# Patient Record
Sex: FEMALE | Race: WHITE | NOT HISPANIC OR LATINO | Employment: OTHER | URBAN - METROPOLITAN AREA
[De-identification: names, ages, dates, MRNs, and addresses within clinical notes are randomized per-mention and may not be internally consistent; named-entity substitution may affect disease eponyms.]

---

## 2023-03-24 DIAGNOSIS — M62.838 MUSCLE SPASM: ICD-10-CM

## 2023-03-24 DIAGNOSIS — J45.40 MODERATE PERSISTENT ASTHMA, UNSPECIFIED WHETHER COMPLICATED (HHS-HCC): Primary | ICD-10-CM

## 2023-03-24 PROBLEM — T78.3XXA ANGIO-EDEMA: Status: ACTIVE | Noted: 2023-03-24

## 2023-03-24 PROBLEM — G47.00 INSOMNIA: Status: ACTIVE | Noted: 2023-03-24

## 2023-03-24 PROBLEM — J44.9 COPD (CHRONIC OBSTRUCTIVE PULMONARY DISEASE) (MULTI): Status: ACTIVE | Noted: 2023-03-24

## 2023-03-24 PROBLEM — L03.116 CELLULITIS OF LEFT LOWER EXTREMITY: Status: ACTIVE | Noted: 2023-03-24

## 2023-03-24 PROBLEM — U07.1 COVID-19: Status: ACTIVE | Noted: 2023-03-24

## 2023-03-24 PROBLEM — R93.5 ABNORMAL ULTRASOUND OF ABDOMEN: Status: ACTIVE | Noted: 2023-03-24

## 2023-03-24 PROBLEM — K76.9 LIVER LESION: Status: ACTIVE | Noted: 2023-03-24

## 2023-03-24 PROBLEM — I73.9 PERIPHERAL VASCULAR DISEASE (CMS-HCC): Status: ACTIVE | Noted: 2023-03-24

## 2023-03-24 PROBLEM — M79.606 LEG PAIN: Status: ACTIVE | Noted: 2023-03-24

## 2023-03-24 PROBLEM — F41.9 ANXIETY: Status: ACTIVE | Noted: 2023-03-24

## 2023-03-24 PROBLEM — I25.10 CAD (CORONARY ARTERY DISEASE): Status: ACTIVE | Noted: 2023-03-24

## 2023-03-24 PROBLEM — I73.9 INTERMITTENT CLAUDICATION (CMS-HCC): Status: ACTIVE | Noted: 2023-03-24

## 2023-03-24 PROBLEM — Z98.890 S/P ARTERIOGRAM OF EXTREMITY: Status: ACTIVE | Noted: 2023-03-24

## 2023-03-24 PROBLEM — E55.9 VITAMIN D DEFICIENCY: Status: ACTIVE | Noted: 2023-03-24

## 2023-03-24 PROBLEM — R20.2 PARESTHESIAS: Status: ACTIVE | Noted: 2023-03-24

## 2023-03-24 RX ORDER — MONTELUKAST SODIUM 10 MG/1
10 TABLET ORAL NIGHTLY
COMMUNITY
Start: 2020-09-25 | End: 2023-03-24 | Stop reason: SDUPTHER

## 2023-03-24 RX ORDER — HYDROXYZINE HYDROCHLORIDE 25 MG/1
25 TABLET, FILM COATED ORAL
COMMUNITY
End: 2023-08-01 | Stop reason: SDUPTHER

## 2023-03-24 RX ORDER — CITALOPRAM 20 MG/1
1 TABLET, FILM COATED ORAL DAILY
COMMUNITY
Start: 2022-06-20 | End: 2023-08-01 | Stop reason: SDUPTHER

## 2023-03-24 RX ORDER — ASPIRIN 81 MG/1
1 TABLET ORAL DAILY
COMMUNITY
Start: 2020-09-25

## 2023-03-24 RX ORDER — AMLODIPINE BESYLATE 5 MG/1
1 TABLET ORAL DAILY
COMMUNITY
Start: 2017-02-10 | End: 2023-04-18 | Stop reason: SDUPTHER

## 2023-03-24 RX ORDER — ROSUVASTATIN CALCIUM 10 MG/1
1 TABLET, COATED ORAL DAILY
COMMUNITY
Start: 2020-09-25 | End: 2023-10-24 | Stop reason: SDUPTHER

## 2023-03-24 RX ORDER — CYCLOBENZAPRINE HCL 10 MG
10 TABLET ORAL 2 TIMES DAILY PRN
Qty: 30 TABLET | Refills: 3 | Status: SHIPPED | OUTPATIENT
Start: 2023-03-24 | End: 2023-08-09 | Stop reason: DRUGHIGH

## 2023-03-24 RX ORDER — ALBUTEROL SULFATE 90 UG/1
1 AEROSOL, METERED RESPIRATORY (INHALATION) EVERY 4 HOURS PRN
COMMUNITY
Start: 2021-11-02 | End: 2023-11-21 | Stop reason: SDUPTHER

## 2023-03-24 RX ORDER — CITALOPRAM 40 MG/1
1 TABLET, FILM COATED ORAL DAILY
COMMUNITY
Start: 2021-11-02 | End: 2023-07-26 | Stop reason: SDUPTHER

## 2023-03-24 RX ORDER — CEPHALEXIN 500 MG/1
500 TABLET ORAL 4 TIMES DAILY
COMMUNITY
Start: 2022-08-09 | End: 2023-10-04 | Stop reason: WASHOUT

## 2023-03-24 RX ORDER — CLOPIDOGREL BISULFATE 75 MG/1
1 TABLET ORAL DAILY
COMMUNITY
Start: 2022-08-23 | End: 2023-11-07 | Stop reason: SDUPTHER

## 2023-03-24 RX ORDER — FLUTICASONE FUROATE, UMECLIDINIUM BROMIDE AND VILANTEROL TRIFENATATE 200; 62.5; 25 UG/1; UG/1; UG/1
1 POWDER RESPIRATORY (INHALATION)
COMMUNITY
Start: 2022-02-04 | End: 2023-11-07 | Stop reason: SDUPTHER

## 2023-03-24 RX ORDER — MONTELUKAST SODIUM 10 MG/1
10 TABLET ORAL NIGHTLY
Qty: 90 TABLET | Refills: 1 | Status: SHIPPED | OUTPATIENT
Start: 2023-03-24 | End: 2023-09-21 | Stop reason: SDUPTHER

## 2023-03-24 RX ORDER — BUSPIRONE HYDROCHLORIDE 15 MG/1
1 TABLET ORAL 3 TIMES DAILY
COMMUNITY
Start: 2020-09-25 | End: 2023-10-04 | Stop reason: WASHOUT

## 2023-03-24 RX ORDER — CYCLOBENZAPRINE HCL 10 MG
10 TABLET ORAL 2 TIMES DAILY PRN
COMMUNITY
Start: 2020-09-14 | End: 2023-03-24 | Stop reason: SDUPTHER

## 2023-03-24 RX ORDER — LORAZEPAM 0.5 MG/1
TABLET ORAL DAILY PRN
COMMUNITY
Start: 2021-12-01 | End: 2023-05-02 | Stop reason: ALTCHOICE

## 2023-03-24 RX ORDER — TRAZODONE HYDROCHLORIDE 50 MG/1
50 TABLET ORAL NIGHTLY
COMMUNITY
Start: 2021-12-01 | End: 2023-05-24 | Stop reason: SDUPTHER

## 2023-03-29 ENCOUNTER — APPOINTMENT (OUTPATIENT)
Dept: PRIMARY CARE | Facility: CLINIC | Age: 68
End: 2023-03-29
Payer: MEDICARE

## 2023-04-18 DIAGNOSIS — I10 PRIMARY HYPERTENSION: Primary | ICD-10-CM

## 2023-04-18 RX ORDER — AMLODIPINE BESYLATE 5 MG/1
5 TABLET ORAL DAILY
Qty: 90 TABLET | Refills: 3 | Status: SHIPPED | OUTPATIENT
Start: 2023-04-18 | End: 2023-10-04 | Stop reason: WASHOUT

## 2023-05-02 ENCOUNTER — OFFICE VISIT (OUTPATIENT)
Dept: PRIMARY CARE | Facility: CLINIC | Age: 68
End: 2023-05-02
Payer: MEDICARE

## 2023-05-02 VITALS
TEMPERATURE: 97.3 F | SYSTOLIC BLOOD PRESSURE: 118 MMHG | WEIGHT: 139 LBS | BODY MASS INDEX: 25.42 KG/M2 | DIASTOLIC BLOOD PRESSURE: 70 MMHG

## 2023-05-02 DIAGNOSIS — R73.9 HYPERGLYCEMIA: ICD-10-CM

## 2023-05-02 DIAGNOSIS — E78.2 MIXED HYPERLIPIDEMIA: ICD-10-CM

## 2023-05-02 DIAGNOSIS — Z12.2 ENCOUNTER FOR SCREENING FOR LUNG CANCER: ICD-10-CM

## 2023-05-02 DIAGNOSIS — R20.2 PARESTHESIAS: ICD-10-CM

## 2023-05-02 DIAGNOSIS — F17.200 CURRENT SMOKER: ICD-10-CM

## 2023-05-02 DIAGNOSIS — I73.9 PERIPHERAL VASCULAR DISEASE (CMS-HCC): ICD-10-CM

## 2023-05-02 DIAGNOSIS — F41.9 ANXIETY: ICD-10-CM

## 2023-05-02 DIAGNOSIS — F17.210 CIGARETTE SMOKER: ICD-10-CM

## 2023-05-02 DIAGNOSIS — Z00.00 MEDICARE ANNUAL WELLNESS VISIT, SUBSEQUENT: ICD-10-CM

## 2023-05-02 DIAGNOSIS — Z00.00 ROUTINE GENERAL MEDICAL EXAMINATION AT HEALTH CARE FACILITY: Primary | ICD-10-CM

## 2023-05-02 DIAGNOSIS — J43.8 OTHER EMPHYSEMA (MULTI): ICD-10-CM

## 2023-05-02 LAB
ESTIMATED AVERAGE GLUCOSE FOR HBA1C: 120 MG/DL
HEMOGLOBIN A1C/HEMOGLOBIN TOTAL IN BLOOD: 5.8 %

## 2023-05-02 PROCEDURE — 83036 HEMOGLOBIN GLYCOSYLATED A1C: CPT

## 2023-05-02 PROCEDURE — 84443 ASSAY THYROID STIM HORMONE: CPT

## 2023-05-02 PROCEDURE — 1170F FXNL STATUS ASSESSED: CPT | Performed by: STUDENT IN AN ORGANIZED HEALTH CARE EDUCATION/TRAINING PROGRAM

## 2023-05-02 PROCEDURE — 4004F PT TOBACCO SCREEN RCVD TLK: CPT | Performed by: STUDENT IN AN ORGANIZED HEALTH CARE EDUCATION/TRAINING PROGRAM

## 2023-05-02 PROCEDURE — 1160F RVW MEDS BY RX/DR IN RCRD: CPT | Performed by: STUDENT IN AN ORGANIZED HEALTH CARE EDUCATION/TRAINING PROGRAM

## 2023-05-02 PROCEDURE — G0439 PPPS, SUBSEQ VISIT: HCPCS | Performed by: STUDENT IN AN ORGANIZED HEALTH CARE EDUCATION/TRAINING PROGRAM

## 2023-05-02 PROCEDURE — G0296 VISIT TO DETERM LDCT ELIG: HCPCS | Performed by: STUDENT IN AN ORGANIZED HEALTH CARE EDUCATION/TRAINING PROGRAM

## 2023-05-02 PROCEDURE — 1159F MED LIST DOCD IN RCRD: CPT | Performed by: STUDENT IN AN ORGANIZED HEALTH CARE EDUCATION/TRAINING PROGRAM

## 2023-05-02 PROCEDURE — 36415 COLL VENOUS BLD VENIPUNCTURE: CPT | Performed by: STUDENT IN AN ORGANIZED HEALTH CARE EDUCATION/TRAINING PROGRAM

## 2023-05-02 PROCEDURE — 99214 OFFICE O/P EST MOD 30 MIN: CPT | Performed by: STUDENT IN AN ORGANIZED HEALTH CARE EDUCATION/TRAINING PROGRAM

## 2023-05-02 PROCEDURE — 80061 LIPID PANEL: CPT

## 2023-05-02 ASSESSMENT — ACTIVITIES OF DAILY LIVING (ADL)
DRESSING: INDEPENDENT
TAKING_MEDICATION: INDEPENDENT
GROCERY_SHOPPING: INDEPENDENT
MANAGING_FINANCES: INDEPENDENT
DOING_HOUSEWORK: INDEPENDENT
BATHING: INDEPENDENT

## 2023-05-02 ASSESSMENT — ENCOUNTER SYMPTOMS
DYSURIA: 0
APPETITE CHANGE: 0
ARTHRALGIAS: 1
SLEEP DISTURBANCE: 0
DYSPHORIC MOOD: 0
COUGH: 1
SHORTNESS OF BREATH: 0
HEADACHES: 0
CHEST TIGHTNESS: 0
CONSTIPATION: 1
ACTIVITY CHANGE: 0
NERVOUS/ANXIOUS: 0
BLOOD IN STOOL: 0
WHEEZING: 0
LIGHT-HEADEDNESS: 0
FATIGUE: 0

## 2023-05-02 ASSESSMENT — PATIENT HEALTH QUESTIONNAIRE - PHQ9
SUM OF ALL RESPONSES TO PHQ9 QUESTIONS 1 AND 2: 0
2. FEELING DOWN, DEPRESSED OR HOPELESS: NOT AT ALL
1. LITTLE INTEREST OR PLEASURE IN DOING THINGS: NOT AT ALL

## 2023-05-02 NOTE — PROGRESS NOTES
Subjective   Patient ID: Radha Toussaint is a 67 y.o. female who presents for Medicare Annual Wellness Visit Subsequent (Medicare annual wellness).  Breathing doing well. Using her trelegy.     Anxiety and sleep much better. Has been using mindfulness and relaxation techniques.     Occasional constipation.     Hernia was bothering her a few months ago. Has stopped.     Arthritis pains, tolerable.     Due for CT lung screening. Discussed pros and cons with her.     Never had mammograms, does not wish to. No prior colon cancer screening. Declines.         Review of Systems   Constitutional:  Negative for activity change, appetite change and fatigue.   HENT:  Negative for congestion.    Eyes:  Negative for visual disturbance.   Respiratory:  Positive for cough. Negative for chest tightness, shortness of breath and wheezing.    Gastrointestinal:  Positive for constipation. Negative for blood in stool.   Genitourinary:  Negative for dysuria.   Musculoskeletal:  Positive for arthralgias.   Neurological:  Negative for light-headedness and headaches.   Psychiatric/Behavioral:  Negative for dysphoric mood and sleep disturbance. The patient is not nervous/anxious.        Objective   Physical Exam  Vitals reviewed.   Constitutional:       General: She is not in acute distress.     Appearance: She is not toxic-appearing.   HENT:      Head: Normocephalic.   Eyes:      Pupils: Pupils are equal, round, and reactive to light.   Cardiovascular:      Rate and Rhythm: Normal rate and regular rhythm.      Pulses: Normal pulses.      Heart sounds: No murmur heard.  Pulmonary:      Effort: Pulmonary effort is normal. No respiratory distress.      Breath sounds: No stridor. Wheezing present. No rhonchi.   Abdominal:      Palpations: Abdomen is soft.      Tenderness: There is no abdominal tenderness.   Musculoskeletal:         General: Normal range of motion.      Cervical back: Normal range of motion.   Skin:     General: Skin is warm and  dry.   Neurological:      General: No focal deficit present.      Mental Status: She is alert. Mental status is at baseline.   Psychiatric:         Mood and Affect: Mood normal.         Behavior: Behavior normal.         Assessment/Plan   Diagnoses and all orders for this visit:  Routine general medical examination at health care facility Medicare annual wellness visit, subsequent  Comments:  declines mammograms, colon cancer screening, flu vaccines  Other emphysema (CMS/HCC)  Current smoker  -     CT lung screening low dose; Future  Encounter for screening for lung cancer  Comments:  counseling provided on low dose CT screening  Orders:  -     CT lung screening low dose; Future  Paresthesias  -     TSH with reflex to Free T4 if abnormal  Peripheral vascular disease (CMS/[facility])  -     Lipid panel  -     Hemoglobin A1c  Mixed hyperlipidemia  -     Lipid panel  Hyperglycemia  -     Hemoglobin A1c  Cigarette smoker  -     CT lung screening low dose; Future    Not interested in smoking cessation    Follow up in 6 months    Francheska Rogel, DO

## 2023-05-03 LAB
CHOLESTEROL (MG/DL) IN SER/PLAS: 160 MG/DL (ref 0–199)
CHOLESTEROL IN HDL (MG/DL) IN SER/PLAS: 35.6 MG/DL
CHOLESTEROL/HDL RATIO: 4.5
LDL: 107 MG/DL (ref 0–99)
THYROTROPIN (MIU/L) IN SER/PLAS BY DETECTION LIMIT <= 0.05 MIU/L: 1.75 MIU/L (ref 0.44–3.98)
TRIGLYCERIDE (MG/DL) IN SER/PLAS: 89 MG/DL (ref 0–149)
VLDL: 18 MG/DL (ref 0–40)

## 2023-05-08 ENCOUNTER — TELEPHONE (OUTPATIENT)
Dept: PRIMARY CARE | Facility: CLINIC | Age: 68
End: 2023-05-08
Payer: MEDICARE

## 2023-05-08 NOTE — TELEPHONE ENCOUNTER
----- Message from Francheska Rogel DO sent at 5/5/2023  9:53 AM EDT -----  Happy birthday!    Labs all look ok. Blood sugar a little elevated, prediabetes. A1c 5.8%- not too bad    Cholesterol and thyroid are good    No changes at this time

## 2023-05-24 DIAGNOSIS — F51.01 PRIMARY INSOMNIA: Primary | ICD-10-CM

## 2023-05-24 RX ORDER — TRAZODONE HYDROCHLORIDE 50 MG/1
50 TABLET ORAL NIGHTLY
Qty: 30 TABLET | Refills: 3 | Status: SHIPPED | OUTPATIENT
Start: 2023-05-24 | End: 2023-08-09 | Stop reason: SDUPTHER

## 2023-07-26 DIAGNOSIS — F41.9 ANXIETY: Primary | ICD-10-CM

## 2023-07-27 RX ORDER — CITALOPRAM 40 MG/1
40 TABLET, FILM COATED ORAL DAILY
Qty: 90 TABLET | Refills: 1 | Status: SHIPPED | OUTPATIENT
Start: 2023-07-27 | End: 2023-10-04 | Stop reason: WASHOUT

## 2023-08-01 ENCOUNTER — OFFICE VISIT (OUTPATIENT)
Dept: PRIMARY CARE | Facility: CLINIC | Age: 68
End: 2023-08-01
Payer: MEDICARE

## 2023-08-01 VITALS
TEMPERATURE: 96.2 F | HEIGHT: 62 IN | DIASTOLIC BLOOD PRESSURE: 64 MMHG | BODY MASS INDEX: 24.46 KG/M2 | SYSTOLIC BLOOD PRESSURE: 122 MMHG | HEART RATE: 76 BPM | OXYGEN SATURATION: 83 % | WEIGHT: 132.9 LBS

## 2023-08-01 DIAGNOSIS — J44.9 CHRONIC OBSTRUCTIVE PULMONARY DISEASE, UNSPECIFIED COPD TYPE (MULTI): ICD-10-CM

## 2023-08-01 DIAGNOSIS — M79.10 MUSCLE PAIN: ICD-10-CM

## 2023-08-01 DIAGNOSIS — C80.1 CANCER (MULTI): ICD-10-CM

## 2023-08-01 DIAGNOSIS — F41.9 ANXIETY: Primary | ICD-10-CM

## 2023-08-01 DIAGNOSIS — K59.00 CONSTIPATION, UNSPECIFIED CONSTIPATION TYPE: ICD-10-CM

## 2023-08-01 PROCEDURE — 96372 THER/PROPH/DIAG INJ SC/IM: CPT | Performed by: STUDENT IN AN ORGANIZED HEALTH CARE EDUCATION/TRAINING PROGRAM

## 2023-08-01 PROCEDURE — 1126F AMNT PAIN NOTED NONE PRSNT: CPT | Performed by: STUDENT IN AN ORGANIZED HEALTH CARE EDUCATION/TRAINING PROGRAM

## 2023-08-01 PROCEDURE — 99215 OFFICE O/P EST HI 40 MIN: CPT | Performed by: STUDENT IN AN ORGANIZED HEALTH CARE EDUCATION/TRAINING PROGRAM

## 2023-08-01 PROCEDURE — 4004F PT TOBACCO SCREEN RCVD TLK: CPT | Performed by: STUDENT IN AN ORGANIZED HEALTH CARE EDUCATION/TRAINING PROGRAM

## 2023-08-01 PROCEDURE — 1160F RVW MEDS BY RX/DR IN RCRD: CPT | Performed by: STUDENT IN AN ORGANIZED HEALTH CARE EDUCATION/TRAINING PROGRAM

## 2023-08-01 PROCEDURE — 1159F MED LIST DOCD IN RCRD: CPT | Performed by: STUDENT IN AN ORGANIZED HEALTH CARE EDUCATION/TRAINING PROGRAM

## 2023-08-01 RX ORDER — CYCLOBENZAPRINE HCL 10 MG
10 TABLET ORAL 3 TIMES DAILY PRN
Qty: 30 TABLET | Refills: 0 | Status: SHIPPED | OUTPATIENT
Start: 2023-08-01 | End: 2024-01-18 | Stop reason: ALTCHOICE

## 2023-08-01 RX ORDER — METHYLPREDNISOLONE SODIUM SUCCINATE 40 MG/ML
40 INJECTION INTRAMUSCULAR; INTRAVENOUS ONCE
Status: COMPLETED | OUTPATIENT
Start: 2023-08-01 | End: 2023-08-01

## 2023-08-01 RX ORDER — SERTRALINE HYDROCHLORIDE 25 MG/1
25 TABLET, FILM COATED ORAL DAILY
Qty: 30 TABLET | Refills: 1 | Status: SHIPPED | OUTPATIENT
Start: 2023-08-01 | End: 2023-09-25 | Stop reason: SDUPTHER

## 2023-08-01 RX ORDER — PREDNISONE 10 MG/1
TABLET ORAL
Qty: 30 TABLET | Refills: 0 | Status: SHIPPED | OUTPATIENT
Start: 2023-08-01 | End: 2023-08-15

## 2023-08-01 RX ORDER — SENNOSIDES 8.6 MG/1
1 TABLET ORAL DAILY
Qty: 30 TABLET | Refills: 11 | Status: SHIPPED | OUTPATIENT
Start: 2023-08-01 | End: 2023-10-16 | Stop reason: SDUPTHER

## 2023-08-01 RX ORDER — AZITHROMYCIN 250 MG/1
TABLET, FILM COATED ORAL
Qty: 6 TABLET | Refills: 0 | Status: SHIPPED | OUTPATIENT
Start: 2023-08-01 | End: 2023-08-09 | Stop reason: ALTCHOICE

## 2023-08-01 RX ORDER — IPRATROPIUM BROMIDE AND ALBUTEROL SULFATE 2.5; .5 MG/3ML; MG/3ML
3 SOLUTION RESPIRATORY (INHALATION)
Qty: 360 ML | Refills: 11 | Status: SHIPPED | OUTPATIENT
Start: 2023-08-01 | End: 2024-05-07 | Stop reason: WASHOUT

## 2023-08-01 RX ORDER — HYDROXYZINE HYDROCHLORIDE 25 MG/1
25 TABLET, FILM COATED ORAL 3 TIMES DAILY
Qty: 90 TABLET | Refills: 3 | Status: SHIPPED | OUTPATIENT
Start: 2023-08-01 | End: 2023-10-04 | Stop reason: WASHOUT

## 2023-08-01 RX ADMIN — METHYLPREDNISOLONE SODIUM SUCCINATE 40 MG: 40 INJECTION INTRAMUSCULAR; INTRAVENOUS at 11:30

## 2023-08-01 NOTE — PROGRESS NOTES
"Subjective   Patient ID: Radha Toussaint is a 68 y.o. female who presents for Fatigue (Weakness, difficulty sleeping), Anxiety (Increased anxiety and panic attacks), Hospital Follow-up (In the ER on 7/26), and Constipation (Hasn't had a bowel movement since being in the ER).    HPI     COPD: severe copd, states has oxygen at home but is wearing only at night.was 83% when walking but 95% at rest, adivsed to wear oxygen 24 7. Has trelegy and nebulizer that she doesn't like or use. Advised she needs to take this to help with her breathing,     Anxiety: has sever anxiety as she has new dx of cancer and going for biopsy on 11th. Causing her great disterss and worrying, did my best to ease her anxiety.    Cancer; has follow up with oncology, мария lung cancer given smoking hx    Review of Systems    Objective   /64 (BP Location: Right arm, Patient Position: Sitting)   Pulse 76   Temp 35.7 °C (96.2 °F) (Temporal)   Ht 1.575 m (5' 2\")   Wt 60.3 kg (132 lb 14.4 oz)   SpO2 (!) 83% Comment: on room air  BMI 24.31 kg/m²     Physical Exam  Constitutional:       Appearance: Normal appearance.   HENT:      Head: Normocephalic and atraumatic.      Right Ear: Tympanic membrane and ear canal normal.      Left Ear: Tympanic membrane and ear canal normal.      Mouth/Throat:      Mouth: Mucous membranes are moist.      Pharynx: Oropharynx is clear.   Eyes:      Extraocular Movements: Extraocular movements intact.      Conjunctiva/sclera: Conjunctivae normal.      Pupils: Pupils are equal, round, and reactive to light.   Cardiovascular:      Rate and Rhythm: Normal rate and regular rhythm.      Pulses: Normal pulses.      Heart sounds: Normal heart sounds.   Pulmonary:      Effort: Pulmonary effort is normal.      Breath sounds: Normal breath sounds.   Abdominal:      General: Abdomen is flat. Bowel sounds are normal.      Palpations: Abdomen is soft.   Musculoskeletal:         General: Normal range of motion.      Cervical " back: Normal range of motion and neck supple.   Skin:     General: Skin is warm and dry.      Capillary Refill: Capillary refill takes 2 to 3 seconds.   Neurological:      General: No focal deficit present.      Mental Status: She is alert and oriented to person, place, and time. Mental status is at baseline.   Psychiatric:         Mood and Affect: Mood normal.         Behavior: Behavior normal.         Thought Content: Thought content normal.         Judgment: Judgment normal.         Assessment/Plan   1. Anxiety    - predniSONE (Deltasone) 10 mg tablet; Take 5 tablets (50 mg) by mouth once daily for 3 days, THEN 4 tablets (40 mg) once daily for 2 days, THEN 3 tablets (30 mg) once daily for 3 days, THEN 2 tablets (20 mg) once daily for 3 days, THEN 1 tablet (10 mg) once daily for 3 days.  Dispense: 30 tablet; Refill: 0  - hydrOXYzine HCL (Atarax) 25 mg tablet; Take 1 tablet (25 mg) by mouth 3 times a day. At onset of panic, can take 2nd tab 1 hour later if needed can take 3rd tab 8 hours later  Dispense: 90 tablet; Refill: 3  - sertraline (Zoloft) 25 mg tablet; Take 1 tablet (25 mg) by mouth once daily.  Dispense: 30 tablet; Refill: 1    2. Chronic obstructive pulmonary disease, unspecified COPD type (CMS/HCC)  - sterodi taper and IM shot of steroids  - oxygen 24/7, breztri as welll  - if no better advised to go back to hospital  - azithromycin (Zithromax) 250 mg tablet; Take 2 tablets (500 mg) by mouth once daily for 1 day, THEN 1 tablet (250 mg) once daily for 4 days. Take 2 tabs (500 mg) by mouth today, than 1 daily for 4 days..  Dispense: 6 tablet; Refill: 0  - ipratropium-albuteroL (Duo-Neb) 0.5-2.5 mg/3 mL nebulizer solution; Take 3 mL by nebulization 4 times a day.  Dispense: 360 mL; Refill: 11    3. Muscle pain    - cyclobenzaprine (Flexeril) 10 mg tablet; Take 1 tablet (10 mg) by mouth 3 times a day as needed for muscle spasms.  Dispense: 30 tablet; Refill: 0    4. Constipation, unspecified constipation  type    - sennosides (senna) 8.6 mg tablet; Take 1 tablet (8.6 mg) by mouth once daily.  Dispense: 30 tablet; Refill: 11    5. Cancer (CMS/HCC)  - biopsy amanda. Will need to get this down in order to give prognosis  - Referral to Oncology; Future        non-distended/non-tender

## 2023-08-07 DIAGNOSIS — J43.8 OTHER EMPHYSEMA (MULTI): Primary | ICD-10-CM

## 2023-08-09 ENCOUNTER — TELEPHONE (OUTPATIENT)
Dept: PRIMARY CARE | Facility: CLINIC | Age: 68
End: 2023-08-09

## 2023-08-09 DIAGNOSIS — F51.01 PRIMARY INSOMNIA: ICD-10-CM

## 2023-08-09 RX ORDER — TRAZODONE HYDROCHLORIDE 50 MG/1
50 TABLET ORAL NIGHTLY
Qty: 30 TABLET | Refills: 3 | Status: SHIPPED | OUTPATIENT
Start: 2023-08-09 | End: 2023-08-10 | Stop reason: SDUPTHER

## 2023-08-10 RX ORDER — TRAZODONE HYDROCHLORIDE 100 MG/1
100 TABLET ORAL NIGHTLY
Qty: 90 TABLET | Refills: 1 | Status: SHIPPED | OUTPATIENT
Start: 2023-08-10 | End: 2023-10-04 | Stop reason: WASHOUT

## 2023-08-10 NOTE — TELEPHONE ENCOUNTER
Patient's  Alexis came in office stating Radha was in hospital with  he put her on trazodone 50 mg told her to double up and take twice a day patient is now almost out  50 mg was sent yesterday when it was supposed to be 100 mg insurance won't cover the 50 mg Trazodone they will only cover a new prescription. Please send back to me when done patient would like a call back. Pharmacy is [address] [place] [facility] [id number]

## 2023-08-11 ENCOUNTER — HOSPITAL ENCOUNTER (OUTPATIENT)
Dept: DATA CONVERSION | Facility: HOSPITAL | Age: 68
End: 2023-08-11
Attending: NURSE PRACTITIONER | Admitting: NURSE PRACTITIONER
Payer: MEDICARE

## 2023-08-11 DIAGNOSIS — E27.8 OTHER SPECIFIED DISORDERS OF ADRENAL GLAND (MULTI): ICD-10-CM

## 2023-08-11 DIAGNOSIS — C79.71 SECONDARY MALIGNANT NEOPLASM OF RIGHT ADRENAL GLAND (MULTI): ICD-10-CM

## 2023-08-11 LAB
INR IN PPP BY COAGULATION ASSAY: 1.2 (ref 0.9–1.1)
PROTHROMBIN TIME (PT) IN PPP BY COAGULATION ASSAY: 13.9 SEC (ref 9.8–12.8)

## 2023-08-23 LAB
COMPLETE PATHOLOGY REPORT: NORMAL
CONVERTED ADDENDUM DIAGNOSIS 2: NORMAL
CONVERTED ADDENDUM DIAGNOSIS 3: NORMAL
CONVERTED ADDENDUM DIAGNOSIS 4: NORMAL
CONVERTED CLINICAL DIAGNOSIS-HISTORY: NORMAL
CONVERTED DIAGNOSIS COMMENT: NORMAL
CONVERTED FINAL DIAGNOSIS: NORMAL
CONVERTED FINAL REPORT PDF LINK TO COPY AND PASTE: NORMAL
CONVERTED GROSS DESCRIPTION: NORMAL

## 2023-08-25 ENCOUNTER — PATIENT OUTREACH (OUTPATIENT)
Dept: CARE COORDINATION | Facility: CLINIC | Age: 68
End: 2023-08-25
Payer: MEDICARE

## 2023-08-25 DIAGNOSIS — R53.1 WEAKNESS: ICD-10-CM

## 2023-08-25 RX ORDER — FAMOTIDINE 40 MG/1
40 TABLET, FILM COATED ORAL DAILY
Status: ON HOLD | COMMUNITY
End: 2024-01-23 | Stop reason: ENTERED-IN-ERROR

## 2023-08-25 NOTE — PROGRESS NOTES
Discharge Facility:[facility]  Discharge Diagnosis:R53.1 Weakness  Admission Date:8/21/23  Discharge Date: 8/23/23    PCP Appointment Date:Task sent to the office  Specialist Appointment Date:   Hospital Encounter and Summary: not available at this time  See discharge assessment below for further details      Engagement  Call Start Time: 0905 (8/25/2023  9:05 AM)    Medications  Medications reviewed with patient/caregiver?: Yes (8/25/2023  9:05 AM)  Is the patient having any side effects they believe may be caused by any medication additions or changes?: No (8/25/2023  9:05 AM)  Does the patient have all medications ordered at discharge?: Yes (8/25/2023  9:05 AM)  Care Management Interventions: Provided patient education (8/25/2023  9:05 AM)  Is the patient taking all medications as directed (includes completed medication regime)?: Yes (8/25/2023  9:05 AM)  Care Management Interventions: Provided patient education (8/25/2023  9:05 AM)  Medication Comments: Per daughter patient was discharged with Pepcid 40mg prn (8/25/2023  9:05 AM)    Appointments  Does the patient have a primary care provider?: Yes (8/25/2023  9:05 AM)  Care Management Interventions: Advised patient to make appointment (8/25/2023  9:05 AM)  Has the patient kept scheduled appointments due by today?: Yes (8/25/2023  9:05 AM)    Self Management  Has home health visited the patient within 72 hours of discharge?: Not applicable (8/25/2023  9:05 AM)    Patient Teaching  Does the patient have access to their discharge instructions?: Yes (8/25/2023  9:05 AM)  Care Management Interventions: Reviewed instructions with patient (8/25/2023  9:05 AM)  What is the patient's perception of their health status since discharge?: Improving (8/25/2023  9:05 AM)  Is the patient/caregiver able to teach back the hierarchy of who to call/visit for symptoms/problems? PCP, Specialist, Home Health nurse, Urgent Care, ED, 911: Yes (8/25/2023  9:05 AM)

## 2023-09-05 ENCOUNTER — APPOINTMENT (OUTPATIENT)
Dept: PRIMARY CARE | Facility: CLINIC | Age: 68
End: 2023-09-05
Payer: MEDICARE

## 2023-09-05 LAB
THYROTROPIN (MIU/L) IN SER/PLAS BY DETECTION LIMIT <= 0.05 MIU/L: 6.39 MIU/L (ref 0.44–3.98)
THYROXINE (T4) FREE (NG/DL) IN SER/PLAS: 1.2 NG/DL (ref 0.61–1.12)

## 2023-09-06 ENCOUNTER — PATIENT OUTREACH (OUTPATIENT)
Dept: CARE COORDINATION | Facility: CLINIC | Age: 68
End: 2023-09-06
Payer: MEDICARE

## 2023-09-21 DIAGNOSIS — C34.90 NON-SMALL CELL LUNG CANCER, UNSPECIFIED LATERALITY (MULTI): Primary | ICD-10-CM

## 2023-09-21 DIAGNOSIS — J45.40 MODERATE PERSISTENT ASTHMA, UNSPECIFIED WHETHER COMPLICATED (HHS-HCC): ICD-10-CM

## 2023-09-21 RX ORDER — MONTELUKAST SODIUM 10 MG/1
10 TABLET ORAL NIGHTLY
Qty: 90 TABLET | Refills: 1 | Status: SHIPPED | OUTPATIENT
Start: 2023-09-21 | End: 2024-03-19 | Stop reason: SDUPTHER

## 2023-09-21 RX ORDER — HEPARIN 100 UNIT/ML
500 SYRINGE INTRAVENOUS AS NEEDED
Status: CANCELLED | OUTPATIENT
Start: 2023-10-02

## 2023-09-21 RX ORDER — HEPARIN SODIUM,PORCINE/PF 10 UNIT/ML
50 SYRINGE (ML) INTRAVENOUS AS NEEDED
Status: CANCELLED | OUTPATIENT
Start: 2023-10-02

## 2023-09-25 VITALS — BODY MASS INDEX: 24.14 KG/M2 | HEIGHT: 62 IN | WEIGHT: 131.17 LBS

## 2023-09-25 DIAGNOSIS — C34.90 NON-SMALL CELL LUNG CANCER, UNSPECIFIED LATERALITY (MULTI): Primary | ICD-10-CM

## 2023-09-25 DIAGNOSIS — F41.9 ANXIETY: ICD-10-CM

## 2023-09-25 RX ORDER — PALONOSETRON 0.05 MG/ML
0.25 INJECTION, SOLUTION INTRAVENOUS ONCE
Status: CANCELLED | OUTPATIENT
Start: 2023-10-04

## 2023-09-25 RX ORDER — DIPHENHYDRAMINE HCL 50 MG
50 CAPSULE ORAL ONCE
Status: CANCELLED | OUTPATIENT
Start: 2023-10-04

## 2023-09-25 RX ORDER — FAMOTIDINE 10 MG/ML
20 INJECTION INTRAVENOUS ONCE AS NEEDED
Status: CANCELLED | OUTPATIENT
Start: 2023-10-04

## 2023-09-25 RX ORDER — FAMOTIDINE 10 MG/ML
20 INJECTION INTRAVENOUS ONCE
Status: CANCELLED | OUTPATIENT
Start: 2023-10-04

## 2023-09-25 RX ORDER — EPINEPHRINE 0.3 MG/.3ML
0.3 INJECTION SUBCUTANEOUS EVERY 5 MIN PRN
Status: CANCELLED | OUTPATIENT
Start: 2023-10-04

## 2023-09-25 RX ORDER — PROCHLORPERAZINE MALEATE 10 MG
10 TABLET ORAL EVERY 6 HOURS PRN
Status: CANCELLED | OUTPATIENT
Start: 2023-10-04

## 2023-09-25 RX ORDER — ALBUTEROL SULFATE 0.83 MG/ML
3 SOLUTION RESPIRATORY (INHALATION) AS NEEDED
Status: CANCELLED | OUTPATIENT
Start: 2023-10-04

## 2023-09-25 RX ORDER — PROCHLORPERAZINE EDISYLATE 5 MG/ML
10 INJECTION INTRAMUSCULAR; INTRAVENOUS EVERY 6 HOURS PRN
Status: CANCELLED | OUTPATIENT
Start: 2023-10-04

## 2023-09-25 RX ORDER — SERTRALINE HYDROCHLORIDE 25 MG/1
25 TABLET, FILM COATED ORAL DAILY
Qty: 90 TABLET | Refills: 1 | Status: SHIPPED | OUTPATIENT
Start: 2023-09-25 | End: 2023-10-27 | Stop reason: SDUPTHER

## 2023-09-25 RX ORDER — DIPHENHYDRAMINE HYDROCHLORIDE 50 MG/ML
50 INJECTION INTRAMUSCULAR; INTRAVENOUS AS NEEDED
Status: CANCELLED | OUTPATIENT
Start: 2023-10-04

## 2023-10-02 DIAGNOSIS — C34.91 NON-SMALL CELL CANCER OF RIGHT LUNG (MULTI): Primary | ICD-10-CM

## 2023-10-04 ENCOUNTER — OFFICE VISIT (OUTPATIENT)
Dept: PALLIATIVE MEDICINE | Facility: HOSPITAL | Age: 68
End: 2023-10-04
Payer: MEDICARE

## 2023-10-04 ENCOUNTER — OFFICE VISIT (OUTPATIENT)
Dept: HEMATOLOGY/ONCOLOGY | Facility: HOSPITAL | Age: 68
End: 2023-10-04
Payer: MEDICARE

## 2023-10-04 ENCOUNTER — APPOINTMENT (OUTPATIENT)
Dept: PALLIATIVE MEDICINE | Facility: CLINIC | Age: 68
End: 2023-10-04
Payer: MEDICARE

## 2023-10-04 VITALS
SYSTOLIC BLOOD PRESSURE: 134 MMHG | TEMPERATURE: 97 F | WEIGHT: 134.04 LBS | OXYGEN SATURATION: 93 % | HEART RATE: 77 BPM | BODY MASS INDEX: 23.75 KG/M2 | HEIGHT: 63 IN | RESPIRATION RATE: 18 BRPM | DIASTOLIC BLOOD PRESSURE: 63 MMHG

## 2023-10-04 DIAGNOSIS — Z51.81 ENCOUNTER FOR MONITORING OPIOID MAINTENANCE THERAPY: ICD-10-CM

## 2023-10-04 DIAGNOSIS — R63.0 POOR APPETITE: ICD-10-CM

## 2023-10-04 DIAGNOSIS — C34.90 NON-SMALL CELL LUNG CANCER, UNSPECIFIED LATERALITY (MULTI): Primary | ICD-10-CM

## 2023-10-04 DIAGNOSIS — Z79.891 ENCOUNTER FOR MONITORING OPIOID MAINTENANCE THERAPY: ICD-10-CM

## 2023-10-04 DIAGNOSIS — G89.3 CANCER RELATED PAIN: ICD-10-CM

## 2023-10-04 DIAGNOSIS — Z51.5 PALLIATIVE CARE ENCOUNTER: ICD-10-CM

## 2023-10-04 PROCEDURE — 1126F AMNT PAIN NOTED NONE PRSNT: CPT | Performed by: INTERNAL MEDICINE

## 2023-10-04 PROCEDURE — 99214 OFFICE O/P EST MOD 30 MIN: CPT | Mod: GC | Performed by: INTERNAL MEDICINE

## 2023-10-04 PROCEDURE — 4004F PT TOBACCO SCREEN RCVD TLK: CPT | Performed by: INTERNAL MEDICINE

## 2023-10-04 PROCEDURE — 99214 OFFICE O/P EST MOD 30 MIN: CPT | Performed by: INTERNAL MEDICINE

## 2023-10-04 PROCEDURE — 3008F BODY MASS INDEX DOCD: CPT | Performed by: INTERNAL MEDICINE

## 2023-10-04 PROCEDURE — 1159F MED LIST DOCD IN RCRD: CPT | Performed by: INTERNAL MEDICINE

## 2023-10-04 PROCEDURE — 1160F RVW MEDS BY RX/DR IN RCRD: CPT | Performed by: INTERNAL MEDICINE

## 2023-10-04 PROCEDURE — 99214 OFFICE O/P EST MOD 30 MIN: CPT | Mod: 25 | Performed by: INTERNAL MEDICINE

## 2023-10-04 RX ORDER — ALBUTEROL SULFATE 0.83 MG/ML
3 SOLUTION RESPIRATORY (INHALATION) AS NEEDED
Status: CANCELLED | OUTPATIENT
Start: 2023-10-06

## 2023-10-04 RX ORDER — EPINEPHRINE 0.3 MG/.3ML
0.3 INJECTION SUBCUTANEOUS EVERY 5 MIN PRN
Status: CANCELLED | OUTPATIENT
Start: 2023-10-06

## 2023-10-04 RX ORDER — DIPHENHYDRAMINE HYDROCHLORIDE 50 MG/ML
50 INJECTION INTRAMUSCULAR; INTRAVENOUS AS NEEDED
Status: CANCELLED | OUTPATIENT
Start: 2023-10-06

## 2023-10-04 RX ORDER — HYDROMORPHONE HYDROCHLORIDE 2 MG/1
2 TABLET ORAL 3 TIMES DAILY PRN
Qty: 90 TABLET | Refills: 0 | Status: SHIPPED | OUTPATIENT
Start: 2023-10-04 | End: 2023-10-05 | Stop reason: SDUPTHER

## 2023-10-04 RX ORDER — NALOXONE HYDROCHLORIDE 4 MG/.1ML
4 SPRAY NASAL AS NEEDED
Qty: 2 EACH | Refills: 0 | Status: SHIPPED | OUTPATIENT
Start: 2023-10-04 | End: 2024-01-18 | Stop reason: WASHOUT

## 2023-10-04 RX ORDER — MIRTAZAPINE 7.5 MG/1
7.5 TABLET, FILM COATED ORAL NIGHTLY
Qty: 60 TABLET | Refills: 2 | Status: SHIPPED | OUTPATIENT
Start: 2023-10-04 | End: 2023-11-07 | Stop reason: SINTOL

## 2023-10-04 RX ORDER — FAMOTIDINE 10 MG/ML
20 INJECTION INTRAVENOUS ONCE AS NEEDED
Status: CANCELLED | OUTPATIENT
Start: 2023-10-06

## 2023-10-04 ASSESSMENT — ENCOUNTER SYMPTOMS
CONSTIPATION: 0
DIARRHEA: 0
VOMITING: 0
NAUSEA: 0
COUGH: 0
DIARRHEA: 0
FATIGUE: 0
ABDOMINAL PAIN: 0
SHORTNESS OF BREATH: 1
FATIGUE: 0
VOMITING: 0
NAUSEA: 0
APPETITE CHANGE: 0
UNEXPECTED WEIGHT CHANGE: 0
NERVOUS/ANXIOUS: 1
COUGH: 0
NUMBNESS: 1
MYALGIAS: 1
DIFFICULTY URINATING: 0
ARTHRALGIAS: 0
ACTIVITY CHANGE: 0
EYE PROBLEMS: 0
BACK PAIN: 0
LEG SWELLING: 1
ADENOPATHY: 0
APPETITE CHANGE: 0
CONSTIPATION: 0
HEADACHES: 0
SHORTNESS OF BREATH: 0
DIZZINESS: 0

## 2023-10-04 ASSESSMENT — PAIN SCALES - GENERAL: PAINLEVEL: 0-NO PAIN

## 2023-10-04 NOTE — PROGRESS NOTES
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 Parada).     Today, the patient was seen with her  Alexis. Overall, the patient reports feeling well. She denies any acute complaints, but reports being frustrated with the delay in her infusion starting.     Symptoms  [place] Symptom Assessment Scores  Pain: Under  control with current regimen. Describes intermittent tingling in her foot, cramping in her calves. Improves with dilaudid (takes 2 mg BID).    SOB: Chronic with exertion, on 2 L baseline   Fatigue: Denies   Sleep: No complaints  Constipation: Reports having multiple small bowel movements each day  Nausea: Denies  Cough: Denies   Anxiety: Chronic, has been taking medications since age 26 for this   Depression: Denies  Appetite: Reports good appetite and recent 3 pound weight gain   Drowsiness: Denies.     Serious Illness Conversation  How much information about what is likely to be ahead with your illness would you like: Pt and  would like all information  What is your understanding now of where you are with your illness:  Pt understands she has stage IV cancer in her lungs and adrenals   What brings you [place]: , grandchildren, smoking, camping, housekeeping  What are you hoping for: Remission  What are your fears, worries, or concerns about the future: Denies any fears or concerns   What are some functions that are so critical that you can't imagine living without: Patient reports wanting to be very independent as she has been her entire life.   How much does your family know about your priorities and wishes:  Family is aware of patient's priorities and wishes     Patient ID: Radha Toussaint is a 68 y.o. female  who presents for No chief complaint on file..    HPI    Palliative Care Social History  Social History: Living situation - 1 story home with  and grandkids and Self-identifed support system: 3 children and  (Alexis)   Roman Catholic/Cultural/Spiritual:  pt is Yazidi, prays everyday, is interested in speaking with      Prior Hospitalizations: not asked  History obtained from: the patient and      Review of Systems   Constitutional:  Negative for activity change, appetite change and fatigue.   Respiratory:  Positive for shortness of breath. Negative for cough.    Cardiovascular:  Negative for chest pain.   Gastrointestinal:  Negative for constipation, diarrhea, nausea and vomiting.   Musculoskeletal:  Positive for myalgias.   Neurological:  Positive for numbness.   Psychiatric/Behavioral:  The patient is nervous/anxious.      Objective   Physical Exam  Constitutional:       General: She is not in acute distress.     Appearance: Normal appearance.   HENT:      Head: Normocephalic and atraumatic.      Mouth/Throat:      Mouth: Mucous membranes are moist.      Pharynx: Oropharynx is clear.   Eyes:      Extraocular Movements: Extraocular movements intact.      Conjunctiva/sclera: Conjunctivae normal.      Pupils: Pupils are equal, round, and reactive to light.   Cardiovascular:      Rate and Rhythm: Normal rate and regular rhythm.      Pulses: Normal pulses.      Heart sounds: Normal heart sounds.   Pulmonary:      Effort: Pulmonary effort is normal.      Breath sounds: Normal breath sounds. No wheezing.   Musculoskeletal:         General: No swelling, tenderness or deformity.      Cervical back: Normal range of motion and neck supple.   Skin:     General: Skin is warm and dry.   Neurological:      General: No focal deficit present.      Mental Status: She is alert and oriented to person, place, and time.      Sensory: No sensory deficit.   Psychiatric:         Mood and Affect: Mood normal.          Behavior: Behavior normal.     Assessment/Plan   Palliative Assessment   Functional assessment: Patient reports being fairly active, spends > 50% out of bed      Prognosis: 1-2 years     Decisional Capacity: intact  Patient's understanding of illness: Good  Patient goals of care: Remission, independence     Advance Care Planning  Advance Directives on file?: <no information>  Health Care Directive on file?: Unclear, patient is unaware of signing any POA paperwork     Allergies  Ace inhibitors, Demerol [meperidine], and Iodinated contrast media    Last Recorded Vitals  There were no vitals taken for this visit.    Relevant Results  Lab Results   Component Value Date    WBC 9.6 10/04/2023    HGB 10.5 (L) 10/04/2023    HCT 35.1 (L) 10/04/2023    MCV 79 (L) 10/04/2023     10/04/2023      Lab Results   Component Value Date    GLUCOSE 87 10/04/2023    CALCIUM 7.9 (L) 10/04/2023     10/04/2023    K 4.7 10/04/2023    CO2 27 10/04/2023     10/04/2023    BUN 13 10/04/2023    CREATININE 0.56 10/04/2023      Lab Results   Component Value Date    ALT 12 10/04/2023    AST 26 10/04/2023    ALKPHOS 57 10/04/2023    BILITOT 0.4 10/04/2023        Assessment and Plan  Code Status  Full Code   Patient is a capable decision maker: Yes  Surrogate decision maker: - Alexis   Estimated life expectancy Provider estimate of survival: 6+ months    Radha Toussaint is a is a 68 F with PMH of chronic respiratory failure COPD 2 L at night, CAD s/p PCI, HTN, HLD, left MCA s/p clip, PVD s/p  femorofemoral bypass, AAA s/p graft repair, and newly diagnosed 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 Parada).     CA related pain  Poor appetite  Insomnia  CIPN    Recommendations:  - Updated medication reconciliation performed   - Continue current pain management regiment. Patient is reporting pain is well controlled with dilaudid 2 mg BID  - Continue with mirtazapine, patient reports improvement  in sleep and appetite with the medication    - Can consider changing Zoloft to duloxetine in the future given patient's complaints of neuropathy. Will discuss with PCP  - Discussed with patient a few coping strategies for anxiety including controlled breathing and thought redirecting     Goals of Care  Most important health related goal: Remission, independence     Functional Status           Caregiving/Caregiver Support  Does the patient require assistance in some or all components of his care, including coordination of medical care? No  If Yes, which person serves that role?  spouse   Caregiver emotional or practical needs:  NA    Discharge and Social Considerations  Will see patient again in 6 weeks at next infusion appointment.       Lissett Marroquin MD

## 2023-10-04 NOTE — PROGRESS NOTES
[facility] - Medical Oncology Follow-Up Visit    Patient ID: Radha Toussaint is a 68 y.o. female with stage IV poorly differentiated carcinoma (unknown primary but likely of the lung) with no actionable mutations and PD-L1 70%.     Current therapy: carboplatin/paclitaxel/pembrolizumab     Chief Concern: Here today for follow-up and treatment readiness visit    HPI:  Patient here today with her . She reports this last week she has felt amazing - best she has in months. Denies back pain. Last took dilaudid 3 days ago. Her legs have been achey with chemo and intermittently has had swelling and tingling. No tingling right now, seems worse at night. Legs just feel heavy if she is up and around a lot. Has been doing a lot at home - cleaned out the camper van yest. Reports gaining 4lbs. No further black stools, bowels moving ok.      Diagnosis: NSCLC - poorly differentiated carcinoma, suspect lung primary  Stage:  Cancer Staging   Non-small cell lung cancer (NSCLC) (CMS/[facility])  Staging form: Lung, AJCC 8th Edition  - Clinical: Stage IVB (cTX, cNX, pM1c) - Signed by Francheska Parada MD on 9/23/2023     Current sites of disease: Adrenals bilaterally, possible lung/hilum  Molecular and ancillary testing:   PD-L1 70%  LAKHWINDER amplification   B2M p.[id number] ([id number] c.[id number]), subclonal   CDKN2A p.A127P ([id number] c.379G>C)   NRAS p.G12A ([id number] c.35G>C), subclonal   TP53 p.R249S ([id number] c.747G>T)     Oncologic History:  7/26/23 - Presented to ER with 1m of weakness, dyspnea, abd pain and prior fall, Found to have bilateral adrenal masses on CT A/P, possible R hilar LN  8/11/23 - Biopsy of adrenal mass with poorly differentiated carcinoma  8/29-9/2/23 - [place] [facility] [place], [place]ension, found to have adrenal insufficiency due to bilateral adrenal masses. Improved with hydrocortisone. PET scan completed without obvious primary (small effusion, small  lung nodules, R  hilar LN mildly FDG-avid)  9/14/23 - C1 carboplatin/paclitaxel/pembrolizumab (dose-reduced chemotherapy)    Oncology History   Non-small cell lung cancer (NSCLC) (CMS/HCC)   9/14/2023 -  Chemotherapy    Pembrolizumab + PACLitaxel / CARBOplatin, 21 Day Cycles     9/21/2023 Initial Diagnosis    Non-small cell lung cancer (NSCLC) (CMS/HCC)     9/23/2023 Cancer Staged    Staging form: Lung, AJCC 8th Edition, Clinical: Stage IVB (cTX, cNX, pM1c) - Signed by Francheska Parada MD on 9/23/2023         Meds (Current):    Current Outpatient Medications:     albuterol 90 mcg/actuation inhaler, Inhale 1 puff every 4 hours if needed., Disp: , Rfl:     amLODIPine (Norvasc) 5 mg tablet, Take 1 tablet (5 mg) by mouth once daily., Disp: 90 tablet, Rfl: 3    aspirin 81 mg EC tablet, Take 1 tablet (81 mg) by mouth once daily., Disp: , Rfl:     busPIRone (Buspar) 15 mg tablet, Take 1 tablet (15 mg) by mouth 3 times a day., Disp: , Rfl:     cephalexin (Keftab) 500 mg tablet, Take 1 tablet (500 mg) by mouth 4 times a day., Disp: , Rfl:     citalopram (CeleXA) 40 mg tablet, Take 1 tablet (40 mg) by mouth once daily., Disp: 90 tablet, Rfl: 1    clopidogrel (Plavix) 75 mg tablet, Take 1 tablet (75 mg) by mouth once daily., Disp: , Rfl:     cyclobenzaprine (Flexeril) 10 mg tablet, Take 1 tablet (10 mg) by mouth 3 times a day as needed for muscle spasms., Disp: 30 tablet, Rfl: 0    famotidine (Pepcid) 40 mg tablet, Take 1 tablet (40 mg) by mouth once daily as needed for heartburn., Disp: , Rfl:     fludrocortisone (Florinef) 0.1 mg tablet, TAKE 1/2 TABLET BY MOUTH ONCE DAILY, Disp: 30 tablet, Rfl: 2    fluticasone-umeclidin-vilanter (Trelegy Ellipta) 200-62.5-25 mcg blister with device, Inhale 1 puff once daily., Disp: , Rfl:     hydrocortisone (Cortef) 10 mg tablet, TAKE 2 TABLETS AT 8AM THEN TAKE 1 TABLET AT NOON THEN TAKE 1/2 TABLET AT 4PM FOR 7 DAYS, Disp: 25 tablet, Rfl: 0    hydrocortisone (Cortef) 5 mg tablet, STARTING  ON 9/8 TAKE 2 TABS AT 8AM THEN TAKE 1 TABLET AT NOON THEN TAKE 1/2 TABLET AT 4PM, Disp: 180 tablet, Rfl: 0    HYDROmorphone (Dilaudid) 2 mg tablet, TAKE 1 TABLET BY MOUTH EVERY 4 HOURS WHILE AWAKE AS NEEDED FOR PAIN-SEVERE (7-10), Disp: 50 tablet, Rfl: 0    hydrOXYzine HCL (Atarax) 25 mg tablet, Take 1 tablet (25 mg) by mouth 3 times a day. At onset of panic, can take 2nd tab 1 hour later if needed can take 3rd tab 8 hours later, Disp: 90 tablet, Rfl: 3    ipratropium-albuteroL (Duo-Neb) 0.5-2.5 mg/3 mL nebulizer solution, Take 3 mL by nebulization 4 times a day., Disp: 360 mL, Rfl: 11    LORazepam (Ativan) 0.5 mg tablet, TAKE 1/2 TABLET BY MOUTH THREE TIMES A DAY AS NEEDED FOR ANXIETY, Disp: 30 tablet, Rfl: 0    mirtazapine (Remeron) 7.5 mg tablet, TAKE 1 TABLET BY MOUTH EVERY NIGHT AT BEDTIME, Disp: 60 tablet, Rfl: 2    montelukast (Singulair) 10 mg tablet, Take 1 tablet (10 mg) by mouth once daily at bedtime., Disp: 90 tablet, Rfl: 1    ondansetron ODT (Zofran-ODT) 4 mg disintegrating tablet, DISSOLVE 1 TABLET IN MOUTH BY MOUTH THREE TIMES A DAY AS NEEDED NAUSEA, Disp: 90 tablet, Rfl: 0    oxygen (O2) gas therapy, Uses 2/3 ml of oxygen at night., Disp: , Rfl:     polyethylene glycol (Glycolax, Miralax) 17 gram/dose powder, MIX ONE CAPFUL (17 GRAMS) IN 8 OUNCES OF LIQUID AND DRINK BY MOUTH ONCE DAILY, Disp: 510 g, Rfl: 2    rosuvastatin (Crestor) 10 mg tablet, Take 1 tablet (10 mg) by mouth once daily., Disp: , Rfl:     sennosides (senna) 8.6 mg tablet, Take 1 tablet (8.6 mg) by mouth once daily., Disp: 30 tablet, Rfl: 11    sertraline (Zoloft) 25 mg tablet, Take 1 tablet (25 mg) by mouth once daily., Disp: 90 tablet, Rfl: 1    simethicone (Mylicon) 80 mg chewable tablet, CHEW 1 TAB(S) ORALLY 4 TIMES A DAY (AFTER MEALS AND AT BEDTIME), Disp: 120 tablet, Rfl: 0    traZODone (Desyrel) 100 mg tablet, Take 1 tablet (100 mg) by mouth once daily at bedtime., Disp: 90 tablet, Rfl: 1    traZODone (Desyrel) 50 mg tablet,  "0.5 TAB(S) ORALLY ONCE A DAY (AT BEDTIME), AS NEEDED FOR INSOMNIA, Disp: 15 tablet, Rfl: 0    Review of Systems   Constitutional:  Negative for appetite change, fatigue and unexpected weight change.   HENT:   Negative for hearing loss.    Eyes:  Negative for eye problems.   Respiratory:  Negative for cough and shortness of breath.    Cardiovascular:  Positive for leg swelling (intermittent). Negative for chest pain.   Gastrointestinal:  Negative for abdominal pain, constipation, diarrhea, nausea and vomiting.   Genitourinary:  Negative for difficulty urinating.    Musculoskeletal:  Negative for arthralgias and back pain.   Skin:  Negative for rash.   Neurological:  Negative for dizziness and headaches.   Hematological:  Negative for adenopathy.        Objective   BSA: 1.64 meters squared  /63 (BP Location: Left arm, Patient Position: Sitting, BP Cuff Size: Adult)   Pulse 77   Temp 36.1 °C (97 °F) (Temporal)   Resp 18   Ht (S) 1.595 m (5' 2.8\")   Wt 60.8 kg (134 lb 0.6 oz)   SpO2 93%   BMI 23.90 kg/m²   Performance Status:  Symptomatic; fully ambulatory ECOG 1     Physical Exam  Vitals and nursing note reviewed.   Constitutional:       General: She is not in acute distress.  HENT:      Head: Normocephalic and atraumatic.   Eyes:      Pupils: Pupils are equal, round, and reactive to light.   Cardiovascular:      Rate and Rhythm: Normal rate and regular rhythm.      Heart sounds: Normal heart sounds.   Pulmonary:      Effort: Pulmonary effort is normal.      Breath sounds: Normal breath sounds.   Abdominal:      General: There is no distension.   Musculoskeletal:         General: No swelling.   Skin:     General: Skin is warm and dry.      Findings: No rash.   Neurological:      General: No focal deficit present.      Mental Status: She is alert and oriented to person, place, and time.   Psychiatric:         Mood and Affect: Mood normal.         Behavior: Behavior normal.          Results:  Labs:  Lab " "Results   Component Value Date    WBC 9.6 10/04/2023    HGB 10.5 (L) 10/04/2023    HCT 35.1 (L) 10/04/2023    MCV 79 (L) 10/04/2023     10/04/2023      Lab Results   Component Value Date    NEUTROABS 6.45 09/18/2023      Lab Results   Component Value Date    GLUCOSE 79 09/20/2023    CALCIUM 8.0 (L) 09/20/2023     09/20/2023    K 4.0 09/20/2023    CO2 28 09/20/2023     09/20/2023    BUN 5 (L) 09/20/2023    CREATININE 0.53 09/20/2023    MG 1.99 09/06/2023     Lab Results   Component Value Date    ALT 16 09/20/2023    AST 14 09/20/2023    ALKPHOS 69 09/20/2023    BILITOT 0.4 09/20/2023    BILIDIR 0.2 08/21/2023      Lab Results   Component Value Date    ACTH 12.2 09/06/2023    CORTISOL 25.0 (H) 09/06/2023    TSH 2.75 09/06/2023    FREET4 1.20 (H) 09/05/2023       Imaging:  No new imaging    Pathology:  No new pathology    Assessment/Plan      Radha Toussaint is a 68 y.o. female here for follow up of poorly differentiated carcinoma involving bilateral adrenal glands, likely of lung primary, with PD-L1 70% and no actionable alterations.    Reviewed overall work-up to date with patient and family previously including implications of stage IV disease, and suspicion for lung cancer though not clearly proven (she has history of tobacco use, and does have R hilar lymph node as well as lung nodules  which although not overly suspicious, in the right context may represent primary site).     Discussed options and given burden of disease, elected to proceed with carboplatin/paclitaxel and pembro instead of pembro alone. NGS did not show actionable mutations.     She is s/p C1, with hospitalization for severe fatigue. Now feels significantly improved.     Of note, \"cancer type\" test resulted and showed high suspicion for sarcoma; I have again discussed our case with pathology and they are confident histologically this does not represent sarcoma. I think the molecular pattern is relatively non-specific therefore " will continue current plan. Additionally, recent imaging with ?colonic mass vs transient changes, will follow and if concern can consider colonoscopy in the future.    #Poorly differentiated carcinoma - likely lung primary  - Proceed with C2 today, continue with dose reduction of carboplatin/taxol (carbo AUC 4 and taxol 140mg/m2) given tolerance. Discussed would like to continue if tolerated for now, if ongoing issues with tolerance with this cycle may consider de-escalating to immunotherapy alone   - Plan for IVF on Friday after chemotherapy given fatigue with last treatment  - Anti-emetics sent to pharmacy previously  - Refilled dilaudid 2mg q4h PRN for pain at last visit, hasn't used last few days  - Refilled ativan BID 0.5mg (using about 2x per day) previously  - Referred to supportive oncology given their management inpatient and her significant pain and anxiety (though pain now much improved), and for support moving forward given complexity of her situation  - Will also refer to psycho-oncology as she would likely benefit from focused management of depression as well. She also endorses severe mood changes on prednisone in the past and given possible need for them in the future would help to have psychiatry  team following prior  - Monitor for worsening neuropathy on chemotherapy     Monitor mood with chemo/steroids     RTC for C3    Francheska Parada MD  [facility]

## 2023-10-05 DIAGNOSIS — C34.90 NON-SMALL CELL LUNG CANCER, UNSPECIFIED LATERALITY (MULTI): Primary | ICD-10-CM

## 2023-10-05 DIAGNOSIS — G89.3 CANCER RELATED PAIN: ICD-10-CM

## 2023-10-05 RX ORDER — HYDROMORPHONE HYDROCHLORIDE 4 MG/1
2 TABLET ORAL 3 TIMES DAILY PRN
Qty: 45 TABLET | Refills: 0 | OUTPATIENT
Start: 2023-10-05 | End: 2023-10-23

## 2023-10-05 NOTE — TELEPHONE ENCOUNTER
Thais calling to notify team that hydromorphone 2mg. Is unavailable from . Pharmacy had 10 pills and pt took those, which will only last pt approx 3 days.   Please send to another pharmacy or send alternative medication.

## 2023-10-06 RX ORDER — BUDESONIDE, GLYCOPYRROLATE, AND FORMOTEROL FUMARATE 160; 9; 4.8 UG/1; UG/1; UG/1
2 AEROSOL, METERED RESPIRATORY (INHALATION)
Qty: 11.8 G | Refills: 0 | COMMUNITY
Start: 2023-10-06 | End: 2023-10-22 | Stop reason: ENTERED-IN-ERROR

## 2023-10-10 ENCOUNTER — TELEPHONE (OUTPATIENT)
Dept: HEMATOLOGY/ONCOLOGY | Facility: HOSPITAL | Age: 68
End: 2023-10-10
Payer: MEDICARE

## 2023-10-10 ENCOUNTER — NURSE TRIAGE (OUTPATIENT)
Dept: ADMISSION | Facility: HOSPITAL | Age: 68
End: 2023-10-10
Payer: MEDICARE

## 2023-10-10 DIAGNOSIS — C34.90 NON-SMALL CELL LUNG CANCER, UNSPECIFIED LATERALITY (MULTI): Primary | ICD-10-CM

## 2023-10-10 DIAGNOSIS — K59.00 CONSTIPATION, UNSPECIFIED CONSTIPATION TYPE: ICD-10-CM

## 2023-10-10 NOTE — TELEPHONE ENCOUNTER
"Patient called to report she has had difficulty\"staying asleep at night, she is extremely fatigued during the day, admits to the insomnia for one week,Patient states fatigue level is a \"10\" on a scale of 0-10, She has been taking   Remeron, however, not effective, patient stated \" I just cannot stay asleep at night, and I am so weak and tired.:  Takes the Remeron at 6pm every day. Denies fever and chills, admits to nausea today, takes her anti nausea medication 3x's per day, also had 3 bouts of diarrhea in the last 24 hours.  Has been drinking fluids and appetite is fair to good.  Able to ambulate around the home without difficulty, denies confusion or lethargy.    Secure Chat and message sent to Dr. John's Team    Per Dr. Parada the patient notified the prednisone pre-med could be contributing to the insomnia, informed the patient ok to take the Ativan she has prior to bed per Dr. Parada,  Also, if the ativan is not effective, the patient can try low dose melatonin (OTC)  However, if the diarrhea, nausea and insomnia persist the patient needs to call back to let Dr. Parada's office know.  16:48  This nurse called the patient back to inform her Dr. Parada  would like to know if the anti-nausea medications have helped, patient states the meds helps 75% of the time.  She is going to take ativan when she wakes up the 1st time after going to bed.  Nothing seems to cause the insomnia,  I did inform Radha the 1st week is the toughest for the chemo side effects . The patient stated she will try going to try  OTC Melatonin 3mg PO  to start.... if the ativan doesn't help. This nurse informed the patient  to  please call back if the diarrhea, nausea or insomnia worsens, we can advise another plan, I let her know we are here 24/7.    Call back number given to the patient.  "

## 2023-10-16 DIAGNOSIS — K59.00 CONSTIPATION, UNSPECIFIED CONSTIPATION TYPE: ICD-10-CM

## 2023-10-16 RX ORDER — SENNOSIDES 8.6 MG/1
1 TABLET ORAL DAILY
Qty: 30 TABLET | Refills: 11 | Status: SHIPPED | OUTPATIENT
Start: 2023-10-16 | End: 2024-10-15

## 2023-10-22 ENCOUNTER — APPOINTMENT (OUTPATIENT)
Dept: RADIOLOGY | Facility: HOSPITAL | Age: 68
End: 2023-10-22
Payer: MEDICARE

## 2023-10-22 ENCOUNTER — HOSPITAL ENCOUNTER (OUTPATIENT)
Facility: HOSPITAL | Age: 68
Setting detail: OBSERVATION
Discharge: HOME | End: 2023-10-23
Attending: STUDENT IN AN ORGANIZED HEALTH CARE EDUCATION/TRAINING PROGRAM
Payer: MEDICARE

## 2023-10-22 DIAGNOSIS — R06.02 SHORTNESS OF BREATH: Primary | ICD-10-CM

## 2023-10-22 DIAGNOSIS — J15.69 PNEUMONIA DUE TO GRAM-NEGATIVE BACTERIA (MULTI): ICD-10-CM

## 2023-10-22 DIAGNOSIS — J18.9 PNEUMONIA DUE TO INFECTIOUS ORGANISM, UNSPECIFIED LATERALITY, UNSPECIFIED PART OF LUNG: ICD-10-CM

## 2023-10-22 LAB
ANION GAP SERPL CALC-SCNC: 14 MMOL/L (ref 10–20)
BASOPHILS # BLD AUTO: 0.07 X10*3/UL (ref 0–0.1)
BASOPHILS NFR BLD AUTO: 0.6 %
BNP SERPL-MCNC: 82 PG/ML (ref 0–99)
BUN SERPL-MCNC: 15 MG/DL (ref 6–23)
BURR CELLS BLD QL SMEAR: NORMAL
CALCIUM SERPL-MCNC: 8.7 MG/DL (ref 8.6–10.3)
CARDIAC TROPONIN I PNL SERPL HS: 15 NG/L (ref 0–13)
CHLORIDE SERPL-SCNC: 98 MMOL/L (ref 98–107)
CO2 SERPL-SCNC: 26 MMOL/L (ref 21–32)
CREAT SERPL-MCNC: 0.52 MG/DL (ref 0.5–1.05)
D DIMER PPP FEU-MCNC: 2493 NG/ML FEU
EOSINOPHIL # BLD AUTO: 0.11 X10*3/UL (ref 0–0.7)
EOSINOPHIL NFR BLD AUTO: 0.9 %
ERYTHROCYTE [DISTWIDTH] IN BLOOD BY AUTOMATED COUNT: 20.1 % (ref 11.5–14.5)
GFR SERPL CREATININE-BSD FRML MDRD: >90 ML/MIN/1.73M*2
GLUCOSE SERPL-MCNC: 97 MG/DL (ref 74–99)
HCT VFR BLD AUTO: 38.6 % (ref 36–46)
HGB BLD-MCNC: 11.6 G/DL (ref 12–16)
IMM GRANULOCYTES # BLD AUTO: 0.12 X10*3/UL (ref 0–0.7)
IMM GRANULOCYTES NFR BLD AUTO: 1 % (ref 0–0.9)
LACTATE SERPL-SCNC: 0.5 MMOL/L (ref 0.4–2)
LYMPHOCYTES # BLD AUTO: 1.61 X10*3/UL (ref 1.2–4.8)
LYMPHOCYTES NFR BLD AUTO: 13.9 %
MCH RBC QN AUTO: 23.9 PG (ref 26–34)
MCHC RBC AUTO-ENTMCNC: 30.1 G/DL (ref 32–36)
MCV RBC AUTO: 79 FL (ref 80–100)
MONOCYTES # BLD AUTO: 0.95 X10*3/UL (ref 0.1–1)
MONOCYTES NFR BLD AUTO: 8.2 %
NEUTROPHILS # BLD AUTO: 8.75 X10*3/UL (ref 1.2–7.7)
NEUTROPHILS NFR BLD AUTO: 75.4 %
NRBC BLD-RTO: 0 /100 WBCS (ref 0–0)
OVALOCYTES BLD QL SMEAR: NORMAL
PLATELET # BLD AUTO: 321 X10*3/UL (ref 150–450)
PMV BLD AUTO: 10 FL (ref 7.5–11.5)
POTASSIUM SERPL-SCNC: 4.6 MMOL/L (ref 3.5–5.3)
RBC # BLD AUTO: 4.86 X10*6/UL (ref 4–5.2)
RBC MORPH BLD: NORMAL
SARS-COV-2 RNA RESP QL NAA+PROBE: NOT DETECTED
SODIUM SERPL-SCNC: 133 MMOL/L (ref 136–145)
WBC # BLD AUTO: 11.6 X10*3/UL (ref 4.4–11.3)

## 2023-10-22 PROCEDURE — 71275 CT ANGIOGRAPHY CHEST: CPT | Performed by: STUDENT IN AN ORGANIZED HEALTH CARE EDUCATION/TRAINING PROGRAM

## 2023-10-22 PROCEDURE — G0378 HOSPITAL OBSERVATION PER HR: HCPCS

## 2023-10-22 PROCEDURE — 2500000002 HC RX 250 W HCPCS SELF ADMINISTERED DRUGS (ALT 637 FOR MEDICARE OP, ALT 636 FOR OP/ED): Performed by: STUDENT IN AN ORGANIZED HEALTH CARE EDUCATION/TRAINING PROGRAM

## 2023-10-22 PROCEDURE — 83880 ASSAY OF NATRIURETIC PEPTIDE: CPT | Performed by: STUDENT IN AN ORGANIZED HEALTH CARE EDUCATION/TRAINING PROGRAM

## 2023-10-22 PROCEDURE — 96367 TX/PROPH/DG ADDL SEQ IV INF: CPT

## 2023-10-22 PROCEDURE — 85025 COMPLETE CBC W/AUTO DIFF WBC: CPT | Performed by: STUDENT IN AN ORGANIZED HEALTH CARE EDUCATION/TRAINING PROGRAM

## 2023-10-22 PROCEDURE — 87075 CULTR BACTERIA EXCEPT BLOOD: CPT | Mod: CMCLAB,PARLAB | Performed by: STUDENT IN AN ORGANIZED HEALTH CARE EDUCATION/TRAINING PROGRAM

## 2023-10-22 PROCEDURE — 99285 EMERGENCY DEPT VISIT HI MDM: CPT | Mod: 25

## 2023-10-22 PROCEDURE — 80048 BASIC METABOLIC PNL TOTAL CA: CPT | Performed by: STUDENT IN AN ORGANIZED HEALTH CARE EDUCATION/TRAINING PROGRAM

## 2023-10-22 PROCEDURE — 84484 ASSAY OF TROPONIN QUANT: CPT

## 2023-10-22 PROCEDURE — 87040 BLOOD CULTURE FOR BACTERIA: CPT | Mod: CMCLAB,PARLAB | Performed by: STUDENT IN AN ORGANIZED HEALTH CARE EDUCATION/TRAINING PROGRAM

## 2023-10-22 PROCEDURE — 85379 FIBRIN DEGRADATION QUANT: CPT | Performed by: STUDENT IN AN ORGANIZED HEALTH CARE EDUCATION/TRAINING PROGRAM

## 2023-10-22 PROCEDURE — 84484 ASSAY OF TROPONIN QUANT: CPT | Mod: 59 | Performed by: STUDENT IN AN ORGANIZED HEALTH CARE EDUCATION/TRAINING PROGRAM

## 2023-10-22 PROCEDURE — 71275 CT ANGIOGRAPHY CHEST: CPT | Mod: ME

## 2023-10-22 PROCEDURE — 71045 X-RAY EXAM CHEST 1 VIEW: CPT | Performed by: RADIOLOGY

## 2023-10-22 PROCEDURE — 96365 THER/PROPH/DIAG IV INF INIT: CPT

## 2023-10-22 PROCEDURE — 71045 X-RAY EXAM CHEST 1 VIEW: CPT | Mod: FY

## 2023-10-22 PROCEDURE — 36415 COLL VENOUS BLD VENIPUNCTURE: CPT | Performed by: STUDENT IN AN ORGANIZED HEALTH CARE EDUCATION/TRAINING PROGRAM

## 2023-10-22 PROCEDURE — 94640 AIRWAY INHALATION TREATMENT: CPT

## 2023-10-22 PROCEDURE — 87635 SARS-COV-2 COVID-19 AMP PRB: CPT | Performed by: STUDENT IN AN ORGANIZED HEALTH CARE EDUCATION/TRAINING PROGRAM

## 2023-10-22 PROCEDURE — 2550000001 HC RX 255 CONTRASTS: Performed by: STUDENT IN AN ORGANIZED HEALTH CARE EDUCATION/TRAINING PROGRAM

## 2023-10-22 PROCEDURE — 83605 ASSAY OF LACTIC ACID: CPT | Performed by: STUDENT IN AN ORGANIZED HEALTH CARE EDUCATION/TRAINING PROGRAM

## 2023-10-22 PROCEDURE — 2500000004 HC RX 250 GENERAL PHARMACY W/ HCPCS (ALT 636 FOR OP/ED): Performed by: STUDENT IN AN ORGANIZED HEALTH CARE EDUCATION/TRAINING PROGRAM

## 2023-10-22 RX ORDER — ALBUTEROL SULFATE 90 UG/1
1 AEROSOL, METERED RESPIRATORY (INHALATION) EVERY 4 HOURS PRN
Status: DISCONTINUED | OUTPATIENT
Start: 2023-10-22 | End: 2023-10-23 | Stop reason: HOSPADM

## 2023-10-22 RX ORDER — AMLODIPINE BESYLATE 5 MG/1
5 TABLET ORAL DAILY
Status: DISCONTINUED | OUTPATIENT
Start: 2023-10-23 | End: 2023-10-23 | Stop reason: HOSPADM

## 2023-10-22 RX ORDER — IPRATROPIUM BROMIDE AND ALBUTEROL SULFATE 2.5; .5 MG/3ML; MG/3ML
3 SOLUTION RESPIRATORY (INHALATION) 4 TIMES DAILY PRN
Status: DISCONTINUED | OUTPATIENT
Start: 2023-10-22 | End: 2023-10-22

## 2023-10-22 RX ORDER — ONDANSETRON 4 MG/1
4 TABLET, ORALLY DISINTEGRATING ORAL EVERY 8 HOURS PRN
Status: DISCONTINUED | OUTPATIENT
Start: 2023-10-22 | End: 2023-10-23 | Stop reason: HOSPADM

## 2023-10-22 RX ORDER — FLUTICASONE FUROATE AND VILANTEROL 200; 25 UG/1; UG/1
1 POWDER RESPIRATORY (INHALATION)
Status: DISCONTINUED | OUTPATIENT
Start: 2023-10-23 | End: 2023-10-23 | Stop reason: HOSPADM

## 2023-10-22 RX ORDER — PANTOPRAZOLE SODIUM 40 MG/1
40 TABLET, DELAYED RELEASE ORAL
COMMUNITY
End: 2024-03-11 | Stop reason: ALTCHOICE

## 2023-10-22 RX ORDER — SERTRALINE HYDROCHLORIDE 50 MG/1
25 TABLET, FILM COATED ORAL DAILY
Status: DISCONTINUED | OUTPATIENT
Start: 2023-10-23 | End: 2023-10-23 | Stop reason: HOSPADM

## 2023-10-22 RX ORDER — MONTELUKAST SODIUM 10 MG/1
10 TABLET ORAL NIGHTLY
Status: DISCONTINUED | OUTPATIENT
Start: 2023-10-22 | End: 2023-10-23 | Stop reason: HOSPADM

## 2023-10-22 RX ORDER — PANTOPRAZOLE SODIUM 40 MG/1
40 TABLET, DELAYED RELEASE ORAL
Status: DISCONTINUED | OUTPATIENT
Start: 2023-10-23 | End: 2023-10-23 | Stop reason: HOSPADM

## 2023-10-22 RX ORDER — IPRATROPIUM BROMIDE AND ALBUTEROL SULFATE 2.5; .5 MG/3ML; MG/3ML
3 SOLUTION RESPIRATORY (INHALATION) ONCE
Status: COMPLETED | OUTPATIENT
Start: 2023-10-22 | End: 2023-10-22

## 2023-10-22 RX ORDER — MIRTAZAPINE 15 MG/1
7.5 TABLET, FILM COATED ORAL NIGHTLY
Status: DISCONTINUED | OUTPATIENT
Start: 2023-10-22 | End: 2023-10-23 | Stop reason: HOSPADM

## 2023-10-22 RX ORDER — AMLODIPINE BESYLATE 5 MG/1
5 TABLET ORAL DAILY
COMMUNITY
End: 2023-12-01 | Stop reason: HOSPADM

## 2023-10-22 RX ORDER — CLOPIDOGREL BISULFATE 75 MG/1
75 TABLET ORAL DAILY
Status: DISCONTINUED | OUTPATIENT
Start: 2023-10-23 | End: 2023-10-23 | Stop reason: HOSPADM

## 2023-10-22 RX ORDER — FERROUS SULFATE 325(65) MG
65 TABLET, DELAYED RELEASE (ENTERIC COATED) ORAL DAILY
COMMUNITY

## 2023-10-22 RX ORDER — IPRATROPIUM BROMIDE AND ALBUTEROL SULFATE 2.5; .5 MG/3ML; MG/3ML
3 SOLUTION RESPIRATORY (INHALATION) 3 TIMES DAILY
Status: DISCONTINUED | OUTPATIENT
Start: 2023-10-23 | End: 2023-10-23

## 2023-10-22 RX ORDER — L. ACIDOPHILUS/L.BULGARICUS 1MM CELL
TABLET ORAL DAILY
Status: DISCONTINUED | OUTPATIENT
Start: 2023-10-23 | End: 2023-10-23 | Stop reason: HOSPADM

## 2023-10-22 RX ORDER — FLUDROCORTISONE ACETATE 0.1 MG/1
0.05 TABLET ORAL DAILY
Status: DISCONTINUED | OUTPATIENT
Start: 2023-10-23 | End: 2023-10-23 | Stop reason: HOSPADM

## 2023-10-22 RX ORDER — ROSUVASTATIN CALCIUM 10 MG/1
10 TABLET, COATED ORAL DAILY
Status: DISCONTINUED | OUTPATIENT
Start: 2023-10-23 | End: 2023-10-23 | Stop reason: HOSPADM

## 2023-10-22 RX ORDER — FERROUS SULFATE 325(65) MG
65 TABLET ORAL DAILY
Status: DISCONTINUED | OUTPATIENT
Start: 2023-10-23 | End: 2023-10-23 | Stop reason: HOSPADM

## 2023-10-22 RX ORDER — SENNOSIDES 8.6 MG/1
1 TABLET ORAL DAILY
Status: DISCONTINUED | OUTPATIENT
Start: 2023-10-23 | End: 2023-10-23 | Stop reason: HOSPADM

## 2023-10-22 RX ORDER — HEPARIN SODIUM 5000 [USP'U]/ML
5000 INJECTION, SOLUTION INTRAVENOUS; SUBCUTANEOUS EVERY 8 HOURS
Status: DISCONTINUED | OUTPATIENT
Start: 2023-10-22 | End: 2023-10-23 | Stop reason: HOSPADM

## 2023-10-22 RX ORDER — FAMOTIDINE 20 MG/1
40 TABLET, FILM COATED ORAL DAILY
Status: DISCONTINUED | OUTPATIENT
Start: 2023-10-23 | End: 2023-10-23 | Stop reason: HOSPADM

## 2023-10-22 RX ORDER — HYDROCORTISONE 10 MG/1
5 TABLET ORAL DAILY
Status: DISCONTINUED | OUTPATIENT
Start: 2023-10-23 | End: 2023-10-23 | Stop reason: HOSPADM

## 2023-10-22 RX ORDER — GUAIFENESIN 600 MG/1
600 TABLET, EXTENDED RELEASE ORAL 2 TIMES DAILY PRN
Status: DISCONTINUED | OUTPATIENT
Start: 2023-10-22 | End: 2023-10-23 | Stop reason: HOSPADM

## 2023-10-22 RX ORDER — ASPIRIN 81 MG/1
81 TABLET ORAL DAILY
Status: DISCONTINUED | OUTPATIENT
Start: 2023-10-23 | End: 2023-10-23 | Stop reason: HOSPADM

## 2023-10-22 RX ORDER — HYDROMORPHONE HYDROCHLORIDE 2 MG/1
2 TABLET ORAL 3 TIMES DAILY PRN
Status: DISCONTINUED | OUTPATIENT
Start: 2023-10-22 | End: 2023-10-23 | Stop reason: HOSPADM

## 2023-10-22 RX ADMIN — AZITHROMYCIN MONOHYDRATE 500 MG: 500 INJECTION, POWDER, LYOPHILIZED, FOR SOLUTION INTRAVENOUS at 22:08

## 2023-10-22 RX ADMIN — IPRATROPIUM BROMIDE AND ALBUTEROL SULFATE 3 ML: .5; 3 SOLUTION RESPIRATORY (INHALATION) at 19:19

## 2023-10-22 RX ADMIN — IOHEXOL 65 ML: 350 INJECTION, SOLUTION INTRAVENOUS at 20:27

## 2023-10-22 RX ADMIN — VANCOMYCIN HYDROCHLORIDE 1.5 G: 1.5 INJECTION, POWDER, LYOPHILIZED, FOR SOLUTION INTRAVENOUS at 23:39

## 2023-10-22 RX ADMIN — PIPERACILLIN SODIUM AND TAZOBACTAM SODIUM 4.5 G: 4; .5 INJECTION, SOLUTION INTRAVENOUS at 21:48

## 2023-10-22 ASSESSMENT — LIFESTYLE VARIABLES
HAVE YOU EVER FELT YOU SHOULD CUT DOWN ON YOUR DRINKING: NO
EVER FELT BAD OR GUILTY ABOUT YOUR DRINKING: NO
REASON UNABLE TO ASSESS: NO
HAVE PEOPLE ANNOYED YOU BY CRITICIZING YOUR DRINKING: NO
EVER HAD A DRINK FIRST THING IN THE MORNING TO STEADY YOUR NERVES TO GET RID OF A HANGOVER: NO

## 2023-10-22 ASSESSMENT — PAIN - FUNCTIONAL ASSESSMENT: PAIN_FUNCTIONAL_ASSESSMENT: 0-10

## 2023-10-22 ASSESSMENT — COLUMBIA-SUICIDE SEVERITY RATING SCALE - C-SSRS
1. IN THE PAST MONTH, HAVE YOU WISHED YOU WERE DEAD OR WISHED YOU COULD GO TO SLEEP AND NOT WAKE UP?: NO
2. HAVE YOU ACTUALLY HAD ANY THOUGHTS OF KILLING YOURSELF?: NO
6. HAVE YOU EVER DONE ANYTHING, STARTED TO DO ANYTHING, OR PREPARED TO DO ANYTHING TO END YOUR LIFE?: NO

## 2023-10-22 NOTE — PROGRESS NOTES
Pharmacy Medication History Review    Radha Toussaint is a 68 y.o. female admitted for No Principal Problem: There is no principal problem currently on the Problem List. Please update the Problem List and refresh.. Pharmacy reviewed the patient's [id number]-[id number] medications and allergies for accuracy.    The list below reflectives the updated PTA list. Please review each medication in order reconciliation for additional clarification and justification.    See PTA med list       The list below reflectives the updated allergy list. Please review each documented allergy for additional clarification and justification.  Allergies  Reviewed by Emiliana Wheeler CPhT on 10/22/2023        Severity Reactions Comments    Ace Inhibitors Medium Angioedema     Demerol [meperidine] Low Nausea/vomiting     Iodinated Contrast Media Low Rash             Below are additional concerns with the patient's PTA list.      Emiliana Wheeler CPhT

## 2023-10-22 NOTE — ED PROVIDER NOTES
HPI   Chief Complaint   Patient presents with    Shortness of Breath     Patient arrives from home by EMS reporting anxiety and shortness of breath. States shortness of breath is brought on by minimal exertion. Wears 2L NC PRN at night for COPD and was wearing it throughout the day today due to dyspnea.  States she took a hydroxyzine prior to arrival, but was still feeling anxious. Visibly dyspneic on arrival, wearing 4L NC. Speaking in few word sentences. Productive cough with clear sputum. Denies any fevers/chills/sick contacts       History of COPD on baseline 2 L nasal cannula as well as a history of lung cancer on chemotherapy presents with exertional dyspnea.  Patient states that she has anxiety and thought that this was anxiety.  She tried hydroxyzine at home without relief.  No breathing treatments at home.  Does not associated cough productive of clear sputum.  Denies fevers, chills, chest pain, and lower extremity swelling.      History provided by:  Patient   used: No                        Thomas Coma Scale Score: 15                  Patient History   Past Medical History:   Diagnosis Date    Aneurysm of carotid artery (CMS/[facility])     Neck aneurysm    DVT (deep venous thrombosis) (CMS/[facility])     2022    History of tubal ligation     Old myocardial infarction     History of heart attack    Other specified disorders of kidney and ureter     Obstruction of kidney    Personal history of other diseases of the circulatory system     History of intracranial aneurysm    Personal history of other diseases of the circulatory system     History of abdominal aortic aneurysm (AAA)    Personal history of other diseases of the respiratory system     History of chronic obstructive lung disease    Personal history of urinary calculi     History of kidney stones    Right upper quadrant pain 09/25/2020    Abdominal pain, RUQ (right upper quadrant)     Past Surgical History:   Procedure Laterality Date     CT ABDOMEN PELVIS ANGIOGRAM W AND/OR WO IV CONTRAST  11/21/2019    CT ABDOMEN PELVIS ANGIOGRAM W AND/OR WO IV CONTRAST 11/21/2019 [facility]    CT AORTA AND BILATERAL ILIOFEMORAL RUNOFF ANGIOGRAM W AND/OR WO IV CONTRAST  8/9/2022    CT AORTA AND BILATERAL ILIOFEMORAL RUNOFF ANGIOGRAM W AND/OR WO IV CONTRAST 8/9/2022 [facility]    CT HEAD ANGIO W AND WO IV CONTRAST  5/22/2020    CT HEAD ANGIO W AND WO IV CONTRAST 5/22/2020 [facility]    OTHER SURGICAL HISTORY  09/30/2020    Tubal ligation    OTHER SURGICAL HISTORY  09/30/2020    Cardiac catheterization     Family History   Problem Relation Name Age of Onset    Aneurysm Mother      Hypertension Mother      Heart attack Father       Social History     Tobacco Use    Smoking status: Every Day     Packs/day: 1     Types: Cigarettes    Smokeless tobacco: Never   Substance Use Topics    Alcohol use: Not on file    Drug use: Not on file       Physical Exam   ED Triage Vitals   Temp Heart Rate Resp BP   10/22/23 1815 10/22/23 1815 10/22/23 1815 10/22/23 1815   37 °C (98.6 °F) 79 24 180/77      SpO2 Temp src Heart Rate Source Patient Position   10/22/23 1815 -- 10/22/23 1815 10/22/23 1921   96 %  Monitor Lying      BP Location FiO2 (%)     10/22/23 1921 10/22/23 1919     Right arm 32 %       Physical Exam  Vitals and nursing note reviewed.   HENT:      Head: Atraumatic.      Mouth/Throat:      Mouth: Mucous membranes are moist.   Eyes:      Conjunctiva/sclera: Conjunctivae normal.   Cardiovascular:      Rate and Rhythm: Normal rate and regular rhythm.      Comments: There is no pitting lower extremity edema or erythema.  Pulmonary:      Comments: Dyspneic but able to speak in full sentences.  Decreased breath sounds bilaterally.  No obvious wheezing.  Abdominal:      Palpations: Abdomen is soft.      Tenderness: There is no abdominal tenderness.   Musculoskeletal:         General: No deformity.      Cervical back: Normal range of motion.    Skin:     General: Skin is warm and dry.   Neurological:      Mental Status: She is alert.      Comments: Moving all extremities         ED Course & MDM   ED Course as of 10/22/23 2332   Sun Oct 22, 2023   1855 D-Dimer Non VTE, Quant (ng/mL FEU)(!): 2,493 [AB]   1900 Spoke with patient about her contrast allergy.  She states that she has not had an issue with this and would like to proceed with the scan. [AB]   1931 Again spoke with patient about contrast allergy.  She had scans with contrast performed in August.  She states that she was not premedicated for the scans.  I do not feel that she needs premedication for her scans in the emergency department today. [AB]   2129 Given her leukocytosis, productive cough, and increased oxygen requirement from her baseline, will treat for pneumonia, particular in the setting of her immunosuppression. [AB]      ED Course User Index  [AB] Darrion Wolff MD         Diagnoses as of 10/22/23 2332   Shortness of breath   Pneumonia due to infectious organism, unspecified laterality, unspecified part of lung       Medical Decision Making  Multiple etiologies of her shortness of breath, given her past medical history of COPD, CAD, and lung cancer.    Considered COPD.  Decreased breath sounds bilaterally without wheezing.  Trial of albuterol with minimal improvement.    Given immunosuppressed status, to consider infectious etiology.  Will treat for pneumonia, as further detailed above in ED course.    Considered CHF.  Pulmonary edema noted on CT imaging.  Patient is peripherally euvolemic.  BNP is not elevated.    Given history of cancer did consider PE.  Dimer elevated.  CT PE without PE.    Amount and/or Complexity of Data Reviewed  Independent Historian: EMS  External Data Reviewed: notes.     Details: Most recent discharge summary  Labs: ordered.  Radiology: ordered.  ECG/medicine tests: ordered and independent interpretation performed.     Details: My ECG interpretation:  Normal sinus rhythm heart rate 74, no ST segment elevation  Discussion of management or test interpretation with external provider(s): The admitting provider    Risk  Decision regarding hospitalization.        Procedure  Procedures     Darrion Wolff MD  10/22/23 [id number]

## 2023-10-23 VITALS
WEIGHT: 133 LBS | OXYGEN SATURATION: 97 % | BODY MASS INDEX: 22.71 KG/M2 | SYSTOLIC BLOOD PRESSURE: 143 MMHG | HEIGHT: 64 IN | HEART RATE: 85 BPM | RESPIRATION RATE: 19 BRPM | TEMPERATURE: 98.6 F | DIASTOLIC BLOOD PRESSURE: 82 MMHG

## 2023-10-23 LAB
ANION GAP SERPL CALC-SCNC: 12 MMOL/L (ref 10–20)
APPEARANCE UR: CLEAR
BILIRUB UR STRIP.AUTO-MCNC: NEGATIVE MG/DL
BUN SERPL-MCNC: 11 MG/DL (ref 6–23)
CALCIUM SERPL-MCNC: 8.9 MG/DL (ref 8.6–10.3)
CARDIAC TROPONIN I PNL SERPL HS: 24 NG/L (ref 0–13)
CARDIAC TROPONIN I PNL SERPL HS: 42 NG/L (ref 0–13)
CHLORIDE SERPL-SCNC: 102 MMOL/L (ref 98–107)
CO2 SERPL-SCNC: 28 MMOL/L (ref 21–32)
COLOR UR: COLORLESS
CREAT SERPL-MCNC: 0.63 MG/DL (ref 0.5–1.05)
ERYTHROCYTE [DISTWIDTH] IN BLOOD BY AUTOMATED COUNT: 19.8 % (ref 11.5–14.5)
GFR SERPL CREATININE-BSD FRML MDRD: >90 ML/MIN/1.73M*2
GLUCOSE SERPL-MCNC: 87 MG/DL (ref 74–99)
GLUCOSE UR STRIP.AUTO-MCNC: ABNORMAL MG/DL
HCT VFR BLD AUTO: 37.1 % (ref 36–46)
HGB BLD-MCNC: 11 G/DL (ref 12–16)
KETONES UR STRIP.AUTO-MCNC: NEGATIVE MG/DL
LEGIONELLA AG UR QL: NEGATIVE
LEUKOCYTE ESTERASE UR QL STRIP.AUTO: ABNORMAL
MCH RBC QN AUTO: 23.5 PG (ref 26–34)
MCHC RBC AUTO-ENTMCNC: 29.6 G/DL (ref 32–36)
MCV RBC AUTO: 79 FL (ref 80–100)
NITRITE UR QL STRIP.AUTO: NEGATIVE
NRBC BLD-RTO: 0 /100 WBCS (ref 0–0)
PH UR STRIP.AUTO: 6.5 [PH]
PLATELET # BLD AUTO: 293 X10*3/UL (ref 150–450)
PMV BLD AUTO: 9.6 FL (ref 7.5–11.5)
POTASSIUM SERPL-SCNC: 4 MMOL/L (ref 3.5–5.3)
PROT UR STRIP.AUTO-MCNC: NEGATIVE MG/DL
RBC # BLD AUTO: 4.69 X10*6/UL (ref 4–5.2)
RBC # UR STRIP.AUTO: NEGATIVE /UL
RBC #/AREA URNS AUTO: NORMAL /HPF
S PNEUM AG UR QL: NEGATIVE
SODIUM SERPL-SCNC: 138 MMOL/L (ref 136–145)
SP GR UR STRIP.AUTO: <1.005
SQUAMOUS #/AREA URNS AUTO: NORMAL /HPF
UROBILINOGEN UR STRIP.AUTO-MCNC: ABNORMAL MG/DL
VANCOMYCIN TROUGH SERPL-MCNC: 17.5 UG/ML (ref 5–20)
WBC # BLD AUTO: 8.6 X10*3/UL (ref 4.4–11.3)
WBC #/AREA URNS AUTO: NORMAL /HPF
WBC CLUMPS #/AREA URNS AUTO: NORMAL /HPF

## 2023-10-23 PROCEDURE — 84484 ASSAY OF TROPONIN QUANT: CPT

## 2023-10-23 PROCEDURE — 87449 NOS EACH ORGANISM AG IA: CPT | Mod: CMCLAB,PARLAB

## 2023-10-23 PROCEDURE — 81001 URINALYSIS AUTO W/SCOPE: CPT

## 2023-10-23 PROCEDURE — 87899 AGENT NOS ASSAY W/OPTIC: CPT | Mod: CMCLAB,PARLAB

## 2023-10-23 PROCEDURE — 94640 AIRWAY INHALATION TREATMENT: CPT

## 2023-10-23 PROCEDURE — 96372 THER/PROPH/DIAG INJ SC/IM: CPT

## 2023-10-23 PROCEDURE — 99238 HOSP IP/OBS DSCHRG MGMT 30/<: CPT | Performed by: STUDENT IN AN ORGANIZED HEALTH CARE EDUCATION/TRAINING PROGRAM

## 2023-10-23 PROCEDURE — 80202 ASSAY OF VANCOMYCIN: CPT

## 2023-10-23 PROCEDURE — 85027 COMPLETE CBC AUTOMATED: CPT

## 2023-10-23 PROCEDURE — 80048 BASIC METABOLIC PNL TOTAL CA: CPT

## 2023-10-23 PROCEDURE — 96376 TX/PRO/DX INJ SAME DRUG ADON: CPT

## 2023-10-23 PROCEDURE — G0378 HOSPITAL OBSERVATION PER HR: HCPCS

## 2023-10-23 PROCEDURE — 36415 COLL VENOUS BLD VENIPUNCTURE: CPT

## 2023-10-23 PROCEDURE — S4991 NICOTINE PATCH NONLEGEND: HCPCS

## 2023-10-23 PROCEDURE — 99223 1ST HOSP IP/OBS HIGH 75: CPT

## 2023-10-23 PROCEDURE — 2500000004 HC RX 250 GENERAL PHARMACY W/ HCPCS (ALT 636 FOR OP/ED)

## 2023-10-23 PROCEDURE — 2500000002 HC RX 250 W HCPCS SELF ADMINISTERED DRUGS (ALT 637 FOR MEDICARE OP, ALT 636 FOR OP/ED): Performed by: STUDENT IN AN ORGANIZED HEALTH CARE EDUCATION/TRAINING PROGRAM

## 2023-10-23 PROCEDURE — 2500000002 HC RX 250 W HCPCS SELF ADMINISTERED DRUGS (ALT 637 FOR MEDICARE OP, ALT 636 FOR OP/ED)

## 2023-10-23 PROCEDURE — 2500000001 HC RX 250 WO HCPCS SELF ADMINISTERED DRUGS (ALT 637 FOR MEDICARE OP)

## 2023-10-23 RX ORDER — AMOXICILLIN AND CLAVULANATE POTASSIUM 500; 125 MG/1; MG/1
500 TABLET, FILM COATED ORAL 3 TIMES DAILY
Qty: 10 TABLET | Refills: 0 | Status: SHIPPED | OUTPATIENT
Start: 2023-10-23 | End: 2023-11-07 | Stop reason: WASHOUT

## 2023-10-23 RX ORDER — IBUPROFEN 200 MG
1 TABLET ORAL DAILY
Status: DISCONTINUED | OUTPATIENT
Start: 2023-10-23 | End: 2023-10-23 | Stop reason: HOSPADM

## 2023-10-23 RX ORDER — IPRATROPIUM BROMIDE AND ALBUTEROL SULFATE 2.5; .5 MG/3ML; MG/3ML
3 SOLUTION RESPIRATORY (INHALATION)
Status: DISCONTINUED | OUTPATIENT
Start: 2023-10-23 | End: 2023-10-23 | Stop reason: HOSPADM

## 2023-10-23 RX ADMIN — SENNOSIDES 8.6 MG: 8.6 TABLET, FILM COATED ORAL at 13:04

## 2023-10-23 RX ADMIN — CLOPIDOGREL BISULFATE 75 MG: 75 TABLET ORAL at 10:33

## 2023-10-23 RX ADMIN — IPRATROPIUM BROMIDE AND ALBUTEROL SULFATE 3 ML: .5; 3 SOLUTION RESPIRATORY (INHALATION) at 07:21

## 2023-10-23 RX ADMIN — ROSUVASTATIN CALCIUM 10 MG: 10 TABLET, FILM COATED ORAL at 13:03

## 2023-10-23 RX ADMIN — PANTOPRAZOLE SODIUM 40 MG: 40 TABLET, DELAYED RELEASE ORAL at 10:33

## 2023-10-23 RX ADMIN — HYDROCORTISONE 5 MG: 10 TABLET ORAL at 13:02

## 2023-10-23 RX ADMIN — FLUDROCORTISONE ACETATE 0.05 MG: 0.1 TABLET ORAL at 13:04

## 2023-10-23 RX ADMIN — SERTRALINE HYDROCHLORIDE 25 MG: 50 TABLET ORAL at 10:33

## 2023-10-23 RX ADMIN — PIPERACILLIN SODIUM AND TAZOBACTAM SODIUM 3.38 G: 3; .375 INJECTION, SOLUTION INTRAVENOUS at 03:56

## 2023-10-23 RX ADMIN — MIRTAZAPINE 7.5 MG: 15 TABLET, FILM COATED ORAL at 00:26

## 2023-10-23 RX ADMIN — ASPIRIN 81 MG: 81 TABLET, COATED ORAL at 10:33

## 2023-10-23 RX ADMIN — FAMOTIDINE 40 MG: 20 TABLET, FILM COATED ORAL at 10:33

## 2023-10-23 RX ADMIN — Medication 1 TABLET: at 13:02

## 2023-10-23 RX ADMIN — HYDROMORPHONE HYDROCHLORIDE 2 MG: 2 TABLET ORAL at 13:03

## 2023-10-23 RX ADMIN — IPRATROPIUM BROMIDE AND ALBUTEROL SULFATE 3 ML: .5; 3 SOLUTION RESPIRATORY (INHALATION) at 13:30

## 2023-10-23 RX ADMIN — HEPARIN SODIUM 5000 UNITS: 5000 INJECTION INTRAVENOUS; SUBCUTANEOUS at 00:26

## 2023-10-23 RX ADMIN — HEPARIN SODIUM 5000 UNITS: 5000 INJECTION INTRAVENOUS; SUBCUTANEOUS at 15:54

## 2023-10-23 RX ADMIN — FERROUS SULFATE TAB 325 MG (65 MG ELEMENTAL FE) 65 MG OF IRON: 325 (65 FE) TAB at 13:03

## 2023-10-23 RX ADMIN — Medication 1 PATCH: at 15:53

## 2023-10-23 RX ADMIN — PIPERACILLIN SODIUM AND TAZOBACTAM SODIUM 3.38 G: 3; .375 INJECTION, SOLUTION INTRAVENOUS at 10:38

## 2023-10-23 SDOH — SOCIAL STABILITY: SOCIAL INSECURITY: HAS ANYONE EVER THREATENED TO HURT YOUR FAMILY OR YOUR PETS?: NO

## 2023-10-23 SDOH — SOCIAL STABILITY: SOCIAL INSECURITY: DOES ANYONE TRY TO KEEP YOU FROM HAVING/CONTACTING OTHER FRIENDS OR DOING THINGS OUTSIDE YOUR HOME?: NO

## 2023-10-23 SDOH — SOCIAL STABILITY: SOCIAL INSECURITY: ABUSE: ADULT

## 2023-10-23 SDOH — SOCIAL STABILITY: SOCIAL INSECURITY: ARE YOU OR HAVE YOU BEEN THREATENED OR ABUSED PHYSICALLY, EMOTIONALLY, OR SEXUALLY BY ANYONE?: NO

## 2023-10-23 SDOH — SOCIAL STABILITY: SOCIAL INSECURITY: DO YOU FEEL ANYONE HAS EXPLOITED OR TAKEN ADVANTAGE OF YOU FINANCIALLY OR OF YOUR PERSONAL PROPERTY?: NO

## 2023-10-23 SDOH — SOCIAL STABILITY: SOCIAL INSECURITY: DO YOU FEEL UNSAFE GOING BACK TO THE PLACE WHERE YOU ARE LIVING?: NO

## 2023-10-23 SDOH — SOCIAL STABILITY: SOCIAL INSECURITY: WERE YOU ABLE TO COMPLETE ALL THE BEHAVIORAL HEALTH SCREENINGS?: YES

## 2023-10-23 SDOH — SOCIAL STABILITY: SOCIAL INSECURITY: ARE THERE ANY APPARENT SIGNS OF INJURIES/BEHAVIORS THAT COULD BE RELATED TO ABUSE/NEGLECT?: NO

## 2023-10-23 SDOH — SOCIAL STABILITY: SOCIAL INSECURITY: HAVE YOU HAD THOUGHTS OF HARMING ANYONE ELSE?: NO

## 2023-10-23 ASSESSMENT — COGNITIVE AND FUNCTIONAL STATUS - GENERAL
DAILY ACTIVITIY SCORE: 22
WALKING IN HOSPITAL ROOM: A LITTLE
MOBILITY SCORE: 21
PATIENT BASELINE BEDBOUND: NO
TOILETING: A LITTLE
CLIMB 3 TO 5 STEPS WITH RAILING: A LITTLE
HELP NEEDED FOR BATHING: A LITTLE
TURNING FROM BACK TO SIDE WHILE IN FLAT BAD: A LITTLE

## 2023-10-23 ASSESSMENT — LIFESTYLE VARIABLES
AUDIT-C TOTAL SCORE: 0
HOW MANY STANDARD DRINKS CONTAINING ALCOHOL DO YOU HAVE ON A TYPICAL DAY: PATIENT DOES NOT DRINK
SKIP TO QUESTIONS 9-10: 1
AUDIT-C TOTAL SCORE: 0
PRESCIPTION_ABUSE_PAST_12_MONTHS: NO
SUBSTANCE_ABUSE_PAST_12_MONTHS: NO
HOW OFTEN DO YOU HAVE A DRINK CONTAINING ALCOHOL: NEVER
HOW OFTEN DO YOU HAVE 6 OR MORE DRINKS ON ONE OCCASION: NEVER

## 2023-10-23 ASSESSMENT — PATIENT HEALTH QUESTIONNAIRE - PHQ9
1. LITTLE INTEREST OR PLEASURE IN DOING THINGS: NOT AT ALL
SUM OF ALL RESPONSES TO PHQ9 QUESTIONS 1 & 2: 0
2. FEELING DOWN, DEPRESSED OR HOPELESS: NOT AT ALL

## 2023-10-23 ASSESSMENT — ACTIVITIES OF DAILY LIVING (ADL)
HEARING - LEFT EAR: FUNCTIONAL
TOILETING: NEEDS ASSISTANCE
ADEQUATE_TO_COMPLETE_ADL: YES
DRESSING YOURSELF: INDEPENDENT
PATIENT'S MEMORY ADEQUATE TO SAFELY COMPLETE DAILY ACTIVITIES?: YES
LACK_OF_TRANSPORTATION: NO
HEARING - RIGHT EAR: FUNCTIONAL
WALKS IN HOME: NEEDS ASSISTANCE
FEEDING YOURSELF: INDEPENDENT
GROOMING: INDEPENDENT
BATHING: NEEDS ASSISTANCE
JUDGMENT_ADEQUATE_SAFELY_COMPLETE_DAILY_ACTIVITIES: YES

## 2023-10-23 ASSESSMENT — PAIN SCALES - GENERAL: PAINLEVEL_OUTOF10: 8

## 2023-10-23 ASSESSMENT — COLUMBIA-SUICIDE SEVERITY RATING SCALE - C-SSRS
6. HAVE YOU EVER DONE ANYTHING, STARTED TO DO ANYTHING, OR PREPARED TO DO ANYTHING TO END YOUR LIFE?: NO
2. HAVE YOU ACTUALLY HAD ANY THOUGHTS OF KILLING YOURSELF?: NO
1. IN THE PAST MONTH, HAVE YOU WISHED YOU WERE DEAD OR WISHED YOU COULD GO TO SLEEP AND NOT WAKE UP?: NO

## 2023-10-23 NOTE — PROGRESS NOTES
This TCC met with patient at bedside, introduced self and explained role.  Demographic information and insurance verified.  Patient is independent, from with spouse and adult daughter.  Patient is currently undergoing chemo for adrenal and lung CA; wears O2 at 2 LPM at night.  Denies SW needs at this time.  Patient plans to return home at discharge, no needs.  Transportation home will be provided by patient's spouse.  TCC will continue to follow care progression for discharge planning needs.     10/23/23 1303   Discharge Planning   Living Arrangements Spouse/significant other;Children   Support Systems Spouse/significant other;Children   Assistance Needed Independent, denies use of assistive devices; able to drive, but currently not driving.   Type of Residence Private residence  ([facility])   Number of Stairs to Enter Residence 2   Number of Stairs Within Residence   (Patient stays on 1st floor.)   Do you have animals or pets at home? Yes   Type of Animals or Pets 6 dogs, 2 cats, 2 turtles   Who is requesting discharge planning?   (CCT workflow)   Home or Post Acute Services None   Patient expects to be discharged to: Home   Does the patient need discharge transport arranged? No   Financial Resource Strain   How hard is it for you to pay for the very basics like food, housing, medical care, and heating? Not hard   Housing Stability   In the last 12 months, was there a time when you were not able to pay the mortgage or rent on time? N   In the last 12 months, how many places have you lived? 1   In the last 12 months, was there a time when you did not have a steady place to sleep or slept in a shelter (including now)? N   Transportation Needs   In the past 12 months, has lack of transportation kept you from medical appointments or from getting medications? no   In the past 12 months, has lack of transportation kept you from meetings, work, or from getting things needed for daily living? No       Grace Garcia  Stack

## 2023-10-23 NOTE — H&P
History Of Present Illness  Radha Toussaint is a 68 y.o. 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 States shortness of breath started 4 days ago with productive cough that is green. States she had mild abdominal pain. Denies chest pain, fever, chills, nausea, vomiting, urinary symptoms, diarrhea, or constipation. Receives chemotherapy at [facility].     ED Course: Temp 37.0C, /77, HR 79, RR 24. 96% 4L NC (baseline is 2L at home intermittently). EKG unavailable for my review. Labs show WBC 11.6, neutrophil 8.75. H&H 11.6/38.6. Sodium 133. Troponin 15. See imaging results below. Azithromycin, zosyn, vancomycin, duoneb given in ED. Pt will be admitted under the care of Dr. Davies who will continue to follow. I was asked to do the H&P and place initial admission orders.     Past Medical History  Past Medical History:   Diagnosis Date    Aneurysm of carotid artery (CMS/[facility])     Neck aneurysm    DVT (deep venous thrombosis) (CMS/[facility])     2022    History of tubal ligation     Old myocardial infarction     History of heart attack    Other specified disorders of kidney and ureter     Obstruction of kidney    Personal history of other diseases of the circulatory system     History of intracranial aneurysm    Personal history of other diseases of the circulatory system     History of abdominal aortic aneurysm (AAA)    Personal history of other diseases of the respiratory system     History of chronic obstructive lung disease    Personal history of urinary calculi     History of kidney stones    Right upper quadrant pain 09/25/2020    Abdominal  pain, RUQ (right upper quadrant)       Surgical History  as above; Left MCA aneurysm clipping, femoral bypass, AAA graft repair, tubal ligation, bladder lift, lithotripsy        Social History  Smoker (1 ppd for ~ 47 years), no EtOH or drug use. Lives with . Retired teacher.     Family History  Father  age 52, MI.  Mother  age 70 5 aortic aneurysm complication.  Sister  age 68 (from depression?).  2 children with history of Crohn's disease.      Allergies  Ace inhibitors, Prednisone, Demerol [meperidine], and Iodinated contrast media    Review of Systems   10 point review of systems negative except as noted above.  Physical Exam  Constitutional:       Comments: Chronically ill appearing, eating during assessment   HENT:      Head: Normocephalic and atraumatic.      Nose: Nose normal.      Mouth/Throat:      Mouth: Mucous membranes are moist.      Pharynx: Oropharynx is clear.   Eyes:      Extraocular Movements: Extraocular movements intact.      Conjunctiva/sclera: Conjunctivae normal.      Pupils: Pupils are equal, round, and reactive to light.   Cardiovascular:      Rate and Rhythm: Normal rate and regular rhythm.      Pulses: Normal pulses.      Heart sounds: Normal heart sounds.   Pulmonary:      Effort: Pulmonary effort is normal.      Breath sounds: Normal breath sounds.      Comments: Diminished with mild wheeze  Abdominal:      General: Bowel sounds are normal.      Palpations: Abdomen is soft.   Musculoskeletal:         General: Normal range of motion.      Cervical back: Normal range of motion and neck supple.   Skin:     General: Skin is warm and dry.      Capillary Refill: Capillary refill takes less than 2 seconds.   Neurological:      General: No focal deficit present.      Mental Status: She is alert and oriented to person, place, and time.   Psychiatric:         Mood and Affect: Mood normal.         Behavior: Behavior normal.         Thought Content: Thought content  "normal.         Judgment: Judgment normal.          Last Recorded Vitals  Blood pressure 138/74, pulse 85, temperature 37 °C (98.6 °F), resp. rate 20, height 1.626 m (5' 4\"), weight 60.3 kg (133 lb), SpO2 98 %.    Relevant Results  Results for orders placed or performed during the hospital encounter of 10/22/23 (from the past 24 hour(s))   CBC and Auto Differential   Result Value Ref Range    WBC 11.6 (H) 4.4 - 11.3 x10*3/uL    nRBC 0.0 0.0 - 0.0 /100 WBCs    RBC 4.86 4.00 - 5.20 x10*6/uL    Hemoglobin 11.6 (L) 12.0 - 16.0 g/dL    Hematocrit 38.6 36.0 - 46.0 %    MCV 79 (L) 80 - 100 fL    MCH 23.9 (L) 26.0 - 34.0 pg    MCHC 30.1 (L) 32.0 - 36.0 g/dL    RDW 20.1 (H) 11.5 - 14.5 %    Platelets 321 150 - 450 x10*3/uL    MPV 10.0 7.5 - 11.5 fL    Neutrophils % 75.4 40.0 - 80.0 %    Immature Granulocytes %, Automated 1.0 (H) 0.0 - 0.9 %    Lymphocytes % 13.9 13.0 - 44.0 %    Monocytes % 8.2 2.0 - 10.0 %    Eosinophils % 0.9 0.0 - 6.0 %    Basophils % 0.6 0.0 - 2.0 %    Neutrophils Absolute 8.75 (H) 1.20 - 7.70 x10*3/uL    Immature Granulocytes Absolute, Automated 0.12 0.00 - 0.70 x10*3/uL    Lymphocytes Absolute 1.61 1.20 - 4.80 x10*3/uL    Monocytes Absolute 0.95 0.10 - 1.00 x10*3/uL    Eosinophils Absolute 0.11 0.00 - 0.70 x10*3/uL    Basophils Absolute 0.07 0.00 - 0.10 x10*3/uL   Basic metabolic panel   Result Value Ref Range    Glucose 97 74 - 99 mg/dL    Sodium 133 (L) 136 - 145 mmol/L    Potassium 4.6 3.5 - 5.3 mmol/L    Chloride 98 98 - 107 mmol/L    Bicarbonate 26 21 - 32 mmol/L    Anion Gap 14 10 - 20 mmol/L    Urea Nitrogen 15 6 - 23 mg/dL    Creatinine 0.52 0.50 - 1.05 mg/dL    eGFR >90 >60 mL/min/1.73m*2    Calcium 8.7 8.6 - 10.3 mg/dL   Troponin I, High Sensitivity   Result Value Ref Range    Troponin I, High Sensitivity 15 (H) 0 - 13 ng/L   B-Type Natriuretic Peptide   Result Value Ref Range    BNP 82 0 - 99 pg/mL   D-dimer, quantitative   Result Value Ref Range    D-Dimer Non VTE, Quant (ng/mL FEU) 2,790 " (H) <=500 ng/mL FEU   SARS-CoV-2 RT PCR   Result Value Ref Range    Coronavirus 2019, PCR Not Detected Not Detected   Morphology   Result Value Ref Range    RBC Morphology See Below     Ovalocytes Few     Sybil Cells Few    Lactate   Result Value Ref Range    Lactate 0.5 0.4 - 2.0 mmol/L   Troponin I, High Sensitivity   Result Value Ref Range    Troponin I, High Sensitivity 42 (H) 0 - 13 ng/L     CT angio chest for pulmonary embolism    Result Date: 10/22/2023  Interpreted By:  Paulo Simpson, STUDY: CT ANGIO CHEST FOR PULMONARY EMBOLISM;  10/22/2023 8:20 pm   INDICATION: Signs/Symptoms:h/o lung cancer, elevated dimer, notes exertional SOB, eval for PE.   COMPARISON: 09/01/2023 PET CT, 08/30/2023 CT chest   ACCESSION NUMBER(S): [id number]   ORDERING CLINICIAN: WILLIAM MIRANDA   TECHNIQUE: Contiguous axial images of the chest were obtained after the intravenous administration of 65 mL Omnipaque 350 contrast using angiographic PE protocol. Coronal and sagittal reformatted images were reconstructed from the axial data. MIP images were created on an independent workstation and reviewed.   FINDINGS: MEDIASTINUM AND LYMPH NODES: There are numerous prominent and mildly enlarged mediastinal and right hilar lymph nodes that are similar in size at 8:30 2023. For example, there is a 1 cm right lower paratracheal lymph node, 1.2 cm subcarinal lymph node, 1.3 cm right hilar lymph node (amongst others), 0.7 cm aortopulmonary window lymph node. There is a 1 cm right thyroid lobe nodule. The esophagus appears within normal limits. No pneumomediastinum.   VESSELS: There is redemonstration of a focal outpouching of the anterior wall of the innominate artery consistent with saccular aneurysm (axial image 66/252 measuring 1.7 cm x 1.7 cm x 1.8 cm. There is redemonstration of ectatic proximal descending thoracic aorta measuring 3.8 cm x 3 cm with minimal mural thrombus. The mid ascending aorta is normal in caliber. There is mild  thoracic aortic atherosclerosis and moderate upper abdominal aortic atherosclerosis with partially visualized endovascular aortic stent graft noted. No acute pulmonary embolism.   HEART: Normal in size.  Mild coronary artery calcifications. No significant pericardial effusion.   LUNG, AIRWAYS, AND PLEURA: Emphysema. There has been resolution of previous small left pleural effusion and adjacent atelectasis. However, there is new smooth bilateral interlobular septal thickening and peribronchovascular interstitial thickening suspicious for interstitial edema. Scattered subcentimeter pulmonary nodules are unchanged from prior study. There is mild scarring/atelectasis in the medial right upper lobe and lingula. There is a small area of subsegmental atelectasis in the peripheral right lower lobe, new from prior study. No consolidation, pleural effusion or pneumothorax.   OSSEOUS STRUCTURES: No acute osseous abnormality. Previous subacute lateral left 8th rib fractures now healed with mild sclerosis in its previous location. Stable 0.4 cm lytic focus within the T7 vertebral body, dating back to 07/26/2023.   CHEST WALL SOFT TISSUES: No discernible abnormality.   UPPER ABDOMEN/OTHER: There is redemonstration of nonobstructing subcentimeter left upper pole renal calculus. There is redemonstration of calcified portacaval lymph nodes present transient of mildly dilated main pancreatic duct with parenchymal calcifications indicative of chronic pancreatitis. Additionally, there is redemonstration of bilateral adrenal metastases, similar to slightly smaller compared to 09/18/2023 measuring 6.5 x 4.1 cm on the right and 4.4 cm x 2.6 cm on the left. Evaluation of solid organs is limited due to contrast timing. There are subcentimeter hypodense lesions in the right hepatic lobe that are too small to characterize but are stable from prior study.       1. No acute pulmonary embolism.   2. New smooth interlobular septal thickening and  peribronchovascular interstitial thickening suggestive of interstitial edema. Resolution of previous small left pleural effusion.   3. Unchanged subcentimeter bilateral solid pulmonary nodules that may relate to metastatic disease. Continued attention on follow-up surveillance recommended.   4. Slight decrease in size of bilateral adrenal metastases. Stable size of intrathoracic prominent to mildly enlarged lymph nodes.   5. Unchanged saccular aneurysm arising from the anterior wall the innominate artery measuring 1.7 cm x 1.7 cm x 1.8 cm.   MACRO: None.   Signed by: Paulo Simpson 10/22/2023 9:09 PM Dictation workstation:   [id number]    XR chest 1 view    Result Date: 10/22/2023  Interpreted By:  Lanie Denny, STUDY: XR CHEST 1 VIEW;  10/22/2023 6:51 pm   INDICATION: Signs/Symptoms:SOB.   COMPARISON: 09/18/2023   ACCESSION NUMBER(S): [id number]   ORDERING CLINICIAN: WILLIAM MIRANDA   FINDINGS: AP radiograph of the chest was provided.       CARDIOMEDIASTINAL SILHOUETTE: Cardiomediastinal silhouette is stable in size and configuration.   LUNGS: Lung fields are hyperinflated with coarse interstitial prominence and hazy left lower lobe opacities similar to prior imaging. No focal consolidation, pleural effusion or sizable pneumothorax seen.   ABDOMEN: No remarkable upper abdominal findings.   BONES: No acute osseous changes.       1.  Chronic lung changes likely sequela of emphysema/COPD and left basilar scarring/atelectasis.       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 States shortness of breath started 4 days ago with productive cough that is green. Pulmonology consulted. Patient to be admitted to hospital for further medical management.  #Suspect pneumonia, suspect gram negative rods  #Elevated troponin, denies chest pain on admission  #Hypoxia with increased oxygen requirement (on 4L NC, baseline is 2L intermittently at home)  #COPD  #Lung cancer on chemotherapy  #Leukocytosis  -Admit to OBS/Telemetry to Dr. Davies  -Pulmonology consult and appreciate recs  -Continue Azithromycin, rocephin, and vancomycin. De-escalate as appropriate.  -Duoneb/Proventil  -Mucinex  -Imaging results above  -RT recs appreciated  -Titrate oxygen to maintain sat >92%  -Strep. Pneumonia Ag. & Legionella antigen urine  -Sputum culture ordered  -Troponin 15. Will trend.   -Repeat labs in AM    #Acute on chronic anemia  H&H 11.6/38.6  -No signs of active bleeding  -Continue to monitor    Chronic issues  #HTN  #HLD  #CAD  -Continue home meds as appropriate when nursing completes home med rec.  -Smoking cessation  -Nicotine patch  -Full code    #DVT prophylaxis  -Heparin SQ  -SCD's as tolerated  -Up in chair    I spent 25 minutes in the professional and overall care of this patient.      KERA Cheng-CNP

## 2023-10-23 NOTE — PROGRESS NOTES
Vancomycin Dosing by Pharmacy- FOLLOW UP    Radha Toussaint is a 68 y.o. year old female who Pharmacy has been consulted for vancomycin dosing for pneumonia. Based on the patient's indication and renal status this patient is being dosed based on a goal AUC of 400-600.     Renal function is currently stable.    A loading dose of vancomycin 1,500 mg has been given.    Visit Vitals  /74 (BP Location: Right arm, Patient Position: Lying)   Pulse 67   Temp 37 °C (98.6 °F)   Resp 20        Lab Results   Component Value Date    CREATININE 0.63 10/23/2023    CREATININE 0.52 10/22/2023    CREATININE 0.56 10/04/2023    CREATININE 0.53 09/20/2023    CREATININE CANCELED 09/19/2023    CREATININE 0.47 (L) 09/19/2023    CREATININE CANCELED 09/19/2023        Patient weight is 60.3  kg    Lab Results   Component Value Date    PATIENTTEMP 37.0 09/18/2023    PATIENTTEMP 37.0 08/30/2023        Assessment/Plan     A maintenance dose of vancomycin 1,500 mg iv q24h has been ordered.    This dosing regimen is predicted by [employer] to result in the following pharmacokinetic parameters:    AUC24,ss: 476 mg/L.hr  Probability of AUC24 > 400: 78 %  Ctrough,ss: 11.4 mg/L  Probability of Ctrough,ss > 20: 6 %  Probability of nephrotoxicity (Lodise ROSA 2009): 7 %    2.  The next level will be obtained on 10/26/23 with am labs. May be obtained sooner if clinically indicated.   3.  Will continue to monitor renal function daily while on vancomycin and order serum creatinine at least every 48 hours if not already ordered.  4.  Follow for continued vancomycin needs, clinical response, and signs/symptoms of toxicity.       Eduardo Matt, PharmD

## 2023-10-23 NOTE — DISCHARGE SUMMARY
Discharge Diagnosis  Pneumonia due to gram-negative bacteria    Issues Requiring Follow-Up  pna    Test Results Pending At Discharge  Pending Labs       Order Current Status    Blood Culture Preliminary result    Blood Culture Preliminary result            Hospital Course   Radha Toussaint is a 68 y.o. 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 States shortness of breath started 4 days ago with productive cough that is green. States she had mild abdominal pain. Denies chest pain, fever, chills, nausea, vomiting, urinary symptoms, diarrhea, or constipation. Receives chemotherapy at [facility].      ED Course: Temp 37.0C, /77, HR 79, RR 24. 96% 4L NC (baseline is 2L at home intermittently). EKG unavailable for my review. Labs show WBC 11.6, neutrophil 8.75. H&H 11.6/38.6. Sodium 133. Troponin 15. See imaging results below. Azithromycin, zosyn, vancomycin, duoneb given in ED. Pt will be admitted under the care of Dr. Davies who will continue to follow. I was asked to do the H&P and place initial admission orders. Feels better, wants to go home, oxygne weaned    Pertinent Physical Exam At Time of Discharge  Physical Exam  Constitutional:       Appearance: Normal appearance.   HENT:      Head: Normocephalic and atraumatic.      Right Ear: Tympanic membrane and ear canal normal.      Left Ear: Tympanic membrane and ear canal normal.      Mouth/Throat:      Mouth: Mucous membranes are moist.      Pharynx: Oropharynx is clear.   Eyes:      Extraocular Movements: Extraocular movements intact.      Conjunctiva/sclera: Conjunctivae normal.       Pupils: Pupils are equal, round, and reactive to light.   Cardiovascular:      Rate and Rhythm: Normal rate and regular rhythm.      Pulses: Normal pulses.      Heart sounds: Normal heart sounds.   Pulmonary:      Effort: Pulmonary effort is normal.      Breath sounds: Normal breath sounds.   Abdominal:      General: Abdomen is flat. Bowel sounds are normal.      Palpations: Abdomen is soft.   Musculoskeletal:         General: Normal range of motion.      Cervical back: Normal range of motion and neck supple.   Skin:     General: Skin is warm and dry.      Capillary Refill: Capillary refill takes 2 to 3 seconds.   Neurological:      General: No focal deficit present.      Mental Status: She is alert and oriented to person, place, and time. Mental status is at baseline.   Psychiatric:         Mood and Affect: Mood normal.         Behavior: Behavior normal.         Thought Content: Thought content normal.         Judgment: Judgment normal.         Home Medications     Medication List      START taking these medications     amoxicillin-pot clavulanate 500-125 mg tablet; Commonly known as:   Augmentin; Take 1 tablet (500 mg) by mouth 3 times a day.     CHANGE how you take these medications     ipratropium-albuteroL 0.5-2.5 mg/3 mL nebulizer solution; Commonly known   as: Duo-Neb; Take 3 mL by nebulization 4 times a day.; What changed: when   to take this, reasons to take this     CONTINUE taking these medications     albuterol 90 mcg/actuation inhaler   amLODIPine 5 mg tablet; Commonly known as: Norvasc   aspirin 81 mg EC tablet   clopidogrel 75 mg tablet; Commonly known as: Plavix   cyclobenzaprine 10 mg tablet; Commonly known as: Flexeril; Take 1 tablet   (10 mg) by mouth 3 times a day as needed for muscle spasms.   famotidine 40 mg tablet; Commonly known as: Pepcid   ferrous sulfate 325 (65 Fe) MG EC tablet   fludrocortisone 0.1 mg tablet; Commonly known as: Florinef; TAKE 1/2   TABLET BY MOUTH ONCE DAILY    hydrocortisone 5 mg tablet; Commonly known as: Cortef; STARTING ON 9/8   TAKE 2 TABS AT 8AM THEN TAKE 1 TABLET AT NOON THEN TAKE 1/2 TABLET AT 4PM   mirtazapine 7.5 mg tablet; Commonly known as: Remeron; TAKE 1 TABLET BY   MOUTH EVERY NIGHT AT BEDTIME   montelukast 10 mg tablet; Commonly known as: Singulair; Take 1 tablet   (10 mg) by mouth once daily at bedtime.   naloxone 4 mg/0.1 mL nasal spray; Commonly known as: Narcan; Administer   1 spray (4 mg) into affected nostril(s) if needed for opioid reversal. May   repeat every 2-3 minutes if needed, alternating nostrils, until medical   assistance becomes available.   ondansetron ODT 4 mg disintegrating tablet; Commonly known as:   Zofran-ODT; DISSOLVE 1 TABLET IN MOUTH BY MOUTH THREE TIMES A DAY AS   NEEDED NAUSEA   pantoprazole 40 mg EC tablet; Commonly known as: ProtoNix   PROBIOTIC ORAL   rosuvastatin 10 mg tablet; Commonly known as: Crestor   sennosides 8.6 mg tablet; Commonly known as: senna; Take 1 tablet (8.6   mg) by mouth once daily.   sertraline 25 mg tablet; Commonly known as: Zoloft; Take 1 tablet (25   mg) by mouth once daily.   Trelegy Ellipta 200-62.5-25 mcg blister with device; Generic drug:   fluticasone-umeclidin-vilanter     STOP taking these medications     HYDROmorphone 4 mg tablet; Commonly known as: Dilaudid       Outpatient Follow-Up  Future Appointments   Date Time Provider Department Center   10/25/2023  8:30 AM KERA Seymour-CNP [id number] Academic   10/25/2023  9:30 AM INF [id number]   11/1/2023  1:00 PM Darin Richardson MD [id number] Academic   11/7/2023  8:00 [facility]

## 2023-10-23 NOTE — H&P
History Of Present Illness  Radha Toussaint is a 68 y.o. 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 States shortness of breath started 4 days ago with productive cough that is green. States she had mild abdominal pain. Denies chest pain, fever, chills, nausea, vomiting, urinary symptoms, diarrhea, or constipation. Receives chemotherapy at [facility].     ED Course: Temp 37.0C, /77, HR 79, RR 24. 96% 4L NC (baseline is 2L at home intermittently). EKG unavailable for my review. Labs show WBC 11.6, neutrophil 8.75. H&H 11.6/38.6. Sodium 133. Troponin 15. See imaging results below. Azithromycin, zosyn, vancomycin, duoneb given in ED. Pt will be admitted under the care of Dr. Davies who will continue to follow. I was asked to do the H&P and place initial admission orders.     Past Medical History  Past Medical History:   Diagnosis Date    Aneurysm of carotid artery (CMS/[facility])     Neck aneurysm    DVT (deep venous thrombosis) (CMS/[facility])     2022    History of tubal ligation     Old myocardial infarction     History of heart attack    Other specified disorders of kidney and ureter     Obstruction of kidney    Personal history of other diseases of the circulatory system     History of intracranial aneurysm    Personal history of other diseases of the circulatory system     History of abdominal aortic aneurysm (AAA)    Personal history of other diseases of the respiratory system     History of chronic obstructive lung disease    Personal history of urinary calculi     History of kidney stones    Right upper quadrant pain 09/25/2020    Abdominal  pain, RUQ (right upper quadrant)       Surgical History  as above; Left MCA aneurysm clipping, femoral bypass, AAA graft repair, tubal ligation, bladder lift, lithotripsy        Social History  Smoker (1 ppd for ~ 47 years), no EtOH or drug use. Lives with . Retired teacher.     Family History  Father  age 52, MI.  Mother  age 70 5 aortic aneurysm complication.  Sister  age 68 (from depression?).  2 children with history of Crohn's disease.      Allergies  Ace inhibitors, Prednisone, Demerol [meperidine], and Iodinated contrast media    Review of Systems   10 point review of systems negative except as noted above.  Physical Exam  Constitutional:       Comments: Chronically ill appearing, eating during assessment   HENT:      Head: Normocephalic and atraumatic.      Nose: Nose normal.      Mouth/Throat:      Mouth: Mucous membranes are moist.      Pharynx: Oropharynx is clear.   Eyes:      Extraocular Movements: Extraocular movements intact.      Conjunctiva/sclera: Conjunctivae normal.      Pupils: Pupils are equal, round, and reactive to light.   Cardiovascular:      Rate and Rhythm: Normal rate and regular rhythm.      Pulses: Normal pulses.      Heart sounds: Normal heart sounds.   Pulmonary:      Effort: Pulmonary effort is normal.      Breath sounds: Normal breath sounds.      Comments: Diminished with mild wheeze  Abdominal:      General: Bowel sounds are normal.      Palpations: Abdomen is soft.   Musculoskeletal:         General: Normal range of motion.      Cervical back: Normal range of motion and neck supple.   Skin:     General: Skin is warm and dry.      Capillary Refill: Capillary refill takes less than 2 seconds.   Neurological:      General: No focal deficit present.      Mental Status: She is alert and oriented to person, place, and time.   Psychiatric:         Mood and Affect: Mood normal.         Behavior: Behavior normal.         Thought Content: Thought content  "normal.         Judgment: Judgment normal.          Last Recorded Vitals  Blood pressure 119/56, pulse 76, temperature 37 °C (98.6 °F), resp. rate 20, height 1.626 m (5' 4\"), weight 60.3 kg (133 lb), SpO2 99 %.    Relevant Results  Results for orders placed or performed during the hospital encounter of 10/22/23 (from the past 24 hour(s))   CBC and Auto Differential   Result Value Ref Range    WBC 11.6 (H) 4.4 - 11.3 x10*3/uL    nRBC 0.0 0.0 - 0.0 /100 WBCs    RBC 4.86 4.00 - 5.20 x10*6/uL    Hemoglobin 11.6 (L) 12.0 - 16.0 g/dL    Hematocrit 38.6 36.0 - 46.0 %    MCV 79 (L) 80 - 100 fL    MCH 23.9 (L) 26.0 - 34.0 pg    MCHC 30.1 (L) 32.0 - 36.0 g/dL    RDW 20.1 (H) 11.5 - 14.5 %    Platelets 321 150 - 450 x10*3/uL    MPV 10.0 7.5 - 11.5 fL    Neutrophils % 75.4 40.0 - 80.0 %    Immature Granulocytes %, Automated 1.0 (H) 0.0 - 0.9 %    Lymphocytes % 13.9 13.0 - 44.0 %    Monocytes % 8.2 2.0 - 10.0 %    Eosinophils % 0.9 0.0 - 6.0 %    Basophils % 0.6 0.0 - 2.0 %    Neutrophils Absolute 8.75 (H) 1.20 - 7.70 x10*3/uL    Immature Granulocytes Absolute, Automated 0.12 0.00 - 0.70 x10*3/uL    Lymphocytes Absolute 1.61 1.20 - 4.80 x10*3/uL    Monocytes Absolute 0.95 0.10 - 1.00 x10*3/uL    Eosinophils Absolute 0.11 0.00 - 0.70 x10*3/uL    Basophils Absolute 0.07 0.00 - 0.10 x10*3/uL   Basic metabolic panel   Result Value Ref Range    Glucose 97 74 - 99 mg/dL    Sodium 133 (L) 136 - 145 mmol/L    Potassium 4.6 3.5 - 5.3 mmol/L    Chloride 98 98 - 107 mmol/L    Bicarbonate 26 21 - 32 mmol/L    Anion Gap 14 10 - 20 mmol/L    Urea Nitrogen 15 6 - 23 mg/dL    Creatinine 0.52 0.50 - 1.05 mg/dL    eGFR >90 >60 mL/min/1.73m*2    Calcium 8.7 8.6 - 10.3 mg/dL   Troponin I, High Sensitivity   Result Value Ref Range    Troponin I, High Sensitivity 15 (H) 0 - 13 ng/L   B-Type Natriuretic Peptide   Result Value Ref Range    BNP 82 0 - 99 pg/mL   D-dimer, quantitative   Result Value Ref Range    D-Dimer Non VTE, Quant (ng/mL FEU) 2,628 " (H) <=500 ng/mL FEU   SARS-CoV-2 RT PCR   Result Value Ref Range    Coronavirus 2019, PCR Not Detected Not Detected   Morphology   Result Value Ref Range    RBC Morphology See Below     Ovalocytes Few     Sybil Cells Few    Lactate   Result Value Ref Range    Lactate 0.5 0.4 - 2.0 mmol/L   Blood Culture    Specimen: Peripheral Venipuncture; Blood culture   Result Value Ref Range    Blood Culture Loaded on Instrument - Culture in progress    Blood Culture    Specimen: Peripheral Venipuncture; Blood culture   Result Value Ref Range    Blood Culture Loaded on Instrument - Culture in progress    Troponin I, High Sensitivity   Result Value Ref Range    Troponin I, High Sensitivity 42 (H) 0 - 13 ng/L   Urinalysis with Reflex Microscopic   Result Value Ref Range    Color, Urine Colorless (N) Straw, Yellow    Appearance, Urine Clear Clear    Specific Gravity, Urine <1.005 (A) 1.005 - 1.035    pH, Urine 6.5 5.0, 5.5, 6.0, 6.5, 7.0, 7.5, 8.0    Protein, Urine NEGATIVE NEGATIVE, 10 (TRACE) mg/dL    Glucose, Urine 30 (TRACE) (A) NEGATIVE mg/dL    Blood, Urine NEGATIVE NEGATIVE    Ketones, Urine NEGATIVE NEGATIVE mg/dL    Bilirubin, Urine NEGATIVE NEGATIVE    Urobilinogen, Urine NORM NORM mg/dL    Nitrite, Urine NEGATIVE NEGATIVE    Leukocyte Esterase, Urine 75 Srinath/µL (A) NEGATIVE   Microscopic Only, Urine   Result Value Ref Range    WBC, Urine 1-5 1-5, NONE /HPF    WBC Clumps, Urine RARE Reference range not established. /HPF    RBC, Urine 1-2 NONE, 1-2, 3-5 /HPF    Squamous Epithelial Cells, Urine 1-9 (SPARSE) Reference range not established. /HPF   Legionella Antigen, Urine    Specimen: Urine   Result Value Ref Range    L. pneumophila Urine Ag Negative Negative   Streptococcus pneumoniae Antigen, Urine    Specimen: Urine   Result Value Ref Range    Streptococcus pneumoniae Ag, Urine Negative Negative   Troponin I, High Sensitivity   Result Value Ref Range    Troponin I, High Sensitivity 24 (H) 0 - 13 ng/L   CBC   Result Value  Ref Range    WBC 8.6 4.4 - 11.3 x10*3/uL    nRBC 0.0 0.0 - 0.0 /100 WBCs    RBC 4.69 4.00 - 5.20 x10*6/uL    Hemoglobin 11.0 (L) 12.0 - 16.0 g/dL    Hematocrit 37.1 36.0 - 46.0 %    MCV 79 (L) 80 - 100 fL    MCH 23.5 (L) 26.0 - 34.0 pg    MCHC 29.6 (L) 32.0 - 36.0 g/dL    RDW 19.8 (H) 11.5 - 14.5 %    Platelets 293 150 - 450 x10*3/uL    MPV 9.6 7.5 - 11.5 fL   Basic Metabolic Panel   Result Value Ref Range    Glucose 87 74 - 99 mg/dL    Sodium 138 136 - 145 mmol/L    Potassium 4.0 3.5 - 5.3 mmol/L    Chloride 102 98 - 107 mmol/L    Bicarbonate 28 21 - 32 mmol/L    Anion Gap 12 10 - 20 mmol/L    Urea Nitrogen 11 6 - 23 mg/dL    Creatinine 0.63 0.50 - 1.05 mg/dL    eGFR >90 >60 mL/min/1.73m*2    Calcium 8.9 8.6 - 10.3 mg/dL   Vancomycin, Trough   Result Value Ref Range    Vancomycin, Trough 17.5 5.0 - 20.0 ug/mL     CT angio chest for pulmonary embolism    Result Date: 10/22/2023  Interpreted By:  Paulo Simpson, STUDY: CT ANGIO CHEST FOR PULMONARY EMBOLISM;  10/22/2023 8:20 pm   INDICATION: Signs/Symptoms:h/o lung cancer, elevated dimer, notes exertional SOB, eval for PE.   COMPARISON: 09/01/2023 PET CT, 08/30/2023 CT chest   ACCESSION NUMBER(S): [id number]   ORDERING CLINICIAN: WILLIAM MIRANDA   TECHNIQUE: Contiguous axial images of the chest were obtained after the intravenous administration of 65 mL Omnipaque 350 contrast using angiographic PE protocol. Coronal and sagittal reformatted images were reconstructed from the axial data. MIP images were created on an independent workstation and reviewed.   FINDINGS: MEDIASTINUM AND LYMPH NODES: There are numerous prominent and mildly enlarged mediastinal and right hilar lymph nodes that are similar in size at 8:30 2023. For example, there is a 1 cm right lower paratracheal lymph node, 1.2 cm subcarinal lymph node, 1.3 cm right hilar lymph node (amongst others), 0.7 cm aortopulmonary window lymph node. There is a 1 cm right thyroid lobe nodule. The esophagus appears  within normal limits. No pneumomediastinum.   VESSELS: There is redemonstration of a focal outpouching of the anterior wall of the innominate artery consistent with saccular aneurysm (axial image 66/252 measuring 1.7 cm x 1.7 cm x 1.8 cm. There is redemonstration of ectatic proximal descending thoracic aorta measuring 3.8 cm x 3 cm with minimal mural thrombus. The mid ascending aorta is normal in caliber. There is mild thoracic aortic atherosclerosis and moderate upper abdominal aortic atherosclerosis with partially visualized endovascular aortic stent graft noted. No acute pulmonary embolism.   HEART: Normal in size.  Mild coronary artery calcifications. No significant pericardial effusion.   LUNG, AIRWAYS, AND PLEURA: Emphysema. There has been resolution of previous small left pleural effusion and adjacent atelectasis. However, there is new smooth bilateral interlobular septal thickening and peribronchovascular interstitial thickening suspicious for interstitial edema. Scattered subcentimeter pulmonary nodules are unchanged from prior study. There is mild scarring/atelectasis in the medial right upper lobe and lingula. There is a small area of subsegmental atelectasis in the peripheral right lower lobe, new from prior study. No consolidation, pleural effusion or pneumothorax.   OSSEOUS STRUCTURES: No acute osseous abnormality. Previous subacute lateral left 8th rib fractures now healed with mild sclerosis in its previous location. Stable 0.4 cm lytic focus within the T7 vertebral body, dating back to 07/26/2023.   CHEST WALL SOFT TISSUES: No discernible abnormality.   UPPER ABDOMEN/OTHER: There is redemonstration of nonobstructing subcentimeter left upper pole renal calculus. There is redemonstration of calcified portacaval lymph nodes present transient of mildly dilated main pancreatic duct with parenchymal calcifications indicative of chronic pancreatitis. Additionally, there is redemonstration of bilateral  adrenal metastases, similar to slightly smaller compared to 09/18/2023 measuring 6.5 x 4.1 cm on the right and 4.4 cm x 2.6 cm on the left. Evaluation of solid organs is limited due to contrast timing. There are subcentimeter hypodense lesions in the right hepatic lobe that are too small to characterize but are stable from prior study.       1. No acute pulmonary embolism.   2. New smooth interlobular septal thickening and peribronchovascular interstitial thickening suggestive of interstitial edema. Resolution of previous small left pleural effusion.   3. Unchanged subcentimeter bilateral solid pulmonary nodules that may relate to metastatic disease. Continued attention on follow-up surveillance recommended.   4. Slight decrease in size of bilateral adrenal metastases. Stable size of intrathoracic prominent to mildly enlarged lymph nodes.   5. Unchanged saccular aneurysm arising from the anterior wall the innominate artery measuring 1.7 cm x 1.7 cm x 1.8 cm.   MACRO: None.   Signed by: Paulo Simpson 10/22/2023 9:09 PM Dictation workstation:   [id number]    XR chest 1 view    Result Date: 10/22/2023  Interpreted By:  Lanie Denny, STUDY: XR CHEST 1 VIEW;  10/22/2023 6:51 pm   INDICATION: Signs/Symptoms:SOB.   COMPARISON: 09/18/2023   ACCESSION NUMBER(S): [id number]   ORDERING CLINICIAN: WILLIAM MIRANDA   FINDINGS: AP radiograph of the chest was provided.       CARDIOMEDIASTINAL SILHOUETTE: Cardiomediastinal silhouette is stable in size and configuration.   LUNGS: Lung fields are hyperinflated with coarse interstitial prominence and hazy left lower lobe opacities similar to prior imaging. No focal consolidation, pleural effusion or sizable pneumothorax seen.   ABDOMEN: No remarkable upper abdominal findings.   BONES: No acute osseous changes.       1.  Chronic lung changes likely sequela of emphysema/COPD and left basilar scarring/atelectasis.       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 States shortness of breath started 4 days ago with productive cough that is green. Pulmonology consulted. Patient to be admitted to hospital for further medical management.  #Suspect pneumonia, suspect gram negative rods  #Elevated troponin, denies chest pain on admission  #Hypoxia with increased oxygen requirement (on 4L NC, baseline is 2L intermittently at home)  #COPD  #Lung cancer on chemotherapy  #Leukocytosis  -Admit to OBS/Telemetry to Dr. Davies  -Pulmonology consult and appreciate recs  -Continue Azithromycin, rocephin, and vancomycin. De-escalate as appropriate.  -Duoneb/Proventil  -Mucinex  -Imaging results above  -RT recs appreciated  -Titrate oxygen to maintain sat >92%  -Strep. Pneumonia Ag. & Legionella antigen urine  -Sputum culture ordered  -Troponin 15. Will trend.   -Repeat labs in AM    #Acute on chronic anemia  H&H 11.6/38.6  -No signs of active bleeding  -Continue to monitor    Chronic issues  #HTN  #HLD  #CAD  -Continue home meds as appropriate when nursing completes home med rec.  -Smoking cessation  -Nicotine patch  -Full code    #DVT prophylaxis  -Heparin SQ  -SCD's as tolerated  -Up in chair    I spent 25 minutes in the professional and overall care of this patient.      Nico Davies, DO

## 2023-10-23 NOTE — PROGRESS NOTES
"Vancomycin Dosing by Pharmacy- INITIAL    Radha Toussaint is a 68 y.o. year old female who Pharmacy has been consulted for vancomycin dosing for pneumonia. Based on the patient's indication and renal status this patient will be dosed based on a goal AUC of 500-600.     Renal function is currently stable.    Visit Vitals  /62 (BP Location: Right arm, Patient Position: Lying)   Pulse 90   Temp 37 °C (98.6 °F)   Resp 22        Lab Results   Component Value Date    CREATININE 0.52 10/22/2023    CREATININE 0.56 10/04/2023    CREATININE 0.53 09/20/2023    CREATININE CANCELED 09/19/2023    CREATININE 0.47 (L) 09/19/2023    CREATININE CANCELED 09/19/2023        Patient weight is No results found for: \"PTWEIGHT\"    No results found for: \"CULTURE\"     No intake/output data recorded.  [unfilled]    Lab Results   Component Value Date    PATIENTTEMP 37.0 09/18/2023    PATIENTTEMP 37.0 08/30/2023          Assessment/Plan     Patient will be given a loading dose of 1500 mg at ~22:00.  2.   Trough with AM draw 10/23  3.   Maintenance dose to be determined by clinical staff.    This dosing regimen is predicted by [employer] to result in the following pharmacokinetic parameters:  A maintenance dose and scheduled was not determined at this time. A loading dose only was given. A lab/trough is scheduled for 10/23 AM draw.     Follow-up level will be ordered on 10/23 at 0430 unless clinically indicated sooner.  Will continue to monitor renal function daily while on vancomycin and order serum creatinine at least every 48 hours if not already ordered.  Follow for continued vancomycin needs, clinical response, and signs/symptoms of toxicity.       Nestor Cannon, PharmD       "

## 2023-10-23 NOTE — PROGRESS NOTES
Pulmonary Critical Care note    Patient Name :Radha Toussaint  Date of service : 10/23/23  MRN: [id number]  YOB: 1955      Interval History:    History of Present Illness:     Past Medical/Surgical History:   Past Medical History:   Diagnosis Date    Aneurysm of carotid artery (CMS/[facility])     Neck aneurysm    DVT (deep venous thrombosis) (CMS/[facility])     2022    History of tubal ligation     Old myocardial infarction     History of heart attack    Other specified disorders of kidney and ureter     Obstruction of kidney    Personal history of other diseases of the circulatory system     History of intracranial aneurysm    Personal history of other diseases of the circulatory system     History of abdominal aortic aneurysm (AAA)    Personal history of other diseases of the respiratory system     History of chronic obstructive lung disease    Personal history of urinary calculi     History of kidney stones    Right upper quadrant pain 09/25/2020    Abdominal pain, RUQ (right upper quadrant)     Past Surgical History:   Procedure Laterality Date    CT ABDOMEN PELVIS ANGIOGRAM W AND/OR WO IV CONTRAST  11/21/2019    CT ABDOMEN PELVIS ANGIOGRAM W AND/OR WO IV CONTRAST 11/21/2019 [facility]    CT AORTA AND BILATERAL ILIOFEMORAL RUNOFF ANGIOGRAM W AND/OR WO IV CONTRAST  8/9/2022    CT AORTA AND BILATERAL ILIOFEMORAL RUNOFF ANGIOGRAM W AND/OR WO IV CONTRAST 8/9/2022 [facility]    CT HEAD ANGIO W AND WO IV CONTRAST  5/22/2020    CT HEAD ANGIO W AND WO IV CONTRAST 5/22/2020 [facility]    OTHER SURGICAL HISTORY  09/30/2020    Tubal ligation    OTHER SURGICAL HISTORY  09/30/2020    Cardiac catheterization       Family History:   No relevant family history has been documented for this patient.    Allergies:     Allergies   Allergen Reactions    Ace Inhibitors Angioedema    Prednisone Anxiety and Agitation     & violent    Demerol [Meperidine] Nausea/vomiting    Iodinated Contrast Media Rash          Social history:   reports that she has been smoking cigarettes. She has been smoking an average of 1 pack per day. She has never used smokeless tobacco.    Current Medications:   Scheduled medications  amLODIPine, 5 mg, oral, Daily  aspirin, 81 mg, oral, Daily  azithromycin, 500 mg, intravenous, q24h  clopidogrel, 75 mg, oral, Daily  famotidine, 40 mg, oral, Daily  ferrous sulfate, 65 mg of iron, oral, Daily  fludrocortisone, 0.05 mg, oral, Daily  tiotropium, 2 Inhalation, inhalation, Daily   And  fluticasone furoate-vilanteroL, 1 puff, inhalation, Daily  heparin (porcine), 5,000 Units, subcutaneous, q8h  hydrocortisone, 5 mg, oral, Daily  ipratropium-albuteroL, 3 mL, nebulization, TID  lactobacillus acidophilus, , oral, Daily  mirtazapine, 7.5 mg, oral, Nightly  montelukast, 10 mg, oral, Nightly  nicotine, 1 patch, transdermal, Daily  pantoprazole, 40 mg, oral, Daily before breakfast  piperacillin-tazobactam, 3.375 g, intravenous, q6h  rosuvastatin, 10 mg, oral, Daily  sennosides, 1 tablet, oral, Daily  sertraline, 25 mg, oral, Daily  vancomycin, 1,500 mg, intravenous, q24h      Continuous medications     PRN medications  PRN medications: albuterol, guaiFENesin, HYDROmorphone, ondansetron ODT, oxygen      Review of Systems:   General: denies weight gain, denies loss of appetite, fever, chills, night sweats.  HEENT: denies headaches, dizziness, head trauma, visual changes, eye pain, tinnitus, nosebleeds, hoarseness or throat pain    Respiratory: denies chest pain, dyspnea, cough and hemoptysis  Cardiovascular: denies orthopnea, paroxysmal nocturnal dyspnea, leg swelling, and previous heart attack.    Gastrointestinal: denies pain, nausea vomiting, diarrhea, constipation, melena or bleeding.  Genitourinary: denies hematuria, frequency, urgency or dysuria  Neurology: denies syncope, seizures, paralysis, paraesthesia   Endocrine: denies polyuria, polydipsia, skin or hair changes, and heat or cold  intolerance  Musculoskeletal: denies joint pain, swelling, arthritis or myalgia  Hematologic: denies bleeding, adenopathy and easy bruising  Skin: denies rashes and skin discoloration  Psychiatry: denies depression    Physical Exam:   Vital Signs:   Vitals:    10/23/23 1500   BP: 124/76   Pulse: 80   Resp:    Temp:    SpO2: 100%     Vitals:    10/22/23 1815   Weight: 60.3 kg (133 lb)         Input/Output:  No intake or output data in the 24 hours ending 10/23/23 1623    Oxygen requirements:    Ventilator Information:  FiO2 (%):  [32 %] 32 %  Oxygen Therapy/Pulse Ox  Oxygen Therapy: Supplemental oxygen  O2 Delivery Method: Nasal cannula  FiO2 (%): 32 %  SpO2: 100 %  Patient Activity During SpO2 Measurement: At rest  Temp: 37 °C (98.6 °F)        General appearance: Not in pain or distress, in no respiratory distress    HEENT: Atraumatic/normocephalic, EOMI, GISSELLE, pharynx clear, moist mucosa, redness of the uvula appreciated,   Neck: Supple, no jugular venous distension, lymphadenopathy, thyromegaly or carotid bruits  Chest: decreased breath sounds    Cardiovascular: Normal S1, S2, regular rate and rhythm, no murmur, rub or gallop  Abdomen: Normal sounds present, soft, lax with no tenderness, no hepatosplenomegaly, and no masses  Extremities: No edema. Pulses are equally present.   Skin: intact, no rashes   Neurologic: Alert and oriented x 3, No focal deficit         Investigations:  Labs, radiological imaging and cardiac work up were personally reviewed    Results from last 7 days   Lab Units 10/23/23  0528 10/22/23  1837   SODIUM mmol/L 138 133*   POTASSIUM mmol/L 4.0 4.6   CHLORIDE mmol/L 102 98   CO2 mmol/L 28 26   BUN mg/dL 11 15   CREATININE mg/dL 0.63 0.52   GLUCOSE mg/dL 87 97   CALCIUM mg/dL 8.9 8.7     Results from last 7 days   Lab Units 10/23/23  0528   WBC AUTO x10*3/uL 8.6   HEMOGLOBIN g/dL 11.0*   HEMATOCRIT % 37.1   PLATELETS AUTO x10*3/uL 293         CT angio chest for pulmonary embolism    Result  Date: 10/22/2023  Interpreted By:  Paulo Simpson, STUDY: CT ANGIO CHEST FOR PULMONARY EMBOLISM;  10/22/2023 8:20 pm   INDICATION: Signs/Symptoms:h/o lung cancer, elevated dimer, notes exertional SOB, eval for PE.   COMPARISON: 09/01/2023 PET CT, 08/30/2023 CT chest   ACCESSION NUMBER(S): [id number]   ORDERING CLINICIAN: WILLIAM MIRANDA   TECHNIQUE: Contiguous axial images of the chest were obtained after the intravenous administration of 65 mL Omnipaque 350 contrast using angiographic PE protocol. Coronal and sagittal reformatted images were reconstructed from the axial data. MIP images were created on an independent workstation and reviewed.   FINDINGS: MEDIASTINUM AND LYMPH NODES: There are numerous prominent and mildly enlarged mediastinal and right hilar lymph nodes that are similar in size at 8:30 2023. For example, there is a 1 cm right lower paratracheal lymph node, 1.2 cm subcarinal lymph node, 1.3 cm right hilar lymph node (amongst others), 0.7 cm aortopulmonary window lymph node. There is a 1 cm right thyroid lobe nodule. The esophagus appears within normal limits. No pneumomediastinum.   VESSELS: There is redemonstration of a focal outpouching of the anterior wall of the innominate artery consistent with saccular aneurysm (axial image 66/252 measuring 1.7 cm x 1.7 cm x 1.8 cm. There is redemonstration of ectatic proximal descending thoracic aorta measuring 3.8 cm x 3 cm with minimal mural thrombus. The mid ascending aorta is normal in caliber. There is mild thoracic aortic atherosclerosis and moderate upper abdominal aortic atherosclerosis with partially visualized endovascular aortic stent graft noted. No acute pulmonary embolism.   HEART: Normal in size.  Mild coronary artery calcifications. No significant pericardial effusion.   LUNG, AIRWAYS, AND PLEURA: Emphysema. There has been resolution of previous small left pleural effusion and adjacent atelectasis. However, there is new smooth bilateral  interlobular septal thickening and peribronchovascular interstitial thickening suspicious for interstitial edema. Scattered subcentimeter pulmonary nodules are unchanged from prior study. There is mild scarring/atelectasis in the medial right upper lobe and lingula. There is a small area of subsegmental atelectasis in the peripheral right lower lobe, new from prior study. No consolidation, pleural effusion or pneumothorax.   OSSEOUS STRUCTURES: No acute osseous abnormality. Previous subacute lateral left 8th rib fractures now healed with mild sclerosis in its previous location. Stable 0.4 cm lytic focus within the T7 vertebral body, dating back to 07/26/2023.   CHEST WALL SOFT TISSUES: No discernible abnormality.   UPPER ABDOMEN/OTHER: There is redemonstration of nonobstructing subcentimeter left upper pole renal calculus. There is redemonstration of calcified portacaval lymph nodes present transient of mildly dilated main pancreatic duct with parenchymal calcifications indicative of chronic pancreatitis. Additionally, there is redemonstration of bilateral adrenal metastases, similar to slightly smaller compared to 09/18/2023 measuring 6.5 x 4.1 cm on the right and 4.4 cm x 2.6 cm on the left. Evaluation of solid organs is limited due to contrast timing. There are subcentimeter hypodense lesions in the right hepatic lobe that are too small to characterize but are stable from prior study.       1. No acute pulmonary embolism.   2. New smooth interlobular septal thickening and peribronchovascular interstitial thickening suggestive of interstitial edema. Resolution of previous small left pleural effusion.   3. Unchanged subcentimeter bilateral solid pulmonary nodules that may relate to metastatic disease. Continued attention on follow-up surveillance recommended.   4. Slight decrease in size of bilateral adrenal metastases. Stable size of intrathoracic prominent to mildly enlarged lymph nodes.   5. 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   MACRO: None.   Signed by: Paulo Simpson 10/22/2023 9:09 PM Dictation workstation:   [id number]    XR chest 1 view    Result Date: 10/22/2023  Interpreted By:  Lanie Denny, STUDY: XR CHEST 1 VIEW;  10/22/2023 6:51 pm   INDICATION: Signs/Symptoms:SOB.   COMPARISON: 09/18/2023   ACCESSION NUMBER(S): [id number]   ORDERING CLINICIAN: WILLIAM MIRANDA   FINDINGS: AP radiograph of the chest was provided.       CARDIOMEDIASTINAL SILHOUETTE: Cardiomediastinal silhouette is stable in size and configuration.   LUNGS: Lung fields are hyperinflated with coarse interstitial prominence and hazy left lower lobe opacities similar to prior imaging. No focal consolidation, pleural effusion or sizable pneumothorax seen.   ABDOMEN: No remarkable upper abdominal findings.   BONES: No acute osseous changes.       1.  Chronic lung changes likely sequela of emphysema/COPD and left basilar scarring/atelectasis.       MACRO: None   Signed by: Lanie Denny 10/22/2023 7:11 PM Dictation workstation:   [id number]        Assessment  Patient is -year-old (man/woman) with the following medical Problems:  I reviewed patient CT scan of the chest patient has no evidence of from advanced worsening lung cancer nor pleural effusion no parenchymal infiltrate her symptoms are most consistent with airway disease i.e. acute exacerbation of COPD  Acute on chronic hypoxic respiratory failure  Acute exacerbation of COPD  Underlying lung cancer    Recommendation:  IV steroids  IV antibiotics  Schedule bronchodilator therapy  If significant improvement next 24 hours she may be a good candidate for outpatient therapy  Oxygen for saturation of 89 to 94%  Incentive spirometry  Bronchodilators therapy as needed  PT/OT  DVT prophylaxis  Minimize benzodiazepine and narcotics  Encourage ambulation  Head evaluation and aspiration precautions.  Recommend  LAMA/LABA/ICS upon discharge.        Dina Cristobal MD  10/23/23     STAFF PHYSICIAN NOTE OF PERSONAL INVOLVEMENT IN CARE  As the attending physician, I certify that I personally reviewed the patient's history and personally examined the patient to confirm the physical findings described above, and that I reviewed the relevant imaging studies and available reports.  I also discussed the differential diagnosis and all of the proposed management plans with the patient and individuals accompanying the patient to this visit.  They had the opportunity to ask questions about the proposed management plans and to have those questions answered.

## 2023-10-23 NOTE — CONSULTS
Inpatient consult to Pulmonology  Consult performed by: Kj Huynh DO  Consult ordered by: Myriam Tang, APRN-CNP  Reason for consult: Possible pneumonia in the setting of lung cancer history      HPI:  Radha Toussaint is a 68 y.o. 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  She describes the cough consistent with phlegm production.  She says that overall she does not feel sick however states shortness of breath episodes will progress to panic attacks and that she cannot catch her breath.  Patient follows with Dr. Carlos for cancer therapy; her last treatment was 3 weeks ago and her next one is this Wednesday.  Patient is currently requiring 3 L O2 nasal cannula.  She thinks that her cough feels better today.    Patient presented to the ED afebrile with normal pulse and respiratory rate, 180/77 and 96% on 4 L O2.  CMP unremarkable.  D-dimer was drawn last night and found to be 2400.  CBC showed hemoglobin at 11.  Blood cultures drawn.  UA was unremarkable.  CT PE ruled out acute pulmonary embolism and showed new interlobular septal thickening and interstitial thickening suggestive of interstitial edema, unchanged bilateral solid pulmonary nodule slight decrease in size of bilateral adrenal metastasis.  Chest x-ray showed findings consistent with chronic lung changes of emphysema/COPD and basilar scarring/atelectasis.  These findings are unchanged from previous study on 9/18.  Patient to be admitted to the medical floor and pulmonology consulted for possible pneumonia in setting of immunocompromised individual.    A 10 point ROS was performed with the patient denying any complaint at this time  aside from those listed in the HPI above.     PMH: as above  PSH: Denies  SH: 1.5 PPD, denies EtOH, denies illicit drug use    Vitals:    10/23/23 1000   BP: 119/56   Pulse: 76   Resp:    Temp:    SpO2: 99%       Scheduled Medications  amLODIPine, 5 mg, oral, Daily  aspirin, 81 mg, oral, Daily  azithromycin, 500 mg, intravenous, q24h  clopidogrel, 75 mg, oral, Daily  famotidine, 40 mg, oral, Daily  ferrous sulfate, 65 mg of iron, oral, Daily  fludrocortisone, 0.05 mg, oral, Daily  tiotropium, 2 Inhalation, inhalation, Daily   And  fluticasone furoate-vilanteroL, 1 puff, inhalation, Daily  heparin (porcine), 5,000 Units, subcutaneous, q8h  hydrocortisone, 5 mg, oral, Daily  ipratropium-albuteroL, 3 mL, nebulization, TID  lactobacillus acidophilus, , oral, Daily  mirtazapine, 7.5 mg, oral, Nightly  montelukast, 10 mg, oral, Nightly  nicotine, 1 patch, transdermal, Daily  pantoprazole, 40 mg, oral, Daily before breakfast  piperacillin-tazobactam, 3.375 g, intravenous, q6h  rosuvastatin, 10 mg, oral, Daily  sennosides, 1 tablet, oral, Daily  sertraline, 25 mg, oral, Daily  vancomycin, 1,500 mg, intravenous, q24h         Physical Exam   Constitutional: frail, A&Ox3, no acute distress, alert and cooperative. Hair loss from chemo  Eyes: EOMI, clear sclera  Respiratory/Thorax: overall diminished lung sounds, CTAB, with good chest expansion  Cardiovascular: RRR, no murmurs, 2+ equal pulses of the extremities  Gastrointestinal: Nondistended, soft, non-tender, no rebound tenderness or guarding, +BS  Extremities: normal extremities, no cyanosis edema, no clubbing  Lymphatic: No significant lymphadenopathy  Psychological: Appropriate mood and behavior  Skin: Warm and dry    ASSESSMENT & RECOMMENDATIONS    Acute hypoxic Respiratory Failure  AECOPD  Hx Non small cell lung cancer, currently on chemo  Hx metastasis to b/l adrenals  -Patient states that she does not feel sick, no body aches no fevers chills, normal WBC --> indicating  less likely to be infectious process.  -Worsening cough and increased oxygen demand supports COPD exacerbation.  Patient feeling better on steroid dose and breathing treatments.  -Last chemo therapy 3 weeks ago --> next on Wednesday 10/25  -Currently on 3L O2  PLAN:  -If significant improvement next 24 hours she may be a good candidate for outpatient therapy   -Sputum culture pending  -Azithromycin, Zosyn, Vanc  -Tiotropium daily, Duonebs TID  -PRN Albuterol, Mucinex  -Fludrocortisone 0.05mg daily, Hydrocortisone 5mg daily  -Encourage use of LESTER Huynh DO  PGY-1, Internal Medicine  Please SecureChat for any further questions  This is a preliminary note, please await attending attestation for final A/P

## 2023-10-24 ENCOUNTER — PATIENT OUTREACH (OUTPATIENT)
Dept: CARE COORDINATION | Facility: CLINIC | Age: 68
End: 2023-10-24
Payer: MEDICARE

## 2023-10-24 DIAGNOSIS — J18.9 PNEUMONIA DUE TO INFECTIOUS ORGANISM, UNSPECIFIED LATERALITY, UNSPECIFIED PART OF LUNG: ICD-10-CM

## 2023-10-24 DIAGNOSIS — E78.2 MIXED HYPERLIPIDEMIA: Primary | ICD-10-CM

## 2023-10-24 RX ORDER — ROSUVASTATIN CALCIUM 10 MG/1
10 TABLET, COATED ORAL DAILY
Qty: 90 TABLET | Refills: 1 | Status: ON HOLD | OUTPATIENT
Start: 2023-10-24 | End: 2024-01-23 | Stop reason: SDUPTHER

## 2023-10-24 NOTE — PROGRESS NOTES
Discharge Facility: [facility]  Discharge Diagnosis:Pneumonia due to gram-negative bacteria   Admission Date:10.22.23  Discharge Date: 10.23.23    PCP Appointment Date:11.7.23  Specialist Appointment Date: HEM ONC 10.25.23,  11.1.23  Hospital Encounter and Summary: Linked  See discharge assessment below for further details     Engagement  Call Start Time: 1455 (10/24/2023  2:55 PM)    Medications  Medications reviewed with patient/caregiver?: Yes (10/24/2023  2:55 PM)  Is the patient having any side effects they believe may be caused by any medication additions or changes?: No (10/24/2023  2:55 PM)  Does the patient have all medications ordered at discharge?: Yes (10/24/2023  2:55 PM)  Care Management Interventions: No intervention needed (10/24/2023  2:55 PM)  Is the patient taking all medications as directed (includes completed medication regime)?: Yes (10/24/2023  2:55 PM)  Care Management Interventions: Provided patient education (10/24/2023  2:55 PM)    Appointments  Does the patient have a primary care provider?: Yes (10/24/2023  2:55 PM)  Care Management Interventions: Educated patient on importance of making appointment (10/24/2023  2:55 PM)  Has the patient kept scheduled appointments due by today?: Yes (10/24/2023  2:55 PM)    Self Management  What is the home health agency?: n/a (10/24/2023  2:55 PM)  What Durable Medical Equipment (DME) was ordered?: n/a (10/24/2023  2:55 PM)    Patient Teaching  Does the patient have access to their discharge instructions?: Yes (10/24/2023  2:55 PM)  What is the patient's perception of their health status since discharge?: Improving (10/24/2023  2:55 PM)  Is the patient/caregiver able to teach back the hierarchy of who to call/visit for symptoms/problems? PCP, Specialist, Home Health nurse, Urgent Care, ED, 911: Yes (10/24/2023  2:55 PM)  Patient/Caregiver Education Comments: Spoke with patient today. States i feeling better. She feels is at baseline with her breathing.  Overwhelmed with multiple healthcare appts coming up so declined my offer to check tosee if sooner appt available. (10/24/2023  2:55 PM)

## 2023-10-24 NOTE — TELEPHONE ENCOUNTER
Message sent to team.    Per Dr. Parada:    No issue with amoxicillin/dilaudid. No other new meds?   Will have to see her to decide about treatment tomorrow but there is a chance we delay - options include her comign in person for eval, possible treatment but possible delay or can do virtual visit tomorrow instead to follow-up and push chemo back a week to allow some time for recovery. I don't think pushing chemo back one week will be detrimental so up to them     Called pt's daughter Opal back to communicate the plan of care. Understanding verbalized with teach back. She opted for a virtual appointment tomorrow and reschedule infusion to next Wednesday.   Scheduling orders placed.  Team updated.  No further questions or concerns at this time.

## 2023-10-25 ENCOUNTER — TELEMEDICINE (OUTPATIENT)
Dept: HEMATOLOGY/ONCOLOGY | Facility: HOSPITAL | Age: 68
End: 2023-10-25
Payer: MEDICARE

## 2023-10-25 DIAGNOSIS — F41.9 ANXIETY: ICD-10-CM

## 2023-10-25 DIAGNOSIS — G89.3 NEOPLASM RELATED PAIN: ICD-10-CM

## 2023-10-25 DIAGNOSIS — C34.90 NON-SMALL CELL LUNG CANCER, UNSPECIFIED LATERALITY (MULTI): Primary | ICD-10-CM

## 2023-10-25 PROBLEM — E86.0 DEHYDRATION: Status: ACTIVE | Noted: 2023-10-25

## 2023-10-25 PROBLEM — I25.10 ARTERIOSCLEROSIS OF CORONARY ARTERY: Status: ACTIVE | Noted: 2023-10-25

## 2023-10-25 PROBLEM — R10.9 ABDOMINAL PAIN: Status: ACTIVE | Noted: 2023-10-25

## 2023-10-25 PROBLEM — R93.5 ABNORMAL ULTRASOUND OF ABDOMEN: Status: RESOLVED | Noted: 2023-03-24 | Resolved: 2023-10-25

## 2023-10-25 PROBLEM — C80.1: Status: ACTIVE | Noted: 2023-10-25

## 2023-10-25 PROBLEM — R20.2 NUMBNESS AND TINGLING SENSATION OF SKIN: Status: ACTIVE | Noted: 2023-10-25

## 2023-10-25 PROBLEM — F17.200 TOBACCO DEPENDENCE SYNDROME: Status: ACTIVE | Noted: 2023-10-25

## 2023-10-25 PROBLEM — R20.0 NUMBNESS AND TINGLING SENSATION OF SKIN: Status: ACTIVE | Noted: 2023-10-25

## 2023-10-25 PROBLEM — D21.9 FIBROIDS: Status: ACTIVE | Noted: 2023-10-25

## 2023-10-25 PROBLEM — F41.8 MIXED ANXIETY DEPRESSIVE DISORDER: Status: ACTIVE | Noted: 2023-10-25

## 2023-10-25 PROBLEM — R53.1 GENERALIZED WEAKNESS: Status: ACTIVE | Noted: 2023-10-25

## 2023-10-25 PROBLEM — R63.0 DECREASE IN APPETITE: Status: ACTIVE | Noted: 2023-10-25

## 2023-10-25 PROBLEM — I95.9 HYPOTENSION: Status: ACTIVE | Noted: 2023-10-25

## 2023-10-25 PROBLEM — R11.2 NAUSEA AND VOMITING: Status: ACTIVE | Noted: 2023-10-25

## 2023-10-25 PROBLEM — I10 HYPERTENSION: Status: ACTIVE | Noted: 2023-10-25

## 2023-10-25 PROBLEM — I72.3 ANEURYSM OF ILIAC ARTERY (CMS-HCC): Status: ACTIVE | Noted: 2023-10-25

## 2023-10-25 PROBLEM — R11.10 VOMITING IN ADULT: Status: ACTIVE | Noted: 2023-10-25

## 2023-10-25 PROBLEM — I71.9 AORTIC ANEURYSM (CMS-HCC): Status: ACTIVE | Noted: 2023-10-25

## 2023-10-25 PROBLEM — D21.9 LEIOMYOMA: Status: ACTIVE | Noted: 2023-10-25

## 2023-10-25 PROBLEM — I71.40 ABDOMINAL AORTIC ANEURYSM (CMS-HCC): Status: ACTIVE | Noted: 2023-10-25

## 2023-10-25 PROBLEM — D63.8 ANEMIA IN OTHER CHRONIC DISEASES CLASSIFIED ELSEWHERE: Status: ACTIVE | Noted: 2023-10-25

## 2023-10-25 PROBLEM — J44.1 COPD EXACERBATION (MULTI): Status: ACTIVE | Noted: 2017-10-02

## 2023-10-25 PROBLEM — M19.90 ARTHRITIS: Status: ACTIVE | Noted: 2023-10-25

## 2023-10-25 PROBLEM — I73.9 CLAUDICATION (CMS-HCC): Status: ACTIVE | Noted: 2023-10-25

## 2023-10-25 PROBLEM — R06.09 DYSPNEA ON EXERTION: Status: ACTIVE | Noted: 2023-10-25

## 2023-10-25 PROBLEM — R63.0 ANOREXIA: Status: ACTIVE | Noted: 2023-10-25

## 2023-10-25 PROBLEM — N20.0 NEPHROLITHIASIS: Status: ACTIVE | Noted: 2023-10-25

## 2023-10-25 PROBLEM — F32.A DEPRESSION: Status: ACTIVE | Noted: 2023-10-25

## 2023-10-25 PROBLEM — E27.40 ADRENAL INSUFFICIENCY (MULTI): Status: ACTIVE | Noted: 2023-10-25

## 2023-10-25 PROBLEM — K59.00 CONSTIPATION: Status: ACTIVE | Noted: 2023-10-25

## 2023-10-25 PROBLEM — K52.9 COLITIS: Status: ACTIVE | Noted: 2023-10-25

## 2023-10-25 PROCEDURE — 99442 PR PHYS/QHP TELEPHONE EVALUATION 11-20 MIN: CPT | Performed by: NURSE PRACTITIONER

## 2023-10-25 ASSESSMENT — PATIENT HEALTH QUESTIONNAIRE - PHQ9
4. FEELING TIRED OR HAVING LITTLE ENERGY: SEVERAL DAYS
7. TROUBLE CONCENTRATING ON THINGS, SUCH AS READING THE NEWSPAPER OR WATCHING TELEVISION: SEVERAL DAYS
1. LITTLE INTEREST OR PLEASURE IN DOING THINGS: 1
4. FEELING TIRED OR HAVING LITTLE ENERGY: SEVERAL DAYS
4. FEELING TIRED OR HAVING LITTLE ENERGY: SEVERAL DAYS
8. MOVING OR SPEAKING SO SLOWLY THAT OTHER PEOPLE COULD HAVE NOTICED. OR THE OPPOSITE, BEING SO FIGETY OR RESTLESS THAT YOU HAVE BEEN MOVING AROUND A LOT MORE THAN USUAL: 0
4. FEELING TIRED OR HAVING LITTLE ENERGY: 1
2. FEELING DOWN, DEPRESSED OR HOPELESS: SEVERAL DAYS
7. TROUBLE CONCENTRATING ON THINGS, SUCH AS READING THE NEWSPAPER OR WATCHING TELEVISION: SEVERAL DAYS
2. FEELING DOWN, DEPRESSED OR HOPELESS: SEVERAL DAYS
8. MOVING OR SPEAKING SO SLOWLY THAT OTHER PEOPLE COULD HAVE NOTICED. OR THE OPPOSITE, BEING SO FIGETY OR RESTLESS THAT YOU HAVE BEEN MOVING AROUND A LOT MORE THAN USUAL: NOT AT ALL
6. FEELING BAD ABOUT YOURSELF - OR THAT YOU ARE A FAILURE OR HAVE LET YOURSELF OR YOUR FAMILY DOWN: 1
8. MOVING OR SPEAKING SO SLOWLY THAT OTHER PEOPLE COULD HAVE NOTICED. OR THE OPPOSITE, BEING SO FIGETY OR RESTLESS THAT YOU HAVE BEEN MOVING AROUND A LOT MORE THAN USUAL: NOT AT ALL
2. FEELING DOWN, DEPRESSED, IRRITABLE, OR HOPELESS: 1
5. POOR APPETITE OR OVEREATING: SEVERAL DAYS
5. POOR APPETITE OR OVEREATING: 1
2. FEELING DOWN, DEPRESSED, IRRITABLE, OR HOPELESS: SEVERAL DAYS
8. MOVING OR SPEAKING SO SLOWLY THAT OTHER PEOPLE COULD HAVE NOTICED. OR THE OPPOSITE, BEING SO FIGETY OR RESTLESS THAT YOU HAVE BEEN MOVING AROUND A LOT MORE THAN USUAL: NOT AT ALL
1. LITTLE INTEREST OR PLEASURE IN DOING THINGS: SEVERAL DAYS
7. TROUBLE CONCENTRATING ON THINGS, SUCH AS READING THE NEWSPAPER OR WATCHING TELEVISION: SEVERAL DAYS
9. THOUGHTS THAT YOU WOULD BE BETTER OFF DEAD, OR OF HURTING YOURSELF: NOT AT ALL
7. TROUBLE CONCENTRATING ON THINGS, SUCH AS READING THE NEWSPAPER OR WATCHING TELEVISION: 1
1. LITTLE INTEREST OR PLEASURE IN DOING THINGS: SEVERAL DAYS
7. TROUBLE CONCENTRATING ON THINGS, SUCH AS READING THE NEWSPAPER OR WATCHING TELEVISION: SEVERAL DAYS
9. THOUGHTS THAT YOU WOULD BE BETTER OFF DEAD, OR OF HURTING YOURSELF: 0
2. FEELING DOWN, DEPRESSED, IRRITABLE, OR HOPELESS: SEVERAL DAYS
10. IF YOU CHECKED OFF ANY PROBLEMS, HOW DIFFICULT HAVE THESE PROBLEMS MADE IT FOR YOU TO DO YOUR WORK, TAKE CARE OF THINGS AT HOME, OR GET ALONG WITH OTHER PEOPLE: SOMEWHAT DIFFICULT
8. MOVING OR SPEAKING SO SLOWLY THAT OTHER PEOPLE COULD HAVE NOTICED. OR THE OPPOSITE, BEING SO FIGETY OR RESTLESS THAT YOU HAVE BEEN MOVING AROUND A LOT MORE THAN USUAL: NOT AT ALL
2. FEELING DOWN, DEPRESSED, IRRITABLE, OR HOPELESS: 1
1. LITTLE INTEREST OR PLEASURE IN DOING THINGS: SEVERAL DAYS
SUM OF ALL RESPONSES TO PHQ QUESTIONS 1-9: 7
1. LITTLE INTEREST OR PLEASURE IN DOING THINGS: SEVERAL DAYS
5. POOR APPETITE OR OVEREATING: SEVERAL DAYS
6. FEELING BAD ABOUT YOURSELF - OR THAT YOU ARE A FAILURE OR HAVE LET YOURSELF OR YOUR FAMILY DOWN: SEVERAL DAYS
10. IF YOU CHECKED OFF ANY PROBLEMS, HOW DIFFICULT HAVE THESE PROBLEMS MADE IT FOR YOU TO DO YOUR WORK, TAKE CARE OF THINGS AT HOME, OR GET ALONG WITH OTHER PEOPLE: SOMEWHAT DIFFICULT
6. FEELING BAD ABOUT YOURSELF - OR THAT YOU ARE A FAILURE OR HAVE LET YOURSELF OR YOUR FAMILY DOWN: SEVERAL DAYS
10. IF YOU CHECKED OFF ANY PROBLEMS, HOW DIFFICULT HAVE THESE PROBLEMS MADE IT FOR YOU TO DO YOUR WORK, TAKE CARE OF THINGS AT HOME, OR GET ALONG WITH OTHER PEOPLE: SOMEWHAT DIFFICULT
5. POOR APPETITE OR OVEREATING: SEVERAL DAYS
SUM OF ALL RESPONSES TO PHQ QUESTIONS 1-9: 7
9. THOUGHTS THAT YOU WOULD BE BETTER OFF DEAD, OR OF HURTING YOURSELF: 0
SUM OF ALL RESPONSES TO PHQ QUESTIONS 1-9: 7
6. FEELING BAD ABOUT YOURSELF - OR THAT YOU ARE A FAILURE OR HAVE LET YOURSELF OR YOUR FAMILY DOWN: 1
4. FEELING TIRED OR HAVING LITTLE ENERGY: SEVERAL DAYS
7. TROUBLE CONCENTRATING ON THINGS, SUCH AS READING THE NEWSPAPER OR WATCHING TELEVISION: 1
9. THOUGHTS THAT YOU WOULD BE BETTER OFF DEAD, OR OF HURTING YOURSELF: NOT AT ALL
3. TROUBLE FALLING OR STAYING ASLEEP OR SLEEPING TOO MUCH: SEVERAL DAYS
6. FEELING BAD ABOUT YOURSELF - OR THAT YOU ARE A FAILURE OR HAVE LET YOURSELF OR YOUR FAMILY DOWN: SEVERAL DAYS
5. POOR APPETITE OR OVEREATING: SEVERAL DAYS
8. MOVING OR SPEAKING SO SLOWLY THAT OTHER PEOPLE COULD HAVE NOTICED. OR THE OPPOSITE, BEING SO FIGETY OR RESTLESS THAT YOU HAVE BEEN MOVING AROUND A LOT MORE THAN USUAL: 0
10. IF YOU CHECKED OFF ANY PROBLEMS, HOW DIFFICULT HAVE THESE PROBLEMS MADE IT FOR YOU TO DO YOUR WORK, TAKE CARE OF THINGS AT HOME, OR GET ALONG WITH OTHER PEOPLE: SOMEWHAT DIFFICULT
SUM OF ALL RESPONSES TO PHQ QUESTIONS 1-9: 7
3. TROUBLE FALLING OR STAYING ASLEEP OR SLEEPING TOO MUCH: 1
3. TROUBLE FALLING OR STAYING ASLEEP OR SLEEPING TOO MUCH: SEVERAL DAYS
3. TROUBLE FALLING OR STAYING ASLEEP OR SLEEPING TOO MUCH: 1
3. TROUBLE FALLING OR STAYING ASLEEP OR SLEEPING TOO MUCH: SEVERAL DAYS
6. FEELING BAD ABOUT YOURSELF - OR THAT YOU ARE A FAILURE OR HAVE LET YOURSELF OR YOUR FAMILY DOWN: SEVERAL DAYS
4. FEELING TIRED OR HAVING LITTLE ENERGY: 1
1. LITTLE INTEREST OR PLEASURE IN DOING THINGS: 1
3. TROUBLE FALLING OR STAYING ASLEEP OR SLEEPING TOO MUCH: SEVERAL DAYS
9. THOUGHTS THAT YOU WOULD BE BETTER OFF DEAD, OR OF HURTING YOURSELF: NOT AT ALL
5. POOR APPETITE OR OVEREATING: 1
9. THOUGHTS THAT YOU WOULD BE BETTER OFF DEAD, OR OF HURTING YOURSELF: NOT AT ALL

## 2023-10-25 NOTE — PROGRESS NOTES
Consent:  Verbal consent was requested and obtained from patient on this date for a telehealth visit.    Patient ID: Radha Toussaint is a 68 y.o. female.    Current therapy: carboplatin/paclitaxel/pembrolizumab     Chief Concern: Telephonic ED visit follow-up      Subjective    HPI:  Pt reports she is feeling well today.  Breathing is stable.  Only using 2L oxygen at night and PRN during the day.  Reports CONNORS, at her baseline.  ED visit on 10/22 for SOB.  Note reflects she was ' visibly dyspneic on arrival...' Reports at time of ED visit, she was experiencing a panic attack. States remeron is ineffective.  Ativan ineffective - not taking.  With hydroxyzine admin, anxiety symptoms worsen once med is wearing off.  Previously seen by Dr. Lerner with reported plan to fuv with pt at next infusion.  Continues with oral ATB amoxicillin for PNA.  Denies ASE.  Appetite is improving.  Denies n/v/c/d.  Daily BM's.  Pain managed with PRN Dilaudid, taking 1-2x/day.  No rashes or skin concerns.  No other concerns today.      Diagnosis: NSCLC - poorly differentiated carcinoma, suspect lung primary  Stage:  Cancer Staging   Non-small cell lung cancer (NSCLC) (CMS/[facility])  Staging form: Lung, AJCC 8th Edition  - Clinical: Stage IVB (cTX, cNX, pM1c) - Signed by Francheska Parada MD on 9/23/2023     Current sites of disease: Adrenals bilaterally, possible lung/hilum  Molecular and ancillary testing:   PD-L1 70%  LAKHWINDER amplification   B2M p.[id number] ([id number] c.37_45delCTCTCTCTT), subclonal   CDKN2A p.A127P (NM_000077 c.379G>C)   NRAS p.G12A (NM_002524 c.35G>C), subclonal   TP53 p.R249S (NM_000546 c.747G>T)     Oncologic History:  7/26/23 - Presented to ER with 1m of weakness, dyspnea, abd pain and prior fall, Found to have bilateral adrenal masses on CT A/P, possible R hilar LN  8/11/23 - Biopsy of adrenal mass with poorly differentiated carcinoma  8/29-9/2/23 - Admitted to [facility] [place] with progressive weakness, hypotension, found to have  adrenal insufficiency due to bilateral adrenal masses. Improved with hydrocortisone. PET scan completed without obvious primary (small effusion, small lung nodules, R  hilar LN mildly FDG-avid)  9/14/23 - C1 carboplatin/paclitaxel/pembrolizumab (dose-reduced chemotherapy)    Oncology History   Non-small cell lung cancer (NSCLC) (CMS/HCC)   9/14/2023 -  Chemotherapy    Pembrolizumab + PACLitaxel / CARBOplatin, 21 Day Cycles     9/21/2023 Initial Diagnosis    Non-small cell lung cancer (NSCLC) (CMS/HCC)     9/23/2023 Cancer Staged    Staging form: Lung, AJCC 8th Edition, Clinical: Stage IVB (cTX, cNX, pM1c) - Signed by Francheska Parada MD on 9/23/2023         Objective    BSA: There is no height or weight on file to calculate BSA.  There were no vitals taken for this visit.  - telephonic visit.      Physical Exam  Neurological:      Mental Status: She is alert.   Psychiatric:         Mood and Affect: Mood normal.         Behavior: Behavior normal.         Thought Content: Thought content normal.         Judgment: Judgment normal.       Performance Status:  Symptomatic; fully ambulatory    Assessment/Plan       Radha Toussaint is a 68 y.o. female here for follow up of poorly differentiated carcinoma involving bilateral adrenal glands, likely of lung primary, with PD-L1 70% and no actionable alterations.     Reviewed overall work-up to date with patient and family previously including implications of stage IV disease, and suspicion for lung cancer though not clearly proven (she has history of tobacco use, and does have R hilar lymph node as well as lung nodules  which although not overly suspicious, in the right context may represent primary site).     Discussed options and given burden of disease, elected to proceed with carboplatin/paclitaxel and pembro instead of pembro alone. NGS did not show actionable mutations.     She is s/p C1, with hospitalization for severe fatigue. Now feels significantly improved.     Of  "note, \"cancer type\" test resulted and showed high suspicion for sarcoma; I have again discussed our case with pathology and they are confident histologically this does not represent sarcoma. I think the molecular pattern is relatively non-specific therefore will continue current plan. Additionally, recent imaging with ?colonic mass vs transient changes, will follow and if concern can consider colonoscopy in the future.     #Poorly differentiated carcinoma - likely lung primary  - Proceed with C2 today, continue with dose reduction of carboplatin/taxol (carbo AUC 4 and taxol 140mg/m2) given tolerance. Discussed would like to continue if tolerated for now, if ongoing issues with tolerance with this cycle may consider de-escalating to immunotherapy alone   - Tentatively plan for IVF on Friday after chemotherapy given fatigue   - Anti-emetics Rx's in place   - Dilaudid 2mg q4h PRN Rx in place, taking 1-2x/day     #Anxiety/Depression  - Ativan BID 0.5mg (using about 2x per day) - today reports she isn't taking med b/c it is ineffective  - Hydroxyzine with limited benefit, states symptoms worsen as med wears off  - Referred to supportive oncology given their management inpatient and her significant pain and anxiety (though pain now much improved), and for support moving forward given complexity of her situation  - Will also refer to psycho-oncology as she would likely benefit from focused management of depression as well. She also endorses severe mood changes on prednisone in the past and given possible need for them in the future would help to have psychiatry  team following prior   - Dr. Dylan dominique scheduled on same day as rescheduled C3.  Will check to see if Dr. Richardson appt can be moved to later in the day.     - Monitor for worsening neuropathy on chemotherapy     - Monitor mood with chemo/steroids     RTC 11/1 for C3    KERA Seymour-CNP    Time spent on call 19:15     "

## 2023-10-26 ENCOUNTER — TELEPHONE (OUTPATIENT)
Dept: PRIMARY CARE | Facility: CLINIC | Age: 68
End: 2023-10-26
Payer: MEDICARE

## 2023-10-26 NOTE — TELEPHONE ENCOUNTER
Patients daughter calling concerned about her mom- anxiety is very bad, having panic attacks and hard time breathing. Cancer and chemo is making is worse. Was recently is hospital for this. She wants to know if there is anything you can recommend to help with this? She said this is preventing her from doing chemo at the moment. Opal will be here in the AM for an apt if you want to tell her anything else,

## 2023-10-27 DIAGNOSIS — F41.9 ANXIETY: ICD-10-CM

## 2023-10-27 LAB
BACTERIA BLD CULT: NORMAL
BACTERIA BLD CULT: NORMAL

## 2023-10-27 RX ORDER — SERTRALINE HYDROCHLORIDE 50 MG/1
TABLET, FILM COATED ORAL
Qty: 42 TABLET | Refills: 0 | Status: SHIPPED | OUTPATIENT
Start: 2023-10-27 | End: 2023-11-20 | Stop reason: SDUPTHER

## 2023-10-30 ENCOUNTER — TELEPHONE (OUTPATIENT)
Dept: PALLIATIVE MEDICINE | Facility: HOSPITAL | Age: 68
End: 2023-10-30
Payer: MEDICARE

## 2023-10-30 NOTE — TELEPHONE ENCOUNTER
Received email from Dr. Lerner requesting that patient be seen on 11/1/23  am. Scheduled patient for  10 am. He will see her while she is in infusion. Message left for patient.

## 2023-10-31 RX ORDER — DIPHENHYDRAMINE HCL 50 MG
50 CAPSULE ORAL ONCE
Status: CANCELLED | OUTPATIENT
Start: 2023-11-01

## 2023-10-31 RX ORDER — FAMOTIDINE 10 MG/ML
20 INJECTION INTRAVENOUS ONCE
Status: CANCELLED | OUTPATIENT
Start: 2023-11-01

## 2023-10-31 RX ORDER — FAMOTIDINE 10 MG/ML
20 INJECTION INTRAVENOUS ONCE AS NEEDED
Status: CANCELLED | OUTPATIENT
Start: 2023-11-01

## 2023-10-31 RX ORDER — EPINEPHRINE 0.3 MG/.3ML
0.3 INJECTION SUBCUTANEOUS EVERY 5 MIN PRN
Status: CANCELLED | OUTPATIENT
Start: 2023-11-01

## 2023-10-31 RX ORDER — PROCHLORPERAZINE MALEATE 10 MG
10 TABLET ORAL EVERY 6 HOURS PRN
Status: CANCELLED | OUTPATIENT
Start: 2023-11-01

## 2023-10-31 RX ORDER — PROCHLORPERAZINE EDISYLATE 5 MG/ML
10 INJECTION INTRAMUSCULAR; INTRAVENOUS EVERY 6 HOURS PRN
Status: CANCELLED | OUTPATIENT
Start: 2023-11-01

## 2023-10-31 RX ORDER — DIPHENHYDRAMINE HYDROCHLORIDE 50 MG/ML
50 INJECTION INTRAMUSCULAR; INTRAVENOUS AS NEEDED
Status: CANCELLED | OUTPATIENT
Start: 2023-11-01

## 2023-10-31 RX ORDER — PALONOSETRON 0.05 MG/ML
0.25 INJECTION, SOLUTION INTRAVENOUS ONCE
Status: CANCELLED | OUTPATIENT
Start: 2023-11-01

## 2023-10-31 RX ORDER — ALBUTEROL SULFATE 0.83 MG/ML
3 SOLUTION RESPIRATORY (INHALATION) AS NEEDED
Status: CANCELLED | OUTPATIENT
Start: 2023-11-01

## 2023-11-01 ENCOUNTER — TELEPHONE (OUTPATIENT)
Dept: HEMATOLOGY/ONCOLOGY | Facility: HOSPITAL | Age: 68
End: 2023-11-01
Payer: MEDICARE

## 2023-11-01 ENCOUNTER — OFFICE VISIT (OUTPATIENT)
Dept: PALLIATIVE MEDICINE | Facility: HOSPITAL | Age: 68
End: 2023-11-01
Payer: MEDICARE

## 2023-11-01 ENCOUNTER — LAB (OUTPATIENT)
Dept: LAB | Facility: HOSPITAL | Age: 68
End: 2023-11-01
Payer: MEDICARE

## 2023-11-01 ENCOUNTER — HOSPITAL ENCOUNTER (OUTPATIENT)
Dept: CARDIOLOGY | Facility: HOSPITAL | Age: 68
Discharge: HOME | End: 2023-11-01
Payer: MEDICARE

## 2023-11-01 ENCOUNTER — INFUSION (OUTPATIENT)
Dept: HEMATOLOGY/ONCOLOGY | Facility: HOSPITAL | Age: 68
End: 2023-11-01
Payer: MEDICARE

## 2023-11-01 VITALS
BODY MASS INDEX: 22.74 KG/M2 | DIASTOLIC BLOOD PRESSURE: 53 MMHG | WEIGHT: 132.5 LBS | OXYGEN SATURATION: 97 % | SYSTOLIC BLOOD PRESSURE: 104 MMHG | TEMPERATURE: 97.3 F | HEART RATE: 73 BPM | RESPIRATION RATE: 18 BRPM

## 2023-11-01 DIAGNOSIS — E27.40 ADRENAL INSUFFICIENCY (MULTI): ICD-10-CM

## 2023-11-01 DIAGNOSIS — C34.91 NON-SMALL CELL CANCER OF RIGHT LUNG (MULTI): ICD-10-CM

## 2023-11-01 DIAGNOSIS — C34.90 NON-SMALL CELL LUNG CANCER, UNSPECIFIED LATERALITY (MULTI): Primary | ICD-10-CM

## 2023-11-01 DIAGNOSIS — Z51.5 PALLIATIVE CARE ENCOUNTER: Primary | ICD-10-CM

## 2023-11-01 DIAGNOSIS — C34.90 NON-SMALL CELL LUNG CANCER, UNSPECIFIED LATERALITY (MULTI): ICD-10-CM

## 2023-11-01 DIAGNOSIS — R53.0 NEOPLASTIC MALIGNANT RELATED FATIGUE: ICD-10-CM

## 2023-11-01 DIAGNOSIS — R06.02 SHORTNESS OF BREATH: ICD-10-CM

## 2023-11-01 DIAGNOSIS — R63.0 POOR APPETITE: ICD-10-CM

## 2023-11-01 DIAGNOSIS — F41.9 ANXIETY: Primary | ICD-10-CM

## 2023-11-01 DIAGNOSIS — G89.3 CANCER RELATED PAIN: ICD-10-CM

## 2023-11-01 LAB
ALBUMIN SERPL BCP-MCNC: 3.6 G/DL (ref 3.4–5)
ALP SERPL-CCNC: 62 U/L (ref 33–136)
ALT SERPL W P-5'-P-CCNC: 8 U/L (ref 7–45)
ANION GAP SERPL CALC-SCNC: 11 MMOL/L (ref 10–20)
AST SERPL W P-5'-P-CCNC: 9 U/L (ref 9–39)
ATRIAL RATE: 74 BPM
BASOPHILS # BLD AUTO: 0.07 X10*3/UL (ref 0–0.1)
BASOPHILS NFR BLD AUTO: 0.7 %
BILIRUB SERPL-MCNC: 0.4 MG/DL (ref 0–1.2)
BUN SERPL-MCNC: 13 MG/DL (ref 6–23)
CALCIUM SERPL-MCNC: 9 MG/DL (ref 8.6–10.3)
CHLORIDE SERPL-SCNC: 103 MMOL/L (ref 98–107)
CO2 SERPL-SCNC: 29 MMOL/L (ref 21–32)
CORTIS AM PEAK SERPL-MSCNC: 15 UG/DL (ref 5–20)
CREAT SERPL-MCNC: 0.52 MG/DL (ref 0.5–1.05)
EOSINOPHIL # BLD AUTO: 0.07 X10*3/UL (ref 0–0.7)
EOSINOPHIL NFR BLD AUTO: 0.7 %
ERYTHROCYTE [DISTWIDTH] IN BLOOD BY AUTOMATED COUNT: 19.7 % (ref 11.5–14.5)
GFR SERPL CREATININE-BSD FRML MDRD: >90 ML/MIN/1.73M*2
GLUCOSE SERPL-MCNC: 110 MG/DL (ref 74–99)
HCT VFR BLD AUTO: 38.8 % (ref 36–46)
HGB BLD-MCNC: 11.8 G/DL (ref 12–16)
IMM GRANULOCYTES # BLD AUTO: 0.05 X10*3/UL (ref 0–0.7)
IMM GRANULOCYTES NFR BLD AUTO: 0.5 % (ref 0–0.9)
LYMPHOCYTES # BLD AUTO: 1.27 X10*3/UL (ref 1.2–4.8)
LYMPHOCYTES NFR BLD AUTO: 11.9 %
MCH RBC QN AUTO: 23.6 PG (ref 26–34)
MCHC RBC AUTO-ENTMCNC: 30.4 G/DL (ref 32–36)
MCV RBC AUTO: 78 FL (ref 80–100)
MONOCYTES # BLD AUTO: 0.77 X10*3/UL (ref 0.1–1)
MONOCYTES NFR BLD AUTO: 7.2 %
NEUTROPHILS # BLD AUTO: 8.42 X10*3/UL (ref 1.2–7.7)
NEUTROPHILS NFR BLD AUTO: 79 %
NRBC BLD-RTO: 0 /100 WBCS (ref 0–0)
P AXIS: 84 DEGREES
P OFFSET: 198 MS
P ONSET: 150 MS
PLATELET # BLD AUTO: 247 X10*3/UL (ref 150–450)
PMV BLD AUTO: 10.4 FL (ref 7.5–11.5)
POTASSIUM SERPL-SCNC: 4.2 MMOL/L (ref 3.5–5.3)
PR INTERVAL: 142 MS
PROT SERPL-MCNC: 6.8 G/DL (ref 6.4–8.2)
Q ONSET: 221 MS
QRS COUNT: 12 BEATS
QRS DURATION: 96 MS
QT INTERVAL: 340 MS
QTC CALCULATION(BAZETT): 377 MS
QTC FREDERICIA: 364 MS
R AXIS: 81 DEGREES
RBC # BLD AUTO: 4.99 X10*6/UL (ref 4–5.2)
SODIUM SERPL-SCNC: 139 MMOL/L (ref 136–145)
T AXIS: 27 DEGREES
T OFFSET: 391 MS
TSH SERPL-ACNC: 2.63 MIU/L (ref 0.44–3.98)
VENTRICULAR RATE: 74 BPM
WBC # BLD AUTO: 10.7 X10*3/UL (ref 4.4–11.3)

## 2023-11-01 PROCEDURE — 3074F SYST BP LT 130 MM HG: CPT | Performed by: INTERNAL MEDICINE

## 2023-11-01 PROCEDURE — 1160F RVW MEDS BY RX/DR IN RCRD: CPT | Performed by: INTERNAL MEDICINE

## 2023-11-01 PROCEDURE — 96415 CHEMO IV INFUSION ADDL HR: CPT

## 2023-11-01 PROCEDURE — 1126F AMNT PAIN NOTED NONE PRSNT: CPT | Performed by: INTERNAL MEDICINE

## 2023-11-01 PROCEDURE — 82533 TOTAL CORTISOL: CPT

## 2023-11-01 PROCEDURE — 2500000001 HC RX 250 WO HCPCS SELF ADMINISTERED DRUGS (ALT 637 FOR MEDICARE OP): Performed by: INTERNAL MEDICINE

## 2023-11-01 PROCEDURE — 85025 COMPLETE CBC W/AUTO DIFF WBC: CPT

## 2023-11-01 PROCEDURE — 80053 COMPREHEN METABOLIC PANEL: CPT

## 2023-11-01 PROCEDURE — 96413 CHEMO IV INFUSION 1 HR: CPT

## 2023-11-01 PROCEDURE — 3079F DIAST BP 80-89 MM HG: CPT | Performed by: INTERNAL MEDICINE

## 2023-11-01 PROCEDURE — 3008F BODY MASS INDEX DOCD: CPT | Performed by: INTERNAL MEDICINE

## 2023-11-01 PROCEDURE — 96417 CHEMO IV INFUS EACH ADDL SEQ: CPT

## 2023-11-01 PROCEDURE — 99215 OFFICE O/P EST HI 40 MIN: CPT | Mod: GC | Performed by: INTERNAL MEDICINE

## 2023-11-01 PROCEDURE — 99215 OFFICE O/P EST HI 40 MIN: CPT | Performed by: INTERNAL MEDICINE

## 2023-11-01 PROCEDURE — 84443 ASSAY THYROID STIM HORMONE: CPT

## 2023-11-01 PROCEDURE — 1159F MED LIST DOCD IN RCRD: CPT | Performed by: INTERNAL MEDICINE

## 2023-11-01 PROCEDURE — 96375 TX/PRO/DX INJ NEW DRUG ADDON: CPT | Mod: INF

## 2023-11-01 PROCEDURE — 93005 ELECTROCARDIOGRAM TRACING: CPT

## 2023-11-01 PROCEDURE — 2500000004 HC RX 250 GENERAL PHARMACY W/ HCPCS (ALT 636 FOR OP/ED): Performed by: INTERNAL MEDICINE

## 2023-11-01 PROCEDURE — 36415 COLL VENOUS BLD VENIPUNCTURE: CPT

## 2023-11-01 PROCEDURE — 4004F PT TOBACCO SCREEN RCVD TLK: CPT | Performed by: INTERNAL MEDICINE

## 2023-11-01 PROCEDURE — 82024 ASSAY OF ACTH: CPT

## 2023-11-01 PROCEDURE — 2500000004 HC RX 250 GENERAL PHARMACY W/ HCPCS (ALT 636 FOR OP/ED): Mod: JZ | Performed by: INTERNAL MEDICINE

## 2023-11-01 PROCEDURE — 96367 TX/PROPH/DG ADDL SEQ IV INF: CPT

## 2023-11-01 RX ORDER — FAMOTIDINE 10 MG/ML
20 INJECTION INTRAVENOUS ONCE
Status: COMPLETED | OUTPATIENT
Start: 2023-11-01 | End: 2023-11-01

## 2023-11-01 RX ORDER — HEPARIN 100 UNIT/ML
500 SYRINGE INTRAVENOUS AS NEEDED
Status: DISCONTINUED | OUTPATIENT
Start: 2023-11-01 | End: 2023-11-01 | Stop reason: HOSPADM

## 2023-11-01 RX ORDER — PROCHLORPERAZINE MALEATE 10 MG
10 TABLET ORAL EVERY 6 HOURS PRN
Status: DISCONTINUED | OUTPATIENT
Start: 2023-11-01 | End: 2023-11-01 | Stop reason: HOSPADM

## 2023-11-01 RX ORDER — PROCHLORPERAZINE EDISYLATE 5 MG/ML
10 INJECTION INTRAMUSCULAR; INTRAVENOUS EVERY 6 HOURS PRN
Status: DISCONTINUED | OUTPATIENT
Start: 2023-11-01 | End: 2023-11-01 | Stop reason: HOSPADM

## 2023-11-01 RX ORDER — FAMOTIDINE 10 MG/ML
20 INJECTION INTRAVENOUS ONCE AS NEEDED
Status: DISCONTINUED | OUTPATIENT
Start: 2023-11-01 | End: 2023-11-01 | Stop reason: HOSPADM

## 2023-11-01 RX ORDER — HEPARIN SODIUM,PORCINE/PF 10 UNIT/ML
50 SYRINGE (ML) INTRAVENOUS AS NEEDED
Status: DISCONTINUED | OUTPATIENT
Start: 2023-11-01 | End: 2023-11-01 | Stop reason: HOSPADM

## 2023-11-01 RX ORDER — HEPARIN SODIUM,PORCINE/PF 10 UNIT/ML
50 SYRINGE (ML) INTRAVENOUS AS NEEDED
Status: CANCELLED | OUTPATIENT
Start: 2023-11-01

## 2023-11-01 RX ORDER — DIPHENHYDRAMINE HCL 50 MG
50 CAPSULE ORAL ONCE
Status: COMPLETED | OUTPATIENT
Start: 2023-11-01 | End: 2023-11-01

## 2023-11-01 RX ORDER — EPINEPHRINE 0.3 MG/.3ML
0.3 INJECTION SUBCUTANEOUS EVERY 5 MIN PRN
Status: DISCONTINUED | OUTPATIENT
Start: 2023-11-01 | End: 2023-11-01 | Stop reason: HOSPADM

## 2023-11-01 RX ORDER — ALBUTEROL SULFATE 0.83 MG/ML
3 SOLUTION RESPIRATORY (INHALATION) AS NEEDED
Status: DISCONTINUED | OUTPATIENT
Start: 2023-11-01 | End: 2023-11-01 | Stop reason: HOSPADM

## 2023-11-01 RX ORDER — DIPHENHYDRAMINE HYDROCHLORIDE 50 MG/ML
50 INJECTION INTRAMUSCULAR; INTRAVENOUS AS NEEDED
Status: DISCONTINUED | OUTPATIENT
Start: 2023-11-01 | End: 2023-11-01 | Stop reason: HOSPADM

## 2023-11-01 RX ORDER — HEPARIN 100 UNIT/ML
500 SYRINGE INTRAVENOUS AS NEEDED
Status: CANCELLED | OUTPATIENT
Start: 2023-11-01

## 2023-11-01 RX ORDER — PALONOSETRON 0.05 MG/ML
0.25 INJECTION, SOLUTION INTRAVENOUS ONCE
Status: COMPLETED | OUTPATIENT
Start: 2023-11-01 | End: 2023-11-01

## 2023-11-01 RX ADMIN — PACLITAXEL 216 MG: 6 INJECTION, SOLUTION INTRAVENOUS at 11:58

## 2023-11-01 RX ADMIN — DEXAMETHASONE SODIUM PHOSPHATE 12 MG: 10 INJECTION, SOLUTION INTRAMUSCULAR; INTRAVENOUS at 10:05

## 2023-11-01 RX ADMIN — SODIUM CHLORIDE 200 MG: 9 INJECTION, SOLUTION INTRAVENOUS at 11:08

## 2023-11-01 RX ADMIN — PALONOSETRON HYDROCHLORIDE 250 MCG: 0.25 INJECTION INTRAVENOUS at 10:05

## 2023-11-01 RX ADMIN — DIPHENHYDRAMINE HYDROCHLORIDE 50 MG: 50 CAPSULE ORAL at 10:05

## 2023-11-01 RX ADMIN — FAMOTIDINE 20 MG: 10 INJECTION INTRAVENOUS at 10:05

## 2023-11-01 RX ADMIN — CARBOPLATIN 393.2 MG: 10 INJECTION, SOLUTION INTRAVENOUS at 15:15

## 2023-11-01 RX ADMIN — FOSAPREPITANT 150 MG: 150 INJECTION, POWDER, LYOPHILIZED, FOR SOLUTION INTRAVENOUS at 10:33

## 2023-11-01 ASSESSMENT — PAIN SCALES - GENERAL: PAINLEVEL: 0-NO PAIN

## 2023-11-01 NOTE — PROGRESS NOTES
Outpatient Supportive Oncology Follow Up Note     Radha Toussaint is a is a 68 F with PMH of chronic respiratory failure COPD 2 L at night, CAD s/p PCI, HTN, HLD, left MCA s/p clip, PVD s/p  femorofemoral bypass, AAA s/p graft repair, and newly diagnosed 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 Parada).      Today, the patient was seen with her  Alexis. Overall, the patient reports feeling well.  Her pain is well controlled. Patient and her  note she has a strong internal script, that things must always go to plan otherwise the patient will become very anxious. This is very difficult for her to manage and is a large source of stress for her as well.     Patient has continuously felt fatigued. Her appetite has been well except for this last week when she felt as though she did not have good appetite or will to eat.    Patient and her  note that she has not responded well to the mirtazapine and it does not help with appetite, fatigue. She also has responded poorly to steroids in the past and mentions that her mood is severely affected when she is on treatment.     Symptoms  [place] Symptom Assessment Scores  Pain: Under  control with current regimen. Describes intermittent tingling in her foot, cramping in her calves. Improves with dilaudid (takes 2 mg BID).    SOB: Chronic with exertion, on 2 L baseline   Fatigue: Denies   Sleep: No complaints  Constipation: Reports having multiple small bowel movements each day  Nausea: Denies  Cough: Denies   Anxiety: Chronic, has been taking medications since age 26 for this   Depression: Denies  Appetite: Reports good appetite and recent 1/2 pound weight loss  Drowsiness: Denies.      Serious Illness Conversation  How much information about what is likely to be ahead with your illness would you like: Pt and  would like all information  What is your understanding now of where you are with your illness:  Pt understands  she has stage IV cancer in her lungs and adrenals   What brings you [place]: , grandchildren, smoking, camping, housekeeping  What are you hoping for: Remission  What are your fears, worries, or concerns about the future: Denies any fears or concerns   What are some functions that are so critical that you can't imagine living without: Patient reports wanting to be very independent as she has been her entire life.   How much does your family know about your priorities and wishes:  Family is aware of patient's priorities and wishes      Patient ID: Radha Toussaint is a 68 y.o. female who presents for No chief complaint on file..     HPI     Palliative Care Social History  Social History: Living situation - 1 story home with  and grandkids and Self-identifed support system: 3 children and  (Alexis)   Pentecostalism/Cultural/Spiritual:  pt is Latter day, prays everyday, is interested in speaking with       Prior Hospitalizations: not asked  History obtained from: the patient and       Review of Systems   Constitutional:  Negative for activity change, appetite change and fatigue.   Respiratory:  Positive for shortness of breath. Negative for cough.    Cardiovascular:  Negative for chest pain.   Gastrointestinal:  Negative for constipation, diarrhea, nausea and vomiting.   Psychiatric/Behavioral:  The patient is nervous/anxious.          Objective   Physical Exam  Constitutional:       General: She is not in acute distress.     Appearance: Normal appearance.   HENT:      Head: Normocephalic and atraumatic.      Mouth/Throat:      Mouth: Mucous membranes are moist.      Pharynx: Oropharynx is clear.   Eyes:      Extraocular Movements: Extraocular movements intact.      Conjunctiva/sclera: Conjunctivae normal.      Pupils: Pupils are equal, round, and reactive to light.   Cardiovascular:      Rate and Rhythm: Normal rate and regular rhythm.      Pulses: Normal pulses.      Heart sounds: Normal heart  sounds.   Pulmonary:      Effort: Pulmonary effort is normal.      Breath sounds: Normal breath sounds. No wheezing.   Musculoskeletal:         General: No swelling, tenderness or deformity.      Cervical back: Normal range of motion and neck supple.   Skin:     General: Skin is warm and dry.   Neurological:      General: No focal deficit present.      Mental Status: She is alert and oriented to person, place, and time.      Sensory: No sensory deficit.   Psychiatric:         Mood and Affect: Mood normal.         Behavior: Behavior normal.            Assessment/Plan   Palliative Assessment   Functional assessment: Patient reports being fairly active, spends > 50% out of bed   Prognosis: 1-2 years      Decisional Capacity: intact  Patient's understanding of illness: Good  Patient goals of care: Remission, independence      Advance Care Planning  Advance Directives on file?: <no information>  Health Care Directive on file?: Unclear, patient is unaware of signing any POA paperwork      Allergies  Ace inhibitors, Demerol [meperidine], and Iodinated contrast media     Last Recorded Vitals  There were no vitals taken for this visit.     Relevant Results        Contains abnormal data Comprehensive metabolic panel  Order: [id number]  Status: Final result       Visible to patient: No (inaccessible in [facility])       Dx: Non-small cell lung cancer, unspecifi...    0 Result Notes            Component  Ref Range & Units 08:42  (11/1/23) 9 d ago  (10/23/23) 10 d ago  (10/22/23) 4 wk ago  (10/4/23) 1 mo ago  (9/20/23) 1 mo ago  (9/19/23) 1 mo ago  (9/19/23)   Glucose  74 - 99 mg/dL 110 High  87 97 87 79 CANCELED R, CM 93   Sodium  136 - 145 mmol/L 139 138 133 Low  139 141 CANCELED R,  Low    Potassium  3.5 - 5.3 mmol/L 4.2 4.0 4.6 4.7 CM 4.0 CANCELED R, CM 4.2   Chloride  98 - 107 mmol/L 103 102 98 107 106 CANCELED R,    Bicarbonate  21 - 32 mmol/L 29 28 26 27 28 CANCELED R, CM 29   Anion Gap  10 - 20 mmol/L 11 12 14  10 11 CANCELED R, CM 10   Urea Nitrogen  6 - 23 mg/dL 13 11 15 13 5 Low  CANCELED R, CM 9   Creatinine  0.50 - 1.05 mg/dL 0.52 0.63 0.52 0.56 0.53 CANCELED R, CM 0.47 Low    eGFR  >60 mL/min/1.73m*2 >90 >90 CM >90 CM >90 CM      Comment: Calculations of estimated GFR are performed using the 2021 CKD-EPI Study Refit equation without the race variable for the IDMS-Traceable creatinine methods.  https://jasn.asnjournals.org/content/early/2021/09/22/ASN.8775321180   Calcium  8.6 - 10.3 mg/dL 9.0 8.9 8.7 7.9 Low  8.0 Low  CANCELED R, CM 8.0 Low    Albumin  3.4 - 5.0 g/dL 3.6   3.2 Low  2.4 Low  CANCELED R, CM 2.7 Low    Alkaline Phosphatase  33 - 136 U/L 62   57 69 CANCELED R, CM 80   Total Protein  6.4 - 8.2 g/dL 6.8   6.2 Low  4.8 Low  CANCELED R, CM 5.1 Low    AST  9 - 39 U/L 9   26 CM 14 CANCELED R, CM 15   Bilirubin, Total  0.0 - 1.2 mg/dL 0.4   0.4 0.4 CANCELED R, CM 0.5   ALT  7 - 45 U/L 8   12 CM 16 CM CANCELED R, CM 18 CM   Comment: Patients treated with Sulfasalazine may generate falsely decreased results for ALT.   Resulting Agency [facility]          Other Results from 11/1/2023     CBC and Auto Differential Final result 11/1/2023    Acth In process 11/1/2023    Cortisol Am In process 11/1/2023    Tsh With Reflex To Free T4 If Abnormal In process 11/1/2023         Assessment and Plan  Code Status  Full Code   Patient is a capable decision maker: Yes  Surrogate decision maker: - Alexis   Estimated life expectancy Provider estimate of survival: 6+ months     Radha Toussaint is a is a 68 F with PMH of chronic respiratory failure COPD 2 L at night, CAD s/p PCI, HTN, HLD, left MCA s/p clip, PVD s/p  femorofemoral bypass, AAA s/p graft repair, and newly diagnosed 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 Parada).      Problem List: (in addition to above)  CA related pain  Poor appetite  Cancer related  fatigue  Adjustment disorder    Today, the patient was seen with her  Alexis. Overall, the patient reports feeling well.  Her pain is well controlled. Patient and her  note she has a strong internal script, that things must always go to plan otherwise the patient will become very anxious. This is very difficult for her to manage and is a large source of stress for her as well.     Patient has continuously felt fatigued. Her appetite has been well except for this last week when she felt as though she did not have good appetite or will to eat.    Patient and her  note that she has not responded well to the mirtazapine and it does not help with appetite, fatigue. She also has responded poorly to steroids in the past and mentions that her mood is severely affected when she is on treatment.         Recommendations:  - Stop Mirtazapine, patient not responding well  - Continue current pain management regiment. Patient is reporting pain is well controlled with dilaudid 2 mg BID prn  - Start Olanzipine 5mg at bedtime  - Can consider changing Zoloft to duloxetine in the future  - Discussed with patient a few coping strategies for anxiety including controlled breathing and thought redirecting   - Recommend having an echo performed to rule out and HFrEF, cardiomyopathy, will be seen by cardio-onc  - Encouraged small spurts of exercise as this has been proven to improve cancer related fatigue     Goals of Care  Most important health related goal: Remission, independence        Caregiving/Caregiver Support  Does the patient require assistance in some or all components of his care, including coordination of medical care? No  If Yes, which person serves that role?  spouse   Caregiver emotional or practical needs:  NA     Discharge and Social Considerations  Will see patient again in 4 weeks at next infusion appointment.

## 2023-11-03 LAB — ACTH PLAS-MCNC: 36.4 PG/ML (ref 7.2–63.3)

## 2023-11-06 DIAGNOSIS — E27.40 ADRENAL INSUFFICIENCY (MULTI): Primary | ICD-10-CM

## 2023-11-06 DIAGNOSIS — C34.91 NON-SMALL CELL CANCER OF RIGHT LUNG (MULTI): ICD-10-CM

## 2023-11-06 RX ORDER — HYDROCORTISONE 5 MG/1
TABLET ORAL
Qty: 180 TABLET | Refills: 0 | Status: SHIPPED | OUTPATIENT
Start: 2023-11-06 | End: 2024-01-03 | Stop reason: SDUPTHER

## 2023-11-07 ENCOUNTER — OFFICE VISIT (OUTPATIENT)
Dept: PRIMARY CARE | Facility: CLINIC | Age: 68
End: 2023-11-07
Payer: MEDICARE

## 2023-11-07 VITALS
HEIGHT: 64 IN | SYSTOLIC BLOOD PRESSURE: 112 MMHG | BODY MASS INDEX: 22.36 KG/M2 | WEIGHT: 131 LBS | DIASTOLIC BLOOD PRESSURE: 80 MMHG

## 2023-11-07 DIAGNOSIS — C34.90 NON-SMALL CELL LUNG CANCER, UNSPECIFIED LATERALITY (MULTI): ICD-10-CM

## 2023-11-07 DIAGNOSIS — F17.200 CURRENT SMOKER: ICD-10-CM

## 2023-11-07 DIAGNOSIS — J43.8 OTHER EMPHYSEMA (MULTI): ICD-10-CM

## 2023-11-07 DIAGNOSIS — F41.9 ANXIETY: ICD-10-CM

## 2023-11-07 DIAGNOSIS — I73.9 PERIPHERAL VASCULAR DISEASE (CMS-HCC): Primary | ICD-10-CM

## 2023-11-07 DIAGNOSIS — F33.9 DEPRESSION, RECURRENT (CMS-HCC): ICD-10-CM

## 2023-11-07 PROCEDURE — 4004F PT TOBACCO SCREEN RCVD TLK: CPT | Performed by: STUDENT IN AN ORGANIZED HEALTH CARE EDUCATION/TRAINING PROGRAM

## 2023-11-07 PROCEDURE — 3079F DIAST BP 80-89 MM HG: CPT | Performed by: STUDENT IN AN ORGANIZED HEALTH CARE EDUCATION/TRAINING PROGRAM

## 2023-11-07 PROCEDURE — 99213 OFFICE O/P EST LOW 20 MIN: CPT | Performed by: STUDENT IN AN ORGANIZED HEALTH CARE EDUCATION/TRAINING PROGRAM

## 2023-11-07 PROCEDURE — 1160F RVW MEDS BY RX/DR IN RCRD: CPT | Performed by: STUDENT IN AN ORGANIZED HEALTH CARE EDUCATION/TRAINING PROGRAM

## 2023-11-07 PROCEDURE — 3008F BODY MASS INDEX DOCD: CPT | Performed by: STUDENT IN AN ORGANIZED HEALTH CARE EDUCATION/TRAINING PROGRAM

## 2023-11-07 PROCEDURE — 1159F MED LIST DOCD IN RCRD: CPT | Performed by: STUDENT IN AN ORGANIZED HEALTH CARE EDUCATION/TRAINING PROGRAM

## 2023-11-07 PROCEDURE — 1126F AMNT PAIN NOTED NONE PRSNT: CPT | Performed by: STUDENT IN AN ORGANIZED HEALTH CARE EDUCATION/TRAINING PROGRAM

## 2023-11-07 PROCEDURE — 3074F SYST BP LT 130 MM HG: CPT | Performed by: STUDENT IN AN ORGANIZED HEALTH CARE EDUCATION/TRAINING PROGRAM

## 2023-11-07 RX ORDER — CLOPIDOGREL BISULFATE 75 MG/1
75 TABLET ORAL DAILY
Qty: 90 TABLET | Refills: 1 | Status: SHIPPED | OUTPATIENT
Start: 2023-11-07 | End: 2024-05-13 | Stop reason: SDUPTHER

## 2023-11-07 RX ORDER — FLUTICASONE FUROATE, UMECLIDINIUM BROMIDE AND VILANTEROL TRIFENATATE 200; 62.5; 25 UG/1; UG/1; UG/1
1 POWDER RESPIRATORY (INHALATION)
Qty: 60 EACH | Refills: 3 | Status: SHIPPED | OUTPATIENT
Start: 2023-11-07 | End: 2024-03-19 | Stop reason: SDUPTHER

## 2023-11-07 RX ORDER — OLANZAPINE 5 MG/1
5 TABLET ORAL NIGHTLY
Qty: 30 TABLET | Refills: 3 | Status: SHIPPED | OUTPATIENT
Start: 2023-11-07 | End: 2024-01-18 | Stop reason: WASHOUT

## 2023-11-07 ASSESSMENT — ENCOUNTER SYMPTOMS
ARTHRALGIAS: 1
GASTROINTESTINAL NEGATIVE: 1
CARDIOVASCULAR NEGATIVE: 1
FATIGUE: 0
RESPIRATORY NEGATIVE: 1
SLEEP DISTURBANCE: 0

## 2023-11-07 ASSESSMENT — PATIENT HEALTH QUESTIONNAIRE - PHQ9
1. LITTLE INTEREST OR PLEASURE IN DOING THINGS: NOT AT ALL
2. FEELING DOWN, DEPRESSED OR HOPELESS: NOT AT ALL
SUM OF ALL RESPONSES TO PHQ9 QUESTIONS 1 AND 2: 0

## 2023-11-07 NOTE — PROGRESS NOTES
Subjective   Patient ID: Radha Toussaint is a 68 y.o. female who presents for Hospital Follow-up (Medication refills ).  Diagnosed with non small cell lung cancer. Emphysema. Current daily 1 ppd smoker.     Last chemo is planned for November 29 then switches to immune therapy.     Most recent chemo she did better with.     Sleep and anxiety are not great. Mirtazapine not helping. States her palliative care doctor mentioned switching meds but then did not send anything. Per note was considering olanzapine.     Has declined all preventative cancer screenings including mammogram, colon cancer screening, and lung cancer screening.     Using her trelegy. Breathing is about the same.     Says overall she is doing okay all things considered.           Review of Systems   Constitutional:  Negative for fatigue.   Respiratory: Negative.     Cardiovascular: Negative.    Gastrointestinal: Negative.    Genitourinary: Negative.    Musculoskeletal:  Positive for arthralgias.   Psychiatric/Behavioral:  Negative for sleep disturbance.    All other systems reviewed and are negative.      Objective   Physical Exam  Vitals reviewed.   Constitutional:       Appearance: Normal appearance.   HENT:      Head: Normocephalic.      Mouth/Throat:      Mouth: Mucous membranes are moist.   Eyes:      Pupils: Pupils are equal, round, and reactive to light.   Cardiovascular:      Rate and Rhythm: Normal rate and regular rhythm.   Pulmonary:      Effort: Pulmonary effort is normal. No respiratory distress.      Breath sounds: Normal breath sounds. No wheezing or rhonchi.   Musculoskeletal:         General: Normal range of motion.   Skin:     General: Skin is warm and dry.   Neurological:      General: No focal deficit present.      Mental Status: She is alert. Mental status is at baseline.   Psychiatric:         Mood and Affect: Mood normal.         Behavior: Behavior normal.         Assessment/Plan   Diagnoses and all orders for this  visit:  Peripheral vascular disease (CMS/HCC)  -     clopidogrel (Plavix) 75 mg tablet; Take 1 tablet (75 mg) by mouth once daily.  Anxiety  Comments:  will try olanzapine instead of mirtazapine  Orders:  -     OLANZapine (ZyPREXA) 5 mg tablet; Take 1 tablet (5 mg) by mouth once daily at bedtime.  Other emphysema (CMS/HCC)  -     fluticasone-umeclidin-vilanter (Trelegy Ellipta) 200-62.5-25 mcg blister with device; Inhale 1 puff once daily.  Depression, recurrent (CMS/HCC)  Current smoker  Comments:  not interested in quitting  Non-small cell lung cancer, unspecified laterality (CMS/HCC)  Comments:  following with oncology       Follow up in 6 months    Francheska Rogel, DO

## 2023-11-08 ENCOUNTER — APPOINTMENT (OUTPATIENT)
Dept: HEMATOLOGY/ONCOLOGY | Facility: HOSPITAL | Age: 68
End: 2023-11-08
Payer: MEDICARE

## 2023-11-09 ENCOUNTER — APPOINTMENT (OUTPATIENT)
Dept: CARDIOLOGY | Facility: CLINIC | Age: 68
End: 2023-11-09
Payer: MEDICARE

## 2023-11-10 ENCOUNTER — PATIENT OUTREACH (OUTPATIENT)
Dept: CARE COORDINATION | Facility: CLINIC | Age: 68
End: 2023-11-10
Payer: MEDICARE

## 2023-11-10 NOTE — PROGRESS NOTES
Call regarding appt. with PCP on 11.10.23 after hospitalization.  At time of outreach call the patient feels as if their condition has improved since last visit.  Reviewed with patient the PCP appointment and any questions or concerns regarding the appt.

## 2023-11-15 DIAGNOSIS — C34.91 NON-SMALL CELL CANCER OF RIGHT LUNG (MULTI): ICD-10-CM

## 2023-11-20 ENCOUNTER — TELEPHONE (OUTPATIENT)
Dept: PRIMARY CARE | Facility: CLINIC | Age: 68
End: 2023-11-20
Payer: MEDICARE

## 2023-11-20 DIAGNOSIS — F41.9 ANXIETY: ICD-10-CM

## 2023-11-20 RX ORDER — SERTRALINE HYDROCHLORIDE 100 MG/1
100 TABLET, FILM COATED ORAL DAILY
Qty: 90 TABLET | Refills: 2 | Status: SHIPPED | OUTPATIENT
Start: 2023-11-20 | End: 2024-01-18 | Stop reason: WASHOUT

## 2023-11-20 NOTE — TELEPHONE ENCOUNTER
Patient called and said she normally takes 100mg of zoloft per day but rx was sent in, incorrectly. Can this be fixed? I wanted to make sure you wanted her taking it daily and not on the titered dose that you have in her chart before setting it up

## 2023-11-21 DIAGNOSIS — J43.8 OTHER EMPHYSEMA (MULTI): Primary | ICD-10-CM

## 2023-11-21 RX ORDER — ALBUTEROL SULFATE 90 UG/1
1 AEROSOL, METERED RESPIRATORY (INHALATION) EVERY 4 HOURS PRN
Qty: 18 G | Refills: 3 | Status: SHIPPED | OUTPATIENT
Start: 2023-11-21

## 2023-11-22 ENCOUNTER — TELEMEDICINE (OUTPATIENT)
Dept: HEMATOLOGY/ONCOLOGY | Facility: HOSPITAL | Age: 68
End: 2023-11-22
Payer: MEDICARE

## 2023-11-22 ENCOUNTER — APPOINTMENT (OUTPATIENT)
Dept: HEMATOLOGY/ONCOLOGY | Facility: HOSPITAL | Age: 68
End: 2023-11-22
Payer: MEDICARE

## 2023-11-22 DIAGNOSIS — C34.91 NON-SMALL CELL CANCER OF RIGHT LUNG (MULTI): ICD-10-CM

## 2023-11-22 PROCEDURE — 99441 PR PHYS/QHP TELEPHONE EVALUATION 5-10 MIN: CPT | Performed by: INTERNAL MEDICINE

## 2023-11-22 RX ORDER — PROCHLORPERAZINE MALEATE 5 MG
5 TABLET ORAL EVERY 6 HOURS PRN
Qty: 60 TABLET | Refills: 1 | Status: SHIPPED | OUTPATIENT
Start: 2023-11-22 | End: 2023-12-22

## 2023-11-24 NOTE — PROGRESS NOTES
Medical Oncology Follow-up Visit  Telephone visit    Patient ID: Radha Toussaint is a 68 y.o. female.    Current therapy: carboplatin/paclitaxel/pembrolizumab     Chief Concern: Telephonic  visit to follow-up    Subjective    HPI:  Pt reports she is feeling well today. Reports she did really well with this round of chemo. Anxiety is a lot better. Her legs continue to be intermittently swollen and one does have tingling which has not worsened since starting chemo. Is up and around more. Still with some fatigue. Still short of breath at times but overall improved    Diagnosis: NSCLC - poorly differentiated carcinoma, suspect lung primary  Stage:  Cancer Staging   Non-small cell lung cancer (NSCLC) (CMS/HCC)  Staging form: Lung, AJCC 8th Edition  - Clinical: Stage IVB (cTX, cNX, pM1c) - Signed by Francheska Parada MD on 9/23/2023     Current sites of disease: Adrenals bilaterally, possible lung/hilum  Molecular and ancillary testing:   PD-L1 70%  LAKHWINDER amplification   B2M p.[id number] ([id number] c.[id number]), subclonal   CDKN2A p.A127P (NM_000077 c.379G>C)   NRAS p.G12A (NM_002524 c.35G>C), subclonal   TP53 p.R249S (NM_[id number] c.747G>T)     Oncologic History:  7/26/23 - Presented to ER with 1m of weakness, dyspnea, abd pain and prior fall, Found to have bilateral adrenal masses on CT A/P, possible R hilar LN  8/11/23 - Biopsy of adrenal mass with poorly differentiated carcinoma  8/29-9/2/23 - Admitted to [facility] with progressive weakness, hypotension, found to have adrenal insufficiency due to bilateral adrenal masses. Improved with hydrocortisone. PET scan completed without obvious primary (small effusion, small lung nodules, R  hilar LN mildly FDG-avid)  9/14/23 - C1 carboplatin/paclitaxel/pembrolizumab (dose-reduced chemotherapy)    Oncology History   Non-small cell lung cancer (NSCLC) (CMS/HCC)   9/14/2023 -  Chemotherapy    Pembrolizumab + PACLitaxel / CARBOplatin, 21 Day Cycles     9/21/2023 Initial Diagnosis  "   Non-small cell lung cancer (NSCLC) (CMS/[facility])     9/23/2023 Cancer Staged    Staging form: Lung, AJCC 8th Edition, Clinical: Stage IVB (cTX, cNX, pM1c) - Signed by Francheska Parada MD on 9/23/2023         Objective    BSA: There is no height or weight on file to calculate BSA.  There were no vitals taken for this visit.  - telephonic visit.      Physical Exam  Neurological:      Mental Status: She is alert.   Psychiatric:         Mood and Affect: Mood normal.         Behavior: Behavior normal.         Thought Content: Thought content normal.         Judgment: Judgment normal.       Performance Status:  Symptomatic; fully ambulatory    Assessment/Plan       Radha Toussaint is a 68 y.o. female here for follow up of poorly differentiated carcinoma involving bilateral adrenal glands, likely of lung primary, with PD-L1 70% and no actionable alterations.     Reviewed overall work-up to date with patient and family previously including implications of stage IV disease, and suspicion for lung cancer though not clearly proven (she has history of tobacco use, and does have R hilar lymph node as well as lung nodules  which although not overly suspicious, in the right context may represent primary site).     Discussed options and given burden of disease, elected to proceed with carboplatin/paclitaxel and pembro instead of pembro alone. NGS did not show actionable mutations.     She is s/p C1, with hospitalization for severe fatigue. Now feels significantly improved.     Of note, \"cancer type\" test resulted and showed high suspicion for sarcoma; I have again discussed our case with pathology and they are confident histologically this does not represent sarcoma. I think the molecular pattern is relatively non-specific therefore will continue current plan. Additionally, recent imaging with ?colonic mass vs transient changes, will follow and if concern can consider colonoscopy in the future.     #Poorly differentiated carcinoma - " likely lung primary  - Proceed with C4 next week, continue with dose reduction of carboplatin/taxol (carbo AUC 4 and taxol 140mg/m2) given tolerance.   - Plan repeat CT C/A/P after C4  - If response to treatment will plan to transition to pembrolizumab maintenance; she would like to transition care back to Dr. Burnham at [place] and is all set up to see him and receive her treatment there   - Anti-emetics Rx's in place, she did note nausea after last cycle, advised her to schedule compazine for a few days after chemo  - Dilaudid 2mg q4h PRN Rx in place    #Anxiety/Depression  - Ativan BID 0.5mg didn't really help  - Hydroxyzine with limited benefit, states symptoms worsen as med wears off  - Referred to supportive oncology given their management inpatient and her significant pain and anxiety (though pain now much improved), and for support moving forward given complexity of her situation, may need to transition to supportive onc team in [place] when transferring care  - Previously referred to psycho-oncology as she would likely benefit from focused management of depression as well. She also endorses severe mood changes on prednisone in the past and given possible need for them in the future would help to have psychiatry  team following prior   - Dr. Dylan dominique scheduled on same day as rescheduled C3. Discussed not sure if onco-psych at [place], can refer to psychiatry in general if she would like as she does not wish to come to main campus for visit    - Monitor for worsening neuropathy on chemotherapy     - Monitor mood with chemo/steroids - hx of ?psychosis with steroids in the past     RTC next week for C4, plan to review subsequent CT results by phone per her request    Francheska Parada MD    Time spent on call 9 min

## 2023-11-26 RX ORDER — PROCHLORPERAZINE MALEATE 10 MG
10 TABLET ORAL EVERY 6 HOURS PRN
Status: CANCELLED | OUTPATIENT
Start: 2023-11-29

## 2023-11-26 RX ORDER — EPINEPHRINE 0.3 MG/.3ML
0.3 INJECTION SUBCUTANEOUS EVERY 5 MIN PRN
Status: CANCELLED | OUTPATIENT
Start: 2023-11-29

## 2023-11-26 RX ORDER — DIPHENHYDRAMINE HYDROCHLORIDE 50 MG/ML
50 INJECTION INTRAMUSCULAR; INTRAVENOUS AS NEEDED
Status: CANCELLED | OUTPATIENT
Start: 2023-11-29

## 2023-11-26 RX ORDER — FAMOTIDINE 10 MG/ML
20 INJECTION INTRAVENOUS ONCE
Status: CANCELLED | OUTPATIENT
Start: 2023-11-29

## 2023-11-26 RX ORDER — PALONOSETRON 0.05 MG/ML
0.25 INJECTION, SOLUTION INTRAVENOUS ONCE
Status: CANCELLED | OUTPATIENT
Start: 2023-11-29

## 2023-11-26 RX ORDER — FAMOTIDINE 10 MG/ML
20 INJECTION INTRAVENOUS ONCE AS NEEDED
Status: CANCELLED | OUTPATIENT
Start: 2023-11-29

## 2023-11-26 RX ORDER — ALBUTEROL SULFATE 0.83 MG/ML
3 SOLUTION RESPIRATORY (INHALATION) AS NEEDED
Status: CANCELLED | OUTPATIENT
Start: 2023-11-29

## 2023-11-26 RX ORDER — DIPHENHYDRAMINE HCL 50 MG
50 CAPSULE ORAL ONCE
Status: CANCELLED | OUTPATIENT
Start: 2023-11-29

## 2023-11-26 RX ORDER — PROCHLORPERAZINE EDISYLATE 5 MG/ML
10 INJECTION INTRAMUSCULAR; INTRAVENOUS EVERY 6 HOURS PRN
Status: CANCELLED | OUTPATIENT
Start: 2023-11-29

## 2023-11-28 ENCOUNTER — HOSPITAL ENCOUNTER (OUTPATIENT)
Dept: CARDIOLOGY | Facility: HOSPITAL | Age: 68
Discharge: HOME | End: 2023-11-28
Payer: MEDICARE

## 2023-11-28 ENCOUNTER — APPOINTMENT (OUTPATIENT)
Dept: RADIOLOGY | Facility: HOSPITAL | Age: 68
DRG: 177 | End: 2023-11-28
Payer: MEDICARE

## 2023-11-28 ENCOUNTER — HOSPITAL ENCOUNTER (INPATIENT)
Facility: HOSPITAL | Age: 68
LOS: 3 days | Discharge: HOME | DRG: 177 | End: 2023-12-01
Attending: STUDENT IN AN ORGANIZED HEALTH CARE EDUCATION/TRAINING PROGRAM | Admitting: STUDENT IN AN ORGANIZED HEALTH CARE EDUCATION/TRAINING PROGRAM
Payer: MEDICARE

## 2023-11-28 DIAGNOSIS — U07.1 COVID: Primary | ICD-10-CM

## 2023-11-28 DIAGNOSIS — J96.00 ACUTE RESPIRATORY FAILURE, UNSPECIFIED WHETHER WITH HYPOXIA OR HYPERCAPNIA (MULTI): ICD-10-CM

## 2023-11-28 DIAGNOSIS — I21.4 NSTEMI (NON-ST ELEVATED MYOCARDIAL INFARCTION) (MULTI): ICD-10-CM

## 2023-11-28 LAB
ALBUMIN SERPL BCP-MCNC: 4.1 G/DL (ref 3.4–5)
ALP SERPL-CCNC: 79 U/L (ref 33–136)
ALT SERPL W P-5'-P-CCNC: 10 U/L (ref 7–45)
ANION GAP SERPL CALC-SCNC: 14 MMOL/L (ref 10–20)
AST SERPL W P-5'-P-CCNC: 22 U/L (ref 9–39)
BASE EXCESS BLDV CALC-SCNC: 3.2 MMOL/L (ref -2–3)
BASOPHILS # BLD AUTO: 0.06 X10*3/UL (ref 0–0.1)
BASOPHILS NFR BLD AUTO: 0.6 %
BILIRUB SERPL-MCNC: 0.5 MG/DL (ref 0–1.2)
BNP SERPL-MCNC: 185 PG/ML (ref 0–99)
BODY TEMPERATURE: 37 DEGREES CELSIUS
BUN SERPL-MCNC: 12 MG/DL (ref 6–23)
CALCIUM SERPL-MCNC: 9.2 MG/DL (ref 8.6–10.3)
CARDIAC TROPONIN I PNL SERPL HS: 2735 NG/L (ref 0–13)
CARDIAC TROPONIN I PNL SERPL HS: 2902 NG/L (ref 0–13)
CHLORIDE SERPL-SCNC: 99 MMOL/L (ref 98–107)
CO2 SERPL-SCNC: 28 MMOL/L (ref 21–32)
CREAT SERPL-MCNC: 0.67 MG/DL (ref 0.5–1.05)
EOSINOPHIL # BLD AUTO: 0.04 X10*3/UL (ref 0–0.7)
EOSINOPHIL NFR BLD AUTO: 0.4 %
ERYTHROCYTE [DISTWIDTH] IN BLOOD BY AUTOMATED COUNT: 19.9 % (ref 11.5–14.5)
FLUAV RNA RESP QL NAA+PROBE: NOT DETECTED
FLUBV RNA RESP QL NAA+PROBE: NOT DETECTED
GFR SERPL CREATININE-BSD FRML MDRD: >90 ML/MIN/1.73M*2
GLUCOSE SERPL-MCNC: 114 MG/DL (ref 74–99)
HCO3 BLDV-SCNC: 31.2 MMOL/L (ref 22–26)
HCT VFR BLD AUTO: 45.1 % (ref 36–46)
HGB BLD-MCNC: 13.9 G/DL (ref 12–16)
IMM GRANULOCYTES # BLD AUTO: 0.07 X10*3/UL (ref 0–0.7)
IMM GRANULOCYTES NFR BLD AUTO: 0.7 % (ref 0–0.9)
INHALED O2 CONCENTRATION: 36 %
LACTATE SERPL-SCNC: 1.4 MMOL/L (ref 0.4–2)
LACTATE SERPL-SCNC: 2.3 MMOL/L (ref 0.4–2)
LYMPHOCYTES # BLD AUTO: 1.19 X10*3/UL (ref 1.2–4.8)
LYMPHOCYTES NFR BLD AUTO: 11.1 %
MAGNESIUM SERPL-MCNC: 1.73 MG/DL (ref 1.6–2.4)
MCH RBC QN AUTO: 24.6 PG (ref 26–34)
MCHC RBC AUTO-ENTMCNC: 30.8 G/DL (ref 32–36)
MCV RBC AUTO: 80 FL (ref 80–100)
MONOCYTES # BLD AUTO: 0.58 X10*3/UL (ref 0.1–1)
MONOCYTES NFR BLD AUTO: 5.4 %
NEUTROPHILS # BLD AUTO: 8.8 X10*3/UL (ref 1.2–7.7)
NEUTROPHILS NFR BLD AUTO: 81.8 %
NRBC BLD-RTO: 0 /100 WBCS (ref 0–0)
OXYHGB MFR BLDV: 24.3 % (ref 45–75)
PCO2 BLDV: 62 MM HG (ref 41–51)
PH BLDV: 7.31 PH (ref 7.33–7.43)
PLATELET # BLD AUTO: 209 X10*3/UL (ref 150–450)
PO2 BLDV: 21 MM HG (ref 35–45)
POTASSIUM SERPL-SCNC: 4.3 MMOL/L (ref 3.5–5.3)
PROT SERPL-MCNC: 7.7 G/DL (ref 6.4–8.2)
RBC # BLD AUTO: 5.65 X10*6/UL (ref 4–5.2)
SAO2 % BLDV: 26 % (ref 45–75)
SARS-COV-2 RNA RESP QL NAA+PROBE: DETECTED
SODIUM SERPL-SCNC: 137 MMOL/L (ref 136–145)
TEST COMMENT: ABNORMAL
UFH PPP CHRO-ACNC: 0.2 IU/ML
UFH PPP CHRO-ACNC: 0.2 IU/ML
WBC # BLD AUTO: 10.7 X10*3/UL (ref 4.4–11.3)

## 2023-11-28 PROCEDURE — 71045 X-RAY EXAM CHEST 1 VIEW: CPT | Performed by: RADIOLOGY

## 2023-11-28 PROCEDURE — 2500000004 HC RX 250 GENERAL PHARMACY W/ HCPCS (ALT 636 FOR OP/ED): Performed by: STUDENT IN AN ORGANIZED HEALTH CARE EDUCATION/TRAINING PROGRAM

## 2023-11-28 PROCEDURE — 94640 AIRWAY INHALATION TREATMENT: CPT

## 2023-11-28 PROCEDURE — 96361 HYDRATE IV INFUSION ADD-ON: CPT

## 2023-11-28 PROCEDURE — 2500000001 HC RX 250 WO HCPCS SELF ADMINISTERED DRUGS (ALT 637 FOR MEDICARE OP): Performed by: STUDENT IN AN ORGANIZED HEALTH CARE EDUCATION/TRAINING PROGRAM

## 2023-11-28 PROCEDURE — 96374 THER/PROPH/DIAG INJ IV PUSH: CPT

## 2023-11-28 PROCEDURE — 93005 ELECTROCARDIOGRAM TRACING: CPT

## 2023-11-28 PROCEDURE — 71275 CT ANGIOGRAPHY CHEST: CPT

## 2023-11-28 PROCEDURE — 3E0333Z INTRODUCTION OF ANTI-INFLAMMATORY INTO PERIPHERAL VEIN, PERCUTANEOUS APPROACH: ICD-10-PCS | Performed by: STUDENT IN AN ORGANIZED HEALTH CARE EDUCATION/TRAINING PROGRAM

## 2023-11-28 PROCEDURE — 87040 BLOOD CULTURE FOR BACTERIA: CPT | Mod: PARLAB | Performed by: NURSE PRACTITIONER

## 2023-11-28 PROCEDURE — 85025 COMPLETE CBC W/AUTO DIFF WBC: CPT | Performed by: NURSE PRACTITIONER

## 2023-11-28 PROCEDURE — 71275 CT ANGIOGRAPHY CHEST: CPT | Performed by: RADIOLOGY

## 2023-11-28 PROCEDURE — 96375 TX/PRO/DX INJ NEW DRUG ADDON: CPT

## 2023-11-28 PROCEDURE — 2500000001 HC RX 250 WO HCPCS SELF ADMINISTERED DRUGS (ALT 637 FOR MEDICARE OP): Performed by: NURSE PRACTITIONER

## 2023-11-28 PROCEDURE — 87636 SARSCOV2 & INF A&B AMP PRB: CPT | Performed by: NURSE PRACTITIONER

## 2023-11-28 PROCEDURE — 2060000001 HC INTERMEDIATE ICU ROOM DAILY

## 2023-11-28 PROCEDURE — 83735 ASSAY OF MAGNESIUM: CPT | Performed by: NURSE PRACTITIONER

## 2023-11-28 PROCEDURE — 99223 1ST HOSP IP/OBS HIGH 75: CPT | Performed by: INTERNAL MEDICINE

## 2023-11-28 PROCEDURE — 99285 EMERGENCY DEPT VISIT HI MDM: CPT | Mod: 25 | Performed by: STUDENT IN AN ORGANIZED HEALTH CARE EDUCATION/TRAINING PROGRAM

## 2023-11-28 PROCEDURE — 2500000004 HC RX 250 GENERAL PHARMACY W/ HCPCS (ALT 636 FOR OP/ED): Performed by: NURSE PRACTITIONER

## 2023-11-28 PROCEDURE — XW033E5 INTRODUCTION OF REMDESIVIR ANTI-INFECTIVE INTO PERIPHERAL VEIN, PERCUTANEOUS APPROACH, NEW TECHNOLOGY GROUP 5: ICD-10-PCS | Performed by: STUDENT IN AN ORGANIZED HEALTH CARE EDUCATION/TRAINING PROGRAM

## 2023-11-28 PROCEDURE — 85520 HEPARIN ASSAY: CPT | Performed by: NURSE PRACTITIONER

## 2023-11-28 PROCEDURE — 84484 ASSAY OF TROPONIN QUANT: CPT | Performed by: NURSE PRACTITIONER

## 2023-11-28 PROCEDURE — 80053 COMPREHEN METABOLIC PANEL: CPT | Performed by: NURSE PRACTITIONER

## 2023-11-28 PROCEDURE — 83605 ASSAY OF LACTIC ACID: CPT | Performed by: NURSE PRACTITIONER

## 2023-11-28 PROCEDURE — 82805 BLOOD GASES W/O2 SATURATION: CPT | Performed by: NURSE PRACTITIONER

## 2023-11-28 PROCEDURE — 2550000001 HC RX 255 CONTRASTS: Performed by: STUDENT IN AN ORGANIZED HEALTH CARE EDUCATION/TRAINING PROGRAM

## 2023-11-28 PROCEDURE — 36415 COLL VENOUS BLD VENIPUNCTURE: CPT | Performed by: NURSE PRACTITIONER

## 2023-11-28 PROCEDURE — 99223 1ST HOSP IP/OBS HIGH 75: CPT | Performed by: NURSE PRACTITIONER

## 2023-11-28 PROCEDURE — 83880 ASSAY OF NATRIURETIC PEPTIDE: CPT | Performed by: NURSE PRACTITIONER

## 2023-11-28 PROCEDURE — 2500000002 HC RX 250 W HCPCS SELF ADMINISTERED DRUGS (ALT 637 FOR MEDICARE OP, ALT 636 FOR OP/ED): Performed by: STUDENT IN AN ORGANIZED HEALTH CARE EDUCATION/TRAINING PROGRAM

## 2023-11-28 PROCEDURE — 71045 X-RAY EXAM CHEST 1 VIEW: CPT | Mod: FY

## 2023-11-28 PROCEDURE — 2500000005 HC RX 250 GENERAL PHARMACY W/O HCPCS: Performed by: NURSE PRACTITIONER

## 2023-11-28 PROCEDURE — 99292 CRITICAL CARE ADDL 30 MIN: CPT | Mod: 25 | Performed by: NURSE PRACTITIONER

## 2023-11-28 RX ORDER — HYDROCORTISONE 10 MG/1
5 TABLET ORAL DAILY
Status: DISCONTINUED | OUTPATIENT
Start: 2023-11-28 | End: 2023-12-01 | Stop reason: HOSPADM

## 2023-11-28 RX ORDER — HYDROMORPHONE HYDROCHLORIDE 2 MG/1
2 TABLET ORAL 3 TIMES DAILY PRN
Status: DISCONTINUED | OUTPATIENT
Start: 2023-11-28 | End: 2023-12-01 | Stop reason: HOSPADM

## 2023-11-28 RX ORDER — CLOPIDOGREL BISULFATE 75 MG/1
75 TABLET ORAL DAILY
Status: DISCONTINUED | OUTPATIENT
Start: 2023-11-28 | End: 2023-12-01 | Stop reason: HOSPADM

## 2023-11-28 RX ORDER — HEPARIN SODIUM 5000 [USP'U]/ML
60 INJECTION, SOLUTION INTRAVENOUS; SUBCUTANEOUS ONCE
Status: COMPLETED | OUTPATIENT
Start: 2023-11-28 | End: 2023-11-28

## 2023-11-28 RX ORDER — SERTRALINE HYDROCHLORIDE 100 MG/1
100 TABLET, FILM COATED ORAL DAILY
Status: DISCONTINUED | OUTPATIENT
Start: 2023-11-28 | End: 2023-11-30

## 2023-11-28 RX ORDER — FLUTICASONE FUROATE AND VILANTEROL 200; 25 UG/1; UG/1
1 POWDER RESPIRATORY (INHALATION)
Status: DISCONTINUED | OUTPATIENT
Start: 2023-11-28 | End: 2023-12-01 | Stop reason: HOSPADM

## 2023-11-28 RX ORDER — DEXAMETHASONE SODIUM PHOSPHATE 10 MG/ML
6 INJECTION INTRAMUSCULAR; INTRAVENOUS EVERY 24 HOURS
Status: DISCONTINUED | OUTPATIENT
Start: 2023-11-28 | End: 2023-12-01 | Stop reason: HOSPADM

## 2023-11-28 RX ORDER — NALOXONE HYDROCHLORIDE 1 MG/ML
4 INJECTION INTRAMUSCULAR; INTRAVENOUS; SUBCUTANEOUS AS NEEDED
Status: DISCONTINUED | OUTPATIENT
Start: 2023-11-28 | End: 2023-12-01 | Stop reason: HOSPADM

## 2023-11-28 RX ORDER — IPRATROPIUM BROMIDE AND ALBUTEROL SULFATE 2.5; .5 MG/3ML; MG/3ML
3 SOLUTION RESPIRATORY (INHALATION) EVERY 2 HOUR PRN
Status: DISCONTINUED | OUTPATIENT
Start: 2023-11-28 | End: 2023-12-01 | Stop reason: HOSPADM

## 2023-11-28 RX ORDER — FERROUS SULFATE 325(65) MG
65 TABLET, DELAYED RELEASE (ENTERIC COATED) ORAL
Status: DISCONTINUED | OUTPATIENT
Start: 2023-11-28 | End: 2023-12-01

## 2023-11-28 RX ORDER — HEPARIN SODIUM 10000 [USP'U]/100ML
0-4000 INJECTION, SOLUTION INTRAVENOUS CONTINUOUS
Status: DISCONTINUED | OUTPATIENT
Start: 2023-11-28 | End: 2023-12-01 | Stop reason: HOSPADM

## 2023-11-28 RX ORDER — FLUDROCORTISONE ACETATE 0.1 MG/1
0.1 TABLET ORAL DAILY
Status: DISCONTINUED | OUTPATIENT
Start: 2023-11-28 | End: 2023-12-01 | Stop reason: HOSPADM

## 2023-11-28 RX ORDER — ONDANSETRON 4 MG/1
4 TABLET, ORALLY DISINTEGRATING ORAL EVERY 8 HOURS PRN
Status: DISCONTINUED | OUTPATIENT
Start: 2023-11-28 | End: 2023-12-01 | Stop reason: HOSPADM

## 2023-11-28 RX ORDER — PANTOPRAZOLE SODIUM 40 MG/1
40 TABLET, DELAYED RELEASE ORAL
Status: DISCONTINUED | OUTPATIENT
Start: 2023-11-29 | End: 2023-12-01 | Stop reason: HOSPADM

## 2023-11-28 RX ORDER — LORAZEPAM 0.5 MG/1
0.5 TABLET ORAL ONCE
Status: COMPLETED | OUTPATIENT
Start: 2023-11-28 | End: 2023-11-28

## 2023-11-28 RX ORDER — ROSUVASTATIN CALCIUM 10 MG/1
10 TABLET, COATED ORAL DAILY
Status: DISCONTINUED | OUTPATIENT
Start: 2023-11-28 | End: 2023-12-01 | Stop reason: HOSPADM

## 2023-11-28 RX ORDER — IPRATROPIUM BROMIDE AND ALBUTEROL SULFATE 2.5; .5 MG/3ML; MG/3ML
3 SOLUTION RESPIRATORY (INHALATION)
Status: DISCONTINUED | OUTPATIENT
Start: 2023-11-28 | End: 2023-11-28

## 2023-11-28 RX ORDER — SENNOSIDES 8.6 MG/1
1 TABLET ORAL DAILY
Status: DISCONTINUED | OUTPATIENT
Start: 2023-11-28 | End: 2023-12-01 | Stop reason: HOSPADM

## 2023-11-28 RX ORDER — FLUTICASONE FUROATE AND VILANTEROL 200; 25 UG/1; UG/1
1 POWDER RESPIRATORY (INHALATION)
Status: CANCELLED | OUTPATIENT
Start: 2023-11-29

## 2023-11-28 RX ORDER — MONTELUKAST SODIUM 10 MG/1
10 TABLET ORAL NIGHTLY
Status: DISCONTINUED | OUTPATIENT
Start: 2023-11-28 | End: 2023-12-01 | Stop reason: HOSPADM

## 2023-11-28 RX ORDER — OLANZAPINE 5 MG/1
5 TABLET ORAL NIGHTLY
Status: DISCONTINUED | OUTPATIENT
Start: 2023-11-28 | End: 2023-12-01 | Stop reason: HOSPADM

## 2023-11-28 RX ORDER — ASPIRIN 81 MG/1
81 TABLET ORAL DAILY
Status: DISCONTINUED | OUTPATIENT
Start: 2023-11-28 | End: 2023-12-01 | Stop reason: HOSPADM

## 2023-11-28 RX ORDER — AMLODIPINE BESYLATE 5 MG/1
5 TABLET ORAL DAILY
Status: DISCONTINUED | OUTPATIENT
Start: 2023-11-28 | End: 2023-11-29

## 2023-11-28 RX ORDER — DEXAMETHASONE SODIUM PHOSPHATE 10 MG/ML
10 INJECTION INTRAMUSCULAR; INTRAVENOUS ONCE
Status: COMPLETED | OUTPATIENT
Start: 2023-11-28 | End: 2023-11-28

## 2023-11-28 RX ORDER — HEPARIN SODIUM 5000 [USP'U]/ML
INJECTION, SOLUTION INTRAVENOUS; SUBCUTANEOUS EVERY 4 HOURS PRN
Status: DISCONTINUED | OUTPATIENT
Start: 2023-11-28 | End: 2023-12-01 | Stop reason: HOSPADM

## 2023-11-28 RX ORDER — IPRATROPIUM BROMIDE AND ALBUTEROL SULFATE 2.5; .5 MG/3ML; MG/3ML
3 SOLUTION RESPIRATORY (INHALATION)
Status: DISCONTINUED | OUTPATIENT
Start: 2023-11-28 | End: 2023-12-01 | Stop reason: HOSPADM

## 2023-11-28 RX ORDER — MIDODRINE HYDROCHLORIDE 5 MG/1
5 TABLET ORAL 2 TIMES DAILY
Status: DISCONTINUED | OUTPATIENT
Start: 2023-11-28 | End: 2023-12-01 | Stop reason: HOSPADM

## 2023-11-28 RX ORDER — DOCUSATE SODIUM 100 MG/1
100 CAPSULE, LIQUID FILLED ORAL 2 TIMES DAILY
Status: DISCONTINUED | OUTPATIENT
Start: 2023-11-28 | End: 2023-12-01 | Stop reason: HOSPADM

## 2023-11-28 RX ORDER — FERROUS SULFATE 325(65) MG
65 TABLET, DELAYED RELEASE (ENTERIC COATED) ORAL DAILY
Status: DISCONTINUED | OUTPATIENT
Start: 2023-11-28 | End: 2023-11-28

## 2023-11-28 RX ORDER — FAMOTIDINE 20 MG/1
40 TABLET, FILM COATED ORAL DAILY
Status: DISCONTINUED | OUTPATIENT
Start: 2023-11-28 | End: 2023-12-01 | Stop reason: HOSPADM

## 2023-11-28 RX ORDER — LORAZEPAM 0.5 MG/1
0.5 TABLET ORAL EVERY 8 HOURS PRN
Status: DISCONTINUED | OUTPATIENT
Start: 2023-11-28 | End: 2023-12-01 | Stop reason: HOSPADM

## 2023-11-28 RX ORDER — PROCHLORPERAZINE MALEATE 5 MG
5 TABLET ORAL EVERY 6 HOURS PRN
Status: DISCONTINUED | OUTPATIENT
Start: 2023-11-28 | End: 2023-12-01 | Stop reason: HOSPADM

## 2023-11-28 RX ORDER — CEFTRIAXONE 1 G/50ML
1 INJECTION, SOLUTION INTRAVENOUS EVERY 24 HOURS
Status: DISCONTINUED | OUTPATIENT
Start: 2023-11-28 | End: 2023-12-01 | Stop reason: HOSPADM

## 2023-11-28 RX ORDER — NAPROXEN SODIUM 220 MG/1
324 TABLET, FILM COATED ORAL ONCE
Status: COMPLETED | OUTPATIENT
Start: 2023-11-28 | End: 2023-11-28

## 2023-11-28 RX ADMIN — FAMOTIDINE 40 MG: 20 TABLET, FILM COATED ORAL at 15:27

## 2023-11-28 RX ADMIN — MIDODRINE HYDROCHLORIDE 5 MG: 5 TABLET ORAL at 20:30

## 2023-11-28 RX ADMIN — IOHEXOL 75 ML: 350 INJECTION, SOLUTION INTRAVENOUS at 09:05

## 2023-11-28 RX ADMIN — SODIUM CHLORIDE 1000 ML: 9 INJECTION, SOLUTION INTRAVENOUS at 13:00

## 2023-11-28 RX ADMIN — HEPARIN SODIUM 700 UNITS/HR: 10000 INJECTION, SOLUTION INTRAVENOUS at 09:45

## 2023-11-28 RX ADMIN — ASPIRIN 81 MG CHEWABLE TABLET 324 MG: 81 TABLET CHEWABLE at 08:30

## 2023-11-28 RX ADMIN — DEXAMETHASONE SODIUM PHOSPHATE 6 MG: 10 INJECTION INTRAMUSCULAR; INTRAVENOUS at 15:33

## 2023-11-28 RX ADMIN — HEPARIN SODIUM 1500 UNITS: 5000 INJECTION INTRAVENOUS; SUBCUTANEOUS at 21:48

## 2023-11-28 RX ADMIN — CEFTRIAXONE SODIUM 1 G: 1 INJECTION, SOLUTION INTRAVENOUS at 16:48

## 2023-11-28 RX ADMIN — DOCUSATE SODIUM 100 MG: 100 CAPSULE, LIQUID FILLED ORAL at 20:30

## 2023-11-28 RX ADMIN — SERTRALINE HYDROCHLORIDE 100 MG: 100 TABLET ORAL at 15:27

## 2023-11-28 RX ADMIN — OLANZAPINE 5 MG: 5 TABLET, FILM COATED ORAL at 20:30

## 2023-11-28 RX ADMIN — LORAZEPAM 0.5 MG: 0.5 TABLET ORAL at 12:58

## 2023-11-28 RX ADMIN — BUSPIRONE HYDROCHLORIDE 15 MG: 10 TABLET ORAL at 20:30

## 2023-11-28 RX ADMIN — DEXAMETHASONE SODIUM PHOSPHATE 10 MG: 10 INJECTION, SOLUTION INTRAMUSCULAR; INTRAVENOUS at 08:55

## 2023-11-28 RX ADMIN — IPRATROPIUM BROMIDE AND ALBUTEROL SULFATE 3 ML: 2.5; .5 SOLUTION RESPIRATORY (INHALATION) at 19:20

## 2023-11-28 RX ADMIN — MIDODRINE HYDROCHLORIDE 5 MG: 5 TABLET ORAL at 15:27

## 2023-11-28 RX ADMIN — ROSUVASTATIN CALCIUM 10 MG: 10 TABLET, FILM COATED ORAL at 15:27

## 2023-11-28 RX ADMIN — CLOPIDOGREL BISULFATE 75 MG: 75 TABLET ORAL at 15:27

## 2023-11-28 RX ADMIN — HEPARIN SODIUM 1500 UNITS: 5000 INJECTION INTRAVENOUS; SUBCUTANEOUS at 16:51

## 2023-11-28 RX ADMIN — HYDROCORTISONE 5 MG: 10 TABLET ORAL at 15:28

## 2023-11-28 RX ADMIN — AZITHROMYCIN MONOHYDRATE 500 MG: 500 INJECTION, POWDER, LYOPHILIZED, FOR SOLUTION INTRAVENOUS at 18:03

## 2023-11-28 RX ADMIN — SODIUM CHLORIDE, POTASSIUM CHLORIDE, SODIUM LACTATE AND CALCIUM CHLORIDE 500 ML: 600; 310; 30; 20 INJECTION, SOLUTION INTRAVENOUS at 07:10

## 2023-11-28 RX ADMIN — MONTELUKAST 10 MG: 10 TABLET, FILM COATED ORAL at 20:30

## 2023-11-28 RX ADMIN — ASPIRIN 81 MG: 81 TABLET, COATED ORAL at 15:28

## 2023-11-28 RX ADMIN — HEPARIN SODIUM 3650 UNITS: 5000 INJECTION INTRAVENOUS; SUBCUTANEOUS at 11:38

## 2023-11-28 RX ADMIN — REMDESIVIR 200 MG: 100 INJECTION, POWDER, LYOPHILIZED, FOR SOLUTION INTRAVENOUS at 19:25

## 2023-11-28 RX ADMIN — FLUDROCORTISONE ACETATE 0.1 MG: 0.1 TABLET ORAL at 15:45

## 2023-11-28 SDOH — SOCIAL STABILITY: SOCIAL INSECURITY: ABUSE: ADULT

## 2023-11-28 SDOH — SOCIAL STABILITY: SOCIAL INSECURITY: ARE THERE ANY APPARENT SIGNS OF INJURIES/BEHAVIORS THAT COULD BE RELATED TO ABUSE/NEGLECT?: NO

## 2023-11-28 SDOH — SOCIAL STABILITY: SOCIAL INSECURITY: ARE YOU OR HAVE YOU BEEN THREATENED OR ABUSED PHYSICALLY, EMOTIONALLY, OR SEXUALLY BY ANYONE?: NO

## 2023-11-28 SDOH — SOCIAL STABILITY: SOCIAL INSECURITY: DO YOU FEEL UNSAFE GOING BACK TO THE PLACE WHERE YOU ARE LIVING?: NO

## 2023-11-28 SDOH — SOCIAL STABILITY: SOCIAL INSECURITY: HAVE YOU HAD THOUGHTS OF HARMING ANYONE ELSE?: NO

## 2023-11-28 SDOH — SOCIAL STABILITY: SOCIAL INSECURITY: DO YOU FEEL ANYONE HAS EXPLOITED OR TAKEN ADVANTAGE OF YOU FINANCIALLY OR OF YOUR PERSONAL PROPERTY?: NO

## 2023-11-28 SDOH — SOCIAL STABILITY: SOCIAL INSECURITY: DOES ANYONE TRY TO KEEP YOU FROM HAVING/CONTACTING OTHER FRIENDS OR DOING THINGS OUTSIDE YOUR HOME?: NO

## 2023-11-28 SDOH — SOCIAL STABILITY: SOCIAL INSECURITY: HAS ANYONE EVER THREATENED TO HURT YOUR FAMILY OR YOUR PETS?: NO

## 2023-11-28 SDOH — SOCIAL STABILITY: SOCIAL INSECURITY: WERE YOU ABLE TO COMPLETE ALL THE BEHAVIORAL HEALTH SCREENINGS?: YES

## 2023-11-28 ASSESSMENT — LIFESTYLE VARIABLES
EVER HAD A DRINK FIRST THING IN THE MORNING TO STEADY YOUR NERVES TO GET RID OF A HANGOVER: NO
HOW OFTEN DO YOU HAVE 6 OR MORE DRINKS ON ONE OCCASION: NEVER
HOW OFTEN DO YOU HAVE A DRINK CONTAINING ALCOHOL: NEVER
REASON UNABLE TO ASSESS: NO
PRESCIPTION_ABUSE_PAST_12_MONTHS: NO
SKIP TO QUESTIONS 9-10: 1
HAVE YOU EVER FELT YOU SHOULD CUT DOWN ON YOUR DRINKING: NO
SUBSTANCE_ABUSE_PAST_12_MONTHS: NO
AUDIT-C TOTAL SCORE: 0
HAVE PEOPLE ANNOYED YOU BY CRITICIZING YOUR DRINKING: NO
HOW MANY STANDARD DRINKS CONTAINING ALCOHOL DO YOU HAVE ON A TYPICAL DAY: PATIENT DOES NOT DRINK
AUDIT-C TOTAL SCORE: 0
EVER FELT BAD OR GUILTY ABOUT YOUR DRINKING: NO

## 2023-11-28 ASSESSMENT — COGNITIVE AND FUNCTIONAL STATUS - GENERAL
DAILY ACTIVITIY SCORE: 21
STANDING UP FROM CHAIR USING ARMS: A LITTLE
DAILY ACTIVITIY SCORE: 21
PATIENT BASELINE BEDBOUND: NO
HELP NEEDED FOR BATHING: A LITTLE
CLIMB 3 TO 5 STEPS WITH RAILING: A LOT
MOVING TO AND FROM BED TO CHAIR: A LITTLE
TOILETING: A LITTLE
TURNING FROM BACK TO SIDE WHILE IN FLAT BAD: A LITTLE
CLIMB 3 TO 5 STEPS WITH RAILING: A LOT
TURNING FROM BACK TO SIDE WHILE IN FLAT BAD: A LITTLE
WALKING IN HOSPITAL ROOM: A LITTLE
STANDING UP FROM CHAIR USING ARMS: A LITTLE
MOBILITY SCORE: 17
WALKING IN HOSPITAL ROOM: A LOT
MOBILITY SCORE: 18
HELP NEEDED FOR BATHING: A LITTLE
DRESSING REGULAR LOWER BODY CLOTHING: A LITTLE
DRESSING REGULAR LOWER BODY CLOTHING: A LITTLE
MOVING TO AND FROM BED TO CHAIR: A LITTLE
TOILETING: A LITTLE

## 2023-11-28 ASSESSMENT — ENCOUNTER SYMPTOMS
ALLERGIC/IMMUNOLOGIC NEGATIVE: 1
PSYCHIATRIC NEGATIVE: 1
HEMATOLOGIC/LYMPHATIC NEGATIVE: 1
ENDOCRINE NEGATIVE: 1
SHORTNESS OF BREATH: 1
NEUROLOGICAL NEGATIVE: 1
CONSTITUTIONAL NEGATIVE: 1
EYES NEGATIVE: 1
MUSCULOSKELETAL NEGATIVE: 1
GASTROINTESTINAL NEGATIVE: 1

## 2023-11-28 ASSESSMENT — ACTIVITIES OF DAILY LIVING (ADL)
LACK_OF_TRANSPORTATION: NO
JUDGMENT_ADEQUATE_SAFELY_COMPLETE_DAILY_ACTIVITIES: YES
BATHING: NEEDS ASSISTANCE
FEEDING YOURSELF: INDEPENDENT
HEARING - LEFT EAR: FUNCTIONAL
PATIENT'S MEMORY ADEQUATE TO SAFELY COMPLETE DAILY ACTIVITIES?: YES
TOILETING: NEEDS ASSISTANCE
GROOMING: INDEPENDENT
HEARING - RIGHT EAR: FUNCTIONAL
WALKS IN HOME: NEEDS ASSISTANCE
ADEQUATE_TO_COMPLETE_ADL: YES
DRESSING YOURSELF: NEEDS ASSISTANCE

## 2023-11-28 ASSESSMENT — PATIENT HEALTH QUESTIONNAIRE - PHQ9
SUM OF ALL RESPONSES TO PHQ9 QUESTIONS 1 & 2: 0
2. FEELING DOWN, DEPRESSED OR HOPELESS: NOT AT ALL
1. LITTLE INTEREST OR PLEASURE IN DOING THINGS: NOT AT ALL

## 2023-11-28 ASSESSMENT — PAIN SCALES - GENERAL
PAINLEVEL_OUTOF10: 0 - NO PAIN

## 2023-11-28 ASSESSMENT — COLUMBIA-SUICIDE SEVERITY RATING SCALE - C-SSRS
1. IN THE PAST MONTH, HAVE YOU WISHED YOU WERE DEAD OR WISHED YOU COULD GO TO SLEEP AND NOT WAKE UP?: NO
2. HAVE YOU ACTUALLY HAD ANY THOUGHTS OF KILLING YOURSELF?: NO
1. IN THE PAST MONTH, HAVE YOU WISHED YOU WERE DEAD OR WISHED YOU COULD GO TO SLEEP AND NOT WAKE UP?: NO
2. HAVE YOU ACTUALLY HAD ANY THOUGHTS OF KILLING YOURSELF?: NO
6. HAVE YOU EVER DONE ANYTHING, STARTED TO DO ANYTHING, OR PREPARED TO DO ANYTHING TO END YOUR LIFE?: NO
6. HAVE YOU EVER DONE ANYTHING, STARTED TO DO ANYTHING, OR PREPARED TO DO ANYTHING TO END YOUR LIFE?: NO

## 2023-11-28 ASSESSMENT — PAIN - FUNCTIONAL ASSESSMENT: PAIN_FUNCTIONAL_ASSESSMENT: 0-10

## 2023-11-28 NOTE — ED PROVIDER NOTES
HPI   No chief complaint on file.      Patient is a 68-year-old female with past medical history of carotid artery aneurysm, DVT, on Plavix, stage IV lung cancer, COPD, who presents ED today due to shortness of breath.  Patient was brought in by EMS from home due to increasing shortness of breath.  She was found to be hypoxic at home and they increased her from 2 L nasal cannula at baseline up to 6 L which seemed to improve her symptoms and her oxygenation.  Patient was also given a breathing treatment.  Patient does endorse intermittent shortness of breath but none at this time.  Patient denies any recent swelling in her legs or bloody sputum.       used: No                        No data recorded                Patient History   Past Medical History:   Diagnosis Date    Aneurysm of carotid artery (CMS/[facility])     Neck aneurysm    DVT (deep venous thrombosis) (CMS/[facility])     2022    History of tubal ligation     Old myocardial infarction     History of heart attack    Other specified disorders of kidney and ureter     Obstruction of kidney    Personal history of other diseases of the circulatory system     History of intracranial aneurysm    Personal history of other diseases of the circulatory system     History of abdominal aortic aneurysm (AAA)    Personal history of other diseases of the respiratory system     History of chronic obstructive lung disease    Personal history of urinary calculi     History of kidney stones    Right upper quadrant pain 09/25/2020    Abdominal pain, RUQ (right upper quadrant)     Past Surgical History:   Procedure Laterality Date    CT ABDOMEN PELVIS ANGIOGRAM W AND/OR WO IV CONTRAST  11/21/2019    CT ABDOMEN PELVIS ANGIOGRAM W AND/OR WO IV CONTRAST 11/21/2019 [facility] EMERGENCY [facility]GAC[facility]    CT AORTA AND BILATERAL ILIOFEMORAL RUNOFF ANGIOGRAM W AND/OR WO IV CONTRAST  8/9/2022    CT AORTA AND BILATERAL ILIOFEMORAL RUNOFF ANGIOGRAM W AND/OR WO IV CONTRAST 8/9/2022 [facility] CLINICAL  LEGACY    CT HEAD ANGIO W AND WO IV CONTRAST  5/22/2020    CT HEAD ANGIO W AND WO IV CONTRAST 5/22/2020 [facility]    OTHER SURGICAL HISTORY  09/30/2020    Tubal ligation    OTHER SURGICAL HISTORY  09/30/2020    Cardiac catheterization     Family History   Problem Relation Name Age of Onset    Aneurysm Mother      Hypertension Mother      Heart attack Father       Social History     Tobacco Use    Smoking status: Every Day     Packs/day: 1     Types: Cigarettes    Smokeless tobacco: Never   Substance Use Topics    Alcohol use: Not on file    Drug use: Not on file       Physical Exam   ED Triage Vitals   Temp Pulse Resp BP   -- -- -- --      SpO2 Temp src Heart Rate Source Patient Position   -- -- -- --      BP Location FiO2 (%)     -- --       Physical Exam  Vitals and nursing note reviewed.   Constitutional:       General: She is not in acute distress.     Appearance: She is well-developed.   HENT:      Head: Normocephalic and atraumatic.   Eyes:      Conjunctiva/sclera: Conjunctivae normal.   Cardiovascular:      Rate and Rhythm: Normal rate and regular rhythm.      Heart sounds: No murmur heard.  Pulmonary:      Effort: Pulmonary effort is normal. No respiratory distress.      Breath sounds: Examination of the right-upper field reveals wheezing and rhonchi. Examination of the left-upper field reveals wheezing and rhonchi. Examination of the right-middle field reveals wheezing and rhonchi. Examination of the right-lower field reveals decreased breath sounds. Examination of the left-lower field reveals decreased breath sounds. Decreased breath sounds, wheezing and rhonchi present.   Abdominal:      Palpations: Abdomen is soft.      Tenderness: There is no abdominal tenderness.   Musculoskeletal:         General: No swelling.      Cervical back: Neck supple.   Skin:     General: Skin is warm and dry.      Capillary Refill: Capillary refill takes less than 2 seconds.   Neurological:      Mental Status: She  is alert.   Psychiatric:         Mood and Affect: Mood normal.       ED Course & MDM   ED Course as of 11/28/23 0942   Tue Nov 28, 2023   0707 ECG 12 Lead  EKG, my read, 0652  Sinus rhythm, slight ST elevation in aVL, no reciprocal lead changes, ST depressions in leads III and aVF, these are new from EKG 10/22/2023, performed ventricular rate-91 BPM [WS]   0730 CBC and Auto Differential(!)  Slightly elevated RBCs, no leukocytosis, anemia, or thrombocytopenia [WS]   0749 XR chest 1 view  Per my view, there is no obvious pneumothorax or focal opacity. [AB]   0757 Lactate(!)  Elevated, will repeat after fluids. [WS]   0757 XR chest 1 view  No evidence of acute intrathoracic abnormality [WS]   0808 Troponin I, High Sensitivity(!!): 2,902 [AB]   0823 Bedside ultrasound performed.  No obvious right heart strain on limited long axis view.  No obvious R heart strain on short axis view.  Limited apical 4 chamber but no obvious increase in RV:LV ratio.  Sub xiphoid view with significant pericardial effusion.  B-lines appreciated in multiple lung fields bilaterally. [AB]   0824 9/2023 MRI of the brain reviewed.  Status post clip of aneurysm, no obvious metastatic disease.  Will discuss anticoagulation with cardiology. [AB]   0829 Given findings on bedside ultrasound, discontinuing fluid order placed.  Giving aspirin.  Having nursing repeat ECG.  Cardiology consulted. [AB]   0834 Per chart review, patient had a STEMI 8/20/2016 and received FCO x 2 to the distal RCA as well as mid RCA with [facility]. [AB]   0836 8/31/2023 Echo:  CONCLUSIONS:  1. Left ventricular systolic function is normal with a 60-65% estimated ejection fraction.  2. Spectral Doppler shows an impaired relaxation pattern of left ventricular diastolic filling.  3. Small pericardial effusion predominantly adjacent to the right ventricular free wall.  4. There is no evidence of cardiac tamponade.  5. Incompletely visualized cystic appearing abdominal mass  compressing the infradiaphragmmatic inferior vena cava. [AB]   0844 Coronavirus 2019, PCR(!): Detected [AB]   0851 My interpretation of repeat ECG: No dynamic changes. [AB]   0859 ECG 12 lead  EKG, my read, 0843  Sinus rhythm with ST depressions still in leads III and aVF, improvement in ST elevation in aVL.  Ventricular rate-93 BPM [WS]   0904 CT angio chest for pulmonary embolism  Per my view, no obvious central PE. [AB]   0907 Influenza A, and B PCR  Negative [WS]   0907 B-Type Natriuretic Peptide(!)  Slightly elevated, unlikely fluid overload as cause of patient's symptoms [WS]   0907 Comprehensive Metabolic Panel(!)  CMP is stable, no electrolyte abnormalities, metabolic disorder, kidney injury, or elevated liver enzymes consistent with liver pathology. [WS]   0907 Sars-CoV-2 PCR, Symptomatic(!)  Positive, given hypoxia was prescribed Decadron [WS]   0907 Magnesium  Normal [WS]   0907 Troponin I, High Sensitivity(!!)  Significantly elevated, this is consistent with patient's shortness of breath and chest pain, EKG with subtle ST elevation in 1-lead and depressions in several other leads, no obvious STEMI considering she is chest pain-free at this time we will repeat EKG and troponin. [WS]   0908 Blood Gas Venous(!)  Slight hypercapnia and hypoxia with mild acidosis, consider starting on BiPAP, but will hold off on BiPAP until CT PE results. [WS]   0923 CT angio chest for pulmonary embolism  No evidence of a pulmonary embolism.  Mild patchy ground-glass opacities without consolidation.  Emphysematous changes.  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 Bilateral adrenal masses.  Stable subcentimeter pulmonary nodules. No new pulmonary nodule or mass.    [WS]   0924 Awaiting callback from cardiology regarding heparin for current clinical picture concerning for COVID-19 complicated by acute respiratory failure and  demand ischemia. [AB]      ED Course User Index  [AB] Darrion Wolff MD  [WS] SHRUTHI Murcia         Diagnoses as of 11/28/23 0942   COVID   Acute respiratory failure, unspecified whether with hypoxia or hypercapnia (CMS/[facility])   NSTEMI (non-ST elevated myocardial infarction) (CMS/[facility])       Medical Decision Making  Differential diagnosis: ACS, NSTEMI, arrhythmia, COPD exacerbation, pneumonia, COVID, PE  Patient's vital signs initially hypotensive, repeat was improving.  Pulse ox remained stable on 4 L nasal cannula.  Please see ED course for medical decision making and plan.  I did discuss patient's elevated troponin and EKG findings with cardiology Dr. Schmitz.  Decision to anticoagulate was made after discussing benefits with Dr. Schmitz.  Heparin was started.  Patient is being admitted to stepdown.  Awaiting repeat troponin.    Amount and/or Complexity of Data Reviewed  Labs: ordered. Decision-making details documented in ED Course.  Radiology: ordered and independent interpretation performed. Decision-making details documented in ED Course.  ECG/medicine tests: ordered and independent interpretation performed. Decision-making details documented in ED Course.    Risk  Prescription drug management.  Parenteral controlled substances.  Drug therapy requiring intensive monitoring for toxicity.  Decision regarding hospitalization.        Procedure  Critical Care    Performed by: SHRUTHI Murcia  Authorized by: Darrion Wolff MD    Critical care provider statement:     Critical care time (minutes):  35    Critical care time was exclusive of:  Separately billable procedures and treating other patients    Critical care was necessary to treat or prevent imminent or life-threatening deterioration of the following conditions: NSTEMI.    Critical care was time spent personally by me on the following activities:  Blood draw for specimens, development of treatment plan with patient or surrogate,  evaluation of patient's response to treatment, examination of patient, obtaining history from patient or surrogate, ordering and performing treatments and interventions, ordering and review of laboratory studies, ordering and review of radiographic studies, pulse oximetry, re-evaluation of patient's condition, review of old charts, discussions with consultants and discussions with primary provider    I assumed direction of critical care for this patient from another provider in my specialty: yes      Care discussed with: admitting provider           KERA Murcia-MARYBEL  11/28/23 0944

## 2023-11-28 NOTE — CONSULTS
Consults  History Of Present Illness:    Radha Toussaint is a 68 y.o. female presenting with PMH of stage IV lung cancer, COPD, carotid artery aneurysm, STEMI in 2016, anxiety and depression, chemotherapy induced neuropathy admitted with increasing dyspnea which imprlved after increased oxygen and a breathing treatment.  Sh was found to have an elevaed Troponin .     Last Recorded Vitals:  Vitals:    11/28/23 1357 11/28/23 1435 11/28/23 1444 11/28/23 1530   BP: 97/55 103/56  108/62   BP Location: Right arm Right arm     Pulse: 79 80 80 83   Resp:   18    Temp:    36 °C (96.8 °F)   TempSrc:       SpO2:   97% 98%   Weight:       Height:           Last Labs:  CBC - 11/28/2023:  7:12 AM  10.7 13.9 209    45.1      CMP - 11/28/2023:  7:12 AM  9.2 7.7 22 --- 0.5   3.0 4.1 10 79      PTT - 8/30/2023: 11:00 AM  1.3   14.3 36     Troponin I, High Sensitivity   Date/Time Value Ref Range Status   11/28/2023 09:16 AM 2,735 (HH) 0 - 13 ng/L Final     Comment:     Previous result verified on 11/28/2023 0805 on specimen/case [id number] called with component [facility] for procedure Troponin I, High Sensitivity with value 2,902 ng/L.   11/28/2023 07:12 AM 2,902 (HH) 0 - 13 ng/L Final   10/23/2023 05:28 AM 24 (H) 0 - 13 ng/L Final     BNP   Date/Time Value Ref Range Status   11/28/2023 07:12  (H) 0 - 99 pg/mL Final   10/22/2023 06:37 PM 82 0 - 99 pg/mL Final     Hemoglobin A1C   Date/Time Value Ref Range Status   05/02/2023 09:07 AM 5.8 (A) % Final     Comment:          Diagnosis of Diabetes-Adults   Non-Diabetic: < or = 5.6%   Increased risk for developing diabetes: 5.7-6.4%   Diagnostic of diabetes: > or = 6.5%  .       Monitoring of Diabetes                Age (y)     Therapeutic Goal (%)   Adults:          >18           <7.0   Pediatrics:    13-18           <7.5                   7-12           <8.0                   0- 6            7.5-8.5   American Diabetes Association. Diabetes Care 33(S1), Jan 2010.     VLDL    Date/Time Value Ref Range Status   05/02/2023 09:07 AM 18 0 - 40 mg/dL Final   09/29/2020 07:46 AM 23 0 - 40 mg/dL Final      Last I/O:  No intake/output data recorded.    Past Cardiology Tests (Last 3 Years):  EKG:  ECG 12 lead 11/1/2023    Echo:  No results found for this or any previous visit from the past 1095 days.    Ejection Fractions:  60-65% 8/31/23  Cath:  No results found for this or any previous visit from the past 1095 days.    Stress Test:  No results found for this or any previous visit from the past 1095 days.    Cardiac Imaging:  No results found for this or any previous visit from the past 1095 days.      Past Medical History:  She has a past medical history of Aneurysm of carotid artery (CMS/[facility]), DVT (deep venous thrombosis) (CMS/[facility]), History of tubal ligation, Old myocardial infarction, Other specified disorders of kidney and ureter, Personal history of other diseases of the circulatory system, Personal history of other diseases of the circulatory system, Personal history of other diseases of the respiratory system, Personal history of urinary calculi, and Right upper quadrant pain (09/25/2020).    Past Surgical History:  She has a past surgical history that includes Other surgical history (09/30/2020); Other surgical history (09/30/2020); CT angio abdomen pelvis w and or wo IV IV contrast (11/21/2019); CT angio head w and wo IV contrast (5/22/2020); and CT angio aorta and bilateral iliofemoral runoff w and or wo IV contrast (8/9/2022).      Social History:  She reports that she has been smoking cigarettes. She has been smoking an average of 1 pack per day. She has never used smokeless tobacco. No history on file for alcohol use and drug use.    Family History:  Family History   Problem Relation Name Age of Onset    Aneurysm Mother      Hypertension Mother      Heart attack Father          Allergies:  Ace inhibitors, Prednisone, and Demerol [meperidine]    Inpatient Medications:  Scheduled  medications   Medication Dose Route Frequency    amLODIPine  5 mg oral Daily    aspirin  81 mg oral Daily    azithromycin  500 mg intravenous Daily    busPIRone  15 mg oral BID    cefTRIAXone  1 g intravenous q24h    clopidogrel  75 mg oral Daily    dexAMETHasone  6 mg intravenous q24h    docusate sodium  100 mg oral BID    famotidine  40 mg oral Daily    ferrous sulfate  65 mg of iron oral Daily with breakfast    fludrocortisone  0.1 mg oral Daily    tiotropium  2 Inhalation inhalation Daily    And    fluticasone furoate-vilanteroL  1 puff inhalation Daily    hydrocortisone  5 mg oral Daily    ipratropium-albuteroL  3 mL nebulization TID    midodrine  5 mg oral BID    montelukast  10 mg oral Nightly    OLANZapine  5 mg oral Nightly    [START ON 11/29/2023] pantoprazole  40 mg oral Daily before breakfast    remdesivir  200 mg intravenous Once    Followed by    [START ON 11/29/2023] remdesivir  100 mg intravenous q24h    rosuvastatin  10 mg oral Daily    sennosides  1 tablet oral Daily    sertraline  100 mg oral Daily     PRN medications   Medication    heparin    HYDROmorphone    ipratropium-albuteroL    LORazepam    naloxone    ondansetron ODT    oxygen    prochlorperazine     Continuous Medications   Medication Dose Last Rate    heparin  0-4,000 Units/hr 700 Units/hr (11/28/23 1259)     Outpatient Medications:  Current Outpatient Medications   Medication Instructions    albuterol 90 mcg/actuation inhaler 1 puff, inhalation, Every 4 hours PRN    amLODIPine (NORVASC) 5 mg, oral, Daily    aspirin 81 mg EC tablet 1 tablet, oral, Daily    busPIRone (Buspar) 15 mg tablet     clopidogrel (PLAVIX) 75 mg, oral, Daily    cyclobenzaprine (FLEXERIL) 10 mg, oral, 3 times daily PRN    famotidine (PEPCID) 40 mg, oral, Daily    ferrous sulfate 65 mg, oral, Daily, Do not crush, chew, or split.    fludrocortisone (Florinef) 0.1 mg tablet TAKE 1/2 TABLET BY MOUTH ONCE DAILY    fluticasone-umeclidin-vilanter (Trelegy Ellipta)  200-62.5-25 mcg blister with device 1 puff, inhalation, Daily RT    hydrocortisone (Cortef) 5 mg tablet STARTING ON 9/8 TAKE 2 TABS AT 8AM THEN TAKE 1 TABLET AT NOON THEN TAKE 1/2 TABLET AT 4PM    HYDROmorphone (Dilaudid) 4 mg tablet Take 0.5 tablets (2 mg) by mouth 3 times a day as needed for severe pain (7 - 10).    ipratropium-albuteroL (Duo-Neb) 0.5-2.5 mg/3 mL nebulizer solution 3 mL, nebulization, 4 times daily RT    Lactobacillus acidophilus (PROBIOTIC ORAL) 1 capsule, oral, Daily    montelukast (SINGULAIR) 10 mg, oral, Nightly    naloxone (NARCAN) 4 mg, nasal, As needed, May repeat every 2-3 minutes if needed, alternating nostrils, until medical assistance becomes available.    OLANZapine (ZYPREXA) 5 mg, oral, Nightly    ondansetron ODT (Zofran-ODT) 4 mg disintegrating tablet DISSOLVE 1 TABLET IN MOUTH BY MOUTH THREE TIMES A DAY AS NEEDED NAUSEA    pantoprazole (PROTONIX) 40 mg, oral, Daily before breakfast, Do not crush, chew, or split.    prochlorperazine (COMPAZINE) 5 mg, oral, Every 6 hours PRN    rosuvastatin (CRESTOR) 10 mg, oral, Daily    sennosides (SENNA) 8.6 mg, oral, Daily    sertraline (ZOLOFT) 100 mg, oral, Daily       Physical Exam:  Cachectic  67 yo wf lying flat comfortably  HEENT: Atraumatic  NECK: No JVD  COR: Reg  LUNGS: anterior rhonchi  EXT: warm       Assessment/Plan   1.) Acute Likely type II MI from demand ischemia  2.) Stage IV lung CA  3.) COPD  4.) ASHD S/P STEMI 2016  5.) Carotid artery aneurysm  6.) Remote DVT  7.) Anxiety/depression  REC: Conservative treatment   Thank you for the consultation                     Will follow with you                                             [place]      Peripheral IV 11/28/23 20 G Right Antecubital (Active)   Site Assessment Clean;Dry;Intact 11/28/23 1547   Dressing Status Clean;Dry 11/28/23 1547   Number of days: 0       Peripheral IV 11/28/23 22 G Distal;Left;Anterior Forearm (Active)   Site Assessment Clean;Intact;Dry 11/28/23 1540   Dressing  Status Clean;Dry 11/28/23 1547   Number of days: 0       Code Status:  Full Code    I spent 30 minutes in the professional and overall care of this patient.        Jose Rafael Schmitz MD

## 2023-11-28 NOTE — CARE PLAN
Problem: Respiratory  Goal: Minimize anxiety/maximize coping throughout shift  Outcome: Progressing  Goal: Minimal/no exertional discomfort or dyspnea this shift  Outcome: Progressing  Goal: No signs of respiratory distress (eg. Use of accessory muscles. Peds grunting)  Outcome: Progressing     Problem: Fall/Injury  Goal: Not fall by end of shift  Outcome: Progressing  Goal: Be free from injury by end of the shift  Outcome: Progressing  Goal: Verbalize understanding of personal risk factors for fall in the hospital  Outcome: Progressing     Problem: Skin  Goal: Participates in plan/prevention/treatment measures  Outcome: Progressing  Goal: Promote/optimize nutrition  Outcome: Progressing       The clinical goals for the shift include  decrease feelings of SOB

## 2023-11-28 NOTE — H&P
History Of Present Illness  Patient is a 68-year-old female brought in by EMS for complaints of shortness of breath.  Patient reports her shortness of breath started last night and got worse today.  She normally wears 2 L of oxygen and EMS had increased her oxygen to 6 L.  EMS also gave her a breathing treatment.  Patient reports her shortness of breath is improved after the oxygen and breathing treatment.  She has a past medical history of carotid artery aneurysm, DVT on Plavix, stage IV lung cancer, COPD, STEMI in 2016, anxiety, depression, chemotherapy-induced neuropathy, PVD, chronic respiratory failure and anemia.  She reports she did have chest pain last night however she does not have any chest pain currently.  She denies any nausea, vomiting, diarrhea or abdominal pain.  Her last bowel movement was yesterday.  She denies any fevers.  She did test positive for COVID today in the emergency department.    ED Course  VS reviewed  Labs reviewed, results below, initial troponin 2902, repeat 2735  EKG interpreted by the ED provider as sinus rhythm with ST depressions in leads III and aVF, improvement in ST elevation in aVL  CT chest and CXR reviewed, results below  Aspirin given  IVF bolus  Cardiology consulted for elevated troponin  Heparin gtt   Blood cultures pending     Past Medical History  Past Medical History:   Diagnosis Date    Aneurysm of carotid artery (CMS/[facility])     Neck aneurysm    DVT (deep venous thrombosis) (CMS/[facility])     2022    History of tubal ligation     Old myocardial infarction     History of heart attack    Other specified disorders of kidney and ureter     Obstruction of kidney    Personal history of other diseases of the circulatory system     History of intracranial aneurysm    Personal history of other diseases of the circulatory system     History of abdominal aortic aneurysm (AAA)    Personal history of other diseases of the respiratory system     History of chronic obstructive lung  disease    Personal history of urinary calculi     History of kidney stones    Right upper quadrant pain 09/25/2020    Abdominal pain, RUQ (right upper quadrant)       Surgical History  Past Surgical History:   Procedure Laterality Date    CT ABDOMEN PELVIS ANGIOGRAM W AND/OR WO IV CONTRAST  11/21/2019    CT ABDOMEN PELVIS ANGIOGRAM W AND/OR WO IV CONTRAST 11/21/2019 [facility]    CT AORTA AND BILATERAL ILIOFEMORAL RUNOFF ANGIOGRAM W AND/OR WO IV CONTRAST  8/9/2022    CT AORTA AND BILATERAL ILIOFEMORAL RUNOFF ANGIOGRAM W AND/OR WO IV CONTRAST 8/9/2022 [facility]    CT HEAD ANGIO W AND WO IV CONTRAST  5/22/2020    CT HEAD ANGIO W AND WO IV CONTRAST 5/22/2020 [facility]    OTHER SURGICAL HISTORY  09/30/2020    Tubal ligation    OTHER SURGICAL HISTORY  09/30/2020    Cardiac catheterization        Social History  She reports that she has been smoking cigarettes. She has been smoking an average of 1 pack per day. She has never used smokeless tobacco. No history on file for alcohol use and drug use.    Family History  Family History   Problem Relation Name Age of Onset    Aneurysm Mother      Hypertension Mother      Heart attack Father          Allergies  Ace inhibitors, Prednisone, and Demerol [meperidine]    Review of Systems   Constitutional: Negative.    HENT: Negative.     Eyes: Negative.    Respiratory:  Positive for shortness of breath.    Cardiovascular:  Positive for chest pain.   Gastrointestinal: Negative.    Endocrine: Negative.    Genitourinary: Negative.    Musculoskeletal: Negative.    Allergic/Immunologic: Negative.    Neurological: Negative.    Hematological: Negative.    Psychiatric/Behavioral: Negative.          Physical Exam  Constitutional:       Appearance: She is ill-appearing.   HENT:      Head: Normocephalic.      Nose: Nose normal.      Mouth/Throat:      Mouth: Mucous membranes are moist.   Eyes:      Extraocular Movements: Extraocular movements intact.  "  Cardiovascular:      Rate and Rhythm: Normal rate and regular rhythm.   Pulmonary:      Breath sounds: Normal breath sounds.      Comments: tachypneic  Abdominal:      Palpations: Abdomen is soft.   Musculoskeletal:         General: Normal range of motion.      Cervical back: Normal range of motion and neck supple.   Skin:     General: Skin is warm and dry.   Neurological:      General: No focal deficit present.      Mental Status: She is alert and oriented to person, place, and time.   Psychiatric:         Mood and Affect: Mood normal.         Behavior: Behavior normal.          Last Recorded Vitals  Blood pressure 87/52, pulse 89, temperature 36.3 °C (97.3 °F), temperature source Temporal, resp. rate 20, height 1.626 m (5' 4\"), weight 61.2 kg (135 lb), SpO2 96 %.    Relevant Results  Results for orders placed or performed during the hospital encounter of 11/28/23 (from the past 24 hour(s))   B-Type Natriuretic Peptide   Result Value Ref Range     (H) 0 - 99 pg/mL   Troponin I, High Sensitivity   Result Value Ref Range    Troponin I, High Sensitivity 2,902 (HH) 0 - 13 ng/L   Comprehensive Metabolic Panel   Result Value Ref Range    Glucose 114 (H) 74 - 99 mg/dL    Sodium 137 136 - 145 mmol/L    Potassium 4.3 3.5 - 5.3 mmol/L    Chloride 99 98 - 107 mmol/L    Bicarbonate 28 21 - 32 mmol/L    Anion Gap 14 10 - 20 mmol/L    Urea Nitrogen 12 6 - 23 mg/dL    Creatinine 0.67 0.50 - 1.05 mg/dL    eGFR >90 >60 mL/min/1.73m*2    Calcium 9.2 8.6 - 10.3 mg/dL    Albumin 4.1 3.4 - 5.0 g/dL    Alkaline Phosphatase 79 33 - 136 U/L    Total Protein 7.7 6.4 - 8.2 g/dL    AST 22 9 - 39 U/L    Bilirubin, Total 0.5 0.0 - 1.2 mg/dL    ALT 10 7 - 45 U/L   Magnesium   Result Value Ref Range    Magnesium 1.73 1.60 - 2.40 mg/dL   CBC and Auto Differential   Result Value Ref Range    WBC 10.7 4.4 - 11.3 x10*3/uL    nRBC 0.0 0.0 - 0.0 /100 WBCs    RBC 5.65 (H) 4.00 - 5.20 x10*6/uL    Hemoglobin 13.9 12.0 - 16.0 g/dL    Hematocrit " 45.1 36.0 - 46.0 %    MCV 80 80 - 100 fL    MCH 24.6 (L) 26.0 - 34.0 pg    MCHC 30.8 (L) 32.0 - 36.0 g/dL    RDW 19.9 (H) 11.5 - 14.5 %    Platelets 209 150 - 450 x10*3/uL    Neutrophils % 81.8 40.0 - 80.0 %    Immature Granulocytes %, Automated 0.7 0.0 - 0.9 %    Lymphocytes % 11.1 13.0 - 44.0 %    Monocytes % 5.4 2.0 - 10.0 %    Eosinophils % 0.4 0.0 - 6.0 %    Basophils % 0.6 0.0 - 2.0 %    Neutrophils Absolute 8.80 (H) 1.20 - 7.70 x10*3/uL    Immature Granulocytes Absolute, Automated 0.07 0.00 - 0.70 x10*3/uL    Lymphocytes Absolute 1.19 (L) 1.20 - 4.80 x10*3/uL    Monocytes Absolute 0.58 0.10 - 1.00 x10*3/uL    Eosinophils Absolute 0.04 0.00 - 0.70 x10*3/uL    Basophils Absolute 0.06 0.00 - 0.10 x10*3/uL   Lactate   Result Value Ref Range    Lactate 2.3 (H) 0.4 - 2.0 mmol/L   Blood Gas Venous   Result Value Ref Range    POCT pH, Venous 7.31 (L) 7.33 - 7.43 pH    POCT pCO2, Venous 62 (H) 41 - 51 mm Hg    POCT pO2, Venous 21 (L) 35 - 45 mm Hg    POCT SO2, Venous 26 (L) 45 - 75 %    POCT Oxy Hemoglobin, Venous 24.3 (L) 45.0 - 75.0 %    POCT Base Excess, Venous 3.2 (H) -2.0 - 3.0 mmol/L    POCT HCO3 Calculated, Venous 31.2 (H) 22.0 - 26.0 mmol/L    Patient Temperature 37.0 degrees Celsius    FiO2 36 %    Test Comment ICU-S    Blood Culture    Specimen: Peripheral Venipuncture; Blood culture   Result Value Ref Range    Blood Culture Loaded on Instrument - Culture in progress    Blood Culture    Specimen: Peripheral Venipuncture; Blood culture   Result Value Ref Range    Blood Culture Loaded on Instrument - Culture in progress    Influenza A, and B PCR   Result Value Ref Range    Flu A Result Not Detected Not Detected    Flu B Result Not Detected Not Detected   Sars-CoV-2 PCR, Symptomatic   Result Value Ref Range    Coronavirus 2019, PCR Detected (A) Not Detected   Lactate   Result Value Ref Range    Lactate 1.4 0.4 - 2.0 mmol/L   Troponin I, High Sensitivity   Result Value Ref Range    Troponin I, High Sensitivity  2,735 () 0 - 13 ng/L   CT angio chest for pulmonary embolism    Result Date: 11/28/2023  Interpreted By:  Jonathan Ng, STUDY: CT ANGIO CHEST FOR PULMONARY EMBOLISM;  11/28/2023 9:02 am   INDICATION: Signs/Symptoms:Short of breath with history of lung CA.   COMPARISON: 10/22/2023   ACCESSION NUMBER(S): [id number]   ORDERING CLINICIAN: NIKIA SHERMAN   TECHNIQUE: Helical data acquisition of the chest was obtained following the intravenous administration of  75 ml of contrast/Omnipaque 350. Images were reformatted in axial, coronal, and sagittal planes. 3D post processing was performed on an independent workstation and volume rendered images of the pulmonary arteries were created and utilized in the interpretation.   FINDINGS: POTENTIAL LIMITATIONS OF THE STUDY:   Respiratory motion artifact.   HEART AND VESSELS: No filling defects are identified within the pulmonary arteries to indicate the presence of a pulmonary embolism. Prominence of the central pulmonary arteries suggest hypertension, similar to the prior examination.   There are atherosclerotic calcifications of the aorta and its branches. Aorta is unchanged in course and caliber. 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 The focal outpouching measures approximately 1.8 cm in size and is unchanged when compared to the previous study. The diameter of the brachiocephalic artery at this location measures up to 3.7 cm and is also unchanged. There is also mild aneurysmal dilation of the proximal aspect of the descending thoracic aorta measuring up to 3.8 cm, image 124 of 327, also unchanged. Stent graft noted within the visible portions of the abdominal aorta.   The heart is unchanged in size.   No pericardial effusion is seen.   MEDIASTINUM AND TRINA, LOWER NECK AND AXILLA: The visible portions of the thyroid are grossly unchanged in appearance.   As described on the previous study,  there are borderline to mildly prominent mediastinal and hilar lymph nodes, measuring up to 1.2 cm in the subcarinal region, unchanged when compared to the previous study. No new pathologically enlarged lymph nodes are noted.   Esophagus appears within normal limits as seen.   LUNGS AND AIRWAYS: The trachea and central airways are patent. No endobronchial lesion.   There are emphysematous changes. There are mild scattered areas of atelectasis/scarring. As described on the previous study, there are scattered subcentimeter nodules, unchanged when compared to the previous study. No new pulmonary nodule or mass. No new areas of consolidation. No sizable effusion or evidence of a pneumothorax. Mild nonspecific patchy ground-glass opacities without consolidation.   UPPER ABDOMEN: The visualized subdiaphragmatic structures demonstrate no acute abnormality. Sequela of chronic pancreatitis. Stable calcified portacaval lymph nodes. Left nephrolithiasis. Bilateral adrenal masses.   CHEST WALL AND OSSEOUS STRUCTURES: No acute process. Degenerative changes. Previously described tiny lucent lesion within the T7 vertebral body is unchanged.       No evidence of a pulmonary embolism. Mild patchy ground-glass opacities without consolidation. Emphysematous changes. 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 Bilateral adrenal masses. Stable subcentimeter pulmonary nodules. No new pulmonary nodule or mass. Additional unchanged findings as above.   MACRO: None.   Signed by: Jonathan Ng 11/28/2023 9:13 AM Dictation workstation:   [id number]    XR chest 1 view    Result Date: 11/28/2023  Interpreted By:  Kimani Brunner, STUDY: XR CHEST 1 VIEW   INDICATION: Signs/Symptoms:SOB.   COMPARISON: October 22, 2023   ACCESSION NUMBER(S): [id number]   ORDERING CLINICIAN: NIKIA SHERMAN   FINDINGS: No consolidation, effusion, edema, or pneumothorax.    Heart size enlarged but unchanged.       No evidence of acute intrathoracic abnormality.   Signed by: Kimani Brunner 11/28/2023 7:45 AM Dictation workstation:   [id number]    Scheduled medications  amLODIPine, 5 mg, oral, Daily  aspirin, 81 mg, oral, Daily  busPIRone, 15 mg, oral, BID  clopidogrel, 75 mg, oral, Daily  dexAMETHasone, 6 mg, intravenous, q24h  docusate sodium, 100 mg, oral, BID  famotidine, 40 mg, oral, Daily  ferrous sulfate, 65 mg of iron, oral, Daily  fludrocortisone, 0.1 mg, oral, Daily  tiotropium, 2 Inhalation, inhalation, Daily   And  fluticasone furoate-vilanteroL, 1 puff, inhalation, Daily  hydrocortisone, 5 mg, oral, Daily  ipratropium-albuteroL, 3 mL, nebulization, 4x daily  midodrine, 5 mg, oral, BID  montelukast, 10 mg, oral, Nightly  OLANZapine, 5 mg, oral, Nightly  [START ON 11/29/2023] pantoprazole, 40 mg, oral, Daily before breakfast  remdesivir, 200 mg, intravenous, Once   Followed by  [START ON 11/29/2023] remdesivir, 100 mg, intravenous, q24h  rosuvastatin, 10 mg, oral, Daily  sennosides, 1 tablet, oral, Daily  sertraline, 100 mg, oral, Daily  sodium chloride, 1,000 mL, intravenous, Once      Continuous medications  heparin, 0-4,000 Units/hr, Last Rate: 700 Units/hr (11/28/23 1259)      PRN medications  PRN medications: heparin, HYDROmorphone, LORazepam, naloxone, ondansetron ODT, oxygen, prochlorperazine          Assessment/Plan   Principal Problem:    COVID  NSTEMI, pneumonia  Admit to general medicine, intermediate care under Dr. Davies  Inpatient  Telemetry  Droplet precautions   Continue Heparin gtt  IV Dexamethasone   IV Remdesivir    IV Ceftriaxone and IV Zithromax   Appreciate Cardiology recommendations  Titrate oxygen to maintain a saturation of >90%     Chronic conditions  HTN, HLD, COPD, lung cancer, anemia, DVT, PVD, anxiety, depression  Continue home medications as appropriate    DVT Prophylaxis  Patient is currently on a Heparin gtt  SCDs           I spent 45 minutes in  the professional and overall care of this patient.      Suellen Davis, APRN-CNP

## 2023-11-28 NOTE — PROGRESS NOTES
Pharmacy Medication History Review    Radha Toussaint is a 68 y.o. female admitted for COVID. Pharmacy reviewed the patient's vpczb-ed-[id number] medications and allergies for accuracy.    The list below reflectives the updated PTA list. Please review each medication in order reconciliation for additional clarification and justification.  Prior to Admission Medications   Prescriptions Last Dose Informant Patient Reported? Taking?   HYDROmorphone (Dilaudid) 4 mg tablet Unknown  Yes Yes   Sig: Take 0.5 tablets (2 mg) by mouth 3 times a day as needed for severe pain (7 - 10).   Lactobacillus acidophilus (PROBIOTIC ORAL) Unknown  Yes Yes   Sig: Take 1 capsule by mouth once daily.   OLANZapine (ZyPREXA) 5 mg tablet Unknown  No Yes   Sig: Take 1 tablet (5 mg) by mouth once daily at bedtime.   albuterol 90 mcg/actuation inhaler Unknown  No Yes   Sig: Inhale 1 puff every 4 hours if needed for shortness of breath.   amLODIPine (Norvasc) 5 mg tablet Unknown  Yes Yes   Sig: Take 1 tablet (5 mg) by mouth once daily.   aspirin 81 mg EC tablet Unknown  Yes Yes   Sig: Take 1 tablet (81 mg) by mouth once daily.   busPIRone (Buspar) 15 mg tablet Unknown  Yes Yes   clopidogrel (Plavix) 75 mg tablet Unknown  No Yes   Sig: Take 1 tablet (75 mg) by mouth once daily.   cyclobenzaprine (Flexeril) 10 mg tablet   No No   Sig: Take 1 tablet (10 mg) by mouth 3 times a day as needed for muscle spasms.   famotidine (Pepcid) 40 mg tablet Unknown  Yes No   Sig: Take 1 tablet (40 mg) by mouth once daily.   ferrous sulfate 325 (65 Fe) MG EC tablet Unknown  Yes Yes   Sig: Take 65 mg by mouth once daily. Do not crush, chew, or split.   fludrocortisone (Florinef) 0.1 mg tablet Unknown  No Yes   Sig: TAKE 1/2 TABLET BY MOUTH ONCE DAILY   fluticasone-umeclidin-vilanter (Trelegy Ellipta) 200-62.5-25 mcg blister with device Unknown  No Yes   Sig: Inhale 1 puff once daily.   hydrocortisone (Cortef) 5 mg tablet Unknown  No Yes   Sig: STARTING ON 9/8 TAKE 2  TABS AT 8AM THEN TAKE 1 TABLET AT NOON THEN TAKE 1/2 TABLET AT 4PM   ipratropium-albuteroL (Duo-Neb) 0.5-2.5 mg/3 mL nebulizer solution Unknown  No Yes   Sig: Take 3 mL by nebulization 4 times a day.   montelukast (Singulair) 10 mg tablet Unknown  No Yes   Sig: Take 1 tablet (10 mg) by mouth once daily at bedtime.   naloxone (Narcan) 4 mg/0.1 mL nasal spray Unknown  No Yes   Sig: Administer 1 spray (4 mg) into affected nostril(s) if needed for opioid reversal. May repeat every 2-3 minutes if needed, alternating nostrils, until medical assistance becomes available.   ondansetron ODT (Zofran-ODT) 4 mg disintegrating tablet Unknown  No Yes   Sig: DISSOLVE 1 TABLET IN MOUTH BY MOUTH THREE TIMES A DAY AS NEEDED NAUSEA   Patient taking differently: Take 1 tablet (4 mg) by mouth every 8 hours if needed for vomiting or nausea.   pantoprazole (ProtoNix) 40 mg EC tablet Unknown  Yes Yes   Sig: Take 1 tablet (40 mg) by mouth once daily in the morning. Take before meals. Do not crush, chew, or split.   prochlorperazine (Compazine) 5 mg tablet Unknown  No Yes   Sig: Take 1 tablet (5 mg) by mouth every 6 hours if needed for nausea or vomiting.   rosuvastatin (Crestor) 10 mg tablet Unknown  No Yes   Sig: Take 1 tablet (10 mg) by mouth once daily.   sennosides (senna) 8.6 mg tablet Unknown  No Yes   Sig: Take 1 tablet (8.6 mg) by mouth once daily.   sertraline (Zoloft) 100 mg tablet Unknown  No Yes   Sig: Take 1 tablet (100 mg) by mouth once daily.      Facility-Administered Medications: None        The list below reflectives the updated allergy list. Please review each documented allergy for additional clarification and justification.  Allergies  Reviewed by Ori Brunner RN on 11/28/2023        Severity Reactions Comments    Ace Inhibitors Medium Angioedema     Prednisone Medium Anxiety, Agitation & violent    Demerol [meperidine] Low Nausea/vomiting             Below are additional concerns with the patient's PTA  list.      Beatrice Bee, DEBBIEhT

## 2023-11-28 NOTE — ED TRIAGE NOTES
From home.  Shortness of breath over the past ~4 hours. Wears home 02. Hx of lung ca.    Per ems, pt 84% on scene.  Ems admin henriqueo neb, pt states feeling better after duoneb.  Spo2 98 on 4lpm upon arrival to ED.

## 2023-11-29 ENCOUNTER — APPOINTMENT (OUTPATIENT)
Dept: PALLIATIVE MEDICINE | Facility: HOSPITAL | Age: 68
End: 2023-11-29
Payer: MEDICARE

## 2023-11-29 ENCOUNTER — APPOINTMENT (OUTPATIENT)
Dept: HEMATOLOGY/ONCOLOGY | Facility: HOSPITAL | Age: 68
End: 2023-11-29
Payer: MEDICARE

## 2023-11-29 ENCOUNTER — PATIENT OUTREACH (OUTPATIENT)
Dept: CARE COORDINATION | Facility: CLINIC | Age: 68
End: 2023-11-29
Payer: MEDICARE

## 2023-11-29 LAB
ALBUMIN SERPL BCP-MCNC: 3.4 G/DL (ref 3.4–5)
ALP SERPL-CCNC: 62 U/L (ref 33–136)
ALT SERPL W P-5'-P-CCNC: 9 U/L (ref 7–45)
ANION GAP SERPL CALC-SCNC: 13 MMOL/L (ref 10–20)
AST SERPL W P-5'-P-CCNC: 17 U/L (ref 9–39)
BILIRUB SERPL-MCNC: 0.3 MG/DL (ref 0–1.2)
BUN SERPL-MCNC: 20 MG/DL (ref 6–23)
CALCIUM SERPL-MCNC: 9 MG/DL (ref 8.6–10.3)
CHLORIDE SERPL-SCNC: 105 MMOL/L (ref 98–107)
CO2 SERPL-SCNC: 25 MMOL/L (ref 21–32)
CREAT SERPL-MCNC: 0.7 MG/DL (ref 0.5–1.05)
ERYTHROCYTE [DISTWIDTH] IN BLOOD BY AUTOMATED COUNT: 19.7 % (ref 11.5–14.5)
GFR SERPL CREATININE-BSD FRML MDRD: >90 ML/MIN/1.73M*2
GLUCOSE SERPL-MCNC: 99 MG/DL (ref 74–99)
HCT VFR BLD AUTO: 41.4 % (ref 36–46)
HGB BLD-MCNC: 12.7 G/DL (ref 12–16)
MCH RBC QN AUTO: 24.6 PG (ref 26–34)
MCHC RBC AUTO-ENTMCNC: 30.7 G/DL (ref 32–36)
MCV RBC AUTO: 80 FL (ref 80–100)
NRBC BLD-RTO: 0 /100 WBCS (ref 0–0)
PLATELET # BLD AUTO: 192 X10*3/UL (ref 150–450)
POTASSIUM SERPL-SCNC: 5.3 MMOL/L (ref 3.5–5.3)
PROT SERPL-MCNC: 6.2 G/DL (ref 6.4–8.2)
RBC # BLD AUTO: 5.17 X10*6/UL (ref 4–5.2)
SODIUM SERPL-SCNC: 138 MMOL/L (ref 136–145)
UFH PPP CHRO-ACNC: 0.2 IU/ML
UFH PPP CHRO-ACNC: 0.2 IU/ML
UFH PPP CHRO-ACNC: 0.4 IU/ML
WBC # BLD AUTO: 9.6 X10*3/UL (ref 4.4–11.3)

## 2023-11-29 PROCEDURE — S4991 NICOTINE PATCH NONLEGEND: HCPCS | Performed by: STUDENT IN AN ORGANIZED HEALTH CARE EDUCATION/TRAINING PROGRAM

## 2023-11-29 PROCEDURE — 2500000004 HC RX 250 GENERAL PHARMACY W/ HCPCS (ALT 636 FOR OP/ED): Performed by: NURSE PRACTITIONER

## 2023-11-29 PROCEDURE — 80053 COMPREHEN METABOLIC PANEL: CPT | Performed by: NURSE PRACTITIONER

## 2023-11-29 PROCEDURE — 2500000002 HC RX 250 W HCPCS SELF ADMINISTERED DRUGS (ALT 637 FOR MEDICARE OP, ALT 636 FOR OP/ED): Performed by: NURSE PRACTITIONER

## 2023-11-29 PROCEDURE — 2060000001 HC INTERMEDIATE ICU ROOM DAILY

## 2023-11-29 PROCEDURE — 85027 COMPLETE CBC AUTOMATED: CPT | Performed by: NURSE PRACTITIONER

## 2023-11-29 PROCEDURE — 85520 HEPARIN ASSAY: CPT | Performed by: STUDENT IN AN ORGANIZED HEALTH CARE EDUCATION/TRAINING PROGRAM

## 2023-11-29 PROCEDURE — 36415 COLL VENOUS BLD VENIPUNCTURE: CPT | Performed by: STUDENT IN AN ORGANIZED HEALTH CARE EDUCATION/TRAINING PROGRAM

## 2023-11-29 PROCEDURE — 2500000004 HC RX 250 GENERAL PHARMACY W/ HCPCS (ALT 636 FOR OP/ED): Performed by: STUDENT IN AN ORGANIZED HEALTH CARE EDUCATION/TRAINING PROGRAM

## 2023-11-29 PROCEDURE — 2500000005 HC RX 250 GENERAL PHARMACY W/O HCPCS: Performed by: NURSE PRACTITIONER

## 2023-11-29 PROCEDURE — 97161 PT EVAL LOW COMPLEX 20 MIN: CPT | Mod: GP

## 2023-11-29 PROCEDURE — 94640 AIRWAY INHALATION TREATMENT: CPT

## 2023-11-29 PROCEDURE — 36415 COLL VENOUS BLD VENIPUNCTURE: CPT | Performed by: NURSE PRACTITIONER

## 2023-11-29 PROCEDURE — 99223 1ST HOSP IP/OBS HIGH 75: CPT | Performed by: STUDENT IN AN ORGANIZED HEALTH CARE EDUCATION/TRAINING PROGRAM

## 2023-11-29 PROCEDURE — 97165 OT EVAL LOW COMPLEX 30 MIN: CPT | Mod: GO

## 2023-11-29 PROCEDURE — 2500000002 HC RX 250 W HCPCS SELF ADMINISTERED DRUGS (ALT 637 FOR MEDICARE OP, ALT 636 FOR OP/ED): Performed by: STUDENT IN AN ORGANIZED HEALTH CARE EDUCATION/TRAINING PROGRAM

## 2023-11-29 PROCEDURE — 2500000001 HC RX 250 WO HCPCS SELF ADMINISTERED DRUGS (ALT 637 FOR MEDICARE OP): Performed by: NURSE PRACTITIONER

## 2023-11-29 RX ORDER — IBUPROFEN 200 MG
1 TABLET ORAL DAILY
Status: DISCONTINUED | OUTPATIENT
Start: 2023-11-29 | End: 2023-12-01 | Stop reason: HOSPADM

## 2023-11-29 RX ORDER — SERTRALINE HYDROCHLORIDE 50 MG/1
50 TABLET, FILM COATED ORAL 2 TIMES DAILY
COMMUNITY
End: 2024-03-01 | Stop reason: SDUPTHER

## 2023-11-29 RX ADMIN — FLUDROCORTISONE ACETATE 0.1 MG: 0.1 TABLET ORAL at 09:00

## 2023-11-29 RX ADMIN — HEPARIN SODIUM 10 UNITS/HR: 10000 INJECTION, SOLUTION INTRAVENOUS at 12:57

## 2023-11-29 RX ADMIN — BUSPIRONE HYDROCHLORIDE 15 MG: 10 TABLET ORAL at 21:01

## 2023-11-29 RX ADMIN — SERTRALINE HYDROCHLORIDE 100 MG: 100 TABLET ORAL at 09:43

## 2023-11-29 RX ADMIN — HEPARIN SODIUM 1500 UNITS: 5000 INJECTION INTRAVENOUS; SUBCUTANEOUS at 03:04

## 2023-11-29 RX ADMIN — IPRATROPIUM BROMIDE AND ALBUTEROL SULFATE 3 ML: 2.5; .5 SOLUTION RESPIRATORY (INHALATION) at 13:38

## 2023-11-29 RX ADMIN — CEFTRIAXONE SODIUM 1 G: 1 INJECTION, SOLUTION INTRAVENOUS at 14:53

## 2023-11-29 RX ADMIN — ASPIRIN 81 MG: 81 TABLET, COATED ORAL at 09:42

## 2023-11-29 RX ADMIN — FAMOTIDINE 40 MG: 20 TABLET, FILM COATED ORAL at 09:43

## 2023-11-29 RX ADMIN — IPRATROPIUM BROMIDE AND ALBUTEROL SULFATE 3 ML: 2.5; .5 SOLUTION RESPIRATORY (INHALATION) at 07:47

## 2023-11-29 RX ADMIN — FERROUS SULFATE TAB EC 325 MG (65 MG FE EQUIVALENT) 1 TABLET: 325 (65 FE) TABLET DELAYED RESPONSE at 08:00

## 2023-11-29 RX ADMIN — DOCUSATE SODIUM 100 MG: 100 CAPSULE, LIQUID FILLED ORAL at 21:01

## 2023-11-29 RX ADMIN — CLOPIDOGREL BISULFATE 75 MG: 75 TABLET ORAL at 09:42

## 2023-11-29 RX ADMIN — MIDODRINE HYDROCHLORIDE 5 MG: 5 TABLET ORAL at 21:01

## 2023-11-29 RX ADMIN — PANTOPRAZOLE SODIUM 40 MG: 40 TABLET, DELAYED RELEASE ORAL at 14:53

## 2023-11-29 RX ADMIN — SENNOSIDES 8.6 MG: 8.6 TABLET, FILM COATED ORAL at 09:43

## 2023-11-29 RX ADMIN — TIOTROPIUM BROMIDE INHALATION SPRAY 2 PUFF: 3.12 SPRAY, METERED RESPIRATORY (INHALATION) at 07:52

## 2023-11-29 RX ADMIN — HYDROCORTISONE 5 MG: 10 TABLET ORAL at 09:43

## 2023-11-29 RX ADMIN — ROSUVASTATIN CALCIUM 10 MG: 10 TABLET, FILM COATED ORAL at 09:42

## 2023-11-29 RX ADMIN — DOCUSATE SODIUM 100 MG: 100 CAPSULE, LIQUID FILLED ORAL at 09:43

## 2023-11-29 RX ADMIN — Medication 3 L/MIN: at 13:38

## 2023-11-29 RX ADMIN — AZITHROMYCIN MONOHYDRATE 500 MG: 500 INJECTION, POWDER, LYOPHILIZED, FOR SOLUTION INTRAVENOUS at 09:45

## 2023-11-29 RX ADMIN — DEXAMETHASONE SODIUM PHOSPHATE 6 MG: 10 INJECTION INTRAMUSCULAR; INTRAVENOUS at 14:53

## 2023-11-29 RX ADMIN — MIDODRINE HYDROCHLORIDE 5 MG: 5 TABLET ORAL at 09:42

## 2023-11-29 RX ADMIN — AMLODIPINE BESYLATE 5 MG: 5 TABLET ORAL at 09:43

## 2023-11-29 RX ADMIN — MONTELUKAST 10 MG: 10 TABLET, FILM COATED ORAL at 21:01

## 2023-11-29 RX ADMIN — BUSPIRONE HYDROCHLORIDE 15 MG: 10 TABLET ORAL at 09:42

## 2023-11-29 RX ADMIN — FLUTICASONE FUROATE AND VILANTEROL TRIFENATATE 1 PUFF: 200; 25 POWDER RESPIRATORY (INHALATION) at 07:53

## 2023-11-29 RX ADMIN — IPRATROPIUM BROMIDE AND ALBUTEROL SULFATE 3 ML: 2.5; .5 SOLUTION RESPIRATORY (INHALATION) at 18:50

## 2023-11-29 RX ADMIN — REMDESIVIR 100 MG: 100 INJECTION, POWDER, LYOPHILIZED, FOR SOLUTION INTRAVENOUS at 14:53

## 2023-11-29 RX ADMIN — Medication 1 PATCH: at 09:43

## 2023-11-29 RX ADMIN — OLANZAPINE 5 MG: 5 TABLET, FILM COATED ORAL at 21:01

## 2023-11-29 ASSESSMENT — PAIN SCALES - GENERAL
PAINLEVEL_OUTOF10: 0 - NO PAIN

## 2023-11-29 ASSESSMENT — COGNITIVE AND FUNCTIONAL STATUS - GENERAL
MOBILITY SCORE: 22
HELP NEEDED FOR BATHING: A LITTLE
DAILY ACTIVITIY SCORE: 23
CLIMB 3 TO 5 STEPS WITH RAILING: A LITTLE
CLIMB 3 TO 5 STEPS WITH RAILING: A LITTLE
MOBILITY SCORE: 22
WALKING IN HOSPITAL ROOM: A LITTLE
WALKING IN HOSPITAL ROOM: A LITTLE
DAILY ACTIVITIY SCORE: 23
HELP NEEDED FOR BATHING: A LITTLE

## 2023-11-29 ASSESSMENT — ENCOUNTER SYMPTOMS
EYES NEGATIVE: 1
CONSTITUTIONAL NEGATIVE: 1
GASTROINTESTINAL NEGATIVE: 1
PSYCHIATRIC NEGATIVE: 1
HEMATOLOGIC/LYMPHATIC NEGATIVE: 1
SHORTNESS OF BREATH: 1
ALLERGIC/IMMUNOLOGIC NEGATIVE: 1
MUSCULOSKELETAL NEGATIVE: 1
ENDOCRINE NEGATIVE: 1
NEUROLOGICAL NEGATIVE: 1

## 2023-11-29 NOTE — H&P
History Of Present Illness  Patient is a 68-year-old female brought in by EMS for complaints of shortness of breath.  Patient reports her shortness of breath started last night and got worse today.  She normally wears 2 L of oxygen and EMS had increased her oxygen to 6 L.  EMS also gave her a breathing treatment.  Patient reports her shortness of breath is improved after the oxygen and breathing treatment.  She has a past medical history of carotid artery aneurysm, DVT on Plavix, stage IV lung cancer, COPD, STEMI in 2016, anxiety, depression, chemotherapy-induced neuropathy, PVD, chronic respiratory failure and anemia.  She reports she did have chest pain last night however she does not have any chest pain currently.  She denies any nausea, vomiting, diarrhea or abdominal pain.  Her last bowel movement was yesterday.  She denies any fevers.  She did test positive for COVID today in the emergency department.    ED Course  VS reviewed  Labs reviewed, results below, initial troponin 2902, repeat 2735  EKG interpreted by the ED provider as sinus rhythm with ST depressions in leads III and aVF, improvement in ST elevation in aVL  CT chest and CXR reviewed, results below  Aspirin given  IVF bolus  Cardiology consulted for elevated troponin  Heparin gtt   Blood cultures pending     Past Medical History  Past Medical History:   Diagnosis Date    Aneurysm of carotid artery (CMS/[facility])     Neck aneurysm    DVT (deep venous thrombosis) (CMS/[facility])     2022    History of tubal ligation     Old myocardial infarction     History of heart attack    Other specified disorders of kidney and ureter     Obstruction of kidney    Personal history of other diseases of the circulatory system     History of intracranial aneurysm    Personal history of other diseases of the circulatory system     History of abdominal aortic aneurysm (AAA)    Personal history of other diseases of the respiratory system     History of chronic obstructive lung  disease    Personal history of urinary calculi     History of kidney stones    Right upper quadrant pain 09/25/2020    Abdominal pain, RUQ (right upper quadrant)       Surgical History  Past Surgical History:   Procedure Laterality Date    CT ABDOMEN PELVIS ANGIOGRAM W AND/OR WO IV CONTRAST  11/21/2019    CT ABDOMEN PELVIS ANGIOGRAM W AND/OR WO IV CONTRAST 11/21/2019 [facility]    CT AORTA AND BILATERAL ILIOFEMORAL RUNOFF ANGIOGRAM W AND/OR WO IV CONTRAST  8/9/2022    CT AORTA AND BILATERAL ILIOFEMORAL RUNOFF ANGIOGRAM W AND/OR WO IV CONTRAST 8/9/2022 [facility]    CT HEAD ANGIO W AND WO IV CONTRAST  5/22/2020    CT HEAD ANGIO W AND WO IV CONTRAST 5/22/2020 [facility]    OTHER SURGICAL HISTORY  09/30/2020    Tubal ligation    OTHER SURGICAL HISTORY  09/30/2020    Cardiac catheterization        Social History  She reports that she has been smoking cigarettes. She has been smoking an average of 1 pack per day. She has never used smokeless tobacco. No history on file for alcohol use and drug use.    Family History  Family History   Problem Relation Name Age of Onset    Aneurysm Mother      Hypertension Mother      Heart attack Father          Allergies  Ace inhibitors, Prednisone, and Demerol [meperidine]    Review of Systems   Constitutional: Negative.    HENT: Negative.     Eyes: Negative.    Respiratory:  Positive for shortness of breath.    Cardiovascular:  Positive for chest pain.   Gastrointestinal: Negative.    Endocrine: Negative.    Genitourinary: Negative.    Musculoskeletal: Negative.    Allergic/Immunologic: Negative.    Neurological: Negative.    Hematological: Negative.    Psychiatric/Behavioral: Negative.          Physical Exam  Constitutional:       Appearance: She is ill-appearing.   HENT:      Head: Normocephalic.      Nose: Nose normal.      Mouth/Throat:      Mouth: Mucous membranes are moist.   Eyes:      Extraocular Movements: Extraocular movements intact.  "  Cardiovascular:      Rate and Rhythm: Normal rate and regular rhythm.   Pulmonary:      Breath sounds: Normal breath sounds.      Comments: tachypneic  Abdominal:      Palpations: Abdomen is soft.   Musculoskeletal:         General: Normal range of motion.      Cervical back: Normal range of motion and neck supple.   Skin:     General: Skin is warm and dry.   Neurological:      General: No focal deficit present.      Mental Status: She is alert and oriented to person, place, and time.   Psychiatric:         Mood and Affect: Mood normal.         Behavior: Behavior normal.          Last Recorded Vitals  Blood pressure 127/58, pulse 66, temperature 36.3 °C (97.3 °F), resp. rate 18, height 1.626 m (5' 4\"), weight 61.2 kg (135 lb), SpO2 98 %.    Relevant Results  Results for orders placed or performed during the hospital encounter of 11/28/23 (from the past 24 hour(s))   Heparin Assay, UFH   Result Value Ref Range    Heparin Unfractionated 0.2 See Comment Below for Therapeutic Ranges IU/mL   Heparin Assay, UFH   Result Value Ref Range    Heparin Unfractionated 0.2 See Comment Below for Therapeutic Ranges IU/mL   Heparin Assay, UFH   Result Value Ref Range    Heparin Unfractionated 0.2 See Comment Below for Therapeutic Ranges IU/mL   CBC   Result Value Ref Range    WBC 9.6 4.4 - 11.3 x10*3/uL    nRBC 0.0 0.0 - 0.0 /100 WBCs    RBC 5.17 4.00 - 5.20 x10*6/uL    Hemoglobin 12.7 12.0 - 16.0 g/dL    Hematocrit 41.4 36.0 - 46.0 %    MCV 80 80 - 100 fL    MCH 24.6 (L) 26.0 - 34.0 pg    MCHC 30.7 (L) 32.0 - 36.0 g/dL    RDW 19.7 (H) 11.5 - 14.5 %    Platelets 192 150 - 450 x10*3/uL   Comprehensive metabolic panel   Result Value Ref Range    Glucose 99 74 - 99 mg/dL    Sodium 138 136 - 145 mmol/L    Potassium 5.3 3.5 - 5.3 mmol/L    Chloride 105 98 - 107 mmol/L    Bicarbonate 25 21 - 32 mmol/L    Anion Gap 13 10 - 20 mmol/L    Urea Nitrogen 20 6 - 23 mg/dL    Creatinine 0.70 0.50 - 1.05 mg/dL    eGFR >90 >60 mL/min/1.73m*2    " Calcium 9.0 8.6 - 10.3 mg/dL    Albumin 3.4 3.4 - 5.0 g/dL    Alkaline Phosphatase 62 33 - 136 U/L    Total Protein 6.2 (L) 6.4 - 8.2 g/dL    AST 17 9 - 39 U/L    Bilirubin, Total 0.3 0.0 - 1.2 mg/dL    ALT 9 7 - 45 U/L   Heparin Assay, UFH   Result Value Ref Range    Heparin Unfractionated 0.4 See Comment Below for Therapeutic Ranges IU/mL   Heparin Assay, UFH   Result Value Ref Range    Heparin Unfractionated 0.2 See Comment Below for Therapeutic Ranges IU/mL   CT angio chest for pulmonary embolism    Result Date: 11/28/2023  Interpreted By:  Jonathan Ng, STUDY: CT ANGIO CHEST FOR PULMONARY EMBOLISM;  11/28/2023 9:02 am   INDICATION: Signs/Symptoms:Short of breath with history of lung CA.   COMPARISON: 10/22/2023   ACCESSION NUMBER(S): [id number]   ORDERING CLINICIAN: NIKIA SHERMAN   TECHNIQUE: Helical data acquisition of the chest was obtained following the intravenous administration of  75 ml of contrast/Omnipaque 350. Images were reformatted in axial, coronal, and sagittal planes. 3D post processing was performed on an independent workstation and volume rendered images of the pulmonary arteries were created and utilized in the interpretation.   FINDINGS: POTENTIAL LIMITATIONS OF THE STUDY:   Respiratory motion artifact.   HEART AND VESSELS: No filling defects are identified within the pulmonary arteries to indicate the presence of a pulmonary embolism. Prominence of the central pulmonary arteries suggest hypertension, similar to the prior examination.   There are atherosclerotic calcifications of the aorta and its branches. Aorta is unchanged in course and caliber. 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 The focal outpouching measures approximately 1.8 cm in size and is unchanged when compared to the previous study. The diameter of the brachiocephalic artery at this location measures up to 3.7 cm and is also unchanged. There is  also mild aneurysmal dilation of the proximal aspect of the descending thoracic aorta measuring up to 3.8 cm, image 124 of 327, also unchanged. Stent graft noted within the visible portions of the abdominal aorta.   The heart is unchanged in size.   No pericardial effusion is seen.   MEDIASTINUM AND TRINA, LOWER NECK AND AXILLA: The visible portions of the thyroid are grossly unchanged in appearance.   As described on the previous study, there are borderline to mildly prominent mediastinal and hilar lymph nodes, measuring up to 1.2 cm in the subcarinal region, unchanged when compared to the previous study. No new pathologically enlarged lymph nodes are noted.   Esophagus appears within normal limits as seen.   LUNGS AND AIRWAYS: The trachea and central airways are patent. No endobronchial lesion.   There are emphysematous changes. There are mild scattered areas of atelectasis/scarring. As described on the previous study, there are scattered subcentimeter nodules, unchanged when compared to the previous study. No new pulmonary nodule or mass. No new areas of consolidation. No sizable effusion or evidence of a pneumothorax. Mild nonspecific patchy ground-glass opacities without consolidation.   UPPER ABDOMEN: The visualized subdiaphragmatic structures demonstrate no acute abnormality. Sequela of chronic pancreatitis. Stable calcified portacaval lymph nodes. Left nephrolithiasis. Bilateral adrenal masses.   CHEST WALL AND OSSEOUS STRUCTURES: No acute process. Degenerative changes. Previously described tiny lucent lesion within the T7 vertebral body is unchanged.       No evidence of a pulmonary embolism. Mild patchy ground-glass opacities without consolidation. Emphysematous changes. 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 Bilateral adrenal masses. Stable subcentimeter pulmonary nodules. No new pulmonary nodule or mass.  Additional unchanged findings as above.   MACRO: None.   Signed by: Jonathan Ng 11/28/2023 9:13 AM Dictation workstation:   [id number]    XR chest 1 view    Result Date: 11/28/2023  Interpreted By:  Kimani Brunner, STUDY: XR CHEST 1 VIEW   INDICATION: Signs/Symptoms:SOB.   COMPARISON: October 22, 2023   ACCESSION NUMBER(S): [id number]   ORDERING CLINICIAN: NIKIA SHERMAN   FINDINGS: No consolidation, effusion, edema, or pneumothorax.   Heart size enlarged but unchanged.       No evidence of acute intrathoracic abnormality.   Signed by: Kimani Brunner 11/28/2023 7:45 AM Dictation workstation:   [id number]    Scheduled medications  amLODIPine, 5 mg, oral, Daily  aspirin, 81 mg, oral, Daily  azithromycin, 500 mg, intravenous, Daily  busPIRone, 15 mg, oral, BID  cefTRIAXone, 1 g, intravenous, q24h  clopidogrel, 75 mg, oral, Daily  dexAMETHasone, 6 mg, intravenous, q24h  docusate sodium, 100 mg, oral, BID  famotidine, 40 mg, oral, Daily  ferrous sulfate, 65 mg of iron, oral, Daily with breakfast  fludrocortisone, 0.1 mg, oral, Daily  tiotropium, 2 Inhalation, inhalation, Daily   And  fluticasone furoate-vilanteroL, 1 puff, inhalation, Daily  hydrocortisone, 5 mg, oral, Daily  ipratropium-albuteroL, 3 mL, nebulization, TID  midodrine, 5 mg, oral, BID  montelukast, 10 mg, oral, Nightly  nicotine, 1 patch, transdermal, Daily  OLANZapine, 5 mg, oral, Nightly  pantoprazole, 40 mg, oral, Daily before breakfast  remdesivir, 100 mg, intravenous, q24h  rosuvastatin, 10 mg, oral, Daily  sennosides, 1 tablet, oral, Daily  sertraline, 100 mg, oral, Daily      Continuous medications  heparin, 0-4,000 Units/hr, Last Rate: 1,000 Units/hr (11/29/23 0512)      PRN medications  PRN medications: heparin, HYDROmorphone, ipratropium-albuteroL, LORazepam, naloxone, ondansetron ODT, oxygen, prochlorperazine          Assessment/Plan   Principal Problem:    COVID  NSTEMI, pneumonia  Admit to general medicine, intermediate care under   Ice  Inpatient  Telemetry  Droplet precautions   Continue Heparin gtt  IV Dexamethasone   IV Remdesivir    IV Ceftriaxone and IV Zithromax   Appreciate Cardiology recommendations  Titrate oxygen to maintain a saturation of >90%     Chronic conditions  HTN, HLD, COPD, lung cancer, anemia, DVT, PVD, anxiety, depression  Continue home medications as appropriate    DVT Prophylaxis  Patient is currently on a Heparin gtt  SCDs           I spent 45 minutes in the professional and overall care of this patient.      Nico Davies, DO

## 2023-11-29 NOTE — CARE PLAN
Problem: Respiratory  Goal: Minimize anxiety/maximize coping throughout shift  Outcome: Progressing  Goal: Minimal/no exertional discomfort or dyspnea this shift  Outcome: Progressing  Goal: No signs of respiratory distress (eg. Use of accessory muscles. Peds grunting)  Outcome: Progressing     Problem: Fall/Injury  Goal: Not fall by end of shift  Outcome: Progressing  Goal: Be free from injury by end of the shift  Outcome: Progressing  Goal: Verbalize understanding of personal risk factors for fall in the hospital  Outcome: Progressing     Problem: Skin  Goal: Participates in plan/prevention/treatment measures  Outcome: Progressing  Flowsheets (Taken 11/28/2023 2223)  Participates in plan/prevention/treatment measures:   Elevate heels   Discuss with provider PT/OT consult   Increase activity/out of bed for meals  Goal: Promote/optimize nutrition  Outcome: Progressing     Problem: Safety - Adult  Goal: Free from fall injury  Outcome: Progressing     Problem: Pain - Adult  Goal: Verbalizes/displays adequate comfort level or baseline comfort level  Outcome: Progressing     Problem: Discharge Planning  Goal: Discharge to home or other facility with appropriate resources  Outcome: Progressing   The patient's goals for the shift include  rest and comfort, pain control    The clinical goals for the shift include verbalize improvement in cough and SOB    Over the shift, the patient did not make progress toward the following goals. Barriers to progression include covid, history of lung cancer. Recommendations to address these barriers include iv atbx, remdesivir/steroids

## 2023-11-29 NOTE — PROGRESS NOTES
Occupational Therapy    Occupational Therapy    Evaluation    Patient Name: Radha Toussaint  MRN: [id number]  Today's Date: 11/29/2023  Time Calculation  Start Time: 0907  Stop Time: 0927  Time Calculation (min): 20 min    Assessment  IP OT Assessment  OT Assessment: no skilled OT needs  Medical Staff Made Aware: Yes  End of Session Communication: Bedside nurse  End of Session Patient Position: Bed, 2 rail up (call light and tray table in reach. all needs met)    Plan:  No Skilled OT: Independent with ADLs  OT Discharge Recommendations: No further acute OT  OT - OK to Discharge: Yes (to next level of care once cleared by medical team)    Subjective     Current Problem:  1. COVID        2. Acute respiratory failure, unspecified whether with hypoxia or hypercapnia (CMS/[facility])        3. NSTEMI (non-ST elevated myocardial infarction) (CMS/[facility])            General:  General  Reason for Referral: OT eval and treat- Pt. admitted on 11/28/23 with c/o inc sob, spo2 84% upon EMS arrival, elevated troponin, cardiology: acute likely type II mi from demand ischemia rec: conservative tx;   DX: covid, acute respiratory failure, nstemi  Referred By: Ryan NO  Past Medical History Relevant to Rehab: carotid artery anuerysm, dvt, stage IV lung ca, copd 2L home O2, anxiety, depression, chemo induced neuropthy, +smoker  Caregiver Feedback: Per conference with RN, pt. stable to participate in therapy evaluation.  Prior to Session Communication: Bedside nurse  Patient Position Received: Bed, 2 rail up (no alarm on)  General Comment: pt. pleasant and cooperative.    Precautions:  Medical Precautions: Fall precautions, Cardiac precautions, Infection precautions, Oxygen therapy device and L/min (droplet (+) COVID, O2)  Pain:  Pain Assessment  Pain Assessment:  (no pain per pt.)    Objective   Cognition:  Overall Cognitive Status: Within Functional Limits    Home Living:  Lives With:  (pt. lives with her spouse, dtr., son-in-law and 2  grandchildren in 1 floor home with 2 steps to enter with railing. Bathroom set-up inclused shower stall with seat/grab bar/hhs and std. toilet with vanity for support. Pt. also has bsc next to hospital.)     Prior Function:  Level of Rains:  (ind mobility w/o use of device, owns ww/rollator/cane, denies h/o falls; ind adl; pt manages cooking, spouse manages clearning/laundry/driving)  Hand Dominance: Right    CURRENT ADL:  ADL Comments: pt. has been transferring by herself to bsc next to bed, ind. with toileting, ind. with feeding. ind. using cell phone, ind. with donning/doffing socks at eob.    Bed Mobility/Transfers:   Bed Mobility  Bed Mobility:  (ind. with bed mobility supine <>sit to eob. hob elevated.)  Transfers  Transfer:  (ind. sit<> stand)    Ambulation/Gait Training:  Ambulation/Gait Training  Ambulation/Gait Training Performed:  (pt. ambulated in room with hand-held cga/sba and assist with IV pole/O2 tank)    Sensation:  Sensation Comment: no c/o per pt.  Strength:  Strength Comments: bue arom wfl; bue strength 4/5    Coordination:  Coordination Comment: bue [facility]mc/[facility] wfl     Outcome Measures: [facility] Daily Activity  Putting on and taking off regular lower body clothing: None  Bathing (including washing, rinsing, drying): A little  Putting on and taking off regular upper body clothing: None  Toileting, which includes using toilet, bedpan or urinal: None  Taking care of personal grooming such as brushing teeth: None  Eating Meals: None  Daily Activity - Total Score: 23     EDUCATION:    Goals: N/A

## 2023-11-29 NOTE — PROGRESS NOTES
"Called and spoke with patient. Identified self and explained role. Patient lives with her  and is independent. Patient wear oxygen at night and as needed. Patient reported \" I bought my oxygen from [employer].\" Explained that oxygen testing may need to be completed and may need oxygen on a more continuous basis. Plan to return home when discharged.  "

## 2023-11-29 NOTE — PROGRESS NOTES
Physical Therapy    Physical Therapy Evaluation    Patient Name: Radha Toussaint  MRN: [id number]  Today's Date: 11/29/2023   Time Calculation  Start Time: 0907  Stop Time: 0927  Time Calculation (min): 20 min    Assessment/Plan   PT Assessment  PT Assessment Results: Decreased endurance, Impaired balance, Decreased mobility  Rehab Prognosis: Good  Evaluation/Treatment Tolerance: Patient limited by fatigue (sob)  Assessment Comment: Continued skilled PT intervention indicated to facilitate increased   balance & gait stability  End of Session Patient Position: Bed, 2 rail up (call light in reach, all needs met)  IP OR SWING BED PT PLAN  Inpatient or Swing Bed: Inpatient  PT Plan  Treatment/Interventions: Gait training, Stair training, Balance training, Endurance training  PT Plan: Skilled PT  PT Frequency: 2 times per week  PT Discharge Recommendations: No PT needed after discharge  PT - OK to Discharge: Yes (when  cleared by medical team)    Subjective     Current Problem:  Patient Active Problem List   Diagnosis    Angio-edema    Anxiety    CAD (coronary artery disease)    Cellulitis of left lower extremity    COPD (chronic obstructive pulmonary disease) (CMS/HCC)    COVID-19    Peripheral vascular disease (CMS/HCC)    Insomnia    Intermittent claudication (CMS/HCC)    Leg pain    Liver lesion    Paresthesias    Vitamin D deficiency    S/P arteriogram of extremity    Non-small cell lung cancer (NSCLC) (CMS/HCC)    Pneumonia due to gram-negative bacteria    Anemia in other chronic diseases classified elsewhere    Abdominal aortic aneurysm (CMS/HCC)    Abdominal pain    Adrenal insufficiency (CMS/HCC)    Aortic aneurysm (CMS/HCC)    Aneurysm of iliac artery (CMS/HCC)    Arthritis    Colitis    Constipation    Anorexia    Decrease in appetite    Dehydration    Dyspnea on exertion    Generalized weakness    Hyperlipidemia    Fibroids    Leiomyoma    Malignant neoplasm of unknown origin (CMS/HCC)    Mixed anxiety  depressive disorder    Depression, recurrent (CMS/HCC)    Myocardial infarction (CMS/HCC)    STEMI (ST elevation myocardial infarction) (CMS/[facility])    Nausea and vomiting    Vomiting in adult    Neoplasm related pain    Nephrolithiasis    Nonruptured cerebral aneurysm    Tobacco dependence syndrome    Arteriosclerosis of coronary artery    Chronic obstructive pulmonary disease (CMS/HCC)    COPD exacerbation (CMS/[facility])    Numbness and tingling sensation of skin    Aneurysm (CMS/HCC)    Hypertension    Hypotension    Claudication (CMS/[facility])    COVID       General Visit Information:  General  Reason for Referral: PT eval & treat/impaired mobility (11/28/23 c/o inc sob, spo2 84% upon EMS arrival, elevated troponin, cardiology: acute likely type II mi from demand ischemia rec: conservative tx; DX: covid, acute respiratory failure, nstemi)  Referred By: Ryan NO  Past Medical History Relevant to Rehab: carotid artery anuerysm, dvt, stage IV lung ca, copd 2L home O2, anxiety, depression, chemo induced neuropthy, +smoker  Caregiver Feedback: Per conference w/ RN patient stable to participate in therapy  General Comment: Pleasant & cooperative, receptive to mobility& instructions    Home Living:  Home Living  Lives With:  (spouse, dtr, son in law, 2 grandchildren, 2steps w/ rail to enter [facility] set up including laundry; stall shower w/ seat, grab bar& hand held shower; hospital bed; standard toilet near [facility]l bedside commode)    Prior Level of Function:  Prior Function Per Pt/Caregiver Report  Level of [place]:  (ind mobility w/o use of device, owns ww/rollator/cane, denies h/o falls; ind adl; pt manages cooking, souse manages clearning/laundry/driving)    Precautions:  Precautions  Precautions Comment: fall, droplet +/covid, 3LO2, IV    Functional Assessments:     Bed Mobility  Bed Mobility:  (mod ind w/ hob elevated supine<>sit, has adjustable bed at home)  Transfers  Transfer:  (ind w/o use of  device)  Ambulation/Gait Training  Ambulation/Gait Training Performed:  (sba/cga w/o device 50ft, general mild instabilty , inc assist w/ changes in direction & maneuvering environmentla obstacles)   Extremity/Trunk Assessments:     RLE   RLE :  (arom& strength grossly wfl)  LLE   LLE :  (arom& strength grossly wfl)    Outcome Measures:  [facility] Basic Mobility  Turning from your back to your side while in a flat bed without using bedrails: None  Moving from lying on your back to sitting on the side of a flat bed without using bedrails: None  Moving to and from bed to chair (including a wheelchair): None  Standing up from a chair using your arms (e.g. wheelchair or bedside chair): None  To walk in hospital room: A little  Climbing 3-5 steps with railing: A little  Basic Mobility - Total Score: 22   Goals:  Encounter Problems       Encounter Problems (Active)       PT Problem       gait  (Progressing)       Start:  11/29/23    Expected End:  12/13/23       Independent gait >=100ft w/o use of appropriate assistive device as indicated for gait stabiilty& energy conservation          stairs  (Progressing)       Start:  11/29/23    Expected End:  12/13/23       Sba negotiating >=2steps simulating home environment         balance  (Progressing)       Start:  11/29/23    Expected End:  12/13/23       Self maintain balance w/ standing dynamic activity performing changes in direction & maneuver of obstacles             Pain - Adult            Education Documentation  Mobility Training, taught by Nicole Flores, PT at 11/29/2023  9:59 AM.  Learner: Patient  Readiness: Acceptance  Method: Explanation  Response: Verbalizes Understanding, Needs Reinforcement  Comment: safety, ,actiivty progression, self monitoring tolerance to fxl mobility for tolerance & safety

## 2023-11-30 LAB
ERYTHROCYTE [DISTWIDTH] IN BLOOD BY AUTOMATED COUNT: 20.1 % (ref 11.5–14.5)
HCT VFR BLD AUTO: 39.9 % (ref 36–46)
HGB BLD-MCNC: 11.8 G/DL (ref 12–16)
HOLD SPECIMEN: NORMAL
MCH RBC QN AUTO: 23.7 PG (ref 26–34)
MCHC RBC AUTO-ENTMCNC: 29.6 G/DL (ref 32–36)
MCV RBC AUTO: 80 FL (ref 80–100)
NRBC BLD-RTO: 0 /100 WBCS (ref 0–0)
PLATELET # BLD AUTO: 183 X10*3/UL (ref 150–450)
RBC # BLD AUTO: 4.97 X10*6/UL (ref 4–5.2)
UFH PPP CHRO-ACNC: 0.2 IU/ML
UFH PPP CHRO-ACNC: 0.3 IU/ML
WBC # BLD AUTO: 8.3 X10*3/UL (ref 4.4–11.3)

## 2023-11-30 PROCEDURE — 2500000004 HC RX 250 GENERAL PHARMACY W/ HCPCS (ALT 636 FOR OP/ED): Performed by: NURSE PRACTITIONER

## 2023-11-30 PROCEDURE — 36415 COLL VENOUS BLD VENIPUNCTURE: CPT | Performed by: STUDENT IN AN ORGANIZED HEALTH CARE EDUCATION/TRAINING PROGRAM

## 2023-11-30 PROCEDURE — S4991 NICOTINE PATCH NONLEGEND: HCPCS | Performed by: STUDENT IN AN ORGANIZED HEALTH CARE EDUCATION/TRAINING PROGRAM

## 2023-11-30 PROCEDURE — 2500000002 HC RX 250 W HCPCS SELF ADMINISTERED DRUGS (ALT 637 FOR MEDICARE OP, ALT 636 FOR OP/ED): Performed by: STUDENT IN AN ORGANIZED HEALTH CARE EDUCATION/TRAINING PROGRAM

## 2023-11-30 PROCEDURE — 2500000004 HC RX 250 GENERAL PHARMACY W/ HCPCS (ALT 636 FOR OP/ED): Performed by: STUDENT IN AN ORGANIZED HEALTH CARE EDUCATION/TRAINING PROGRAM

## 2023-11-30 PROCEDURE — 2500000001 HC RX 250 WO HCPCS SELF ADMINISTERED DRUGS (ALT 637 FOR MEDICARE OP): Performed by: NURSE PRACTITIONER

## 2023-11-30 PROCEDURE — 2500000004 HC RX 250 GENERAL PHARMACY W/ HCPCS (ALT 636 FOR OP/ED): Mod: JZ | Performed by: NURSE PRACTITIONER

## 2023-11-30 PROCEDURE — 85027 COMPLETE CBC AUTOMATED: CPT | Performed by: NURSE PRACTITIONER

## 2023-11-30 PROCEDURE — 2060000001 HC INTERMEDIATE ICU ROOM DAILY

## 2023-11-30 PROCEDURE — 85520 HEPARIN ASSAY: CPT | Performed by: STUDENT IN AN ORGANIZED HEALTH CARE EDUCATION/TRAINING PROGRAM

## 2023-11-30 PROCEDURE — 94640 AIRWAY INHALATION TREATMENT: CPT

## 2023-11-30 PROCEDURE — 2500000001 HC RX 250 WO HCPCS SELF ADMINISTERED DRUGS (ALT 637 FOR MEDICARE OP): Performed by: STUDENT IN AN ORGANIZED HEALTH CARE EDUCATION/TRAINING PROGRAM

## 2023-11-30 RX ORDER — ALPRAZOLAM 0.25 MG/1
0.5 TABLET ORAL 3 TIMES DAILY PRN
Status: DISCONTINUED | OUTPATIENT
Start: 2023-11-30 | End: 2023-12-01 | Stop reason: HOSPADM

## 2023-11-30 RX ORDER — SERTRALINE HYDROCHLORIDE 50 MG/1
50 TABLET, FILM COATED ORAL 2 TIMES DAILY
Status: DISCONTINUED | OUTPATIENT
Start: 2023-11-30 | End: 2023-12-01 | Stop reason: HOSPADM

## 2023-11-30 RX ADMIN — ALPRAZOLAM 0.5 MG: 0.25 TABLET ORAL at 10:59

## 2023-11-30 RX ADMIN — FERROUS SULFATE TAB EC 325 MG (65 MG FE EQUIVALENT) 1 TABLET: 325 (65 FE) TABLET DELAYED RESPONSE at 08:00

## 2023-11-30 RX ADMIN — DEXAMETHASONE SODIUM PHOSPHATE 6 MG: 10 INJECTION INTRAMUSCULAR; INTRAVENOUS at 17:03

## 2023-11-30 RX ADMIN — DOCUSATE SODIUM 100 MG: 100 CAPSULE, LIQUID FILLED ORAL at 09:14

## 2023-11-30 RX ADMIN — IPRATROPIUM BROMIDE AND ALBUTEROL SULFATE 3 ML: 2.5; .5 SOLUTION RESPIRATORY (INHALATION) at 06:42

## 2023-11-30 RX ADMIN — SENNOSIDES 8.6 MG: 8.6 TABLET, FILM COATED ORAL at 09:15

## 2023-11-30 RX ADMIN — IPRATROPIUM BROMIDE AND ALBUTEROL SULFATE 3 ML: 2.5; .5 SOLUTION RESPIRATORY (INHALATION) at 13:18

## 2023-11-30 RX ADMIN — ROSUVASTATIN CALCIUM 10 MG: 10 TABLET, FILM COATED ORAL at 09:15

## 2023-11-30 RX ADMIN — FLUDROCORTISONE ACETATE 0.1 MG: 0.1 TABLET ORAL at 09:15

## 2023-11-30 RX ADMIN — BUSPIRONE HYDROCHLORIDE 15 MG: 10 TABLET ORAL at 09:14

## 2023-11-30 RX ADMIN — TIOTROPIUM BROMIDE INHALATION SPRAY 2 PUFF: 3.12 SPRAY, METERED RESPIRATORY (INHALATION) at 06:49

## 2023-11-30 RX ADMIN — FLUTICASONE FUROATE AND VILANTEROL TRIFENATATE 1 PUFF: 200; 25 POWDER RESPIRATORY (INHALATION) at 06:48

## 2023-11-30 RX ADMIN — Medication 1 PATCH: at 09:24

## 2023-11-30 RX ADMIN — CLOPIDOGREL BISULFATE 75 MG: 75 TABLET ORAL at 09:14

## 2023-11-30 RX ADMIN — BUSPIRONE HYDROCHLORIDE 15 MG: 10 TABLET ORAL at 20:40

## 2023-11-30 RX ADMIN — MIDODRINE HYDROCHLORIDE 5 MG: 5 TABLET ORAL at 09:14

## 2023-11-30 RX ADMIN — SERTRALINE HYDROCHLORIDE 50 MG: 50 TABLET ORAL at 20:41

## 2023-11-30 RX ADMIN — HEPARIN SODIUM 10 UNITS/HR: 10000 INJECTION, SOLUTION INTRAVENOUS at 11:17

## 2023-11-30 RX ADMIN — SERTRALINE HYDROCHLORIDE 100 MG: 100 TABLET ORAL at 09:14

## 2023-11-30 RX ADMIN — FAMOTIDINE 40 MG: 20 TABLET, FILM COATED ORAL at 09:23

## 2023-11-30 RX ADMIN — MONTELUKAST 10 MG: 10 TABLET, FILM COATED ORAL at 20:41

## 2023-11-30 RX ADMIN — AZITHROMYCIN MONOHYDRATE 500 MG: 500 INJECTION, POWDER, LYOPHILIZED, FOR SOLUTION INTRAVENOUS at 09:24

## 2023-11-30 RX ADMIN — ASPIRIN 81 MG: 81 TABLET, COATED ORAL at 09:15

## 2023-11-30 RX ADMIN — REMDESIVIR 100 MG: 100 INJECTION, POWDER, LYOPHILIZED, FOR SOLUTION INTRAVENOUS at 17:03

## 2023-11-30 RX ADMIN — OLANZAPINE 5 MG: 5 TABLET, FILM COATED ORAL at 20:41

## 2023-11-30 RX ADMIN — DOCUSATE SODIUM 100 MG: 100 CAPSULE, LIQUID FILLED ORAL at 20:41

## 2023-11-30 RX ADMIN — CEFTRIAXONE SODIUM 1 G: 1 INJECTION, SOLUTION INTRAVENOUS at 17:03

## 2023-11-30 RX ADMIN — PANTOPRAZOLE SODIUM 40 MG: 40 TABLET, DELAYED RELEASE ORAL at 06:15

## 2023-11-30 RX ADMIN — HYDROCORTISONE 5 MG: 10 TABLET ORAL at 09:15

## 2023-11-30 RX ADMIN — HYDROMORPHONE HYDROCHLORIDE 2 MG: 2 TABLET ORAL at 10:59

## 2023-11-30 ASSESSMENT — COGNITIVE AND FUNCTIONAL STATUS - GENERAL
MOBILITY SCORE: 22
DAILY ACTIVITIY SCORE: 23
WALKING IN HOSPITAL ROOM: A LITTLE
WALKING IN HOSPITAL ROOM: A LITTLE
DAILY ACTIVITIY SCORE: 23
HELP NEEDED FOR BATHING: A LITTLE
MOBILITY SCORE: 22
CLIMB 3 TO 5 STEPS WITH RAILING: A LITTLE
HELP NEEDED FOR BATHING: A LITTLE
CLIMB 3 TO 5 STEPS WITH RAILING: A LITTLE

## 2023-11-30 ASSESSMENT — PAIN SCALES - GENERAL: PAINLEVEL_OUTOF10: 5 - MODERATE PAIN

## 2023-11-30 ASSESSMENT — PAIN SCALES - WONG BAKER: WONGBAKER_NUMERICALRESPONSE: HURTS LITTLE BIT

## 2023-11-30 NOTE — PROGRESS NOTES
11/30/2023  [facility], No PT needs per therapy.  Pt On iv zithromax, iv decadron, iv Remdesivir.  Ct Team will continue to follow.   Opal Whitaker RN TCC

## 2023-11-30 NOTE — CARE PLAN
Problem: Respiratory  Goal: Minimize anxiety/maximize coping throughout shift  Outcome: Progressing  Goal: Minimal/no exertional discomfort or dyspnea this shift  Outcome: Progressing  Goal: No signs of respiratory distress (eg. Use of accessory muscles. Peds grunting)  Outcome: Progressing     Problem: Fall/Injury  Goal: Not fall by end of shift  Outcome: Progressing  Goal: Be free from injury by end of the shift  Outcome: Progressing  Goal: Verbalize understanding of personal risk factors for fall in the hospital  Outcome: Progressing  Goal: Verbalize understanding of risk factor reduction measures to prevent injury from fall in the home  Outcome: Progressing  Goal: Use assistive devices by end of the shift  Outcome: Progressing  Goal: Pace activities to prevent fatigue by end of the shift  Outcome: Progressing     Problem: Skin  Goal: Participates in plan/prevention/treatment measures  Outcome: Progressing  Flowsheets (Taken 11/29/2023 2221)  Participates in plan/prevention/treatment measures:   Elevate heels   Discuss with provider PT/OT consult   Increase activity/out of bed for meals  Goal: Promote/optimize nutrition  Outcome: Progressing     Problem: Pain - Adult  Goal: Verbalizes/displays adequate comfort level or baseline comfort level  Outcome: Progressing   The patient's goals for the shift include  rest and comfort and pain control    The clinical goals for the shift include verbalize improvement in cough and SOB    Over the shift, the patient did not make progress toward the following goals. Barriers to progression include 2L o2NC at baseline. Recommendations to address these barriers include monitor oxygenation, heparin drip

## 2023-11-30 NOTE — CARE PLAN
The patient's goals for the shift include      The clinical goals for the shift include verbalize improvement in cough and SOB    Over the shift, the patient will be pain free and refrain from complications

## 2023-12-01 VITALS
TEMPERATURE: 97 F | OXYGEN SATURATION: 100 % | DIASTOLIC BLOOD PRESSURE: 61 MMHG | SYSTOLIC BLOOD PRESSURE: 121 MMHG | WEIGHT: 135 LBS | RESPIRATION RATE: 20 BRPM | HEART RATE: 77 BPM | BODY MASS INDEX: 23.05 KG/M2 | HEIGHT: 64 IN

## 2023-12-01 LAB
GLUCOSE BLD MANUAL STRIP-MCNC: 122 MG/DL (ref 74–99)
UFH PPP CHRO-ACNC: 0.3 IU/ML

## 2023-12-01 PROCEDURE — 2500000001 HC RX 250 WO HCPCS SELF ADMINISTERED DRUGS (ALT 637 FOR MEDICARE OP): Performed by: NURSE PRACTITIONER

## 2023-12-01 PROCEDURE — 36415 COLL VENOUS BLD VENIPUNCTURE: CPT | Performed by: STUDENT IN AN ORGANIZED HEALTH CARE EDUCATION/TRAINING PROGRAM

## 2023-12-01 PROCEDURE — 2500000002 HC RX 250 W HCPCS SELF ADMINISTERED DRUGS (ALT 637 FOR MEDICARE OP, ALT 636 FOR OP/ED): Performed by: STUDENT IN AN ORGANIZED HEALTH CARE EDUCATION/TRAINING PROGRAM

## 2023-12-01 PROCEDURE — 82947 ASSAY GLUCOSE BLOOD QUANT: CPT

## 2023-12-01 PROCEDURE — 99238 HOSP IP/OBS DSCHRG MGMT 30/<: CPT | Performed by: STUDENT IN AN ORGANIZED HEALTH CARE EDUCATION/TRAINING PROGRAM

## 2023-12-01 PROCEDURE — 85520 HEPARIN ASSAY: CPT | Performed by: STUDENT IN AN ORGANIZED HEALTH CARE EDUCATION/TRAINING PROGRAM

## 2023-12-01 PROCEDURE — 2500000004 HC RX 250 GENERAL PHARMACY W/ HCPCS (ALT 636 FOR OP/ED): Performed by: NURSE PRACTITIONER

## 2023-12-01 PROCEDURE — 2500000004 HC RX 250 GENERAL PHARMACY W/ HCPCS (ALT 636 FOR OP/ED): Performed by: STUDENT IN AN ORGANIZED HEALTH CARE EDUCATION/TRAINING PROGRAM

## 2023-12-01 PROCEDURE — S4991 NICOTINE PATCH NONLEGEND: HCPCS | Performed by: STUDENT IN AN ORGANIZED HEALTH CARE EDUCATION/TRAINING PROGRAM

## 2023-12-01 PROCEDURE — 2500000001 HC RX 250 WO HCPCS SELF ADMINISTERED DRUGS (ALT 637 FOR MEDICARE OP): Performed by: STUDENT IN AN ORGANIZED HEALTH CARE EDUCATION/TRAINING PROGRAM

## 2023-12-01 PROCEDURE — 94640 AIRWAY INHALATION TREATMENT: CPT

## 2023-12-01 RX ORDER — LEVOFLOXACIN 500 MG/1
500 TABLET, FILM COATED ORAL DAILY
Qty: 7 TABLET | Refills: 0 | Status: SHIPPED | OUTPATIENT
Start: 2023-12-01 | End: 2024-01-18 | Stop reason: WASHOUT

## 2023-12-01 RX ORDER — SERTRALINE HYDROCHLORIDE 50 MG/1
50 TABLET, FILM COATED ORAL 2 TIMES DAILY
Status: CANCELLED | OUTPATIENT
Start: 2023-12-01

## 2023-12-01 RX ORDER — DEXAMETHASONE 4 MG/1
4 TABLET ORAL
Qty: 7 TABLET | Refills: 0 | Status: SHIPPED | OUTPATIENT
Start: 2023-12-01 | End: 2024-01-18 | Stop reason: ALTCHOICE

## 2023-12-01 RX ORDER — FERROUS SULFATE 325(65) MG
65 TABLET ORAL
Status: DISCONTINUED | OUTPATIENT
Start: 2023-12-01 | End: 2023-12-01 | Stop reason: HOSPADM

## 2023-12-01 RX ADMIN — IPRATROPIUM BROMIDE AND ALBUTEROL SULFATE 3 ML: 2.5; .5 SOLUTION RESPIRATORY (INHALATION) at 12:00

## 2023-12-01 RX ADMIN — FLUTICASONE FUROATE AND VILANTEROL TRIFENATATE 1 PUFF: 200; 25 POWDER RESPIRATORY (INHALATION) at 06:47

## 2023-12-01 RX ADMIN — DOCUSATE SODIUM 100 MG: 100 CAPSULE, LIQUID FILLED ORAL at 09:46

## 2023-12-01 RX ADMIN — SENNOSIDES 8.6 MG: 8.6 TABLET, FILM COATED ORAL at 09:46

## 2023-12-01 RX ADMIN — FERROUS SULFATE TAB 325 MG (65 MG ELEMENTAL FE) 1 TABLET: 325 (65 FE) TAB at 10:55

## 2023-12-01 RX ADMIN — AZITHROMYCIN MONOHYDRATE 500 MG: 500 INJECTION, POWDER, LYOPHILIZED, FOR SOLUTION INTRAVENOUS at 09:47

## 2023-12-01 RX ADMIN — FAMOTIDINE 40 MG: 20 TABLET, FILM COATED ORAL at 09:46

## 2023-12-01 RX ADMIN — ALPRAZOLAM 0.5 MG: 0.25 TABLET ORAL at 10:33

## 2023-12-01 RX ADMIN — FLUDROCORTISONE ACETATE 0.1 MG: 0.1 TABLET ORAL at 09:50

## 2023-12-01 RX ADMIN — ROSUVASTATIN CALCIUM 10 MG: 10 TABLET, FILM COATED ORAL at 09:46

## 2023-12-01 RX ADMIN — MIDODRINE HYDROCHLORIDE 5 MG: 5 TABLET ORAL at 09:46

## 2023-12-01 RX ADMIN — ASPIRIN 81 MG: 81 TABLET, COATED ORAL at 09:46

## 2023-12-01 RX ADMIN — HEPARIN SODIUM 1000 UNITS/HR: 10000 INJECTION, SOLUTION INTRAVENOUS at 12:56

## 2023-12-01 RX ADMIN — IPRATROPIUM BROMIDE AND ALBUTEROL SULFATE 3 ML: 2.5; .5 SOLUTION RESPIRATORY (INHALATION) at 06:37

## 2023-12-01 RX ADMIN — SERTRALINE HYDROCHLORIDE 50 MG: 50 TABLET ORAL at 09:46

## 2023-12-01 RX ADMIN — PANTOPRAZOLE SODIUM 40 MG: 40 TABLET, DELAYED RELEASE ORAL at 07:53

## 2023-12-01 RX ADMIN — HYDROMORPHONE HYDROCHLORIDE 2 MG: 2 TABLET ORAL at 10:33

## 2023-12-01 RX ADMIN — CEFTRIAXONE SODIUM 1 G: 1 INJECTION, SOLUTION INTRAVENOUS at 14:17

## 2023-12-01 RX ADMIN — TIOTROPIUM BROMIDE INHALATION SPRAY 2 PUFF: 3.12 SPRAY, METERED RESPIRATORY (INHALATION) at 06:48

## 2023-12-01 RX ADMIN — CLOPIDOGREL BISULFATE 75 MG: 75 TABLET ORAL at 09:46

## 2023-12-01 RX ADMIN — Medication 1 PATCH: at 09:50

## 2023-12-01 RX ADMIN — REMDESIVIR 100 MG: 100 INJECTION, POWDER, LYOPHILIZED, FOR SOLUTION INTRAVENOUS at 13:10

## 2023-12-01 RX ADMIN — BUSPIRONE HYDROCHLORIDE 15 MG: 10 TABLET ORAL at 09:46

## 2023-12-01 RX ADMIN — HYDROCORTISONE 5 MG: 10 TABLET ORAL at 09:46

## 2023-12-01 RX ADMIN — DEXAMETHASONE SODIUM PHOSPHATE 6 MG: 10 INJECTION INTRAMUSCULAR; INTRAVENOUS at 13:10

## 2023-12-01 ASSESSMENT — COGNITIVE AND FUNCTIONAL STATUS - GENERAL
WALKING IN HOSPITAL ROOM: A LITTLE
DAILY ACTIVITIY SCORE: 23
MOBILITY SCORE: 22
HELP NEEDED FOR BATHING: A LITTLE
CLIMB 3 TO 5 STEPS WITH RAILING: A LITTLE

## 2023-12-01 ASSESSMENT — PAIN SCALES - GENERAL
PAINLEVEL_OUTOF10: 6
PAINLEVEL_OUTOF10: 0 - NO PAIN

## 2023-12-01 ASSESSMENT — PAIN - FUNCTIONAL ASSESSMENT: PAIN_FUNCTIONAL_ASSESSMENT: 0-10

## 2023-12-01 NOTE — DISCHARGE SUMMARY
Discharge Diagnosis  COVID    Issues Requiring Follow-Up  covid    Test Results Pending At Discharge  Pending Labs       Order Current Status    Blood Culture Preliminary result    Blood Culture Preliminary result            Hospital Course   COVID  NSTEMI, pneumonia  Admit to general medicine, intermediate care under Dr. Davies  Inpatient  Telemetry  Droplet precautions   Continue Heparin gtt  IV Dexamethasone   IV Remdesivir    IV Ceftriaxone and IV Zithromax   Appreciate Cardiology recommendations  Titrate oxygen to maintain a saturation of >90%      Chronic conditions  HTN, HLD, COPD, lung cancer, anemia, DVT, PVD, anxiety, depression  Continue home medications as appropriate     DVT Prophylaxis  Patient is currently on a Heparin gtt  SCDs  At her baseline, wants to go hoem  Cardiology cleared  Dc on levaquin and decadron for 1 week    Pertinent Physical Exam At Time of Discharge  Physical Exam    Home Medications     Medication List      START taking these medications     dexAMETHasone 4 mg tablet; Commonly known as: Decadron; Take 1 tablet (4   mg) by mouth once daily with a meal for 7 days.   levoFLOXacin 500 mg tablet; Commonly known as: Levaquin; Take 1 tablet   (500 mg) by mouth once daily.     CHANGE how you take these medications     ondansetron ODT 4 mg disintegrating tablet; Commonly known as:   Zofran-ODT; DISSOLVE 1 TABLET IN MOUTH BY MOUTH THREE TIMES A DAY AS   NEEDED NAUSEA; What changed: how much to take, how to take this, when to   take this, reasons to take this     CONTINUE taking these medications     albuterol 90 mcg/actuation inhaler; Inhale 1 puff every 4 hours if   needed for shortness of breath.   aspirin 81 mg EC tablet   busPIRone 15 mg tablet; Commonly known as: Buspar   clopidogrel 75 mg tablet; Commonly known as: Plavix; Take 1 tablet (75   mg) by mouth once daily.   cyclobenzaprine 10 mg tablet; Commonly known as: Flexeril; Take 1 tablet   (10 mg) by mouth 3 times a day as needed for  muscle spasms.   famotidine 40 mg tablet; Commonly known as: Pepcid   ferrous sulfate 325 (65 Fe) MG EC tablet   fludrocortisone 0.1 mg tablet; Commonly known as: Florinef; TAKE 1/2   TABLET BY MOUTH ONCE DAILY   hydrocortisone 5 mg tablet; Commonly known as: Cortef; STARTING ON 9/8   TAKE 2 TABS AT 8AM THEN TAKE 1 TABLET AT NOON THEN TAKE 1/2 TABLET AT 4PM   HYDROmorphone 4 mg tablet; Commonly known as: Dilaudid   ipratropium-albuteroL 0.5-2.5 mg/3 mL nebulizer solution; Commonly known   as: Duo-Neb; Take 3 mL by nebulization 4 times a day.   montelukast 10 mg tablet; Commonly known as: Singulair; Take 1 tablet   (10 mg) by mouth once daily at bedtime.   naloxone 4 mg/0.1 mL nasal spray; Commonly known as: Narcan; Administer   1 spray (4 mg) into affected nostril(s) if needed for opioid reversal. May   repeat every 2-3 minutes if needed, alternating nostrils, until medical   assistance becomes available.   OLANZapine 5 mg tablet; Commonly known as: ZyPREXA; Take 1 tablet (5 mg)   by mouth once daily at bedtime.   pantoprazole 40 mg EC tablet; Commonly known as: ProtoNix   PROBIOTIC ORAL   prochlorperazine 5 mg tablet; Commonly known as: Compazine; Take 1   tablet (5 mg) by mouth every 6 hours if needed for nausea or vomiting.   rosuvastatin 10 mg tablet; Commonly known as: Crestor; Take 1 tablet (10   mg) by mouth once daily.   sennosides 8.6 mg tablet; Commonly known as: senna; Take 1 tablet (8.6   mg) by mouth once daily.   * sertraline 50 mg tablet; Commonly known as: Zoloft   * sertraline 100 mg tablet; Commonly known as: Zoloft; Take 1 tablet   (100 mg) by mouth once daily.   Trelegy Ellipta 200-62.5-25 mcg blister with device; Generic drug:   fluticasone-umeclidin-vilanter; Inhale 1 puff once daily.  * This list has 2 medication(s) that are the same as other medications   prescribed for you. Read the directions carefully, and ask your doctor or   other care provider to review them with you.     STOP taking  these medications     amLODIPine 5 mg tablet; Commonly known as: Norvasc       Outpatient Follow-Up  Future Appointments   Date Time Provider Department Center   12/13/2023  1:15 PM PAR CT 1 PARCT [facility]   12/20/2023 11:30 AM Berny Burnham MD [id number] [place]   12/20/2023 12:00 PM INF 09 PARMA [id number] [place]   5/7/2024  8:00 AM DO KRISSY [address]       Nico Davies DO   No

## 2023-12-01 NOTE — CARE PLAN
Problem: Respiratory  Goal: Minimize anxiety/maximize coping throughout shift  Outcome: Progressing  Goal: Minimal/no exertional discomfort or dyspnea this shift  Outcome: Progressing  Goal: No signs of respiratory distress (eg. Use of accessory muscles. Peds grunting)  Outcome: Progressing     Problem: Fall/Injury  Goal: Not fall by end of shift  Outcome: Progressing  Goal: Be free from injury by end of the shift  Outcome: Progressing  Goal: Verbalize understanding of personal risk factors for fall in the hospital  Outcome: Progressing  Goal: Verbalize understanding of risk factor reduction measures to prevent injury from fall in the home  Outcome: Progressing  Goal: Use assistive devices by end of the shift  Outcome: Progressing  Goal: Pace activities to prevent fatigue by end of the shift  Outcome: Progressing     Problem: Skin  Goal: Participates in plan/prevention/treatment measures  Outcome: Progressing  Flowsheets (Taken 12/1/2023 7342)  Participates in plan/prevention/treatment measures:   Increase activity/out of bed for meals   Elevate heels   Discuss with provider PT/OT consult  Goal: Promote/optimize nutrition  Outcome: Progressing     Problem: Pain - Adult  Goal: Verbalizes/displays adequate comfort level or baseline comfort level  Outcome: Progressing     Problem: Safety - Adult  Goal: Free from fall injury  Outcome: Progressing     Problem: Discharge Planning  Goal: Discharge to home or other facility with appropriate resources  Outcome: Progressing     Problem: Chronic Conditions and Co-morbidities  Goal: Patient's chronic conditions and co-morbidity symptoms are monitored and maintained or improved  Outcome: Progressing   The patient's goals for the shift include      The clinical goals for the shift include patient will remain free of falls and injury through end of shift    Over the shift, the patient did not make progress toward the following goals. Barriers to progression include Dx.  Recommendations to address these barriers include education.

## 2023-12-01 NOTE — CARE PLAN
The patient's goals for the shift include      The clinical goals for the shift include patient will be pain free and be free of complications    Problem: Respiratory  Goal: Minimize anxiety/maximize coping throughout shift  Outcome: Progressing  Goal: Minimal/no exertional discomfort or dyspnea this shift  Outcome: Progressing  Goal: No signs of respiratory distress (eg. Use of accessory muscles. Peds grunting)  Outcome: Progressing     Problem: Fall/Injury  Goal: Not fall by end of shift  Outcome: Progressing  Goal: Be free from injury by end of the shift  Outcome: Progressing  Goal: Verbalize understanding of personal risk factors for fall in the hospital  Outcome: Progressing  Goal: Verbalize understanding of risk factor reduction measures to prevent injury from fall in the home  Outcome: Progressing  Goal: Use assistive devices by end of the shift  Outcome: Progressing  Goal: Pace activities to prevent fatigue by end of the shift  Outcome: Progressing

## 2023-12-02 LAB
BACTERIA BLD CULT: NORMAL
BACTERIA BLD CULT: NORMAL

## 2023-12-04 ENCOUNTER — DOCUMENTATION (OUTPATIENT)
Dept: CARE COORDINATION | Facility: CLINIC | Age: 68
End: 2023-12-04
Payer: MEDICARE

## 2023-12-04 ENCOUNTER — TELEPHONE (OUTPATIENT)
Dept: HEMATOLOGY/ONCOLOGY | Facility: HOSPITAL | Age: 68
End: 2023-12-04
Payer: MEDICARE

## 2023-12-04 DIAGNOSIS — G89.3 CANCER RELATED PAIN: Primary | ICD-10-CM

## 2023-12-04 RX ORDER — HYDROMORPHONE HYDROCHLORIDE 4 MG/1
2 TABLET ORAL 3 TIMES DAILY PRN
Qty: 45 TABLET | Refills: 0 | Status: SHIPPED | OUTPATIENT
Start: 2023-12-04 | End: 2023-12-13 | Stop reason: SDUPTHER

## 2023-12-04 RX ORDER — HYDROMORPHONE HYDROCHLORIDE 4 MG/1
2 TABLET ORAL 2 TIMES DAILY PRN
Qty: 45 TABLET | Refills: 0 | Status: CANCELLED | OUTPATIENT
Start: 2023-12-04

## 2023-12-04 NOTE — PROGRESS NOTES
Discharge Facility: [facility]  Discharge Diagnosis:COVID  Admission Date:11.28.23  Discharge Date: 12.1.23    PCP Appointment Date:no appt; patient to call  Specialist Appointment Date:   Hospital Encounter and Summary: Linked   See discharge assessment below for further details   Engagement  Call Start Time: 1113 (12/4/2023 11:12 AM)    Medications  Medications reviewed with patient/caregiver?: Yes (12/4/2023 11:12 AM)  Is the patient having any side effects they believe may be caused by any medication additions or changes?: No (12/4/2023 11:12 AM)  Does the patient have all medications ordered at discharge?: Yes (12/4/2023 11:12 AM)  Is the patient taking all medications as directed (includes completed medication regime)?: Yes (12/4/2023 11:12 AM)  Care Management Interventions: Provided patient education (12/4/2023 11:12 AM)    Appointments  Does the patient have a primary care provider?: Yes (12/4/2023 11:12 AM)  Care Management Interventions: Educated patient on importance of making appointment (patient stated will call office) (12/4/2023 11:12 AM)  Has the patient kept scheduled appointments due by today?: Yes (12/4/2023 11:12 AM)  Care Management Interventions: Advised patient to keep appointment (12/4/2023 11:12 AM)    Self Management  What is the home health agency?: n/a (12/4/2023 11:12 AM)  What Durable Medical Equipment (DME) was ordered?: n/a (12/4/2023 11:12 AM)    Patient Teaching  Does the patient have access to their discharge instructions?: Yes (12/4/2023 11:12 AM)  Care Management Interventions: Reviewed instructions with patient (12/4/2023 11:12 AM)  What is the patient's perception of their health status since discharge?: Improving (12/4/2023 11:12 AM)  Is the patient/caregiver able to teach back the hierarchy of who to call/visit for symptoms/problems? PCP, Specialist, Home Health nurse, Urgent Care, ED, 911: Yes (12/4/2023 11:12 AM)  Patient/Caregiver Education Comments: Spoke with patient.  States doing much better. breathing normally. Inquired about making appt with PCP for follow up and she stated she is busy with other treatments but would call office directly. (12/4/2023 11:12 AM)

## 2023-12-04 NOTE — TELEPHONE ENCOUNTER
Refill request received for Dilaudid 4mg.  Preferred pharmacy is [facility] at the corner of [facility].  Message sent to Palliative Care tteam.

## 2023-12-06 ENCOUNTER — TELEMEDICINE (OUTPATIENT)
Dept: HEMATOLOGY/ONCOLOGY | Facility: HOSPITAL | Age: 68
End: 2023-12-06
Payer: MEDICARE

## 2023-12-06 DIAGNOSIS — C34.91 NON-SMALL CELL CANCER OF RIGHT LUNG (MULTI): ICD-10-CM

## 2023-12-06 PROCEDURE — 99441 PR PHYS/QHP TELEPHONE EVALUATION 5-10 MIN: CPT | Performed by: INTERNAL MEDICINE

## 2023-12-10 NOTE — PROGRESS NOTES
Medical Oncology Follow-up Visit  Telephone visit    Patient ID: Radha Toussaint is a 68 y.o. female.    Current therapy: carboplatin/paclitaxel/pembrolizumab     Chief Concern: Telephonic  visit to follow-up    Subjective    HPI:  Pt missed cycle 4 chemoIO as she was hospitalized with COVID. Still feels very fatigued and slightly short of breath but feels she is overall imrpoving. She is eating well and her pain is well controlled.     Diagnosis: NSCLC - poorly differentiated carcinoma, suspect lung primary  Stage:  Cancer Staging   Non-small cell lung cancer (NSCLC) (CMS/HCC)  Staging form: Lung, AJCC 8th Edition  - Clinical: Stage IVB (cTX, cNX, pM1c) - Signed by Francheska Parada MD on 9/23/2023     Current sites of disease: Adrenals bilaterally, possible lung/hilum  Molecular and ancillary testing:   PD-L1 70%  LAKHWINDER amplification   B2M p.[id number] ([id number] c.[id number]), subclonal   CDKN2A p.A127P (NM_000077 c.379G>C)   NRAS p.G12A (NM_002524 c.35G>C), subclonal   TP53 p.R249S (NM_[id number] c.747G>T)     Oncologic History:  7/26/23 - Presented to ER with 1m of weakness, dyspnea, abd pain and prior fall, Found to have bilateral adrenal masses on CT A/P, possible R hilar LN  8/11/23 - Biopsy of adrenal mass with poorly differentiated carcinoma  8/29-9/2/23 - Admitted to [facility] with progressive weakness, hypotension, found to have adrenal insufficiency due to bilateral adrenal masses. Improved with hydrocortisone. PET scan completed without obvious primary (small effusion, small lung nodules, R  hilar LN mildly FDG-avid)  9/14/23 - C1 carboplatin/paclitaxel/pembrolizumab (dose-reduced chemotherapy)    Oncology History   Non-small cell lung cancer (NSCLC) (CMS/HCC)   9/14/2023 -  Chemotherapy    Pembrolizumab + PACLitaxel / CARBOplatin, 21 Day Cycles     9/21/2023 Initial Diagnosis    Non-small cell lung cancer (NSCLC) (CMS/HCC)     9/23/2023 Cancer Staged    Staging form: Lung, AJCC 8th Edition, Clinical:  Stage IVB (cTX, cNX, pM1c) - Signed by Francheska Parada MD on 9/23/2023         Objective    BSA: There is no height or weight on file to calculate BSA.  There were no vitals taken for this visit.  - telephonic visit.      Physical Exam  Performance Status:  Symptomatic; fully ambulatory    Labs:    Lab Results   Component Value Date    WBC 8.3 11/30/2023    HGB 11.8 (L) 11/30/2023    HCT 39.9 11/30/2023    MCV 80 11/30/2023     11/30/2023      Lab Results   Component Value Date    NEUTROABS 8.80 (H) 11/28/2023      Lab Results   Component Value Date    GLUCOSE 99 11/29/2023    CALCIUM 9.0 11/29/2023     11/29/2023    K 5.3 11/29/2023    CO2 25 11/29/2023     11/29/2023    BUN 20 11/29/2023    CREATININE 0.70 11/29/2023    MG 1.73 11/28/2023     Lab Results   Component Value Date    ALT 9 11/29/2023    AST 17 11/29/2023    ALKPHOS 62 11/29/2023    BILITOT 0.3 11/29/2023    BILIDIR 0.2 08/21/2023      Lab Results   Component Value Date    ACTH 36.4 11/01/2023    CORTISOL 15.0 11/01/2023    TSH 2.63 11/01/2023    FREET4 1.20 (H) 09/05/2023       Imaging:  === Results for orders placed during the hospital encounter of 11/28/23 ===    CT angio chest for pulmonary embolism [[id number]] 11/28/2023    Status: Normal  No evidence of a pulmonary embolism.  Mild patchy ground-glass opacities without consolidation.  Emphysematous changes.  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 Bilateral adrenal masses.  Stable subcentimeter pulmonary nodules. No new pulmonary nodule or  mass. Additional unchanged findings as above.    MACRO:  None.    Signed by: Jonathan Ng 11/28/2023 9:13 AM  Dictation workstation:   [id number]    Assessment/Plan       Radha Toussaint is a 68 y.o. female here for follow up of poorly differentiated carcinoma involving bilateral adrenal glands, likely of lung primary, with PD-L1 70% and no  "actionable alterations.     Reviewed overall work-up to date with patient and family previously including implications of stage IV disease, and suspicion for lung cancer though not clearly proven (she has history of tobacco use, and does have R hilar lymph node as well as lung nodules  which although not overly suspicious, in the right context may represent primary site).     Discussed options and given burden of disease, elected to proceed with carboplatin/paclitaxel and pembro instead of pembro alone. NGS did not show actionable mutations.     S/p C1, with hospitalization for severe fatigue.     Of note, \"cancer type\" test resulted and showed high suspicion for sarcoma; I have again discussed our case with pathology and they are confident histologically this does not represent sarcoma. I think the molecular pattern is relatively non-specific therefore will continue current plan. Additionally, recent imaging with ?colonic mass vs transient changes, will follow and if concern can consider colonoscopy in the future.     #Poorly differentiated carcinoma - likely lung primary  - She completed 3 cycles of carbo/taxol/pembro with dose reduction of carboplatin/taxol (carbo AUC 4 and taxol 140mg/m2) given tolerance  - She missed cycle 4 due to hospitalization with COVID  - Discussed today options - can reschedule for C4 either here or [place] and push scan back vs keep scan next week and move forward with pembro. She feels she cannot do more chemotherapy and wishes to keep CT and transition to pembro and defer C4.  - She is scheduled to transition care back to Dr. Burnham on 12/20   - Anti-emetics Rx's in place  - Dilaudid 2mg q4h PRN Rx in place per supportive onc    #Anxiety/Depression  - Ativan BID 0.5mg didn't really help  - Hydroxyzine with limited benefit, states symptoms worsen as med wears off  - Referred to supportive oncology given their management inpatient and her significant pain and anxiety (though pain now " much improved), and for support moving forward given complexity of her situation, may need to transition to supportive onc team in [place] when transferring care  - Previously referred to psycho-oncology as she would likely benefit from focused management of depression as well. She also endorses severe mood changes on prednisone in the past and given possible need for them in the future would help to have psychiatry  team following prior   - Dr. Richardson appt scheduled on same day as rescheduled C3. Discussed not sure if onco-psych at [place], can refer to psychiatry in general if she would like as she does not wish to come to main campus for visit    - Monitor for worsening neuropathy on chemotherapy     - Monitor mood with chemo/steroids - hx of ?psychosis with steroids in the past     Will call her with CT results and subsequently she will transition care to Dr. Burnham and they will let us know if we can help in the future.    Francheska Parada MD    Time spent on call 5 min

## 2023-12-13 ENCOUNTER — HOSPITAL ENCOUNTER (OUTPATIENT)
Dept: RADIOLOGY | Facility: HOSPITAL | Age: 68
Discharge: HOME | End: 2023-12-13
Payer: MEDICARE

## 2023-12-13 DIAGNOSIS — C34.91 NON-SMALL CELL CANCER OF RIGHT LUNG (MULTI): ICD-10-CM

## 2023-12-13 PROCEDURE — 71260 CT THORAX DX C+: CPT

## 2023-12-13 PROCEDURE — 2550000001 HC RX 255 CONTRASTS: Performed by: INTERNAL MEDICINE

## 2023-12-13 PROCEDURE — 71260 CT THORAX DX C+: CPT | Performed by: RADIOLOGY

## 2023-12-13 PROCEDURE — 74177 CT ABD & PELVIS W/CONTRAST: CPT | Performed by: RADIOLOGY

## 2023-12-13 RX ORDER — HYDROMORPHONE HYDROCHLORIDE 4 MG/1
2 TABLET ORAL 3 TIMES DAILY PRN
Qty: 45 TABLET | Refills: 0 | Status: SHIPPED | OUTPATIENT
Start: 2023-12-13 | End: 2024-01-12 | Stop reason: SDUPTHER

## 2023-12-13 RX ADMIN — IOHEXOL 75 ML: 350 INJECTION, SOLUTION INTRAVENOUS at 13:42

## 2023-12-13 NOTE — TELEPHONE ENCOUNTER
I called Thais MONZON [phone] at [address] in [place] and confirmed they have medication in stock. I called patient who was aware of this and agreeable. Patient accepted FUV with Dr. Lerner on 1/3/24 at 1pm but says she is transferring her care to [place] and meets Dr. Burnham next week. Patient says she would like them to take over her pain medications, as well, and will ask next week. Patient agreeable to call our office with an update. Script pended to provider to sign.

## 2023-12-13 NOTE — TELEPHONE ENCOUNTER
Rachel from [facility] calls to state that they do not have 4mg Dilaudid in stock at their location but they did locate it in stock at [facility] at [address] in [place], pharmacy phone number [phone]. She requests that the script be re-sent to that location.

## 2023-12-18 ENCOUNTER — PATIENT OUTREACH (OUTPATIENT)
Dept: CARE COORDINATION | Facility: CLINIC | Age: 68
End: 2023-12-18
Payer: MEDICARE

## 2023-12-18 NOTE — PROGRESS NOTES
Call placed regarding one month post discharge follow up call.             At time of outreach call the patient feels as if their condition has              improved since initial visit with PCP or specialist.             Questions or concerns regarding recovery period addressed at this time.              Reviewed any PCP or specialists progress notes/labs/radiology reports if applicable             and addressed any questions or concerns.

## 2023-12-20 ENCOUNTER — LAB (OUTPATIENT)
Dept: LAB | Facility: CLINIC | Age: 68
End: 2023-12-20
Payer: MEDICARE

## 2023-12-20 ENCOUNTER — INFUSION (OUTPATIENT)
Dept: HEMATOLOGY/ONCOLOGY | Facility: CLINIC | Age: 68
End: 2023-12-20
Payer: MEDICARE

## 2023-12-20 ENCOUNTER — OFFICE VISIT (OUTPATIENT)
Dept: HEMATOLOGY/ONCOLOGY | Facility: CLINIC | Age: 68
End: 2023-12-20
Payer: MEDICARE

## 2023-12-20 VITALS
TEMPERATURE: 97.2 F | BODY MASS INDEX: 23.44 KG/M2 | HEART RATE: 80 BPM | RESPIRATION RATE: 20 BRPM | DIASTOLIC BLOOD PRESSURE: 83 MMHG | WEIGHT: 132.28 LBS | HEIGHT: 63 IN | SYSTOLIC BLOOD PRESSURE: 137 MMHG

## 2023-12-20 DIAGNOSIS — C34.90 NON-SMALL CELL LUNG CANCER, UNSPECIFIED LATERALITY (MULTI): ICD-10-CM

## 2023-12-20 DIAGNOSIS — C34.90 NON-SMALL CELL LUNG CANCER, UNSPECIFIED LATERALITY (MULTI): Primary | ICD-10-CM

## 2023-12-20 LAB
ALBUMIN SERPL BCP-MCNC: 3.7 G/DL (ref 3.4–5)
ALP SERPL-CCNC: 68 U/L (ref 33–136)
ALT SERPL W P-5'-P-CCNC: 7 U/L (ref 7–45)
ANION GAP SERPL CALC-SCNC: 10 MMOL/L (ref 10–20)
AST SERPL W P-5'-P-CCNC: 11 U/L (ref 9–39)
BASOPHILS # BLD AUTO: 0.07 X10*3/UL (ref 0–0.1)
BASOPHILS NFR BLD AUTO: 0.7 %
BILIRUB SERPL-MCNC: 0.3 MG/DL (ref 0–1.2)
BUN SERPL-MCNC: 14 MG/DL (ref 6–23)
CALCIUM SERPL-MCNC: 9.1 MG/DL (ref 8.6–10.3)
CHLORIDE SERPL-SCNC: 102 MMOL/L (ref 98–107)
CO2 SERPL-SCNC: 28 MMOL/L (ref 21–32)
CREAT SERPL-MCNC: 0.64 MG/DL (ref 0.5–1.05)
DACRYOCYTES BLD QL SMEAR: NORMAL
EOSINOPHIL # BLD AUTO: 0.1 X10*3/UL (ref 0–0.7)
EOSINOPHIL NFR BLD AUTO: 1 %
ERYTHROCYTE [DISTWIDTH] IN BLOOD BY AUTOMATED COUNT: 20.1 % (ref 11.5–14.5)
GFR SERPL CREATININE-BSD FRML MDRD: >90 ML/MIN/1.73M*2
GLUCOSE SERPL-MCNC: 124 MG/DL (ref 74–99)
HCT VFR BLD AUTO: 45.5 % (ref 36–46)
HGB BLD-MCNC: 14 G/DL (ref 12–16)
IMM GRANULOCYTES # BLD AUTO: 0.04 X10*3/UL (ref 0–0.7)
IMM GRANULOCYTES NFR BLD AUTO: 0.4 % (ref 0–0.9)
LYMPHOCYTES # BLD AUTO: 2.17 X10*3/UL (ref 1.2–4.8)
LYMPHOCYTES NFR BLD AUTO: 22.3 %
MCH RBC QN AUTO: 24.7 PG (ref 26–34)
MCHC RBC AUTO-ENTMCNC: 30.8 G/DL (ref 32–36)
MCV RBC AUTO: 80 FL (ref 80–100)
MONOCYTES # BLD AUTO: 0.62 X10*3/UL (ref 0.1–1)
MONOCYTES NFR BLD AUTO: 6.4 %
NEUTROPHILS # BLD AUTO: 6.73 X10*3/UL (ref 1.2–7.7)
NEUTROPHILS NFR BLD AUTO: 69.2 %
NRBC BLD-RTO: 0 /100 WBCS (ref 0–0)
OVALOCYTES BLD QL SMEAR: NORMAL
PLATELET # BLD AUTO: 296 X10*3/UL (ref 150–450)
POTASSIUM SERPL-SCNC: 3.8 MMOL/L (ref 3.5–5.3)
PROT SERPL-MCNC: 7.2 G/DL (ref 6.4–8.2)
RBC # BLD AUTO: 5.67 X10*6/UL (ref 4–5.2)
RBC MORPH BLD: NORMAL
SCHISTOCYTES BLD QL SMEAR: NORMAL
SODIUM SERPL-SCNC: 136 MMOL/L (ref 136–145)
WBC # BLD AUTO: 9.7 X10*3/UL (ref 4.4–11.3)

## 2023-12-20 PROCEDURE — 4004F PT TOBACCO SCREEN RCVD TLK: CPT | Performed by: INTERNAL MEDICINE

## 2023-12-20 PROCEDURE — 99215 OFFICE O/P EST HI 40 MIN: CPT | Performed by: INTERNAL MEDICINE

## 2023-12-20 PROCEDURE — 80053 COMPREHEN METABOLIC PANEL: CPT

## 2023-12-20 PROCEDURE — 1111F DSCHRG MED/CURRENT MED MERGE: CPT | Performed by: INTERNAL MEDICINE

## 2023-12-20 PROCEDURE — 1126F AMNT PAIN NOTED NONE PRSNT: CPT | Performed by: INTERNAL MEDICINE

## 2023-12-20 PROCEDURE — 3075F SYST BP GE 130 - 139MM HG: CPT | Performed by: INTERNAL MEDICINE

## 2023-12-20 PROCEDURE — 96413 CHEMO IV INFUSION 1 HR: CPT

## 2023-12-20 PROCEDURE — 85025 COMPLETE CBC W/AUTO DIFF WBC: CPT

## 2023-12-20 PROCEDURE — 3079F DIAST BP 80-89 MM HG: CPT | Performed by: INTERNAL MEDICINE

## 2023-12-20 PROCEDURE — 3008F BODY MASS INDEX DOCD: CPT | Performed by: INTERNAL MEDICINE

## 2023-12-20 PROCEDURE — 2500000004 HC RX 250 GENERAL PHARMACY W/ HCPCS (ALT 636 FOR OP/ED): Performed by: INTERNAL MEDICINE

## 2023-12-20 PROCEDURE — 1160F RVW MEDS BY RX/DR IN RCRD: CPT | Performed by: INTERNAL MEDICINE

## 2023-12-20 PROCEDURE — 1159F MED LIST DOCD IN RCRD: CPT | Performed by: INTERNAL MEDICINE

## 2023-12-20 PROCEDURE — 36415 COLL VENOUS BLD VENIPUNCTURE: CPT

## 2023-12-20 RX ORDER — EPINEPHRINE 0.3 MG/.3ML
0.3 INJECTION SUBCUTANEOUS EVERY 5 MIN PRN
Status: CANCELLED | OUTPATIENT
Start: 2023-12-20

## 2023-12-20 RX ORDER — PROCHLORPERAZINE EDISYLATE 5 MG/ML
10 INJECTION INTRAMUSCULAR; INTRAVENOUS EVERY 6 HOURS PRN
Status: CANCELLED | OUTPATIENT
Start: 2023-12-20

## 2023-12-20 RX ORDER — EPINEPHRINE 0.3 MG/.3ML
0.3 INJECTION SUBCUTANEOUS EVERY 5 MIN PRN
Status: DISCONTINUED | OUTPATIENT
Start: 2023-12-20 | End: 2023-12-20 | Stop reason: HOSPADM

## 2023-12-20 RX ORDER — FAMOTIDINE 10 MG/ML
20 INJECTION INTRAVENOUS ONCE AS NEEDED
Status: CANCELLED | OUTPATIENT
Start: 2023-12-20

## 2023-12-20 RX ORDER — PROCHLORPERAZINE MALEATE 5 MG
10 TABLET ORAL EVERY 6 HOURS PRN
Status: CANCELLED | OUTPATIENT
Start: 2024-01-31

## 2023-12-20 RX ORDER — EPINEPHRINE 0.3 MG/.3ML
0.3 INJECTION SUBCUTANEOUS EVERY 5 MIN PRN
Status: CANCELLED | OUTPATIENT
Start: 2024-01-31

## 2023-12-20 RX ORDER — DIPHENHYDRAMINE HYDROCHLORIDE 50 MG/ML
50 INJECTION INTRAMUSCULAR; INTRAVENOUS AS NEEDED
Status: CANCELLED | OUTPATIENT
Start: 2024-01-31

## 2023-12-20 RX ORDER — FAMOTIDINE 10 MG/ML
20 INJECTION INTRAVENOUS ONCE AS NEEDED
Status: CANCELLED | OUTPATIENT
Start: 2024-01-31

## 2023-12-20 RX ORDER — FAMOTIDINE 10 MG/ML
20 INJECTION INTRAVENOUS ONCE AS NEEDED
Status: DISCONTINUED | OUTPATIENT
Start: 2023-12-20 | End: 2023-12-20 | Stop reason: HOSPADM

## 2023-12-20 RX ORDER — PROCHLORPERAZINE MALEATE 5 MG
10 TABLET ORAL EVERY 6 HOURS PRN
Status: CANCELLED | OUTPATIENT
Start: 2023-12-20

## 2023-12-20 RX ORDER — DIPHENHYDRAMINE HYDROCHLORIDE 50 MG/ML
50 INJECTION INTRAMUSCULAR; INTRAVENOUS AS NEEDED
Status: CANCELLED | OUTPATIENT
Start: 2023-12-20

## 2023-12-20 RX ORDER — ALBUTEROL SULFATE 0.83 MG/ML
3 SOLUTION RESPIRATORY (INHALATION) AS NEEDED
Status: CANCELLED | OUTPATIENT
Start: 2023-12-20

## 2023-12-20 RX ORDER — DIPHENHYDRAMINE HYDROCHLORIDE 50 MG/ML
50 INJECTION INTRAMUSCULAR; INTRAVENOUS AS NEEDED
Status: DISCONTINUED | OUTPATIENT
Start: 2023-12-20 | End: 2023-12-20 | Stop reason: HOSPADM

## 2023-12-20 RX ORDER — PROCHLORPERAZINE EDISYLATE 5 MG/ML
10 INJECTION INTRAMUSCULAR; INTRAVENOUS EVERY 6 HOURS PRN
Status: CANCELLED | OUTPATIENT
Start: 2024-01-31

## 2023-12-20 RX ORDER — ALBUTEROL SULFATE 0.83 MG/ML
3 SOLUTION RESPIRATORY (INHALATION) AS NEEDED
Status: CANCELLED | OUTPATIENT
Start: 2024-01-31

## 2023-12-20 RX ORDER — ALBUTEROL SULFATE 0.83 MG/ML
3 SOLUTION RESPIRATORY (INHALATION) AS NEEDED
Status: DISCONTINUED | OUTPATIENT
Start: 2023-12-20 | End: 2023-12-20 | Stop reason: HOSPADM

## 2023-12-20 RX ADMIN — SODIUM CHLORIDE 400 MG: 9 INJECTION, SOLUTION INTRAVENOUS at 13:27

## 2023-12-20 ASSESSMENT — COLUMBIA-SUICIDE SEVERITY RATING SCALE - C-SSRS
2. HAVE YOU ACTUALLY HAD ANY THOUGHTS OF KILLING YOURSELF?: NO
6. HAVE YOU EVER DONE ANYTHING, STARTED TO DO ANYTHING, OR PREPARED TO DO ANYTHING TO END YOUR LIFE?: NO
1. IN THE PAST MONTH, HAVE YOU WISHED YOU WERE DEAD OR WISHED YOU COULD GO TO SLEEP AND NOT WAKE UP?: NO

## 2023-12-20 ASSESSMENT — PAIN SCALES - GENERAL: PAINLEVEL: 0-NO PAIN

## 2023-12-20 NOTE — SIGNIFICANT EVENT
12/20/23 1240   Prechemo Checklist   Has the patient been in the hospital, ED, or urgent care since last date of service Yes   Chemo/Immuno Consent Signed Yes   Protocol/Indications Verified Yes   Confirmed to previous date/time of medication Yes   Compared to previous dose Yes   All medications are dated accurately Yes   Pregnancy Test Negative No   Parameters Met Yes   BSA/Weight-Height Verified Yes   Dose Calculations Verified Yes

## 2023-12-20 NOTE — PROGRESS NOTES
LOCATION:   [facility], at [facility].     HEMATOLOGY ONCOLOGY PROBLEMS:  1.  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  PD-L1 70%.  No actionable alterations.       a.  Ist -line therapy with carbo/Taxol/Keytruda x 3 cycles from Sep to Nov 2023.       b.  On maintenance Keytruda beginning Dec 2023     CHIEF COMPLAINT:    The patient is in the clinic today for followup of poorly differentiated carcinoma.          HISTORY:  Ms. Toussaint is a 68-year-old female with poorly differentiated carcinoma of most probable primary bronchogenic origin.  She has multiple medical problems and was admitted at [facility] in Aug 2023 with complaint worsening shortness of breath, cough and weakness.  There was also history of progressive weight loss and recent falls.  Work-up done in the hospital showed stable pulmonary nodules but there was concern regarding new bilateral adrenal metastatic lesions.  There was also finding of questionable liver lesions and right hilar lymphadenopathy. She is a lifelong heavy smoker.  CT-guided biopsy of the adrenal lesion was suggestive of poorly differentiated carcinoma.  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  She was also evaluated by Dr. Francheska Parada at [facility] and overall clinical and radiological finding were considered mainly of probable primary bronchogenic origin.  She was treated with carbo/Taxol/Keytruda combination for 3 cycles.  4th cycle was discontinued due to tolerability issues.  Follow-up CT scan showed interval decrease in bilateral lung lesions and she has been transitioned to maintenance Keytruda beginning Dec 2023.    INTERVAL HISTORY:  She is having persistent issues with extreme weakness, fatigue, anorexia and weight loss. No history of nausea, vomiting, fever, night sweats, diarrhea, rash, anorexia or weight loss. No recent changes in medications.     PAST MEDICAL  HISTORY:   1.  COPD on home O2.   2.  Coronary artery disease   3.  Hypertension   4.  Hyperlipidemia   5.  Multiple vascular aneurysms.  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  6.  Peripheral vascular disease.  S/p femoral-femoral bypass   7.  AAA graft repair procedure.  8.  History of tubal ligation, bladder lift, lithotripsy surgeries     SOCIAL HISTORY:    for 46 years and lives in [place].  1 and half to 2 pack a day for 47 years smoking history.  Nonalcoholic.  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  Born and raised  in [place]     FAMILY HISTORY:    Father  at age 52 from myocardial infarction.  Mother  at age 75 or from aortic aneurysm related complications.  1 sister  at age 68 from depression and related complications.  3 children  and 5 grandkids.  Her son and daughter has history of Crohn's disease.  No other specific history of bleeding, clotting or malignant disorder in the family.     REVIEW OF SYSTEMS:  Pertinent finding as per the history above.  All other systems have been reviewed and generally negative and noncontributory.     PHYSICAL EXAMINATION:    Detailed physical examination not done     ALLERGY & MEDICATIONS:  Allergies and latest outpatient medications list were reviewed in the EMR.    LAB RESULTS:  CBC and CMP from today is unremarkable. Last 3 sets of blood work were reviewed and the trend was noted.     PATHOLOGY RESULTS:  Surgical Pathology [Aug 23 2023 1:48PM] ([id number])  Specimens: RIGHT ADRENAL LESION CORE   Name AALIYAH SAWANT   Accession #: [id number]   Pathologist: BOLIVAR BARRETO ASA, MD, PhD   Date of Procedure: 2023   Submitting  Physician: MAUREEN CHAVIS CNP   Location: [facility]   Copy To/Referring/Attending:   MARTHA CORDERO MD Other External #     FINAL DIAGNOSIS   A. RIGHT ADRENAL LESION CORE:     METASTATIC POORLY DIFFERENTIATED  CARCINOMA: SOFT TISSUE (IDENTIFIED AS RIGHT ADRENAL) BIOPSY   Note    COMMENT : The biopsyconsists entirely of a poorly differentiated malignancy in soft  tissue; no adrenal is seen. The tumor stains for keratins (CAM5.2 and  CK7 but   not CK20) and is negative for TTF1 and Napsin as well as p40. Staining for SF1   is completely negative.     Electronically Signed Out By BOLIVAR BARRETO ASA, MD, PhD/SLA       ASSESSMENT AND PLAN:   1.  Poorly differentiated carcinoma of unknown primary origin. Patient with a lifelong history of heavy smoking.  She was admitted at [facility]  with complaint of worsening shortness of breath.  CT scan was suggestive of bilateral adrenal lesions highly concerning for metastatic disease.  CT chest was mainly showing chronic stable pulmonary nodule but there is some concern regarding new right  hilar lymphadenopathy.  Vague liver lesions appear to be cyst.  CT-guided biopsy of the adrenal lesion was suggestive of poorly differentiated carcinoma.  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  She was also evaluated by Dr. Francheska Parada at [facility] and overall clinical and radiological finding were considered mainly of probable primary bronchogenic origin.  She was treated with carbo/Taxol/Keytruda combination for 3 cycles.  4th cycle was discontinued due to tolerability issues and she has been transitioned to maintenance Keytruda beginning Dec 2023.     Long discussion with the patient and her  and they were explained about the management plan of transitioning her to maintenance Keytruda at this time.  Probable side effects of weakness, fatigue, pneumonitis, colitis, endocrinopathies, pleuritis, skin rash etc. were explained to them in detail.  Informed consent was obtained.  Her overall long-term prognosis remains guarded.    2.  Anxiety/depression.  She was evaluated by supportive oncology team at [facility].  They do not come to [facility].  Will try to see if we can schedule her at [place] or [place]  locations.     3.  Follow-up.  She will return to the clinic in about 6 weeks..        This note has been transcribed using Dragon voice recognition system and there is a possibility of unintentional typing misprints.

## 2024-01-03 ENCOUNTER — APPOINTMENT (OUTPATIENT)
Dept: PALLIATIVE MEDICINE | Facility: HOSPITAL | Age: 69
End: 2024-01-03
Payer: MEDICARE

## 2024-01-03 DIAGNOSIS — C34.90 NON-SMALL CELL LUNG CANCER, UNSPECIFIED LATERALITY (MULTI): ICD-10-CM

## 2024-01-03 DIAGNOSIS — E27.40 ADRENAL INSUFFICIENCY (MULTI): ICD-10-CM

## 2024-01-03 RX ORDER — HYDROCORTISONE 5 MG/1
TABLET ORAL
Qty: 180 TABLET | Refills: 2 | Status: SHIPPED | OUTPATIENT
Start: 2024-01-03 | End: 2024-03-15 | Stop reason: HOSPADM

## 2024-01-03 RX ORDER — FLUDROCORTISONE ACETATE 0.1 MG/1
TABLET ORAL DAILY
Qty: 30 TABLET | Refills: 3 | Status: SHIPPED | OUTPATIENT
Start: 2024-01-03

## 2024-01-04 ENCOUNTER — PATIENT OUTREACH (OUTPATIENT)
Dept: CARE COORDINATION | Facility: CLINIC | Age: 69
End: 2024-01-04
Payer: MEDICARE

## 2024-01-04 NOTE — PROGRESS NOTES
Call placed regarding one month post discharge follow up call.             At time of outreach call the patient feels as if their condition has              returned to baseline since initial visit with PCP or specialist.             Questions or concerns regarding recovery period addressed at this time.              Reviewed any PCP or specialists progress notes/labs/radiology reports if applicable             and addressed any questions or concerns.  Patient wished to be graduated at the 30 day target.

## 2024-01-05 ENCOUNTER — APPOINTMENT (OUTPATIENT)
Dept: RADIOLOGY | Facility: HOSPITAL | Age: 69
End: 2024-01-05
Payer: MEDICARE

## 2024-01-05 ENCOUNTER — HOSPITAL ENCOUNTER (EMERGENCY)
Facility: HOSPITAL | Age: 69
Discharge: HOME | End: 2024-01-05
Attending: STUDENT IN AN ORGANIZED HEALTH CARE EDUCATION/TRAINING PROGRAM
Payer: MEDICARE

## 2024-01-05 ENCOUNTER — OFFICE VISIT (OUTPATIENT)
Dept: PRIMARY CARE | Facility: CLINIC | Age: 69
End: 2024-01-05
Payer: MEDICARE

## 2024-01-05 VITALS
HEIGHT: 64 IN | HEART RATE: 92 BPM | BODY MASS INDEX: 22.88 KG/M2 | WEIGHT: 134 LBS | TEMPERATURE: 97.9 F | DIASTOLIC BLOOD PRESSURE: 66 MMHG | RESPIRATION RATE: 17 BRPM | SYSTOLIC BLOOD PRESSURE: 146 MMHG | OXYGEN SATURATION: 100 %

## 2024-01-05 VITALS
HEART RATE: 72 BPM | BODY MASS INDEX: 23.74 KG/M2 | HEIGHT: 63 IN | SYSTOLIC BLOOD PRESSURE: 140 MMHG | WEIGHT: 134 LBS | DIASTOLIC BLOOD PRESSURE: 72 MMHG

## 2024-01-05 DIAGNOSIS — J43.9 PULMONARY EMPHYSEMA, UNSPECIFIED EMPHYSEMA TYPE (MULTI): ICD-10-CM

## 2024-01-05 DIAGNOSIS — C79.72 SECONDARY MALIGNANT NEOPLASM OF LEFT ADRENAL GLAND (MULTI): ICD-10-CM

## 2024-01-05 DIAGNOSIS — C34.90 NON-SMALL CELL LUNG CANCER, UNSPECIFIED LATERALITY (MULTI): ICD-10-CM

## 2024-01-05 DIAGNOSIS — E44.0 MODERATE PROTEIN-CALORIE MALNUTRITION (MULTI): ICD-10-CM

## 2024-01-05 DIAGNOSIS — E27.49 OTHER ADRENOCORTICAL INSUFFICIENCY (MULTI): ICD-10-CM

## 2024-01-05 DIAGNOSIS — S91.302S OPEN WOUND OF LEFT FOOT, SEQUELA: Primary | ICD-10-CM

## 2024-01-05 DIAGNOSIS — Z86.79 HISTORY OF PERIPHERAL VASCULAR DISEASE: ICD-10-CM

## 2024-01-05 DIAGNOSIS — I72.8 ANEURYSM OF OTHER SPECIFIED ARTERIES (CMS-HCC): ICD-10-CM

## 2024-01-05 DIAGNOSIS — F17.210 CIGARETTE SMOKER: ICD-10-CM

## 2024-01-05 DIAGNOSIS — F33.9 DEPRESSION, RECURRENT (CMS-HCC): ICD-10-CM

## 2024-01-05 DIAGNOSIS — S91.302A WOUND OF LEFT FOOT: Primary | ICD-10-CM

## 2024-01-05 DIAGNOSIS — R23.4 ESCHAR OF FOOT: ICD-10-CM

## 2024-01-05 LAB
ALBUMIN SERPL BCP-MCNC: 4.1 G/DL (ref 3.4–5)
ALP SERPL-CCNC: 75 U/L (ref 33–136)
ALT SERPL W P-5'-P-CCNC: 12 U/L (ref 7–45)
ANION GAP SERPL CALC-SCNC: 11 MMOL/L (ref 10–20)
AST SERPL W P-5'-P-CCNC: 13 U/L (ref 9–39)
BASOPHILS # BLD AUTO: 0.07 X10*3/UL (ref 0–0.1)
BASOPHILS NFR BLD AUTO: 0.6 %
BILIRUB SERPL-MCNC: 0.3 MG/DL (ref 0–1.2)
BUN SERPL-MCNC: 15 MG/DL (ref 6–23)
CALCIUM SERPL-MCNC: 9.9 MG/DL (ref 8.6–10.3)
CHLORIDE SERPL-SCNC: 101 MMOL/L (ref 98–107)
CO2 SERPL-SCNC: 31 MMOL/L (ref 21–32)
CREAT SERPL-MCNC: 0.7 MG/DL (ref 0.5–1.05)
CRP SERPL-MCNC: 3.31 MG/DL
EOSINOPHIL # BLD AUTO: 0.09 X10*3/UL (ref 0–0.7)
EOSINOPHIL NFR BLD AUTO: 0.7 %
ERYTHROCYTE [DISTWIDTH] IN BLOOD BY AUTOMATED COUNT: 19.9 % (ref 11.5–14.5)
ERYTHROCYTE [SEDIMENTATION RATE] IN BLOOD BY WESTERGREN METHOD: 9 MM/H (ref 0–30)
GFR SERPL CREATININE-BSD FRML MDRD: >90 ML/MIN/1.73M*2
GLUCOSE SERPL-MCNC: 105 MG/DL (ref 74–99)
HCT VFR BLD AUTO: 47.3 % (ref 36–46)
HGB BLD-MCNC: 14.8 G/DL (ref 12–16)
IMM GRANULOCYTES # BLD AUTO: 0.04 X10*3/UL (ref 0–0.7)
IMM GRANULOCYTES NFR BLD AUTO: 0.3 % (ref 0–0.9)
LYMPHOCYTES # BLD AUTO: 2.17 X10*3/UL (ref 1.2–4.8)
LYMPHOCYTES NFR BLD AUTO: 17.8 %
MCH RBC QN AUTO: 25.2 PG (ref 26–34)
MCHC RBC AUTO-ENTMCNC: 31.3 G/DL (ref 32–36)
MCV RBC AUTO: 80 FL (ref 80–100)
MONOCYTES # BLD AUTO: 0.89 X10*3/UL (ref 0.1–1)
MONOCYTES NFR BLD AUTO: 7.3 %
NEUTROPHILS # BLD AUTO: 8.92 X10*3/UL (ref 1.2–7.7)
NEUTROPHILS NFR BLD AUTO: 73.3 %
NRBC BLD-RTO: 0 /100 WBCS (ref 0–0)
PLATELET # BLD AUTO: 254 X10*3/UL (ref 150–450)
POTASSIUM SERPL-SCNC: 4 MMOL/L (ref 3.5–5.3)
PROT SERPL-MCNC: 7.8 G/DL (ref 6.4–8.2)
RBC # BLD AUTO: 5.88 X10*6/UL (ref 4–5.2)
SODIUM SERPL-SCNC: 139 MMOL/L (ref 136–145)
WBC # BLD AUTO: 12.2 X10*3/UL (ref 4.4–11.3)

## 2024-01-05 PROCEDURE — 73630 X-RAY EXAM OF FOOT: CPT | Mod: LT

## 2024-01-05 PROCEDURE — 3078F DIAST BP <80 MM HG: CPT | Performed by: STUDENT IN AN ORGANIZED HEALTH CARE EDUCATION/TRAINING PROGRAM

## 2024-01-05 PROCEDURE — 86140 C-REACTIVE PROTEIN: CPT

## 2024-01-05 PROCEDURE — 1159F MED LIST DOCD IN RCRD: CPT | Performed by: STUDENT IN AN ORGANIZED HEALTH CARE EDUCATION/TRAINING PROGRAM

## 2024-01-05 PROCEDURE — 80053 COMPREHEN METABOLIC PANEL: CPT

## 2024-01-05 PROCEDURE — 3008F BODY MASS INDEX DOCD: CPT | Performed by: STUDENT IN AN ORGANIZED HEALTH CARE EDUCATION/TRAINING PROGRAM

## 2024-01-05 PROCEDURE — 85652 RBC SED RATE AUTOMATED: CPT

## 2024-01-05 PROCEDURE — 99214 OFFICE O/P EST MOD 30 MIN: CPT | Performed by: STUDENT IN AN ORGANIZED HEALTH CARE EDUCATION/TRAINING PROGRAM

## 2024-01-05 PROCEDURE — 1126F AMNT PAIN NOTED NONE PRSNT: CPT | Performed by: STUDENT IN AN ORGANIZED HEALTH CARE EDUCATION/TRAINING PROGRAM

## 2024-01-05 PROCEDURE — 3077F SYST BP >= 140 MM HG: CPT | Performed by: STUDENT IN AN ORGANIZED HEALTH CARE EDUCATION/TRAINING PROGRAM

## 2024-01-05 PROCEDURE — 73630 X-RAY EXAM OF FOOT: CPT | Mod: LEFT SIDE | Performed by: RADIOLOGY

## 2024-01-05 PROCEDURE — 85025 COMPLETE CBC W/AUTO DIFF WBC: CPT

## 2024-01-05 PROCEDURE — 1160F RVW MEDS BY RX/DR IN RCRD: CPT | Performed by: STUDENT IN AN ORGANIZED HEALTH CARE EDUCATION/TRAINING PROGRAM

## 2024-01-05 PROCEDURE — 36415 COLL VENOUS BLD VENIPUNCTURE: CPT

## 2024-01-05 PROCEDURE — 99284 EMERGENCY DEPT VISIT MOD MDM: CPT | Performed by: STUDENT IN AN ORGANIZED HEALTH CARE EDUCATION/TRAINING PROGRAM

## 2024-01-05 PROCEDURE — 99283 EMERGENCY DEPT VISIT LOW MDM: CPT

## 2024-01-05 RX ORDER — SULFAMETHOXAZOLE AND TRIMETHOPRIM 800; 160 MG/1; MG/1
1 TABLET ORAL 2 TIMES DAILY
Qty: 20 TABLET | Refills: 0 | Status: SHIPPED | OUTPATIENT
Start: 2024-01-05 | End: 2024-01-15

## 2024-01-05 RX ORDER — DEXTROSE 20 G/100ML
INJECTION, SOLUTION INTRAVENOUS CONTINUOUS
Status: CANCELLED | OUTPATIENT
Start: 2024-01-05

## 2024-01-05 ASSESSMENT — ENCOUNTER SYMPTOMS
NUMBNESS: 0
COLOR CHANGE: 1
WOUND: 1
RESPIRATORY NEGATIVE: 1
MYALGIAS: 1
ARTHRALGIAS: 1
CHILLS: 0
FEVER: 0
FATIGUE: 1

## 2024-01-05 ASSESSMENT — PATIENT HEALTH QUESTIONNAIRE - PHQ9
1. LITTLE INTEREST OR PLEASURE IN DOING THINGS: NOT AT ALL
SUM OF ALL RESPONSES TO PHQ9 QUESTIONS 1 AND 2: 0
2. FEELING DOWN, DEPRESSED OR HOPELESS: NOT AT ALL

## 2024-01-05 ASSESSMENT — PAIN SCALES - GENERAL: PAINLEVEL_OUTOF10: 8

## 2024-01-05 ASSESSMENT — COLUMBIA-SUICIDE SEVERITY RATING SCALE - C-SSRS
6. HAVE YOU EVER DONE ANYTHING, STARTED TO DO ANYTHING, OR PREPARED TO DO ANYTHING TO END YOUR LIFE?: NO
1. IN THE PAST MONTH, HAVE YOU WISHED YOU WERE DEAD OR WISHED YOU COULD GO TO SLEEP AND NOT WAKE UP?: NO
2. HAVE YOU ACTUALLY HAD ANY THOUGHTS OF KILLING YOURSELF?: NO

## 2024-01-05 ASSESSMENT — PAIN - FUNCTIONAL ASSESSMENT: PAIN_FUNCTIONAL_ASSESSMENT: 0-10

## 2024-01-05 NOTE — PROGRESS NOTES
Subjective   Patient ID: Radha Toussaint is a 68 y.o. female who presents for Toe Injury (Injury on pinky toe -Has been there for a few weeks. She states its very painful. It is not weeping, there is no pus. She states the pain is going up to her knee now.).  Left lateral foot wound x 3 weeks. Not healing. Black eschar on left  Lateral 5th toe. Reports she had initial ulceration from how she sits on her couch. Has not been healing. Now has black scab over wound and redness throughout her foot. No fevers or chills.     Hx of fem fem bypass on left leg in 2022. Reports she has had color change in her left foot, getting more red and toes more purple. Feels cold and painful. Reports yesterday also had pain in her left knee.     Has non small cell lung cancer diagnosed earlier this year. Current everyday smoker.    Did chemo, plan is to transition to keytruda.     Frustrated with being in and out of hospital. Does not want to go to ER.         Review of Systems   Constitutional:  Positive for fatigue. Negative for chills and fever.   HENT: Negative.     Respiratory: Negative.     Musculoskeletal:  Positive for arthralgias and myalgias.   Skin:  Positive for color change, pallor, rash and wound.   Neurological:  Negative for numbness.   All other systems reviewed and are negative.      Objective   Physical Exam  Vitals reviewed.   Constitutional:       General: She is not in acute distress.     Appearance: Normal appearance. She is ill-appearing (chronically). She is not toxic-appearing.   HENT:      Head: Normocephalic.   Eyes:      Pupils: Pupils are equal, round, and reactive to light.   Cardiovascular:      Rate and Rhythm: Normal rate and regular rhythm.   Pulmonary:      Effort: Pulmonary effort is normal. No respiratory distress.   Musculoskeletal:         General: Swelling and tenderness present.   Skin:     Comments: Left foot with delayed capillary refill, violaceous toes, and erythema over dorsum of foot  3 cm  black eschar over left lateral foot   Neurological:      General: No focal deficit present.      Mental Status: She is alert. Mental status is at baseline.   Psychiatric:         Mood and Affect: Mood normal.         Behavior: Behavior normal.         Body mass index is 23.89 kg/m².      Current Outpatient Medications:     albuterol 90 mcg/actuation inhaler, Inhale 1 puff every 4 hours if needed for shortness of breath., Disp: 18 g, Rfl: 3    aspirin 81 mg EC tablet, Take 1 tablet (81 mg) by mouth once daily., Disp: , Rfl:     clopidogrel (Plavix) 75 mg tablet, Take 1 tablet (75 mg) by mouth once daily., Disp: 90 tablet, Rfl: 1    cyclobenzaprine (Flexeril) 10 mg tablet, Take 1 tablet (10 mg) by mouth 3 times a day as needed for muscle spasms., Disp: 30 tablet, Rfl: 0    dexAMETHasone (Decadron) 4 mg tablet, Take 1 tablet (4 mg) by mouth once daily with a meal for 7 days., Disp: 7 tablet, Rfl: 0    famotidine (Pepcid) 40 mg tablet, Take 1 tablet (40 mg) by mouth once daily., Disp: , Rfl:     ferrous sulfate 325 (65 Fe) MG EC tablet, Take 65 mg by mouth once daily. Do not crush, chew, or split., Disp: , Rfl:     fludrocortisone (Florinef) 0.1 mg tablet, TAKE 1/2 TABLET BY MOUTH ONCE DAILY, Disp: 30 tablet, Rfl: 3    fluticasone-umeclidin-vilanter (Trelegy Ellipta) 200-62.5-25 mcg blister with device, Inhale 1 puff once daily., Disp: 60 each, Rfl: 3    hydrocortisone (Cortef) 5 mg tablet, STARTING ON 9/8 TAKE 2 TABS AT 8AM THEN TAKE 1 TABLET AT NOON THEN TAKE 1/2 TABLET AT 4PM, Disp: 180 tablet, Rfl: 2    HYDROmorphone (Dilaudid) 4 mg tablet, Take 0.5 tablets (2 mg) by mouth 3 times a day as needed for severe pain (7 - 10)., Disp: 45 tablet, Rfl: 0    ipratropium-albuteroL (Duo-Neb) 0.5-2.5 mg/3 mL nebulizer solution, Take 3 mL by nebulization 4 times a day., Disp: 360 mL, Rfl: 11    levoFLOXacin (Levaquin) 500 mg tablet, Take 1 tablet (500 mg) by mouth once daily. (Patient not taking: Reported on 12/20/2023), Disp: 7  tablet, Rfl: 0    montelukast (Singulair) 10 mg tablet, Take 1 tablet (10 mg) by mouth once daily at bedtime., Disp: 90 tablet, Rfl: 1    naloxone (Narcan) 4 mg/0.1 mL nasal spray, Administer 1 spray (4 mg) into affected nostril(s) if needed for opioid reversal. May repeat every 2-3 minutes if needed, alternating nostrils, until medical assistance becomes available. (Patient not taking: Reported on 12/20/2023), Disp: 2 each, Rfl: 0    OLANZapine (ZyPREXA) 5 mg tablet, Take 1 tablet (5 mg) by mouth once daily at bedtime., Disp: 30 tablet, Rfl: 3    ondansetron ODT (Zofran-ODT) 4 mg disintegrating tablet, DISSOLVE 1 TABLET IN MOUTH BY MOUTH THREE TIMES A DAY AS NEEDED NAUSEA, Disp: 90 tablet, Rfl: 0    pantoprazole (ProtoNix) 40 mg EC tablet, Take 1 tablet (40 mg) by mouth once daily in the morning. Take before meals. Do not crush, chew, or split., Disp: , Rfl:     rosuvastatin (Crestor) 10 mg tablet, Take 1 tablet (10 mg) by mouth once daily., Disp: 90 tablet, Rfl: 1    sennosides (senna) 8.6 mg tablet, Take 1 tablet (8.6 mg) by mouth once daily., Disp: 30 tablet, Rfl: 11    sertraline (Zoloft) 100 mg tablet, Take 1 tablet (100 mg) by mouth once daily. (Patient not taking: Reported on 12/20/2023), Disp: 90 tablet, Rfl: 2    sertraline (Zoloft) 50 mg tablet, Take 1 tablet (50 mg) by mouth 2 times a day., Disp: , Rfl:     sulfamethoxazole-trimethoprim (Bactrim DS) 800-160 mg tablet, Take 1 tablet by mouth 2 times a day for 10 days., Disp: 20 tablet, Rfl: 0      Assessment/Plan   Diagnoses and all orders for this visit:  Open wound of left foot, sequela  Comments:  bactrim prescribed  no systemic symptoms although is immunocompromised  strongly recommended ER, pt declines at this time but will think about it  Orders:  -     sulfamethoxazole-trimethoprim (Bactrim DS) 800-160 mg tablet; Take 1 tablet by mouth 2 times a day for 10 days.  -     Referral to Wound Clinic; Future  Eschar of foot  Comments:  concern about poor  healing due to appearance, length of time, and multiple comorbidities including PVD and prior vascular compromise of left leg  Secondary malignant neoplasm of left adrenal gland (CMS/HCC)  Moderate protein-calorie malnutrition (CMS/HCC)  Depression, recurrent (CMS/HCC)  Aneurysm of other specified arteries (CMS/HCC)  Other adrenocortical insufficiency (CMS/HCC)  Pulmonary emphysema, unspecified emphysema type (CMS/HCC)  Non-small cell lung cancer, unspecified laterality (CMS/HCC)  Comments:  seeing oncology  she is frustrated with treatments and appts  suggested she discuss goals of care with palliative  Cigarette smoker       Follow up as needed    Francheska Rogel DO 01/05/24 12:47 PM

## 2024-01-05 NOTE — ED TRIAGE NOTES
The patient was seen and examined in triage.     History of Present Illness: The patient is a 68-year-old female with a past medical history of tobacco use presenting with a wound on the lateral aspect of her left foot. Patient states that the wound has been there for three weeks now and is continuing to worsen.  She was sent by her PCP for further evaluation.  She is having some pain associated with the wound, but has full sensation.  No history of diabetes or wounds.  Denies any fever, chest pain, shortness of breath, headache, lightheadedness, dizziness, nausea, vomiting, abdominal pain.    Brief Physical Exam: Exam is limited by the patient sitting in a chair in triage.  Heart: Regular rate and rhythm.   Lungs: Clear to auscultation bilaterally.   Abdomen: Soft, nondistended, normoactive bowel sounds, non tender  Skin: Circular, 1.5 cm, wound with scabbing assessed on the lateral aspect of the left foot just inferior to the fifth toe.     Plan: Appropriate labs and diagnostic imaging were ordered.     For the remainder of the patient's workup and ED course, please refer to the main ED provider note. We discussed need for diagnostic testing including laboratory studies and imaging.  We also discussed that they may be asked to wait in the waiting room while these tests are pending.  They understand that if they choose to leave without having the testing completed or resulted that we cannot rule out acute life threatening illnesses and the risks involved could lead to worsening condition, permanent disability or even death.      Disclaimer: This note was dictated by speech recognition. Minor errors in transcription may be present. Please call if questions.

## 2024-01-05 NOTE — DISCHARGE INSTRUCTIONS
Please reach out to the wound center to follow-up.  You can call at [phone].  I have also reached out to the schedulers at the wound center to contact you.  Please take the antibiotic prescribed by your primary care physician.  If you have any other concerns, please return to the emergency department.

## 2024-01-05 NOTE — ED PROVIDER NOTES
HPI   Chief Complaint   Patient presents with    Wound Check     Pt states wound to left foot [id number], sent by pcp        Presents with wound to the outer aspect of the left foot.  Present for several weeks.  Not healing.  Does note a history of peripheral vascular disease status post bypass.  Also notes a history of cancer currently on chemotherapy.  Denies fevers, chills, nausea, vomiting.  Saw her PCP today, who prescribed her antibiotics and suggested she go to the emergency department for further evaluation.      History provided by:  Patient   used: No                        Thomas Coma Scale Score: 15                  Patient History   Past Medical History:   Diagnosis Date    Aneurysm of carotid artery (CMS/[facility])     Neck aneurysm    DVT (deep venous thrombosis) (CMS/[facility])     2022    History of tubal ligation     Old myocardial infarction     History of heart attack    Other specified disorders of kidney and ureter     Obstruction of kidney    Personal history of other diseases of the circulatory system     History of intracranial aneurysm    Personal history of other diseases of the circulatory system     History of abdominal aortic aneurysm (AAA)    Personal history of other diseases of the respiratory system     History of chronic obstructive lung disease    Personal history of urinary calculi     History of kidney stones    Right upper quadrant pain 09/25/2020    Abdominal pain, RUQ (right upper quadrant)     Past Surgical History:   Procedure Laterality Date    CT ABDOMEN PELVIS ANGIOGRAM W AND/OR WO IV CONTRAST  11/21/2019    CT ABDOMEN PELVIS ANGIOGRAM W AND/OR WO IV CONTRAST 11/21/2019 [facility] EMERGENCY LEGAC[facility]    CT AORTA AND BILATERAL ILIOFEMORAL RUNOFF ANGIOGRAM W AND/OR WO IV CONTRAST  8/9/2022    CT AORTA AND BILATERAL ILIOFEMORAL RUNOFF ANGIOGRAM W AND/OR WO IV CONTRAST 8/9/2022 [facility] CLINICAL LEGACY    CT HEAD ANGIO W AND WO IV CONTRAST  5/22/2020    CT HEAD ANGIO W AND WO IV  CONTRAST 5/22/2020 [facility]    OTHER SURGICAL HISTORY  09/30/2020    Tubal ligation    OTHER SURGICAL HISTORY  09/30/2020    Cardiac catheterization     Family History   Problem Relation Name Age of Onset    Aneurysm Mother      Hypertension Mother      Heart attack Father       Social History     Tobacco Use    Smoking status: Every Day     Packs/day: 1     Types: Cigarettes    Smokeless tobacco: Never   Substance Use Topics    Alcohol use: Not on file    Drug use: Not on file       Physical Exam   ED Triage Vitals [01/05/24 1121]   Temp Heart Rate Resp BP   36.6 °C (97.9 °F) 85 18 120/62      SpO2 Temp src Heart Rate Source Patient Position   98 % -- -- --      BP Location FiO2 (%)     -- --       Physical Exam  Vitals and nursing note reviewed.   HENT:      Head: Atraumatic.      Mouth/Throat:      Mouth: Mucous membranes are moist.   Eyes:      Conjunctiva/sclera: Conjunctivae normal.   Cardiovascular:      Rate and Rhythm: Normal rate and regular rhythm.   Pulmonary:      Effort: Pulmonary effort is normal.   Abdominal:      Palpations: Abdomen is soft.      Tenderness: There is no abdominal tenderness.   Musculoskeletal:         General: No deformity.      Cervical back: Normal range of motion.   Skin:     General: Skin is warm and dry.      Comments: There is an eschar noted to the lateral aspect of the base of digit 5 of the left foot; there is very scant surrounding erythema; there is no calor; no crepitus; no fluctuance; no bullae foot; foot is somewhat cool to the touch, but is the same as the right lower extremity; cap refill is sluggish to both lower extremities; I am able to palpate DP pulses bilaterally; I am able to Doppler a DP as well as PT pulse to the left lower extremity.   Neurological:      Mental Status: She is alert.      Comments: Moving all extremities         ED Course & MDM   ED Course as of 01/05/24 1505   Fri Jan 05, 2024   1451 Reached out to wound care center.  They will  see the patient at 1 PM and 1/9/2024.  Communicated this with the patient. [AB]      ED Course User Index  [AB] Darrion Wolff MD         Diagnoses as of 01/05/24 1505   Wound of left foot   History of peripheral vascular disease       Medical Decision Making  Past medical history that includes immunosuppression secondary to malignancy as well as peripheral vascular disease status post bypass presents with nonhealing ulcer to the lateral aspect of the left foot.  No systemic signs of infection.  Suspect nonhealing wound secondary to peripheral vascular disease.  She does have pulses on exam I do not think that she requires any emergent imaging or emergent vascular intervention.  To consider escalation of care to hospitalization, given her immuno compromised status; however, she is very well-appearing and has no systemic signs of infection.  After shared decision-making discussion, will complete the course of antibiotics prescribed by her PCP.  Additionally, I reached out to the wound care center, who will see the patient early next week.  Patient is very comfortable with this plan.  Discussed return precautions.    Amount and/or Complexity of Data Reviewed  Labs: ordered.  Radiology: ordered.        Procedure  Procedures     Darrion Wolff MD  01/05/24 1502

## 2024-01-09 ENCOUNTER — OFFICE VISIT (OUTPATIENT)
Dept: WOUND CARE | Facility: CLINIC | Age: 69
End: 2024-01-09
Payer: MEDICARE

## 2024-01-09 ENCOUNTER — TELEPHONE (OUTPATIENT)
Dept: PRIMARY CARE | Facility: CLINIC | Age: 69
End: 2024-01-09
Payer: MEDICARE

## 2024-01-09 PROCEDURE — 99214 OFFICE O/P EST MOD 30 MIN: CPT | Mod: 25

## 2024-01-09 NOTE — TELEPHONE ENCOUNTER
Pt advised to call her vascular surgeon. She reports that she was already planning on doing that.

## 2024-01-10 DIAGNOSIS — M79.606 PAIN OF LOWER EXTREMITY, UNSPECIFIED LATERALITY: ICD-10-CM

## 2024-01-10 DIAGNOSIS — I73.89 OTHER SPECIFIED PERIPHERAL VASCULAR DISEASES (CMS-HCC): ICD-10-CM

## 2024-01-10 DIAGNOSIS — I73.9 INTERMITTENT CLAUDICATION (CMS-HCC): ICD-10-CM

## 2024-01-11 PROBLEM — J18.9 PNEUMONIA DUE TO INFECTIOUS ORGANISM: Status: ACTIVE | Noted: 2023-10-22

## 2024-01-11 PROBLEM — R73.9 HYPERGLYCEMIA: Status: ACTIVE | Noted: 2024-01-11

## 2024-01-11 PROBLEM — Z91.148 PATIENT'S OTHER NONCOMPLIANCE WITH MEDICATION REGIMEN FOR OTHER REASON: Status: ACTIVE | Noted: 2023-09-02

## 2024-01-11 PROBLEM — Z86.79 HISTORY OF AORTIC ANEURYSM: Status: ACTIVE | Noted: 2024-01-11

## 2024-01-11 PROBLEM — Z86.79 HISTORY OF PERIPHERAL VASCULAR DISEASE: Status: RESOLVED | Noted: 2023-07-28 | Resolved: 2024-01-11

## 2024-01-11 PROBLEM — F17.200 CURRENT SMOKER: Status: ACTIVE | Noted: 2023-07-28

## 2024-01-11 PROBLEM — Z20.822 CONTACT WITH AND (SUSPECTED) EXPOSURE TO COVID-19: Status: ACTIVE | Noted: 2023-09-02

## 2024-01-11 PROBLEM — E04.1 THYROID NODULE: Status: ACTIVE | Noted: 2024-01-11

## 2024-01-11 PROBLEM — I25.2 HISTORY OF MYOCARDIAL INFARCTION: Status: ACTIVE | Noted: 2023-07-28

## 2024-01-11 PROBLEM — I70.202: Status: ACTIVE | Noted: 2024-01-11

## 2024-01-11 PROBLEM — Z86.79 HISTORY OF PERIPHERAL VASCULAR DISEASE: Status: ACTIVE | Noted: 2023-07-28

## 2024-01-11 PROBLEM — N28.89 DILATION OF RENAL CALIX: Status: ACTIVE | Noted: 2024-01-11

## 2024-01-11 PROBLEM — Z86.79 HISTORY OF AORTIC ANEURYSM: Status: RESOLVED | Noted: 2024-01-11 | Resolved: 2024-01-11

## 2024-01-11 PROBLEM — R63.0 LOSS OF APPETITE: Status: ACTIVE | Noted: 2023-10-04

## 2024-01-11 PROBLEM — I71.9 AORTIC ANEURYSM (CMS-HCC): Status: RESOLVED | Noted: 2023-10-25 | Resolved: 2024-01-11

## 2024-01-11 PROBLEM — R53.81 PHYSICAL DECONDITIONING: Status: ACTIVE | Noted: 2023-07-28

## 2024-01-11 PROBLEM — I25.2 HISTORY OF MYOCARDIAL INFARCTION: Status: RESOLVED | Noted: 2023-07-28 | Resolved: 2024-01-11

## 2024-01-11 PROBLEM — G89.3 PAIN DUE TO NEOPLASM: Status: ACTIVE | Noted: 2023-09-27

## 2024-01-11 PROBLEM — I25.10 ARTERIOSCLEROSIS OF CORONARY ARTERY: Status: RESOLVED | Noted: 2023-10-25 | Resolved: 2024-01-11

## 2024-01-11 PROBLEM — R05.9 COUGH, UNSPECIFIED: Status: ACTIVE | Noted: 2023-07-28

## 2024-01-12 DIAGNOSIS — G89.3 CANCER RELATED PAIN: ICD-10-CM

## 2024-01-12 DIAGNOSIS — G89.3 CANCER RELATED PAIN: Primary | ICD-10-CM

## 2024-01-12 RX ORDER — HYDROMORPHONE HYDROCHLORIDE 4 MG/1
2 TABLET ORAL 3 TIMES DAILY PRN
Qty: 45 TABLET | Refills: 0 | Status: SHIPPED | OUTPATIENT
Start: 2024-01-12 | End: 2024-01-17 | Stop reason: RX

## 2024-01-16 ENCOUNTER — TELEPHONE (OUTPATIENT)
Dept: HEMATOLOGY/ONCOLOGY | Facility: CLINIC | Age: 69
End: 2024-01-16
Payer: MEDICARE

## 2024-01-16 NOTE — TELEPHONE ENCOUNTER
Dr. Burnham patientRobb Ramos from [facility] called regarding her Dilaudid 4mg tabs. The medication is not in stock and they wanted to know if the Doctor wanted to give her an alternate medication. Please call them at [phone]

## 2024-01-17 ENCOUNTER — HOSPITAL ENCOUNTER (OUTPATIENT)
Dept: VASCULAR MEDICINE | Facility: CLINIC | Age: 69
Discharge: HOME | End: 2024-01-17
Payer: MEDICARE

## 2024-01-17 DIAGNOSIS — I73.9 INTERMITTENT CLAUDICATION (CMS-HCC): ICD-10-CM

## 2024-01-17 DIAGNOSIS — I73.89 OTHER SPECIFIED PERIPHERAL VASCULAR DISEASES (CMS-HCC): ICD-10-CM

## 2024-01-17 DIAGNOSIS — M79.606 PAIN OF LOWER EXTREMITY, UNSPECIFIED LATERALITY: ICD-10-CM

## 2024-01-17 PROCEDURE — 93922 UPR/L XTREMITY ART 2 LEVELS: CPT | Performed by: SURGERY

## 2024-01-17 PROCEDURE — 93922 UPR/L XTREMITY ART 2 LEVELS: CPT

## 2024-01-17 RX ORDER — HYDROCODONE BITARTRATE AND ACETAMINOPHEN 10; 325 MG/1; MG/1
1 TABLET ORAL EVERY 8 HOURS PRN
Qty: 60 TABLET | Refills: 0 | Status: SHIPPED | OUTPATIENT
Start: 2024-01-17 | End: 2024-02-16 | Stop reason: SDUPTHER

## 2024-01-17 NOTE — TELEPHONE ENCOUNTER
Dr. Burnham aware; patient stated she can't take oxycodone but willing to try Vicodin/[place] - Dr. Burnham aware and will send in script. Checked with pharmacy and they are able to get Norco at this time.

## 2024-01-18 ENCOUNTER — OFFICE VISIT (OUTPATIENT)
Dept: CARDIOLOGY | Facility: CLINIC | Age: 69
End: 2024-01-18
Payer: MEDICARE

## 2024-01-18 VITALS
DIASTOLIC BLOOD PRESSURE: 60 MMHG | WEIGHT: 135.8 LBS | OXYGEN SATURATION: 96 % | HEART RATE: 85 BPM | SYSTOLIC BLOOD PRESSURE: 114 MMHG | HEIGHT: 64 IN | BODY MASS INDEX: 23.18 KG/M2

## 2024-01-18 DIAGNOSIS — I70.222 CRITICAL LIMB ISCHEMIA OF LEFT LOWER EXTREMITY (MULTI): Primary | ICD-10-CM

## 2024-01-18 DIAGNOSIS — E78.2 MIXED HYPERLIPIDEMIA: ICD-10-CM

## 2024-01-18 PROCEDURE — 3078F DIAST BP <80 MM HG: CPT | Performed by: INTERNAL MEDICINE

## 2024-01-18 PROCEDURE — 99215 OFFICE O/P EST HI 40 MIN: CPT | Performed by: INTERNAL MEDICINE

## 2024-01-18 PROCEDURE — 1160F RVW MEDS BY RX/DR IN RCRD: CPT | Performed by: INTERNAL MEDICINE

## 2024-01-18 PROCEDURE — 1159F MED LIST DOCD IN RCRD: CPT | Performed by: INTERNAL MEDICINE

## 2024-01-18 PROCEDURE — 1126F AMNT PAIN NOTED NONE PRSNT: CPT | Performed by: INTERNAL MEDICINE

## 2024-01-18 PROCEDURE — 3008F BODY MASS INDEX DOCD: CPT | Performed by: INTERNAL MEDICINE

## 2024-01-18 PROCEDURE — 3074F SYST BP LT 130 MM HG: CPT | Performed by: INTERNAL MEDICINE

## 2024-01-18 RX ORDER — SODIUM CHLORIDE 9 MG/ML
100 INJECTION, SOLUTION INTRAVENOUS CONTINUOUS
Status: CANCELLED | OUTPATIENT
Start: 2024-01-18

## 2024-01-18 RX ORDER — ASPIRIN 325 MG
325 TABLET ORAL ONCE
Status: CANCELLED | OUTPATIENT
Start: 2024-01-18 | End: 2024-01-18

## 2024-01-18 RX ORDER — CLOPIDOGREL BISULFATE 75 MG/1
300 TABLET ORAL ONCE
Status: CANCELLED | OUTPATIENT
Start: 2024-01-18 | End: 2024-01-18

## 2024-01-18 RX ORDER — SIMETHICONE 80 MG
80 TABLET,CHEWABLE ORAL EVERY 6 HOURS PRN
COMMUNITY
Start: 2023-09-01 | End: 2024-08-31

## 2024-01-18 NOTE — H&P (VIEW-ONLY)
PCP: Francheska Rogel DO  Podiatrist: Amy Burk   Radha Toussaint is a 68 y.o. female who complains of nonhealing wound . Patient's symptom duration is approximately 2 months. Throbbing pain.    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      Review of Systems:  Otherwise, limited cardiovascular review of systems is negative.    Past Medical History:  She has a past medical history of Aneurysm of carotid artery (CMS/HCC), DVT (deep venous thrombosis) (CMS/HCC), History of tubal ligation, Old myocardial infarction, Other specified disorders of kidney and ureter, Personal history of other diseases of the circulatory system, Personal history of other diseases of the circulatory system, Personal history of other diseases of the respiratory system, Personal history of urinary calculi, and Right upper quadrant pain (09/25/2020).    Surgical History:   She has a past surgical history that includes Other surgical history (09/30/2020); Other surgical history (09/30/2020); CT angio abdomen pelvis w and or wo IV IV contrast (11/21/2019); CT angio head w and wo IV contrast (5/22/2020); and CT angio aorta and bilateral iliofemoral runoff w and or wo IV contrast (8/9/2022).    Family History:   Family History   Problem Relation Name Age of Onset    Aneurysm Mother      Hypertension Mother      Heart attack Father         Social History:   Tobacco Use: High Risk (1/5/2024)    Patient History     Smoking Tobacco Use: Every Day     Smokeless Tobacco Use: Never     Passive Exposure: Not on file   2 ppd    Outpatient Medications:    Current Outpatient Medications:     albuterol 90 mcg/actuation inhaler, Inhale 1 puff every 4 hours if needed for shortness of breath., Disp: 18 g, Rfl: 3    aspirin 81 mg EC tablet, Take 1 tablet (81 mg) by mouth once daily., Disp: , Rfl:      clopidogrel (Plavix) 75 mg tablet, Take 1 tablet (75 mg) by mouth once daily., Disp: 90 tablet, Rfl: 1    famotidine (Pepcid) 40 mg tablet, Take 1 tablet (40 mg) by mouth once daily., Disp: , Rfl:     ferrous sulfate 325 (65 Fe) MG EC tablet, Take 65 mg by mouth once daily. Do not crush, chew, or split., Disp: , Rfl:     fludrocortisone (Florinef) 0.1 mg tablet, TAKE 1/2 TABLET BY MOUTH ONCE DAILY, Disp: 30 tablet, Rfl: 3    fluticasone-umeclidin-vilanter (Trelegy Ellipta) 200-62.5-25 mcg blister with device, Inhale 1 puff once daily., Disp: 60 each, Rfl: 3    HYDROcodone-acetaminophen (Norco)  mg tablet, Take 1 tablet by mouth every 8 hours if needed for severe pain (7 - 10)., Disp: 60 tablet, Rfl: 0    hydrocortisone (Cortef) 5 mg tablet, STARTING ON 9/8 TAKE 2 TABS AT 8AM THEN TAKE 1 TABLET AT NOON THEN TAKE 1/2 TABLET AT 4PM, Disp: 180 tablet, Rfl: 2    ipratropium-albuteroL (Duo-Neb) 0.5-2.5 mg/3 mL nebulizer solution, Take 3 mL by nebulization 4 times a day., Disp: 360 mL, Rfl: 11    montelukast (Singulair) 10 mg tablet, Take 1 tablet (10 mg) by mouth once daily at bedtime., Disp: 90 tablet, Rfl: 1    ondansetron ODT (Zofran-ODT) 4 mg disintegrating tablet, DISSOLVE 1 TABLET IN MOUTH BY MOUTH THREE TIMES A DAY AS NEEDED NAUSEA, Disp: 90 tablet, Rfl: 0    pantoprazole (ProtoNix) 40 mg EC tablet, Take 1 tablet (40 mg) by mouth once daily in the morning. Take before meals. Do not crush, chew, or split., Disp: , Rfl:     rosuvastatin (Crestor) 10 mg tablet, Take 1 tablet (10 mg) by mouth once daily., Disp: 90 tablet, Rfl: 1    sennosides (senna) 8.6 mg tablet, Take 1 tablet (8.6 mg) by mouth once daily., Disp: 30 tablet, Rfl: 11    sertraline (Zoloft) 50 mg tablet, Take 1 tablet (50 mg) by mouth 2 times a day., Disp: , Rfl:     simethicone (Mylicon) 80 mg chewable tablet, Chew 1 tablet (80 mg) every 6 hours if needed., Disp: , Rfl:     cyclobenzaprine (Flexeril) 10 mg tablet, Take 1 tablet (10 mg) by mouth 3  "times a day as needed for muscle spasms., Disp: 30 tablet, Rfl: 0    dexAMETHasone (Decadron) 4 mg tablet, Take 1 tablet (4 mg) by mouth once daily with a meal for 7 days. (Patient not taking: Reported on 1/18/2024), Disp: 7 tablet, Rfl: 0    levoFLOXacin (Levaquin) 500 mg tablet, Take 1 tablet (500 mg) by mouth once daily., Disp: 7 tablet, Rfl: 0    naloxone (Narcan) 4 mg/0.1 mL nasal spray, Administer 1 spray (4 mg) into affected nostril(s) if needed for opioid reversal. May repeat every 2-3 minutes if needed, alternating nostrils, until medical assistance becomes available., Disp: 2 each, Rfl: 0    OLANZapine (ZyPREXA) 5 mg tablet, Take 1 tablet (5 mg) by mouth once daily at bedtime., Disp: 30 tablet, Rfl: 3    sertraline (Zoloft) 100 mg tablet, Take 1 tablet (100 mg) by mouth once daily., Disp: 90 tablet, Rfl: 2     Allergies:  Ace inhibitors, Prednisone, and Demerol [meperidine]       Objective   Vital Signs:  /60 (BP Location: Left arm, Patient Position: Sitting, BP Cuff Size: Adult)   Pulse 85   Ht 1.626 m (5' 4\")   Wt 61.6 kg (135 lb 12.8 oz)   SpO2 96%   BMI 23.31 kg/m²     Physical Exam:  General: no acute distress  HEENT: EOMI, no scleral icterus.  Lungs: Clear to auscultation bilaterally without wheezing, rales, or rhonchi.  Cardiovascular: Regular rhythm and rate. Normal S1 and S2. No murmurs, rubs, or gallops are appreciated. JVP normal.  no bruits  Abdomen: Soft, nontender, nondistended. Bowel sounds present.  Extremities: LLE dependent rubor; 0.7x1.4x0.1 cm at L 5th met head (based on wound center documentation).  Neurologic: Alert and oriented x3.    Pertinent Recent Cardiovascular Studies (personally reviewed):  Vascular studies:  TRAVIS/TBI 1/18/2024: normal RLE TRAVIS, diminished LLE TRAVIS, diminished RLE TBI and/or PPG, and diminished LLE TBI and/or PPG. LLE TBI 0.00.    Cadiac studies:  Echocardiogram 8/31/23:  LVEF 60-65%    Laboratory values:  CMP:  Recent Labs     01/05/24  1400 " 12/20/23  1121 11/29/23  0418 11/28/23  0712 11/01/23  0842 10/23/23  0528 10/22/23  1837 10/04/23  0857 09/18/23  1802 09/06/23  1628 09/02/23  0814 09/01/23  0752 08/31/23  1234 08/30/23  1457 08/22/23  0552 08/21/23  0925 07/28/23  0657 07/26/23  1928    136 138 137 139 138 133* 139   < > 137 142 139 134* 131*   < > 131*   < > 133*   K 4.0 3.8 5.3 4.3 4.2 4.0 4.6 4.7   < > 3.8 3.8 4.6 4.8 4.4   < > 5.1   < > 5.4*    102 105 99 103 102 98 107   < > 99 105 106 99 98   < > 95*   < > 95*   CO2 31 28 25 28 29 28 26 27   < > 26 28 26 24 26   < > 28   < > 29   ANIONGAP 11 10 13 14 11 12 14 10   < > 16 13 12 16 11   < > 13   < > 14   BUN 15 14 20 12 13 11 15 13   < > 10 13 9 9 13   < > 20   < > 17   CREATININE 0.70 0.64 0.70 0.67 0.52 0.63 0.52 0.56   < > 0.46* 0.55 0.67 0.65 0.61   < > 0.79   < > 0.90   EGFR >90 >90 >90 >90 >90 >90 >90 >90  --   --   --   --   --   --   --   --   --   --    MG  --   --   --  1.73  --   --   --   --   --  1.99 2.02 2.13 2.06 1.59*  --  1.88  --  1.97    < > = values in this interval not displayed.     Recent Labs     01/05/24  1400 12/20/23  1121 11/29/23  0418 11/28/23  0712 11/01/23  0842 09/19/23  0756 09/18/23  1802 08/30/23  0557 08/29/23  1257 05/21/20  2150 11/21/19  1156   ALBUMIN 4.1 3.7 3.4 4.1 3.6   < > 2.8*   < > 3.0*   < > 4.0   ALKPHOS 75 68 62 79 62   < > 86   < > 97   < > 65   ALT 12 7 9 10 8   < > 21   < > 12   < > 15   AST 13 11 17 22 9   < > 15   < > 15   < > 16   BILITOT 0.3 0.3 0.3 0.5 0.4   < > 0.4   < > 0.6   < > 0.5   LIPASE  --   --   --   --   --   --  50  --  40  --  45    < > = values in this interval not displayed.     CBC:  Recent Labs     01/05/24  1400 12/20/23  1121 11/30/23  0546 11/29/23  0418 11/28/23  0712 11/01/23  0842 10/23/23  0528 10/22/23  1837   WBC 12.2* 9.7 8.3 9.6 10.7 10.7 8.6 11.6*   HGB 14.8 14.0 11.8* 12.7 13.9 11.8* 11.0* 11.6*   HCT 47.3* 45.5 39.9 41.4 45.1 38.8 37.1 38.6    296 183 192 209 247 293 321   MCV 80 80  80 80 80 78* 79* 79*     COAG:   Recent Labs     12/01/23  0535 11/30/23  0918 11/30/23  0546 11/29/23  0759 11/29/23  0418 11/29/23  0146 11/28/23  1945 11/28/23  1555 09/18/23  1824 08/30/23  1100 08/30/23  1054 08/11/23  0716 08/10/22  0303 08/09/22  1930 05/21/20  2150 11/21/19  1156   INR  --   --   --   --   --   --   --   --  1.3* 1.5*  --  1.2*  --  1.1  --  1.2*   HAUF 0.3 0.2 0.3 0.2 0.4 0.2 0.2 0.2  --   --   --   --    < >  --   --   --    DDIMERVTE  --   --   --   --   --   --   --   --   --   --   --   --   --   --  2,110*  --    HAPTOGLOBIN  --   --   --   --   --   --   --   --   --   --  CANCELED  --   --   --   --   --    FIBRINOGEN  --   --   --   --   --   --   --   --   --   --  CANCELED  --   --   --   --   --     < > = values in this interval not displayed.     ABO:   Recent Labs     09/18/23  1802   ABO A     HEME/ENDO:  Recent Labs     11/01/23  0842 09/19/23  0756 09/06/23  1628 09/05/23  0848 08/30/23  0557 05/02/23  0907 05/02/23  0907 09/29/20  0746   FERRITIN  --  712*  --   --   --   --   --   --    IRONSAT  --  11*  --   --   --   --   --  26   TSH 2.63  --  2.75 6.39* 6.15*   < > 1.75  --    HGBA1C  --   --   --   --   --   --  5.8*  --     < > = values in this interval not displayed.      CARDIAC:   Recent Labs     11/28/23  0916 11/28/23  0712 10/23/23  0528 10/22/23  2338 10/22/23  1837 09/18/23  1802 08/30/23  1054 08/29/23  1257 08/21/23  1312 07/26/23  1928 11/28/21  1545   LDH  --   --   --   --   --   --  CANCELED  --   --   --   --    TROPHS 2,735* 2,902* 24* 42* 15* 7  --  12 9   < >  --    BNP  --  185*  --   --  82  --   --   --   --   --  65    < > = values in this interval not displayed.     Recent Labs     05/02/23  0907 09/29/20  0746   CHOL 160 144   LDLF 107* 93   HDL 35.6* 28.0*   TRIG 89 114     MICRO:   Recent Labs     01/05/24  1400   CRP 3.31*     No results found for the last 90 days.         I have personally reviewed most recent PCP, cardiology, vascular,  and/or podiatry documentation.      Assessment/Plan   68 y.o. 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    Last intervention was 2022 for claudication. Continues to smoke 2 ppd (self rolled).    Plan:  Suspect LLE SFA/popliteal reocclusion. Fem-fem likely intact based on CFA waveform on TRAVIS. Will plan for intervention ASAP for limb salvage.  BP/HR adequate at this time.  LDL above goal, will need to increase post-procedure.  Smoking cessation discussion initiated - will continue. Pt is not ready for quitting yet.  Discussed with Dr. Reyes.         SIGNATURE: Willam Mike MD PATIENT NAME: Radha Toussaint   DATE/TIME: January 18, 2024 3:36 PM MRN: [id number]

## 2024-01-18 NOTE — PATIENT INSTRUCTIONS
We will have you come back for the procedure for the artery on 1/23/24 at [facility].     Medications:  If you are on a blood thinner (like Eliquis and Xarelto), please hold it 2 days before procedure.  If you are on warfarin, please hold it 3 days before procedure.  If you are in aspirin, Plavix (clopidogrel), Effient (prasugrel), or Brilinta (ticagrelor), please continue through procedure.    If you are diabetic on oral pills: please hold your morning oral diabetes medications.  If you are diabetic on insulin: take half dose of your long acting insulin the night before and hold your short acting insulin and oral diabetes medications on the morning of.    Otherwise, you may continue your medications in the morning with sips of water.     Other Instructions:  Nothing to eat after midnight for the procedure. Our cath lab nurses will contact you the day before the procedure for further instructions.   Address: [address] [place] [address]. Parking structure or  by main entrance, then arrive to admitting desk on the 2nd floor.    If help needed, call [phone], Option 4 for [facility] [phone] (for [facility] Cath Lab M-F, 6:30 AM-5 PM).

## 2024-01-18 NOTE — PROGRESS NOTES
PCP: Francheska Rogel DO  Podiatrist: Amy Burk   Radha Toussaint is a 68 y.o. female who complains of nonhealing wound . Patient's symptom duration is approximately 2 months. Throbbing pain.    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      Review of Systems:  Otherwise, limited cardiovascular review of systems is negative.    Past Medical History:  She has a past medical history of Aneurysm of carotid artery (CMS/HCC), DVT (deep venous thrombosis) (CMS/HCC), History of tubal ligation, Old myocardial infarction, Other specified disorders of kidney and ureter, Personal history of other diseases of the circulatory system, Personal history of other diseases of the circulatory system, Personal history of other diseases of the respiratory system, Personal history of urinary calculi, and Right upper quadrant pain (09/25/2020).    Surgical History:   She has a past surgical history that includes Other surgical history (09/30/2020); Other surgical history (09/30/2020); CT angio abdomen pelvis w and or wo IV IV contrast (11/21/2019); CT angio head w and wo IV contrast (5/22/2020); and CT angio aorta and bilateral iliofemoral runoff w and or wo IV contrast (8/9/2022).    Family History:   Family History   Problem Relation Name Age of Onset    Aneurysm Mother      Hypertension Mother      Heart attack Father         Social History:   Tobacco Use: High Risk (1/5/2024)    Patient History     Smoking Tobacco Use: Every Day     Smokeless Tobacco Use: Never     Passive Exposure: Not on file   2 ppd    Outpatient Medications:    Current Outpatient Medications:     albuterol 90 mcg/actuation inhaler, Inhale 1 puff every 4 hours if needed for shortness of breath., Disp: 18 g, Rfl: 3    aspirin 81 mg EC tablet, Take 1 tablet (81 mg) by mouth once daily., Disp: , Rfl:      clopidogrel (Plavix) 75 mg tablet, Take 1 tablet (75 mg) by mouth once daily., Disp: 90 tablet, Rfl: 1    famotidine (Pepcid) 40 mg tablet, Take 1 tablet (40 mg) by mouth once daily., Disp: , Rfl:     ferrous sulfate 325 (65 Fe) MG EC tablet, Take 65 mg by mouth once daily. Do not crush, chew, or split., Disp: , Rfl:     fludrocortisone (Florinef) 0.1 mg tablet, TAKE 1/2 TABLET BY MOUTH ONCE DAILY, Disp: 30 tablet, Rfl: 3    fluticasone-umeclidin-vilanter (Trelegy Ellipta) 200-62.5-25 mcg blister with device, Inhale 1 puff once daily., Disp: 60 each, Rfl: 3    HYDROcodone-acetaminophen (Norco)  mg tablet, Take 1 tablet by mouth every 8 hours if needed for severe pain (7 - 10)., Disp: 60 tablet, Rfl: 0    hydrocortisone (Cortef) 5 mg tablet, STARTING ON 9/8 TAKE 2 TABS AT 8AM THEN TAKE 1 TABLET AT NOON THEN TAKE 1/2 TABLET AT 4PM, Disp: 180 tablet, Rfl: 2    ipratropium-albuteroL (Duo-Neb) 0.5-2.5 mg/3 mL nebulizer solution, Take 3 mL by nebulization 4 times a day., Disp: 360 mL, Rfl: 11    montelukast (Singulair) 10 mg tablet, Take 1 tablet (10 mg) by mouth once daily at bedtime., Disp: 90 tablet, Rfl: 1    ondansetron ODT (Zofran-ODT) 4 mg disintegrating tablet, DISSOLVE 1 TABLET IN MOUTH BY MOUTH THREE TIMES A DAY AS NEEDED NAUSEA, Disp: 90 tablet, Rfl: 0    pantoprazole (ProtoNix) 40 mg EC tablet, Take 1 tablet (40 mg) by mouth once daily in the morning. Take before meals. Do not crush, chew, or split., Disp: , Rfl:     rosuvastatin (Crestor) 10 mg tablet, Take 1 tablet (10 mg) by mouth once daily., Disp: 90 tablet, Rfl: 1    sennosides (senna) 8.6 mg tablet, Take 1 tablet (8.6 mg) by mouth once daily., Disp: 30 tablet, Rfl: 11    sertraline (Zoloft) 50 mg tablet, Take 1 tablet (50 mg) by mouth 2 times a day., Disp: , Rfl:     simethicone (Mylicon) 80 mg chewable tablet, Chew 1 tablet (80 mg) every 6 hours if needed., Disp: , Rfl:     cyclobenzaprine (Flexeril) 10 mg tablet, Take 1 tablet (10 mg) by mouth 3  "times a day as needed for muscle spasms., Disp: 30 tablet, Rfl: 0    dexAMETHasone (Decadron) 4 mg tablet, Take 1 tablet (4 mg) by mouth once daily with a meal for 7 days. (Patient not taking: Reported on 1/18/2024), Disp: 7 tablet, Rfl: 0    levoFLOXacin (Levaquin) 500 mg tablet, Take 1 tablet (500 mg) by mouth once daily., Disp: 7 tablet, Rfl: 0    naloxone (Narcan) 4 mg/0.1 mL nasal spray, Administer 1 spray (4 mg) into affected nostril(s) if needed for opioid reversal. May repeat every 2-3 minutes if needed, alternating nostrils, until medical assistance becomes available., Disp: 2 each, Rfl: 0    OLANZapine (ZyPREXA) 5 mg tablet, Take 1 tablet (5 mg) by mouth once daily at bedtime., Disp: 30 tablet, Rfl: 3    sertraline (Zoloft) 100 mg tablet, Take 1 tablet (100 mg) by mouth once daily., Disp: 90 tablet, Rfl: 2     Allergies:  Ace inhibitors, Prednisone, and Demerol [meperidine]       Objective   Vital Signs:  /60 (BP Location: Left arm, Patient Position: Sitting, BP Cuff Size: Adult)   Pulse 85   Ht 1.626 m (5' 4\")   Wt 61.6 kg (135 lb 12.8 oz)   SpO2 96%   BMI 23.31 kg/m²     Physical Exam:  General: no acute distress  HEENT: EOMI, no scleral icterus.  Lungs: Clear to auscultation bilaterally without wheezing, rales, or rhonchi.  Cardiovascular: Regular rhythm and rate. Normal S1 and S2. No murmurs, rubs, or gallops are appreciated. JVP normal.  no bruits  Abdomen: Soft, nontender, nondistended. Bowel sounds present.  Extremities: LLE dependent rubor; 0.7x1.4x0.1 cm at L 5th met head (based on wound center documentation).  Neurologic: Alert and oriented x3.    Pertinent Recent Cardiovascular Studies (personally reviewed):  Vascular studies:  TRAVIS/TBI 1/18/2024: normal RLE TRAVIS, diminished LLE TRAVIS, diminished RLE TBI and/or PPG, and diminished LLE TBI and/or PPG. LLE TBI 0.00.    Cadiac studies:  Echocardiogram 8/31/23:  LVEF 60-65%    Laboratory values:  CMP:  Recent Labs     01/05/24  1400 " 12/20/23  1121 11/29/23  0418 11/28/23  0712 11/01/23  0842 10/23/23  0528 10/22/23  1837 10/04/23  0857 09/18/23  1802 09/06/23  1628 09/02/23  0814 09/01/23  0752 08/31/23  1234 08/30/23  1457 08/22/23  0552 08/21/23  0925 07/28/23  0657 07/26/23  1928    136 138 137 139 138 133* 139   < > 137 142 139 134* 131*   < > 131*   < > 133*   K 4.0 3.8 5.3 4.3 4.2 4.0 4.6 4.7   < > 3.8 3.8 4.6 4.8 4.4   < > 5.1   < > 5.4*    102 105 99 103 102 98 107   < > 99 105 106 99 98   < > 95*   < > 95*   CO2 31 28 25 28 29 28 26 27   < > 26 28 26 24 26   < > 28   < > 29   ANIONGAP 11 10 13 14 11 12 14 10   < > 16 13 12 16 11   < > 13   < > 14   BUN 15 14 20 12 13 11 15 13   < > 10 13 9 9 13   < > 20   < > 17   CREATININE 0.70 0.64 0.70 0.67 0.52 0.63 0.52 0.56   < > 0.46* 0.55 0.67 0.65 0.61   < > 0.79   < > 0.90   EGFR >90 >90 >90 >90 >90 >90 >90 >90  --   --   --   --   --   --   --   --   --   --    MG  --   --   --  1.73  --   --   --   --   --  1.99 2.02 2.13 2.06 1.59*  --  1.88  --  1.97    < > = values in this interval not displayed.     Recent Labs     01/05/24  1400 12/20/23  1121 11/29/23  0418 11/28/23  0712 11/01/23  0842 09/19/23  0756 09/18/23  1802 08/30/23  0557 08/29/23  1257 05/21/20  2150 11/21/19  1156   ALBUMIN 4.1 3.7 3.4 4.1 3.6   < > 2.8*   < > 3.0*   < > 4.0   ALKPHOS 75 68 62 79 62   < > 86   < > 97   < > 65   ALT 12 7 9 10 8   < > 21   < > 12   < > 15   AST 13 11 17 22 9   < > 15   < > 15   < > 16   BILITOT 0.3 0.3 0.3 0.5 0.4   < > 0.4   < > 0.6   < > 0.5   LIPASE  --   --   --   --   --   --  50  --  40  --  45    < > = values in this interval not displayed.     CBC:  Recent Labs     01/05/24  1400 12/20/23  1121 11/30/23  0546 11/29/23  0418 11/28/23  0712 11/01/23  0842 10/23/23  0528 10/22/23  1837   WBC 12.2* 9.7 8.3 9.6 10.7 10.7 8.6 11.6*   HGB 14.8 14.0 11.8* 12.7 13.9 11.8* 11.0* 11.6*   HCT 47.3* 45.5 39.9 41.4 45.1 38.8 37.1 38.6    296 183 192 209 247 293 321   MCV 80 80  80 80 80 78* 79* 79*     COAG:   Recent Labs     12/01/23  0535 11/30/23  0918 11/30/23  0546 11/29/23  0759 11/29/23  0418 11/29/23  0146 11/28/23  1945 11/28/23  1555 09/18/23  1824 08/30/23  1100 08/30/23  1054 08/11/23  0716 08/10/22  0303 08/09/22  1930 05/21/20  2150 11/21/19  1156   INR  --   --   --   --   --   --   --   --  1.3* 1.5*  --  1.2*  --  1.1  --  1.2*   HAUF 0.3 0.2 0.3 0.2 0.4 0.2 0.2 0.2  --   --   --   --    < >  --   --   --    DDIMERVTE  --   --   --   --   --   --   --   --   --   --   --   --   --   --  2,110*  --    HAPTOGLOBIN  --   --   --   --   --   --   --   --   --   --  CANCELED  --   --   --   --   --    FIBRINOGEN  --   --   --   --   --   --   --   --   --   --  CANCELED  --   --   --   --   --     < > = values in this interval not displayed.     ABO:   Recent Labs     09/18/23  1802   ABO A     HEME/ENDO:  Recent Labs     11/01/23  0842 09/19/23  0756 09/06/23  1628 09/05/23  0848 08/30/23  0557 05/02/23  0907 05/02/23  0907 09/29/20  0746   FERRITIN  --  712*  --   --   --   --   --   --    IRONSAT  --  11*  --   --   --   --   --  26   TSH 2.63  --  2.75 6.39* 6.15*   < > 1.75  --    HGBA1C  --   --   --   --   --   --  5.8*  --     < > = values in this interval not displayed.      CARDIAC:   Recent Labs     11/28/23  0916 11/28/23  0712 10/23/23  0528 10/22/23  2338 10/22/23  1837 09/18/23  1802 08/30/23  1054 08/29/23  1257 08/21/23  1312 07/26/23  1928 11/28/21  1545   LDH  --   --   --   --   --   --  CANCELED  --   --   --   --    TROPHS 2,735* 2,902* 24* 42* 15* 7  --  12 9   < >  --    BNP  --  185*  --   --  82  --   --   --   --   --  65    < > = values in this interval not displayed.     Recent Labs     05/02/23  0907 09/29/20  0746   CHOL 160 144   LDLF 107* 93   HDL 35.6* 28.0*   TRIG 89 114     MICRO:   Recent Labs     01/05/24  1400   CRP 3.31*     No results found for the last 90 days.         I have personally reviewed most recent PCP, cardiology, vascular,  and/or podiatry documentation.      Assessment/Plan   68 y.o. 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    Last intervention was 2022 for claudication. Continues to smoke 2 ppd (self rolled).    Plan:  Suspect LLE SFA/popliteal reocclusion. Fem-fem likely intact based on CFA waveform on TRAVIS. Will plan for intervention ASAP for limb salvage.  BP/HR adequate at this time.  LDL above goal, will need to increase post-procedure.  Smoking cessation discussion initiated - will continue. Pt is not ready for quitting yet.  Discussed with Dr. Reyes.         SIGNATURE: Willam Mike MD PATIENT NAME: Radha Toussaint   DATE/TIME: January 18, 2024 3:36 PM MRN: [id number]

## 2024-01-23 ENCOUNTER — APPOINTMENT (OUTPATIENT)
Dept: CARDIOLOGY | Facility: HOSPITAL | Age: 69
End: 2024-01-23
Payer: MEDICARE

## 2024-01-23 ENCOUNTER — HOSPITAL ENCOUNTER (OUTPATIENT)
Facility: HOSPITAL | Age: 69
Discharge: HOME | End: 2024-01-24
Attending: INTERNAL MEDICINE | Admitting: PHYSICIAN ASSISTANT
Payer: MEDICARE

## 2024-01-23 DIAGNOSIS — I73.9 PAD (PERIPHERAL ARTERY DISEASE) (CMS-HCC): ICD-10-CM

## 2024-01-23 DIAGNOSIS — I70.235 ATHEROSCLEROSIS OF NATIVE ARTERIES OF RIGHT LEG WITH ULCERATION OF OTHER PART OF FOOT (MULTI): ICD-10-CM

## 2024-01-23 DIAGNOSIS — F17.200 CURRENT SMOKER: ICD-10-CM

## 2024-01-23 DIAGNOSIS — E78.2 MIXED HYPERLIPIDEMIA: ICD-10-CM

## 2024-01-23 DIAGNOSIS — F17.200 TOBACCO DEPENDENCE SYNDROME: ICD-10-CM

## 2024-01-23 DIAGNOSIS — I70.222 CRITICAL LIMB ISCHEMIA OF LEFT LOWER EXTREMITY (MULTI): Primary | ICD-10-CM

## 2024-01-23 PROCEDURE — C2623 CATH, TRANSLUMIN, DRUG-COAT: HCPCS | Performed by: INTERNAL MEDICINE

## 2024-01-23 PROCEDURE — G0378 HOSPITAL OBSERVATION PER HR: HCPCS

## 2024-01-23 PROCEDURE — 2500000001 HC RX 250 WO HCPCS SELF ADMINISTERED DRUGS (ALT 637 FOR MEDICARE OP): Performed by: STUDENT IN AN ORGANIZED HEALTH CARE EDUCATION/TRAINING PROGRAM

## 2024-01-23 PROCEDURE — 75710 ARTERY X-RAYS ARM/LEG: CPT | Performed by: INTERNAL MEDICINE

## 2024-01-23 PROCEDURE — 2500000005 HC RX 250 GENERAL PHARMACY W/O HCPCS: Performed by: INTERNAL MEDICINE

## 2024-01-23 PROCEDURE — 75710 ARTERY X-RAYS ARM/LEG: CPT | Mod: 59 | Performed by: INTERNAL MEDICINE

## 2024-01-23 PROCEDURE — 85347 COAGULATION TIME ACTIVATED: CPT

## 2024-01-23 PROCEDURE — 99153 MOD SED SAME PHYS/QHP EA: CPT | Performed by: INTERNAL MEDICINE

## 2024-01-23 PROCEDURE — C1773 RET DEV, INSERTABLE: HCPCS | Performed by: INTERNAL MEDICINE

## 2024-01-23 PROCEDURE — 99152 MOD SED SAME PHYS/QHP 5/>YRS: CPT | Performed by: INTERNAL MEDICINE

## 2024-01-23 PROCEDURE — 2550000001 HC RX 255 CONTRASTS: Performed by: INTERNAL MEDICINE

## 2024-01-23 PROCEDURE — C1876 STENT, NON-COA/NON-COV W/DEL: HCPCS | Performed by: INTERNAL MEDICINE

## 2024-01-23 PROCEDURE — 36140 INTRO NDL ICATH UPR/LXTR ART: CPT | Performed by: INTERNAL MEDICINE

## 2024-01-23 PROCEDURE — 2500000004 HC RX 250 GENERAL PHARMACY W/ HCPCS (ALT 636 FOR OP/ED): Performed by: INTERNAL MEDICINE

## 2024-01-23 PROCEDURE — 2500000001 HC RX 250 WO HCPCS SELF ADMINISTERED DRUGS (ALT 637 FOR MEDICARE OP): Performed by: PHYSICIAN ASSISTANT

## 2024-01-23 PROCEDURE — 36246 INS CATH ABD/L-EXT ART 2ND: CPT | Mod: 59 | Performed by: INTERNAL MEDICINE

## 2024-01-23 PROCEDURE — C1894 INTRO/SHEATH, NON-LASER: HCPCS | Performed by: INTERNAL MEDICINE

## 2024-01-23 PROCEDURE — 7100000009 HC PHASE TWO TIME - INITIAL BASE CHARGE: Performed by: INTERNAL MEDICINE

## 2024-01-23 PROCEDURE — 37226 PR REVSC OPN/PRQ FEM/POP W/STNT/ANGIOP SM VSL: CPT | Performed by: INTERNAL MEDICINE

## 2024-01-23 PROCEDURE — 85347 COAGULATION TIME ACTIVATED: CPT | Performed by: INTERNAL MEDICINE

## 2024-01-23 PROCEDURE — 36246 INS CATH ABD/L-EXT ART 2ND: CPT | Performed by: INTERNAL MEDICINE

## 2024-01-23 PROCEDURE — C1769 GUIDE WIRE: HCPCS | Performed by: INTERNAL MEDICINE

## 2024-01-23 PROCEDURE — 2500000004 HC RX 250 GENERAL PHARMACY W/ HCPCS (ALT 636 FOR OP/ED): Performed by: PHYSICIAN ASSISTANT

## 2024-01-23 PROCEDURE — 7100000011 HC EXTENDED STAY RECOVERY HOURLY - NURSING UNIT

## 2024-01-23 PROCEDURE — 37228 PR REVSC OPN/PRQ TIB/PERO W/ANGIOPLASTY UNI: CPT | Performed by: INTERNAL MEDICINE

## 2024-01-23 PROCEDURE — 2780000003 HC OR 278 NO HCPCS: Performed by: INTERNAL MEDICINE

## 2024-01-23 PROCEDURE — 37228 HC REVASCULARIZE TIBIAL/PERON ARTERY,ANGIOPLASTY INITIAL: CPT | Performed by: INTERNAL MEDICINE

## 2024-01-23 PROCEDURE — 7100000010 HC PHASE TWO TIME - EACH INCREMENTAL 1 MINUTE: Performed by: INTERNAL MEDICINE

## 2024-01-23 PROCEDURE — 36140 INTRO NDL ICATH UPR/LXTR ART: CPT | Mod: 59 | Performed by: INTERNAL MEDICINE

## 2024-01-23 PROCEDURE — 2500000001 HC RX 250 WO HCPCS SELF ADMINISTERED DRUGS (ALT 637 FOR MEDICARE OP): Performed by: INTERNAL MEDICINE

## 2024-01-23 PROCEDURE — 37226 HC REVASCULARIZE FEM/POP ARTERY,ANGIOPLASTY/STENT: CPT | Performed by: INTERNAL MEDICINE

## 2024-01-23 PROCEDURE — 2500000002 HC RX 250 W HCPCS SELF ADMINISTERED DRUGS (ALT 637 FOR MEDICARE OP, ALT 636 FOR OP/ED): Mod: MUE | Performed by: PHYSICIAN ASSISTANT

## 2024-01-23 PROCEDURE — C1725 CATH, TRANSLUMIN NON-LASER: HCPCS | Performed by: INTERNAL MEDICINE

## 2024-01-23 PROCEDURE — 2720000007 HC OR 272 NO HCPCS: Performed by: INTERNAL MEDICINE

## 2024-01-23 PROCEDURE — 93005 ELECTROCARDIOGRAM TRACING: CPT | Mod: 59

## 2024-01-23 PROCEDURE — 93010 ELECTROCARDIOGRAM REPORT: CPT | Performed by: INTERNAL MEDICINE

## 2024-01-23 DEVICE — STENT EVD35-06-040-150 EF ENTRUST V01
Type: IMPLANTABLE DEVICE | Site: LEG | Status: FUNCTIONAL
Brand: EVERFLEX™

## 2024-01-23 DEVICE — SUPERA PERIPHERAL STENT SYSTEM 5.5 MM X 80 MM X 120 CM 6 F
Type: IMPLANTABLE DEVICE | Site: LEG | Status: FUNCTIONAL
Brand: SUPERA

## 2024-01-23 RX ORDER — ALBUTEROL SULFATE 90 UG/1
1 AEROSOL, METERED RESPIRATORY (INHALATION) EVERY 4 HOURS PRN
Status: DISCONTINUED | OUTPATIENT
Start: 2024-01-23 | End: 2024-01-24 | Stop reason: HOSPADM

## 2024-01-23 RX ORDER — TALC
6 POWDER (GRAM) TOPICAL ONCE
Status: COMPLETED | OUTPATIENT
Start: 2024-01-23 | End: 2024-01-23

## 2024-01-23 RX ORDER — SERTRALINE HYDROCHLORIDE 50 MG/1
50 TABLET, FILM COATED ORAL 2 TIMES DAILY
Status: DISCONTINUED | OUTPATIENT
Start: 2024-01-23 | End: 2024-01-24 | Stop reason: HOSPADM

## 2024-01-23 RX ORDER — HYDROCORTISONE 5 MG/1
5 TABLET ORAL 2 TIMES DAILY
Status: DISCONTINUED | OUTPATIENT
Start: 2024-01-23 | End: 2024-01-24 | Stop reason: HOSPADM

## 2024-01-23 RX ORDER — SENNOSIDES 8.6 MG/1
1 TABLET ORAL DAILY
Status: DISCONTINUED | OUTPATIENT
Start: 2024-01-23 | End: 2024-01-24 | Stop reason: HOSPADM

## 2024-01-23 RX ORDER — PROTAMINE SULFATE 10 MG/ML
20 INJECTION, SOLUTION INTRAVENOUS ONCE
Status: COMPLETED | OUTPATIENT
Start: 2024-01-23 | End: 2024-01-23

## 2024-01-23 RX ORDER — ASPIRIN 325 MG
325 TABLET ORAL ONCE
Status: COMPLETED | OUTPATIENT
Start: 2024-01-23 | End: 2024-01-23

## 2024-01-23 RX ORDER — SODIUM CHLORIDE 9 MG/ML
100 INJECTION, SOLUTION INTRAVENOUS CONTINUOUS
Status: DISCONTINUED | OUTPATIENT
Start: 2024-01-23 | End: 2024-01-24 | Stop reason: HOSPADM

## 2024-01-23 RX ORDER — AMOXICILLIN 250 MG
2 CAPSULE ORAL 2 TIMES DAILY
Status: DISCONTINUED | OUTPATIENT
Start: 2024-01-23 | End: 2024-01-24 | Stop reason: HOSPADM

## 2024-01-23 RX ORDER — FLUDROCORTISONE ACETATE 0.1 MG/1
0.05 TABLET ORAL DAILY
Status: DISCONTINUED | OUTPATIENT
Start: 2024-01-23 | End: 2024-01-24 | Stop reason: HOSPADM

## 2024-01-23 RX ORDER — MONTELUKAST SODIUM 10 MG/1
10 TABLET ORAL NIGHTLY
Status: DISCONTINUED | OUTPATIENT
Start: 2024-01-23 | End: 2024-01-24 | Stop reason: HOSPADM

## 2024-01-23 RX ORDER — ROSUVASTATIN CALCIUM 10 MG/1
20 TABLET, COATED ORAL DAILY
Qty: 180 TABLET | Refills: 3 | Status: SHIPPED | OUTPATIENT
Start: 2024-01-23 | End: 2025-01-22

## 2024-01-23 RX ORDER — ASPIRIN 81 MG/1
81 TABLET ORAL DAILY
Status: DISCONTINUED | OUTPATIENT
Start: 2024-01-24 | End: 2024-01-24 | Stop reason: HOSPADM

## 2024-01-23 RX ORDER — ROSUVASTATIN CALCIUM 20 MG/1
10 TABLET, COATED ORAL DAILY
Status: DISCONTINUED | OUTPATIENT
Start: 2024-01-23 | End: 2024-01-23

## 2024-01-23 RX ORDER — ROSUVASTATIN CALCIUM 40 MG/1
20 TABLET, COATED ORAL DAILY
Status: DISCONTINUED | OUTPATIENT
Start: 2024-01-24 | End: 2024-01-24 | Stop reason: HOSPADM

## 2024-01-23 RX ORDER — IBUPROFEN 200 MG
1 TABLET ORAL DAILY
Status: DISCONTINUED | OUTPATIENT
Start: 2024-01-24 | End: 2024-01-24 | Stop reason: HOSPADM

## 2024-01-23 RX ORDER — HEPARIN SODIUM 1000 [USP'U]/ML
INJECTION, SOLUTION INTRAVENOUS; SUBCUTANEOUS AS NEEDED
Status: DISCONTINUED | OUTPATIENT
Start: 2024-01-23 | End: 2024-01-23 | Stop reason: HOSPADM

## 2024-01-23 RX ORDER — FLUTICASONE FUROATE AND VILANTEROL 200; 25 UG/1; UG/1
1 POWDER RESPIRATORY (INHALATION)
Status: DISCONTINUED | OUTPATIENT
Start: 2024-01-23 | End: 2024-01-24 | Stop reason: HOSPADM

## 2024-01-23 RX ORDER — ONDANSETRON 4 MG/1
4 TABLET, ORALLY DISINTEGRATING ORAL EVERY 8 HOURS PRN
Status: DISCONTINUED | OUTPATIENT
Start: 2024-01-23 | End: 2024-01-24 | Stop reason: HOSPADM

## 2024-01-23 RX ORDER — CLOPIDOGREL BISULFATE 300 MG/1
300 TABLET, FILM COATED ORAL ONCE
Status: COMPLETED | OUTPATIENT
Start: 2024-01-23 | End: 2024-01-23

## 2024-01-23 RX ORDER — ACETAMINOPHEN 325 MG/1
650 TABLET ORAL EVERY 4 HOURS PRN
Status: DISCONTINUED | OUTPATIENT
Start: 2024-01-23 | End: 2024-01-24 | Stop reason: HOSPADM

## 2024-01-23 RX ORDER — MIDAZOLAM HYDROCHLORIDE 1 MG/ML
INJECTION, SOLUTION INTRAMUSCULAR; INTRAVENOUS AS NEEDED
Status: DISCONTINUED | OUTPATIENT
Start: 2024-01-23 | End: 2024-01-23 | Stop reason: HOSPADM

## 2024-01-23 RX ORDER — IPRATROPIUM BROMIDE AND ALBUTEROL SULFATE 2.5; .5 MG/3ML; MG/3ML
3 SOLUTION RESPIRATORY (INHALATION)
Status: DISCONTINUED | OUTPATIENT
Start: 2024-01-23 | End: 2024-01-24 | Stop reason: HOSPADM

## 2024-01-23 RX ORDER — FENTANYL CITRATE 50 UG/ML
INJECTION, SOLUTION INTRAMUSCULAR; INTRAVENOUS AS NEEDED
Status: DISCONTINUED | OUTPATIENT
Start: 2024-01-23 | End: 2024-01-23 | Stop reason: HOSPADM

## 2024-01-23 RX ORDER — SIMETHICONE 80 MG
80 TABLET,CHEWABLE ORAL EVERY 6 HOURS PRN
Status: DISCONTINUED | OUTPATIENT
Start: 2024-01-23 | End: 2024-01-24 | Stop reason: HOSPADM

## 2024-01-23 RX ORDER — HYDROCODONE BITARTRATE AND ACETAMINOPHEN 5; 325 MG/1; MG/1
1 TABLET ORAL EVERY 8 HOURS PRN
Status: DISCONTINUED | OUTPATIENT
Start: 2024-01-23 | End: 2024-01-24 | Stop reason: HOSPADM

## 2024-01-23 RX ORDER — IODIXANOL 320 MG/ML
INJECTION, SOLUTION INTRAVASCULAR AS NEEDED
Status: DISCONTINUED | OUTPATIENT
Start: 2024-01-23 | End: 2024-01-23 | Stop reason: HOSPADM

## 2024-01-23 RX ORDER — PANTOPRAZOLE SODIUM 40 MG/1
40 TABLET, DELAYED RELEASE ORAL
Status: DISCONTINUED | OUTPATIENT
Start: 2024-01-24 | End: 2024-01-24 | Stop reason: HOSPADM

## 2024-01-23 RX ORDER — FERROUS SULFATE 325(65) MG
65 TABLET ORAL DAILY
Status: DISCONTINUED | OUTPATIENT
Start: 2024-01-23 | End: 2024-01-24 | Stop reason: HOSPADM

## 2024-01-23 RX ORDER — CLOPIDOGREL BISULFATE 75 MG/1
75 TABLET ORAL DAILY
Status: DISCONTINUED | OUTPATIENT
Start: 2024-01-24 | End: 2024-01-24 | Stop reason: HOSPADM

## 2024-01-23 RX ORDER — LIDOCAINE HYDROCHLORIDE 10 MG/ML
INJECTION, SOLUTION EPIDURAL; INFILTRATION; INTRACAUDAL; PERINEURAL AS NEEDED
Status: DISCONTINUED | OUTPATIENT
Start: 2024-01-23 | End: 2024-01-23 | Stop reason: HOSPADM

## 2024-01-23 RX ADMIN — SODIUM CHLORIDE 100 ML/HR: 9 INJECTION, SOLUTION INTRAVENOUS at 20:20

## 2024-01-23 RX ADMIN — SERTRALINE HYDROCHLORIDE 50 MG: 50 TABLET ORAL at 20:00

## 2024-01-23 RX ADMIN — HYDROCODONE BITARTRATE AND ACETAMINOPHEN 1 TABLET: 5; 325 TABLET ORAL at 16:09

## 2024-01-23 RX ADMIN — MONTELUKAST 10 MG: 10 TABLET, FILM COATED ORAL at 20:00

## 2024-01-23 RX ADMIN — ACETAMINOPHEN 650 MG: 325 TABLET ORAL at 20:00

## 2024-01-23 RX ADMIN — MELATONIN 6 MG: 3 TAB ORAL at 21:00

## 2024-01-23 RX ADMIN — CLOPIDOGREL BISULFATE 300 MG: 300 TABLET, FILM COATED ORAL at 08:15

## 2024-01-23 RX ADMIN — HYDROCORTISONE 5 MG: 5 TABLET ORAL at 20:00

## 2024-01-23 RX ADMIN — SODIUM CHLORIDE 100 ML/HR: 9 INJECTION, SOLUTION INTRAVENOUS at 10:29

## 2024-01-23 RX ADMIN — SERTRALINE HYDROCHLORIDE 50 MG: 50 TABLET ORAL at 14:00

## 2024-01-23 RX ADMIN — SENNOSIDES 8.6 MG: 8.6 TABLET, FILM COATED ORAL at 18:20

## 2024-01-23 RX ADMIN — SENNOSIDES AND DOCUSATE SODIUM 2 TABLET: 8.6; 5 TABLET ORAL at 20:00

## 2024-01-23 RX ADMIN — ASPIRIN 325 MG ORAL TABLET 325 MG: 325 PILL ORAL at 08:15

## 2024-01-23 RX ADMIN — PROTAMINE SULFATE 20 MG: 10 INJECTION, SOLUTION INTRAVENOUS at 14:30

## 2024-01-23 RX ADMIN — HYDROCORTISONE 5 MG: 5 TABLET ORAL at 14:00

## 2024-01-23 SDOH — ECONOMIC STABILITY: TRANSPORTATION INSECURITY
IN THE PAST 12 MONTHS, HAS THE LACK OF TRANSPORTATION KEPT YOU FROM MEDICAL APPOINTMENTS OR FROM GETTING MEDICATIONS?: NO

## 2024-01-23 SDOH — ECONOMIC STABILITY: INCOME INSECURITY: HOW HARD IS IT FOR YOU TO PAY FOR THE VERY BASICS LIKE FOOD, HOUSING, MEDICAL CARE, AND HEATING?: NOT HARD AT ALL

## 2024-01-23 SDOH — ECONOMIC STABILITY: TRANSPORTATION INSECURITY
IN THE PAST 12 MONTHS, HAS LACK OF TRANSPORTATION KEPT YOU FROM MEETINGS, WORK, OR FROM GETTING THINGS NEEDED FOR DAILY LIVING?: NO

## 2024-01-23 SDOH — SOCIAL STABILITY: SOCIAL INSECURITY: ABUSE: ADULT

## 2024-01-23 SDOH — ECONOMIC STABILITY: INCOME INSECURITY: IN THE LAST 12 MONTHS, WAS THERE A TIME WHEN YOU WERE NOT ABLE TO PAY THE MORTGAGE OR RENT ON TIME?: NO

## 2024-01-23 SDOH — ECONOMIC STABILITY: HOUSING INSECURITY
IN THE LAST 12 MONTHS, WAS THERE A TIME WHEN YOU DID NOT HAVE A STEADY PLACE TO SLEEP OR SLEPT IN A SHELTER (INCLUDING NOW)?: NO

## 2024-01-23 SDOH — SOCIAL STABILITY: SOCIAL INSECURITY: HAS ANYONE EVER THREATENED TO HURT YOUR FAMILY OR YOUR PETS?: NO

## 2024-01-23 SDOH — SOCIAL STABILITY: SOCIAL INSECURITY: WERE YOU ABLE TO COMPLETE ALL THE BEHAVIORAL HEALTH SCREENINGS?: YES

## 2024-01-23 SDOH — SOCIAL STABILITY: SOCIAL INSECURITY: DO YOU FEEL UNSAFE GOING BACK TO THE PLACE WHERE YOU ARE LIVING?: NO

## 2024-01-23 SDOH — ECONOMIC STABILITY: HOUSING INSECURITY: IN THE LAST 12 MONTHS, HOW MANY PLACES HAVE YOU LIVED?: 1

## 2024-01-23 SDOH — SOCIAL STABILITY: SOCIAL INSECURITY: ARE THERE ANY APPARENT SIGNS OF INJURIES/BEHAVIORS THAT COULD BE RELATED TO ABUSE/NEGLECT?: NO

## 2024-01-23 SDOH — SOCIAL STABILITY: SOCIAL INSECURITY: HAVE YOU HAD THOUGHTS OF HARMING ANYONE ELSE?: YES

## 2024-01-23 SDOH — SOCIAL STABILITY: SOCIAL INSECURITY: DO YOU FEEL ANYONE HAS EXPLOITED OR TAKEN ADVANTAGE OF YOU FINANCIALLY OR OF YOUR PERSONAL PROPERTY?: NO

## 2024-01-23 SDOH — SOCIAL STABILITY: SOCIAL INSECURITY: ARE YOU OR HAVE YOU BEEN THREATENED OR ABUSED PHYSICALLY, EMOTIONALLY, OR SEXUALLY BY ANYONE?: NO

## 2024-01-23 SDOH — SOCIAL STABILITY: SOCIAL INSECURITY: DOES ANYONE TRY TO KEEP YOU FROM HAVING/CONTACTING OTHER FRIENDS OR DOING THINGS OUTSIDE YOUR HOME?: NO

## 2024-01-23 ASSESSMENT — ACTIVITIES OF DAILY LIVING (ADL)
ADEQUATE_TO_COMPLETE_ADL: YES
BATHING: NEEDS ASSISTANCE
HEARING - RIGHT EAR: FUNCTIONAL
DRESSING YOURSELF: NEEDS ASSISTANCE
TOILETING: INDEPENDENT
HEARING - LEFT EAR: FUNCTIONAL
FEEDING YOURSELF: INDEPENDENT
JUDGMENT_ADEQUATE_SAFELY_COMPLETE_DAILY_ACTIVITIES: YES
PATIENT'S MEMORY ADEQUATE TO SAFELY COMPLETE DAILY ACTIVITIES?: YES
GROOMING: INDEPENDENT
WALKS IN HOME: INDEPENDENT

## 2024-01-23 ASSESSMENT — COGNITIVE AND FUNCTIONAL STATUS - GENERAL
MOBILITY SCORE: 23
HELP NEEDED FOR BATHING: A LITTLE
PATIENT BASELINE BEDBOUND: NO
DRESSING REGULAR LOWER BODY CLOTHING: A LITTLE
CLIMB 3 TO 5 STEPS WITH RAILING: A LITTLE
DAILY ACTIVITIY SCORE: 22

## 2024-01-23 ASSESSMENT — LIFESTYLE VARIABLES
HOW OFTEN DO YOU HAVE A DRINK CONTAINING ALCOHOL: PATIENT UNABLE TO ANSWER
AUDIT-C TOTAL SCORE: -1
AUDIT-C TOTAL SCORE: -1
SKIP TO QUESTIONS 9-10: 0
HOW MANY STANDARD DRINKS CONTAINING ALCOHOL DO YOU HAVE ON A TYPICAL DAY: PATIENT UNABLE TO ANSWER
HOW OFTEN DO YOU HAVE 6 OR MORE DRINKS ON ONE OCCASION: PATIENT UNABLE TO ANSWER

## 2024-01-23 ASSESSMENT — PATIENT HEALTH QUESTIONNAIRE - PHQ9
2. FEELING DOWN, DEPRESSED OR HOPELESS: NOT AT ALL
SUM OF ALL RESPONSES TO PHQ9 QUESTIONS 1 & 2: 0
1. LITTLE INTEREST OR PLEASURE IN DOING THINGS: NOT AT ALL
1. LITTLE INTEREST OR PLEASURE IN DOING THINGS: NOT AT ALL
2. FEELING DOWN, DEPRESSED OR HOPELESS: NOT AT ALL
SUM OF ALL RESPONSES TO PHQ9 QUESTIONS 1 & 2: 0

## 2024-01-23 ASSESSMENT — PAIN - FUNCTIONAL ASSESSMENT: PAIN_FUNCTIONAL_ASSESSMENT: 0-10

## 2024-01-23 ASSESSMENT — PAIN SCALES - GENERAL: PAINLEVEL_OUTOF10: 0 - NO PAIN

## 2024-01-23 NOTE — Clinical Note
Patient Clipped and Prepped: groin. Prepped with ChloraPrep, a minimum of 3 minute dry time, longer if needed, no pooling noted, patient draped in sterile fashion. Left knee to toes prepped with iodine

## 2024-01-23 NOTE — INTERVAL H&P NOTE
H&P reviewed. The patient was examined and there are no changes to the H&P.  Left leg intervention

## 2024-01-23 NOTE — Clinical Note
Vessel(s): left PTA and left KELVIN. Injected with hand injections. Single view taken. Taken from AT access

## 2024-01-23 NOTE — POST-PROCEDURE NOTE
Physician Transition of Care Summary  Invasive Cardiovascular Lab    Procedure Date: 1/23/2024  Attending:    Eddie Mike - Primary  Resident/Fellow/Other Assistant: Surgeon(s) and Role:     * Edi Garza MD MPH - Fellow    Indications:   Pre-op Diagnosis     * Critical limb ischemia of left lower extremity (CMS/HCC) [I70.222]    Post-procedure diagnosis:   Post-op Diagnosis     * Critical limb ischemia of left lower extremity (CMS/HCC) [I70.222]    Procedure(s):   Lower Extremity Angiogram  92989 - CHG ANGIOGRAPHY EXTREMITY UNILATERAL RS&I        Procedure Findings:   Occluded distal popliteal, ostial AT and ostial TP trunk    Description of the Procedure:   Left common femoral antegrade access  6 Fr 45 cm sheath  - Retrograde AT and PT accesses (sheathless)  PTA/DCB and stenting (supera and everflex) in left popliteal.  PTA/limited DCB of left TP trunk-PT    Complications:   None    Stents/Implants:   Cardiovascular Implants       Stent    Stent System, Supera, 5.5mm X 80mm X 120cm, 6fr - [id number] - Implanted        Inventory item: STENT SYSTEM, SUPERA, 5.5MM X 80MM X 120CM, 6FR Model/Cat number: [id number]    : [employer] Lot number: [id number]    Device identifier: [id number]        As of 1/23/2024       Status: Implanted                      Stent System, Everflex, 6 X 40 X 150cm - [id number] - Implanted        Inventory item: STENT SYSTEM, EVERFLEX, 6 X 40 X 150CM Model/Cat number: [id number]    : [employer] [facility] Lot number: [id number]    Device identifier: [id number]        As of 1/23/2024       Status: Implanted                              Anticoagulation/Antiplatelet Plan:   Aspirin and plavix for 6 months    Estimated Blood Loss:   * No values recorded between 1/23/2024 10:26 AM and 1/23/2024 12:15 PM *    Anesthesia: Moderate Sedation Anesthesia Staff: No anesthesia staff entered.    Any Specimen(s) Removed:   No specimens collected during this  procedure.    Disposition:   Discharge in 5 hours post sheath removal.      Electronically signed by: Edi Garza MD MPH, 1/23/2024 12:15 PM

## 2024-01-23 NOTE — Clinical Note
Balloon inflated. Balloon inflated using single inflation technique. Inflation 1: Pressure = 8 jose; Duration = 180 sec.

## 2024-01-23 NOTE — Clinical Note
Vessel(s): left dorsalis pedis artery. Injected with hand injections. Single view taken. Implemented All Universal Safety Interventions:  [place] to call system. Call bell, personal items and telephone within reach. Instruct patient to call for assistance. Room bathroom lighting operational. Non-slip footwear when patient is off stretcher. Physically safe environment: no spills, clutter or unnecessary equipment. Stretcher in lowest position, wheels locked, appropriate side rails in place.

## 2024-01-23 NOTE — PROGRESS NOTES
Pharmacy Medication History Review    Radha Toussaint is a 68 y.o. female admitted for Critical limb ischemia of left lower extremity (CMS/[facility]). Pharmacy reviewed the patient's tjrtz-uw-[id number] medications and allergies for accuracy.    The list below reflects the updated PTA list. Comments regarding how patient may be taking medications differently can be found in the Admit Orders Activity  Prior to Admission Medications   Prescriptions Last Dose Informant Patient Reported?   HYDROcodone-acetaminophen (Norco)  mg tablet 1/23/2024 Self No   Sig: Take 1 tablet by mouth every 8 hours if needed for severe pain (7 - 10).   albuterol 90 mcg/actuation inhaler 1/22/2024 Self No   Sig: Inhale 1 puff every 4 hours if needed for shortness of breath.   aspirin 81 mg EC tablet 1/23/2024 Self Yes   Sig: Take 1 tablet (81 mg) by mouth once daily.   clopidogrel (Plavix) 75 mg tablet 1/23/2024 Self No   Sig: Take 1 tablet (75 mg) by mouth once daily.   ferrous sulfate 325 (65 Fe) MG EC tablet 1/23/2024 Self Yes   Sig: Take 65 mg by mouth once daily. Do not crush, chew, or split.   fludrocortisone (Florinef) 0.1 mg tablet 1/23/2024 Self No   Sig: TAKE 1/2 TABLET BY MOUTH ONCE DAILY   fluticasone-umeclidin-vilanter (Trelegy Ellipta) 200-62.5-25 mcg blister with device 1/23/2024 Self No   Sig: Inhale 1 puff once daily.   hydrocortisone (Cortef) 5 mg tablet 1/23/2024 Self No   Sig: STARTING ON 9/8 TAKE 2 TABS AT 8AM THEN TAKE 1 TABLET AT NOON THEN TAKE 1/2 TABLET AT 4PM   ipratropium-albuteroL (Duo-Neb) 0.5-2.5 mg/3 mL nebulizer solution Past Month Self No   Sig: Take 3 mL by nebulization 4 times a day.   montelukast (Singulair) 10 mg tablet 1/22/2024 Self No   Sig: Take 1 tablet (10 mg) by mouth once daily at bedtime.   ondansetron ODT (Zofran-ODT) 4 mg disintegrating tablet Past Month Self No   Sig: DISSOLVE 1 TABLET IN MOUTH BY MOUTH THREE TIMES A DAY AS NEEDED NAUSEA   pantoprazole (ProtoNix) 40 mg EC tablet Unknown Self  Yes   Sig: Take 1 tablet (40 mg) by mouth once daily in the morning. Take before meals. Do not crush, chew, or split.   rosuvastatin (Crestor) 10 mg tablet 1/23/2024 Self No   Sig: Take 1 tablet (10 mg) by mouth once daily.   sennosides (senna) 8.6 mg tablet 1/22/2024 Self No   Sig: Take 1 tablet (8.6 mg) by mouth once daily.   sertraline (Zoloft) 50 mg tablet 1/23/2024 Self Yes   Sig: Take 1 tablet (50 mg) by mouth 2 times a day.   simethicone (Mylicon) 80 mg chewable tablet Unknown Self Yes   Sig: Chew 1 tablet (80 mg) every 6 hours if needed.      Facility-Administered Medications: None        The list below reflects the updated allergy list. Please review each documented allergy for additional clarification and justification.  Allergies  Reviewed by Kyleigh Orellana RN on 1/23/2024        Severity Reactions Comments    Ace Inhibitors Medium Angioedema     Prednisone Medium Anxiety, Agitation & violent    Demerol [meperidine] Low Nausea/vomiting             Patient declines M2B at discharge.     Sources used to complete the med history include out patient fill history, OARRS, and patient interview along with 1/18/24 office visit cardiology Dr. Mike      Below are additional concerns with the patient's PTA list.      Jose Rafael Brewster [facility]  Transitions of Care Clinical Pharmacist  Please reach out via Epic Chat for questions, if no response call  [employer] or [employer]   Meds Ambulatory and [facility] Services

## 2024-01-23 NOTE — Clinical Note
Vessel(s): left CFA, left PFA, left SFA and left popliteal artery. Injected with hand injections. Multiple views taken.

## 2024-01-23 NOTE — Clinical Note
Balloon inflated. Balloon inflated using single inflation technique. Inflation 1: Pressure = 6 jose; Duration = 180 sec.

## 2024-01-23 NOTE — Clinical Note
Balloon inflated. Balloon inflated using single inflation technique. Inflation 1: Pressure = 8 jose; Duration = 15 sec.

## 2024-01-24 VITALS
HEART RATE: 70 BPM | SYSTOLIC BLOOD PRESSURE: 135 MMHG | OXYGEN SATURATION: 99 % | DIASTOLIC BLOOD PRESSURE: 76 MMHG | TEMPERATURE: 97.2 F | RESPIRATION RATE: 16 BRPM | WEIGHT: 135.7 LBS | BODY MASS INDEX: 23.17 KG/M2 | HEIGHT: 64 IN

## 2024-01-24 LAB
ACT BLD: 199 SEC (ref 89–169)
ACT BLD: 200 SEC (ref 89–169)
ACT BLD: 214 SEC (ref 89–169)
ACT BLD: 226 SEC (ref 89–169)
ACT BLD: 290 SEC (ref 89–169)
ACT BLD: 312 SEC (ref 89–169)
ACT BLD: 317 SEC (ref 89–169)
ANION GAP SERPL CALC-SCNC: 9 MMOL/L (ref 10–20)
BUN SERPL-MCNC: 17 MG/DL (ref 6–23)
CALCIUM SERPL-MCNC: 8.7 MG/DL (ref 8.6–10.6)
CHLORIDE SERPL-SCNC: 108 MMOL/L (ref 98–107)
CO2 SERPL-SCNC: 29 MMOL/L (ref 21–32)
CREAT SERPL-MCNC: 0.57 MG/DL (ref 0.5–1.05)
EGFRCR SERPLBLD CKD-EPI 2021: >90 ML/MIN/1.73M*2
ERYTHROCYTE [DISTWIDTH] IN BLOOD BY AUTOMATED COUNT: 20.3 % (ref 11.5–14.5)
GLUCOSE SERPL-MCNC: 61 MG/DL (ref 74–99)
HCT VFR BLD AUTO: 42 % (ref 36–46)
HGB BLD-MCNC: 12.7 G/DL (ref 12–16)
MCH RBC QN AUTO: 25 PG (ref 26–34)
MCHC RBC AUTO-ENTMCNC: 30.2 G/DL (ref 32–36)
MCV RBC AUTO: 83 FL (ref 80–100)
NRBC BLD-RTO: 0 /100 WBCS (ref 0–0)
PLATELET # BLD AUTO: 197 X10*3/UL (ref 150–450)
POTASSIUM SERPL-SCNC: 4.2 MMOL/L (ref 3.5–5.3)
RBC # BLD AUTO: 5.08 X10*6/UL (ref 4–5.2)
SODIUM SERPL-SCNC: 142 MMOL/L (ref 136–145)
WBC # BLD AUTO: 9.2 X10*3/UL (ref 4.4–11.3)

## 2024-01-24 PROCEDURE — 80048 BASIC METABOLIC PNL TOTAL CA: CPT | Performed by: PHYSICIAN ASSISTANT

## 2024-01-24 PROCEDURE — 2500000001 HC RX 250 WO HCPCS SELF ADMINISTERED DRUGS (ALT 637 FOR MEDICARE OP): Performed by: PHYSICIAN ASSISTANT

## 2024-01-24 PROCEDURE — 85027 COMPLETE CBC AUTOMATED: CPT | Performed by: PHYSICIAN ASSISTANT

## 2024-01-24 PROCEDURE — 2500000004 HC RX 250 GENERAL PHARMACY W/ HCPCS (ALT 636 FOR OP/ED): Performed by: INTERNAL MEDICINE

## 2024-01-24 PROCEDURE — 2500000004 HC RX 250 GENERAL PHARMACY W/ HCPCS (ALT 636 FOR OP/ED): Performed by: NURSE PRACTITIONER

## 2024-01-24 PROCEDURE — 99239 HOSP IP/OBS DSCHRG MGMT >30: CPT | Performed by: PHYSICIAN ASSISTANT

## 2024-01-24 PROCEDURE — S4991 NICOTINE PATCH NONLEGEND: HCPCS | Performed by: PHYSICIAN ASSISTANT

## 2024-01-24 PROCEDURE — 2500000002 HC RX 250 W HCPCS SELF ADMINISTERED DRUGS (ALT 637 FOR MEDICARE OP, ALT 636 FOR OP/ED): Performed by: PHYSICIAN ASSISTANT

## 2024-01-24 PROCEDURE — 7100000011 HC EXTENDED STAY RECOVERY HOURLY - NURSING UNIT

## 2024-01-24 PROCEDURE — 2500000004 HC RX 250 GENERAL PHARMACY W/ HCPCS (ALT 636 FOR OP/ED): Performed by: PHYSICIAN ASSISTANT

## 2024-01-24 RX ORDER — IBUPROFEN 200 MG
1 TABLET ORAL DAILY
Qty: 30 PATCH | Refills: 1 | Status: SHIPPED | OUTPATIENT
Start: 2024-01-25 | End: 2024-03-06 | Stop reason: ALTCHOICE

## 2024-01-24 RX ORDER — IBUPROFEN 200 MG
1 TABLET ORAL DAILY
Status: CANCELLED | OUTPATIENT
Start: 2024-01-24

## 2024-01-24 RX ADMIN — PANTOPRAZOLE SODIUM 40 MG: 40 TABLET, DELAYED RELEASE ORAL at 08:13

## 2024-01-24 RX ADMIN — CLOPIDOGREL BISULFATE 75 MG: 75 TABLET ORAL at 08:13

## 2024-01-24 RX ADMIN — HYDROCODONE BITARTRATE AND ACETAMINOPHEN 1 TABLET: 5; 325 TABLET ORAL at 00:06

## 2024-01-24 RX ADMIN — SIMETHICONE 80 MG: 80 TABLET, CHEWABLE ORAL at 10:11

## 2024-01-24 RX ADMIN — ASPIRIN 81 MG: 81 TABLET, COATED ORAL at 08:13

## 2024-01-24 RX ADMIN — FLUDROCORTISONE ACETATE 0.05 MG: 0.1 TABLET ORAL at 08:13

## 2024-01-24 RX ADMIN — FERROUS SULFATE TAB 325 MG (65 MG ELEMENTAL FE) 1 TABLET: 325 (65 FE) TAB at 08:13

## 2024-01-24 RX ADMIN — NICOTINE 1 PATCH: 21 PATCH, EXTENDED RELEASE TRANSDERMAL at 08:13

## 2024-01-24 RX ADMIN — HYDROCORTISONE 5 MG: 5 TABLET ORAL at 08:13

## 2024-01-24 RX ADMIN — HYDROCODONE BITARTRATE AND ACETAMINOPHEN 1 TABLET: 5; 325 TABLET ORAL at 08:13

## 2024-01-24 RX ADMIN — ACETAMINOPHEN 650 MG: 325 TABLET ORAL at 00:06

## 2024-01-24 RX ADMIN — SERTRALINE HYDROCHLORIDE 50 MG: 50 TABLET ORAL at 08:13

## 2024-01-24 RX ADMIN — SODIUM CHLORIDE 100 ML/HR: 9 INJECTION, SOLUTION INTRAVENOUS at 05:33

## 2024-01-24 RX ADMIN — ROSUVASTATIN 20 MG: 40 TABLET, FILM COATED ORAL at 08:13

## 2024-01-24 RX ADMIN — ACETAMINOPHEN 650 MG: 325 TABLET ORAL at 05:41

## 2024-01-24 ASSESSMENT — PAIN - FUNCTIONAL ASSESSMENT
PAIN_FUNCTIONAL_ASSESSMENT: 0-10
PAIN_FUNCTIONAL_ASSESSMENT: 0-10

## 2024-01-24 ASSESSMENT — PAIN SCALES - GENERAL
PAINLEVEL_OUTOF10: 7
PAINLEVEL_OUTOF10: 5 - MODERATE PAIN

## 2024-01-24 ASSESSMENT — ACTIVITIES OF DAILY LIVING (ADL)
LACK_OF_TRANSPORTATION: NO
LACK_OF_TRANSPORTATION: NO

## 2024-01-24 NOTE — NURSING NOTE
RN Discharge Note: Pt. Discharged home with per order. Pt. verbalized understanding of discharge summary. Pt. IV and telemetry monitor removed. Reviewed and approved by CARLTON TESFAYE on 1/24/24 at 12:01 PM.

## 2024-01-24 NOTE — CARE PLAN
The patient's goals for the shift include      The clinical goals for the shift include control pain well    Over the shift, the patient did make progress toward the following goals. Pt. Pain level was rate controlled. Pt. will discharge home.

## 2024-01-24 NOTE — DISCHARGE SUMMARY
Discharge Diagnosis  Critical limb ischemia of left lower extremity (CMS/HCC)    Issues Requiring Follow-Up  Wound healing, foot pain    Test Results Pending At Discharge  Pending Labs       No current pending labs.            Hospital Course   Radha Toussaint is a 68 y.o. female who was seen by Dr Mike with complains of nonhealing wound Lt lateral foot with throbbing pain and foot coldness for approximately 2 months.. Pt of Dr Amy DPM.   Cardiovascular risk factors include: smoking/ tobacco exposure and poorly differentiated carcinoma of bilateral adrenal glands (likely lung primary) on carboplatin/paclitaxel and pembro.  Last intervention was 2022 for claudication. Continues to smoke 2 ppd (self rolled). On Nicotine patch, trying to quit.   1/23/24 pt underwent PTA and stenting (supera and everflex) of left SFA-popliteal. PTA of left TP trunk-PT with Dr Mike.  -Access sites Lt CFA antegrade stick and Lt pedal- stable this am, no oozing/hematoma, slightly tender to touch.  - Lt foot is warm to touch, DP dopplerable/palpable  - pt denies foot pain, stating the foot fells warm.   - pt is to continue with ASA and Plavix, will increase Crestor to 20 mg  - will provide Rx for Nicotine patch 21 mg at pt request.   - f/u with Arely Butts NP in 1 mon with repeat TRAVIS/TBI     After all labs and VS were reviewed the decision was made that the patient was medically stable for discharge.  The patient was discharged in satisfactory condition.    Discussed with Dr Mike    I personally spent 35 minutes with this patient, of which >50% of time was spent counseling and coordination of care     Pertinent Physical Exam At Time of Discharge  General: no acute distress  HEENT: EOMI, no scleral icterus.  Lungs: Clear to auscultation bilaterally without wheezing, rales, or rhonchi.  Cardiovascular: Regular rhythm and rate. Normal S1 and S2. No murmurs, rubs, or gallops are appreciated. JVP normal.  no bruits  Abdomen: Soft, nontender,  nondistended. Bowel sounds present.  Extremities: LLE dependent rubor; 0.7x1.4x0.1 cm at L 5th met head (based on wound center documentation).  Neurologic: Alert and oriented x3.       Home Medications     Medication List      CHANGE how you take these medications     rosuvastatin 10 mg tablet; Commonly known as: Crestor; Take 2 tablets   (20 mg) by mouth once daily.; What changed: how much to take     CONTINUE taking these medications     albuterol 90 mcg/actuation inhaler; Inhale 1 puff every 4 hours if   needed for shortness of breath.   aspirin 81 mg EC tablet   clopidogrel 75 mg tablet; Commonly known as: Plavix; Take 1 tablet (75   mg) by mouth once daily.   ferrous sulfate 325 (65 Fe) MG EC tablet   fludrocortisone 0.1 mg tablet; Commonly known as: Florinef; TAKE 1/2   TABLET BY MOUTH ONCE DAILY   HYDROcodone-acetaminophen  mg tablet; Commonly known as: Norco;   Take 1 tablet by mouth every 8 hours if needed for severe pain (7 - 10).   hydrocortisone 5 mg tablet; Commonly known as: Cortef; STARTING ON 9/8   TAKE 2 TABS AT 8AM THEN TAKE 1 TABLET AT NOON THEN TAKE 1/2 TABLET AT 4PM   ipratropium-albuteroL 0.5-2.5 mg/3 mL nebulizer solution; Commonly known   as: Duo-Neb; Take 3 mL by nebulization 4 times a day.   montelukast 10 mg tablet; Commonly known as: Singulair; Take 1 tablet   (10 mg) by mouth once daily at bedtime.   ondansetron ODT 4 mg disintegrating tablet; Commonly known as:   Zofran-ODT; DISSOLVE 1 TABLET IN MOUTH BY MOUTH THREE TIMES A DAY AS   NEEDED NAUSEA   pantoprazole 40 mg EC tablet; Commonly known as: ProtoNix   sennosides 8.6 mg tablet; Commonly known as: senna; Take 1 tablet (8.6   mg) by mouth once daily.   sertraline 50 mg tablet; Commonly known as: Zoloft   simethicone 80 mg chewable tablet; Commonly known as: Mylicon   Trelegy Ellipta 200-62.5-25 mcg blister with device; Generic drug:   fluticasone-umeclidin-vilanter; Inhale 1 puff once daily.       Outpatient Follow-Up  Future  Appointments   Date Time Provider Department Center   2/2/2024  9:30 AM Berny Burnham MD [id number] [place]   2/2/2024 10:00 AM INF 04 PARMA [id number] [place]   5/7/2024  8:00 AM [facility] [place]       Adela Chao PA-C

## 2024-01-25 ENCOUNTER — DOCUMENTATION (OUTPATIENT)
Dept: PRIMARY CARE | Facility: CLINIC | Age: 69
End: 2024-01-25
Payer: MEDICARE

## 2024-01-25 DIAGNOSIS — I73.9 CLAUDICATION (CMS-HCC): ICD-10-CM

## 2024-01-25 DIAGNOSIS — I73.9 PAD (PERIPHERAL ARTERY DISEASE) (CMS-HCC): ICD-10-CM

## 2024-01-26 ENCOUNTER — PATIENT OUTREACH (OUTPATIENT)
Dept: PRIMARY CARE | Facility: CLINIC | Age: 69
End: 2024-01-26
Payer: MEDICARE

## 2024-01-26 ENCOUNTER — TELEPHONE (OUTPATIENT)
Dept: PRIMARY CARE | Facility: CLINIC | Age: 69
End: 2024-01-26
Payer: MEDICARE

## 2024-01-26 NOTE — PROGRESS NOTES
[place]  [facility] [place]charge Diagnosis:   Critical limb ischemia of left lower extremity   Admission Date:  1/23/24   Discharge Date: 1/24/24     PCP Appointment Date: denies need  Specialist Appointment Date:   2/16/24 surgery follow up     Cm spoke with pt who states she is doing well at home. Reviewed med changes and wound care. Pt states she will not be making an appt with pcp . She will be seeing wound care and surgery. Contact info provided.

## 2024-01-29 DIAGNOSIS — F51.01 PRIMARY INSOMNIA: Primary | ICD-10-CM

## 2024-01-29 RX ORDER — OLANZAPINE 5 MG/1
5 TABLET ORAL NIGHTLY
Qty: 30 TABLET | Refills: 1 | Status: SHIPPED | OUTPATIENT
Start: 2024-01-29 | End: 2024-04-01 | Stop reason: SDUPTHER

## 2024-01-30 ENCOUNTER — OFFICE VISIT (OUTPATIENT)
Dept: WOUND CARE | Facility: CLINIC | Age: 69
End: 2024-01-30
Payer: MEDICARE

## 2024-01-30 PROCEDURE — 97597 DBRDMT OPN WND 1ST 20 CM/<: CPT

## 2024-02-01 DIAGNOSIS — J43.8 OTHER EMPHYSEMA (MULTI): Primary | ICD-10-CM

## 2024-02-02 ENCOUNTER — LAB (OUTPATIENT)
Dept: LAB | Facility: CLINIC | Age: 69
End: 2024-02-02
Payer: MEDICARE

## 2024-02-02 ENCOUNTER — OFFICE VISIT (OUTPATIENT)
Dept: HEMATOLOGY/ONCOLOGY | Facility: CLINIC | Age: 69
End: 2024-02-02
Payer: MEDICARE

## 2024-02-02 ENCOUNTER — INFUSION (OUTPATIENT)
Dept: HEMATOLOGY/ONCOLOGY | Facility: CLINIC | Age: 69
End: 2024-02-02
Payer: MEDICARE

## 2024-02-02 ENCOUNTER — SOCIAL WORK (OUTPATIENT)
Dept: HEMATOLOGY/ONCOLOGY | Facility: CLINIC | Age: 69
End: 2024-02-02

## 2024-02-02 VITALS
RESPIRATION RATE: 18 BRPM | WEIGHT: 137.79 LBS | SYSTOLIC BLOOD PRESSURE: 141 MMHG | TEMPERATURE: 97.5 F | HEIGHT: 63 IN | HEART RATE: 69 BPM | OXYGEN SATURATION: 93 % | DIASTOLIC BLOOD PRESSURE: 74 MMHG | BODY MASS INDEX: 24.41 KG/M2

## 2024-02-02 DIAGNOSIS — C34.90 NON-SMALL CELL LUNG CANCER, UNSPECIFIED LATERALITY (MULTI): ICD-10-CM

## 2024-02-02 DIAGNOSIS — C34.91 NON-SMALL CELL CANCER OF RIGHT LUNG (MULTI): ICD-10-CM

## 2024-02-02 LAB
ALBUMIN SERPL BCP-MCNC: 3.7 G/DL (ref 3.4–5)
ALP SERPL-CCNC: 59 U/L (ref 33–136)
ALT SERPL W P-5'-P-CCNC: 11 U/L (ref 7–45)
ANION GAP SERPL CALC-SCNC: 11 MMOL/L (ref 10–20)
AST SERPL W P-5'-P-CCNC: 11 U/L (ref 9–39)
ATRIAL RATE: 62 BPM
BASOPHILS # BLD AUTO: 0.06 X10*3/UL (ref 0–0.1)
BASOPHILS NFR BLD AUTO: 0.6 %
BILIRUB SERPL-MCNC: 0.4 MG/DL (ref 0–1.2)
BUN SERPL-MCNC: 17 MG/DL (ref 6–23)
CALCIUM SERPL-MCNC: 9.1 MG/DL (ref 8.6–10.3)
CHLORIDE SERPL-SCNC: 104 MMOL/L (ref 98–107)
CO2 SERPL-SCNC: 27 MMOL/L (ref 21–32)
CREAT SERPL-MCNC: 0.68 MG/DL (ref 0.5–1.05)
DACRYOCYTES BLD QL SMEAR: NORMAL
EGFRCR SERPLBLD CKD-EPI 2021: >90 ML/MIN/1.73M*2
EOSINOPHIL # BLD AUTO: 0.09 X10*3/UL (ref 0–0.7)
EOSINOPHIL NFR BLD AUTO: 0.9 %
ERYTHROCYTE [DISTWIDTH] IN BLOOD BY AUTOMATED COUNT: 20.4 % (ref 11.5–14.5)
GLUCOSE SERPL-MCNC: 156 MG/DL (ref 74–99)
HCT VFR BLD AUTO: 45.6 % (ref 36–46)
HGB BLD-MCNC: 14.1 G/DL (ref 12–16)
IMM GRANULOCYTES # BLD AUTO: 0.05 X10*3/UL (ref 0–0.7)
IMM GRANULOCYTES NFR BLD AUTO: 0.5 % (ref 0–0.9)
LYMPHOCYTES # BLD AUTO: 1.2 X10*3/UL (ref 1.2–4.8)
LYMPHOCYTES NFR BLD AUTO: 12.1 %
MCH RBC QN AUTO: 26 PG (ref 26–34)
MCHC RBC AUTO-ENTMCNC: 30.9 G/DL (ref 32–36)
MCV RBC AUTO: 84 FL (ref 80–100)
MONOCYTES # BLD AUTO: 0.37 X10*3/UL (ref 0.1–1)
MONOCYTES NFR BLD AUTO: 3.7 %
NEUTROPHILS # BLD AUTO: 8.14 X10*3/UL (ref 1.2–7.7)
NEUTROPHILS NFR BLD AUTO: 82.2 %
NRBC BLD-RTO: 0 /100 WBCS (ref 0–0)
OVALOCYTES BLD QL SMEAR: NORMAL
P AXIS: 73 DEGREES
P OFFSET: 193 MS
P ONSET: 142 MS
PLATELET # BLD AUTO: 240 X10*3/UL (ref 150–450)
POLYCHROMASIA BLD QL SMEAR: NORMAL
POTASSIUM SERPL-SCNC: 4.3 MMOL/L (ref 3.5–5.3)
PR INTERVAL: 156 MS
PROT SERPL-MCNC: 7.1 G/DL (ref 6.4–8.2)
Q ONSET: 220 MS
QRS COUNT: 10 BEATS
QRS DURATION: 108 MS
QT INTERVAL: 420 MS
QTC CALCULATION(BAZETT): 426 MS
QTC FREDERICIA: 424 MS
R AXIS: 58 DEGREES
RBC # BLD AUTO: 5.43 X10*6/UL (ref 4–5.2)
RBC MORPH BLD: NORMAL
SCHISTOCYTES BLD QL SMEAR: NORMAL
SODIUM SERPL-SCNC: 138 MMOL/L (ref 136–145)
T AXIS: 106 DEGREES
T OFFSET: 430 MS
TARGETS BLD QL SMEAR: NORMAL
TSH SERPL-ACNC: 3.6 MIU/L (ref 0.44–3.98)
VENTRICULAR RATE: 62 BPM
WBC # BLD AUTO: 9.9 X10*3/UL (ref 4.4–11.3)

## 2024-02-02 PROCEDURE — 1126F AMNT PAIN NOTED NONE PRSNT: CPT | Performed by: INTERNAL MEDICINE

## 2024-02-02 PROCEDURE — 1159F MED LIST DOCD IN RCRD: CPT | Performed by: INTERNAL MEDICINE

## 2024-02-02 PROCEDURE — 3077F SYST BP >= 140 MM HG: CPT | Performed by: INTERNAL MEDICINE

## 2024-02-02 PROCEDURE — 84443 ASSAY THYROID STIM HORMONE: CPT

## 2024-02-02 PROCEDURE — 36415 COLL VENOUS BLD VENIPUNCTURE: CPT

## 2024-02-02 PROCEDURE — 1160F RVW MEDS BY RX/DR IN RCRD: CPT | Performed by: INTERNAL MEDICINE

## 2024-02-02 PROCEDURE — 2500000004 HC RX 250 GENERAL PHARMACY W/ HCPCS (ALT 636 FOR OP/ED): Performed by: INTERNAL MEDICINE

## 2024-02-02 PROCEDURE — 3078F DIAST BP <80 MM HG: CPT | Performed by: INTERNAL MEDICINE

## 2024-02-02 PROCEDURE — 84075 ASSAY ALKALINE PHOSPHATASE: CPT

## 2024-02-02 PROCEDURE — 96413 CHEMO IV INFUSION 1 HR: CPT

## 2024-02-02 PROCEDURE — 85025 COMPLETE CBC W/AUTO DIFF WBC: CPT

## 2024-02-02 PROCEDURE — 3008F BODY MASS INDEX DOCD: CPT | Performed by: INTERNAL MEDICINE

## 2024-02-02 PROCEDURE — 99215 OFFICE O/P EST HI 40 MIN: CPT | Performed by: INTERNAL MEDICINE

## 2024-02-02 RX ORDER — EPINEPHRINE 0.3 MG/.3ML
0.3 INJECTION SUBCUTANEOUS EVERY 5 MIN PRN
Status: DISCONTINUED | OUTPATIENT
Start: 2024-02-02 | End: 2024-02-02 | Stop reason: HOSPADM

## 2024-02-02 RX ORDER — DIPHENHYDRAMINE HYDROCHLORIDE 50 MG/ML
50 INJECTION INTRAMUSCULAR; INTRAVENOUS AS NEEDED
Status: DISCONTINUED | OUTPATIENT
Start: 2024-02-02 | End: 2024-02-02 | Stop reason: HOSPADM

## 2024-02-02 RX ORDER — FAMOTIDINE 10 MG/ML
20 INJECTION INTRAVENOUS ONCE AS NEEDED
Status: DISCONTINUED | OUTPATIENT
Start: 2024-02-02 | End: 2024-02-02 | Stop reason: HOSPADM

## 2024-02-02 RX ORDER — ALBUTEROL SULFATE 0.83 MG/ML
3 SOLUTION RESPIRATORY (INHALATION) AS NEEDED
Status: DISCONTINUED | OUTPATIENT
Start: 2024-02-02 | End: 2024-02-02 | Stop reason: HOSPADM

## 2024-02-02 RX ADMIN — SODIUM CHLORIDE 400 MG: 9 INJECTION, SOLUTION INTRAVENOUS at 10:27

## 2024-02-02 ASSESSMENT — PAIN SCALES - GENERAL: PAINLEVEL: 0-NO PAIN

## 2024-02-02 NOTE — PROGRESS NOTES
LOCATION:   [facility], at [facility].     HEMATOLOGY ONCOLOGY PROBLEMS:  1.  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  PD-L1 70%.  No actionable alterations.       a.  Ist -line therapy with carbo/Taxol/Keytruda x 3 cycles from Sep to Nov 2023.       b.  On maintenance Keytruda beginning Dec 2023.     CHIEF COMPLAINT:    The patient is in the clinic today for followup of poorly differentiated carcinoma and for continuation of treatment and management of therapy related effects.          HISTORY:  Ms. Toussaint is a 68-year-old female with poorly differentiated carcinoma of most probable primary bronchogenic origin.  She has multiple medical problems and was admitted at [facility] in Aug 2023 with complaint worsening shortness of breath, cough and weakness.  There was also history of progressive weight loss and recent falls.  Work-up revealed stable pulmonary nodules but there was concern regarding new bilateral adrenal metastatic lesions along with questionable liver lesions and right hilar lymphadenopathy. She is a lifelong heavy smoker.  CT-guided biopsy of the adrenal lesion was suggestive of poorly differentiated carcinoma.  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  She was also evaluated by Dr. Francheska Parada at [facility] and case was reviewed by  pathology and as per tumor board evaluation, overall clinical and radiological finding were considered mainly of probable primary bronchogenic origin.  She was treated with carbo/Taxol/Keytruda combination for 3 cycles.  4th cycle was discontinued due to tolerability issues.  Follow-up CT scan showed interval decrease in bilateral adrenal lesions and she was transitioned to maintenance Keytruda beginning Dec 2023.    INTERVAL HISTORY:  Overall she is feeling better as far as weakness, fatigue, anorexia etc.  Since her last visit she also had a  vascular procedure done in the left leg.  Of note she had a chronic left foot wound and is being closely followed in wound and vascular surgery clinics.       PAST MEDICAL HISTORY:   1.  COPD on home O2.   2.  Coronary artery disease   3.  Hypertension   4.  Hyperlipidemia   5.  Multiple vascular aneurysms.  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  6.  Peripheral vascular disease.  S/p femoral-femoral bypass   7.  AAA graft repair procedure.  8.  History of tubal ligation, bladder lift, lithotripsy surgeries     SOCIAL HISTORY:    for 46 years and lives in [place].  1 and half to 2 pack a day for 47 years smoking history.  Nonalcoholic.  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  Born and raised  in [place]     FAMILY HISTORY:    Father  at age 52 from myocardial infarction.  Mother  at age 75 or from aortic aneurysm related complications.  1 sister  at age 68 from depression and related complications.  3 children  and 5 grandkids.  Her son and daughter has history of Crohn's disease.  No other specific history of bleeding, clotting or malignant disorder in the family.     REVIEW OF SYSTEMS:  Pertinent finding as per the history above.  All other systems have been reviewed and generally negative and noncontributory.     PHYSICAL EXAMINATION:    Detailed physical examination not done     ALLERGY & MEDICATIONS:  Allergies and latest outpatient medications list were reviewed in the EMR.    LAB RESULTS:  CBC and CMP from today is unremarkable.  Hemoglobin is 14.1 with white cell count of 9.9 and platelets of 240 K.  Last 3 sets of blood work were reviewed and the trend was noted.     PATHOLOGY RESULTS:  Surgical Pathology [Aug 23 2023 1:48PM] ([id number])  Specimens: RIGHT ADRENAL LESION CORE   Name AALIYAH SAWANT   Accession #: [id number]   Pathologist: BOLIVAR BARRETO ASA, MD, PhD   Date of Procedure: 2023   Submitting  Physician: MAUREEN CHAVIS, CNP    Location: [facility]   Copy To/Referring/Attending:   MARTHA CORDERO MD Other External #     FINAL DIAGNOSIS   A. RIGHT ADRENAL LESION CORE:     METASTATIC POORLY DIFFERENTIATED  CARCINOMA: SOFT TISSUE (IDENTIFIED AS RIGHT ADRENAL) BIOPSY   Note   COMMENT : The biopsyconsists entirely of a poorly differentiated malignancy in soft  tissue; no adrenal is seen. The tumor stains for keratins (CAM5.2 and  CK7 but   not CK20) and is negative for TTF1 and Napsin as well as p40. Staining for SF1   is completely negative.     Electronically Signed Out By BOLIVAR BARRETO ASA, MD, PhD/SLA       ASSESSMENT AND PLAN:   1.  Poorly differentiated carcinoma of unknown primary origin. Patient with a lifelong history of heavy smoking.  She was admitted at [facility]  with complaint of worsening shortness of breath.  Work-up revealed stable pulmonary nodules but there was concern regarding new bilateral adrenal metastatic lesions along with questionable liver lesions and right hilar lymphadenopathy.  CT-guided biopsy of the adrenal lesion was suggestive of poorly differentiated carcinoma.  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  She was also evaluated by Dr. Francheska Parada at [facility] and case was reviewed by  pathology and as per tumor board evaluation, overall clinical and radiological finding were considered mainly of probable primary bronchogenic origin.  She was treated with carbo/Taxol/Keytruda combination for 3 cycles.  4th cycle was discontinued due to tolerability issues.  Follow-up CT scan showed interval decrease in bilateral adrenal lesions and she was transitioned to maintenance Keytruda beginning Dec 2023.     Most of the chemotherapy induced symptoms have resolved.  She is tolerating Keytruda relatively well.  For now, she will continue with the Keytruda at the current dose and schedule.  Probable side effects of weakness, fatigue, pneumonitis, colitis, endocrinopathies, pleuritis,  skin rash etc. were explained to them in detail.  Informed consent was obtained.  Her overall long-term prognosis remains guarded.    2.  Left lower leg wound/vascular issues.  Really appreciate wound and vascular surgery teams from help.  She will continue to follow-up with them.    3.  Follow-up.  She will return to the clinic in about 6 weeks.        This note has been transcribed using Dragon voice recognition system and there is a possibility of unintentional typing misprints.

## 2024-02-02 NOTE — PROGRESS NOTES
Followed up with pt and her . Pt was not coming to [facility] because she was sent to [facility] after a hospital visit. Pt now back here again for her keytruda. Pt indicates she is doing well on it. Pt came in wc due to getting sob when ambulating too far. Pt has been ambulating independently at home. Pt takes naps when needed. Pt is eating, sleeping and doing well at this time. Pt only wears her home O2 at 2L at night now when she lays flat. Her dtr, son in law and their three kids still live with them. Per pt and her  they assist quite a bit around the house. Pt has two other sons. The youngest who is getting  in Oct of this year and the other who just had a baby. The one that is getting  checks in on her about two times a week. While pt and  are here their floors are being redone. Pt is excited to have it almost done. They had ceramic herminio and pt looking forward to it not being so hard on her legs. Pt at this time reminded her kids that [employer] do not give up. Pt coping and managing with supports in place.

## 2024-02-06 LAB
ATRIAL RATE: 86 BPM
ATRIAL RATE: 90 BPM
P AXIS: 48 DEGREES
P AXIS: 79 DEGREES
PR INTERVAL: 138 MS
PR INTERVAL: 153 MS
Q ONSET: 253 MS
Q ONSET: 253 MS
QRS COUNT: 14 BEATS
QRS COUNT: 14 BEATS
QRS DURATION: 101 MS
QRS DURATION: 93 MS
QT INTERVAL: 367 MS
QT INTERVAL: 383 MS
QTC CALCULATION(BAZETT): 452 MS
QTC CALCULATION(BAZETT): 461 MS
QTC FREDERICIA: 422 MS
QTC FREDERICIA: 433 MS
R AXIS: 155 DEGREES
R AXIS: 16 DEGREES
T AXIS: 12 DEGREES
T AXIS: 65 DEGREES
T OFFSET: 436 MS
T OFFSET: 445 MS
VENTRICULAR RATE: 87 BPM
VENTRICULAR RATE: 91 BPM

## 2024-02-13 ENCOUNTER — OFFICE VISIT (OUTPATIENT)
Dept: WOUND CARE | Facility: CLINIC | Age: 69
End: 2024-02-13
Payer: MEDICARE

## 2024-02-13 PROCEDURE — 11042 DBRDMT SUBQ TIS 1ST 20SQCM/<: CPT

## 2024-02-16 ENCOUNTER — APPOINTMENT (OUTPATIENT)
Dept: VASCULAR MEDICINE | Facility: CLINIC | Age: 69
End: 2024-02-16
Payer: MEDICARE

## 2024-02-16 ENCOUNTER — APPOINTMENT (OUTPATIENT)
Dept: CARDIOLOGY | Facility: CLINIC | Age: 69
End: 2024-02-16
Payer: MEDICARE

## 2024-02-16 DIAGNOSIS — G89.3 CANCER RELATED PAIN: ICD-10-CM

## 2024-02-16 RX ORDER — HYDROCODONE BITARTRATE AND ACETAMINOPHEN 10; 325 MG/1; MG/1
1 TABLET ORAL EVERY 8 HOURS PRN
Qty: 60 TABLET | Refills: 0 | Status: SHIPPED | OUTPATIENT
Start: 2024-02-16 | End: 2024-03-25 | Stop reason: SDUPTHER

## 2024-02-19 ENCOUNTER — OFFICE VISIT (OUTPATIENT)
Dept: HEMATOLOGY/ONCOLOGY | Facility: CLINIC | Age: 69
End: 2024-02-19
Payer: MEDICARE

## 2024-02-19 ENCOUNTER — TELEPHONE (OUTPATIENT)
Dept: HEMATOLOGY/ONCOLOGY | Facility: CLINIC | Age: 69
End: 2024-02-19
Payer: MEDICARE

## 2024-02-19 VITALS
BODY MASS INDEX: 24.38 KG/M2 | HEIGHT: 63 IN | WEIGHT: 137.57 LBS | RESPIRATION RATE: 18 BRPM | TEMPERATURE: 97.5 F | SYSTOLIC BLOOD PRESSURE: 118 MMHG | OXYGEN SATURATION: 94 % | DIASTOLIC BLOOD PRESSURE: 74 MMHG | HEART RATE: 77 BPM

## 2024-02-19 DIAGNOSIS — C79.72 SECONDARY MALIGNANT NEOPLASM OF LEFT ADRENAL GLAND (MULTI): Primary | ICD-10-CM

## 2024-02-19 PROCEDURE — 1126F AMNT PAIN NOTED NONE PRSNT: CPT | Performed by: INTERNAL MEDICINE

## 2024-02-19 PROCEDURE — 3078F DIAST BP <80 MM HG: CPT | Performed by: INTERNAL MEDICINE

## 2024-02-19 PROCEDURE — 99214 OFFICE O/P EST MOD 30 MIN: CPT | Performed by: INTERNAL MEDICINE

## 2024-02-19 PROCEDURE — 1160F RVW MEDS BY RX/DR IN RCRD: CPT | Performed by: INTERNAL MEDICINE

## 2024-02-19 PROCEDURE — 3008F BODY MASS INDEX DOCD: CPT | Performed by: INTERNAL MEDICINE

## 2024-02-19 PROCEDURE — 1159F MED LIST DOCD IN RCRD: CPT | Performed by: INTERNAL MEDICINE

## 2024-02-19 PROCEDURE — 3074F SYST BP LT 130 MM HG: CPT | Performed by: INTERNAL MEDICINE

## 2024-02-19 RX ORDER — METHYLPREDNISOLONE 4 MG/1
TABLET ORAL
Qty: 1 EACH | Refills: 0 | Status: SHIPPED | OUTPATIENT
Start: 2024-02-19 | End: 2024-03-06 | Stop reason: WASHOUT

## 2024-02-19 RX ORDER — AZITHROMYCIN 500 MG/1
500 TABLET, FILM COATED ORAL DAILY
Qty: 3 TABLET | Refills: 0 | Status: SHIPPED | OUTPATIENT
Start: 2024-02-19 | End: 2024-02-22

## 2024-02-19 ASSESSMENT — PAIN SCALES - GENERAL: PAINLEVEL: 0-NO PAIN

## 2024-02-19 NOTE — TELEPHONE ENCOUNTER
Patient called with severe SOB on Keytruda, refusing to go to ED; Dr. Burnham will be here this afternoon for a few hours and said we could add her on to be seen by him.  She is agreeable.  Added to 2 pm.

## 2024-02-19 NOTE — PROGRESS NOTES
LOCATION:   [facility], at [facility].     HEMATOLOGY ONCOLOGY PROBLEMS:  1.  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         PD-L1 70%.  No actionable alterations.       a.  Ist -line therapy with carbo/Taxol/Keytruda x 3 cycles from Sep to Nov 2023.       b.  On maintenance Keytruda beginning Dec 2023.     CHIEF COMPLAINT:    The patient is in the clinic today for followup of poorly differentiated carcinoma and for continuation of treatment and management of therapy related effects.          HISTORY:  Ms. Toussaint is a 68-year-old female with poorly differentiated carcinoma of most probable primary bronchogenic origin.  She has multiple medical problems and was admitted at [facility] in Aug 2023 with complaint worsening shortness of breath, cough and weakness.  There was also history of progressive weight loss and recent falls.  Work-up revealed stable pulmonary nodules but there was concern regarding new bilateral adrenal metastatic lesions along with questionable liver lesions and right hilar lymphadenopathy. She is a lifelong heavy smoker. CT-guided biopsy of the adrenal lesion was suggestive of poorly differentiated carcinoma. 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  She was also evaluated by Dr. Francheska Parada at [facility] and case was reviewed by  pathology and as per tumor board evaluation, overall clinical and radiological finding were considered mainly of probable primary bronchogenic origin.  She was treated with carbo/Taxol/Keytruda combination for 3 cycles.  4th cycle was discontinued due to tolerability issues.  Follow-up CT scan showed interval decrease in bilateral adrenal lesions and she was transitioned to maintenance Keytruda beginning Dec 2023.    INTERVAL HISTORY:  Lately she is having problem with progressive worsening shortness of breath even on minimal exertion.  As per the patient she is  not even able to take shower.  She does have a baseline history of COPD but current symptomatology is much more acute and severe.  Of note she had a chronic left foot wound and is being closely followed in wound and vascular surgery clinics.       PAST MEDICAL HISTORY:   1.  COPD on home O2.   2.  Coronary artery disease   3.  Hypertension   4.  Hyperlipidemia   5.  Multiple vascular aneurysms.  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  6.  Peripheral vascular disease.  S/p femoral-femoral bypass   7.  AAA graft repair procedure.  8.  History of tubal ligation, bladder lift, lithotripsy surgeries     SOCIAL HISTORY:    for 46 years and lives in [place].  1 and half to 2 pack a day for 47 years smoking history.  Nonalcoholic.  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  Born and raised  in [place]     FAMILY HISTORY:    Father  at age 52 from myocardial infarction.  Mother  at age 75 or from aortic aneurysm related complications.  1 sister  at age 68 from depression and related complications.  3 children  and 5 grandkids.  Her son and daughter has history of Crohn's disease.  No other specific history of bleeding, clotting or malignant disorder in the family.     REVIEW OF SYSTEMS:  Pertinent finding as per the history above.  All other systems have been reviewed and generally negative and noncontributory.     PHYSICAL EXAMINATION:    Detailed physical examination not done     ALLERGY & MEDICATIONS:  Allergies and latest outpatient medications list were reviewed in the EMR.    LAB RESULTS:  CBC and CMP from 2024 were unremarkable.  Hemoglobin was 14.1 with white cell count of 9.9 and platelets of 240 K.  Last 3 sets of blood work were reviewed and the trend was noted.     PATHOLOGY RESULTS:  Surgical Pathology [Aug 23 2023 1:48PM] ([id number])  Specimens: RIGHT ADRENAL LESION CORE   Name AALIYAH SAWANT   Accession #: [id number]   Pathologist: BOLIVAR BARRETO  MD LYNNE, PhD   Date of Procedure: 8/11/2023   Submitting  Physician: MAUREEN CHAVIS CNP   Location: [facility]   Copy To/Referring/Attending:   MARTHA CORDERO MD Other External #     FINAL DIAGNOSIS   A. RIGHT ADRENAL LESION CORE:     METASTATIC POORLY DIFFERENTIATED  CARCINOMA: SOFT TISSUE (IDENTIFIED AS RIGHT ADRENAL) BIOPSY   Note   COMMENT : The biopsyconsists entirely of a poorly differentiated malignancy in soft  tissue; no adrenal is seen. The tumor stains for keratins (CAM5.2 and  CK7 but   not CK20) and is negative for TTF1 and Napsin as well as p40. Staining for SF1   is completely negative.     Electronically Signed Out By BOLIVAR BARRETO ASA, MD, PhD/SLA       ASSESSMENT AND PLAN:   1.  Poorly differentiated carcinoma of unknown primary origin. Patient with a lifelong history of heavy smoking.  She was admitted at [facility]  with complaint of worsening shortness of breath.  Work-up revealed stable pulmonary nodules but there was concern regarding new bilateral adrenal metastatic lesions along with questionable liver lesions and right hilar lymphadenopathy.  CT-guided biopsy of the adrenal lesion was suggestive of poorly differentiated carcinoma.  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  She was also evaluated by Dr. Francheska Parada at [facility] and case was reviewed by  pathology and as per tumor board evaluation, overall clinical and radiological finding were considered mainly of probable primary bronchogenic origin.  She was treated with carbo/Taxol/Keytruda combination for 3 cycles.  4th cycle was discontinued due to tolerability issues.  Follow-up CT scan showed interval decrease in bilateral adrenal lesions and she was transitioned to maintenance Keytruda beginning Dec 2023.     Overall plan is to continue with  Keytruda at the current dose and schedule.  But in view of issues with acute shortness of breath I will hold Keytruda for now and we will schedule  her for an urgent CT angiogram to rule out any issues with PE or immunotherapy induced pneumonitis.  There is a possibility of's COPD exacerbation also.  As such, I will treat her empirically with a Solu-Medrol Dosepak and Z-Ramiro.  Probable side effects of Keytruda mainly weakness, fatigue, pneumonitis, colitis, endocrinopathies, pleuritis, skin rash etc. were explained to them in detail.  Her overall long-term prognosis remains guarded.    2.  Left lower leg wound/vascular issues.  Really appreciate wound and vascular surgery teams from help.  She will continue to follow-up with them.    3.  Follow-up.  She will return to the clinic in about few weeks for her previously scheduled appointment.  In the meantime we will follow-up on the results of the urgent CT angiogram as detailed above.     This note has been transcribed using Dragon voice recognition system and there is a possibility of unintentional typing misprints.

## 2024-02-20 ENCOUNTER — APPOINTMENT (OUTPATIENT)
Dept: WOUND CARE | Facility: CLINIC | Age: 69
End: 2024-02-20
Payer: MEDICARE

## 2024-03-01 DIAGNOSIS — F41.9 ANXIETY: Primary | ICD-10-CM

## 2024-03-01 PROBLEM — R11.2 NAUSEA AND VOMITING: Status: RESOLVED | Noted: 2023-10-25 | Resolved: 2024-03-01

## 2024-03-01 RX ORDER — SERTRALINE HYDROCHLORIDE 50 MG/1
50 TABLET, FILM COATED ORAL 2 TIMES DAILY
Qty: 180 TABLET | Refills: 1 | Status: SHIPPED | OUTPATIENT
Start: 2024-03-01 | End: 2024-05-19 | Stop reason: DRUGHIGH

## 2024-03-04 ENCOUNTER — HOSPITAL ENCOUNTER (OUTPATIENT)
Dept: RADIOLOGY | Facility: HOSPITAL | Age: 69
Discharge: HOME | End: 2024-03-04
Payer: MEDICARE

## 2024-03-04 DIAGNOSIS — C79.72 SECONDARY MALIGNANT NEOPLASM OF LEFT ADRENAL GLAND (MULTI): ICD-10-CM

## 2024-03-04 PROCEDURE — 71275 CT ANGIOGRAPHY CHEST: CPT

## 2024-03-04 PROCEDURE — 2550000001 HC RX 255 CONTRASTS: Performed by: INTERNAL MEDICINE

## 2024-03-04 RX ADMIN — IOHEXOL 75 ML: 350 INJECTION, SOLUTION INTRAVENOUS at 15:20

## 2024-03-05 ENCOUNTER — APPOINTMENT (OUTPATIENT)
Dept: WOUND CARE | Facility: CLINIC | Age: 69
End: 2024-03-05
Payer: MEDICARE

## 2024-03-06 ENCOUNTER — OFFICE VISIT (OUTPATIENT)
Dept: CARDIOLOGY | Facility: CLINIC | Age: 69
End: 2024-03-06
Payer: MEDICARE

## 2024-03-06 ENCOUNTER — HOSPITAL ENCOUNTER (OUTPATIENT)
Dept: VASCULAR MEDICINE | Facility: HOSPITAL | Age: 69
Discharge: HOME | End: 2024-03-06
Payer: MEDICARE

## 2024-03-06 VITALS
WEIGHT: 140.8 LBS | SYSTOLIC BLOOD PRESSURE: 118 MMHG | HEIGHT: 64 IN | OXYGEN SATURATION: 86 % | DIASTOLIC BLOOD PRESSURE: 62 MMHG | BODY MASS INDEX: 24.04 KG/M2 | HEART RATE: 46 BPM

## 2024-03-06 DIAGNOSIS — I73.9 INTERMITTENT CLAUDICATION (CMS-HCC): ICD-10-CM

## 2024-03-06 DIAGNOSIS — I70.445: ICD-10-CM

## 2024-03-06 DIAGNOSIS — I70.245 ATHEROSCLEROSIS OF NATIVE ARTERIES OF LEFT LEG WITH ULCERATION OF OTHER PART OF FOOT (MULTI): ICD-10-CM

## 2024-03-06 DIAGNOSIS — I73.9 PAD (PERIPHERAL ARTERY DISEASE) (CMS-HCC): ICD-10-CM

## 2024-03-06 DIAGNOSIS — I73.9 PERIPHERAL VASCULAR DISEASE (CMS-HCC): Primary | ICD-10-CM

## 2024-03-06 DIAGNOSIS — I73.9 CLAUDICATION (CMS-HCC): ICD-10-CM

## 2024-03-06 DIAGNOSIS — Z98.890: ICD-10-CM

## 2024-03-06 PROCEDURE — 1126F AMNT PAIN NOTED NONE PRSNT: CPT | Performed by: NURSE PRACTITIONER

## 2024-03-06 PROCEDURE — 93922 UPR/L XTREMITY ART 2 LEVELS: CPT

## 2024-03-06 PROCEDURE — 99213 OFFICE O/P EST LOW 20 MIN: CPT | Performed by: NURSE PRACTITIONER

## 2024-03-06 PROCEDURE — 1159F MED LIST DOCD IN RCRD: CPT | Performed by: NURSE PRACTITIONER

## 2024-03-06 PROCEDURE — 3074F SYST BP LT 130 MM HG: CPT | Performed by: NURSE PRACTITIONER

## 2024-03-06 PROCEDURE — 1160F RVW MEDS BY RX/DR IN RCRD: CPT | Performed by: NURSE PRACTITIONER

## 2024-03-06 PROCEDURE — 3008F BODY MASS INDEX DOCD: CPT | Performed by: NURSE PRACTITIONER

## 2024-03-06 PROCEDURE — 93922 UPR/L XTREMITY ART 2 LEVELS: CPT | Performed by: SURGERY

## 2024-03-06 PROCEDURE — 3078F DIAST BP <80 MM HG: CPT | Performed by: NURSE PRACTITIONER

## 2024-03-06 RX ORDER — ACETAMINOPHEN 325 MG/1
325 TABLET ORAL EVERY 4 HOURS PRN
COMMUNITY
Start: 2024-01-23

## 2024-03-06 NOTE — PROGRESS NOTES
Radha Toussaint is a 68 y.o. female     History Of Present Illness   Mrs Toussaint is a 68 year old female with PMH of a stemi, CAD, hypertension, hyperlipidemia, PAD, nicotine abuse, non small lung cancer, here following a left  popliteal/TPT/PT reconstruction with PTA/DCB, Supera/SES to popliteal occlusion with Dr Mike on 1/23/24.  Since her intervention, she has had almost a complete resolution in her left foot wound The wound is closed healing well.  She continues to complain of leg and foot pain with ambulation.  PVR was completed prior to this appt and I have reviewed the results of her test.  She continues to smoke and does not wish to stop.  She is tolerating her medications well and requesting pain medication for the leg and foot pain.        Social HX  Social History     Tobacco Use    Smoking status: Every Day     Packs/day: 1     Types: Cigarettes    Smokeless tobacco: Never          Family HX  Family History   Problem Relation Name Age of Onset    Aneurysm Mother      Hypertension Mother      Heart attack Father            Review Of Systems   Review of Systems       Allergies  Allergies   Allergen Reactions    Ace Inhibitors Angioedema    Prednisone Anxiety and Agitation     & violent    Demerol [Meperidine] Nausea/vomiting          Vitals  Visit Vitals  /62 (BP Location: Left arm, Patient Position: Sitting, BP Cuff Size: Adult)   Pulse (!) 46           Physical Exam  + left popliteal pulse  Biphasic left DP and pt signals  HR is 46-50  The wound to the left lateral foot      Current/Home Meds    Current Outpatient Medications:     acetaminophen (Tylenol) 325 mg tablet, Take 1 tablet (325 mg) by mouth every 4 hours if needed., Disp: , Rfl:     albuterol 90 mcg/actuation inhaler, Inhale 1 puff every 4 hours if needed for shortness of breath., Disp: 18 g, Rfl: 3    aspirin 81 mg EC tablet, Take 1 tablet (81 mg) by mouth once daily., Disp: , Rfl:     clopidogrel (Plavix) 75 mg tablet, Take 1 tablet (75  mg) by mouth once daily., Disp: 90 tablet, Rfl: 1    ferrous sulfate 325 (65 Fe) MG EC tablet, Take 65 mg by mouth once daily. Do not crush, chew, or split., Disp: , Rfl:     fludrocortisone (Florinef) 0.1 mg tablet, TAKE 1/2 TABLET BY MOUTH ONCE DAILY, Disp: 30 tablet, Rfl: 3    fluticasone-umeclidin-vilanter (Trelegy Ellipta) 200-62.5-25 mcg blister with device, Inhale 1 puff once daily., Disp: 60 each, Rfl: 3    HYDROcodone-acetaminophen (Norco)  mg tablet, Take 1 tablet by mouth every 8 hours if needed for severe pain (7 - 10)., Disp: 60 tablet, Rfl: 0    hydrocortisone (Cortef) 5 mg tablet, STARTING ON 9/8 TAKE 2 TABS AT 8AM THEN TAKE 1 TABLET AT NOON THEN TAKE 1/2 TABLET AT 4PM, Disp: 180 tablet, Rfl: 2    ipratropium-albuteroL (Duo-Neb) 0.5-2.5 mg/3 mL nebulizer solution, Take 3 mL by nebulization 4 times a day., Disp: 360 mL, Rfl: 11    montelukast (Singulair) 10 mg tablet, Take 1 tablet (10 mg) by mouth once daily at bedtime., Disp: 90 tablet, Rfl: 1    ondansetron ODT (Zofran-ODT) 4 mg disintegrating tablet, DISSOLVE 1 TABLET IN MOUTH BY MOUTH THREE TIMES A DAY AS NEEDED NAUSEA, Disp: 90 tablet, Rfl: 0    pantoprazole (ProtoNix) 40 mg EC tablet, Take 1 tablet (40 mg) by mouth once daily in the morning. Take before meals. Do not crush, chew, or split., Disp: , Rfl:     rosuvastatin (Crestor) 10 mg tablet, Take 2 tablets (20 mg) by mouth once daily., Disp: 180 tablet, Rfl: 3    sennosides (senna) 8.6 mg tablet, Take 1 tablet (8.6 mg) by mouth once daily., Disp: 30 tablet, Rfl: 11    sertraline (Zoloft) 50 mg tablet, Take 1 tablet (50 mg) by mouth 2 times a day., Disp: 180 tablet, Rfl: 1    methylPREDNISolone (Medrol, Ramiro,) 4 mg tablets, Follow schedule on package instructions, Disp: 1 each, Rfl: 0    nicotine (Nicoderm CQ) 21 mg/24 hr patch, Place 1 patch over 24 hours on the skin once daily for 41 doses. Do not start before January 25, 2024., Disp: 30 patch, Rfl: 1    OLANZapine (ZyPREXA) 5 mg tablet,  Take 1 tablet (5 mg) by mouth once daily at bedtime., Disp: 30 tablet, Rfl: 1    simethicone (Mylicon) 80 mg chewable tablet, Chew 1 tablet (80 mg) every 6 hours if needed., Disp: , Rfl:        Labs   2/2/24 CR 0.68  K 4.3  CRP 3.31    5/2/23  CHOLESTEROL 160  HDL 35.6    TG 89    Cardiac Service Results:  RIGHT Lower PVR                Pressures Ratios  Right Posterior Tibial (Ankle) 152 mmHg  1.14  Right Dorsalis Pedis (Ankle)   146 mmHg  1.10  Right Digit (Great Toe)        80 mmHg   0.60        LEFT Lower PVR                Pressures Ratios  Left Posterior Tibial (Ankle) 143 mmHg  1.08  Left Dorsalis Pedis (Ankle)   136 mmHg  1.02  Left Digit (Great Toe)        69 mmHg   0.52                        Right     Left  Brachial Pressure 132 mmHg 133 mmHg    Assessment/Plan    PAD: S/P  left  popliteal/TPT/PT reconstruction with PTA/DCB, Supera/SES to popliteal occlusion with Dr Mike on 1/23/24.  Doing very well.  TRAVIS on the left DP is 1.02  Left PT 1.08  Left digit 0.52    The wound to the left lateral foot is closed and healing well.  There is no redness, edema or drainage to the wound.  The patient continues to complain of left leg and foot pain.  Will have her continue plavix as directed and follow up with me in 1-2 months or sooner for any questions, concerns or complaints.

## 2024-03-11 ENCOUNTER — APPOINTMENT (OUTPATIENT)
Dept: RADIOLOGY | Facility: HOSPITAL | Age: 69
DRG: 191 | End: 2024-03-11
Payer: MEDICARE

## 2024-03-11 ENCOUNTER — APPOINTMENT (OUTPATIENT)
Dept: CARDIOLOGY | Facility: HOSPITAL | Age: 69
DRG: 191 | End: 2024-03-11
Payer: MEDICARE

## 2024-03-11 ENCOUNTER — TELEPHONE (OUTPATIENT)
Dept: HEMATOLOGY/ONCOLOGY | Facility: CLINIC | Age: 69
End: 2024-03-11
Payer: MEDICARE

## 2024-03-11 ENCOUNTER — HOSPITAL ENCOUNTER (INPATIENT)
Facility: HOSPITAL | Age: 69
LOS: 2 days | Discharge: HOME | DRG: 191 | End: 2024-03-15
Attending: STUDENT IN AN ORGANIZED HEALTH CARE EDUCATION/TRAINING PROGRAM | Admitting: STUDENT IN AN ORGANIZED HEALTH CARE EDUCATION/TRAINING PROGRAM
Payer: MEDICARE

## 2024-03-11 DIAGNOSIS — R06.09 DYSPNEA ON EXERTION: ICD-10-CM

## 2024-03-11 DIAGNOSIS — E27.40 ADRENAL INSUFFICIENCY (MULTI): ICD-10-CM

## 2024-03-11 DIAGNOSIS — J44.1 COPD EXACERBATION (MULTI): ICD-10-CM

## 2024-03-11 DIAGNOSIS — R20.2 PARESTHESIAS: Primary | ICD-10-CM

## 2024-03-11 LAB
ALBUMIN SERPL BCP-MCNC: 3.9 G/DL (ref 3.4–5)
ALP SERPL-CCNC: 66 U/L (ref 33–136)
ALT SERPL W P-5'-P-CCNC: 13 U/L (ref 7–45)
ANION GAP BLDV CALCULATED.4IONS-SCNC: 4 MMOL/L (ref 10–25)
ANION GAP SERPL CALC-SCNC: 9 MMOL/L (ref 10–20)
AST SERPL W P-5'-P-CCNC: 26 U/L (ref 9–39)
BASE EXCESS BLDV CALC-SCNC: 6.6 MMOL/L (ref -2–3)
BASOPHILS # BLD AUTO: 0.07 X10*3/UL (ref 0–0.1)
BASOPHILS NFR BLD AUTO: 0.6 %
BILIRUB SERPL-MCNC: 0.5 MG/DL (ref 0–1.2)
BODY TEMPERATURE: 37 DEGREES CELSIUS
BUN SERPL-MCNC: 14 MG/DL (ref 6–23)
CA-I BLDV-SCNC: 1.22 MMOL/L (ref 1.1–1.33)
CALCIUM SERPL-MCNC: 8.9 MG/DL (ref 8.6–10.3)
CARDIAC TROPONIN I PNL SERPL HS: 12 NG/L (ref 0–13)
CARDIAC TROPONIN I PNL SERPL HS: 12 NG/L (ref 0–13)
CHLORIDE BLDV-SCNC: 101 MMOL/L (ref 98–107)
CHLORIDE SERPL-SCNC: 101 MMOL/L (ref 98–107)
CO2 SERPL-SCNC: 30 MMOL/L (ref 21–32)
CREAT SERPL-MCNC: 0.54 MG/DL (ref 0.5–1.05)
EGFRCR SERPLBLD CKD-EPI 2021: >90 ML/MIN/1.73M*2
EOSINOPHIL # BLD AUTO: 0.2 X10*3/UL (ref 0–0.7)
EOSINOPHIL NFR BLD AUTO: 1.6 %
ERYTHROCYTE [DISTWIDTH] IN BLOOD BY AUTOMATED COUNT: 19.2 % (ref 11.5–14.5)
FLUAV RNA RESP QL NAA+PROBE: NOT DETECTED
FLUBV RNA RESP QL NAA+PROBE: NOT DETECTED
GLUCOSE BLDV-MCNC: 106 MG/DL (ref 74–99)
GLUCOSE SERPL-MCNC: 91 MG/DL (ref 74–99)
HCO3 BLDV-SCNC: 34.1 MMOL/L (ref 22–26)
HCT VFR BLD AUTO: 48.7 % (ref 36–46)
HCT VFR BLD EST: 47 % (ref 36–46)
HGB BLD-MCNC: 15.3 G/DL (ref 12–16)
HGB BLDV-MCNC: 15.7 G/DL (ref 12–16)
IMM GRANULOCYTES # BLD AUTO: 0.05 X10*3/UL (ref 0–0.7)
IMM GRANULOCYTES NFR BLD AUTO: 0.4 % (ref 0–0.9)
INHALED O2 CONCENTRATION: 32 %
LACTATE BLDV-SCNC: 0.8 MMOL/L (ref 0.4–2)
LYMPHOCYTES # BLD AUTO: 2.19 X10*3/UL (ref 1.2–4.8)
LYMPHOCYTES NFR BLD AUTO: 17.5 %
MCH RBC QN AUTO: 27.1 PG (ref 26–34)
MCHC RBC AUTO-ENTMCNC: 31.4 G/DL (ref 32–36)
MCV RBC AUTO: 86 FL (ref 80–100)
MONOCYTES # BLD AUTO: 0.82 X10*3/UL (ref 0.1–1)
MONOCYTES NFR BLD AUTO: 6.5 %
NEUTROPHILS # BLD AUTO: 9.21 X10*3/UL (ref 1.2–7.7)
NEUTROPHILS NFR BLD AUTO: 73.4 %
NRBC BLD-RTO: 0 /100 WBCS (ref 0–0)
OXYHGB MFR BLDV: 40.9 % (ref 45–75)
PCO2 BLDV: 59 MM HG (ref 41–51)
PH BLDV: 7.37 PH (ref 7.33–7.43)
PLATELET # BLD AUTO: 207 X10*3/UL (ref 150–450)
PO2 BLDV: 30 MM HG (ref 35–45)
POTASSIUM BLDV-SCNC: 4.5 MMOL/L (ref 3.5–5.3)
POTASSIUM SERPL-SCNC: 4.3 MMOL/L (ref 3.5–5.3)
POTASSIUM SERPL-SCNC: 5.4 MMOL/L (ref 3.5–5.3)
PROT SERPL-MCNC: 6.9 G/DL (ref 6.4–8.2)
RBC # BLD AUTO: 5.64 X10*6/UL (ref 4–5.2)
SAO2 % BLDV: 44 % (ref 45–75)
SARS-COV-2 RNA RESP QL NAA+PROBE: NOT DETECTED
SODIUM BLDV-SCNC: 135 MMOL/L (ref 136–145)
SODIUM SERPL-SCNC: 135 MMOL/L (ref 136–145)
WBC # BLD AUTO: 12.5 X10*3/UL (ref 4.4–11.3)

## 2024-03-11 PROCEDURE — G0378 HOSPITAL OBSERVATION PER HR: HCPCS

## 2024-03-11 PROCEDURE — 85025 COMPLETE CBC W/AUTO DIFF WBC: CPT | Performed by: STUDENT IN AN ORGANIZED HEALTH CARE EDUCATION/TRAINING PROGRAM

## 2024-03-11 PROCEDURE — 36415 COLL VENOUS BLD VENIPUNCTURE: CPT | Performed by: STUDENT IN AN ORGANIZED HEALTH CARE EDUCATION/TRAINING PROGRAM

## 2024-03-11 PROCEDURE — 2500000001 HC RX 250 WO HCPCS SELF ADMINISTERED DRUGS (ALT 637 FOR MEDICARE OP)

## 2024-03-11 PROCEDURE — 93005 ELECTROCARDIOGRAM TRACING: CPT

## 2024-03-11 PROCEDURE — 87636 SARSCOV2 & INF A&B AMP PRB: CPT | Performed by: STUDENT IN AN ORGANIZED HEALTH CARE EDUCATION/TRAINING PROGRAM

## 2024-03-11 PROCEDURE — 71045 X-RAY EXAM CHEST 1 VIEW: CPT

## 2024-03-11 PROCEDURE — 84132 ASSAY OF SERUM POTASSIUM: CPT | Performed by: STUDENT IN AN ORGANIZED HEALTH CARE EDUCATION/TRAINING PROGRAM

## 2024-03-11 PROCEDURE — 84484 ASSAY OF TROPONIN QUANT: CPT | Performed by: STUDENT IN AN ORGANIZED HEALTH CARE EDUCATION/TRAINING PROGRAM

## 2024-03-11 PROCEDURE — 96365 THER/PROPH/DIAG IV INF INIT: CPT

## 2024-03-11 PROCEDURE — 2500000004 HC RX 250 GENERAL PHARMACY W/ HCPCS (ALT 636 FOR OP/ED): Performed by: STUDENT IN AN ORGANIZED HEALTH CARE EDUCATION/TRAINING PROGRAM

## 2024-03-11 PROCEDURE — 2500000004 HC RX 250 GENERAL PHARMACY W/ HCPCS (ALT 636 FOR OP/ED)

## 2024-03-11 PROCEDURE — 99285 EMERGENCY DEPT VISIT HI MDM: CPT | Mod: 25

## 2024-03-11 PROCEDURE — 2500000002 HC RX 250 W HCPCS SELF ADMINISTERED DRUGS (ALT 637 FOR MEDICARE OP, ALT 636 FOR OP/ED): Performed by: STUDENT IN AN ORGANIZED HEALTH CARE EDUCATION/TRAINING PROGRAM

## 2024-03-11 PROCEDURE — 2500000002 HC RX 250 W HCPCS SELF ADMINISTERED DRUGS (ALT 637 FOR MEDICARE OP, ALT 636 FOR OP/ED)

## 2024-03-11 PROCEDURE — 99222 1ST HOSP IP/OBS MODERATE 55: CPT

## 2024-03-11 PROCEDURE — 71045 X-RAY EXAM CHEST 1 VIEW: CPT | Performed by: RADIOLOGY

## 2024-03-11 PROCEDURE — 94640 AIRWAY INHALATION TREATMENT: CPT

## 2024-03-11 RX ORDER — MONTELUKAST SODIUM 10 MG/1
10 TABLET ORAL NIGHTLY
Status: DISCONTINUED | OUTPATIENT
Start: 2024-03-11 | End: 2024-03-15 | Stop reason: HOSPADM

## 2024-03-11 RX ORDER — IBUPROFEN 200 MG
1 TABLET ORAL DAILY
Status: DISCONTINUED | OUTPATIENT
Start: 2024-03-12 | End: 2024-03-15 | Stop reason: HOSPADM

## 2024-03-11 RX ORDER — ALBUTEROL SULFATE 0.83 MG/ML
2.5 SOLUTION RESPIRATORY (INHALATION)
Status: DISCONTINUED | OUTPATIENT
Start: 2024-03-12 | End: 2024-03-15 | Stop reason: HOSPADM

## 2024-03-11 RX ORDER — IPRATROPIUM BROMIDE AND ALBUTEROL SULFATE 2.5; .5 MG/3ML; MG/3ML
3 SOLUTION RESPIRATORY (INHALATION) ONCE
Status: COMPLETED | OUTPATIENT
Start: 2024-03-11 | End: 2024-03-11

## 2024-03-11 RX ORDER — METOCLOPRAMIDE HYDROCHLORIDE 5 MG/ML
10 INJECTION INTRAMUSCULAR; INTRAVENOUS ONCE
Status: COMPLETED | OUTPATIENT
Start: 2024-03-11 | End: 2024-03-11

## 2024-03-11 RX ORDER — CLOPIDOGREL BISULFATE 75 MG/1
75 TABLET ORAL EVERY MORNING
Status: DISCONTINUED | OUTPATIENT
Start: 2024-03-12 | End: 2024-03-15 | Stop reason: HOSPADM

## 2024-03-11 RX ORDER — ACETAMINOPHEN 325 MG/1
650 TABLET ORAL EVERY 6 HOURS PRN
Status: DISCONTINUED | OUTPATIENT
Start: 2024-03-11 | End: 2024-03-15 | Stop reason: HOSPADM

## 2024-03-11 RX ORDER — FLUTICASONE FUROATE AND VILANTEROL 200; 25 UG/1; UG/1
1 POWDER RESPIRATORY (INHALATION)
Status: DISCONTINUED | OUTPATIENT
Start: 2024-03-12 | End: 2024-03-15 | Stop reason: HOSPADM

## 2024-03-11 RX ORDER — IPRATROPIUM BROMIDE AND ALBUTEROL SULFATE 2.5; .5 MG/3ML; MG/3ML
3 SOLUTION RESPIRATORY (INHALATION)
Status: DISCONTINUED | OUTPATIENT
Start: 2024-03-12 | End: 2024-03-11

## 2024-03-11 RX ORDER — ALBUTEROL SULFATE 0.83 MG/ML
2.5 SOLUTION RESPIRATORY (INHALATION) EVERY 4 HOURS PRN
Status: DISCONTINUED | OUTPATIENT
Start: 2024-03-11 | End: 2024-03-15 | Stop reason: HOSPADM

## 2024-03-11 RX ORDER — HYDROCODONE BITARTRATE AND ACETAMINOPHEN 10; 325 MG/1; MG/1
1 TABLET ORAL EVERY 8 HOURS PRN
Status: DISCONTINUED | OUTPATIENT
Start: 2024-03-11 | End: 2024-03-15 | Stop reason: HOSPADM

## 2024-03-11 RX ORDER — DIPHENHYDRAMINE HYDROCHLORIDE 50 MG/ML
25 INJECTION INTRAMUSCULAR; INTRAVENOUS ONCE
Status: COMPLETED | OUTPATIENT
Start: 2024-03-11 | End: 2024-03-11

## 2024-03-11 RX ORDER — MAGNESIUM SULFATE HEPTAHYDRATE 40 MG/ML
2 INJECTION, SOLUTION INTRAVENOUS ONCE
Status: COMPLETED | OUTPATIENT
Start: 2024-03-11 | End: 2024-03-11

## 2024-03-11 RX ORDER — FERROUS SULFATE 325(65) MG
65 TABLET ORAL DAILY
Status: DISCONTINUED | OUTPATIENT
Start: 2024-03-11 | End: 2024-03-15 | Stop reason: HOSPADM

## 2024-03-11 RX ORDER — ASPIRIN 81 MG/1
81 TABLET ORAL DAILY
Status: DISCONTINUED | OUTPATIENT
Start: 2024-03-11 | End: 2024-03-15 | Stop reason: HOSPADM

## 2024-03-11 RX ORDER — ALBUTEROL SULFATE 0.83 MG/ML
2.5 SOLUTION RESPIRATORY (INHALATION) EVERY 6 HOURS
Status: DISCONTINUED | OUTPATIENT
Start: 2024-03-11 | End: 2024-03-11

## 2024-03-11 RX ORDER — POLYETHYLENE GLYCOL 3350 17 G/17G
17 POWDER, FOR SOLUTION ORAL DAILY PRN
Status: DISCONTINUED | OUTPATIENT
Start: 2024-03-11 | End: 2024-03-15 | Stop reason: HOSPADM

## 2024-03-11 RX ORDER — IBUPROFEN 200 MG
1 TABLET ORAL DAILY
Status: DISCONTINUED | OUTPATIENT
Start: 2024-04-23 | End: 2024-03-15 | Stop reason: HOSPADM

## 2024-03-11 RX ORDER — OLANZAPINE 5 MG/1
5 TABLET ORAL NIGHTLY
Status: DISCONTINUED | OUTPATIENT
Start: 2024-03-11 | End: 2024-03-15 | Stop reason: HOSPADM

## 2024-03-11 RX ORDER — SENNOSIDES 8.6 MG/1
1 TABLET ORAL NIGHTLY
Status: DISCONTINUED | OUTPATIENT
Start: 2024-03-11 | End: 2024-03-15 | Stop reason: HOSPADM

## 2024-03-11 RX ORDER — ONDANSETRON HYDROCHLORIDE 2 MG/ML
4 INJECTION, SOLUTION INTRAVENOUS EVERY 6 HOURS PRN
Status: DISCONTINUED | OUTPATIENT
Start: 2024-03-11 | End: 2024-03-15 | Stop reason: HOSPADM

## 2024-03-11 RX ORDER — SIMETHICONE 80 MG
80 TABLET,CHEWABLE ORAL EVERY 6 HOURS PRN
Status: DISCONTINUED | OUTPATIENT
Start: 2024-03-11 | End: 2024-03-15 | Stop reason: HOSPADM

## 2024-03-11 RX ORDER — ALBUTEROL SULFATE 0.83 MG/ML
2.5 SOLUTION RESPIRATORY (INHALATION) EVERY 20 MIN
Status: COMPLETED | OUTPATIENT
Start: 2024-03-11 | End: 2024-03-11

## 2024-03-11 RX ORDER — NICOTINE 7MG/24HR
1 PATCH, TRANSDERMAL 24 HOURS TRANSDERMAL DAILY
Status: DISCONTINUED | OUTPATIENT
Start: 2024-05-07 | End: 2024-03-15 | Stop reason: HOSPADM

## 2024-03-11 RX ORDER — IPRATROPIUM BROMIDE AND ALBUTEROL SULFATE 2.5; .5 MG/3ML; MG/3ML
3 SOLUTION RESPIRATORY (INHALATION)
Status: DISCONTINUED | OUTPATIENT
Start: 2024-03-11 | End: 2024-03-11

## 2024-03-11 RX ORDER — KETOROLAC TROMETHAMINE 30 MG/ML
15 INJECTION, SOLUTION INTRAMUSCULAR; INTRAVENOUS ONCE
Status: COMPLETED | OUTPATIENT
Start: 2024-03-11 | End: 2024-03-11

## 2024-03-11 RX ORDER — ROSUVASTATIN CALCIUM 10 MG/1
20 TABLET, COATED ORAL DAILY
Status: DISCONTINUED | OUTPATIENT
Start: 2024-03-11 | End: 2024-03-15 | Stop reason: HOSPADM

## 2024-03-11 RX ORDER — SERTRALINE HYDROCHLORIDE 50 MG/1
50 TABLET, FILM COATED ORAL ONCE
Status: COMPLETED | OUTPATIENT
Start: 2024-03-11 | End: 2024-03-11

## 2024-03-11 RX ORDER — SERTRALINE HYDROCHLORIDE 50 MG/1
50 TABLET, FILM COATED ORAL 2 TIMES DAILY
Status: DISCONTINUED | OUTPATIENT
Start: 2024-03-11 | End: 2024-03-15 | Stop reason: HOSPADM

## 2024-03-11 RX ORDER — ALBUTEROL SULFATE 0.83 MG/ML
2.5 SOLUTION RESPIRATORY (INHALATION)
Status: DISCONTINUED | OUTPATIENT
Start: 2024-03-11 | End: 2024-03-11

## 2024-03-11 RX ADMIN — MONTELUKAST 10 MG: 10 TABLET, FILM COATED ORAL at 21:11

## 2024-03-11 RX ADMIN — DIPHENHYDRAMINE HYDROCHLORIDE 25 MG: 50 INJECTION, SOLUTION INTRAMUSCULAR; INTRAVENOUS at 22:33

## 2024-03-11 RX ADMIN — SODIUM CHLORIDE 500 ML: 9 INJECTION, SOLUTION INTRAVENOUS at 22:15

## 2024-03-11 RX ADMIN — SERTRALINE HYDROCHLORIDE 50 MG: 50 TABLET ORAL at 14:23

## 2024-03-11 RX ADMIN — ALBUTEROL SULFATE 2.5 MG: 2.5 SOLUTION RESPIRATORY (INHALATION) at 12:53

## 2024-03-11 RX ADMIN — IPRATROPIUM BROMIDE AND ALBUTEROL SULFATE 3 ML: .5; 3 SOLUTION RESPIRATORY (INHALATION) at 19:36

## 2024-03-11 RX ADMIN — SENNOSIDES 8.6 MG: 8.6 TABLET, FILM COATED ORAL at 21:12

## 2024-03-11 RX ADMIN — KETOROLAC TROMETHAMINE 15 MG: 30 INJECTION, SOLUTION INTRAMUSCULAR; INTRAVENOUS at 22:34

## 2024-03-11 RX ADMIN — OLANZAPINE 5 MG: 5 TABLET, FILM COATED ORAL at 21:11

## 2024-03-11 RX ADMIN — MAGNESIUM SULFATE HEPTAHYDRATE 2 G: 40 INJECTION, SOLUTION INTRAVENOUS at 16:25

## 2024-03-11 RX ADMIN — METOCLOPRAMIDE 10 MG: 5 INJECTION, SOLUTION INTRAMUSCULAR; INTRAVENOUS at 22:37

## 2024-03-11 RX ADMIN — IPRATROPIUM BROMIDE AND ALBUTEROL SULFATE 3 ML: .5; 3 SOLUTION RESPIRATORY (INHALATION) at 12:41

## 2024-03-11 RX ADMIN — AZITHROMYCIN MONOHYDRATE 500 MG: 500 INJECTION, POWDER, LYOPHILIZED, FOR SOLUTION INTRAVENOUS at 23:27

## 2024-03-11 RX ADMIN — ALBUTEROL SULFATE 2.5 MG: 2.5 SOLUTION RESPIRATORY (INHALATION) at 12:35

## 2024-03-11 SDOH — SOCIAL STABILITY: SOCIAL INSECURITY: HAS ANYONE EVER THREATENED TO HURT YOUR FAMILY OR YOUR PETS?: NO

## 2024-03-11 SDOH — SOCIAL STABILITY: SOCIAL INSECURITY: DO YOU FEEL ANYONE HAS EXPLOITED OR TAKEN ADVANTAGE OF YOU FINANCIALLY OR OF YOUR PERSONAL PROPERTY?: NO

## 2024-03-11 SDOH — SOCIAL STABILITY: SOCIAL INSECURITY: HAVE YOU HAD THOUGHTS OF HARMING ANYONE ELSE?: NO

## 2024-03-11 SDOH — SOCIAL STABILITY: SOCIAL INSECURITY: ABUSE: ADULT

## 2024-03-11 SDOH — SOCIAL STABILITY: SOCIAL INSECURITY: WERE YOU ABLE TO COMPLETE ALL THE BEHAVIORAL HEALTH SCREENINGS?: YES

## 2024-03-11 SDOH — SOCIAL STABILITY: SOCIAL INSECURITY: ARE YOU OR HAVE YOU BEEN THREATENED OR ABUSED PHYSICALLY, EMOTIONALLY, OR SEXUALLY BY ANYONE?: NO

## 2024-03-11 SDOH — SOCIAL STABILITY: SOCIAL INSECURITY: ARE THERE ANY APPARENT SIGNS OF INJURIES/BEHAVIORS THAT COULD BE RELATED TO ABUSE/NEGLECT?: NO

## 2024-03-11 SDOH — SOCIAL STABILITY: SOCIAL INSECURITY: DO YOU FEEL UNSAFE GOING BACK TO THE PLACE WHERE YOU ARE LIVING?: NO

## 2024-03-11 SDOH — SOCIAL STABILITY: SOCIAL INSECURITY: DOES ANYONE TRY TO KEEP YOU FROM HAVING/CONTACTING OTHER FRIENDS OR DOING THINGS OUTSIDE YOUR HOME?: NO

## 2024-03-11 ASSESSMENT — COGNITIVE AND FUNCTIONAL STATUS - GENERAL
TOILETING: A LITTLE
CLIMB 3 TO 5 STEPS WITH RAILING: A LOT
STANDING UP FROM CHAIR USING ARMS: A LITTLE
TURNING FROM BACK TO SIDE WHILE IN FLAT BAD: A LITTLE
MOBILITY SCORE: 18
CLIMB 3 TO 5 STEPS WITH RAILING: A LOT
WALKING IN HOSPITAL ROOM: A LITTLE
TOILETING: A LITTLE
HELP NEEDED FOR BATHING: A LITTLE
MOBILITY SCORE: 18
HELP NEEDED FOR BATHING: A LITTLE
DAILY ACTIVITIY SCORE: 22
STANDING UP FROM CHAIR USING ARMS: A LITTLE
DAILY ACTIVITIY SCORE: 22
MOVING TO AND FROM BED TO CHAIR: A LITTLE
WALKING IN HOSPITAL ROOM: A LITTLE
MOVING TO AND FROM BED TO CHAIR: A LITTLE
TURNING FROM BACK TO SIDE WHILE IN FLAT BAD: A LITTLE
PATIENT BASELINE BEDBOUND: NO

## 2024-03-11 ASSESSMENT — PATIENT HEALTH QUESTIONNAIRE - PHQ9
2. FEELING DOWN, DEPRESSED OR HOPELESS: NOT AT ALL
1. LITTLE INTEREST OR PLEASURE IN DOING THINGS: NOT AT ALL
SUM OF ALL RESPONSES TO PHQ9 QUESTIONS 1 & 2: 0

## 2024-03-11 ASSESSMENT — PAIN - FUNCTIONAL ASSESSMENT: PAIN_FUNCTIONAL_ASSESSMENT: 0-10

## 2024-03-11 ASSESSMENT — ACTIVITIES OF DAILY LIVING (ADL)
WALKS IN HOME: NEEDS ASSISTANCE
HEARING - RIGHT EAR: FUNCTIONAL
DRESSING YOURSELF: INDEPENDENT
LACK_OF_TRANSPORTATION: NO
ADEQUATE_TO_COMPLETE_ADL: YES
BATHING: NEEDS ASSISTANCE
FEEDING YOURSELF: INDEPENDENT
JUDGMENT_ADEQUATE_SAFELY_COMPLETE_DAILY_ACTIVITIES: YES
PATIENT'S MEMORY ADEQUATE TO SAFELY COMPLETE DAILY ACTIVITIES?: YES
HEARING - LEFT EAR: FUNCTIONAL
TOILETING: NEEDS ASSISTANCE
GROOMING: INDEPENDENT

## 2024-03-11 ASSESSMENT — COLUMBIA-SUICIDE SEVERITY RATING SCALE - C-SSRS
2. HAVE YOU ACTUALLY HAD ANY THOUGHTS OF KILLING YOURSELF?: NO
6. HAVE YOU EVER DONE ANYTHING, STARTED TO DO ANYTHING, OR PREPARED TO DO ANYTHING TO END YOUR LIFE?: NO
1. IN THE PAST MONTH, HAVE YOU WISHED YOU WERE DEAD OR WISHED YOU COULD GO TO SLEEP AND NOT WAKE UP?: NO
2. HAVE YOU ACTUALLY HAD ANY THOUGHTS OF KILLING YOURSELF?: NO
1. IN THE PAST MONTH, HAVE YOU WISHED YOU WERE DEAD OR WISHED YOU COULD GO TO SLEEP AND NOT WAKE UP?: NO
6. HAVE YOU EVER DONE ANYTHING, STARTED TO DO ANYTHING, OR PREPARED TO DO ANYTHING TO END YOUR LIFE?: NO

## 2024-03-11 ASSESSMENT — PAIN SCALES - GENERAL
PAINLEVEL_OUTOF10: 0 - NO PAIN

## 2024-03-11 ASSESSMENT — LIFESTYLE VARIABLES
AUDIT-C TOTAL SCORE: 0
HOW MANY STANDARD DRINKS CONTAINING ALCOHOL DO YOU HAVE ON A TYPICAL DAY: PATIENT DOES NOT DRINK
HAVE PEOPLE ANNOYED YOU BY CRITICIZING YOUR DRINKING: NO
SKIP TO QUESTIONS 9-10: 1
EVER FELT BAD OR GUILTY ABOUT YOUR DRINKING: NO
PRESCIPTION_ABUSE_PAST_12_MONTHS: NO
HOW OFTEN DO YOU HAVE 6 OR MORE DRINKS ON ONE OCCASION: NEVER
EVER HAD A DRINK FIRST THING IN THE MORNING TO STEADY YOUR NERVES TO GET RID OF A HANGOVER: NO
HAVE YOU EVER FELT YOU SHOULD CUT DOWN ON YOUR DRINKING: NO
HOW OFTEN DO YOU HAVE A DRINK CONTAINING ALCOHOL: NEVER
AUDIT-C TOTAL SCORE: 0
SUBSTANCE_ABUSE_PAST_12_MONTHS: NO

## 2024-03-11 NOTE — H&P
History Of Present Illness  Radha Toussaint is a 68 y.o. female  who presents to the ED with abdominal pain with ambulation, shortness of breath, wheezing, and cough. Patient has a history of PAD, Anxiety, CAD, s/p Stents, COPD, chronic hypoxia and uses nasal cannula at home nocturnal, PVD, AAA, Adrenal insufficiency, HLD, Fibroids, MI, HTN, multiple vascular aneurysms, and adrenal CA with metastasis, currently on Keytruda.  Patient reports she came to the ER today with worsening shortness of breath for the past month.  Patient admits to using nebulizers at home with increased frequency.  Patient has chronic cough but has been worse lately, cough has been moist. Patient placed on nasal cannula after O2 was in the low 90's during her ED stay.  Patient admits that she uses oxygen at home when needed and at night. Patient also reports having more frequent headaches for the past 2 weeks which she has tried Tylenol for with temporary relief.  Patient also reports having increased weakness in bilateral legs, reports that there is numbness and tingling with difficulty walking.  Denies any use of any assistive device such as cane or walker.  Patient has been having abdominal “fluttering” with ambulation and increased frequency with urination, denies pain or blood in urine.  Family and patient both reported 10 pound weight gain with good appetite.  Patient denies back pain, fever, blurred vision, nausea, rash, and swelling.    ED Course:     Hemodynamically stable,  Vitals: Temp. 37, HR 76 , Resp.16 , /91, Pulse ox 95  Labs: glucose 91, Bun 14, creat. 0.54 , GFR >90 , Calcium 8.9, Phos, Albumin 3.9, ALT 13, AST 13 , BNP, Trop. 12x2, Hgb. 15.3, HCT 48.7 , k+4.3, TSH 91, Na+ 135, WBC 12.5, VBG (Co2 59, p02 30, So2 44) Ionized Ca+ 1.22, Lac. 0.8    Medications: albuterol, Duo-neb, mag sul. 2g, Zoloft 50mg.  Imaging: see result below   EKG: Not available for my review     Past Medical History  See above  Past Medical  History:   Diagnosis Date    Aneurysm of carotid artery (CMS/[facility])     Neck aneurysm    DVT (deep venous thrombosis) (CMS/[facility])     2022    History of tubal ligation     Old myocardial infarction     History of heart attack    Other specified disorders of kidney and ureter     Obstruction of kidney    Personal history of other diseases of the circulatory system     History of intracranial aneurysm    Personal history of other diseases of the circulatory system     History of abdominal aortic aneurysm (AAA)    Personal history of other diseases of the respiratory system     History of chronic obstructive lung disease    Personal history of urinary calculi     History of kidney stones    Right upper quadrant pain 09/25/2020    Abdominal pain, RUQ (right upper quadrant)       Surgical History  Triple A graft repair  Left popliteal reconstruction  Past Surgical History:   Procedure Laterality Date    CT ABDOMEN PELVIS ANGIOGRAM W AND/OR WO IV CONTRAST  11/21/2019    CT ABDOMEN PELVIS ANGIOGRAM W AND/OR WO IV CONTRAST 11/21/2019 [facility] EMERGENCY LEGACY    CT AORTA AND BILATERAL ILIOFEMORAL RUNOFF ANGIOGRAM W AND/OR WO IV CONTRAST  8/9/2022    CT AORTA AND BILATERAL ILIOFEMORAL RUNOFF ANGIOGRAM W AND/OR WO IV CONTRAST 8/9/2022 [facility] CLINICAL LEGACY    CT HEAD ANGIO W AND WO IV CONTRAST  5/22/2020    CT HEAD ANGIO W AND WO IV CONTRAST 5/22/2020 [facility]ERGENCY LE[facility]    INVASIVE VASCULAR PROCEDURE N/A 1/23/2024    Procedure: Lower Extremity Angiogram;  Surgeon: Willam Mike MD;  Location: [place] [address] Cardiac Cath Lab;  Service: Cardiovascular;  Laterality: N/A;  29251;LLE CLTI    INVASIVE VASCULAR PROCEDURE N/A 1/23/2024    Procedure: Lower Extremity Intervention;  Surgeon: Willam Mike MD;  Location: [place] [address] Cardiac Cath Lab;  Service: Cardiovascular;  Laterality: N/A;    OTHER SURGICAL HISTORY  09/30/2020    Tubal ligation    OTHER SURGICAL HISTORY  09/30/2020    Cardiac catheterization        Social History  Patient admits to  smoking currently 1 1/2ppd. This is down from over 3ppd.     Family History  Family History   Problem Relation Name Age of Onset    Aneurysm Mother      Hypertension Mother      Heart attack Father          Allergies  Ace inhibitors, Prednisone, Demerol [meperidine], and Iodinated contrast media    Review of Systems     Physical Exam  Constitutional:       General: She is not in acute distress.     Appearance: Normal appearance. She is normal weight. She is not toxic-appearing.   HENT:      Head: Normocephalic and atraumatic.      Nose: Congestion present. No rhinorrhea.      Mouth/Throat:      Mouth: Mucous membranes are moist.   Eyes:      General:         Right eye: No discharge.         Left eye: No discharge.      Conjunctiva/sclera: Conjunctivae normal.      Pupils: Pupils are equal, round, and reactive to light.   Cardiovascular:      Rate and Rhythm: Normal rate and regular rhythm.      Pulses: Normal pulses.   Pulmonary:      Effort: Tachypnea present. No respiratory distress.      Breath sounds: Examination of the right-lower field reveals decreased breath sounds. Examination of the left-lower field reveals decreased breath sounds. Decreased breath sounds and rhonchi present.   Abdominal:      General: Bowel sounds are normal. There is no distension.      Palpations: Abdomen is soft.      Tenderness: There is no abdominal tenderness. There is no guarding.   Musculoskeletal:         General: Normal range of motion.      Cervical back: Normal range of motion and neck supple.      Right lower leg: No edema.      Left lower leg: No edema.   Skin:     General: Skin is warm and dry.      Findings: Bruising present.   Neurological:      General: No focal deficit present.      Mental Status: She is alert and oriented to person, place, and time. Mental status is at baseline.      Motor: Weakness present.   Psychiatric:         Mood and Affect: Mood normal.         Behavior: Behavior normal.        Last Recorded  "Vitals  Blood pressure 150/65, pulse 84, temperature 36.8 °C (98.2 °F), temperature source Temporal, resp. rate (!) 22, height 1.626 m (5' 4\"), weight 63.5 kg (140 lb), SpO2 97 %.    Relevant Results  XR chest 1 view    Result Date: 3/11/2024  Interpreted By:  Kimani Brunner, STUDY: XR CHEST 1 VIEW   INDICATION: Signs/Symptoms:cough.   COMPARISON: None   ACCESSION NUMBER(S): [id number]   ORDERING CLINICIAN: MARIANNE HOROWITZ   FINDINGS: No consolidation, effusion, edema, or pneumothorax.   Heart size within normal limits.         No evidence of acute intrathoracic abnormality.   Signed by: Kimani Brunner 3/11/2024 1:26 PM Dictation workstation:   [id number]      Results for orders placed or performed during the hospital encounter of 03/11/24 (from the past 24 hour(s))   Blood Gas, Venous Full Panel   Result Value Ref Range    POCT pH, Venous 7.37 7.33 - 7.43 pH    POCT pCO2, Venous 59 (H) 41 - 51 mm Hg    POCT pO2, Venous 30 (L) 35 - 45 mm Hg    POCT SO2, Venous 44 (L) 45 - 75 %    POCT Oxy Hemoglobin, Venous 40.9 (L) 45.0 - 75.0 %    POCT Hematocrit Calculated, Venous 47.0 (H) 36.0 - 46.0 %    POCT Sodium, Venous 135 (L) 136 - 145 mmol/L    POCT Potassium, Venous 4.5 3.5 - 5.3 mmol/L    POCT Chloride, Venous 101 98 - 107 mmol/L    POCT Ionized Calicum, Venous 1.22 1.10 - 1.33 mmol/L    POCT Glucose, Venous 106 (H) 74 - 99 mg/dL    POCT Lactate, Venous 0.8 0.4 - 2.0 mmol/L    POCT Base Excess, Venous 6.6 (H) -2.0 - 3.0 mmol/L    POCT HCO3 Calculated, Venous 34.1 (H) 22.0 - 26.0 mmol/L    POCT Hemoglobin, Venous 15.7 12.0 - 16.0 g/dL    POCT Anion Gap, Venous 4.0 (L) 10.0 - 25.0 mmol/L    Patient Temperature 37.0 degrees Celsius    FiO2 32 %   CBC and Auto Differential   Result Value Ref Range    WBC 12.5 (H) 4.4 - 11.3 x10*3/uL    nRBC 0.0 0.0 - 0.0 /100 WBCs    RBC 5.64 (H) 4.00 - 5.20 x10*6/uL    Hemoglobin 15.3 12.0 - 16.0 g/dL    Hematocrit 48.7 (H) 36.0 - 46.0 %    MCV 86 80 - 100 fL    MCH 27.1 26.0 - 34.0 pg    MCHC " 31.4 (L) 32.0 - 36.0 g/dL    RDW 19.2 (H) 11.5 - 14.5 %    Platelets 207 150 - 450 x10*3/uL    Neutrophils % 73.4 40.0 - 80.0 %    Immature Granulocytes %, Automated 0.4 0.0 - 0.9 %    Lymphocytes % 17.5 13.0 - 44.0 %    Monocytes % 6.5 2.0 - 10.0 %    Eosinophils % 1.6 0.0 - 6.0 %    Basophils % 0.6 0.0 - 2.0 %    Neutrophils Absolute 9.21 (H) 1.20 - 7.70 x10*3/uL    Immature Granulocytes Absolute, Automated 0.05 0.00 - 0.70 x10*3/uL    Lymphocytes Absolute 2.19 1.20 - 4.80 x10*3/uL    Monocytes Absolute 0.82 0.10 - 1.00 x10*3/uL    Eosinophils Absolute 0.20 0.00 - 0.70 x10*3/uL    Basophils Absolute 0.07 0.00 - 0.10 x10*3/uL   Comprehensive metabolic panel   Result Value Ref Range    Glucose 91 74 - 99 mg/dL    Sodium 135 (L) 136 - 145 mmol/L    Potassium 5.4 (H) 3.5 - 5.3 mmol/L    Chloride 101 98 - 107 mmol/L    Bicarbonate 30 21 - 32 mmol/L    Anion Gap 9 (L) 10 - 20 mmol/L    Urea Nitrogen 14 6 - 23 mg/dL    Creatinine 0.54 0.50 - 1.05 mg/dL    eGFR >90 >60 mL/min/1.73m*2    Calcium 8.9 8.6 - 10.3 mg/dL    Albumin 3.9 3.4 - 5.0 g/dL    Alkaline Phosphatase 66 33 - 136 U/L    Total Protein 6.9 6.4 - 8.2 g/dL    AST 26 9 - 39 U/L    Bilirubin, Total 0.5 0.0 - 1.2 mg/dL    ALT 13 7 - 45 U/L   Troponin I, High Sensitivity, Initial   Result Value Ref Range    Troponin I, High Sensitivity 12 0 - 13 ng/L   Sars-CoV-2 and Influenza A/B PCR   Result Value Ref Range    Flu A Result Not Detected Not Detected    Flu B Result Not Detected Not Detected    Coronavirus 2019, PCR Not Detected Not Detected   Troponin, High Sensitivity, 1 Hour   Result Value Ref Range    Troponin I, High Sensitivity 12 0 - 13 ng/L   Potassium   Result Value Ref Range    Potassium 4.3 3.5 - 5.3 mmol/L       ROS  10 point ROS systems completed and is negative except for what is stated in HPI.    Assessment/Plan     #COPD Exacerbation  #SOB  -Chest x-ray negative for effusion, edema, pneumothorax  -CT Chest negative for PE, shows emphysema,  unchanged pulmonary nodules  - Flu/covid+ test negative  -pulmonary c/s  -AM labs  -PRN albuterol   -titrate oxygen as needed to keep stats above 90  -solu-medrol  -Azithromycin  #Headache  - Fluids  -Analgesics PRN  -Reglan  -Toradol  -UA with cx  -Neurology c/s  #Weakness  #Paresthesias   -PT  -OT    Chronic Conditions  #HLD  #MI  #HTN  #PVD  #CAD  #Anxiety  #Adrenal CA  Continue home medications as ordered,    Full code    #DVT Prophylaxis  b/l Scd's as tolerated  On anticoagulant plavix          I spent 45 minutes in the professional and overall care of this patient.      Leanne Meneses, KERA-CNP

## 2024-03-11 NOTE — TELEPHONE ENCOUNTER
"Patient still with complaints of SOB, severe with ambulation, and feeling \"hot\" when moving (on steroids).  Asked if she could get in here this AM to see MD and she doesn't feel like she can.  Advised if she is feeling that bad she needs to go to ED to be evaluated for pneumonitis or COPD exacerbation.  She verbalized understanding.  Will discuss with Dr. Burnham.   "

## 2024-03-11 NOTE — ED PROVIDER NOTES
HPI   Chief Complaint   Patient presents with    Shortness of Breath       Patient is a 68-year-old female past medical history of adrenal cancer, peripheral arterial disease and COPD presents emergency department for shortness of breath wheezing cough.  Patient reports that she has been having cough wheezing sputum production for the past few days she denies any nausea vomiting fever chills or any other muscle aches and pains she has no exacerbating or alleviating symptoms.  She states that she uses nasal cannula oxygen in the evening time however throughout the day does not use patient.                          Thomas Coma Scale Score: 15                     Patient History   Past Medical History:   Diagnosis Date    Aneurysm of carotid artery (CMS/[facility])     Neck aneurysm    DVT (deep venous thrombosis) (CMS/[facility])     2022    History of tubal ligation     Old myocardial infarction     History of heart attack    Other specified disorders of kidney and ureter     Obstruction of kidney    Personal history of other diseases of the circulatory system     History of intracranial aneurysm    Personal history of other diseases of the circulatory system     History of abdominal aortic aneurysm (AAA)    Personal history of other diseases of the respiratory system     History of chronic obstructive lung disease    Personal history of urinary calculi     History of kidney stones    Right upper quadrant pain 09/25/2020    Abdominal pain, RUQ (right upper quadrant)     Past Surgical History:   Procedure Laterality Date    CT ABDOMEN PELVIS ANGIOGRAM W AND/OR WO IV CONTRAST  11/21/2019    CT ABDOMEN PELVIS ANGIOGRAM W AND/OR WO IV CONTRAST 11/21/2019 [facility] EMERGENCY LEGACY    CT AORTA AND BILATERAL ILIOFEMORAL RUNOFF ANGIOGRAM W AND/OR WO IV CONTRAST  8/9/2022    CT AORTA AND BILATERAL ILIOFEMORAL RUNOFF ANGIOGRAM W AND/OR WO IV CONTRAST 8/9/2022 [facility] CLINICAL LEGACY    CT HEAD ANGIO W AND WO IV CONTRAST  5/22/2020    CT HEAD ANGIO  W AND WO IV CONTRAST 5/22/2020 [facility] EMERGENCY LEGACY    INVASIVE VASCULAR PROCEDURE N/A 1/23/2024    Procedure: Lower Extremity Angiogram;  Surgeon: Willam Mike MD;  Location: [place] [address] Cardiac Cath Lab;  Service: Cardiovascular;  Laterality: N/A;  43206;LLE CLTI    INVASIVE VASCULAR PROCEDURE N/A 1/23/2024    Procedure: Lower Extremity Intervention;  Surgeon: Willam Mike MD;  Location: [facility] Cardiac Cath Lab;  Service: Cardiovascular;  Laterality: N/A;    OTHER SURGICAL HISTORY  09/30/2020    Tubal ligation    OTHER SURGICAL HISTORY  09/30/2020    Cardiac catheterization     Family History   Problem Relation Name Age of Onset    Aneurysm Mother      Hypertension Mother      Heart attack Father       Social History     Tobacco Use    Smoking status: Every Day     Packs/day: 1     Types: Cigarettes    Smokeless tobacco: Never   Substance Use Topics    Alcohol use: Not on file    Drug use: Not on file       Physical Exam   ED Triage Vitals [03/11/24 1214]   Temperature Heart Rate Respirations BP   37 °C (98.6 °F) 76 16 (!) 126/91      Pulse Ox Temp Source Heart Rate Source Patient Position   95 % Temporal Monitor --      BP Location FiO2 (%)     Right arm --       Physical Exam  Vitals reviewed.   Constitutional:       Appearance: Normal appearance.   HENT:      Head: Normocephalic and atraumatic.      Nose: Nose normal.      Mouth/Throat:      Mouth: Mucous membranes are moist.   Eyes:      Extraocular Movements: Extraocular movements intact.      Conjunctiva/sclera: Conjunctivae normal.   Cardiovascular:      Rate and Rhythm: Normal rate and regular rhythm.      Pulses: Normal pulses.      Heart sounds: Normal heart sounds.   Pulmonary:      Effort: Pulmonary effort is normal.      Breath sounds: Wheezing and rhonchi present.   Abdominal:      General: Abdomen is flat. Bowel sounds are normal.      Palpations: Abdomen is soft.   Musculoskeletal:         General: Normal range of motion.   Skin:     General: Skin is  warm and dry.      Capillary Refill: Capillary refill takes less than 2 seconds.   Neurological:      Mental Status: She is alert and oriented to person, place, and time. Mental status is at baseline.      Cranial Nerves: Cranial nerves 2-12 are intact. No cranial nerve deficit.      Sensory: Sensation is intact.      Motor: Motor function is intact.   Psychiatric:         Mood and Affect: Mood normal.         ED Course & MDM   ED Course as of 03/11/24 1610   Mon Mar 11, 2024   1204 58-year-old history of adrenal cancer peripheral arterial disease COPD presented to the emergency department for wheezing shortness of breath.  On examination vital signs are stable with diminished aeration and rhonchi/wheezing.  Differential diagnosis includes COPD influenza COVID-19 pneumonia.  Chest x-ray EKG troponin CBC CMP breathing treatments of nebulized albuterol/ipratropium COVID-19 influenza testing have been ordered patient will routinely reevaluate as well as venous blood gas.  EKG on my interpretation showed sinus rhythm ventricular 82  QRS 90 QTc 415. [ZS]   1351 Chest x-ray shows no acute process patient is not acidotic potassium 5.4 however hemolyzed EKG showed no hyperkalemia changes troponin negative influenza COVID-19 testing negative and mild leukocytosis of 12.5. [ZS]   1608 Chest x-ray showed no acute process patient with persistent wheezing and rhonchi.  Troponin negative x 2 influenza COVID-19 testing negative patient had a white blood cell count of 12.5.  Potassium was 5.4 however mildly hemolyzed awaiting repeat potassium EKG did not suggest any hyperkalemia changes.  IV magnesium has been ordered and patient will be admitted to medicine [ZS]      ED Course User Index  [ZS] Mary Richmond MD         Diagnoses as of 03/11/24 1610   COPD exacerbation (CMS/[facility])       Medical Decision Making      Procedure  Procedures     Mary Richmond MD  03/11/24 1610

## 2024-03-11 NOTE — PROGRESS NOTES
Pharmacy Medication History Review    Radha Toussaint is a 68 y.o. female admitted for No Principal Problem: There is no principal problem currently on the Problem List. Please update the Problem List and refresh.. Pharmacy reviewed the patient's [id number] medications and allergies for accuracy.    The list below reflectives the updated PTA list. Please review each medication in order reconciliation for additional clarification and justification.  Prior to Admission medications    Medication Sig Start Date End Date Taking? Authorizing Provider   acetaminophen (Tylenol) 325 mg tablet Take 1 tablet (325 mg) by mouth every 4 hours if needed. 1/23/24  Yes Historical Provider, MD   albuterol 90 mcg/actuation inhaler Inhale 1 puff every 4 hours if needed for shortness of breath. 11/21/23  Yes Francheska Rogel, DO   aspirin 81 mg EC tablet Take 1 tablet (81 mg) by mouth once daily. 9/25/20  Yes Historical Provider, MD   clopidogrel (Plavix) 75 mg tablet Take 1 tablet (75 mg) by mouth once daily.  Patient taking differently: Take 1 tablet (75 mg) by mouth once daily in the morning. 11/7/23  Yes Francheska Rogel DO   ferrous sulfate 325 (65 Fe) MG EC tablet Take 65 mg by mouth once daily. Do not crush, chew, or split.   Yes Historical Provider, MD   fludrocortisone (Florinef) 0.1 mg tablet TAKE 1/2 TABLET BY MOUTH ONCE DAILY  Patient taking differently: Take 0.5 tablets (0.05 mg) by mouth once daily. 1/3/24  Yes Berny Burnham MD   fluticasone-umeclidin-vilanter (Trelegy Ellipta) 200-62.5-25 mcg blister with device Inhale 1 puff once daily. 11/7/23  Yes Francheska Rogel,    HYDROcodone-acetaminophen (Norco)  mg tablet Take 1 tablet by mouth every 8 hours if needed for severe pain (7 - 10). 2/16/24 3/17/24 Yes Berny Burnham MD   hydrocortisone (Cortef) 5 mg tablet STARTING ON 9/8 TAKE 2 TABS AT 8AM THEN TAKE 1 TABLET AT NOON THEN TAKE 1/2 TABLET AT 4PM  Patient taking differently: Take by mouth 2 times a  day. Take 2 tabs (10 mg) in the morning, take 1 tab (5 mg) at noon and 0.5 tab (2.5 mg) at 4 pm 1/3/24 1/2/25 Yes Berny Burnham MD   ipratropium-albuteroL (Duo-Neb) 0.5-2.5 mg/3 mL nebulizer solution Take 3 mL by nebulization 4 times a day.  Patient not taking: Reported on 3/11/2024 8/1/23 7/31/24 Yes Nico Davies, DO   montelukast (Singulair) 10 mg tablet Take 1 tablet (10 mg) by mouth once daily at bedtime. 9/21/23  Yes Francheska Rogel DO   OLANZapine (ZyPREXA) 5 mg tablet Take 1 tablet (5 mg) by mouth once daily at bedtime. 1/29/24 3/11/24 Yes Francheska Rogel,    ondansetron ODT (Zofran-ODT) 4 mg disintegrating tablet DISSOLVE 1 TABLET IN MOUTH BY MOUTH THREE TIMES A DAY AS NEEDED NAUSEA  Patient taking differently: Take 1 tablet (4 mg) by mouth every 8 hours if needed for nausea or vomiting. 9/1/23 8/31/24 Yes Paolo Martin MD   pantoprazole (ProtoNix) 40 mg EC tablet Take 1 tablet (40 mg) by mouth once daily in the morning. Take before meals. Do not crush, chew, or split.   no Jj June MD   rosuvastatin (Crestor) 10 mg tablet Take 2 tablets (20 mg) by mouth once daily. 1/23/24 1/22/25 Yes KERA Nagel-CNP   sennosides (senna) 8.6 mg tablet Take 1 tablet (8.6 mg) by mouth once daily.  Patient taking differently: Take 1 tablet (8.6 mg) by mouth once daily at bedtime. 10/16/23 10/15/24 Yes Francheska Parada MD   sertraline (Zoloft) 50 mg tablet Take 1 tablet (50 mg) by mouth 2 times a day. 3/1/24  Yes Francheska Rogel DO   simethicone (Mylicon) 80 mg chewable tablet Chew 1 tablet (80 mg) every 6 hours if needed. 9/1/23 8/31/24 Yes Historical Provider, MD   methylPREDNISolone (Medrol, Ramiro,) 4 mg tablets Follow schedule on package instructions 2/19/24 3/6/24 no Berny Burnham MD   nicotine (Nicoderm CQ) 21 mg/24 hr patch Place 1 patch over 24 hours on the skin once daily for 41 doses. Do not start before January 25, 2024. 1/25/24 3/6/24 no Adela Chao PA-C        The list below  reflectives the updated allergy list. Please review each documented allergy for additional clarification and justification.  Allergies  Reviewed by Chaitanya Genao RN on 3/11/2024        Severity Reactions Comments    Ace Inhibitors Medium Angioedema     Prednisone Medium Anxiety, Agitation & violent    Demerol [meperidine] Low Nausea/vomiting     Iodinated Contrast Media Low Nausea/vomiting             Below are additional concerns with the patient's PTA list.      Beatrice Bee

## 2024-03-12 ENCOUNTER — TELEPHONE (OUTPATIENT)
Dept: PRIMARY CARE | Facility: CLINIC | Age: 69
End: 2024-03-12
Payer: MEDICARE

## 2024-03-12 LAB
ANION GAP SERPL CALC-SCNC: 14 MMOL/L (ref 10–20)
BUN SERPL-MCNC: 20 MG/DL (ref 6–23)
CALCIUM SERPL-MCNC: 8.7 MG/DL (ref 8.6–10.3)
CHLORIDE SERPL-SCNC: 106 MMOL/L (ref 98–107)
CO2 SERPL-SCNC: 25 MMOL/L (ref 21–32)
CREAT SERPL-MCNC: 0.75 MG/DL (ref 0.5–1.05)
EGFRCR SERPLBLD CKD-EPI 2021: 87 ML/MIN/1.73M*2
ERYTHROCYTE [DISTWIDTH] IN BLOOD BY AUTOMATED COUNT: 18.6 % (ref 11.5–14.5)
EST. AVERAGE GLUCOSE BLD GHB EST-MCNC: 123 MG/DL
GLUCOSE SERPL-MCNC: 83 MG/DL (ref 74–99)
HBA1C MFR BLD: 5.9 %
HCT VFR BLD AUTO: 44.9 % (ref 36–46)
HGB BLD-MCNC: 13.5 G/DL (ref 12–16)
MCH RBC QN AUTO: 26.9 PG (ref 26–34)
MCHC RBC AUTO-ENTMCNC: 30.1 G/DL (ref 32–36)
MCV RBC AUTO: 90 FL (ref 80–100)
NRBC BLD-RTO: 0 /100 WBCS (ref 0–0)
PLATELET # BLD AUTO: 167 X10*3/UL (ref 150–450)
POTASSIUM SERPL-SCNC: 4.8 MMOL/L (ref 3.5–5.3)
RBC # BLD AUTO: 5.01 X10*6/UL (ref 4–5.2)
SODIUM SERPL-SCNC: 140 MMOL/L (ref 136–145)
T4 FREE SERPL-MCNC: 0.83 NG/DL (ref 0.61–1.12)
TSH SERPL-ACNC: 4.41 MIU/L (ref 0.44–3.98)
VIT B12 SERPL-MCNC: 417 PG/ML (ref 211–911)
WBC # BLD AUTO: 8.6 X10*3/UL (ref 4.4–11.3)

## 2024-03-12 PROCEDURE — 2500000002 HC RX 250 W HCPCS SELF ADMINISTERED DRUGS (ALT 637 FOR MEDICARE OP, ALT 636 FOR OP/ED)

## 2024-03-12 PROCEDURE — 80048 BASIC METABOLIC PNL TOTAL CA: CPT

## 2024-03-12 PROCEDURE — S4991 NICOTINE PATCH NONLEGEND: HCPCS

## 2024-03-12 PROCEDURE — 94640 AIRWAY INHALATION TREATMENT: CPT

## 2024-03-12 PROCEDURE — 2500000004 HC RX 250 GENERAL PHARMACY W/ HCPCS (ALT 636 FOR OP/ED)

## 2024-03-12 PROCEDURE — 36415 COLL VENOUS BLD VENIPUNCTURE: CPT | Performed by: NURSE PRACTITIONER

## 2024-03-12 PROCEDURE — 97161 PT EVAL LOW COMPLEX 20 MIN: CPT | Mod: GP

## 2024-03-12 PROCEDURE — 99222 1ST HOSP IP/OBS MODERATE 55: CPT | Performed by: PSYCHIATRY & NEUROLOGY

## 2024-03-12 PROCEDURE — 84443 ASSAY THYROID STIM HORMONE: CPT | Performed by: NURSE PRACTITIONER

## 2024-03-12 PROCEDURE — 82607 VITAMIN B-12: CPT | Mod: PARLAB | Performed by: NURSE PRACTITIONER

## 2024-03-12 PROCEDURE — 84439 ASSAY OF FREE THYROXINE: CPT | Performed by: NURSE PRACTITIONER

## 2024-03-12 PROCEDURE — 85027 COMPLETE CBC AUTOMATED: CPT

## 2024-03-12 PROCEDURE — 2500000002 HC RX 250 W HCPCS SELF ADMINISTERED DRUGS (ALT 637 FOR MEDICARE OP, ALT 636 FOR OP/ED): Performed by: STUDENT IN AN ORGANIZED HEALTH CARE EDUCATION/TRAINING PROGRAM

## 2024-03-12 PROCEDURE — G0378 HOSPITAL OBSERVATION PER HR: HCPCS

## 2024-03-12 PROCEDURE — 2500000001 HC RX 250 WO HCPCS SELF ADMINISTERED DRUGS (ALT 637 FOR MEDICARE OP)

## 2024-03-12 PROCEDURE — 36415 COLL VENOUS BLD VENIPUNCTURE: CPT

## 2024-03-12 PROCEDURE — 97165 OT EVAL LOW COMPLEX 30 MIN: CPT | Mod: GO

## 2024-03-12 PROCEDURE — 83036 HEMOGLOBIN GLYCOSYLATED A1C: CPT | Performed by: NURSE PRACTITIONER

## 2024-03-12 PROCEDURE — 99223 1ST HOSP IP/OBS HIGH 75: CPT | Performed by: STUDENT IN AN ORGANIZED HEALTH CARE EDUCATION/TRAINING PROGRAM

## 2024-03-12 PROCEDURE — 2500000004 HC RX 250 GENERAL PHARMACY W/ HCPCS (ALT 636 FOR OP/ED): Performed by: STUDENT IN AN ORGANIZED HEALTH CARE EDUCATION/TRAINING PROGRAM

## 2024-03-12 RX ORDER — ENOXAPARIN SODIUM 100 MG/ML
40 INJECTION SUBCUTANEOUS EVERY 24 HOURS
Status: DISCONTINUED | OUTPATIENT
Start: 2024-03-12 | End: 2024-03-15 | Stop reason: HOSPADM

## 2024-03-12 RX ORDER — DEXAMETHASONE SODIUM PHOSPHATE 10 MG/ML
4 INJECTION INTRAMUSCULAR; INTRAVENOUS EVERY 8 HOURS
Status: DISCONTINUED | OUTPATIENT
Start: 2024-03-12 | End: 2024-03-15 | Stop reason: HOSPADM

## 2024-03-12 RX ORDER — BUDESONIDE 0.25 MG/2ML
0.25 INHALANT ORAL
Status: DISCONTINUED | OUTPATIENT
Start: 2024-03-12 | End: 2024-03-15 | Stop reason: HOSPADM

## 2024-03-12 RX ADMIN — DEXAMETHASONE SODIUM PHOSPHATE 4 MG: 10 INJECTION, SOLUTION INTRAMUSCULAR; INTRAVENOUS at 14:43

## 2024-03-12 RX ADMIN — AZITHROMYCIN MONOHYDRATE 500 MG: 500 INJECTION, POWDER, LYOPHILIZED, FOR SOLUTION INTRAVENOUS at 22:20

## 2024-03-12 RX ADMIN — FLUTICASONE FUROATE AND VILANTEROL TRIFENATATE 1 PUFF: 200; 25 POWDER RESPIRATORY (INHALATION) at 07:05

## 2024-03-12 RX ADMIN — OLANZAPINE 5 MG: 5 TABLET, FILM COATED ORAL at 21:28

## 2024-03-12 RX ADMIN — ENOXAPARIN SODIUM 40 MG: 40 INJECTION SUBCUTANEOUS at 09:35

## 2024-03-12 RX ADMIN — ALBUTEROL SULFATE 2.5 MG: 2.5 SOLUTION RESPIRATORY (INHALATION) at 19:25

## 2024-03-12 RX ADMIN — NICOTINE 1 PATCH: 21 PATCH, EXTENDED RELEASE TRANSDERMAL at 09:00

## 2024-03-12 RX ADMIN — METHYLPREDNISOLONE SODIUM SUCCINATE 40 MG: 40 INJECTION, POWDER, FOR SOLUTION INTRAMUSCULAR; INTRAVENOUS at 06:13

## 2024-03-12 RX ADMIN — ASPIRIN 81 MG: 81 TABLET, COATED ORAL at 10:53

## 2024-03-12 RX ADMIN — FERROUS SULFATE TAB 325 MG (65 MG ELEMENTAL FE) 1 TABLET: 325 (65 FE) TAB at 10:54

## 2024-03-12 RX ADMIN — DEXAMETHASONE SODIUM PHOSPHATE 4 MG: 10 INJECTION, SOLUTION INTRAMUSCULAR; INTRAVENOUS at 21:30

## 2024-03-12 RX ADMIN — TIOTROPIUM BROMIDE INHALATION SPRAY 2 PUFF: 3.12 SPRAY, METERED RESPIRATORY (INHALATION) at 07:04

## 2024-03-12 RX ADMIN — SENNOSIDES 8.6 MG: 8.6 TABLET, FILM COATED ORAL at 21:28

## 2024-03-12 RX ADMIN — SERTRALINE HYDROCHLORIDE 50 MG: 50 TABLET ORAL at 21:28

## 2024-03-12 RX ADMIN — ALBUTEROL SULFATE 2.5 MG: 2.5 SOLUTION RESPIRATORY (INHALATION) at 06:59

## 2024-03-12 RX ADMIN — MONTELUKAST 10 MG: 10 TABLET, FILM COATED ORAL at 21:29

## 2024-03-12 RX ADMIN — ROSUVASTATIN CALCIUM 20 MG: 10 TABLET, FILM COATED ORAL at 10:53

## 2024-03-12 RX ADMIN — SERTRALINE HYDROCHLORIDE 50 MG: 50 TABLET ORAL at 10:53

## 2024-03-12 RX ADMIN — ALBUTEROL SULFATE 2.5 MG: 2.5 SOLUTION RESPIRATORY (INHALATION) at 11:57

## 2024-03-12 RX ADMIN — HYDROCODONE BITARTRATE AND ACETAMINOPHEN 1 TABLET: 10; 325 TABLET ORAL at 14:52

## 2024-03-12 RX ADMIN — CLOPIDOGREL BISULFATE 75 MG: 75 TABLET ORAL at 10:54

## 2024-03-12 RX ADMIN — BUDESONIDE 0.25 MG: 0.25 INHALANT RESPIRATORY (INHALATION) at 19:25

## 2024-03-12 ASSESSMENT — COGNITIVE AND FUNCTIONAL STATUS - GENERAL
MOBILITY SCORE: 19
HELP NEEDED FOR BATHING: A LOT
HELP NEEDED FOR BATHING: A LOT
DRESSING REGULAR UPPER BODY CLOTHING: A LITTLE
TOILETING: A LITTLE
WALKING IN HOSPITAL ROOM: A LITTLE
CLIMB 3 TO 5 STEPS WITH RAILING: A LOT
WALKING IN HOSPITAL ROOM: A LITTLE
DAILY ACTIVITIY SCORE: 20
TOILETING: A LITTLE
STANDING UP FROM CHAIR USING ARMS: A LITTLE
DAILY ACTIVITIY SCORE: 19
MOBILITY SCORE: 19
MOVING TO AND FROM BED TO CHAIR: A LITTLE
MOVING TO AND FROM BED TO CHAIR: A LITTLE
CLIMB 3 TO 5 STEPS WITH RAILING: A LOT
DRESSING REGULAR UPPER BODY CLOTHING: A LITTLE
STANDING UP FROM CHAIR USING ARMS: A LITTLE
DRESSING REGULAR LOWER BODY CLOTHING: A LITTLE

## 2024-03-12 ASSESSMENT — PAIN SCALES - GENERAL
PAINLEVEL_OUTOF10: 0 - NO PAIN
PAINLEVEL_OUTOF10: 5 - MODERATE PAIN
PAINLEVEL_OUTOF10: 8
PAINLEVEL_OUTOF10: 0 - NO PAIN

## 2024-03-12 ASSESSMENT — PAIN - FUNCTIONAL ASSESSMENT: PAIN_FUNCTIONAL_ASSESSMENT: 0-10

## 2024-03-12 ASSESSMENT — PAIN DESCRIPTION - LOCATION: LOCATION: LEG

## 2024-03-12 NOTE — TELEPHONE ENCOUNTER
Her daughter & son calling in requesting a pulmonologist goes and consults with pt  Call back- [phone] (Son)

## 2024-03-12 NOTE — PROGRESS NOTES
Subjective   Reason for Visit: Radha Toussaint is an 68 y.o. female here for a Medicare Wellness visit.               HPI    Patient Care Team:  Francheska Rogel DO as PCP - General  Nicojaney Davies DO as PCP - [employer] PCP  Francheska Parada MD as Consulting Physician (Hematology and Oncology)  Berny Burnham MD as Consulting Physician (Hematology and Oncology)  KERA Phillips-CNP as Nurse Practitioner (Cardiology)     Review of Systems    Objective   Vitals:  There were no vitals taken for this visit.      Physical Exam    Assessment/Plan   Problem List Items Addressed This Visit    None

## 2024-03-12 NOTE — H&P
History Of Present Illness  Radha Toussaint is a 68 y.o. female  who presents to the ED with abdominal pain with ambulation, shortness of breath, wheezing, and cough. Patient has a history of PAD, Anxiety, CAD, s/p Stents, COPD, chronic hypoxia and uses nasal cannula at home nocturnal, PVD, AAA, Adrenal insufficiency, HLD, Fibroids, MI, HTN, multiple vascular aneurysms, and adrenal CA with metastasis, currently on Keytruda.  Patient reports she came to the ER today with worsening shortness of breath for the past month.  Patient admits to using nebulizers at home with increased frequency.  Patient has chronic cough but has been worse lately, cough has been moist. Patient placed on nasal cannula after O2 was in the low 90's during her ED stay.  Patient admits that she uses oxygen at home when needed and at night. Patient also reports having more frequent headaches for the past 2 weeks which she has tried Tylenol for with temporary relief.  Patient also reports having increased weakness in bilateral legs, reports that there is numbness and tingling with difficulty walking.  Denies any use of any assistive device such as cane or walker.  Patient has been having abdominal “fluttering” with ambulation and increased frequency with urination, denies pain or blood in urine.  Family and patient both reported 10 pound weight gain with good appetite.  Patient denies back pain, fever, blurred vision, nausea, rash, and swelling.    ED Course:     Hemodynamically stable,  Vitals: Temp. 37, HR 76 , Resp.16 , /91, Pulse ox 95  Labs: glucose 91, Bun 14, creat. 0.54 , GFR >90 , Calcium 8.9, Phos, Albumin 3.9, ALT 13, AST 13 , BNP, Trop. 12x2, Hgb. 15.3, HCT 48.7 , k+4.3, TSH 91, Na+ 135, WBC 12.5, VBG (Co2 59, p02 30, So2 44) Ionized Ca+ 1.22, Lac. 0.8    Medications: albuterol, Duo-neb, mag sul. 2g, Zoloft 50mg.  Imaging: see result below   EKG: Not available for my review     Past Medical History  See above  Past Medical  History:   Diagnosis Date    Aneurysm of carotid artery (CMS/[facility])     Neck aneurysm    DVT (deep venous thrombosis) (CMS/[facility])     2022    History of tubal ligation     Old myocardial infarction     History of heart attack    Other specified disorders of kidney and ureter     Obstruction of kidney    Personal history of other diseases of the circulatory system     History of intracranial aneurysm    Personal history of other diseases of the circulatory system     History of abdominal aortic aneurysm (AAA)    Personal history of other diseases of the respiratory system     History of chronic obstructive lung disease    Personal history of urinary calculi     History of kidney stones    Right upper quadrant pain 09/25/2020    Abdominal pain, RUQ (right upper quadrant)       Surgical History  Triple A graft repair  Left popliteal reconstruction  Past Surgical History:   Procedure Laterality Date    CT ABDOMEN PELVIS ANGIOGRAM W AND/OR WO IV CONTRAST  11/21/2019    CT ABDOMEN PELVIS ANGIOGRAM W AND/OR WO IV CONTRAST 11/21/2019 [facility] EMERGENCY LEGACY    CT AORTA AND BILATERAL ILIOFEMORAL RUNOFF ANGIOGRAM W AND/OR WO IV CONTRAST  8/9/2022    CT AORTA AND BILATERAL ILIOFEMORAL RUNOFF ANGIOGRAM W AND/OR WO IV CONTRAST 8/9/2022 [facility] CLINICAL LEGACY    CT HEAD ANGIO W AND WO IV CONTRAST  5/22/2020    CT HEAD ANGIO W AND WO IV CONTRAST 5/22/2020 [facility]    INVASIVE VASCULAR PROCEDURE N/A 1/23/2024    Procedure: Lower Extremity Angiogram;  Surgeon: Willam Mike MD;  Location: [place] [address] Cardiac Cath Lab;  Service: Cardiovascular;  Laterality: N/A;  [id number];L[facility] CLTI    INVASIVE VASCULAR PROCEDURE N/A 1/23/2024    Procedure: Lower Extremity Intervention;  Surgeon: Willam Mike MD;  Location: [place] [address] Cardiac Cath Lab;  Service: Cardiovascular;  Laterality: N/A;    OTHER SURGICAL HISTORY  09/30/2020    Tubal ligation    OTHER SURGICAL HISTORY  09/30/2020    Cardiac catheterization        Social History  Patient admits to  smoking currently 1 1/2ppd. This is down from over 3ppd.     Family History  Family History   Problem Relation Name Age of Onset    Aneurysm Mother      Hypertension Mother      Heart attack Father          Allergies  Ace inhibitors, Prednisone, Demerol [meperidine], and Iodinated contrast media    Review of Systems     Physical Exam  Constitutional:       General: She is not in acute distress.     Appearance: Normal appearance. She is normal weight. She is not toxic-appearing.   HENT:      Head: Normocephalic and atraumatic.      Nose: Congestion present. No rhinorrhea.      Mouth/Throat:      Mouth: Mucous membranes are moist.   Eyes:      General:         Right eye: No discharge.         Left eye: No discharge.      Conjunctiva/sclera: Conjunctivae normal.      Pupils: Pupils are equal, round, and reactive to light.   Cardiovascular:      Rate and Rhythm: Normal rate and regular rhythm.      Pulses: Normal pulses.   Pulmonary:      Effort: Tachypnea present. No respiratory distress.      Breath sounds: Examination of the right-lower field reveals decreased breath sounds. Examination of the left-lower field reveals decreased breath sounds. Decreased breath sounds and rhonchi present.   Abdominal:      General: Bowel sounds are normal. There is no distension.      Palpations: Abdomen is soft.      Tenderness: There is no abdominal tenderness. There is no guarding.   Musculoskeletal:         General: Normal range of motion.      Cervical back: Normal range of motion and neck supple.      Right lower leg: No edema.      Left lower leg: No edema.   Skin:     General: Skin is warm and dry.      Findings: Bruising present.   Neurological:      General: No focal deficit present.      Mental Status: She is alert and oriented to person, place, and time. Mental status is at baseline.      Motor: Weakness present.   Psychiatric:         Mood and Affect: Mood normal.         Behavior: Behavior normal.          Last Recorded  "Vitals  Blood pressure 153/73, pulse 66, temperature 35.9 °C (96.6 °F), temperature source Temporal, resp. rate 18, height 1.626 m (5' 4\"), weight 63.5 kg (140 lb), SpO2 97 %.    Relevant Results  XR chest 1 view    Result Date: 3/11/2024  Interpreted By:  Kimani Brunner, STUDY: XR CHEST 1 VIEW   INDICATION: Signs/Symptoms:cough.   COMPARISON: None   ACCESSION NUMBER(S): [id number]   ORDERING CLINICIAN: MARIANNE HOROWITZ   FINDINGS: No consolidation, effusion, edema, or pneumothorax.   Heart size within normal limits.         No evidence of acute intrathoracic abnormality.   Signed by: Kimani Brunner 3/11/2024 1:26 PM Dictation workstation:   [id number]      Results for orders placed or performed during the hospital encounter of 03/11/24 (from the past 24 hour(s))   Blood Gas, Venous Full Panel   Result Value Ref Range    POCT pH, Venous 7.37 7.33 - 7.43 pH    POCT pCO2, Venous 59 (H) 41 - 51 mm Hg    POCT pO2, Venous 30 (L) 35 - 45 mm Hg    POCT SO2, Venous 44 (L) 45 - 75 %    POCT Oxy Hemoglobin, Venous 40.9 (L) 45.0 - 75.0 %    POCT Hematocrit Calculated, Venous 47.0 (H) 36.0 - 46.0 %    POCT Sodium, Venous 135 (L) 136 - 145 mmol/L    POCT Potassium, Venous 4.5 3.5 - 5.3 mmol/L    POCT Chloride, Venous 101 98 - 107 mmol/L    POCT Ionized Calicum, Venous 1.22 1.10 - 1.33 mmol/L    POCT Glucose, Venous 106 (H) 74 - 99 mg/dL    POCT Lactate, Venous 0.8 0.4 - 2.0 mmol/L    POCT Base Excess, Venous 6.6 (H) -2.0 - 3.0 mmol/L    POCT HCO3 Calculated, Venous 34.1 (H) 22.0 - 26.0 mmol/L    POCT Hemoglobin, Venous 15.7 12.0 - 16.0 g/dL    POCT Anion Gap, Venous 4.0 (L) 10.0 - 25.0 mmol/L    Patient Temperature 37.0 degrees Celsius    FiO2 32 %   CBC and Auto Differential   Result Value Ref Range    WBC 12.5 (H) 4.4 - 11.3 x10*3/uL    nRBC 0.0 0.0 - 0.0 /100 WBCs    RBC 5.64 (H) 4.00 - 5.20 x10*6/uL    Hemoglobin 15.3 12.0 - 16.0 g/dL    Hematocrit 48.7 (H) 36.0 - 46.0 %    MCV 86 80 - 100 fL    MCH 27.1 26.0 - 34.0 pg    MCHC 31.4 " (L) 32.0 - 36.0 g/dL    RDW 19.2 (H) 11.5 - 14.5 %    Platelets 207 150 - 450 x10*3/uL    Neutrophils % 73.4 40.0 - 80.0 %    Immature Granulocytes %, Automated 0.4 0.0 - 0.9 %    Lymphocytes % 17.5 13.0 - 44.0 %    Monocytes % 6.5 2.0 - 10.0 %    Eosinophils % 1.6 0.0 - 6.0 %    Basophils % 0.6 0.0 - 2.0 %    Neutrophils Absolute 9.21 (H) 1.20 - 7.70 x10*3/uL    Immature Granulocytes Absolute, Automated 0.05 0.00 - 0.70 x10*3/uL    Lymphocytes Absolute 2.19 1.20 - 4.80 x10*3/uL    Monocytes Absolute 0.82 0.10 - 1.00 x10*3/uL    Eosinophils Absolute 0.20 0.00 - 0.70 x10*3/uL    Basophils Absolute 0.07 0.00 - 0.10 x10*3/uL   Comprehensive metabolic panel   Result Value Ref Range    Glucose 91 74 - 99 mg/dL    Sodium 135 (L) 136 - 145 mmol/L    Potassium 5.4 (H) 3.5 - 5.3 mmol/L    Chloride 101 98 - 107 mmol/L    Bicarbonate 30 21 - 32 mmol/L    Anion Gap 9 (L) 10 - 20 mmol/L    Urea Nitrogen 14 6 - 23 mg/dL    Creatinine 0.54 0.50 - 1.05 mg/dL    eGFR >90 >60 mL/min/1.73m*2    Calcium 8.9 8.6 - 10.3 mg/dL    Albumin 3.9 3.4 - 5.0 g/dL    Alkaline Phosphatase 66 33 - 136 U/L    Total Protein 6.9 6.4 - 8.2 g/dL    AST 26 9 - 39 U/L    Bilirubin, Total 0.5 0.0 - 1.2 mg/dL    ALT 13 7 - 45 U/L   Troponin I, High Sensitivity, Initial   Result Value Ref Range    Troponin I, High Sensitivity 12 0 - 13 ng/L   Sars-CoV-2 and Influenza A/B PCR   Result Value Ref Range    Flu A Result Not Detected Not Detected    Flu B Result Not Detected Not Detected    Coronavirus 2019, PCR Not Detected Not Detected   Troponin, High Sensitivity, 1 Hour   Result Value Ref Range    Troponin I, High Sensitivity 12 0 - 13 ng/L   Potassium   Result Value Ref Range    Potassium 4.3 3.5 - 5.3 mmol/L   CBC   Result Value Ref Range    WBC 8.6 4.4 - 11.3 x10*3/uL    nRBC 0.0 0.0 - 0.0 /100 WBCs    RBC 5.01 4.00 - 5.20 x10*6/uL    Hemoglobin 13.5 12.0 - 16.0 g/dL    Hematocrit 44.9 36.0 - 46.0 %    MCV 90 80 - 100 fL    MCH 26.9 26.0 - 34.0 pg    MCHC  30.1 (L) 32.0 - 36.0 g/dL    RDW 18.6 (H) 11.5 - 14.5 %    Platelets 167 150 - 450 x10*3/uL   Basic metabolic panel   Result Value Ref Range    Glucose 83 74 - 99 mg/dL    Sodium 140 136 - 145 mmol/L    Potassium 4.8 3.5 - 5.3 mmol/L    Chloride 106 98 - 107 mmol/L    Bicarbonate 25 21 - 32 mmol/L    Anion Gap 14 10 - 20 mmol/L    Urea Nitrogen 20 6 - 23 mg/dL    Creatinine 0.75 0.50 - 1.05 mg/dL    eGFR 87 >60 mL/min/1.73m*2    Calcium 8.7 8.6 - 10.3 mg/dL       ROS  10 point ROS systems completed and is negative except for what is stated in HPI.    Assessment/Plan     #COPD Exacerbation  #SOB  -Chest x-ray negative for effusion, edema, pneumothorax  -CT Chest negative for PE, shows emphysema, unchanged pulmonary nodules  - Flu/covid+ test negative  -pulmonary c/s  -AM labs  -PRN albuterol   -titrate oxygen as needed to keep stats above 90  -solu-medrol  -Azithromycin  #Headache  - Fluids  -Analgesics PRN  -Reglan  -Toradol  -UA with cx  -Neurology c/s  #Weakness  #Paresthesias   -PT  -OT    Chronic Conditions  #HLD  #MI  #HTN  #PVD  #CAD  #Anxiety  #Adrenal CA  Continue home medications as ordered,    Full code    #DVT Prophylaxis  b/l Scd's as tolerated  On anticoagulant plavix      3/12: doing well, appear near baseline, will treat today likely home tomorrow    I spent 45 minutes in the professional and overall care of this patient.      Nico Davies, DO

## 2024-03-12 NOTE — CONSULTS
“Wound” Ostomy Consultation        1. Chief Complaint: wound check and evaluation   2. History of Present Illness:   left 5th toes dry scab   3. Past Medical History: Reviewed   4. Past Surgical History: Reviewed  5. Allergies:     Reviewed  6. Social/Family History: Reviewed  7. Medications:  Reviewed  8. Labs/Data/Test Results: Reviewed   9. Progress Notes:  Reviewed     10. System Review: system review was performed with these findings:   Integumentary: Will be described below    11.  Physical Examination:   Integumentary: Normal skin color, texture, and turgor, No dry/scaly/cracked skin; No ecchymotic areas; No friable skin; No rash/lesions/excoriation/burns; No diaphoresis; No jaundice, mireya, pallor skin; 5th toe dry tissue - hydrated - revealed a dry red healthy scab     Assessment/Plan/Treatment Recommendations:      Left 5th toe, dry red scab, apply critic aid barrier ointment daily     Education provided; Treatment demonstrated; Questions answered  D/W RN / MD  Orders received     I have spent 15 minutes with the patient for physical and wound assessment, wound cleaning, dressing demonstration and answering questions. More than 50% of the time spent with the patient /  included education/counselling/coordination of care.     Tisha Walton RN WOCN

## 2024-03-12 NOTE — PROGRESS NOTES
Occupational Therapy    Occupational Therapy    Evaluation    Patient Name: Radha Toussaint  MRN: [id number]  Today's Date: 3/12/2024  Time Calculation  Start Time: 1030  Stop Time: 1048  Time Calculation (min): 18 min    Assessment  IP OT Assessment  OT Assessment: Patient would benefit from further OT to address ADL's and functional transfers/mobility due to decline in baseline function.  Hospital admit 3/11/2024 due to SOB, wheezing, cough; COPD exac  Prognosis: Good  Evaluation/Treatment Tolerance: Patient limited by fatigue  End of Session Communication: Bedside nurse  End of Session Patient Position: Bed, 2 rail up, Alarm off, not on at start of session (sitting EOB)    Plan:  Treatment Interventions: ADL retraining, Functional transfer training, Endurance training, Compensatory technique education, Patient/family training, Equipment evaluation/education  OT Frequency: 3 times per week  OT Discharge Recommendations: Low intensity level of continued care (with 24 hour support/supervision)  OT - OK to Discharge: Yes (to next level of care when medically cleared by physician/medical team)    Subjective     Current Problem:  1. COPD exacerbation (CMS/[facility])          General:  General  Reason for Referral: ADL, safety assessment  Referred By: SHRUTHI Tapia  Family/Caregiver Present: Yes ()  Prior to Session Communication: Bedside nurse  Patient Position Received: Bed, 2 rail up, Alarm off, not on at start of session  General Comment: Patient seen in room 338; cooperative, motivated    Precautions:  Medical Precautions: Fall precautions, Oxygen therapy device and L/min  Precautions Comment: h/o falls:  3 within one year    Pain:  Pain Assessment  Pain Assessment: 0-10  Pain Score: 0 - No pain    Objective   Cognition:  Overall Cognitive Status: Within Functional Limits     Home Living:  Type of Home: House  Lives With: Spouse (daughter, son-in-law, 3 grandchildren:  ages 8,10,20 (away for  college))  Home Adaptive Equipment: Walker rolling or standard, Wheelchair-manual, Scooter, Hospital bed  Home Layout: One level  Home Access: Stairs to enter with rails (3)  Bathroom Shower/Tub: Walk-in shower  Bathroom Toilet: Standard  Bathroom Equipment: Bedside commode, Hand-held shower hose (bath seat)     Prior Function:  Level of [place]: Independent with ADLs and functional transfers, Independent with homemaking with ambulation (however fluctuates:  this last week most decline in function with  primarily doing homemaking, also typically does driving, shopping)  Ambulatory Assistance: Independent  Hand Dominance: Right    Current ADL:  LE Dressing Assistance: Moderate (anticipate)  LE Dressing Deficit: Don/doff R sock, Don/doff L sock (sitting EOB)  ADL Comments: Would benefit from further addressing in OT sessions with use of ADL adaptive equipment/assistive techniques as needed to facilitate indep and ease in performance.    Activity Tolerance:  Activity Tolerance Comments: practiced proper breathing techniques during rest breaks which patient reported familiarity since using o2 for few years    Bed Mobility/Transfers:   Bed Mobility  Bed Mobility: Yes  Bed Mobility 1  Bed Mobility 1: Supine to sitting  Level of Assistance 1: Independent  Bed Mobility Comments 1: HOB elevated  Transfers  Transfer: Yes  Transfer 1  Transfer From 1: Sit to, Stand to  Transfer to 1: Bed  Transfer Device 1: Walker  Transfer Level of Assistance 1: Contact guard, Minimal verbal cues  Trials/Comments 1: easily SOB; required rest break during walk    Ambulation/Gait Training:  Functional Mobility  Functional Mobility Performed: Yes  Functional Mobility 1  Device 1: Rolling walker  Assistance 1: Contact guard    Sitting Balance:  Static Sitting Balance  Static Sitting-Level of Assistance: Independent    Standing Balance:  Static Standing Balance  Static Standing-Level of Assistance: Contact guard    Sensation:  Light  Touch:  (intermittent numbness/tingling at hands, feet from neuropathy per patient/)     Extremities: RUE   RUE : Within Functional Limits (grossly observed during session) and LUE   LUE: Within Functional Limits (grossly observed during session)    Outcome Measures: [facility] Daily Activity  Putting on and taking off regular lower body clothing: A little  Bathing (including washing, rinsing, drying): A lot  Putting on and taking off regular upper body clothing: A little  Toileting, which includes using toilet, bedpan or urinal: A little  Taking care of personal grooming such as brushing teeth: None  Eating Meals: None  Daily Activity - Total Score: 19     Goals:   Encounter Problems       Encounter Problems (Active)       OT Goals       Patient with complete lower body bathing/dressing and toileting with modified independence using adaptive equipment as needed  (Progressing)       Start:  03/12/24    Expected End:  03/19/24            Patient will perform bed mobility and functional transfers safely and independently: bed, chair, commode using DME as needed  (Progressing)       Start:  03/12/24    Expected End:  03/19/24            Patient will tolerate standing for 8 mins. and show good  standing balance during ADL's and functional transfers/mobility  (Progressing)       Start:  03/12/24    Expected End:  03/19/24            Patient will apply energy conservation/diaphragmatic breathing techniques to ADL's and functional transfers with minimal cues  (Progressing)       Start:  03/12/24    Expected End:  03/19/24

## 2024-03-12 NOTE — CONSULTS
Inpatient consult to Neurology  Consult performed by: Francheska Holley MD  Consult ordered by: Myriam Tang, KERA-CNP      History Of Present Illness  Radha Toussaint is a 68 y.o. female admitted for COPD exacerbation as well as abdominal pain.  Neurology is consulted for numbness and tingling in the hands and feet when walking.  This has been going on for about 2 weeks and occurs in episodes along with only walking.  Currently in bed she has no numbness or tingling in the hands or feet.  Patient has had vascular issues in her left leg as well as surgery for her peripheral artery disease.  She continues to smoke although she is cutting down.  She has a past history of PAD, Anxiety, CAD, s/p Stents, COPD, chronic hypoxia and uses nasal cannula at home nocturnal, PVD, AAA, Adrenal insufficiency, HLD, Fibroids, MI, HTN, multiple vascular aneurysms, and adrenal CA with metastasis, taking Keytruda.  Patient denies any recent illnesses.  She denies any fevers.  She has had some weakness of her walking but overall she feels fatigue when walking about 10-12 steps.  She has a chronic cough and worsening of her breathing issues.       In the ER she received albuterol, Duo-neb, mag sul. 2g, Zoloft 50mg.    Past Medical History  Past Medical History:   Diagnosis Date    Aneurysm of carotid artery (CMS/[facility])     Neck aneurysm    DVT (deep venous thrombosis) (CMS/[facility])     2022    History of tubal ligation     Old myocardial infarction     History of heart attack    Other specified disorders of kidney and ureter     Obstruction of kidney    Personal history of other diseases of the circulatory system     History of intracranial aneurysm    Personal history of other diseases of the circulatory system     History of abdominal aortic aneurysm (AAA)    Personal history of other diseases of the respiratory system     History of chronic obstructive lung disease    Personal history of urinary calculi     History of kidney stones     Right upper quadrant pain 09/25/2020    Abdominal pain, RUQ (right upper quadrant)     Surgical History  Past Surgical History:   Procedure Laterality Date    CT ABDOMEN PELVIS ANGIOGRAM W AND/OR WO IV CONTRAST  11/21/2019    CT ABDOMEN PELVIS ANGIOGRAM W AND/OR WO IV CONTRAST 11/21/2019 [facility]    CT AORTA AND BILATERAL ILIOFEMORAL RUNOFF ANGIOGRAM W AND/OR WO IV CONTRAST  8/9/2022    CT AORTA AND BILATERAL ILIOFEMORAL RUNOFF ANGIOGRAM W AND/OR WO IV CONTRAST 8/9/2022 [facility]    CT HEAD ANGIO W AND WO IV CONTRAST  5/22/2020    CT HEAD ANGIO W AND WO IV CONTRAST 5/22/2020 [facility]    INVASIVE VASCULAR PROCEDURE N/A 1/23/2024    Procedure: Lower Extremity Angiogram;  Surgeon: Willam Mike MD;  Location: [place] [address] Cardiac Cath Lab;  Service: Cardiovascular;  Laterality: N/A;  10077;[facility] CLTI    INVASIVE VASCULAR PROCEDURE N/A 1/23/2024    Procedure: Lower Extremity Intervention;  Surgeon: Willam Mike MD;  Location: [place] [address] Cardiac Cath Lab;  Service: Cardiovascular;  Laterality: N/A;    OTHER SURGICAL HISTORY  09/30/2020    Tubal ligation    OTHER SURGICAL HISTORY  09/30/2020    Cardiac catheterization     Social History  Social History     Tobacco Use    Smoking status: Every Day     Packs/day: 1     Types: Cigarettes    Smokeless tobacco: Never     Allergies  Ace inhibitors, Prednisone, Demerol [meperidine], and Iodinated contrast media  Medications Prior to Admission   Medication Sig Dispense Refill Last Dose    acetaminophen (Tylenol) 325 mg tablet Take 1 tablet (325 mg) by mouth every 4 hours if needed.   Unknown    albuterol 90 mcg/actuation inhaler Inhale 1 puff every 4 hours if needed for shortness of breath. 18 g 3 3/11/2024    aspirin 81 mg EC tablet Take 1 tablet (81 mg) by mouth once daily.   3/11/2024    clopidogrel (Plavix) 75 mg tablet Take 1 tablet (75 mg) by mouth once daily. (Patient taking differently: Take 1 tablet (75 mg) by mouth once daily in the morning.)  90 tablet 1 3/11/2024    ferrous sulfate 325 (65 Fe) MG EC tablet Take 65 mg by mouth once daily. Do not crush, chew, or split.   3/11/2024    fludrocortisone (Florinef) 0.1 mg tablet TAKE 1/2 TABLET BY MOUTH ONCE DAILY (Patient taking differently: Take 0.5 tablets (0.05 mg) by mouth once daily.) 30 tablet 3 3/11/2024    fluticasone-umeclidin-vilanter (Trelegy Ellipta) 200-62.5-25 mcg blister with device Inhale 1 puff once daily. 60 each 3 3/11/2024    HYDROcodone-acetaminophen (Norco)  mg tablet Take 1 tablet by mouth every 8 hours if needed for severe pain (7 - 10). 60 tablet 0 3/11/2024    hydrocortisone (Cortef) 5 mg tablet STARTING ON 9/8 TAKE 2 TABS AT 8AM THEN TAKE 1 TABLET AT NOON THEN TAKE 1/2 TABLET AT 4PM (Patient taking differently: Take by mouth 2 times a day. Take 2 tabs (10 mg) in the morning, take 1 tab (5 mg) at noon and 0.5 tab (2.5 mg) at 4 pm) 180 tablet 2 3/11/2024    ipratropium-albuteroL (Duo-Neb) 0.5-2.5 mg/3 mL nebulizer solution Take 3 mL by nebulization 4 times a day. (Patient not taking: Reported on 3/11/2024) 360 mL 11 Past Week    montelukast (Singulair) 10 mg tablet Take 1 tablet (10 mg) by mouth once daily at bedtime. 90 tablet 1 3/10/2024    OLANZapine (ZyPREXA) 5 mg tablet Take 1 tablet (5 mg) by mouth once daily at bedtime. 30 tablet 1 3/10/2024    ondansetron ODT (Zofran-ODT) 4 mg disintegrating tablet DISSOLVE 1 TABLET IN MOUTH BY MOUTH THREE TIMES A DAY AS NEEDED NAUSEA (Patient taking differently: Take 1 tablet (4 mg) by mouth every 8 hours if needed for nausea or vomiting.) 90 tablet 0 3/11/2024    rosuvastatin (Crestor) 10 mg tablet Take 2 tablets (20 mg) by mouth once daily. 180 tablet 3 3/11/2024    sennosides (senna) 8.6 mg tablet Take 1 tablet (8.6 mg) by mouth once daily. (Patient taking differently: Take 1 tablet (8.6 mg) by mouth once daily at bedtime.) 30 tablet 11 3/10/2024    sertraline (Zoloft) 50 mg tablet Take 1 tablet (50 mg) by mouth 2 times a day. 180  "tablet 1 3/11/2024 at pm    simethicone (Mylicon) 80 mg chewable tablet Chew 1 tablet (80 mg) every 6 hours if needed.   3/10/2024       Review of Systems  Neurological Exam  General Exam:    Appearance:  no acute distress, cooperative.  HEENT: normocephalic /atraumatic.  Cardiovascular/Lungs/Abdomen: No carotid bruits to auscultation bilaterally, heart is regular in rate and rhythm.  Extremities/Skin: no peripheral edema.  She does have a purplish reddish hue to her hands and feet distally.  Pulses are slightly diminished in the dorsalis pedis bilaterally.    NEUROLOGICAL EXAMINATION:    Mental status:  alert and oriented to person, place, date and situation.  remote memory and long term memory intact, fund of knowledge intact, attention and concentration intact.    Cranial nerves:    II/III: Fundoscopic examination was technically difficult. Visual fields are full. Pupils are 2 mm and reactive bilaterally.  III/IV/VI: Extraocular movements are full with no nystagmus.   V: Facial sensation is intact to light touch.  VII: Face is symmetric.  VIII: hearing is intact bilaterally.  IX/X: Palate elevates symmetrically to phonation.  XI: Sternocleidomastoid is MRC 5/5 to strength testing.  XII: Tongue is midline.    Motor exam: Strength is MRC 5/5 throughout. tone is normal.    Sensory exam: Sensation is intact to light touch throughout.  Proprioception intact in the lower extremities.    Reflexes: Reflexes are 1+ and symmetric. Bilateral plantar responses are flexor.    Coordination: intact    Gait exam: Wide-based gait    Physical Exam  Last Recorded Vitals  Blood pressure 115/58, pulse 86, temperature 36.2 °C (97.2 °F), resp. rate 16, height 1.626 m (5' 4\"), weight 63.5 kg (140 lb), SpO2 97 %.    Relevant Results  Scheduled medications  albuterol, 2.5 mg, nebulization, TID  aspirin, 81 mg, oral, Daily  azithromycin, 500 mg, intravenous, q24h  budesonide, 0.25 mg, nebulization, BID  clopidogrel, 75 mg, oral, q " AM  dexAMETHasone, 4 mg, intravenous, q8h  enoxaparin, 40 mg, subcutaneous, q24h  ferrous sulfate (325 mg ferrous sulfate), 65 mg of iron, oral, Daily  tiotropium, 2 puff, inhalation, Daily   And  fluticasone furoate-vilanteroL, 1 puff, inhalation, Daily  montelukast, 10 mg, oral, Nightly  nicotine, 1 patch, transdermal, Daily   Followed by  [START ON 4/23/2024] nicotine, 1 patch, transdermal, Daily   Followed by  [START ON 5/7/2024] nicotine, 1 patch, transdermal, Daily  OLANZapine, 5 mg, oral, Nightly  rosuvastatin, 20 mg, oral, Daily  sennosides, 1 tablet, oral, Nightly  sertraline, 50 mg, oral, BID      Continuous medications     PRN medications  PRN medications: acetaminophen, albuterol, HYDROcodone-acetaminophen, ondansetron, oxygen, polyethylene glycol, simethicone    Results for orders placed or performed during the hospital encounter of 03/11/24 (from the past 24 hour(s))   CBC   Result Value Ref Range    WBC 8.6 4.4 - 11.3 x10*3/uL    nRBC 0.0 0.0 - 0.0 /100 WBCs    RBC 5.01 4.00 - 5.20 x10*6/uL    Hemoglobin 13.5 12.0 - 16.0 g/dL    Hematocrit 44.9 36.0 - 46.0 %    MCV 90 80 - 100 fL    MCH 26.9 26.0 - 34.0 pg    MCHC 30.1 (L) 32.0 - 36.0 g/dL    RDW 18.6 (H) 11.5 - 14.5 %    Platelets 167 150 - 450 x10*3/uL   Basic metabolic panel   Result Value Ref Range    Glucose 83 74 - 99 mg/dL    Sodium 140 136 - 145 mmol/L    Potassium 4.8 3.5 - 5.3 mmol/L    Chloride 106 98 - 107 mmol/L    Bicarbonate 25 21 - 32 mmol/L    Anion Gap 14 10 - 20 mmol/L    Urea Nitrogen 20 6 - 23 mg/dL    Creatinine 0.75 0.50 - 1.05 mg/dL    eGFR 87 >60 mL/min/1.73m*2    Calcium 8.7 8.6 - 10.3 mg/dL   TSH with reflex to Free T4 if abnormal   Result Value Ref Range    Thyroid Stimulating Hormone 4.41 (H) 0.44 - 3.98 mIU/L   Hemoglobin A1C   Result Value Ref Range    Hemoglobin A1C 5.9 (H) see below %    Estimated Average Glucose 123 Not Established mg/dL   Thyroxine, Free   Result Value Ref Range    Thyroxine, Free 0.83 0.61 - 1.12  ng/dL                     I have personally reviewed the following imaging results ECG 12 lead    Result Date: 3/12/2024   Poor data quality, interpretation may be adversely affected Normal sinus rhythm with sinus arrhythmia Cannot rule out Inferior infarct , age undetermined Abnormal ECG When compared with ECG of 23-JAN-2024 18:45, Minimal criteria for Inferior infarct are now Present T wave inversion no longer evident in Anterior leads    XR chest 1 view    Result Date: 3/11/2024  Interpreted By:  Kimani Brunner, STUDY: XR CHEST 1 VIEW   INDICATION: Signs/Symptoms:cough.   COMPARISON: None   ACCESSION NUMBER(S): [id number]   ORDERING CLINICIAN: MARIANNE HOROWITZ   FINDINGS: No consolidation, effusion, edema, or pneumothorax.   Heart size within normal limits.         No evidence of acute intrathoracic abnormality.   Signed by: Kimani Brunner 3/11/2024 1:26 PM Dictation workstation:   [id number]    Vascular US ankle brachial index (TRAVIS) without exercise    Result Date: 3/6/2024           [place] [address] Tel [phone] and Fax [phone]  Vascular Lab Report [facility] US ANKLE BRACHIAL INDEX (TRAVIS) WITHOUT EXERCISE  Patient Name:      AALIYAHNURIA SAWANT     Reading Physician:  [id number] Matthew Warner MD Study Date:        3/6/2024            Ordering Physician: [id number] HEIDI MORALES MRN/PID:           [id number]            Technologist:       Darlyn Gamino RVT Accession#:        [id number]        Technologist 2: Date of Birth/Age: 1955 / 68 years Encounter#:         [id number] Gender:            F Admission Status:  Outpatient          Location Performed: [facility]  Diagnosis/ICD: Atherosclerosis of autologous vein bypass graft(s) of the left                leg with ulceration of other part of foot-I70.445;                Atherosclerosis of native arteries of left leg with ulceration of                other part of foot-I70.245 Indication:    Atherosclerosis of native  arteries-extremities w/ulceration CPT Codes:     90689 Peripheral artery TRAVIS Only  CONCLUSIONS: Right Lower PVR: No evidence of arterial occlusive disease in the right lower extremity at rest. Decreased digital perfusion noted. Triphasic flow is noted in the right common femoral artery, right posterior tibial artery and right dorsalis pedis artery. Left Lower PVR: No evidence of arterial occlusive disease in the left lower extremity at rest. Decreased digital perfusion noted. Triphasic flow is noted in the left common femoral artery, left posterior tibial artery and left dorsalis pedis artery.  Comparison: Compared with study from 1/17/2024, Significant improvement to the LLE following intervention on 01-.  Imaging & Doppler Findings:  RIGHT Lower PVR                Pressures Ratios Right Posterior Tibial (Ankle) 152 mmHg  1.14 Right Dorsalis Pedis (Ankle)   146 mmHg  1.10 Right Digit (Great Toe)        80 mmHg   0.60   LEFT Lower PVR                Pressures Ratios Left Posterior Tibial (Ankle) 143 mmHg  1.08 Left Dorsalis Pedis (Ankle)   136 mmHg  1.02 Left Digit (Great Toe)        69 mmHg   0.52                     Right     Left Brachial Pressure 132 mmHg 133 mmHg   [id number] Matthew Warner MD Electronically signed by [id number] Matthew Warner MD on 3/6/2024 at 10:58:26 AM  ** Final **     CT angio chest for pulmonary embolism    Result Date: 3/4/2024  Interpreted By:  Irma Rodriguez, STUDY: CT ANGIO CHEST FOR PULMONARY EMBOLISM;  3/4/2024 3:19 pm   INDICATION: Signs/Symptoms:Metastatic lung cancer.  On immunotherapy with Keytruda.  Acute shortness of breath.  Need to rule out PE and pneumonitis..   COMPARISON: 12/13/2023   ACCESSION NUMBER(S): [id number]   ORDERING CLINICIAN: ADARSH LARA   TECHNIQUE: Helical data acquisition of the chest was obtained contrast volume:without IV contrast material/Optiray 350/Isovue 370.  Images were reformatted in coronal and sagittal planes. Axial and coronal MIP images were  created and reviewed.   FINDINGS: POTENTIAL LIMITATIONS OF THE STUDY: None   HEART AND VESSELS: No discrete filling defects within the main pulmonary artery or its branches.   Main pulmonary artery is dilated at 3.3 cm and is unchanged.   Ascending thoracic aorta is normal in size. Atherosclerotic calcification is noted at the aortic arch. In the proximal descending aorta there is noncalcified plaque with an area of mild dilatation measuring 3.1 x 4.0 cm, slightly increased compared to the prior study.   Patchy coronary artery calcifications are seen.The study is not optimized for evaluation of coronary arteries.   The cardiac chambers are not enlarged.   No evidence of pericardial effusion.   MEDIASTINUM AND TRINA, LOWER NECK AND AXILLA: 1 cm hypodense nodule in the right thyroid lobe is unchanged.   No evidence of thoracic lymphadenopathy by CT criteria.   Esophagus appears within normal limits as seen.   LUNGS AND AIRWAYS: The trachea and central airways are patent. No endobronchial lesion.   Lungs are emphysematous. Multiple bilateral pulmonary nodules appear unchanged, largest is pulmonary based posterior right lower lobe and measures 9 mm (image 145 of 318). No new or enlarging nodules are identified. There is no focal pneumonia. No pleural effusion is identified.   UPPER ABDOMEN: Upper pole stone in the left kidney is partially visualized and measures up to 1.2 cm. The proximal portion of an aortic stent graft is visible, with the abdominal aorta just above the graft measuring 3.9 cm, unchanged. Bilateral adrenal masses are unchanged, 5 cm on the right and 3.6 cm on the left.   CHEST WALL AND OSSEOUS STRUCTURES: There are no suspicious osseous lesions. Multilevel degenerative changes are present       1.  No pulmonary embolism is identified 2. Unchanged bilateral pulmonary nodules 3. Unchanged bilateral adrenal masses 4. Slightly greater dilatation proximal descending thoracic aorta. No change in dilatation of  the proximal abdominal aorta just above the partially visualized stent graft 5. Partially visualized left renal stone 6. Unchanged dilated main pulmonary artery     MACRO: None   Signed by: Irma Rodriguez 3/4/2024 4:12 PM Dictation workstation:   [id number]  .      Assessment/Plan   Principal Problem:    COPD exacerbation (CMS/HCC)  Patient may have neuropathy from vascular issues.  There is a possibility of also with steroid-induced type of neuropathy.  B12 pending and TSH checked.  Unclear if any other medications could be causing this type of neuropathy such as Keytruda (it is a potential side effect)  She may also have hypoxic induced episodes of neurologic symptoms that could explain why they are episodic.  I do recommend an EMG and nerve test as an outpatient.  She should follow-up with neurology in the next 2 months. PT eval and tx recommended.    Thank you for this consultation.  Please call neurologist on-call for any questions or concerns.       Francheska Holley MD

## 2024-03-12 NOTE — CONSULTS
"Pulmonology Consult Note:      Radha Toussaint is a 68 y.o. 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 Subsequently admitted for COPD exacerbation treatment.    Subjective   Patient reports that she has been having wet cough, wheezing, mucoid sputum production for few days leading up to admission.  She denied N/V/D, constitutional symptoms like fever/chills and muscle aches, other pains.  Patient follows up OP heme-onc Dr. 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  She was transitioned to Keytruda because follow-up CT scan showed interval decrease in bilateral adrenal lesions.  Recently treated last month empirically with Solu-Medrol Dosepak and Z-Ramiro for SOB.  She is a lifelong heavy smoker.  She is on home albuterol, Trelegy Ellipta, Singulair, Florinef, Cortef.  Questionable adherence to home steroid therapy D/T reported neuropsychiatric side effects.     Past pulmonology history:  -PCP Francheska Rogel, DO  -Pertinent home meds: Albuterol inhaler, Trelegy Ellipta, Singulair, Florinef, Cortef  -PFTs: No results found for: \"FEV1\", \"FVC\", \"AIE1SDN\", \"TLC\", \"DLCO\"    A 10 point ROS was performed with the patient denying any complaint at this time aside from those listed in the HPI above.     Current Outpatient Medications   Medication Instructions    acetaminophen (TYLENOL) 325 mg, oral, Every 4 hours PRN    albuterol 90 mcg/actuation inhaler 1 puff, inhalation, Every 4 hours PRN    aspirin 81 mg EC tablet 1 tablet, oral, Daily    " clopidogrel (PLAVIX) 75 mg, oral, Daily    ferrous sulfate 65 mg, oral, Daily, Do not crush, chew, or split.    fludrocortisone (Florinef) 0.1 mg tablet TAKE 1/2 TABLET BY MOUTH ONCE DAILY    fluticasone-umeclidin-vilanter (Trelegy Ellipta) 200-62.5-25 mcg blister with device 1 puff, inhalation, Daily RT    HYDROcodone-acetaminophen (Norco)  mg tablet 1 tablet, oral, Every 8 hours PRN    hydrocortisone (Cortef) 5 mg tablet STARTING ON 9/8 TAKE 2 TABS AT 8AM THEN TAKE 1 TABLET AT NOON THEN TAKE 1/2 TABLET AT 4PM    ipratropium-albuteroL (Duo-Neb) 0.5-2.5 mg/3 mL nebulizer solution 3 mL, nebulization, 4 times daily RT    montelukast (SINGULAIR) 10 mg, oral, Nightly    OLANZapine (ZYPREXA) 5 mg, oral, Nightly    ondansetron ODT (Zofran-ODT) 4 mg disintegrating tablet DISSOLVE 1 TABLET IN MOUTH BY MOUTH THREE TIMES A DAY AS NEEDED NAUSEA    rosuvastatin (CRESTOR) 20 mg, oral, Daily    sennosides (SENNA) 8.6 mg, oral, Daily    sertraline (ZOLOFT) 50 mg, oral, 2 times daily    simethicone (MYLICON) 80 mg, oral, Every 6 hours PRN     Past Medical History:   Diagnosis Date    Aneurysm of carotid artery (CMS/[facility])     Neck aneurysm    DVT (deep venous thrombosis) (CMS/[facility])     2022    History of tubal ligation     Old myocardial infarction     History of heart attack    Other specified disorders of kidney and ureter     Obstruction of kidney    Personal history of other diseases of the circulatory system     History of intracranial aneurysm    Personal history of other diseases of the circulatory system     History of abdominal aortic aneurysm (AAA)    Personal history of other diseases of the respiratory system     History of chronic obstructive lung disease    Personal history of urinary calculi     History of kidney stones    Right upper quadrant pain 09/25/2020    Abdominal pain, RUQ (right upper quadrant)      Past Surgical History:   Procedure Laterality Date    CT ABDOMEN PELVIS ANGIOGRAM W AND/OR WO IV CONTRAST   11/21/2019    CT ABDOMEN PELVIS ANGIOGRAM W AND/OR WO IV CONTRAST 11/21/2019 [facility]    CT AORTA AND BILATERAL ILIOFEMORAL RUNOFF ANGIOGRAM W AND/OR WO IV CONTRAST  8/9/2022    CT AORTA AND BILATERAL ILIOFEMORAL RUNOFF ANGIOGRAM W AND/OR WO IV CONTRAST 8/9/2022 [facility]GAC[facility]    CT HEAD ANGIO W AND WO IV CONTRAST  5/22/2020    CT HEAD ANGIO W AND WO IV CONTRAST 5/22/2020 [facility]    INVASIVE VASCULAR PROCEDURE N/A 1/23/2024    Procedure: Lower Extremity Angiogram;  Surgeon: Willam Mike MD;  Location: [place] [address] Cardiac Cath Lab;  Service: Cardiovascular;  Laterality: N/A;  [id number];LLE CLTI    INVASIVE VASCULAR PROCEDURE N/A 1/23/2024    Procedure: Lower Extremity Intervention;  Surgeon: Willam Mike MD;  Location: [place] [address] Cardiac Cath Lab;  Service: Cardiovascular;  Laterality: N/A;    OTHER SURGICAL HISTORY  09/30/2020    Tubal ligation    OTHER SURGICAL HISTORY  09/30/2020    Cardiac catheterization     Family History   Problem Relation Name Age of Onset    Aneurysm Mother      Hypertension Mother      Heart attack Father       Social History     Socioeconomic History    Marital status:      Spouse name: Not on file    Number of children: Not on file    Years of education: Not on file    Highest education level: Not on file   Occupational History    Not on file   Tobacco Use    Smoking status: Every Day     Packs/day: 1     Types: Cigarettes    Smokeless tobacco: Never   Substance and Sexual Activity    Alcohol use: Not on file    Drug use: Not on file    Sexual activity: Not on file   Other Topics Concern    Not on file   Social History Narrative    Not on file     Social Determinants of Health     Financial Resource Strain: Low Risk  (3/11/2024)    Overall Financial Resource Strain (CARDIA)     Difficulty of Paying Living Expenses: Not very hard   Food Insecurity: Not on file   Transportation Needs: No Transportation Needs (3/11/2024)    PRAPARE - Transportation     Lack of  "Transportation (Medical): No     Lack of Transportation (Non-Medical): No   Physical Activity: Not on file   Stress: Not on file   Social Connections: Not on file   Intimate Partner Violence: Not on file   Housing Stability: Low Risk  (3/11/2024)    Housing Stability Vital Sign     Unable to Pay for Housing in the Last Year: No     Number of Places Lived in the Last Year: 1     Unstable Housing in the Last Year: No     Code Status: Full Code    Objective   /74 (Patient Position: Sitting)   Pulse 88   Temp 36.8 °C (98.2 °F)   Resp 17   Ht 1.626 m (5' 4\")   Wt 63.5 kg (140 lb)   SpO2 94%   BMI 24.03 kg/m²     Labs, radiological imaging and cardiac work up were personally reviewed    Input/Output:  No intake or output data in the 24 hours ending 03/12/24 1340    Oxygen requirements:      Ventilator Information:  FiO2 (%):  [28 %] 28 %  Oxygen Therapy/Pulse Ox  Medical Gas Therapy: Supplemental oxygen  O2 Delivery Method: Nasal cannula  FiO2 (%): 28 %  SpO2:  (resting 94%, w/ activity 91%)  Patient Activity During SpO2 Measurement: At rest  Temp: 36.8 °C (98.2 °F)    Imaging:  Results from last 7 days   Lab Units 03/12/24  0645 03/11/24  1549 03/11/24  1239   SODIUM mmol/L 140  --  135*   POTASSIUM mmol/L 4.8 4.3 5.4*   CHLORIDE mmol/L 106  --  101   CO2 mmol/L 25  --  30   BUN mg/dL 20  --  14   CREATININE mg/dL 0.75  --  0.54   GLUCOSE mg/dL 83  --  91   CALCIUM mg/dL 8.7  --  8.9     Results from last 7 days   Lab Units 03/12/24  0645   WBC AUTO x10*3/uL 8.6   HEMOGLOBIN g/dL 13.5   HEMATOCRIT % 44.9   PLATELETS AUTO x10*3/uL 167     ECG 12 lead    Result Date: 3/12/2024  Poor data quality, interpretation may be adversely affected Normal sinus rhythm with sinus arrhythmia Cannot rule out Inferior infarct , age undetermined Abnormal ECG When compared with ECG of 23-JAN-2024 18:45, Minimal criteria for Inferior infarct are now Present T wave inversion no longer evident in Anterior leads    XR chest 1 " view    Result Date: 3/11/2024  Interpreted By:  Kimani Brunner, STUDY: XR CHEST 1 VIEW   INDICATION: Signs/Symptoms:cough.   COMPARISON: None   ACCESSION NUMBER(S): [id number]   ORDERING CLINICIAN: MARIANNE HOROWITZ   FINDINGS: No consolidation, effusion, edema, or pneumothorax.   Heart size within normal limits.         No evidence of acute intrathoracic abnormality.   Signed by: Kimani Brunner 3/11/2024 1:26 PM Dictation workstation:   [id number]    Vascular US ankle brachial index (TRAVIS) without exercise    Result Date: 3/6/2024           [place] [id number] Tel [phone] and Fax [phone]  Vascular Lab Report VASC US ANKLE BRACHIAL INDEX (TRAVIS) WITHOUT EXERCISE  Patient Name:      AALIYAH Hurley Physician:  [id number] Matthew Warner MD Study Date:        3/6/2024            Ordering Physician: [id number] HEDII MORALES MRN/PID:           [id number]            Technologist:       Darlyn Gamino T Accession#:        [id number]        Technologist 2: Date of Birth/Age: 1955 / 68 years Encounter#:         [id number] Gender:            F Admission Status:  Outpatient          Location Performed: [facility]  Diagnosis/ICD: Atherosclerosis of autologous vein bypass graft(s) of the left                leg with ulceration of other part of foot-I70.445;                Atherosclerosis of native arteries of left leg with ulceration of                other part of foot-I70.245 Indication:    Atherosclerosis of native arteries-extremities w/ulceration CPT Codes:     73925 Peripheral artery TRAVIS Only  CONCLUSIONS: Right Lower PVR: No evidence of arterial occlusive disease in the right lower extremity at rest. Decreased digital perfusion noted. Triphasic flow is noted in the right common femoral artery, right posterior tibial artery and right dorsalis pedis artery. Left Lower PVR: No evidence of arterial occlusive disease in the left lower extremity at rest. Decreased digital  perfusion noted. Triphasic flow is noted in the left common femoral artery, left posterior tibial artery and left dorsalis pedis artery.  Comparison: Compared with study from 1/17/2024, Significant improvement to the LLE following intervention on 01-.  Imaging & Doppler Findings:  RIGHT Lower PVR                Pressures Ratios Right Posterior Tibial (Ankle) 152 mmHg  1.14 Right Dorsalis Pedis (Ankle)   146 mmHg  1.10 Right Digit (Great Toe)        80 mmHg   0.60   LEFT Lower PVR                Pressures Ratios Left Posterior Tibial (Ankle) 143 mmHg  1.08 Left Dorsalis Pedis (Ankle)   136 mmHg  1.02 Left Digit (Great Toe)        69 mmHg   0.52                     Right     Left Brachial Pressure 132 mmHg 133 mmHg   [id number] Matthew Warner MD Electronically signed by [id number] Matthew Warner MD on 3/6/2024 at 10:58:26 AM  ** Final **     CT angio chest for pulmonary embolism    Result Date: 3/4/2024  Interpreted By:  Irma Rodriguez, STUDY: CT ANGIO CHEST FOR PULMONARY EMBOLISM;  3/4/2024 3:19 pm   INDICATION: Signs/Symptoms:Metastatic lung cancer.  On immunotherapy with Keytruda.  Acute shortness of breath.  Need to rule out PE and pneumonitis..   COMPARISON: 12/13/2023   ACCESSION NUMBER(S): [id number]   ORDERING CLINICIAN: ADARSH LARA   TECHNIQUE: Helical data acquisition of the chest was obtained contrast volume:without IV contrast material/Optiray 350/Isovue 370.  Images were reformatted in coronal and sagittal planes. Axial and coronal MIP images were created and reviewed.   FINDINGS: POTENTIAL LIMITATIONS OF THE STUDY: None   HEART AND VESSELS: No discrete filling defects within the main pulmonary artery or its branches.   Main pulmonary artery is dilated at 3.3 cm and is unchanged.   Ascending thoracic aorta is normal in size. Atherosclerotic calcification is noted at the aortic arch. In the proximal descending aorta there is noncalcified plaque with an area of mild dilatation measuring 3.1 x 4.0 cm,  slightly increased compared to the prior study.   Patchy coronary artery calcifications are seen.The study is not optimized for evaluation of coronary arteries.   The cardiac chambers are not enlarged.   No evidence of pericardial effusion.   MEDIASTINUM AND TRINA, LOWER NECK AND AXILLA: 1 cm hypodense nodule in the right thyroid lobe is unchanged.   No evidence of thoracic lymphadenopathy by CT criteria.   Esophagus appears within normal limits as seen.   LUNGS AND AIRWAYS: The trachea and central airways are patent. No endobronchial lesion.   Lungs are emphysematous. Multiple bilateral pulmonary nodules appear unchanged, largest is pulmonary based posterior right lower lobe and measures 9 mm (image 145 of 318). No new or enlarging nodules are identified. There is no focal pneumonia. No pleural effusion is identified.   UPPER ABDOMEN: Upper pole stone in the left kidney is partially visualized and measures up to 1.2 cm. The proximal portion of an aortic stent graft is visible, with the abdominal aorta just above the graft measuring 3.9 cm, unchanged. Bilateral adrenal masses are unchanged, 5 cm on the right and 3.6 cm on the left.   CHEST WALL AND OSSEOUS STRUCTURES: There are no suspicious osseous lesions. Multilevel degenerative changes are present       1.  No pulmonary embolism is identified 2. Unchanged bilateral pulmonary nodules 3. Unchanged bilateral adrenal masses 4. Slightly greater dilatation proximal descending thoracic aorta. No change in dilatation of the proximal abdominal aorta just above the partially visualized stent graft 5. Partially visualized left renal stone 6. Unchanged dilated main pulmonary artery     MACRO: None   Signed by: Imra Rodriguez 3/4/2024 4:12 PM Dictation workstation:   [id number]    Medications:  Inpatient scheduled medications:  albuterol, 2.5 mg, nebulization, TID  aspirin, 81 mg, oral, Daily  azithromycin, 500 mg, intravenous, q24h  clopidogrel, 75 mg, oral, q  AM  enoxaparin, 40 mg, subcutaneous, q24h  ferrous sulfate (325 mg ferrous sulfate), 65 mg of iron, oral, Daily  tiotropium, 2 puff, inhalation, Daily   And  fluticasone furoate-vilanteroL, 1 puff, inhalation, Daily  methylPREDNISolone sodium succinate (PF), 40 mg, intravenous, q8h GARDEN[place]  montelukast, 10 mg, oral, Nightly  nicotine, 1 patch, transdermal, Daily   Followed by  [START ON 4/23/2024] nicotine, 1 patch, transdermal, Daily   Followed by  [START ON 5/7/2024] nicotine, 1 patch, transdermal, Daily  OLANZapine, 5 mg, oral, Nightly  rosuvastatin, 20 mg, oral, Daily  sennosides, 1 tablet, oral, Nightly  sertraline, 50 mg, oral, BID    Inpatient PRN medications:  PRN medications: acetaminophen, albuterol, HYDROcodone-acetaminophen, ondansetron, oxygen, polyethylene glycol, simethicone    Physical Exam:   GENERAL: Awake/alert, cooperative, conversant. Not in pain or distress, in no respiratory distress  HEAD:  Normocephalic, atraumatic.  EYES:  Round and reactive. Anicteric. Clear sclera. No conjunctivitis.   ENT:  No nasal discharge, no coryza. Mucous membranes moist and pink.  NECK:  Atraumatic, no cervical lymphadenopathy bilaterally.  CARDIOVASCULAR: RRR, positive S1 & S2.  RESPIRATORY: Wheezing auscultated bilaterally.  ABDOMEN:  Hypoactive bowel sounds.  EXTREMITIES:  No peripheral edema, no calf tenderness. Peripheral pulses intact.  SKIN:  Warm and dry. No tenting. No rash or open wound noted.  NEUROLOGICAL:  Nonfocal grossly.  A&O x 4. CN II-XII grossly intact.    Assessment/Plan   Radha Toussaint is a 68 y.o. 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 Subsequently admitted for COPD exacerbation treatment.    #COPD exacerbation  #NSCLC R lung with adrenal gland  carcinoma on Keytruda  #Adrenal insufficiency  #History of neuropsychiatric steroid side effects  -Continue home inhalers, Singulair  -Supplemental O2, adjust for SpO2 88 to 94%  -Continue Zithromax  -Discontinue Solu-Medrol, start IV dexamethasone 4 mg every 8 hours  -Start Pulmicort twice daily  -Endocrinology consultation recommended  -Will continue following      Waylon Haney, DO   Internal Medicine PGY-1     This is a preliminary note written by the resident. Please wait for attending addendum for finalization of note and recommendations.

## 2024-03-12 NOTE — PROGRESS NOTES
Physical Therapy    Physical Therapy Evaluation    Patient Name: Radha Toussaint  MRN: [id number]  Today's Date: 3/12/2024   Time Calculation  Start Time: 1030  Stop Time: 1048  Time Calculation (min): 18 min    Assessment/Plan   PT Assessment  PT Assessment Results: Decreased strength, Decreased endurance, Impaired balance, Decreased mobility, Decreased safety awareness  Rehab Prognosis: Good  Evaluation/Treatment Tolerance: Patient limited by fatigue, Patient limited by pain (sob)  Assessment Comment: Continued skilled PT intervention indicated to facilitate increased strength, balance & gait stability  End of Session Patient Position: Bed, 2 rail up (sitting edge of bed w/ call light in reach, all needs met)  IP OR SWING BED PT PLAN  Inpatient or Swing Bed: Inpatient  PT Plan  Treatment/Interventions: Bed mobility, Transfer training, Gait training, Stair training, Balance training, Endurance training, Therapeutic exercise, Therapeutic activity  PT Plan: Skilled PT  PT Frequency: 3 times per week  PT Discharge Recommendations: Low intensity level of continued care (w/ continued 24hr support as prior to admit for safe transition home due to medical status)  PT - OK to Discharge: Yes (to next level of care when cleared by medical team)    Subjective     Current Problem:  Patient Active Problem List   Diagnosis    Angioedema    Anxiety    CAD (coronary artery disease)    Cellulitis of left lower extremity    COPD (chronic obstructive pulmonary disease) (CMS/HCC)    COVID-19    Peripheral vascular disease (CMS/HCC)    Insomnia    Intermittent claudication (CMS/HCC)    Leg pain    Liver lesion    Paresthesias    Vitamin D deficiency    S/P arteriogram of extremity    Non-small cell lung cancer (NSCLC) (CMS/HCC)    Pneumonia due to gram-negative bacteria    Anemia in other chronic diseases classified elsewhere    Abdominal aortic aneurysm (CMS/HCC)    Abdominal pain    Adrenal insufficiency (CMS/HCC)    Aneurysm of iliac  artery (CMS/HCC)    Arthritis    Colitis    Constipation    Loss of appetite    Dehydration    Dyspnea on exertion    Generalized weakness    Mixed hyperlipidemia    Fibroids    Leiomyoma    Malignant neoplasm of unknown origin (CMS/HCC)    Mixed anxiety depressive disorder    Myocardial infarction (CMS/HCC)    STEMI (ST elevation myocardial infarction) (CMS/HCC)    Pain due to neoplasm    Nephrolithiasis    Nonruptured cerebral aneurysm    Tobacco dependence syndrome    Hypertension    Hypotension    Claudication (CMS/HCC)    Secondary malignant neoplasm of left adrenal gland (CMS/HCC)    Moderate protein-calorie malnutrition (CMS/HCC)    Contact with and (suspected) exposure to covid-19    Cough    Current smoker    Dilation of renal shen    Hyperglycemia    Noncompliance with medication regimen    Physical deconditioning    Pneumonia due to infectious organism    Thyroid nodule    Occlusion of left popliteal artery (CMS/HCC)    Critical limb ischemia of left lower extremity (CMS/HCC)    PAD (peripheral artery disease) (CMS/HCC)    COPD exacerbation (CMS/HCC)       General Visit Information:  General  Reason for Referral: PT eval & treat/impaired mobility DX: copd exacerbation (3/11/24 c/o abdominal pain w/ ambulation, sob, wheezing, cough; cxr: (-) acute; ct chest: emphusema, unchanged pulmonary nodules, (-) PE)  Family/Caregiver Present:  (spouse present, supportive)  Caregiver Feedback: Per conference w/ RN patient stable to participate in therapy  Co-Treatment: OT  Co-Treatment Reason: to maximize pt safety & mobility  General Comment: Pleasant & cooperative, receptive to mobility& instructions; a&Ox4; functional mobility tolerance limited by sob/decreased endurance/abdominal pain w/ gait activity    Home Living:  Home Living  Home Living Comments: Lives w/ spouse, daughter, son in law, 8&9yo grandchildren 24hr support available; 2steps w/ rail to enter [facility] set up including laundry; stall shower w/ seat,  grab bar, hand held shower; raised toilet near [facility] for support; has bedside commode; hospital bed w/o rails    Prior Level of Function:  Prior Function Per Pt/Caregiver Report  Hand Dominance: Right  Prior Function Comments: independent mobility w/o use of device, h/o 3 trips/falls in past 3months; owns ww, WC, rollator & 2 motorized scooters; when pt feeling well independent adl & iadl, when not feeling well spouse manages set up for adls & manages iadls; spouse has been managing driving & shopping    Precautions:  Precautions  Precautions Comment: fall,  2LO2    Vital Signs:  Vital Signs  SpO2:  (resting 94%, w/ activity 91%)  Objective     Pain:  Pain Assessment  Pain Assessment:  (no pain at rest, abdominal pain w/ gait activity not rated)  General Assessments:       Sensation  Sensation Comment: intermittent numbness hands, constant in feet attributes to neuropathy     Functional Assessments:     Bed Mobility  Bed Mobility:  (modified independent supine>sit hob elevated)  Transfers  Transfer:  (sba sit<>stand impulsive w/ postion changes, cues to monitor tolerance)  Ambulation/Gait Training  Ambulation/Gait Training Performed:  (cga w/ ww~15 ft performed changes in direction & maneuver of environmental obstacles; initial instructions for safe position to ww & for widened base of support for increased gait stability)   Extremity/Trunk Assessments:        RLE   RLE :  (arom wfl, strength grossly 4/5)  LLE   LLE :  (arom wfl, strength grossly 4/5)    Outcome Measures:  [facility] Basic Mobility  Turning from your back to your side while in a flat bed without using bedrails: None  Moving from lying on your back to sitting on the side of a flat bed without using bedrails: None  Moving to and from bed to chair (including a wheelchair): A little  Standing up from a chair using your arms (e.g. wheelchair or bedside chair): A little  To walk in hospital room: A little  Climbing 3-5 steps with railing: A lot  Basic  Mobility - Total Score: 19     Goals:  Encounter Problems       Encounter Problems (Active)       PT Problem       transfers   (Progressing)       Start:  03/12/24    Expected End:  03/26/24       Modified independent sit<>stand w/ use of ww         gait  (Progressing)       Start:  03/12/24    Expected End:  03/26/24       Supervised >=50ftx2 w/ use of ww self monitoring physical signs of exertion for safety & tolerance         stairs   (Progressing)       Start:  03/12/24    Expected End:  03/26/24       Cga negotiating >=2steps simulating home environment              Education Documentation  Mobility Training, taught by Nicole Flores, PT at 3/12/2024 11:55 AM.  Learner: Significant Other, Patient  Readiness: Acceptance  Method: Explanation  Response: Verbalizes Understanding, Needs Reinforcement  Comment: safety, recommendation for use of rollator for gait stability& rest periods as needed, activity progression, self monitoring physical signs of exertion for safety& tolerance w/ fxl mobility

## 2024-03-12 NOTE — CARE PLAN
The patient's goals for the shift include  remaining safe and being able to breathe without any SOB.    The clinical goals for the shift include  being able to breathe without SOB.    Over the shift, the patient did not make progress toward the following goals. Barriers to progression include SOB on exertion and while having to walk to the bedside commode. Recommendations to address these barriers include having assistance if needed when going to the bedside commode.

## 2024-03-13 LAB
ANION GAP SERPL CALC-SCNC: 12 MMOL/L (ref 10–20)
BUN SERPL-MCNC: 17 MG/DL (ref 6–23)
CALCIUM SERPL-MCNC: 9.3 MG/DL (ref 8.6–10.3)
CHLORIDE SERPL-SCNC: 103 MMOL/L (ref 98–107)
CO2 SERPL-SCNC: 29 MMOL/L (ref 21–32)
CREAT SERPL-MCNC: 0.63 MG/DL (ref 0.5–1.05)
EGFRCR SERPLBLD CKD-EPI 2021: >90 ML/MIN/1.73M*2
ERYTHROCYTE [DISTWIDTH] IN BLOOD BY AUTOMATED COUNT: 18.7 % (ref 11.5–14.5)
GLUCOSE SERPL-MCNC: 119 MG/DL (ref 74–99)
HCT VFR BLD AUTO: 46.8 % (ref 36–46)
HGB BLD-MCNC: 14.3 G/DL (ref 12–16)
MCH RBC QN AUTO: 26.9 PG (ref 26–34)
MCHC RBC AUTO-ENTMCNC: 30.6 G/DL (ref 32–36)
MCV RBC AUTO: 88 FL (ref 80–100)
NRBC BLD-RTO: 0 /100 WBCS (ref 0–0)
PLATELET # BLD AUTO: 198 X10*3/UL (ref 150–450)
POTASSIUM SERPL-SCNC: 4.3 MMOL/L (ref 3.5–5.3)
RBC # BLD AUTO: 5.32 X10*6/UL (ref 4–5.2)
SODIUM SERPL-SCNC: 140 MMOL/L (ref 136–145)
WBC # BLD AUTO: 13.1 X10*3/UL (ref 4.4–11.3)

## 2024-03-13 PROCEDURE — 2500000004 HC RX 250 GENERAL PHARMACY W/ HCPCS (ALT 636 FOR OP/ED)

## 2024-03-13 PROCEDURE — 94640 AIRWAY INHALATION TREATMENT: CPT

## 2024-03-13 PROCEDURE — 85027 COMPLETE CBC AUTOMATED: CPT | Performed by: STUDENT IN AN ORGANIZED HEALTH CARE EDUCATION/TRAINING PROGRAM

## 2024-03-13 PROCEDURE — 80048 BASIC METABOLIC PNL TOTAL CA: CPT | Performed by: STUDENT IN AN ORGANIZED HEALTH CARE EDUCATION/TRAINING PROGRAM

## 2024-03-13 PROCEDURE — 2500000002 HC RX 250 W HCPCS SELF ADMINISTERED DRUGS (ALT 637 FOR MEDICARE OP, ALT 636 FOR OP/ED)

## 2024-03-13 PROCEDURE — 2500000004 HC RX 250 GENERAL PHARMACY W/ HCPCS (ALT 636 FOR OP/ED): Performed by: STUDENT IN AN ORGANIZED HEALTH CARE EDUCATION/TRAINING PROGRAM

## 2024-03-13 PROCEDURE — 2500000002 HC RX 250 W HCPCS SELF ADMINISTERED DRUGS (ALT 637 FOR MEDICARE OP, ALT 636 FOR OP/ED): Performed by: STUDENT IN AN ORGANIZED HEALTH CARE EDUCATION/TRAINING PROGRAM

## 2024-03-13 PROCEDURE — 36415 COLL VENOUS BLD VENIPUNCTURE: CPT | Performed by: STUDENT IN AN ORGANIZED HEALTH CARE EDUCATION/TRAINING PROGRAM

## 2024-03-13 PROCEDURE — 99232 SBSQ HOSP IP/OBS MODERATE 35: CPT | Performed by: STUDENT IN AN ORGANIZED HEALTH CARE EDUCATION/TRAINING PROGRAM

## 2024-03-13 PROCEDURE — 99222 1ST HOSP IP/OBS MODERATE 55: CPT

## 2024-03-13 PROCEDURE — S4991 NICOTINE PATCH NONLEGEND: HCPCS

## 2024-03-13 PROCEDURE — 2500000001 HC RX 250 WO HCPCS SELF ADMINISTERED DRUGS (ALT 637 FOR MEDICARE OP)

## 2024-03-13 PROCEDURE — 1100000001 HC PRIVATE ROOM DAILY

## 2024-03-13 RX ADMIN — ROSUVASTATIN CALCIUM 20 MG: 10 TABLET, FILM COATED ORAL at 09:40

## 2024-03-13 RX ADMIN — DEXAMETHASONE SODIUM PHOSPHATE 4 MG: 10 INJECTION, SOLUTION INTRAMUSCULAR; INTRAVENOUS at 15:37

## 2024-03-13 RX ADMIN — OLANZAPINE 5 MG: 5 TABLET, FILM COATED ORAL at 20:10

## 2024-03-13 RX ADMIN — DEXAMETHASONE SODIUM PHOSPHATE 4 MG: 10 INJECTION, SOLUTION INTRAMUSCULAR; INTRAVENOUS at 21:51

## 2024-03-13 RX ADMIN — TIOTROPIUM BROMIDE INHALATION SPRAY 2 PUFF: 3.12 SPRAY, METERED RESPIRATORY (INHALATION) at 07:04

## 2024-03-13 RX ADMIN — ENOXAPARIN SODIUM 40 MG: 40 INJECTION SUBCUTANEOUS at 09:39

## 2024-03-13 RX ADMIN — SERTRALINE HYDROCHLORIDE 50 MG: 50 TABLET ORAL at 15:35

## 2024-03-13 RX ADMIN — ALBUTEROL SULFATE 2.5 MG: 2.5 SOLUTION RESPIRATORY (INHALATION) at 13:42

## 2024-03-13 RX ADMIN — FLUTICASONE FUROATE AND VILANTEROL TRIFENATATE 1 PUFF: 200; 25 POWDER RESPIRATORY (INHALATION) at 07:05

## 2024-03-13 RX ADMIN — BUDESONIDE 0.25 MG: 0.25 INHALANT RESPIRATORY (INHALATION) at 06:58

## 2024-03-13 RX ADMIN — ALBUTEROL SULFATE 2.5 MG: 2.5 SOLUTION RESPIRATORY (INHALATION) at 06:58

## 2024-03-13 RX ADMIN — AZITHROMYCIN MONOHYDRATE 500 MG: 500 INJECTION, POWDER, LYOPHILIZED, FOR SOLUTION INTRAVENOUS at 21:51

## 2024-03-13 RX ADMIN — BUDESONIDE 0.25 MG: 0.25 INHALANT RESPIRATORY (INHALATION) at 19:26

## 2024-03-13 RX ADMIN — SERTRALINE HYDROCHLORIDE 50 MG: 50 TABLET ORAL at 09:40

## 2024-03-13 RX ADMIN — FERROUS SULFATE TAB 325 MG (65 MG ELEMENTAL FE) 1 TABLET: 325 (65 FE) TAB at 09:39

## 2024-03-13 RX ADMIN — NICOTINE 1 PATCH: 21 PATCH, EXTENDED RELEASE TRANSDERMAL at 09:39

## 2024-03-13 RX ADMIN — CLOPIDOGREL BISULFATE 75 MG: 75 TABLET ORAL at 09:40

## 2024-03-13 RX ADMIN — ALBUTEROL SULFATE 2.5 MG: 2.5 SOLUTION RESPIRATORY (INHALATION) at 19:26

## 2024-03-13 RX ADMIN — HYDROCODONE BITARTRATE AND ACETAMINOPHEN 1 TABLET: 10; 325 TABLET ORAL at 17:21

## 2024-03-13 RX ADMIN — DEXAMETHASONE SODIUM PHOSPHATE 4 MG: 10 INJECTION, SOLUTION INTRAMUSCULAR; INTRAVENOUS at 06:44

## 2024-03-13 RX ADMIN — ASPIRIN 81 MG: 81 TABLET, COATED ORAL at 09:40

## 2024-03-13 RX ADMIN — SENNOSIDES 8.6 MG: 8.6 TABLET, FILM COATED ORAL at 20:10

## 2024-03-13 RX ADMIN — MONTELUKAST 10 MG: 10 TABLET, FILM COATED ORAL at 20:10

## 2024-03-13 ASSESSMENT — PAIN SCALES - GENERAL
PAINLEVEL_OUTOF10: 0 - NO PAIN
PAINLEVEL_OUTOF10: 3
PAINLEVEL_OUTOF10: 7

## 2024-03-13 NOTE — CONSULTS
Inpatient consult to Hematology-Oncology  Consult performed by: Shiv Ann MD  Consult ordered by: Nico Davies DO  Reason for consult: Known patient, question about steroid use and Keytruda      History Of Present Illness  Radha Toussaint is a 68 y.o. female with a history of metastatic poorly differentiated carcinoma, COPD, CAD s/p FCO, PAD, multiple vascular aneurysms who presents with shortness of breath and weakness. She recently visited her oncologist Dr. Burnham 2/19 and was prescribed a Medrol Dose Pack and a Z pack for suspected COPD exacerbation. CT PE then was negative for infiltrate or pulmonary embolism. She was only took three days methylprednisolone as she developed ideas of harming herself and others. [facility] SOB and LUQ abdominal pain that radiates to her left shoulder.     Her poorly differentiated carcinoma is currently treated with Keytruda. She was treated with 3 cycles of carboplatin, paclitaxol, and Keytruda 9/23-12/23, but the fourth cycle was stopped due to tolerability issues. She has been on maintenance Keytruda since 12/23. Latest CT CAP 12/23 showed improved tumor burden.     Past Medical History  As above    Surgical History  Tubal ligation, bladder lift, AAA graft repair, femoral-femoral bypass, MCA aneurysm clipping     Social History  47 pack year smoking history, denies alcohol and drug use    Family History  Family History   Problem Relation Name Age of Onset    Aneurysm Mother      Hypertension Mother      Heart attack Father          Allergies  Ace inhibitors, Prednisone, Demerol [meperidine], and Iodinated contrast media    Review of Systems  A complete 10 point review of systems was completed and is otherwise negative unless stated.        Physical Exam  Physical Exam:  General:  Pleasant and cooperative. No apparent distress.  HEENT:  Normocephalic, atraumatic, mucus membranes moist.   Neck:  Trachea midline.  Chest:  Decreased air  movement  CV:  Regular rate and rhythm.  Positive S1/S2.   Abdomen: Abdomen is soft, non-tender, non-distended.  Extremities:  No lower extremity edema or cyanosis.   Neurological:  AAOx3. No focal deficits.  Skin:  Warm and dry.         Last Recorded Vitals  /67 (BP Location: Right arm, Patient Position: Lying)   Pulse 67   Temp 36.2 °C (97.2 °F) (Temporal)   Resp 16   Wt 63.5 kg (140 lb)   SpO2 94%        Assessment/Plan   #Poorly differentiated metastatic carcinoma  #COPD exacerbation  CT PE 2/19 was negative for PE, pneumonia or obvious signs of pneumonitis. Low suspicion that immunotherapy or malignancy are primary causes of dyspnea.    -Continue steroids. While steroids can decrease the efficacy of patient's current immunotherapy, the benefits of treating current COPD exacerbation outweigh the risks of temporary immune suppression.   -Follow up with oncology outpatient    This is a preliminary note, please await attending attestation for a finalized plan.    Dr. Shiv Ann  Internal Medicine PGY-2  Please message me with any questions

## 2024-03-13 NOTE — PROGRESS NOTES
"Radha Toussaint is a 68 y.o. female on day 0 of admission presenting with COPD exacerbation (CMS/HCC).      Subjective   TSH mildly elevated at 4.41 and B12 therapeutic at 417.  Paresthesias likely attributed to underlying neuropathy, with EMG/nerve conduction study recommended in the outpatient setting.  Neurology to sign off on care.       Objective     Last Recorded Vitals  Blood pressure 138/67, pulse 67, temperature 36.2 °C (97.2 °F), temperature source Temporal, resp. rate 16, height 1.626 m (5' 4\"), weight 63.5 kg (140 lb), SpO2 96 %.    Relevant Results  Scheduled medications  albuterol, 2.5 mg, nebulization, TID  aspirin, 81 mg, oral, Daily  azithromycin, 500 mg, intravenous, q24h  budesonide, 0.25 mg, nebulization, BID  clopidogrel, 75 mg, oral, q AM  dexAMETHasone, 4 mg, intravenous, q8h  enoxaparin, 40 mg, subcutaneous, q24h  ferrous sulfate (325 mg ferrous sulfate), 65 mg of iron, oral, Daily  tiotropium, 2 puff, inhalation, Daily   And  fluticasone furoate-vilanteroL, 1 puff, inhalation, Daily  montelukast, 10 mg, oral, Nightly  nicotine, 1 patch, transdermal, Daily   Followed by  [START ON 4/23/2024] nicotine, 1 patch, transdermal, Daily   Followed by  [START ON 5/7/2024] nicotine, 1 patch, transdermal, Daily  OLANZapine, 5 mg, oral, Nightly  rosuvastatin, 20 mg, oral, Daily  sennosides, 1 tablet, oral, Nightly  sertraline, 50 mg, oral, BID      Continuous medications     PRN medications  PRN medications: acetaminophen, albuterol, HYDROcodone-acetaminophen, ondansetron, oxygen, polyethylene glycol, simethicone  ECG 12 lead    Result Date: 3/12/2024  Poor data quality, interpretation may be adversely affected Normal sinus rhythm with sinus arrhythmia Cannot rule out Inferior infarct , age undetermined Abnormal ECG When compared with ECG of 23-JAN-2024 18:45, Minimal criteria for Inferior infarct are now Present T wave inversion no longer evident in Anterior leads    XR chest 1 view    Result Date: " 3/11/2024  Interpreted By:  Kiamni Brunner, STUDY: XR CHEST 1 VIEW   INDICATION: Signs/Symptoms:cough.   COMPARISON: None   ACCESSION NUMBER(S): [id number]   ORDERING CLINICIAN: MARIANNE HOROWITZ   FINDINGS: No consolidation, effusion, edema, or pneumothorax.   Heart size within normal limits.         No evidence of acute intrathoracic abnormality.   Signed by: Kimani Brunner 3/11/2024 1:26 PM Dictation workstation:   [id number]    Vascular US ankle brachial index (TRAVIS) without exercise    Result Date: 3/6/2024           [place] [address] Tel [phone] and Fax [phone]  Vascular Lab Report VASC US ANKLE BRACHIAL INDEX (TRAVIS) WITHOUT EXERCISE  Patient Name:      AALIYAH Hurley Physician:  [id number] Matthew Warner MD Study Date:        3/6/2024            Ordering Physician: [id number] HEIDI MORALES MRN/PID:           [id number]            Technologist:       Darlyn Gamino MAGGIE Accession#:        [id number]        Technologist 2: Date of Birth/Age: 1955 / 68 years Encounter#:         [id number] Gender:            F Admission Status:  Outpatient          Location Performed: [facility]  Diagnosis/ICD: Atherosclerosis of autologous vein bypass graft(s) of the left                leg with ulceration of other part of foot-I70.445;                Atherosclerosis of native arteries of left leg with ulceration of                other part of foot-I70.245 Indication:    Atherosclerosis of native arteries-extremities w/ulceration CPT Codes:     54499 Peripheral artery TRAVIS Only  CONCLUSIONS: Right Lower PVR: No evidence of arterial occlusive disease in the right lower extremity at rest. Decreased digital perfusion noted. Triphasic flow is noted in the right common femoral artery, right posterior tibial artery and right dorsalis pedis artery. Left Lower PVR: No evidence of arterial occlusive disease in the left lower extremity at rest. Decreased digital perfusion noted. Triphasic  flow is noted in the left common femoral artery, left posterior tibial artery and left dorsalis pedis artery.  Comparison: Compared with study from 1/17/2024, Significant improvement to the LLE following intervention on 01-.  Imaging & Doppler Findings:  RIGHT Lower PVR                Pressures Ratios Right Posterior Tibial (Ankle) 152 mmHg  1.14 Right Dorsalis Pedis (Ankle)   146 mmHg  1.10 Right Digit (Great Toe)        80 mmHg   0.60   LEFT Lower PVR                Pressures Ratios Left Posterior Tibial (Ankle) 143 mmHg  1.08 Left Dorsalis Pedis (Ankle)   136 mmHg  1.02 Left Digit (Great Toe)        69 mmHg   0.52                     Right     Left Brachial Pressure 132 mmHg 133 mmHg   [id number] Matthew Warner MD Electronically signed by [id number] Matthew Warner MD on 3/6/2024 at 10:58:26 AM  ** Final **     CT angio chest for pulmonary embolism    Result Date: 3/4/2024  Interpreted By:  Irma Rodriguez, STUDY: CT ANGIO CHEST FOR PULMONARY EMBOLISM;  3/4/2024 3:19 pm   INDICATION: Signs/Symptoms:Metastatic lung cancer.  On immunotherapy with Keytruda.  Acute shortness of breath.  Need to rule out PE and pneumonitis..   COMPARISON: 12/13/2023   ACCESSION NUMBER(S): [id number]   ORDERING CLINICIAN: ADARSH LARA   TECHNIQUE: Helical data acquisition of the chest was obtained contrast volume:without IV contrast material/Optiray 350/Isovue 370.  Images were reformatted in coronal and sagittal planes. Axial and coronal MIP images were created and reviewed.   FINDINGS: POTENTIAL LIMITATIONS OF THE STUDY: None   HEART AND VESSELS: No discrete filling defects within the main pulmonary artery or its branches.   Main pulmonary artery is dilated at 3.3 cm and is unchanged.   Ascending thoracic aorta is normal in size. Atherosclerotic calcification is noted at the aortic arch. In the proximal descending aorta there is noncalcified plaque with an area of mild dilatation measuring 3.1 x 4.0 cm, slightly increased compared to  the prior study.   Patchy coronary artery calcifications are seen.The study is not optimized for evaluation of coronary arteries.   The cardiac chambers are not enlarged.   No evidence of pericardial effusion.   MEDIASTINUM AND TRINA, LOWER NECK AND AXILLA: 1 cm hypodense nodule in the right thyroid lobe is unchanged.   No evidence of thoracic lymphadenopathy by CT criteria.   Esophagus appears within normal limits as seen.   LUNGS AND AIRWAYS: The trachea and central airways are patent. No endobronchial lesion.   Lungs are emphysematous. Multiple bilateral pulmonary nodules appear unchanged, largest is pulmonary based posterior right lower lobe and measures 9 mm (image 145 of 318). No new or enlarging nodules are identified. There is no focal pneumonia. No pleural effusion is identified.   UPPER ABDOMEN: Upper pole stone in the left kidney is partially visualized and measures up to 1.2 cm. The proximal portion of an aortic stent graft is visible, with the abdominal aorta just above the graft measuring 3.9 cm, unchanged. Bilateral adrenal masses are unchanged, 5 cm on the right and 3.6 cm on the left.   CHEST WALL AND OSSEOUS STRUCTURES: There are no suspicious osseous lesions. Multilevel degenerative changes are present       1.  No pulmonary embolism is identified 2. Unchanged bilateral pulmonary nodules 3. Unchanged bilateral adrenal masses 4. Slightly greater dilatation proximal descending thoracic aorta. No change in dilatation of the proximal abdominal aorta just above the partially visualized stent graft 5. Partially visualized left renal stone 6. Unchanged dilated main pulmonary artery     MACRO: None   Signed by: Irma Rodriguez 3/4/2024 4:12 PM Dictation workstation:   [id number]   Results for orders placed or performed during the hospital encounter of 03/11/24 (from the past 24 hour(s))   Vitamin B12   Result Value Ref Range    Vitamin B12 417 211 - 911 pg/mL   CBC   Result Value Ref Range    WBC 13.1  (H) 4.4 - 11.3 x10*3/uL    nRBC 0.0 0.0 - 0.0 /100 WBCs    RBC 5.32 (H) 4.00 - 5.20 x10*6/uL    Hemoglobin 14.3 12.0 - 16.0 g/dL    Hematocrit 46.8 (H) 36.0 - 46.0 %    MCV 88 80 - 100 fL    MCH 26.9 26.0 - 34.0 pg    MCHC 30.6 (L) 32.0 - 36.0 g/dL    RDW 18.7 (H) 11.5 - 14.5 %    Platelets 198 150 - 450 x10*3/uL   Basic metabolic panel   Result Value Ref Range    Glucose 119 (H) 74 - 99 mg/dL    Sodium 140 136 - 145 mmol/L    Potassium 4.3 3.5 - 5.3 mmol/L    Chloride 103 98 - 107 mmol/L    Bicarbonate 29 21 - 32 mmol/L    Anion Gap 12 10 - 20 mmol/L    Urea Nitrogen 17 6 - 23 mg/dL    Creatinine 0.63 0.50 - 1.05 mg/dL    eGFR >90 >60 mL/min/1.73m*2    Calcium 9.3 8.6 - 10.3 mg/dL                         [facility] Coma Scale  Best Eye Response: Spontaneous  Best Verbal Response: Oriented  Best Motor Response: Follows commands  Thomas Coma Scale Score: 15                             Assessment/Plan      Principal Problem:    COPD exacerbation (CMS/HCC)  -TSH mildly elevated at 4.41 and B12 therapeutic at 417.  Paresthesias likely attributed to underlying neuropathy, with EMG/nerve conduction study recommended in the outpatient setting.  Neurology to sign off on care.           Kacey Dickinson, APRN-CNP

## 2024-03-13 NOTE — PROGRESS NOTES
"Pulmonology Consult Note:      Radha Toussaint is a 68 y.o. 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 Subsequently admitted for COPD exacerbation treatment.    Subjective   Patient reports that she has been having wet cough, wheezing, mucoid sputum production for few days leading up to admission.  She denied N/V/D, constitutional symptoms like fever/chills and muscle aches, other pains.  Patient follows up OP heme-onc Dr. 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  She was transitioned to Keytruda because follow-up CT scan showed interval decrease in bilateral adrenal lesions.  Recently treated last month empirically with Solu-Medrol Dosepak and Z-Ramiro for SOB.  She is a lifelong heavy smoker.  She is on home albuterol, Trelegy Ellipta, Singulair, Florinef, Cortef.  Questionable adherence to home steroid therapy D/T reported neuropsychiatric side effects.     Past pulmonology history:  -PCP Francheska Rogel, DO  -Pertinent home meds: Albuterol inhaler, Trelegy Ellipta, Singulair, Florinef, Cortef  -PFTs: No results found for: \"FEV1\", \"FVC\", \"SLZ4LWW\", \"TLC\", \"DLCO\"    A 10 point ROS was performed with the patient denying any complaint at this time aside from those listed in the HPI above.     Current Outpatient Medications   Medication Instructions    acetaminophen (TYLENOL) 325 mg, oral, Every 4 hours PRN    albuterol 90 mcg/actuation inhaler 1 puff, inhalation, Every 4 hours PRN    aspirin 81 mg EC tablet 1 tablet, oral, Daily    " clopidogrel (PLAVIX) 75 mg, oral, Daily    ferrous sulfate 65 mg, oral, Daily, Do not crush, chew, or split.    fludrocortisone (Florinef) 0.1 mg tablet TAKE 1/2 TABLET BY MOUTH ONCE DAILY    fluticasone-umeclidin-vilanter (Trelegy Ellipta) 200-62.5-25 mcg blister with device 1 puff, inhalation, Daily RT    HYDROcodone-acetaminophen (Norco)  mg tablet 1 tablet, oral, Every 8 hours PRN    hydrocortisone (Cortef) 5 mg tablet STARTING ON 9/8 TAKE 2 TABS AT 8AM THEN TAKE 1 TABLET AT NOON THEN TAKE 1/2 TABLET AT 4PM    ipratropium-albuteroL (Duo-Neb) 0.5-2.5 mg/3 mL nebulizer solution 3 mL, nebulization, 4 times daily RT    montelukast (SINGULAIR) 10 mg, oral, Nightly    OLANZapine (ZYPREXA) 5 mg, oral, Nightly    ondansetron ODT (Zofran-ODT) 4 mg disintegrating tablet DISSOLVE 1 TABLET IN MOUTH BY MOUTH THREE TIMES A DAY AS NEEDED NAUSEA    rosuvastatin (CRESTOR) 20 mg, oral, Daily    sennosides (SENNA) 8.6 mg, oral, Daily    sertraline (ZOLOFT) 50 mg, oral, 2 times daily    simethicone (MYLICON) 80 mg, oral, Every 6 hours PRN     Past Medical History:   Diagnosis Date    Aneurysm of carotid artery (CMS/[facility])     Neck aneurysm    DVT (deep venous thrombosis) (CMS/[facility])     2022    History of tubal ligation     Old myocardial infarction     History of heart attack    Other specified disorders of kidney and ureter     Obstruction of kidney    Personal history of other diseases of the circulatory system     History of intracranial aneurysm    Personal history of other diseases of the circulatory system     History of abdominal aortic aneurysm (AAA)    Personal history of other diseases of the respiratory system     History of chronic obstructive lung disease    Personal history of urinary calculi     History of kidney stones    Right upper quadrant pain 09/25/2020    Abdominal pain, RUQ (right upper quadrant)      Past Surgical History:   Procedure Laterality Date    CT ABDOMEN PELVIS ANGIOGRAM W AND/OR WO IV CONTRAST   11/21/2019    CT ABDOMEN PELVIS ANGIOGRAM W AND/OR WO IV CONTRAST 11/21/2019 [facility]    CT AORTA AND BILATERAL ILIOFEMORAL RUNOFF ANGIOGRAM W AND/OR WO IV CONTRAST  8/9/2022    CT AORTA AND BILATERAL ILIOFEMORAL RUNOFF ANGIOGRAM W AND/OR WO IV CONTRAST 8/9/2022 [facility]    CT HEAD ANGIO W AND WO IV CONTRAST  5/22/2020    CT HEAD ANGIO W AND WO IV CONTRAST 5/22/2020 [facility]    INVASIVE VASCULAR PROCEDURE N/A 1/23/2024    Procedure: Lower Extremity Angiogram;  Surgeon: Willam Mike MD;  Location: [place] [address] Cardiac Cath Lab;  Service: Cardiovascular;  Laterality: N/A;  [id number];LLE CLTI    INVASIVE VASCULAR PROCEDURE N/A 1/23/2024    Procedure: Lower Extremity Intervention;  Surgeon: Willam Mike MD;  Location: [place] [address] Cardiac Cath Lab;  Service: Cardiovascular;  Laterality: N/A;    OTHER SURGICAL HISTORY  09/30/2020    Tubal ligation    OTHER SURGICAL HISTORY  09/30/2020    Cardiac catheterization     Family History   Problem Relation Name Age of Onset    Aneurysm Mother      Hypertension Mother      Heart attack Father       Social History     Socioeconomic History    Marital status:      Spouse name: Not on file    Number of children: Not on file    Years of education: Not on file    Highest education level: Not on file   Occupational History    Not on file   Tobacco Use    Smoking status: Every Day     Packs/day: 1     Types: Cigarettes    Smokeless tobacco: Never   Substance and Sexual Activity    Alcohol use: Not on file    Drug use: Not on file    Sexual activity: Not on file   Other Topics Concern    Not on file   Social History Narrative    Not on file     Social Determinants of Health     Financial Resource Strain: Low Risk  (3/11/2024)    Overall Financial Resource Strain (CARDIA)     Difficulty of Paying Living Expenses: Not very hard   Food Insecurity: Not on file   Transportation Needs: No Transportation Needs (3/11/2024)    PRAPARE - Transportation     Lack of  "Transportation (Medical): No     Lack of Transportation (Non-Medical): No   Physical Activity: Not on file   Stress: Not on file   Social Connections: Not on file   Intimate Partner Violence: Not on file   Housing Stability: Low Risk  (3/11/2024)    Housing Stability Vital Sign     Unable to Pay for Housing in the Last Year: No     Number of Places Lived in the Last Year: 1     Unstable Housing in the Last Year: No     Code Status: Full Code    Objective   /77 (BP Location: Right arm, Patient Position: Lying)   Pulse 65   Temp 36.7 °C (98.1 °F) (Temporal)   Resp 16   Ht 1.626 m (5' 4\")   Wt 63.5 kg (140 lb)   SpO2 95%   BMI 24.03 kg/m²     Labs, radiological imaging and cardiac work up were personally reviewed    Input/Output:    Intake/Output Summary (Last 24 hours) at 3/13/2024 1636  Last data filed at 3/12/2024 2320  Gross per 24 hour   Intake 550 ml   Output --   Net 550 ml       Oxygen requirements:      Ventilator Information:  FiO2 (%):  [28 %] 28 %  Oxygen Therapy/Pulse Ox  Medical Gas Therapy: Supplemental oxygen  O2 Delivery Method: Nasal cannula  FiO2 (%): 28 %  SpO2: 95 %  Patient Activity During SpO2 Measurement: At rest  Temp: 36.7 °C (98.1 °F)    Imaging:  Results from last 7 days   Lab Units 03/13/24  0858 03/12/24  0645 03/11/24  1549 03/11/24  1239   SODIUM mmol/L 140 140  --  135*   POTASSIUM mmol/L 4.3 4.8 4.3 5.4*   CHLORIDE mmol/L 103 106  --  101   CO2 mmol/L 29 25  --  30   BUN mg/dL 17 20  --  14   CREATININE mg/dL 0.63 0.75  --  0.54   GLUCOSE mg/dL 119* 83  --  91   CALCIUM mg/dL 9.3 8.7  --  8.9       Results from last 7 days   Lab Units 03/13/24  0858   WBC AUTO x10*3/uL 13.1*   HEMOGLOBIN g/dL 14.3   HEMATOCRIT % 46.8*   PLATELETS AUTO x10*3/uL 198       ECG 12 lead    Result Date: 3/12/2024  Poor data quality, interpretation may be adversely affected Normal sinus rhythm with sinus arrhythmia Cannot rule out Inferior infarct , age undetermined Abnormal ECG When compared " with ECG of 23-JAN-2024 18:45, Minimal criteria for Inferior infarct are now Present T wave inversion no longer evident in Anterior leads    XR chest 1 view    Result Date: 3/11/2024  Interpreted By:  Kimani Brunner, STUDY: XR CHEST 1 VIEW   INDICATION: Signs/Symptoms:cough.   COMPARISON: None   ACCESSION NUMBER(S): [id number]   ORDERING CLINICIAN: MARIANNE HOROWITZ   FINDINGS: No consolidation, effusion, edema, or pneumothorax.   Heart size within normal limits.         No evidence of acute intrathoracic abnormality.   Signed by: Kimani Brunner 3/11/2024 1:26 PM Dictation workstation:   [id number]    Vascular US ankle brachial index (TRAVIS) without exercise    Result Date: 3/6/2024           [place] [address] Tel [phone] and Fax [phone]  Vascular Lab Report [facility] US ANKLE BRACHIAL INDEX (TRAVIS) WITHOUT EXERCISE  Patient Name:      AALIYAH SAWANT     Reading Physician:  [id number] Matthew Warner MD Study Date:        3/6/2024            Ordering Physician: [id number] HEIDI MORALES MRN/PID:           [id number]            Technologist:       Darlyn Gamino T Accession#:        [id number]        Technologist 2: Date of Birth/Age: 1955 / 68 years Encounter#:         [id number] Gender:            F Admission Status:  Outpatient          Location Performed: [facility]  Diagnosis/ICD: Atherosclerosis of autologous vein bypass graft(s) of the left                leg with ulceration of other part of foot-I70.445;                Atherosclerosis of native arteries of left leg with ulceration of                other part of foot-I70.245 Indication:    Atherosclerosis of native arteries-extremities w/ulceration CPT Codes:     73754 Peripheral artery TRAVIS Only  CONCLUSIONS: Right Lower PVR: No evidence of arterial occlusive disease in the right lower extremity at rest. Decreased digital perfusion noted. Triphasic flow is noted in the right common femoral artery, right posterior tibial artery  and right dorsalis pedis artery. Left Lower PVR: No evidence of arterial occlusive disease in the left lower extremity at rest. Decreased digital perfusion noted. Triphasic flow is noted in the left common femoral artery, left posterior tibial artery and left dorsalis pedis artery.  Comparison: Compared with study from 1/17/2024, Significant improvement to the LLE following intervention on 01-.  Imaging & Doppler Findings:  RIGHT Lower PVR                Pressures Ratios Right Posterior Tibial (Ankle) 152 mmHg  1.14 Right Dorsalis Pedis (Ankle)   146 mmHg  1.10 Right Digit (Great Toe)        80 mmHg   0.60   LEFT Lower PVR                Pressures Ratios Left Posterior Tibial (Ankle) 143 mmHg  1.08 Left Dorsalis Pedis (Ankle)   136 mmHg  1.02 Left Digit (Great Toe)        69 mmHg   0.52                     Right     Left Brachial Pressure 132 mmHg 133 mmHg   [id number] Matthew Warner MD Electronically signed by [id number] Matthew Warner MD on 3/6/2024 at 10:58:26 AM  ** Final **     CT angio chest for pulmonary embolism    Result Date: 3/4/2024  Interpreted By:  Irma Rodriguez, STUDY: CT ANGIO CHEST FOR PULMONARY EMBOLISM;  3/4/2024 3:19 pm   INDICATION: Signs/Symptoms:Metastatic lung cancer.  On immunotherapy with Keytruda.  Acute shortness of breath.  Need to rule out PE and pneumonitis..   COMPARISON: 12/13/2023   ACCESSION NUMBER(S): [id number]   ORDERING CLINICIAN: ADARSH LARA   TECHNIQUE: Helical data acquisition of the chest was obtained contrast volume:without IV contrast material/Optiray 350/Isovue 370.  Images were reformatted in coronal and sagittal planes. Axial and coronal MIP images were created and reviewed.   FINDINGS: POTENTIAL LIMITATIONS OF THE STUDY: None   HEART AND VESSELS: No discrete filling defects within the main pulmonary artery or its branches.   Main pulmonary artery is dilated at 3.3 cm and is unchanged.   Ascending thoracic aorta is normal in size. Atherosclerotic calcification is  noted at the aortic arch. In the proximal descending aorta there is noncalcified plaque with an area of mild dilatation measuring 3.1 x 4.0 cm, slightly increased compared to the prior study.   Patchy coronary artery calcifications are seen.The study is not optimized for evaluation of coronary arteries.   The cardiac chambers are not enlarged.   No evidence of pericardial effusion.   MEDIASTINUM AND TRINA, LOWER NECK AND AXILLA: 1 cm hypodense nodule in the right thyroid lobe is unchanged.   No evidence of thoracic lymphadenopathy by CT criteria.   Esophagus appears within normal limits as seen.   LUNGS AND AIRWAYS: The trachea and central airways are patent. No endobronchial lesion.   Lungs are emphysematous. Multiple bilateral pulmonary nodules appear unchanged, largest is pulmonary based posterior right lower lobe and measures 9 mm (image 145 of 318). No new or enlarging nodules are identified. There is no focal pneumonia. No pleural effusion is identified.   UPPER ABDOMEN: Upper pole stone in the left kidney is partially visualized and measures up to 1.2 cm. The proximal portion of an aortic stent graft is visible, with the abdominal aorta just above the graft measuring 3.9 cm, unchanged. Bilateral adrenal masses are unchanged, 5 cm on the right and 3.6 cm on the left.   CHEST WALL AND OSSEOUS STRUCTURES: There are no suspicious osseous lesions. Multilevel degenerative changes are present       1.  No pulmonary embolism is identified 2. Unchanged bilateral pulmonary nodules 3. Unchanged bilateral adrenal masses 4. Slightly greater dilatation proximal descending thoracic aorta. No change in dilatation of the proximal abdominal aorta just above the partially visualized stent graft 5. Partially visualized left renal stone 6. Unchanged dilated main pulmonary artery     MACRO: None   Signed by: Irma Rodriguez 3/4/2024 4:12 PM Dictation workstation:   [id number]    Medications:  Inpatient scheduled  medications:  albuterol, 2.5 mg, nebulization, TID  aspirin, 81 mg, oral, Daily  azithromycin, 500 mg, intravenous, q24h  budesonide, 0.25 mg, nebulization, BID  clopidogrel, 75 mg, oral, q AM  dexAMETHasone, 4 mg, intravenous, q8h  enoxaparin, 40 mg, subcutaneous, q24h  ferrous sulfate (325 mg ferrous sulfate), 65 mg of iron, oral, Daily  tiotropium, 2 puff, inhalation, Daily   And  fluticasone furoate-vilanteroL, 1 puff, inhalation, Daily  montelukast, 10 mg, oral, Nightly  nicotine, 1 patch, transdermal, Daily   Followed by  [START ON 4/23/2024] nicotine, 1 patch, transdermal, Daily   Followed by  [START ON 5/7/2024] nicotine, 1 patch, transdermal, Daily  OLANZapine, 5 mg, oral, Nightly  rosuvastatin, 20 mg, oral, Daily  sennosides, 1 tablet, oral, Nightly  sertraline, 50 mg, oral, BID    Inpatient PRN medications:  PRN medications: acetaminophen, albuterol, HYDROcodone-acetaminophen, ondansetron, oxygen, polyethylene glycol, simethicone    Physical Exam:   GENERAL: Awake/alert, cooperative, conversant. Not in pain or distress, in no respiratory distress  HEAD:  Normocephalic, atraumatic.  EYES:  Round and reactive. Anicteric. Clear sclera. No conjunctivitis.   ENT:  No nasal discharge, no coryza. Mucous membranes moist and pink.  NECK:  Atraumatic, no cervical lymphadenopathy bilaterally.  CARDIOVASCULAR: RRR, positive S1 & S2.  RESPIRATORY: Wheezing auscultated bilaterally.  ABDOMEN:  Hypoactive bowel sounds.  EXTREMITIES:  No peripheral edema, no calf tenderness. Peripheral pulses intact.  SKIN:  Warm and dry. No tenting. No rash or open wound noted.  NEUROLOGICAL:  Nonfocal grossly.  A&O x 4. CN II-XII grossly intact.    Assessment/Plan   Radha Toussaint is a 68 y.o. 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 Subsequently admitted for COPD exacerbation treatment.    #COPD exacerbation  #NSCLC R lung with adrenal gland carcinoma on Keytruda  #Adrenal insufficiency  #History of neuropsychiatric steroid side effects  -Continue home inhalers, Singulair  -Supplemental O2, adjust for SpO2 88 to 94%  -Continue Zithromax  -Discontinue Solu-Medrol, start IV dexamethasone 4 mg every 8 hours  -Start Pulmicort twice daily  -Endocrinology consultation recommended  -Will continue following    Dina Cristobal MD  Pulmonary critical care consultant  03/13/24  4:37 PM

## 2024-03-13 NOTE — PROGRESS NOTES
"Radha Toussaint is a 68 y.o. female on day 0 of admission presenting with COPD exacerbation (CMS/HCC).    Subjective   Better today however question about steroid usage from oncology       Objective     Physical Exam  Constitutional:       Appearance: Normal appearance.   HENT:      Head: Normocephalic and atraumatic.      Right Ear: Tympanic membrane and ear canal normal.      Left Ear: Tympanic membrane and ear canal normal.      Mouth/Throat:      Mouth: Mucous membranes are moist.      Pharynx: Oropharynx is clear.   Eyes:      Extraocular Movements: Extraocular movements intact.      Conjunctiva/sclera: Conjunctivae normal.      Pupils: Pupils are equal, round, and reactive to light.   Cardiovascular:      Rate and Rhythm: Normal rate and regular rhythm.      Pulses: Normal pulses.      Heart sounds: Normal heart sounds.   Pulmonary:      Effort: Pulmonary effort is normal.      Breath sounds: Normal breath sounds.   Abdominal:      General: Abdomen is flat. Bowel sounds are normal.      Palpations: Abdomen is soft.   Musculoskeletal:         General: Normal range of motion.      Cervical back: Normal range of motion and neck supple.   Skin:     General: Skin is warm and dry.      Capillary Refill: Capillary refill takes 2 to 3 seconds.   Neurological:      General: No focal deficit present.      Mental Status: She is alert and oriented to person, place, and time. Mental status is at baseline.   Psychiatric:         Mood and Affect: Mood normal.         Behavior: Behavior normal.         Thought Content: Thought content normal.         Judgment: Judgment normal.         Last Recorded Vitals  Blood pressure 170/79, pulse 63, temperature 36 °C (96.8 °F), temperature source Temporal, resp. rate 18, height 1.626 m (5' 4\"), weight 63.5 kg (140 lb), SpO2 97 %.  Intake/Output last 3 Shifts:  I/O last 3 completed shifts:  In: 550 (8.7 mL/kg) [I.V.:50 (0.8 mL/kg); IV Piggyback:500]  Out: - (0 mL/kg)   Weight: 63.5 kg "     Relevant Results                             Assessment/Plan   Principal Problem:    COPD exacerbation (CMS/[facility])    #COPD Exacerbation  #SOB  -Chest x-ray negative for effusion, edema, pneumothorax  -CT Chest negative for PE, shows emphysema, unchanged pulmonary nodules  - Flu/covid+ test negative  -pulmonary c/s  -AM labs  -PRN albuterol   -titrate oxygen as needed to keep stats above 90  -solu-medrol  -Azithromycin  #Headache  - Fluids  -Analgesics PRN  -Reglan  -Toradol  -UA with cx  -Neurology c/s  #Weakness  #Paresthesias   -PT  -OT     Chronic Conditions  #HLD  #MI  #HTN  #PVD  #CAD  #Anxiety  #Adrenal CA  Continue home medications as ordered,     Full code     #DVT Prophylaxis  b/l Scd's as tolerated  On anticoagulant plavix        3/12: doing well, appear near baseline, will treat today likely home tomorrow    3/13: meds adjusted by pulm, recommend oncology input on her steroid usage       I spent 35 minutes in the professional and overall care of this patient.      Nico Davies, DO

## 2024-03-14 PROCEDURE — 97535 SELF CARE MNGMENT TRAINING: CPT | Mod: CO,GO

## 2024-03-14 PROCEDURE — 99232 SBSQ HOSP IP/OBS MODERATE 35: CPT | Performed by: STUDENT IN AN ORGANIZED HEALTH CARE EDUCATION/TRAINING PROGRAM

## 2024-03-14 PROCEDURE — 2500000004 HC RX 250 GENERAL PHARMACY W/ HCPCS (ALT 636 FOR OP/ED)

## 2024-03-14 PROCEDURE — 2500000004 HC RX 250 GENERAL PHARMACY W/ HCPCS (ALT 636 FOR OP/ED): Performed by: STUDENT IN AN ORGANIZED HEALTH CARE EDUCATION/TRAINING PROGRAM

## 2024-03-14 PROCEDURE — 97116 GAIT TRAINING THERAPY: CPT | Mod: GP,CQ

## 2024-03-14 PROCEDURE — 94761 N-INVAS EAR/PLS OXIMETRY MLT: CPT

## 2024-03-14 PROCEDURE — 2500000002 HC RX 250 W HCPCS SELF ADMINISTERED DRUGS (ALT 637 FOR MEDICARE OP, ALT 636 FOR OP/ED): Performed by: STUDENT IN AN ORGANIZED HEALTH CARE EDUCATION/TRAINING PROGRAM

## 2024-03-14 PROCEDURE — 1100000001 HC PRIVATE ROOM DAILY

## 2024-03-14 PROCEDURE — S4991 NICOTINE PATCH NONLEGEND: HCPCS

## 2024-03-14 PROCEDURE — 2500000002 HC RX 250 W HCPCS SELF ADMINISTERED DRUGS (ALT 637 FOR MEDICARE OP, ALT 636 FOR OP/ED)

## 2024-03-14 PROCEDURE — 2500000001 HC RX 250 WO HCPCS SELF ADMINISTERED DRUGS (ALT 637 FOR MEDICARE OP)

## 2024-03-14 PROCEDURE — 94640 AIRWAY INHALATION TREATMENT: CPT

## 2024-03-14 RX ADMIN — DEXAMETHASONE SODIUM PHOSPHATE 4 MG: 10 INJECTION, SOLUTION INTRAMUSCULAR; INTRAVENOUS at 14:04

## 2024-03-14 RX ADMIN — CLOPIDOGREL BISULFATE 75 MG: 75 TABLET ORAL at 09:23

## 2024-03-14 RX ADMIN — ALBUTEROL SULFATE 2.5 MG: 2.5 SOLUTION RESPIRATORY (INHALATION) at 07:04

## 2024-03-14 RX ADMIN — DEXAMETHASONE SODIUM PHOSPHATE 4 MG: 10 INJECTION, SOLUTION INTRAMUSCULAR; INTRAVENOUS at 22:04

## 2024-03-14 RX ADMIN — MONTELUKAST 10 MG: 10 TABLET, FILM COATED ORAL at 20:27

## 2024-03-14 RX ADMIN — ALBUTEROL SULFATE 2.5 MG: 2.5 SOLUTION RESPIRATORY (INHALATION) at 14:05

## 2024-03-14 RX ADMIN — FLUTICASONE FUROATE AND VILANTEROL TRIFENATATE 1 PUFF: 200; 25 POWDER RESPIRATORY (INHALATION) at 07:07

## 2024-03-14 RX ADMIN — ENOXAPARIN SODIUM 40 MG: 40 INJECTION SUBCUTANEOUS at 09:22

## 2024-03-14 RX ADMIN — DEXAMETHASONE SODIUM PHOSPHATE 4 MG: 10 INJECTION, SOLUTION INTRAMUSCULAR; INTRAVENOUS at 05:52

## 2024-03-14 RX ADMIN — HYDROCODONE BITARTRATE AND ACETAMINOPHEN 1 TABLET: 10; 325 TABLET ORAL at 14:04

## 2024-03-14 RX ADMIN — SENNOSIDES 8.6 MG: 8.6 TABLET, FILM COATED ORAL at 20:27

## 2024-03-14 RX ADMIN — ASPIRIN 81 MG: 81 TABLET, COATED ORAL at 09:24

## 2024-03-14 RX ADMIN — ALBUTEROL SULFATE 2.5 MG: 2.5 SOLUTION RESPIRATORY (INHALATION) at 18:40

## 2024-03-14 RX ADMIN — SERTRALINE HYDROCHLORIDE 50 MG: 50 TABLET ORAL at 09:25

## 2024-03-14 RX ADMIN — TIOTROPIUM BROMIDE INHALATION SPRAY 2 PUFF: 3.12 SPRAY, METERED RESPIRATORY (INHALATION) at 07:06

## 2024-03-14 RX ADMIN — FERROUS SULFATE TAB 325 MG (65 MG ELEMENTAL FE) 1 TABLET: 325 (65 FE) TAB at 09:24

## 2024-03-14 RX ADMIN — ACETAMINOPHEN 650 MG: 325 TABLET ORAL at 22:03

## 2024-03-14 RX ADMIN — ROSUVASTATIN CALCIUM 20 MG: 10 TABLET, FILM COATED ORAL at 09:23

## 2024-03-14 RX ADMIN — OLANZAPINE 5 MG: 5 TABLET, FILM COATED ORAL at 20:27

## 2024-03-14 RX ADMIN — NICOTINE 1 PATCH: 21 PATCH, EXTENDED RELEASE TRANSDERMAL at 09:22

## 2024-03-14 RX ADMIN — BUDESONIDE 0.25 MG: 0.25 INHALANT RESPIRATORY (INHALATION) at 07:04

## 2024-03-14 RX ADMIN — BUDESONIDE 0.25 MG: 0.25 INHALANT RESPIRATORY (INHALATION) at 18:40

## 2024-03-14 ASSESSMENT — COGNITIVE AND FUNCTIONAL STATUS - GENERAL
DAILY ACTIVITIY SCORE: 21
TOILETING: A LITTLE
CLIMB 3 TO 5 STEPS WITH RAILING: A LITTLE
STANDING UP FROM CHAIR USING ARMS: A LITTLE
WALKING IN HOSPITAL ROOM: A LITTLE
MOVING TO AND FROM BED TO CHAIR: A LITTLE
CLIMB 3 TO 5 STEPS WITH RAILING: A LITTLE
DRESSING REGULAR LOWER BODY CLOTHING: A LITTLE
MOBILITY SCORE: 20
HELP NEEDED FOR BATHING: A LITTLE
MOBILITY SCORE: 24
DRESSING REGULAR LOWER BODY CLOTHING: A LITTLE
DAILY ACTIVITIY SCORE: 24
STANDING UP FROM CHAIR USING ARMS: A LITTLE
WALKING IN HOSPITAL ROOM: A LITTLE
MOBILITY SCORE: 20
TOILETING: A LITTLE
HELP NEEDED FOR BATHING: A LITTLE
MOVING TO AND FROM BED TO CHAIR: A LITTLE
DAILY ACTIVITIY SCORE: 21

## 2024-03-14 ASSESSMENT — PAIN SCALES - GENERAL
PAINLEVEL_OUTOF10: 0 - NO PAIN
PAINLEVEL_OUTOF10: 7
PAINLEVEL_OUTOF10: 0 - NO PAIN

## 2024-03-14 ASSESSMENT — PAIN DESCRIPTION - DESCRIPTORS
DESCRIPTORS: ACHING
DESCRIPTORS: ACHING

## 2024-03-14 ASSESSMENT — PAIN - FUNCTIONAL ASSESSMENT
PAIN_FUNCTIONAL_ASSESSMENT: 0-10

## 2024-03-14 ASSESSMENT — ACTIVITIES OF DAILY LIVING (ADL): HOME_MANAGEMENT_TIME_ENTRY: 30

## 2024-03-14 ASSESSMENT — PAIN DESCRIPTION - LOCATION: LOCATION: HEAD

## 2024-03-14 NOTE — CONSULTS
Endocrinology Initial Inpatient Consult Note     PATIENT NAME: Radha Toussaint  MRN: [id number]  DATE: 3/14/2024    CONSULTING PHYSICIAN: Dr. Davies  REASON FOR CONSULT: Adrenal Insufficiency      History of Presenting Illness     68 y F w. PMHx of:      # Metastatic NSCLC s/p Carboplatin, Taxol, and Pembrolizumab      # COPD c/b Chronic Hypox Resp Fx on Home O2      # Multiple Vascular Aneurysms (AAA, ICA)      # PAD s/p Multiple Interventions      # Hx of DVT      # CAD      # HTN      # HLD     Patient presented to [facility] on 3/11 complaining of worsening shortness of breath.  Reports has been going on for the last month.  She states that she has been using nebulizers at home.  They do not help her dyspnea.  The patient also states that she has been coughing more.  She does have a chronic cough but this has worsened.  She states that she has green sputum but is mostly white in nature.  The patient is on nasal cannula at home.  She states that she uses a O2 saturation device which states that it was in the low 90s.  The patient has increased her oxygen without improvement of her dyspnea.  She also reports that she is been having worsening headache which are not improved with acetaminophen.  She is also complaining of worsening bilateral lower extremity weakness and numbness and tingling.  She has difficulty ambulating.  She does not use a cane or a walker.    In regards to hydrocortisone.  The patient states that she was started on hydrocortisone by her oncologist, Dr. Burnham.  She does not know why.  She states that she thinks her adrenal gland is filled with cancer and does not make adrenal hormones.  She states that she has been on for the last 5 months.  The patient reports that she takes hydrocortisone 10 mg upon waking, 5 mg at around 2 in the afternoon, and 2.5 mg at bedtime.  The patient reports that she has been having suicidal as well as homicidal ideation on the hydrocortisone.  She states that she has  "not been on hydrocortisone since being in the hospital and these ideations have gone away.  She thinks that they are from the hydrocortisone.  The patient denies any lightheadedness.  She denies any dizziness while standing.      Review of Systems (Bold if Positive)     General: Fevers, Chills, Weight Loss, or Night Sweats  HEENT: Headaches or Blurry Vision  Cardiovascular: CP, Palpitations, Diaphoresis, or Peripheral Edema  Respiratory: Cough, Shortness of Breath, or Hemoptysis  Gastrointestinal: N/V, Abdominal Pain, Constipation, Diarrhea, Melena, Hematochezia  Genitourinary: Polyruia, Dysuria, Urgency   Endo: Polydipsia, Heat/Cold Intolerance, Hair/Skin/Nail Changes  Rheum: Joint Pain   MSK: LBP, Muscle Pain  Psych: Anxiety, Depression      Physical Examination     /73   Pulse 60   Temp 35.9 °C (96.6 °F)   Resp 16   Ht 1.626 m (5' 4\")   Wt 63.5 kg (140 lb)   SpO2 98%   BMI 24.03 kg/m²   General: No acute distress. A&Ox3.   HEENT: PERRLA. EOMi. Ø Exophthalmos   Neck: No LAD.  Thyroid: 20 g.  Ø Bruit  CV: Normal rate and rhythm. No murmurs or rubs auscultated.   Resp: CTA b/l. No wheezing or crackles.   Abdomen: Soft, nontender, nondistended abdomen. BSx4.   Extremities: No pedal edema b/l.  Neuro: CN II-XII Grossly Intact. Ø Tremor. Brisk Reflexes.   Psych: Appropriate affect  Skin: No apparent rashes      Past Medical / Surgical / Family / Social History     Past Medical History:   Diagnosis Date    Aneurysm of carotid artery (CMS/[facility])     Neck aneurysm    DVT (deep venous thrombosis) (CMS/[facility])     2022    History of tubal ligation     Old myocardial infarction     History of heart attack    Other specified disorders of kidney and ureter     Obstruction of kidney    Personal history of other diseases of the circulatory system     History of intracranial aneurysm    Personal history of other diseases of the circulatory system     History of abdominal aortic aneurysm (AAA)    Personal history of other " diseases of the respiratory system     History of chronic obstructive lung disease    Personal history of urinary calculi     History of kidney stones    Right upper quadrant pain 09/25/2020    Abdominal pain, RUQ (right upper quadrant)     Past Surgical History:   Procedure Laterality Date    CT ABDOMEN PELVIS ANGIOGRAM W AND/OR WO IV CONTRAST  11/21/2019    CT ABDOMEN PELVIS ANGIOGRAM W AND/OR WO IV CONTRAST 11/21/2019 [facility]    CT AORTA AND BILATERAL ILIOFEMORAL RUNOFF ANGIOGRAM W AND/OR WO IV CONTRAST  8/9/2022    CT AORTA AND BILATERAL ILIOFEMORAL RUNOFF ANGIOGRAM W AND/OR WO IV CONTRAST 8/9/2022 [facility]    CT HEAD ANGIO W AND WO IV CONTRAST  5/22/2020    CT HEAD ANGIO W AND WO IV CONTRAST 5/22/2020 [facility]    INVASIVE VASCULAR PROCEDURE N/A 1/23/2024    Procedure: Lower Extremity Angiogram;  Surgeon: Willam Mike MD;  Location: [place] [address] Cardiac Cath Lab;  Service: Cardiovascular;  Laterality: N/A;  [id number];[facility]    INVASIVE VASCULAR PROCEDURE N/A 1/23/2024    Procedure: Lower Extremity Intervention;  Surgeon: Willam Mike MD;  Location: [place] [address] Cardiac Cath Lab;  Service: Cardiovascular;  Laterality: N/A;    OTHER SURGICAL HISTORY  09/30/2020    Tubal ligation    OTHER SURGICAL HISTORY  09/30/2020    Cardiac catheterization     Family History   Problem Relation Name Age of Onset    Aneurysm Mother      Hypertension Mother      Heart attack Father       Social History     Socioeconomic History    Marital status:      Spouse name: Not on file    Number of children: Not on file    Years of education: Not on file    Highest education level: Not on file   Occupational History    Not on file   Tobacco Use    Smoking status: Every Day     Packs/day: 1     Types: Cigarettes    Smokeless tobacco: Never   Substance and Sexual Activity    Alcohol use: Not on file    Drug use: Not on file    Sexual activity: Not on file   Other Topics Concern    Not on file   Social History  Narrative    Not on file     Social Determinants of Health     Financial Resource Strain: Low Risk  (3/11/2024)    Overall Financial Resource Strain (CARDIA)     Difficulty of Paying Living Expenses: Not very hard   Food Insecurity: Not on file   Transportation Needs: No Transportation Needs (3/11/2024)    PRAPARE - Transportation     Lack of Transportation (Medical): No     Lack of Transportation (Non-Medical): No   Physical Activity: Not on file   Stress: Not on file   Social Connections: Not on file   Intimate Partner Violence: Not on file   Housing Stability: Low Risk  (3/11/2024)    Housing Stability Vital Sign     Unable to Pay for Housing in the Last Year: No     Number of Places Lived in the Last Year: 1     Unstable Housing in the Last Year: No       Medications & Allergies     MAR and PTA Meds Reviewed     Allergies   Allergen Reactions    Ace Inhibitors Angioedema    Prednisone Anxiety and Agitation     & violent    Demerol [Meperidine] Nausea/vomiting    Iodinated Contrast Media Nausea/vomiting       Data     Recent Labs and Imaging Reviewed      Assessment / Plan       # Adrenal Insufficiency  Cloudy picture.  This patient is on pembrolizumab for her non-small cell lung cancer which can cause both primary and secondary adrenal insufficiency.  Furthermore, the patient has been intermittently receiving glucocorticoids for her multiple COPD exacerbations.  I tried to review most of the cortisol levels that are in the chart, and most of them are actually uninterpretable.  Some are done in the evening and others are done in the morning, but I am unclear if the patient was on hydrocortisone at the time of the draw.  It would be very difficult to determine that.    Nonetheless, the patient's maintained on hydrocortisone is a physiologic regimen, she takes 10 mg/5 mg / 2.5 mg daily.  The patient states that she has suicidal and homicidal ideations on hydrocortisone.  She states that these thoughts are gone  since being off of hydrocortisone here in the hospital.  I do not know if this is coincidental or not, nonetheless the patient would like to be trialed on a different glucocorticoid.    She is currently on dexamethasone 4 mg daily for her acute exacerbation of COPD.  I think a decent maintenance dose which would cover her from a glucocorticoid standpoint would be 2 mg daily given her weight and body surface area.  We really do not like to use dexamethasone as glucocorticoid replacement as it has a long half-life.  Patient is on dexamethasone can develop iatrogenic Cushing's symptoms due to 30-hour length of action.  Given the fact that she has been on steroids and is currently receiving glucocorticoids, we cannot proceed with adrenal testing at this time.  I think treating her empirically is the safest option.       Fco Palmer, DO  Endocrinology, Diabetes, and Metabolism    Available via [employer] Messenger    Please excuse any typographical or unwanted errors within this documentation as voice recognition software was used to dictate this note.

## 2024-03-14 NOTE — PROGRESS NOTES
"Pulmonology Consult Note:      Radha Toussaint is a 68 y.o. 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 Subsequently admitted for COPD exacerbation treatment.    Subjective   Patient reports that she has been having wet cough, wheezing, mucoid sputum production for few days leading up to admission.  She denied N/V/D, constitutional symptoms like fever/chills and muscle aches, other pains.  Patient follows up OP heme-onc Dr. 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  She was transitioned to Keytruda because follow-up CT scan showed interval decrease in bilateral adrenal lesions.  Recently treated last month empirically with Solu-Medrol Dosepak and Z-Ramiro for SOB.  She is a lifelong heavy smoker.  She is on home albuterol, Trelegy Ellipta, Singulair, Florinef, Cortef.  Questionable adherence to home steroid therapy D/T reported neuropsychiatric side effects.     Past pulmonology history:  -PCP Francheska Rogel, DO  -Pertinent home meds: Albuterol inhaler, Trelegy Ellipta, Singulair, Florinef, Cortef  -PFTs: No results found for: \"FEV1\", \"FVC\", \"ASF6SIK\", \"TLC\", \"DLCO\"    A 10 point ROS was performed with the patient denying any complaint at this time aside from those listed in the HPI above.     Current Outpatient Medications   Medication Instructions    acetaminophen (TYLENOL) 325 mg, oral, Every 4 hours PRN    albuterol 90 mcg/actuation inhaler 1 puff, inhalation, Every 4 hours PRN    aspirin 81 mg EC tablet 1 tablet, oral, Daily    " clopidogrel (PLAVIX) 75 mg, oral, Daily    ferrous sulfate 65 mg, oral, Daily, Do not crush, chew, or split.    fludrocortisone (Florinef) 0.1 mg tablet TAKE 1/2 TABLET BY MOUTH ONCE DAILY    fluticasone-umeclidin-vilanter (Trelegy Ellipta) 200-62.5-25 mcg blister with device 1 puff, inhalation, Daily RT    HYDROcodone-acetaminophen (Norco)  mg tablet 1 tablet, oral, Every 8 hours PRN    hydrocortisone (Cortef) 5 mg tablet STARTING ON 9/8 TAKE 2 TABS AT 8AM THEN TAKE 1 TABLET AT NOON THEN TAKE 1/2 TABLET AT 4PM    ipratropium-albuteroL (Duo-Neb) 0.5-2.5 mg/3 mL nebulizer solution 3 mL, nebulization, 4 times daily RT    montelukast (SINGULAIR) 10 mg, oral, Nightly    OLANZapine (ZYPREXA) 5 mg, oral, Nightly    ondansetron ODT (Zofran-ODT) 4 mg disintegrating tablet DISSOLVE 1 TABLET IN MOUTH BY MOUTH THREE TIMES A DAY AS NEEDED NAUSEA    rosuvastatin (CRESTOR) 20 mg, oral, Daily    sennosides (SENNA) 8.6 mg, oral, Daily    sertraline (ZOLOFT) 50 mg, oral, 2 times daily    simethicone (MYLICON) 80 mg, oral, Every 6 hours PRN     Past Medical History:   Diagnosis Date    Aneurysm of carotid artery (CMS/[facility])     Neck aneurysm    DVT (deep venous thrombosis) (CMS/[facility])     2022    History of tubal ligation     Old myocardial infarction     History of heart attack    Other specified disorders of kidney and ureter     Obstruction of kidney    Personal history of other diseases of the circulatory system     History of intracranial aneurysm    Personal history of other diseases of the circulatory system     History of abdominal aortic aneurysm (AAA)    Personal history of other diseases of the respiratory system     History of chronic obstructive lung disease    Personal history of urinary calculi     History of kidney stones    Right upper quadrant pain 09/25/2020    Abdominal pain, RUQ (right upper quadrant)      Past Surgical History:   Procedure Laterality Date    CT ABDOMEN PELVIS ANGIOGRAM W AND/OR WO IV CONTRAST   11/21/2019    CT ABDOMEN PELVIS ANGIOGRAM W AND/OR WO IV CONTRAST 11/21/2019 [facility]    CT AORTA AND BILATERAL ILIOFEMORAL RUNOFF ANGIOGRAM W AND/OR WO IV CONTRAST  8/9/2022    CT AORTA AND BILATERAL ILIOFEMORAL RUNOFF ANGIOGRAM W AND/OR WO IV CONTRAST 8/9/2022 [facility]    CT HEAD ANGIO W AND WO IV CONTRAST  5/22/2020    CT HEAD ANGIO W AND WO IV CONTRAST 5/22/2020 [facility]    INVASIVE VASCULAR PROCEDURE N/A 1/23/2024    Procedure: Lower Extremity Angiogram;  Surgeon: Willam Mike MD;  Location: [place] [address] Cardiac Cath Lab;  Service: Cardiovascular;  Laterality: N/A;  [id number];LLE CLTI    INVASIVE VASCULAR PROCEDURE N/A 1/23/2024    Procedure: Lower Extremity Intervention;  Surgeon: Willam Mike MD;  Location: [place] [address] Cardiac Cath Lab;  Service: Cardiovascular;  Laterality: N/A;    OTHER SURGICAL HISTORY  09/30/2020    Tubal ligation    OTHER SURGICAL HISTORY  09/30/2020    Cardiac catheterization     Family History   Problem Relation Name Age of Onset    Aneurysm Mother      Hypertension Mother      Heart attack Father       Social History     Socioeconomic History    Marital status:      Spouse name: Not on file    Number of children: Not on file    Years of education: Not on file    Highest education level: Not on file   Occupational History    Not on file   Tobacco Use    Smoking status: Every Day     Packs/day: 1     Types: Cigarettes    Smokeless tobacco: Never   Substance and Sexual Activity    Alcohol use: Not on file    Drug use: Not on file    Sexual activity: Not on file   Other Topics Concern    Not on file   Social History Narrative    Not on file     Social Determinants of Health     Financial Resource Strain: Low Risk  (3/11/2024)    Overall Financial Resource Strain (CARDIA)     Difficulty of Paying Living Expenses: Not very hard   Food Insecurity: Not on file   Transportation Needs: No Transportation Needs (3/11/2024)    PRAPARE - Transportation     Lack of  "Transportation (Medical): No     Lack of Transportation (Non-Medical): No   Physical Activity: Not on file   Stress: Not on file   Social Connections: Not on file   Intimate Partner Violence: Not on file   Housing Stability: Low Risk  (3/11/2024)    Housing Stability Vital Sign     Unable to Pay for Housing in the Last Year: No     Number of Places Lived in the Last Year: 1     Unstable Housing in the Last Year: No     Code Status: Full Code    Objective   /72   Pulse 60   Temp 36.5 °C (97.7 °F)   Resp 16   Ht 1.626 m (5' 4\")   Wt 63.5 kg (140 lb)   SpO2 99%   BMI 24.03 kg/m²     Labs, radiological imaging and cardiac work up were personally reviewed    Input/Output:  No intake or output data in the 24 hours ending 03/14/24 1118      Oxygen requirements:      Ventilator Information:  FiO2 (%):  [28 %] 28 %  Oxygen Therapy/Pulse Ox  Medical Gas Therapy: Supplemental oxygen  O2 Delivery Method: Nasal cannula  FiO2 (%): 28 %  SpO2: 99 %  Patient Activity During SpO2 Measurement: At rest  $ Pulse Oximetry Charge: Multiple  Temp: 36.5 °C (97.7 °F)    Imaging:  Results from last 7 days   Lab Units 03/13/24  0858 03/12/24  0645 03/11/24  1549 03/11/24  1239   SODIUM mmol/L 140 140  --  135*   POTASSIUM mmol/L 4.3 4.8 4.3 5.4*   CHLORIDE mmol/L 103 106  --  101   CO2 mmol/L 29 25  --  30   BUN mg/dL 17 20  --  14   CREATININE mg/dL 0.63 0.75  --  0.54   GLUCOSE mg/dL 119* 83  --  91   CALCIUM mg/dL 9.3 8.7  --  8.9       Results from last 7 days   Lab Units 03/13/24  0858   WBC AUTO x10*3/uL 13.1*   HEMOGLOBIN g/dL 14.3   HEMATOCRIT % 46.8*   PLATELETS AUTO x10*3/uL 198       ECG 12 lead    Result Date: 3/12/2024  Poor data quality, interpretation may be adversely affected Normal sinus rhythm with sinus arrhythmia Cannot rule out Inferior infarct , age undetermined Abnormal ECG When compared with ECG of 23-JAN-2024 18:45, Minimal criteria for Inferior infarct are now Present T wave inversion no longer evident " in Anterior leads    XR chest 1 view    Result Date: 3/11/2024  Interpreted By:  Kimani Brunner, STUDY: XR CHEST 1 VIEW   INDICATION: Signs/Symptoms:cough.   COMPARISON: None   ACCESSION NUMBER(S): [id number]   ORDERING CLINICIAN: MARIANNE HOROWITZ   FINDINGS: No consolidation, effusion, edema, or pneumothorax.   Heart size within normal limits.         No evidence of acute intrathoracic abnormality.   Signed by: Kimani Brunner 3/11/2024 1:26 PM Dictation workstation:   [id number]    Vascular US ankle brachial index (TRAVIS) without exercise    Result Date: 3/6/2024           [place] [address] Tel [phone] and Fax [phone]  Vascular Lab Report VASC US ANKLE BRACHIAL INDEX (TRAVIS) WITHOUT EXERCISE  Patient Name:      AALIYAH SAWANT     Reading Physician:  [id number] Matthew Warner MD Study Date:        3/6/2024            Ordering Physician: [id number] HEIDI MORALES MRN/PID:           [id number]            Technologist:       Darlyn Gamino T Accession#:        [id number]        Technologist 2: Date of Birth/Age: [id number] / 68 years Encounter#:         [id number] Gender:            F Admission Status:  Outpatient          Location Performed: [facility]  Diagnosis/ICD: Atherosclerosis of autologous vein bypass graft(s) of the left                leg with ulceration of other part of foot-I70.445;                Atherosclerosis of native arteries of left leg with ulceration of                other part of foot-I70.245 Indication:    Atherosclerosis of native arteries-extremities w/ulceration CPT Codes:     12484 Peripheral artery TRAVIS Only  CONCLUSIONS: Right Lower PVR: No evidence of arterial occlusive disease in the right lower extremity at rest. Decreased digital perfusion noted. Triphasic flow is noted in the right common femoral artery, right posterior tibial artery and right dorsalis pedis artery. Left Lower PVR: No evidence of arterial occlusive disease in the left lower extremity at  rest. Decreased digital perfusion noted. Triphasic flow is noted in the left common femoral artery, left posterior tibial artery and left dorsalis pedis artery.  Comparison: Compared with study from 1/17/2024, Significant improvement to the LLE following intervention on 01-.  Imaging & Doppler Findings:  RIGHT Lower PVR                Pressures Ratios Right Posterior Tibial (Ankle) 152 mmHg  1.14 Right Dorsalis Pedis (Ankle)   146 mmHg  1.10 Right Digit (Great Toe)        80 mmHg   0.60   LEFT Lower PVR                Pressures Ratios Left Posterior Tibial (Ankle) 143 mmHg  1.08 Left Dorsalis Pedis (Ankle)   136 mmHg  1.02 Left Digit (Great Toe)        69 mmHg   0.52                     Right     Left Brachial Pressure 132 mmHg 133 mmHg   [id number] Matthew Warner MD Electronically signed by [id number] Matthew Warenr MD on 3/6/2024 at 10:58:26 AM  ** Final **     CT angio chest for pulmonary embolism    Result Date: 3/4/2024  Interpreted By:  Irma Rodriguez, STUDY: CT ANGIO CHEST FOR PULMONARY EMBOLISM;  3/4/2024 3:19 pm   INDICATION: Signs/Symptoms:Metastatic lung cancer.  On immunotherapy with Keytruda.  Acute shortness of breath.  Need to rule out PE and pneumonitis..   COMPARISON: 12/13/2023   ACCESSION NUMBER(S): [id number]   ORDERING CLINICIAN: ADARSH LARA   TECHNIQUE: Helical data acquisition of the chest was obtained contrast volume:without IV contrast material/Optiray 350/Isovue 370.  Images were reformatted in coronal and sagittal planes. Axial and coronal MIP images were created and reviewed.   FINDINGS: POTENTIAL LIMITATIONS OF THE STUDY: None   HEART AND VESSELS: No discrete filling defects within the main pulmonary artery or its branches.   Main pulmonary artery is dilated at 3.3 cm and is unchanged.   Ascending thoracic aorta is normal in size. Atherosclerotic calcification is noted at the aortic arch. In the proximal descending aorta there is noncalcified plaque with an area of mild dilatation  measuring 3.1 x 4.0 cm, slightly increased compared to the prior study.   Patchy coronary artery calcifications are seen.The study is not optimized for evaluation of coronary arteries.   The cardiac chambers are not enlarged.   No evidence of pericardial effusion.   MEDIASTINUM AND TRINA, LOWER NECK AND AXILLA: 1 cm hypodense nodule in the right thyroid lobe is unchanged.   No evidence of thoracic lymphadenopathy by CT criteria.   Esophagus appears within normal limits as seen.   LUNGS AND AIRWAYS: The trachea and central airways are patent. No endobronchial lesion.   Lungs are emphysematous. Multiple bilateral pulmonary nodules appear unchanged, largest is pulmonary based posterior right lower lobe and measures 9 mm (image 145 of 318). No new or enlarging nodules are identified. There is no focal pneumonia. No pleural effusion is identified.   UPPER ABDOMEN: Upper pole stone in the left kidney is partially visualized and measures up to 1.2 cm. The proximal portion of an aortic stent graft is visible, with the abdominal aorta just above the graft measuring 3.9 cm, unchanged. Bilateral adrenal masses are unchanged, 5 cm on the right and 3.6 cm on the left.   CHEST WALL AND OSSEOUS STRUCTURES: There are no suspicious osseous lesions. Multilevel degenerative changes are present       1.  No pulmonary embolism is identified 2. Unchanged bilateral pulmonary nodules 3. Unchanged bilateral adrenal masses 4. Slightly greater dilatation proximal descending thoracic aorta. No change in dilatation of the proximal abdominal aorta just above the partially visualized stent graft 5. Partially visualized left renal stone 6. Unchanged dilated main pulmonary artery     MACRO: None   Signed by: Irma Rodriguez 3/4/2024 4:12 PM Dictation workstation:   [id number]    Medications:  Inpatient scheduled medications:  albuterol, 2.5 mg, nebulization, TID  aspirin, 81 mg, oral, Daily  budesonide, 0.25 mg, nebulization, BID  clopidogrel, 75  mg, oral, q AM  dexAMETHasone, 4 mg, intravenous, q8h  enoxaparin, 40 mg, subcutaneous, q24h  ferrous sulfate (325 mg ferrous sulfate), 65 mg of iron, oral, Daily  tiotropium, 2 puff, inhalation, Daily   And  fluticasone furoate-vilanteroL, 1 puff, inhalation, Daily  montelukast, 10 mg, oral, Nightly  nicotine, 1 patch, transdermal, Daily   Followed by  [START ON 4/23/2024] nicotine, 1 patch, transdermal, Daily   Followed by  [START ON 5/7/2024] nicotine, 1 patch, transdermal, Daily  OLANZapine, 5 mg, oral, Nightly  rosuvastatin, 20 mg, oral, Daily  sennosides, 1 tablet, oral, Nightly  sertraline, 50 mg, oral, BID    Inpatient PRN medications:  PRN medications: acetaminophen, albuterol, HYDROcodone-acetaminophen, ondansetron, oxygen, polyethylene glycol, simethicone    Physical Exam:   GENERAL: Awake/alert, cooperative, conversant. Not in pain or distress, in no respiratory distress  HEAD:  Normocephalic, atraumatic.  EYES:  Round and reactive. Anicteric. Clear sclera. No conjunctivitis.   ENT:  No nasal discharge, no coryza. Mucous membranes moist and pink.  NECK:  Atraumatic, no cervical lymphadenopathy bilaterally.  CARDIOVASCULAR: RRR, positive S1 & S2.  RESPIRATORY: Wheezing auscultated bilaterally.  ABDOMEN:  Hypoactive bowel sounds.  EXTREMITIES:  No peripheral edema, no calf tenderness. Peripheral pulses intact.  SKIN:  Warm and dry. No tenting. No rash or open wound noted.  NEUROLOGICAL:  Nonfocal grossly.  A&O x 4. CN II-XII grossly intact.    Assessment/Plan   Radha Toussaint is a 68 y.o. 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 Subsequently admitted for COPD exacerbation treatment.    #COPD exacerbation  #NSCLC R lung with adrenal gland carcinoma on  Keytruda  #Adrenal insufficiency  #History of neuropsychiatric steroid side effects  -Continue home inhalers, Singulair  -Patient is doing better on oral dexamethasone and p.o. prednisone LABA likely the best steroid agent for her upon discharge  She will need to be on both mineralocorticoid and glucocorticoid upon discharge based on her tolerance profile dexamethasone and Florinef will be most likely the right combination for her   -supplemental O2, adjust for SpO2 88 to 94%  -Continue Zithromax  -Discontinue Solu-Medrol, start IV dexamethasone 4 mg every 8 hours  -Start Pulmicort twice daily  -Endocrinology consultation recommended  -Will continue following    Dina Cristobal MD  Pulmonary critical care consultant  03/14/24  11:18 AM

## 2024-03-14 NOTE — PROGRESS NOTES
Physical Therapy    Physical Therapy Treatment    Patient Name: Radha Toussaint  MRN: [id number]  Today's Date: 3/14/2024  Time Calculation  Start Time: 0945  Stop Time: 1008  Time Calculation (min): 23 min       Assessment/Plan   PT Assessment  Evaluation/Treatment Tolerance: Patient tolerated treatment well  End of Session Patient Position: Alarm off, not on at start of session (pt seated EOB)     PT Plan  Treatment/Interventions: Bed mobility, Transfer training, Gait training, Stair training, Balance training, Endurance training, Therapeutic exercise, Therapeutic activity  PT Plan: Skilled PT  PT Frequency: 3 times per week  PT Discharge Recommendations: Low intensity level of continued care (w/ continued 24hr support as prior to admit for safe transition home due to medical status)  PT - OK to Discharge: Yes (to next level of care when cleared by medical team)      General Visit Information:   PT  Visit  PT Received On: 03/14/24  General  Prior to Session Communication: Bedside nurse  Patient Position Received:  (pt seated EOB, alarm off)  General Comment:  (pt very pleasant and agreeable to participate in therapy session)    Subjective   Precautions:  Precautions  Medical Precautions: Fall precautions, Oxygen therapy device and L/min (2L)  Vital Signs:  Vital Signs  SpO2:  (91% post ambulation, increasing to 96% after brief sitting rest period; cuing for DBE)    Objective   Pain:  Pain Assessment  Pain Assessment: 0-10  Pain Score: 0 - No pain     Treatments:  Bed Mobility  Bed Mobility:  (sup <> sit independent)    Ambulation/Gait Training  Ambulation/Gait Training Performed:  (pt ambulates >150 ft with FWW and SBA.  decreased abimael.  educated pt on benefits of continued FWW use for energy conservation.)    Transfers  Transfer:  (sit <> stand multiple trials with supervision; trials both with FWW and with no AD)    Outcome Measures:  [facility] Basic Mobility  Turning from your back to your side while in a flat bed  without using bedrails: None  Moving from lying on your back to sitting on the side of a flat bed without using bedrails: None  Moving to and from bed to chair (including a wheelchair): A little  Standing up from a chair using your arms (e.g. wheelchair or bedside chair): A little  To walk in hospital room: A little  Climbing 3-5 steps with railing: A little  Basic Mobility - Total Score: 20    Education Documentation  Mobility Training, taught by Carolyne Sorto PTA at 3/14/2024  3:40 PM.  Learner: Patient  Readiness: Acceptance  Method: Explanation  Response: Verbalizes Understanding, Demonstrated Understanding    Education Comments  No comments found.        EDUCATION:       Encounter Problems       Encounter Problems (Active)       PT Problem       transfers   (Progressing)       Start:  03/12/24    Expected End:  03/26/24       Modified independent sit<>stand w/ use of ww         gait  (Progressing)       Start:  03/12/24    Expected End:  03/26/24       Supervised >=50ftx2 w/ use of ww self monitoring physical signs of exertion for safety & tolerance         stairs   (Progressing)       Start:  03/12/24    Expected End:  03/26/24       Cga negotiating >=2steps simulating home environment

## 2024-03-14 NOTE — NURSING NOTE
Pulmonary Disease Navigator Documentation:    Pulmonary disease education was performed by the Respiratory Therapist with a good understanding: yes  Home oxygen: Patient had been using oxygen she had purchased through outside source prior to admission.  Home oxygen assessment performed today (see separate documentation) and patient ordered home O2 2 lpm nc with activity and HS through [employer].  COPD triggers discussed and when to notify physician?  yes  Home medication usage/spacer education done?  Yes, spacer provided at this time.  Albuterol MDI; Trelegy; Duoneb; Singulair.  Patient stated home nebulizer machine is very old.  New machine ordered through [employer].  Pursed lip breathing education done?  yes    Comments: Patient stated she follows with Dr. Francheska Rogel for her outpatient pulmonary needs.  Patient denies need for respiratory medication refills at this time.

## 2024-03-14 NOTE — NURSING NOTE
Home Oxygen Assessment:    Pulse Oximetry on Room Air at Rest: 91%  Pulse Oximetry on Room Air during Ambulation: 87%  Pulse Oximetry on Oxygen during Ambulation: 92%  Amount of Oxygen during Ambulation: 2 lpm nc    Does the patient qualify for home oxygen: yes  Order obtained for home oxygen: yes    What is the patient's chronic pulmonary diagnosis: COPD  Which DME company did the patient choose: [employer]  DME notified of home oxygen needs: yes    Comments: Patient is currently requiring 2 lpm nc with activity and HS.

## 2024-03-14 NOTE — PROGRESS NOTES
Occupational Therapy    OT Treatment    Patient Name: Radha Toussaint  MRN: [id number]  Today's Date: 3/14/2024  Time Calculation  Start Time: 0945  Stop Time: 1015  Time Calculation (min): 30 min         Assessment:        Plan:  Treatment Interventions: ADL retraining, Functional transfer training, Endurance training, Compensatory technique education, Patient/family training, Equipment evaluation/education  OT Frequency: 3 times per week  OT Discharge Recommendations: Low intensity level of continued care (with 24 hour support/supervision)  OT - OK to Discharge: Yes (to next level of care when medically cleared by physician/medical team)  Treatment Interventions: ADL retraining, Functional transfer training, Endurance training, Compensatory technique education, Patient/family training, Equipment evaluation/education    Subjective  patient's spouse present for tx session      Vital Signs:  Vital Signs  SpO2:  (91% following fx mobility, with vc for pursed lipped breathing client came up to 95% within one minute)         Objective      Activities of Daily Living: LE Dressing  LE Dressing:  (Client able to don/doff pants and doff socks at SBA with min vc for increased safety and vc for pursed lipped breathing d/t noted SOB following task.)  Functional Standing Tolerance:  Time:  (5:00)  Activity:  (fx mobility with use of FWW)  Functional Standing Tolerance Comments: Client noted with SOB and required static standing resting break and able to recover to finish task.  Bed Mobility/Transfers: Transfer 1  Transfer From 1:  (EOB to FWW at Mod I level)          Outcome Measures:[facility] Daily Activity  Putting on and taking off regular lower body clothing: A little  Bathing (including washing, rinsing, drying): A little  Putting on and taking off regular upper body clothing: None  Toileting, which includes using toilet, bedpan or urinal: A little  Taking care of personal grooming such as brushing teeth: None  Eating Meals:  None  Daily Activity - Total Score: 21        Education Documentation  No documentation found.  Education Comments  No comments found.        OP EDUCATION:  Education  Education Provided:  (pt educated on pursed lipped breathing and diaphragmatic breathing d/t client noted to be mouth breathing during fx mobility and pulse ox 91% following mobility task)    Goals:  Encounter Problems       Encounter Problems (Active)       OT Goals       Patient with complete lower body bathing/dressing and toileting with modified independence using adaptive equipment as needed  (Progressing)       Start:  03/12/24    Expected End:  03/19/24            Patient will perform bed mobility and functional transfers safely and independently: bed, chair, commode using DME as needed  (Progressing)       Start:  03/12/24    Expected End:  03/19/24            Patient will tolerate standing for 8 mins. and show good  standing balance during ADL's and functional transfers/mobility  (Progressing)       Start:  03/12/24    Expected End:  03/19/24            Patient will apply energy conservation/diaphragmatic breathing techniques to ADL's and functional transfers with minimal cues  (Progressing)       Start:  03/12/24    Expected End:  03/19/24

## 2024-03-14 NOTE — PROGRESS NOTES
"Radha Toussaint is a 68 y.o. female on day 1 of admission presenting with COPD exacerbation (CMS/HCC).    Subjective     Dx with AI last year 2/2 to adrenal mets, oncology has been managing. Will input endo as well.    Objective     Physical Exam  Constitutional:       Appearance: Normal appearance.   HENT:      Head: Normocephalic and atraumatic.      Right Ear: Tympanic membrane and ear canal normal.      Left Ear: Tympanic membrane and ear canal normal.      Mouth/Throat:      Mouth: Mucous membranes are moist.      Pharynx: Oropharynx is clear.   Eyes:      Extraocular Movements: Extraocular movements intact.      Conjunctiva/sclera: Conjunctivae normal.      Pupils: Pupils are equal, round, and reactive to light.   Cardiovascular:      Rate and Rhythm: Normal rate and regular rhythm.      Pulses: Normal pulses.      Heart sounds: Normal heart sounds.   Pulmonary:      Effort: Pulmonary effort is normal.      Breath sounds: Normal breath sounds.   Abdominal:      General: Abdomen is flat. Bowel sounds are normal.      Palpations: Abdomen is soft.   Musculoskeletal:         General: Normal range of motion.      Cervical back: Normal range of motion and neck supple.   Skin:     General: Skin is warm and dry.      Capillary Refill: Capillary refill takes 2 to 3 seconds.   Neurological:      General: No focal deficit present.      Mental Status: She is alert and oriented to person, place, and time. Mental status is at baseline.   Psychiatric:         Mood and Affect: Mood normal.         Behavior: Behavior normal.         Thought Content: Thought content normal.         Judgment: Judgment normal.         Last Recorded Vitals  Blood pressure 142/73, pulse 60, temperature 35.9 °C (96.6 °F), resp. rate 16, height 1.626 m (5' 4\"), weight 63.5 kg (140 lb), SpO2 98 %.  Intake/Output last 3 Shifts:  I/O last 3 completed shifts:  In: 550 (8.7 mL/kg) [I.V.:50 (0.8 mL/kg); IV Piggyback:500]  Out: - (0 mL/kg)   Weight: 63.5 kg "     Relevant Results                             Assessment/Plan   Principal Problem:    COPD exacerbation (CMS/[facility])    #COPD Exacerbation  #SOB  -Chest x-ray negative for effusion, edema, pneumothorax  -CT Chest negative for PE, shows emphysema, unchanged pulmonary nodules  - Flu/covid+ test negative  -pulmonary c/s  -AM labs  -PRN albuterol   -titrate oxygen as needed to keep stats above 90  -solu-medrol  -Azithromycin  #Headache  - Fluids  -Analgesics PRN  -Reglan  -Toradol  -UA with cx  -Neurology c/s  #Weakness  #Paresthesias   -PT  -OT     Chronic Conditions  #HLD  #MI  #HTN  #PVD  #CAD  #Anxiety  #Adrenal CA  Continue home medications as ordered,     Full code     #DVT Prophylaxis  b/l Scd's as tolerated  On anticoagulant plavix        3/12: doing well, appear near baseline, will treat today likely home tomorrow    3/13: meds adjusted by pulm, recommend oncology input on her steroid usage    3/14: endo input. Will look to dc tomorrow       I spent 35 minutes in the professional and overall care of this patient.      Nico Davies, DO

## 2024-03-14 NOTE — CARE PLAN
The patient's goals for the shift include comfort and rest    The clinical goals for the shift include keep oxygen sats greater than 90% for entire shift.

## 2024-03-15 VITALS
HEART RATE: 97 BPM | OXYGEN SATURATION: 93 % | BODY MASS INDEX: 23.9 KG/M2 | TEMPERATURE: 97.3 F | RESPIRATION RATE: 16 BRPM | SYSTOLIC BLOOD PRESSURE: 143 MMHG | WEIGHT: 140 LBS | DIASTOLIC BLOOD PRESSURE: 75 MMHG | HEIGHT: 64 IN

## 2024-03-15 PROCEDURE — 2500000004 HC RX 250 GENERAL PHARMACY W/ HCPCS (ALT 636 FOR OP/ED): Performed by: STUDENT IN AN ORGANIZED HEALTH CARE EDUCATION/TRAINING PROGRAM

## 2024-03-15 PROCEDURE — 2500000001 HC RX 250 WO HCPCS SELF ADMINISTERED DRUGS (ALT 637 FOR MEDICARE OP)

## 2024-03-15 PROCEDURE — 2500000002 HC RX 250 W HCPCS SELF ADMINISTERED DRUGS (ALT 637 FOR MEDICARE OP, ALT 636 FOR OP/ED)

## 2024-03-15 PROCEDURE — 2500000002 HC RX 250 W HCPCS SELF ADMINISTERED DRUGS (ALT 637 FOR MEDICARE OP, ALT 636 FOR OP/ED): Performed by: STUDENT IN AN ORGANIZED HEALTH CARE EDUCATION/TRAINING PROGRAM

## 2024-03-15 PROCEDURE — 2500000004 HC RX 250 GENERAL PHARMACY W/ HCPCS (ALT 636 FOR OP/ED)

## 2024-03-15 PROCEDURE — 94640 AIRWAY INHALATION TREATMENT: CPT

## 2024-03-15 PROCEDURE — 99233 SBSQ HOSP IP/OBS HIGH 50: CPT

## 2024-03-15 PROCEDURE — S4991 NICOTINE PATCH NONLEGEND: HCPCS

## 2024-03-15 PROCEDURE — 99238 HOSP IP/OBS DSCHRG MGMT 30/<: CPT | Performed by: STUDENT IN AN ORGANIZED HEALTH CARE EDUCATION/TRAINING PROGRAM

## 2024-03-15 RX ORDER — BUDESONIDE 0.25 MG/2ML
0.25 INHALANT ORAL
Qty: 2 ML | Refills: 2 | Status: SHIPPED | OUTPATIENT
Start: 2024-03-15 | End: 2024-04-02 | Stop reason: SDUPTHER

## 2024-03-15 RX ORDER — DEXAMETHASONE 4 MG/1
2 TABLET ORAL DAILY
Qty: 30 TABLET | Refills: 0 | Status: SHIPPED | OUTPATIENT
Start: 2024-03-15 | End: 2024-05-07 | Stop reason: SDUPTHER

## 2024-03-15 RX ADMIN — DEXAMETHASONE SODIUM PHOSPHATE 4 MG: 10 INJECTION, SOLUTION INTRAMUSCULAR; INTRAVENOUS at 05:47

## 2024-03-15 RX ADMIN — ROSUVASTATIN CALCIUM 20 MG: 10 TABLET, FILM COATED ORAL at 08:43

## 2024-03-15 RX ADMIN — TIOTROPIUM BROMIDE INHALATION SPRAY 2 PUFF: 3.12 SPRAY, METERED RESPIRATORY (INHALATION) at 06:54

## 2024-03-15 RX ADMIN — ENOXAPARIN SODIUM 40 MG: 40 INJECTION SUBCUTANEOUS at 08:42

## 2024-03-15 RX ADMIN — BUDESONIDE 0.25 MG: 0.25 INHALANT RESPIRATORY (INHALATION) at 06:53

## 2024-03-15 RX ADMIN — ALBUTEROL SULFATE 2.5 MG: 2.5 SOLUTION RESPIRATORY (INHALATION) at 11:16

## 2024-03-15 RX ADMIN — ALBUTEROL SULFATE 2.5 MG: 2.5 SOLUTION RESPIRATORY (INHALATION) at 06:53

## 2024-03-15 RX ADMIN — CLOPIDOGREL BISULFATE 75 MG: 75 TABLET ORAL at 08:43

## 2024-03-15 RX ADMIN — FLUTICASONE FUROATE AND VILANTEROL TRIFENATATE 1 PUFF: 200; 25 POWDER RESPIRATORY (INHALATION) at 06:54

## 2024-03-15 RX ADMIN — FERROUS SULFATE TAB 325 MG (65 MG ELEMENTAL FE) 1 TABLET: 325 (65 FE) TAB at 08:43

## 2024-03-15 RX ADMIN — SERTRALINE HYDROCHLORIDE 50 MG: 50 TABLET ORAL at 08:42

## 2024-03-15 RX ADMIN — ASPIRIN 81 MG: 81 TABLET, COATED ORAL at 08:43

## 2024-03-15 RX ADMIN — NICOTINE 1 PATCH: 21 PATCH, EXTENDED RELEASE TRANSDERMAL at 08:42

## 2024-03-15 ASSESSMENT — PAIN SCALES - GENERAL: PAINLEVEL_OUTOF10: 0 - NO PAIN

## 2024-03-15 NOTE — PROGRESS NOTES
Subjective   Patient sitting comfortably. Able to ambulate without fatigue or shortness of breath. Denies abdominal pain.       Objective     Last Recorded Vitals  /75 (BP Location: Left arm, Patient Position: Sitting)   Pulse 97   Temp 36.3 °C (97.3 °F) (Temporal)   Resp 16   Wt 63.5 kg (140 lb)   SpO2 93%   Intake/Output last 3 Shifts:  No intake or output data in the 24 hours ending 03/15/24 1331    Admission Weight  Weight: 63.5 kg (140 lb) (03/11/24 1214)    Daily Weight  03/11/24 : 63.5 kg (140 lb)      Physical Exam  Constitutional:       Appearance: Normal appearance.   HENT:      Head: Normocephalic and atraumatic.      Mouth/Throat:      Mouth: Mucous membranes are moist.   Eyes:      Extraocular Movements: Extraocular movements intact.   Cardiovascular:      Rate and Rhythm: Normal rate and regular rhythm.   Pulmonary:      Effort: Pulmonary effort is normal.      Breath sounds: Decreased breath sounds present.   Abdominal:      General: Abdomen is flat. There is no distension.      Palpations: Abdomen is soft.   Musculoskeletal:      Right lower leg: No edema.      Left lower leg: No edema.   Skin:     General: Skin is warm and dry.   Neurological:      General: No focal deficit present.      Mental Status: She is alert and oriented to person, place, and time.   Psychiatric:         Mood and Affect: Mood normal.           Assessment/Plan   #Poorly differentiated metastatic carcinoma  #COPD exacerbation  CT PE 2/19 was negative for PE, pneumonia or obvious signs of pneumonitis. Low suspicion that immunotherapy or malignancy are primary causes of dyspnea.     -Okay to discharge on dexamethasone. Benefits of addressing COPD exacerbations and adrenal insufficiency outweigh risks of decreasing effectiveness of immunotherapy  -Follow up with oncology outpatient for Keytruda infusions     This is a preliminary note, please await attending attestation for a finalized plan.     Dr. Chaney  Seth  Internal Medicine PGY-2  Please message me with any questions

## 2024-03-15 NOTE — PROGRESS NOTES
Endocrinology Inpatient Consult Progress Note     PATIENT NAME: Radha Toussaint  MRN: [id number]  DATE: 3/15/2024    Patient was discharged before I saw her.   No bill.   My recommendations are still as below.      Medications     Reviewed MAR       Data     Recent Labs and Imaging Reviewed      Assessment / Plan        # Secondary Adrenal Insufficiency  As per my initial consult note.  The patient remains on dexamethasone 4 mg every 8 hours for acute exacerbation of COPD.  From my perspective, the patient should be maintained on at least dexamethasone 2 mg daily.  This is approximately her physiologic dose of dexamethasone.  From my perspective as well, the patient does not need to be on a mineralocorticoid agonist given that I think her renin-angiotensin-aldosterone system is in fact intact.  Of note, and as per my previous note, the patient has had suicidal ideation on hydrocortisone and she feels like she is not having those same thoughts on dexamethasone.    #Prediabetes  A1c this admission was found to be 5.9%.  No need for therapies at this juncture.  Likely glucocorticoid induced.    # Pembrolizumab Use  I cannot say for certain that she has primary or secondary adrenal insufficiency secondary to pembrolizumab.  In regards to her thyroid status, as pembrolizumab can cause thyroiditis, her free T4 is normal thus there is no indication for levothyroxine.           Fco Palmer, DO  Endocrinology, Diabetes, and Metabolism    Available via [employer] Messenger    Please excuse any typographical or unwanted errors within this documentation as voice recognition software was used to dictate this note.

## 2024-03-15 NOTE — NURSING NOTE
Discharge instructions reviewed with pt who voiced understanding of all instructions.    Discharged to home with all belongings.   Smoking cessation reviewed with patient prior to discharge.

## 2024-03-15 NOTE — DISCHARGE SUMMARY
Discharge Diagnosis  COPD exacerbation (CMS/[facility])    Issues Requiring Follow-Up  copd    Test Results Pending At Discharge  Pending Labs       No current pending labs.            Hospital Course   #COPD Exacerbation  #SOB  -Chest x-ray negative for effusion, edema, pneumothorax  -CT Chest negative for PE, shows emphysema, unchanged pulmonary nodules  - Flu/covid+ test negative  -pulmonary c/s  -AM labs  -PRN albuterol   -titrate oxygen as needed to keep stats above 90  -solu-medrol  -Azithromycin  #Headache  - Fluids  -Analgesics PRN  -Reglan  -Toradol  -UA with cx  -Neurology c/s  #Weakness  #Paresthesias   -PT  -OT     Chronic Conditions  #HLD  #MI  #HTN  #PVD  #CAD  #Anxiety  #Adrenal CA  Continue home medications as ordered,     Full code     #DVT Prophylaxis  b/l Scd's as tolerated  On anticoagulant plavix        3/12: doing well, appear near baseline, will treat today likely home tomorrow     3/13: meds adjusted by pulm, recommend oncology input on her steroid usage     3/14: endo input. Will look to dc tomorrow    3/15: endo and onc input on steroid usage, dc hydrocortisone and staretd decadron contineu floriner follow up with onch    Pertinent Physical Exam At Time of Discharge  Physical Exam  Constitutional:       Appearance: Normal appearance.   HENT:      Head: Normocephalic and atraumatic.      Right Ear: Tympanic membrane and ear canal normal.      Left Ear: Tympanic membrane and ear canal normal.      Mouth/Throat:      Mouth: Mucous membranes are moist.      Pharynx: Oropharynx is clear.   Eyes:      Extraocular Movements: Extraocular movements intact.      Conjunctiva/sclera: Conjunctivae normal.      Pupils: Pupils are equal, round, and reactive to light.   Cardiovascular:      Rate and Rhythm: Normal rate and regular rhythm.      Pulses: Normal pulses.      Heart sounds: Normal heart sounds.   Pulmonary:      Effort: Pulmonary effort is normal.      Breath sounds: Normal breath sounds.   Abdominal:       General: Abdomen is flat. Bowel sounds are normal.      Palpations: Abdomen is soft.   Musculoskeletal:         General: Normal range of motion.      Cervical back: Normal range of motion and neck supple.   Skin:     General: Skin is warm and dry.      Capillary Refill: Capillary refill takes 2 to 3 seconds.   Neurological:      General: No focal deficit present.      Mental Status: She is alert and oriented to person, place, and time. Mental status is at baseline.   Psychiatric:         Mood and Affect: Mood normal.         Behavior: Behavior normal.         Thought Content: Thought content normal.         Judgment: Judgment normal.         Home Medications     Medication List      START taking these medications     budesonide 0.25 mg/2 mL nebulizer solution; Commonly known as:   Pulmicort; Take 2 mL (0.25 mg) by nebulization 2 times a day. Rinse mouth   with water after use to reduce aftertaste and incidence of candidiasis. Do   not swallow.   dexAMETHasone 4 mg tablet; Commonly known as: Decadron; Take 0.5 tablets   (2 mg) by mouth once daily.   ipratropium-albuteroL 0.5-2.5 mg/3 mL nebulizer solution; Commonly known   as: Duo-Neb; Take 3 mL by nebulization 4 times a day.   oxygen gas therapy; Commonly known as: O2; Inhale 1 each once every 24   hours.     CHANGE how you take these medications     clopidogrel 75 mg tablet; Commonly known as: Plavix; Take 1 tablet (75   mg) by mouth once daily.; What changed: when to take this   fludrocortisone 0.1 mg tablet; Commonly known as: Florinef; TAKE 1/2   TABLET BY MOUTH ONCE DAILY; What changed: how much to take   ondansetron ODT 4 mg disintegrating tablet; Commonly known as:   Zofran-ODT; DISSOLVE 1 TABLET IN MOUTH BY MOUTH THREE TIMES A DAY AS   NEEDED NAUSEA; What changed: how much to take, how to take this, when to   take this, reasons to take this   sennosides 8.6 mg tablet; Commonly known as: senna; Take 1 tablet (8.6   mg) by mouth once daily.; What changed:  when to take this     CONTINUE taking these medications     acetaminophen 325 mg tablet; Commonly known as: Tylenol   albuterol 90 mcg/actuation inhaler; Inhale 1 puff every 4 hours if   needed for shortness of breath.   aspirin 81 mg EC tablet   ferrous sulfate 325 (65 Fe) MG EC tablet   HYDROcodone-acetaminophen  mg tablet; Commonly known as: Norco;   Take 1 tablet by mouth every 8 hours if needed for severe pain (7 - 10).   montelukast 10 mg tablet; Commonly known as: Singulair; Take 1 tablet   (10 mg) by mouth once daily at bedtime.   OLANZapine 5 mg tablet; Commonly known as: ZyPREXA; Take 1 tablet (5 mg)   by mouth once daily at bedtime.   rosuvastatin 10 mg tablet; Commonly known as: Crestor; Take 2 tablets   (20 mg) by mouth once daily.   sertraline 50 mg tablet; Commonly known as: Zoloft; Take 1 tablet (50   mg) by mouth 2 times a day.   simethicone 80 mg chewable tablet; Commonly known as: Mylicon   Trelegy Ellipta 200-62.5-25 mcg blister with device; Generic drug:   fluticasone-umeclidin-vilanter; Inhale 1 puff once daily.     STOP taking these medications     hydrocortisone 5 mg tablet; Commonly known as: Cortef       Outpatient Follow-Up  Future Appointments   Date Time Provider Department Center   3/25/2024 10:45 AM DO Josselin [id number] [place]   5/7/2024  8:00 AM DO Zhao Hyde [place]   6/11/2024  9:00 AM KERA Phillips-CNP [id number] [place]   6/14/2024 11:30 AM PAR NEURODG [facility] EQUIP PARNEUD [place]   6/25/2024  8:00 AM Francheska Holley MD [id number] [place]       Nico Davies DO

## 2024-03-15 NOTE — CARE PLAN
The patient's goals for the shift include rest    The clinical goals for the shift include patient will remain safe

## 2024-03-15 NOTE — CARE PLAN
The patient's goals for the shift include rest    The clinical goals for the shift include Patient will remain safe and free from injury for entire shift.      The clinical goals for the shift include reviewing smoking cessation.

## 2024-03-15 NOTE — PROGRESS NOTES
Physical Therapy                 Therapy Communication Note    Patient Name: Radha Toussaint  MRN: [id number]  Today's Date: 3/15/2024     Discipline: Physical Therapy    Missed Visit Reason: Missed Visit Reason: Patient refused    Missed Time: Attempt    Comment: Pt stated she just walked in the ivan, stated being discharged later

## 2024-03-16 LAB
ATRIAL RATE: 82 BPM
P AXIS: 71 DEGREES
P OFFSET: 194 MS
P ONSET: 146 MS
PR INTERVAL: 150 MS
Q ONSET: 221 MS
QRS COUNT: 14 BEATS
QRS DURATION: 90 MS
QT INTERVAL: 356 MS
QTC CALCULATION(BAZETT): 415 MS
QTC FREDERICIA: 395 MS
R AXIS: 64 DEGREES
T AXIS: 87 DEGREES
T OFFSET: 399 MS
VENTRICULAR RATE: 82 BPM

## 2024-03-18 ENCOUNTER — TELEPHONE (OUTPATIENT)
Dept: HEMATOLOGY/ONCOLOGY | Facility: CLINIC | Age: 69
End: 2024-03-18
Payer: MEDICARE

## 2024-03-18 ENCOUNTER — PATIENT OUTREACH (OUTPATIENT)
Dept: CARE COORDINATION | Facility: CLINIC | Age: 69
End: 2024-03-18
Payer: MEDICARE

## 2024-03-18 DIAGNOSIS — J44.1 COPD EXACERBATION (MULTI): ICD-10-CM

## 2024-03-18 NOTE — PROGRESS NOTES
Discharge Facility:[facility]  Discharge Diagnosis:COPD exac  Admission Date:3.11.24  Discharge Date: 3.15.24    PCP Appointment Date:3.25.24  Specialist Appointment Date:   Hospital Encounter and Summary: Linked   See discharge assessment below for further details  Engagement  Call Start Time: 1404 (3/18/2024  2:03 PM)    Medications  Medications reviewed with patient/caregiver?: Yes (3/18/2024  2:03 PM)  Is the patient having any side effects they believe may be caused by any medication additions or changes?: (!) Yes (She believes the steroid making her jittery) (3/18/2024  2:03 PM)  Does the patient have all medications ordered at discharge?: Yes (3/18/2024  2:03 PM)  Care Management Interventions: -- (referred to [facility]) (3/18/2024  2:03 PM)  Is the patient taking all medications as directed (includes completed medication regime)?: -- (Was confused about nebulizer meds-referred to pharmacy) (3/18/2024  2:03 PM)  Care Management Interventions: Provided patient education (3/18/2024  2:03 PM)    Appointments  Does the patient have a primary care provider?: Yes (3/18/2024  2:03 PM)  Care Management Interventions: Verified appointment date/time/provider (3/18/2024  2:03 PM)  Has the patient kept scheduled appointments due by today?: Yes (3/18/2024  2:03 PM)  Care Management Interventions: Advised patient to keep appointment (3/18/2024  2:03 PM)    Self Management  What is the home health agency?: n/a (3/18/2024  2:03 PM)  What Durable Medical Equipment (DME) was ordered?: n/a (3/18/2024  2:03 PM)    Patient Teaching  Care Management Interventions: Reviewed instructions with patient (3/18/2024  2:03 PM)  What is the patient's perception of their health status since discharge?: Same (3/18/2024  2:03 PM)  Is the patient/caregiver able to teach back the hierarchy of who to call/visit for symptoms/problems? PCP, Specialist, Home Health nurse, Urgent Care, ED, 911: Yes (3/18/2024  2:03 PM)  Patient/Caregiver Education  Comments: Spoke with patient. States she does not like taking the steroid and asked if she could stop taking it. She further stated her oncologist ordered it. She did say she was going to call the Oncologist office directly. When reviewing meds she was confused about taking noth Pulmicort and Albuterol and sounded like she did not know it was ordered. Does not report significant improvement in condition. Offered to check to see if she wanted a sooner appt with PCP and she declined stating she also had other appts. Referred to [facility] pharmacy. (3/18/2024  2:03 PM)

## 2024-03-18 NOTE — TELEPHONE ENCOUNTER
Dr. Burnham patient. Patients daughter Opal called wanted to get patient another appointment for Jennifer and also had questions regarding her mother taking steroids because she is having side effects. Phone number is [phone]

## 2024-03-19 ENCOUNTER — TELEPHONE (OUTPATIENT)
Dept: PRIMARY CARE | Facility: CLINIC | Age: 69
End: 2024-03-19
Payer: MEDICARE

## 2024-03-19 DIAGNOSIS — J43.8 OTHER EMPHYSEMA (MULTI): ICD-10-CM

## 2024-03-19 DIAGNOSIS — J45.40 MODERATE PERSISTENT ASTHMA, UNSPECIFIED WHETHER COMPLICATED (HHS-HCC): ICD-10-CM

## 2024-03-19 RX ORDER — MONTELUKAST SODIUM 10 MG/1
10 TABLET ORAL NIGHTLY
Qty: 90 TABLET | Refills: 1 | Status: SHIPPED | OUTPATIENT
Start: 2024-03-19

## 2024-03-19 RX ORDER — FLUTICASONE FUROATE, UMECLIDINIUM BROMIDE AND VILANTEROL TRIFENATATE 200; 62.5; 25 UG/1; UG/1; UG/1
1 POWDER RESPIRATORY (INHALATION)
Qty: 60 EACH | Refills: 3 | Status: SHIPPED | OUTPATIENT
Start: 2024-03-19 | End: 2024-05-07 | Stop reason: SDUPTHER

## 2024-03-19 NOTE — TELEPHONE ENCOUNTER
Daughter called back, asking about mom's reaction to the oral steroid. Advised that Dr. Davies prescribed that so she will have to reach out to his office, as he may have a different medication he wants to give in place of it. She verbalized understanding, also ok with Keytruda appt for Monday if Dr. Burnham ok with Radha getting treatment.

## 2024-03-19 NOTE — TELEPHONE ENCOUNTER
Pt. Is having major side effects due to the steroids that dr. Davies placed her on while in the hospital.

## 2024-03-19 NOTE — TELEPHONE ENCOUNTER
Message left for daughter to call me back - did let her know we were able to add Radha on Monday after her MD followup for possible Keytruda if Dr. Burnham is fine with her getting it. Awaiting return call.

## 2024-03-25 ENCOUNTER — SOCIAL WORK (OUTPATIENT)
Dept: HEMATOLOGY/ONCOLOGY | Facility: CLINIC | Age: 69
End: 2024-03-25

## 2024-03-25 ENCOUNTER — INFUSION (OUTPATIENT)
Dept: HEMATOLOGY/ONCOLOGY | Facility: CLINIC | Age: 69
End: 2024-03-25
Payer: MEDICARE

## 2024-03-25 ENCOUNTER — LAB (OUTPATIENT)
Dept: LAB | Facility: CLINIC | Age: 69
End: 2024-03-25
Payer: MEDICARE

## 2024-03-25 ENCOUNTER — OFFICE VISIT (OUTPATIENT)
Dept: HEMATOLOGY/ONCOLOGY | Facility: CLINIC | Age: 69
End: 2024-03-25
Payer: MEDICARE

## 2024-03-25 ENCOUNTER — APPOINTMENT (OUTPATIENT)
Dept: PRIMARY CARE | Facility: CLINIC | Age: 69
End: 2024-03-25
Payer: MEDICARE

## 2024-03-25 VITALS
HEIGHT: 63 IN | TEMPERATURE: 98.6 F | SYSTOLIC BLOOD PRESSURE: 115 MMHG | DIASTOLIC BLOOD PRESSURE: 68 MMHG | RESPIRATION RATE: 20 BRPM | OXYGEN SATURATION: 96 % | HEART RATE: 72 BPM | BODY MASS INDEX: 24.61 KG/M2 | WEIGHT: 138.89 LBS

## 2024-03-25 VITALS
DIASTOLIC BLOOD PRESSURE: 78 MMHG | SYSTOLIC BLOOD PRESSURE: 130 MMHG | RESPIRATION RATE: 20 BRPM | OXYGEN SATURATION: 94 % | HEART RATE: 71 BPM

## 2024-03-25 DIAGNOSIS — G89.3 CANCER RELATED PAIN: ICD-10-CM

## 2024-03-25 DIAGNOSIS — C34.90 NON-SMALL CELL LUNG CANCER, UNSPECIFIED LATERALITY (MULTI): ICD-10-CM

## 2024-03-25 DIAGNOSIS — C34.90 NON-SMALL CELL LUNG CANCER, UNSPECIFIED LATERALITY (MULTI): Primary | ICD-10-CM

## 2024-03-25 LAB
ALBUMIN SERPL BCP-MCNC: 3.6 G/DL (ref 3.4–5)
ALP SERPL-CCNC: 62 U/L (ref 33–136)
ALT SERPL W P-5'-P-CCNC: 23 U/L (ref 7–45)
ANION GAP SERPL CALC-SCNC: 10 MMOL/L (ref 10–20)
AST SERPL W P-5'-P-CCNC: 15 U/L (ref 9–39)
BASOPHILS # BLD AUTO: 0.09 X10*3/UL (ref 0–0.1)
BASOPHILS NFR BLD AUTO: 0.6 %
BILIRUB SERPL-MCNC: 0.4 MG/DL (ref 0–1.2)
BUN SERPL-MCNC: 20 MG/DL (ref 6–23)
CALCIUM SERPL-MCNC: 9.6 MG/DL (ref 8.6–10.3)
CHLORIDE SERPL-SCNC: 102 MMOL/L (ref 98–107)
CO2 SERPL-SCNC: 30 MMOL/L (ref 21–32)
CREAT SERPL-MCNC: 0.69 MG/DL (ref 0.5–1.05)
EGFRCR SERPLBLD CKD-EPI 2021: >90 ML/MIN/1.73M*2
EOSINOPHIL # BLD AUTO: 0.2 X10*3/UL (ref 0–0.7)
EOSINOPHIL NFR BLD AUTO: 1.3 %
ERYTHROCYTE [DISTWIDTH] IN BLOOD BY AUTOMATED COUNT: 17.6 % (ref 11.5–14.5)
GLUCOSE SERPL-MCNC: 129 MG/DL (ref 74–99)
HCT VFR BLD AUTO: 47 % (ref 36–46)
HGB BLD-MCNC: 15 G/DL (ref 12–16)
IMM GRANULOCYTES # BLD AUTO: 0.21 X10*3/UL (ref 0–0.7)
IMM GRANULOCYTES NFR BLD AUTO: 1.4 % (ref 0–0.9)
LYMPHOCYTES # BLD AUTO: 2.05 X10*3/UL (ref 1.2–4.8)
LYMPHOCYTES NFR BLD AUTO: 13.5 %
MCH RBC QN AUTO: 27.7 PG (ref 26–34)
MCHC RBC AUTO-ENTMCNC: 31.9 G/DL (ref 32–36)
MCV RBC AUTO: 87 FL (ref 80–100)
MONOCYTES # BLD AUTO: 0.39 X10*3/UL (ref 0.1–1)
MONOCYTES NFR BLD AUTO: 2.6 %
NEUTROPHILS # BLD AUTO: 12.25 X10*3/UL (ref 1.2–7.7)
NEUTROPHILS NFR BLD AUTO: 80.6 %
NRBC BLD-RTO: 0 /100 WBCS (ref 0–0)
PLATELET # BLD AUTO: 259 X10*3/UL (ref 150–450)
POTASSIUM SERPL-SCNC: 4.3 MMOL/L (ref 3.5–5.3)
PROT SERPL-MCNC: 7.1 G/DL (ref 6.4–8.2)
RBC # BLD AUTO: 5.41 X10*6/UL (ref 4–5.2)
SODIUM SERPL-SCNC: 138 MMOL/L (ref 136–145)
TSH SERPL-ACNC: 2.67 MIU/L (ref 0.44–3.98)
WBC # BLD AUTO: 15.2 X10*3/UL (ref 4.4–11.3)

## 2024-03-25 PROCEDURE — 99214 OFFICE O/P EST MOD 30 MIN: CPT | Performed by: INTERNAL MEDICINE

## 2024-03-25 PROCEDURE — 1160F RVW MEDS BY RX/DR IN RCRD: CPT | Performed by: INTERNAL MEDICINE

## 2024-03-25 PROCEDURE — 96413 CHEMO IV INFUSION 1 HR: CPT

## 2024-03-25 PROCEDURE — 3074F SYST BP LT 130 MM HG: CPT | Performed by: INTERNAL MEDICINE

## 2024-03-25 PROCEDURE — 1159F MED LIST DOCD IN RCRD: CPT | Performed by: INTERNAL MEDICINE

## 2024-03-25 PROCEDURE — 1126F AMNT PAIN NOTED NONE PRSNT: CPT | Performed by: INTERNAL MEDICINE

## 2024-03-25 PROCEDURE — 3078F DIAST BP <80 MM HG: CPT | Performed by: INTERNAL MEDICINE

## 2024-03-25 PROCEDURE — 36415 COLL VENOUS BLD VENIPUNCTURE: CPT

## 2024-03-25 PROCEDURE — 2500000004 HC RX 250 GENERAL PHARMACY W/ HCPCS (ALT 636 FOR OP/ED): Mod: JZ | Performed by: INTERNAL MEDICINE

## 2024-03-25 PROCEDURE — 3008F BODY MASS INDEX DOCD: CPT | Performed by: INTERNAL MEDICINE

## 2024-03-25 PROCEDURE — 1111F DSCHRG MED/CURRENT MED MERGE: CPT | Performed by: INTERNAL MEDICINE

## 2024-03-25 PROCEDURE — 80053 COMPREHEN METABOLIC PANEL: CPT

## 2024-03-25 PROCEDURE — 84443 ASSAY THYROID STIM HORMONE: CPT

## 2024-03-25 PROCEDURE — 85025 COMPLETE CBC W/AUTO DIFF WBC: CPT

## 2024-03-25 RX ORDER — ONDANSETRON 4 MG/1
TABLET, ORALLY DISINTEGRATING ORAL
Qty: 90 TABLET | Refills: 0 | Status: SHIPPED | OUTPATIENT
Start: 2024-03-25 | End: 2024-05-07 | Stop reason: SDUPTHER

## 2024-03-25 RX ORDER — PROCHLORPERAZINE EDISYLATE 5 MG/ML
10 INJECTION INTRAMUSCULAR; INTRAVENOUS EVERY 6 HOURS PRN
Status: CANCELLED | OUTPATIENT
Start: 2024-03-25

## 2024-03-25 RX ORDER — HEPARIN SODIUM,PORCINE/PF 10 UNIT/ML
50 SYRINGE (ML) INTRAVENOUS AS NEEDED
Status: CANCELLED | OUTPATIENT
Start: 2024-03-25

## 2024-03-25 RX ORDER — PROCHLORPERAZINE MALEATE 5 MG
10 TABLET ORAL EVERY 6 HOURS PRN
Status: CANCELLED | OUTPATIENT
Start: 2024-03-25

## 2024-03-25 RX ORDER — EPINEPHRINE 0.3 MG/.3ML
0.3 INJECTION SUBCUTANEOUS EVERY 5 MIN PRN
Status: CANCELLED | OUTPATIENT
Start: 2024-03-25

## 2024-03-25 RX ORDER — FAMOTIDINE 10 MG/ML
20 INJECTION INTRAVENOUS ONCE AS NEEDED
Status: DISCONTINUED | OUTPATIENT
Start: 2024-03-25 | End: 2024-03-25 | Stop reason: HOSPADM

## 2024-03-25 RX ORDER — HEPARIN 100 UNIT/ML
500 SYRINGE INTRAVENOUS AS NEEDED
Status: CANCELLED | OUTPATIENT
Start: 2024-03-25

## 2024-03-25 RX ORDER — HYDROCODONE BITARTRATE AND ACETAMINOPHEN 7.5; 325 MG/15ML; MG/15ML
10 SOLUTION ORAL EVERY 6 HOURS PRN
COMMUNITY
End: 2024-05-02 | Stop reason: SDUPTHER

## 2024-03-25 RX ORDER — FAMOTIDINE 10 MG/ML
20 INJECTION INTRAVENOUS ONCE AS NEEDED
Status: CANCELLED | OUTPATIENT
Start: 2024-03-25

## 2024-03-25 RX ORDER — DIPHENHYDRAMINE HYDROCHLORIDE 50 MG/ML
50 INJECTION INTRAMUSCULAR; INTRAVENOUS AS NEEDED
Status: DISCONTINUED | OUTPATIENT
Start: 2024-03-25 | End: 2024-03-25 | Stop reason: HOSPADM

## 2024-03-25 RX ORDER — DIPHENHYDRAMINE HYDROCHLORIDE 50 MG/ML
50 INJECTION INTRAMUSCULAR; INTRAVENOUS AS NEEDED
Status: CANCELLED | OUTPATIENT
Start: 2024-03-25

## 2024-03-25 RX ORDER — HYDROCODONE BITARTRATE AND ACETAMINOPHEN 10; 325 MG/1; MG/1
1 TABLET ORAL EVERY 8 HOURS PRN
Qty: 60 TABLET | Refills: 0 | Status: SHIPPED | OUTPATIENT
Start: 2024-03-25 | End: 2024-05-06 | Stop reason: SDUPTHER

## 2024-03-25 RX ORDER — EPINEPHRINE 0.3 MG/.3ML
0.3 INJECTION SUBCUTANEOUS EVERY 5 MIN PRN
Status: DISCONTINUED | OUTPATIENT
Start: 2024-03-25 | End: 2024-03-25 | Stop reason: HOSPADM

## 2024-03-25 RX ORDER — ALBUTEROL SULFATE 0.83 MG/ML
3 SOLUTION RESPIRATORY (INHALATION) AS NEEDED
Status: DISCONTINUED | OUTPATIENT
Start: 2024-03-25 | End: 2024-03-25 | Stop reason: HOSPADM

## 2024-03-25 RX ORDER — ALBUTEROL SULFATE 0.83 MG/ML
3 SOLUTION RESPIRATORY (INHALATION) AS NEEDED
Status: CANCELLED | OUTPATIENT
Start: 2024-03-25

## 2024-03-25 RX ADMIN — SODIUM CHLORIDE 400 MG: 9 INJECTION, SOLUTION INTRAVENOUS at 11:01

## 2024-03-25 ASSESSMENT — PAIN SCALES - GENERAL: PAINLEVEL: 0-NO PAIN

## 2024-03-25 NOTE — PROGRESS NOTES
At 1000  peripheral IV 24 ga inserted in right anterior forearm after 1 attempt/  unable to document on [facility]/  good blood return, pt tolerated well

## 2024-03-25 NOTE — PROGRESS NOTES
LOCATION:   [facility], at [facility].     HEMATOLOGY ONCOLOGY PROBLEMS:  1.  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         PD-L1 70%.  No actionable alterations.       a.  Ist -line therapy with carbo/Taxol/Keytruda x 3 cycles from Sep to Nov 2023.       b.  On maintenance Keytruda beginning Dec 2023.     CHIEF COMPLAINT:    The patient is in the clinic today for followup of poorly differentiated carcinoma and for continuation of treatment and management of therapy related effects.          HISTORY:  Ms. Toussaint is a 68-year-old female with poorly differentiated carcinoma of most probable primary bronchogenic origin.  She has multiple medical problems and was admitted at [facility] in Aug 2023 with complaint worsening shortness of breath, cough and weakness.  There was also history of progressive weight loss and recent falls.  Work-up revealed stable pulmonary nodules but there was concern regarding new bilateral adrenal metastatic lesions along with questionable liver lesions and right hilar lymphadenopathy. She is a lifelong heavy smoker. CT-guided biopsy of the adrenal lesion was suggestive of poorly differentiated carcinoma. 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  She was also evaluated by Dr. Francheska Parada at [facility] and case was reviewed by  pathology and as per tumor board evaluation, overall clinical and radiological finding were considered mainly of probable primary bronchogenic origin.  She was treated with carbo/Taxol/Keytruda combination for 3 cycles.  4th cycle was discontinued due to tolerability issues.  Follow-up CT scan showed interval decrease in bilateral adrenal lesions and she was transitioned to maintenance Keytruda beginning Dec 2023.    INTERVAL HISTORY:  Since her last visit she had a brief hospitalization with the COPD exacerbation.  CT angiogram was negative for PE or any other  evidence of Keytruda induced pneumonitis.  She was managed with steroids and other supportive care.  She is using oxygen at home along with nebulizers.  There is also component of panic attack and baseline anxiety.  Of note she had a chronic left foot wound and is being closely followed in wound and vascular surgery clinics.       PAST MEDICAL HISTORY:   1.  COPD on home O2.   2.  Coronary artery disease   3.  Hypertension   4.  Hyperlipidemia   5.  Multiple vascular aneurysms.  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  6.  Peripheral vascular disease.  S/p femoral-femoral bypass   7.  AAA graft repair procedure.  8.  History of tubal ligation, bladder lift, lithotripsy surgeries     SOCIAL HISTORY:    for 46 years and lives in [place].  1 and half to 2 pack a day for 47 years smoking history.  Nonalcoholic.  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  Born and raised  in [place]     FAMILY HISTORY:    Father  at age 52 from myocardial infarction.  Mother  at age 75 or from aortic aneurysm related complications.  1 sister  at age 68 from depression and related complications.  3 children and 5 grandkids.  Her son and daughter has history of Crohn's disease.  No other specific history of bleeding, clotting or malignant disorder in the family.     REVIEW OF SYSTEMS:  Pertinent finding as per the history above.  All other systems have been reviewed and generally negative and noncontributory.     PHYSICAL EXAMINATION:    Detailed physical examination not done     ALLERGY & MEDICATIONS:  Allergies and latest outpatient medications list were reviewed in the EMR.    LAB RESULTS:  CBC and CMP from 2024 were unremarkable.  Hemoglobin was 14.1 with white cell count of 9.9 and platelets of 240 K.  Last 3 sets of blood work were reviewed and the trend was noted.    RADIOLOGY RESULTS:  CT angiogram 3/4/2024  Impression:  1.  No pulmonary embolism is identified  2. Unchanged  bilateral pulmonary nodules  3. Unchanged bilateral adrenal masses  4. Slightly greater dilatation proximal descending thoracic aorta. No change in dilatation of the proximal abdominal aorta just above the partially visualized stent graft.  5. Partially visualized left renal stone  6. Unchanged dilated main pulmonary artery     PATHOLOGY RESULTS:  Surgical Pathology [Aug 23 2023 1:48PM] ([id number])  Specimens: RIGHT ADRENAL LESION CORE   Name AALIYAH SAWANT   Accession #: [id number]   Pathologist: BOLIVAR BARRETO ASA, MD, PhD   Date of Procedure: 8/11/2023   Submitting  Physician: MAUREEN CHAVIS CNP   Location: [facility]   Copy To/Referring/Attending:   MARTHA CORDERO MD Other External #     FINAL DIAGNOSIS   A. RIGHT ADRENAL LESION CORE:     METASTATIC POORLY DIFFERENTIATED  CARCINOMA: SOFT TISSUE (IDENTIFIED AS RIGHT ADRENAL) BIOPSY   Note   COMMENT : The biopsyconsists entirely of a poorly differentiated malignancy in soft  tissue; no adrenal is seen. The tumor stains for keratins (CAM5.2 and  CK7 but not CK20) and is negative for TTF1 and Napsin as well as p40. Staining for SF1 is completely negative.     Electronically Signed Out By BOLIVAR BARRETO ASA, MD, PhD/SLA       ASSESSMENT AND PLAN:   1.  Poorly differentiated carcinoma of unknown primary origin. Patient with a lifelong history of heavy smoking.  She was admitted at [facility]  with complaint of worsening shortness of breath.  Work-up revealed stable pulmonary nodules but there was concern regarding new bilateral adrenal metastatic lesions along with questionable liver lesions and right hilar lymphadenopathy.  CT-guided biopsy of the adrenal lesion was suggestive of poorly differentiated carcinoma.  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  She was also evaluated by Dr. Francheska Parada at [facility] and case was reviewed by  pathology and as per tumor board evaluation, overall clinical and radiological finding  were considered mainly of probable primary bronchogenic origin.  She was treated with carbo/Taxol/Keytruda combination for 3 cycles.  4th cycle was discontinued due to tolerability issues.  Follow-up CT scan showed interval decrease in bilateral adrenal lesions and she was transitioned to maintenance Keytruda beginning Dec 2023.     Overall plan is to continue with  Keytruda at the current dose and schedule.  As stated above there are issues with frequent COPD exacerbation leading to treatment breaks and brief hospitalization.   Probable side effects of Keytruda mainly weakness, fatigue, pneumonitis, colitis, endocrinopathies, pleuritis, skin rash etc. were explained to them in detail.  Her overall long-term prognosis remains guarded.    2.  COPD exacerbation.  I strongly advised her to continue to follow-up with pulmonary clinic closely.  There is also a significant component of anxiety and panic attacks.  If possible I will try to request evaluation with our supportive oncology team.    3.  Left lower leg wound/vascular issues.  Really appreciate wound and vascular surgery teams from help.  She will continue to follow-up with them.    3.  Follow-up.  She will return to the clinic in few weeks.    This note has been transcribed using Dragon voice recognition system and there is a possibility of unintentional typing misprints.

## 2024-03-25 NOTE — PROGRESS NOTES
Followed up with pt and her . Pt here today for keytruda. Pt appears to not be on any O2 and pt indicates she does not need it. Pt uses 2L at home mostly at night and if she gets sob. Pt just returned from a long car ride from [place] where they got home last night. They went for her 's good friend's . They brought their dtr and her family as well. Pt is not as winded on this visit and she seems to be feeling a bit more energy. Pt does feel she is feeling a bit better. Pt in good spirits while talking about her new grd baby and all in all feels she is coping and managing at this time.

## 2024-03-26 ENCOUNTER — TELEPHONE (OUTPATIENT)
Dept: PALLIATIVE MEDICINE | Facility: CLINIC | Age: 69
End: 2024-03-26
Payer: MEDICARE

## 2024-03-26 NOTE — TELEPHONE ENCOUNTER
Patient referred to supportive oncology/palliative care by Dr. Burnham for pain, anxiety, GOC and mood. Patient has actually met Dr. Lerner in our department before (last seen on 11/1/23). Patient was following with Dr. Parada at the time and we were coordinating visits downtown then. When she transferred to Dr. Burnham she wanted to receive all care at [facility] and so did not continue following with Dr. Lerner (see 12/13/23 phone note for details).    I just called and left her a VM saying she was referred back to us and we would be happy to meet with her again. I provided our call back number [phone], option #5 then option #1 for her to call and schedule. Oncology team updated.

## 2024-03-27 ENCOUNTER — APPOINTMENT (OUTPATIENT)
Dept: PRIMARY CARE | Facility: CLINIC | Age: 69
End: 2024-03-27
Payer: MEDICARE

## 2024-03-29 ENCOUNTER — PATIENT OUTREACH (OUTPATIENT)
Dept: CARE COORDINATION | Facility: CLINIC | Age: 69
End: 2024-03-29
Payer: MEDICARE

## 2024-03-29 NOTE — PROGRESS NOTES
Unable to reach patient for call back two weeks after discharge. No PCP follow up visit.  LVM  with call back number for patient to call if needed.

## 2024-04-01 DIAGNOSIS — F51.01 PRIMARY INSOMNIA: ICD-10-CM

## 2024-04-01 RX ORDER — OLANZAPINE 5 MG/1
5 TABLET ORAL NIGHTLY
Qty: 90 TABLET | Refills: 1 | Status: SHIPPED | OUTPATIENT
Start: 2024-04-01 | End: 2024-09-28

## 2024-04-02 DIAGNOSIS — J44.1 COPD EXACERBATION (MULTI): ICD-10-CM

## 2024-04-02 RX ORDER — BUDESONIDE 0.25 MG/2ML
0.25 INHALANT ORAL
Qty: 2 ML | Refills: 2 | Status: SHIPPED | OUTPATIENT
Start: 2024-04-02 | End: 2024-05-07 | Stop reason: SDUPTHER

## 2024-04-05 ENCOUNTER — APPOINTMENT (OUTPATIENT)
Dept: PALLIATIVE MEDICINE | Facility: HOSPITAL | Age: 69
End: 2024-04-05
Payer: MEDICARE

## 2024-04-22 ENCOUNTER — TELEPHONE (OUTPATIENT)
Dept: HEMATOLOGY/ONCOLOGY | Facility: CLINIC | Age: 69
End: 2024-04-22
Payer: MEDICARE

## 2024-04-22 NOTE — TELEPHONE ENCOUNTER
Daughter called with concerns of patient's severe anxiety; referral made, after discussion, to Nam Mason CNP in Psych.  Daughter will let patient know, and patient had expressed interest to daughter previously in seeing someone.

## 2024-04-29 ENCOUNTER — PATIENT OUTREACH (OUTPATIENT)
Dept: CARE COORDINATION | Facility: CLINIC | Age: 69
End: 2024-04-29
Payer: MEDICARE

## 2024-04-29 DIAGNOSIS — F41.9 ANXIETY: Primary | ICD-10-CM

## 2024-04-29 NOTE — TELEPHONE ENCOUNTER
Received refill request from [facility] for Buspirone 15mg tablet.   Take 1 tab 3 times daily   Qty 270  I no longer see this on her medication list but it looks like you have given this to her before. Will you refill ?

## 2024-04-30 ENCOUNTER — DOCUMENTATION (OUTPATIENT)
Dept: CARE COORDINATION | Facility: CLINIC | Age: 69
End: 2024-04-30
Payer: MEDICARE

## 2024-04-30 DIAGNOSIS — J18.9 PNEUMONIA DUE TO INFECTIOUS ORGANISM, UNSPECIFIED LATERALITY, UNSPECIFIED PART OF LUNG: ICD-10-CM

## 2024-04-30 RX ORDER — IBUPROFEN 200 MG
1 TABLET ORAL EVERY 24 HOURS
COMMUNITY
Start: 2024-05-15 | End: 2024-06-11 | Stop reason: WASHOUT

## 2024-04-30 RX ORDER — LEVALBUTEROL 1.25 MG/.5ML
3 SOLUTION, CONCENTRATE RESPIRATORY (INHALATION)
COMMUNITY
End: 2024-05-06 | Stop reason: ALTCHOICE

## 2024-04-30 RX ORDER — BUSPIRONE HYDROCHLORIDE 15 MG/1
15 TABLET ORAL 3 TIMES DAILY
Qty: 270 TABLET | Refills: 1 | Status: SHIPPED | OUTPATIENT
Start: 2024-04-30 | End: 2024-05-07 | Stop reason: WASHOUT

## 2024-04-30 RX ORDER — IBUPROFEN 200 MG
1 TABLET ORAL EVERY 24 HOURS
COMMUNITY
Start: 2024-04-30 | End: 2024-05-14

## 2024-04-30 RX ORDER — GUAIFENESIN/DEXTROMETHORPHAN 100-10MG/5
10 SYRUP ORAL EVERY 4 HOURS PRN
COMMUNITY
End: 2024-05-06 | Stop reason: ALTCHOICE

## 2024-04-30 NOTE — PROGRESS NOTES
Discharge Facility:[facility]  Discharge Diagnosis:29Community acquired pneumonia, unspecified laterality (Primary Dx);   COPD exacerbation (HCC);  Admission Date:4.25.24  Discharge Date: 4.29.24    PCP Appointment Date:5.7.24  Specialist Appointment Date:   Hospital Encounter and Summary: Linked   See discharge assessment below for further details   Engagement  Call Start Time: 1215 (4/30/2024 12:15 PM)    Medications  Medications reviewed with patient/caregiver?: Yes (4/30/2024 12:15 PM)  Is the patient having any side effects they believe may be caused by any medication additions or changes?: No (4/30/2024 12:15 PM)  Does the patient have all medications ordered at discharge?: Yes (4/30/2024 12:15 PM)  Care Management Interventions: No intervention needed (4/30/2024 12:15 PM)  Is the patient taking all medications as directed (includes completed medication regime)?: Yes (4/30/2024 12:15 PM)  Care Management Interventions: Provided patient education (4/30/2024 12:15 PM)    Appointments  Does the patient have a primary care provider?: Yes (4/30/2024 12:15 PM)  Care Management Interventions: Verified appointment date/time/provider (4/30/2024 12:15 PM)  Care Management Interventions: Advised patient to keep appointment (4/30/2024 12:15 PM)    Self Management  What is the home health agency?: denies need (4/30/2024 12:15 PM)  What Durable Medical Equipment (DME) was ordered?: n/a (4/30/2024 12:15 PM)    Patient Teaching  Does the patient have access to their discharge instructions?: Yes (4/30/2024 12:15 PM)  Care Management Interventions: Reviewed instructions with patient (4/30/2024 12:15 PM)  What is the patient's perception of their health status since discharge?: Improving (4/30/2024 12:15 PM)  Is the patient/caregiver able to teach back the hierarchy of who to call/visit for symptoms/problems? PCP, Specialist, Home Health nurse, Urgent Care, ED, 911: Yes (4/30/2024 12:15 PM)  Patient/Caregiver Education Comments:  Spoke with patient. States not sob and is feeling better. Inquired if she nic be following with PCP for this admission. States she has a previously scheuled appt wit PCP on 5.7 and would discuss hospital stay at that time. Reminded patient dc instructions requested lab to be drawn one week prior to office visit. PCP to follow with postassium level. (4/30/2024 12:15 PM)

## 2024-05-01 ENCOUNTER — TELEPHONE (OUTPATIENT)
Dept: HEMATOLOGY/ONCOLOGY | Facility: CLINIC | Age: 69
End: 2024-05-01
Payer: MEDICARE

## 2024-05-01 NOTE — TELEPHONE ENCOUNTER
Received a message yesterday when I was out of the office that Opal had called to speak to me about Radha.  Message left for Opal today asking her to call me back.  Awaiting return call.

## 2024-05-02 DIAGNOSIS — C34.90 NON-SMALL CELL LUNG CANCER, UNSPECIFIED LATERALITY (MULTI): Primary | ICD-10-CM

## 2024-05-02 DIAGNOSIS — C80.1: ICD-10-CM

## 2024-05-02 RX ORDER — PROCHLORPERAZINE MALEATE 10 MG
10 TABLET ORAL EVERY 6 HOURS PRN
Status: CANCELLED | OUTPATIENT
Start: 2024-05-06

## 2024-05-02 RX ORDER — PROCHLORPERAZINE EDISYLATE 5 MG/ML
10 INJECTION INTRAMUSCULAR; INTRAVENOUS EVERY 6 HOURS PRN
Status: CANCELLED | OUTPATIENT
Start: 2024-05-06

## 2024-05-02 RX ORDER — HYDROCODONE BITARTRATE AND ACETAMINOPHEN 7.5; 325 MG/15ML; MG/15ML
10 SOLUTION ORAL EVERY 6 HOURS PRN
Qty: 60 ML | Refills: 0 | Status: SHIPPED | OUTPATIENT
Start: 2024-05-02 | End: 2024-05-06 | Stop reason: WASHOUT

## 2024-05-04 ENCOUNTER — TELEMEDICINE (OUTPATIENT)
Dept: BEHAVIORAL HEALTH | Facility: CLINIC | Age: 69
End: 2024-05-04
Payer: MEDICARE

## 2024-05-04 DIAGNOSIS — F41.1 GAD (GENERALIZED ANXIETY DISORDER): ICD-10-CM

## 2024-05-04 PROCEDURE — 1159F MED LIST DOCD IN RCRD: CPT

## 2024-05-04 PROCEDURE — 3008F BODY MASS INDEX DOCD: CPT

## 2024-05-04 PROCEDURE — 1160F RVW MEDS BY RX/DR IN RCRD: CPT

## 2024-05-04 PROCEDURE — 99205 OFFICE O/P NEW HI 60 MIN: CPT

## 2024-05-04 RX ORDER — LORAZEPAM 0.5 MG/1
0.5 TABLET ORAL EVERY 6 HOURS PRN
Qty: 120 TABLET | Refills: 0 | Status: SHIPPED | OUTPATIENT
Start: 2024-05-04 | End: 2024-05-19 | Stop reason: SDUPTHER

## 2024-05-04 NOTE — PROGRESS NOTES
"Mrs Toussaint is a 69 year old female with PMH of a stemi, CAD, hypertension, hyperlipidemia, PAD, nicotine abuse, non small lung cancer, referred to  Psychoncology for evaluation and treatment of Anxiety.     Chief Complaint - Anxiety     The patient reports that she has a long history of anxiety that worsened after being diagnosed with lung cancer in . She has COPD and felt that she was experiencing an exacerbation and was shocked when she was told that she has cancer. According to patient,  cancer was found  in the  Adrenal Glands and it had spread to the lungs as NSCLC     Patient reports that she is prescribed Keytruda which causes significant fatigue. She reports that she was very active prior to the diagnosis but is fatigued daily since the treatment started.     The patient describes ongoing anxiety without panic attacks. She describes \"mild depression\" and describes her mood as \"OK\" today. She denies SI/HI. She is still grieving the death of her sister.     The patient has frequent muscle spasms and lasting neuropathy. She reports that gabapentin is helpful for treating symptoms. She feels that hydroxyzine is mildly helpful for anxiety but finds Ativan more effective.     No report of sleep or appetite problems     History     The patient's patents emigrated from [place] and she reports that she was very close to her mother. She had a sister who  in .     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 She has a Master's degree.     The patient  in  and 3 children.     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 She stopped working in  and now receives a pension.     Social History     Tobacco - started smoking at age 22 and she quit 7 months ago following the diagnosis.   Alcohol use - none  Marijuana use - none     Family Psychiatric History   Alcoholism - father   Sister had anxiety " disorder, Mom had depression, cousin has serious mental illness     Medical History     Aneurysm - September of 2009,  2 - brain surgeries   Craniotomy and then then  coiled , 2009, second recoil      Massive Heart Attack August of 2015   Lost pulse x 3. Treated with stents      Had leg debridement following brain surgery due to blood clot in left leg.     The patient had surgery for 2 abdominal aneurysm's and she has one left that is being observed.    Patient wears oxygen while sleeping and ambulating.     Depression  Visit Type: initial  Onset of symptoms: 1 to 6 months ago  Progression since onset: unchanged  Patient presents with the following symptoms: decreased concentration, depressed mood, excessive worry, fatigue, muscle tension, nervousness/anxiety, palpitations, panic, restlessness and shortness of breath.  Frequency of symptoms: most days   Severity: moderate   Sleep per night: 7 hours  Sleep quality: fair  Nighttime awakenings: occasional  Risk factors: recent illness and major life event  Patient has a history of: anxiety/panic attacks, CAD and chronic lung disease  Treatment tried: non-benzodiazephine anxiolytics and benzodiazepine  Compliance with treatment: good  Improvement on treatment: mild    Anxiety  Presents for initial visit. Onset was 1 to 6 months ago. The problem has been unchanged. Symptoms include decreased concentration, depressed mood, dizziness, excessive worry, muscle tension, nervous/anxious behavior, palpitations, panic, restlessness and shortness of breath. Symptoms occur constantly. The severity of symptoms is severe and causing significant distress. The symptoms are aggravated by social activities. The patient sleeps 7 hours per night. The quality of sleep is fair. Nighttime awakenings: occasional.     Risk factors include a major life event and recent illness. Her past medical history is significant for anxiety/panic attacks, CAD and chronic lung disease. Past treatments  include benzodiazephines and non-benzodiazephine anxiolytics. The treatment provided no relief. Compliance with prior treatments has been good.   Mental Status Examination  General Appearance: Well groomed, appropriate eye contact.  Gait/Station:  gait not assessed, virtual appointment   Speech: Normal rate, volume, prosody  Mood: Nervous but OK   Affect: Anxious  Thought Process: Linear, goal directed  Thought Associations: No loosening of associations  Thought Content: normal  Perception: No perceptual abnormalities noted  Level of Consciousness: Alert  Orientation: Alert and oriented to person, place, time and situation  Attention and Concentration: Intact  Recent Memory: Intact as evidenced by ability to recall details from the past 24 hours   Remote Memory: Intact as evidenced by ability to recall previous medical issues   Executive function: Intact  Language: Naming intact  Fund of knowledge: Good  Insight:  Intact  Judgment: Intact, as evidenced by help-seeking behavior, ability to reason through medical decision making, and compliance with treatment recommendations  Review of Systems   Constitutional:  Positive for activity change and fatigue.   Respiratory:  Positive for shortness of breath.    Cardiovascular:  Positive for palpitations.   Neurological:  Positive for dizziness.   Psychiatric/Behavioral:  Positive for decreased concentration, depression and dysphoric mood. The patient is nervous/anxious.            Lab Review:   Lab on 03/25/2024   Component Date Value    WBC 03/25/2024 15.2 (H)     nRBC 03/25/2024 0.0     RBC 03/25/2024 5.41 (H)     Hemoglobin 03/25/2024 15.0     Hematocrit 03/25/2024 47.0 (H)     MCV 03/25/2024 87     MCH 03/25/2024 27.7     MCHC 03/25/2024 31.9 (L)     RDW 03/25/2024 17.6 (H)     Platelets 03/25/2024 259     Neutrophils % 03/25/2024 80.6     Immature Granulocytes %,* 03/25/2024 1.4 (H)     Lymphocytes % 03/25/2024 13.5     Monocytes % 03/25/2024 2.6     Eosinophils %  03/25/2024 1.3     Basophils % 03/25/2024 0.6     Neutrophils Absolute 03/25/2024 12.25 (H)     Immature Granulocytes Ab* 03/25/2024 0.21     Lymphocytes Absolute 03/25/2024 2.05     Monocytes Absolute 03/25/2024 0.39     Eosinophils Absolute 03/25/2024 0.20     Basophils Absolute 03/25/2024 0.09     Glucose 03/25/2024 129 (H)     Sodium 03/25/2024 138     Potassium 03/25/2024 4.3     Chloride 03/25/2024 102     Bicarbonate 03/25/2024 30     Anion Gap 03/25/2024 10     Urea Nitrogen 03/25/2024 20     Creatinine 03/25/2024 0.69     eGFR 03/25/2024 >90     Calcium 03/25/2024 9.6     Albumin 03/25/2024 3.6     Alkaline Phosphatase 03/25/2024 62     Total Protein 03/25/2024 7.1     AST 03/25/2024 15     Bilirubin, Total 03/25/2024 0.4     ALT 03/25/2024 23     Thyroid Stimulating Horm* 03/25/2024 2.67    Admission on 03/11/2024, Discharged on 03/15/2024   Component Date Value    POCT pH, Venous 03/11/2024 7.37     POCT pCO2, Venous 03/11/2024 59 (H)     POCT pO2, Venous 03/11/2024 30 (L)     POCT SO2, Venous 03/11/2024 44 (L)     POCT Oxy Hemoglobin, Oniel* 03/11/2024 40.9 (L)     POCT Hematocrit Calculat* 03/11/2024 47.0 (H)     POCT Sodium, Venous 03/11/2024 135 (L)     POCT Potassium, Venous 03/11/2024 4.5     POCT Chloride, Venous 03/11/2024 101     POCT Ionized Calicum, Ve* 03/11/2024 1.22     POCT Glucose, Venous 03/11/2024 106 (H)     POCT Lactate, Venous 03/11/2024 0.8     POCT Base Excess, Venous 03/11/2024 6.6 (H)     POCT HCO3 Calculated, Ve* 03/11/2024 34.1 (H)     POCT Hemoglobin, Venous 03/11/2024 15.7     POCT Anion Gap, Venous 03/11/2024 4.0 (L)     Patient Temperature 03/11/2024 37.0     FiO2 03/11/2024 32     Ventricular Rate 03/11/2024 82     Atrial Rate 03/11/2024 82     PA Interval 03/11/2024 150     QRS Duration 03/11/2024 90     QT Interval 03/11/2024 356     QTC Calculation(Bazett) 03/11/2024 415     P Axis 03/11/2024 71     R [place] 03/11/2024 64     T [place] 03/11/2024 87     QRS Count 03/11/2024 14      Q Onset 03/11/2024 221     P Onset 03/11/2024 146     P Offset 03/11/2024 194     T Offset 03/11/2024 399     QTC Fredericia 03/11/2024 395     Flu A Result 03/11/2024 Not Detected     Flu B Result 03/11/2024 Not Detected     Coronavirus 2019, PCR 03/11/2024 Not Detected     WBC 03/11/2024 12.5 (H)     nRBC 03/11/2024 0.0     RBC 03/11/2024 5.64 (H)     Hemoglobin 03/11/2024 15.3     Hematocrit 03/11/2024 48.7 (H)     MCV 03/11/2024 86     MCH 03/11/2024 27.1     MCHC 03/11/2024 31.4 (L)     RDW 03/11/2024 19.2 (H)     Platelets 03/11/2024 207     Neutrophils % 03/11/2024 73.4     Immature Granulocytes %,* 03/11/2024 0.4     Lymphocytes % 03/11/2024 17.5     Monocytes % 03/11/2024 6.5     Eosinophils % 03/11/2024 1.6     Basophils % 03/11/2024 0.6     Neutrophils Absolute 03/11/2024 9.21 (H)     Immature Granulocytes Ab* 03/11/2024 0.05     Lymphocytes Absolute 03/11/2024 2.19     Monocytes Absolute 03/11/2024 0.82     Eosinophils Absolute 03/11/2024 0.20     Basophils Absolute 03/11/2024 0.07     Glucose 03/11/2024 91     Sodium 03/11/2024 135 (L)     Potassium 03/11/2024 5.4 (H)     Chloride 03/11/2024 101     Bicarbonate 03/11/2024 30     Anion Gap 03/11/2024 9 (L)     Urea Nitrogen 03/11/2024 14     Creatinine 03/11/2024 0.54     eGFR 03/11/2024 >90     Calcium 03/11/2024 8.9     Albumin 03/11/2024 3.9     Alkaline Phosphatase 03/11/2024 66     Total Protein 03/11/2024 6.9     AST 03/11/2024 26     Bilirubin, Total 03/11/2024 0.5     ALT 03/11/2024 13     Troponin I, High Sensiti* 03/11/2024 12     Troponin I, High Sensiti* 03/11/2024 12     Potassium 03/11/2024 4.3     WBC 03/12/2024 8.6     nRBC 03/12/2024 0.0     RBC 03/12/2024 5.01     Hemoglobin 03/12/2024 13.5     Hematocrit 03/12/2024 44.9     MCV 03/12/2024 90     MCH 03/12/2024 26.9     MCHC 03/12/2024 30.1 (L)     RDW 03/12/2024 18.6 (H)     Platelets 03/12/2024 167     Glucose 03/12/2024 83     Sodium 03/12/2024 140     Potassium 03/12/2024 4.8      Chloride 03/12/2024 106     Bicarbonate 03/12/2024 25     Anion Gap 03/12/2024 14     Urea Nitrogen 03/12/2024 20     Creatinine 03/12/2024 0.75     eGFR 03/12/2024 87     Calcium 03/12/2024 8.7     Thyroid Stimulating Horm* 03/12/2024 4.41 (H)     Hemoglobin A1C 03/12/2024 5.9 (H)     Estimated Average Glucose 03/12/2024 123     Vitamin B12 03/12/2024 417     Thyroxine, Free 03/12/2024 0.83     WBC 03/13/2024 13.1 (H)     nRBC 03/13/2024 0.0     RBC 03/13/2024 5.32 (H)     Hemoglobin 03/13/2024 14.3     Hematocrit 03/13/2024 46.8 (H)     MCV 03/13/2024 88     MCH 03/13/2024 26.9     MCHC 03/13/2024 30.6 (L)     RDW 03/13/2024 18.7 (H)     Platelets 03/13/2024 198     Glucose 03/13/2024 119 (H)     Sodium 03/13/2024 140     Potassium 03/13/2024 4.3     Chloride 03/13/2024 103     Bicarbonate 03/13/2024 29     Anion Gap 03/13/2024 12     Urea Nitrogen 03/13/2024 17     Creatinine 03/13/2024 0.63     eGFR 03/13/2024 >90     Calcium 03/13/2024 9.3        Assessment/Plan   Safety Assessment - no remote or recent SI, HI, or SA. Protective Factors - connected family , no trauma history, limited substance use history . Risk factors - cancer diagnosis and treatment superimposed upon chronic illnesses. Relative Risk is low.     The patient has history of anxiety and recent cancer diagnosis and difficult treatment plan has contributed to mild depression and intensification of anxiety symptoms. Patient denies SI, HI, and panic attacks. Will start Zoloft for anxiety and mood  and add PRN Ativan and scheduled Gabapentin  for anxiety symptoms.     Diagnoses and all orders for this visit:  OSITO (generalized anxiety disorder)   Adjustment Disorder with Depression    START Sertraline 100 mg - take 1 table by mouth daily for anxiety and depression   START Gabapentin 100 mg - take 1 tablet, 3 times daily for anxiety   START Lorazepam 0.5 mg - take 1 tablet, every 6 hours as needed for anxiety     Follow up in 2 weeks.     Chart Review  - 5 minutes   Assessment with patient contact - 50 minutes   Charting 10 minutes

## 2024-05-06 ENCOUNTER — LAB (OUTPATIENT)
Dept: LAB | Facility: CLINIC | Age: 69
End: 2024-05-06
Payer: MEDICARE

## 2024-05-06 ENCOUNTER — INFUSION (OUTPATIENT)
Dept: HEMATOLOGY/ONCOLOGY | Facility: CLINIC | Age: 69
End: 2024-05-06
Payer: MEDICARE

## 2024-05-06 ENCOUNTER — SOCIAL WORK (OUTPATIENT)
Dept: HEMATOLOGY/ONCOLOGY | Facility: CLINIC | Age: 69
End: 2024-05-06

## 2024-05-06 ENCOUNTER — NUTRITION (OUTPATIENT)
Dept: RADIATION ONCOLOGY | Facility: CLINIC | Age: 69
End: 2024-05-06

## 2024-05-06 ENCOUNTER — OFFICE VISIT (OUTPATIENT)
Dept: HEMATOLOGY/ONCOLOGY | Facility: CLINIC | Age: 69
End: 2024-05-06
Payer: MEDICARE

## 2024-05-06 VITALS
DIASTOLIC BLOOD PRESSURE: 58 MMHG | SYSTOLIC BLOOD PRESSURE: 124 MMHG | TEMPERATURE: 96.3 F | RESPIRATION RATE: 20 BRPM | HEART RATE: 88 BPM

## 2024-05-06 VITALS
BODY MASS INDEX: 25 KG/M2 | HEART RATE: 67 BPM | SYSTOLIC BLOOD PRESSURE: 115 MMHG | DIASTOLIC BLOOD PRESSURE: 66 MMHG | WEIGHT: 141.09 LBS | RESPIRATION RATE: 20 BRPM | HEIGHT: 63 IN | TEMPERATURE: 97.9 F

## 2024-05-06 DIAGNOSIS — C34.90 NON-SMALL CELL LUNG CANCER, UNSPECIFIED LATERALITY (MULTI): Primary | ICD-10-CM

## 2024-05-06 DIAGNOSIS — G89.3 CANCER RELATED PAIN: ICD-10-CM

## 2024-05-06 DIAGNOSIS — E27.40 ADRENAL INSUFFICIENCY (MULTI): ICD-10-CM

## 2024-05-06 DIAGNOSIS — C34.91 NON-SMALL CELL CANCER OF RIGHT LUNG (MULTI): ICD-10-CM

## 2024-05-06 DIAGNOSIS — C34.90 NON-SMALL CELL LUNG CANCER, UNSPECIFIED LATERALITY (MULTI): ICD-10-CM

## 2024-05-06 LAB
ALBUMIN SERPL BCP-MCNC: 3.8 G/DL (ref 3.4–5)
ALP SERPL-CCNC: 47 U/L (ref 33–136)
ALT SERPL W P-5'-P-CCNC: 17 U/L (ref 7–45)
ANION GAP SERPL CALC-SCNC: 10 MMOL/L (ref 10–20)
AST SERPL W P-5'-P-CCNC: 13 U/L (ref 9–39)
BASOPHILS # BLD AUTO: 0.09 X10*3/UL (ref 0–0.1)
BASOPHILS NFR BLD AUTO: 0.6 %
BILIRUB SERPL-MCNC: 0.3 MG/DL (ref 0–1.2)
BUN SERPL-MCNC: 26 MG/DL (ref 6–23)
CALCIUM SERPL-MCNC: 9.1 MG/DL (ref 8.6–10.3)
CHLORIDE SERPL-SCNC: 104 MMOL/L (ref 98–107)
CO2 SERPL-SCNC: 27 MMOL/L (ref 21–32)
CREAT SERPL-MCNC: 0.72 MG/DL (ref 0.5–1.05)
EGFRCR SERPLBLD CKD-EPI 2021: >90 ML/MIN/1.73M*2
EOSINOPHIL # BLD AUTO: 0.13 X10*3/UL (ref 0–0.7)
EOSINOPHIL NFR BLD AUTO: 0.8 %
ERYTHROCYTE [DISTWIDTH] IN BLOOD BY AUTOMATED COUNT: 15.9 % (ref 11.5–14.5)
GLUCOSE SERPL-MCNC: 81 MG/DL (ref 74–99)
HCT VFR BLD AUTO: 45.6 % (ref 36–46)
HGB BLD-MCNC: 14.4 G/DL (ref 12–16)
IMM GRANULOCYTES # BLD AUTO: 0.22 X10*3/UL (ref 0–0.7)
IMM GRANULOCYTES NFR BLD AUTO: 1.3 % (ref 0–0.9)
LYMPHOCYTES # BLD AUTO: 2.89 X10*3/UL (ref 1.2–4.8)
LYMPHOCYTES NFR BLD AUTO: 17.7 %
MCH RBC QN AUTO: 28.1 PG (ref 26–34)
MCHC RBC AUTO-ENTMCNC: 31.6 G/DL (ref 32–36)
MCV RBC AUTO: 89 FL (ref 80–100)
MONOCYTES # BLD AUTO: 1.13 X10*3/UL (ref 0.1–1)
MONOCYTES NFR BLD AUTO: 6.9 %
NEUTROPHILS # BLD AUTO: 11.85 X10*3/UL (ref 1.2–7.7)
NEUTROPHILS NFR BLD AUTO: 72.7 %
NRBC BLD-RTO: 0 /100 WBCS (ref 0–0)
PLATELET # BLD AUTO: 245 X10*3/UL (ref 150–450)
POTASSIUM SERPL-SCNC: 4.3 MMOL/L (ref 3.5–5.3)
PROT SERPL-MCNC: 7 G/DL (ref 6.4–8.2)
RBC # BLD AUTO: 5.13 X10*6/UL (ref 4–5.2)
SODIUM SERPL-SCNC: 137 MMOL/L (ref 136–145)
TSH SERPL-ACNC: 3.46 MIU/L (ref 0.44–3.98)
WBC # BLD AUTO: 16.3 X10*3/UL (ref 4.4–11.3)

## 2024-05-06 PROCEDURE — 99215 OFFICE O/P EST HI 40 MIN: CPT | Performed by: INTERNAL MEDICINE

## 2024-05-06 PROCEDURE — 96413 CHEMO IV INFUSION 1 HR: CPT

## 2024-05-06 PROCEDURE — 3008F BODY MASS INDEX DOCD: CPT | Performed by: INTERNAL MEDICINE

## 2024-05-06 PROCEDURE — 85025 COMPLETE CBC W/AUTO DIFF WBC: CPT

## 2024-05-06 PROCEDURE — 36415 COLL VENOUS BLD VENIPUNCTURE: CPT

## 2024-05-06 PROCEDURE — 3078F DIAST BP <80 MM HG: CPT | Performed by: INTERNAL MEDICINE

## 2024-05-06 PROCEDURE — 3074F SYST BP LT 130 MM HG: CPT | Performed by: INTERNAL MEDICINE

## 2024-05-06 PROCEDURE — 1159F MED LIST DOCD IN RCRD: CPT | Performed by: INTERNAL MEDICINE

## 2024-05-06 PROCEDURE — 1160F RVW MEDS BY RX/DR IN RCRD: CPT | Performed by: INTERNAL MEDICINE

## 2024-05-06 PROCEDURE — 84443 ASSAY THYROID STIM HORMONE: CPT

## 2024-05-06 PROCEDURE — 2500000004 HC RX 250 GENERAL PHARMACY W/ HCPCS (ALT 636 FOR OP/ED): Mod: JZ | Performed by: INTERNAL MEDICINE

## 2024-05-06 PROCEDURE — 80053 COMPREHEN METABOLIC PANEL: CPT

## 2024-05-06 RX ORDER — ALBUTEROL SULFATE 0.83 MG/ML
3 SOLUTION RESPIRATORY (INHALATION) AS NEEDED
Status: DISCONTINUED | OUTPATIENT
Start: 2024-05-06 | End: 2024-05-06 | Stop reason: HOSPADM

## 2024-05-06 RX ORDER — HEPARIN 100 UNIT/ML
500 SYRINGE INTRAVENOUS AS NEEDED
OUTPATIENT
Start: 2024-05-06

## 2024-05-06 RX ORDER — DIPHENHYDRAMINE HYDROCHLORIDE 50 MG/ML
50 INJECTION INTRAMUSCULAR; INTRAVENOUS AS NEEDED
Status: DISCONTINUED | OUTPATIENT
Start: 2024-05-06 | End: 2024-05-06 | Stop reason: HOSPADM

## 2024-05-06 RX ORDER — HEPARIN SODIUM,PORCINE/PF 10 UNIT/ML
50 SYRINGE (ML) INTRAVENOUS AS NEEDED
Status: DISCONTINUED | OUTPATIENT
Start: 2024-05-06 | End: 2024-05-06 | Stop reason: HOSPADM

## 2024-05-06 RX ORDER — FAMOTIDINE 10 MG/ML
20 INJECTION INTRAVENOUS ONCE AS NEEDED
Status: DISCONTINUED | OUTPATIENT
Start: 2024-05-06 | End: 2024-05-06 | Stop reason: HOSPADM

## 2024-05-06 RX ORDER — HEPARIN SODIUM,PORCINE/PF 10 UNIT/ML
50 SYRINGE (ML) INTRAVENOUS AS NEEDED
OUTPATIENT
Start: 2024-05-06

## 2024-05-06 RX ORDER — HEPARIN 100 UNIT/ML
500 SYRINGE INTRAVENOUS AS NEEDED
Status: DISCONTINUED | OUTPATIENT
Start: 2024-05-06 | End: 2024-05-06 | Stop reason: HOSPADM

## 2024-05-06 RX ORDER — HYDROCODONE BITARTRATE AND ACETAMINOPHEN 10; 325 MG/1; MG/1
1 TABLET ORAL EVERY 8 HOURS PRN
Qty: 60 TABLET | Refills: 0 | Status: SHIPPED | OUTPATIENT
Start: 2024-05-06 | End: 2024-06-06 | Stop reason: SDUPTHER

## 2024-05-06 RX ORDER — EPINEPHRINE 0.3 MG/.3ML
0.3 INJECTION SUBCUTANEOUS EVERY 5 MIN PRN
Status: DISCONTINUED | OUTPATIENT
Start: 2024-05-06 | End: 2024-05-06 | Stop reason: HOSPADM

## 2024-05-06 RX ADMIN — SODIUM CHLORIDE 400 MG: 9 INJECTION, SOLUTION INTRAVENOUS at 10:40

## 2024-05-06 ASSESSMENT — PAIN SCALES - GENERAL: PAINLEVEL_OUTOF10: 0-NO PAIN

## 2024-05-06 NOTE — SIGNIFICANT EVENT

## 2024-05-06 NOTE — SIGNIFICANT EVENT
05/06/24 1013   Prechemo Checklist   Has the patient been in the hospital, ED, or urgent care since last date of service No   Chemo/Immuno Consent Completed and Signed Yes   Protocol/Indications Verified Yes   Compared to previous dose Yes   All medications are dated accurately Yes   Pregnancy Test Negative Not applicable   Parameters Met Yes   BSA/Weight-Height Verified Yes   Dose Calculations Verified (current, total, cumulative) Yes

## 2024-05-06 NOTE — PROGRESS NOTES
LOCATION:   [facility], at [facility].     HEMATOLOGY ONCOLOGY PROBLEMS:  1.  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         PD-L1 70%.  No actionable alterations.       a.  Ist -line therapy with carbo/Taxol/Keytruda x 3 cycles from Sep to Nov 2023.       b.  On maintenance Keytruda beginning Dec 2023.     CHIEF COMPLAINT:    The patient is in the clinic today for followup of poorly differentiated carcinoma and for continuation of treatment and management of therapy related effects.          HISTORY:  Ms. Toussaint is a 68-year-old female with poorly differentiated carcinoma of most probable primary bronchogenic origin.  She has multiple medical problems and was admitted at [facility] in Aug 2023 with complaint worsening shortness of breath, cough and weakness.  There was also history of progressive weight loss and recent falls.  Work-up revealed stable pulmonary nodules but there was concern regarding new bilateral adrenal metastatic lesions along with questionable liver lesions and right hilar lymphadenopathy. She is a lifelong heavy smoker. CT-guided biopsy of the adrenal lesion was suggestive of poorly differentiated carcinoma. 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  She was also evaluated by Dr. Francheska Parada at [facility] and case was reviewed by  pathology and as per tumor board evaluation, overall clinical and radiological finding were considered mainly of probable primary bronchogenic origin.  She was treated with carbo/Taxol/Keytruda combination for 3 cycles.  4th cycle was discontinued due to tolerability issues.  Follow-up CT scan showed interval decrease in bilateral adrenal lesions and she was transitioned to maintenance Keytruda beginning Dec 2023.    INTERVAL HISTORY:  Since her last visit she again had a brief hospitalization with the COPD exacerbation.  CT angiogram was negative for PE or any  other evidence of Keytruda induced pneumonitis.  She was managed with steroids and other supportive care.  She is using oxygen at home along with nebulizers.  There is also component of panic attack and baseline anxiety and she is being followed by Onco-psychiatry clinic.  Of note she also had a chronic left foot wound and is being closely followed in wound and vascular surgery clinics.       PAST MEDICAL HISTORY:   1.  COPD on home O2.   2.  Coronary artery disease   3.  Hypertension   4.  Hyperlipidemia   5.  Multiple vascular aneurysms.  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  6.  Peripheral vascular disease.  S/p femoral-femoral bypass   7.  AAA graft repair procedure.  8.  History of tubal ligation, bladder lift, lithotripsy surgeries     SOCIAL HISTORY:    for 46 years and lives in [place].  1 and half to 2 pack a day for 47 years smoking history.  Nonalcoholic.  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  Born and raised  in [place]     FAMILY HISTORY:    Father  at age 52 from myocardial infarction.  Mother  at age 75 or from aortic aneurysm related complications.  1 sister  at age 68 from depression and related complications.  3 children and 5 grandkids.  Her son and daughter has history of Crohn's disease.  No other specific history of bleeding, clotting or malignant disorder in the family.     REVIEW OF SYSTEMS:  Pertinent finding as per the history above.  All other systems have been reviewed and generally negative and noncontributory.     PHYSICAL EXAMINATION:    Detailed physical examination not done     ALLERGY & MEDICATIONS:  Allergies and latest outpatient medications list were reviewed in the EMR.    LAB RESULTS:  CBC from today shows a white cell count of 16.3 with hemoglobin of 14.4 and platelets of 245.  MCV is 89.  Metabolic profile and TSH are unremarkable.  Last 3 sets of blood work were reviewed and the trend was noted.    RADIOLOGY  RESULTS:  CT angiogram 3/4/2024 4/25/2024  Impression:  IMPRESSION:   1. No acute process   2.  Severe emphysema and bronchitis.   3.  Severe three-vessel coronary artery calcifications.   4.  Mediastinal adenopathy may be related to patient's known diagnosis of small cell lung cancer, suboptimally evaluated without IV contrast.      PATHOLOGY RESULTS:  Surgical Pathology [Aug 23 2023 1:48PM] ([id number])  Specimens: RIGHT ADRENAL LESION CORE   Name AALIYAH SAWANT   Accession #: [id number]   Pathologist: BOLIVAR BARRETO ASA, MD, PhD   Date of Procedure: 8/11/2023   Submitting  Physician: MAUREEN CHAVIS CNP   Location: [facility]   Copy To/Referring/Attending:   MARTHA CORDERO MD Other External #     FINAL DIAGNOSIS   A. RIGHT ADRENAL LESION CORE:     METASTATIC POORLY DIFFERENTIATED  CARCINOMA: SOFT TISSUE (IDENTIFIED AS RIGHT ADRENAL) BIOPSY   Note   COMMENT : The biopsyconsists entirely of a poorly differentiated malignancy in soft  tissue; no adrenal is seen. The tumor stains for keratins (CAM5.2 and  CK7 but not CK20) and is negative for TTF1 and Napsin as well as p40. Staining for SF1 is completely negative.     Electronically Signed Out By BOLIVAR BARRETO ASA, MD, PhD/AN       ASSESSMENT AND PLAN:   1.  Poorly differentiated carcinoma of unknown primary origin.  Please refer the details of initial presentation and management as outlined above.  In summary, patient with lifelong history of heavy smoking.  She was admitted at [facility]  with complaint of worsening shortness of breath.  Work-up revealed stable pulmonary nodules but there was concern regarding new bilateral adrenal metastatic lesions along with questionable liver lesions and right hilar lymphadenopathy.  CT-guided biopsy of the adrenal lesion was suggestive of poorly differentiated carcinoma.  Cancer type ID molecular test results were suggestive of questionable sarcoma.  She was also evaluated by Dr. Francheska Parada at [facility]  was reviewed by  pathology and as per tumor board evaluation, overall clinical and radiological finding were considered mainly of probable primary bronchogenic origin.  She was treated with carbo/Taxol/Keytruda combination for 3 cycles.  4th cycle was discontinued due to tolerability issues.  Follow-up CT scan showed interval decrease in bilateral adrenal lesions and she was transitioned to maintenance Keytruda beginning Dec 2023.     Overall plan is to continue with  Keytruda at the current dose and schedule.  As stated above there are issues with frequent COPD exacerbation leading to treatment breaks and hospitalizations.   She generally does very well for few weeks after Keytruda and then started having issues with COPD exacerbation.  Most likely those episodes are precipitated by a baseline anxiety.  As per patient request we will try to change Keytruda dosing to every 3-week schedule from next dose onwards.  Probable side effects of Keytruda mainly weakness, fatigue, pneumonitis, colitis, endocrinopathies, pleuritis, skin rash etc. were explained to them in detail.  Her overall long-term prognosis remains guarded.    2.  COPD exacerbation/anxiety.  I strongly advised her to continue to follow-up with pulmonary clinic closely.  There is also a significant component of anxiety and panic attacks and she should continue to follow-up with onco-psychiatry clinic.     3.  Left lower leg wound/vascular issues.  Really appreciate wound and vascular surgery teams from help.  She will continue to follow-up with them.    3.  Follow-up.  She will return to the clinic in 6 weeks.    This note has been transcribed using Dragon voice recognition system and there is a possibility of unintentional typing misprints.

## 2024-05-06 NOTE — PROGRESS NOTES
Follow up visit with pt and her dtr. Pt here today for keytruda and taxol. Pt indicated she was hospitalized recently due to her breathing and per dtr pneumonia. Pt explains she is a bit more stressed about her breathing. She brings her POC with her just in case she feels she is sob. Pt was able to see Nam PEREZ onc psych. Pt is now taking ativan and it has been helping her a great deal. Pt explains how her mind and worries would not shut off and she is noticing it is getting better. Pt is noticing feeling better emotionally. She admits her OCD does control her emotions. Pt was a  for years and a mother of three. She was used to being overly organized and on task. Inquired if pt has always felt she had OCD. Pt indicated as far back as she can remember. Pt grew up with  parents and lived with her mother who had a great deal of anger. Pt is in agreement after Onc EDIE talked to her about how pt grew up trying to take control of her day to day to feel she was able to make changes to her own life. Pt agrees it was the only way she could. Pt has been started on ativan to start but will follow up with Nam PEREZ to discuss something for her anxiety/OCD. Pt is recovering physically from hospitalization. Pt is using a wc for distance and has a rollator in her home to use if she gets tired. Pt is trying to become more involved with household tasks. Pt was able to make 50 meatballs and has been cutting up items for cooking. Pt feels good when she can get something done. Pt also talked about small tasks cleaning out a few cupboards and fridge as well. Dtr stressing pt does not over do it. Pt voicing she wants to be more active. They agreed pt can do things a bit at a time with breaks and support. Pt happy her grd son is home from college because they love to watch sports together. At this time pt seems to be doing well with the anxiety being ugo.

## 2024-05-07 ENCOUNTER — OFFICE VISIT (OUTPATIENT)
Dept: PRIMARY CARE | Facility: CLINIC | Age: 69
End: 2024-05-07
Payer: MEDICARE

## 2024-05-07 VITALS
BODY MASS INDEX: 25.52 KG/M2 | HEART RATE: 61 BPM | HEIGHT: 63 IN | WEIGHT: 144 LBS | OXYGEN SATURATION: 96 % | DIASTOLIC BLOOD PRESSURE: 80 MMHG | SYSTOLIC BLOOD PRESSURE: 120 MMHG

## 2024-05-07 DIAGNOSIS — R11.2 NAUSEA AND VOMITING, UNSPECIFIED VOMITING TYPE: ICD-10-CM

## 2024-05-07 DIAGNOSIS — F41.9 ANXIETY: ICD-10-CM

## 2024-05-07 DIAGNOSIS — J43.8 OTHER EMPHYSEMA (MULTI): ICD-10-CM

## 2024-05-07 DIAGNOSIS — J44.1 COPD EXACERBATION (MULTI): ICD-10-CM

## 2024-05-07 DIAGNOSIS — E27.40 ADRENAL INSUFFICIENCY (MULTI): ICD-10-CM

## 2024-05-07 DIAGNOSIS — G89.3 CANCER RELATED PAIN: ICD-10-CM

## 2024-05-07 DIAGNOSIS — C34.90 NON-SMALL CELL LUNG CANCER, UNSPECIFIED LATERALITY (MULTI): ICD-10-CM

## 2024-05-07 DIAGNOSIS — E78.2 MIXED HYPERLIPIDEMIA: ICD-10-CM

## 2024-05-07 DIAGNOSIS — Z09 HOSPITAL DISCHARGE FOLLOW-UP: Primary | ICD-10-CM

## 2024-05-07 PROCEDURE — 1160F RVW MEDS BY RX/DR IN RCRD: CPT | Performed by: STUDENT IN AN ORGANIZED HEALTH CARE EDUCATION/TRAINING PROGRAM

## 2024-05-07 PROCEDURE — 3074F SYST BP LT 130 MM HG: CPT | Performed by: STUDENT IN AN ORGANIZED HEALTH CARE EDUCATION/TRAINING PROGRAM

## 2024-05-07 PROCEDURE — 99495 TRANSJ CARE MGMT MOD F2F 14D: CPT | Performed by: STUDENT IN AN ORGANIZED HEALTH CARE EDUCATION/TRAINING PROGRAM

## 2024-05-07 PROCEDURE — 3008F BODY MASS INDEX DOCD: CPT | Performed by: STUDENT IN AN ORGANIZED HEALTH CARE EDUCATION/TRAINING PROGRAM

## 2024-05-07 PROCEDURE — 1159F MED LIST DOCD IN RCRD: CPT | Performed by: STUDENT IN AN ORGANIZED HEALTH CARE EDUCATION/TRAINING PROGRAM

## 2024-05-07 PROCEDURE — 3079F DIAST BP 80-89 MM HG: CPT | Performed by: STUDENT IN AN ORGANIZED HEALTH CARE EDUCATION/TRAINING PROGRAM

## 2024-05-07 RX ORDER — FLUTICASONE FUROATE, UMECLIDINIUM BROMIDE AND VILANTEROL TRIFENATATE 200; 62.5; 25 UG/1; UG/1; UG/1
1 POWDER RESPIRATORY (INHALATION)
Qty: 60 EACH | Refills: 3 | Status: SHIPPED | OUTPATIENT
Start: 2024-05-07 | End: 2024-05-28 | Stop reason: SDUPTHER

## 2024-05-07 RX ORDER — ROSUVASTATIN CALCIUM 20 MG/1
20 TABLET, COATED ORAL DAILY
Qty: 100 TABLET | Refills: 3 | Status: SHIPPED | OUTPATIENT
Start: 2024-05-07 | End: 2025-06-11

## 2024-05-07 RX ORDER — BUDESONIDE 0.25 MG/2ML
0.25 INHALANT ORAL
Qty: 2 ML | Refills: 2 | Status: SHIPPED | OUTPATIENT
Start: 2024-05-07

## 2024-05-07 RX ORDER — PROCHLORPERAZINE MALEATE 5 MG
5 TABLET ORAL EVERY 6 HOURS PRN
Qty: 30 TABLET | Refills: 3 | Status: SHIPPED | OUTPATIENT
Start: 2024-05-07 | End: 2024-05-14

## 2024-05-07 RX ORDER — DEXAMETHASONE 4 MG/1
2 TABLET ORAL DAILY
Qty: 30 TABLET | Refills: 0 | Status: SHIPPED | OUTPATIENT
Start: 2024-05-07

## 2024-05-07 RX ORDER — ONDANSETRON 4 MG/1
TABLET, ORALLY DISINTEGRATING ORAL
Qty: 90 TABLET | Refills: 0 | Status: SHIPPED | OUTPATIENT
Start: 2024-05-07 | End: 2025-05-07

## 2024-05-07 ASSESSMENT — ENCOUNTER SYMPTOMS
HEADACHES: 0
DYSPHORIC MOOD: 0
SHORTNESS OF BREATH: 1
CHEST TIGHTNESS: 0
GASTROINTESTINAL NEGATIVE: 1
DIZZINESS: 0
ACTIVITY CHANGE: 0
FATIGUE: 1
COUGH: 1
MUSCULOSKELETAL NEGATIVE: 1
NERVOUS/ANXIOUS: 1
SLEEP DISTURBANCE: 0

## 2024-05-07 ASSESSMENT — PATIENT HEALTH QUESTIONNAIRE - PHQ9
SUM OF ALL RESPONSES TO PHQ9 QUESTIONS 1 AND 2: 0
2. FEELING DOWN, DEPRESSED OR HOPELESS: NOT AT ALL
2. FEELING DOWN, DEPRESSED OR HOPELESS: NOT AT ALL
1. LITTLE INTEREST OR PLEASURE IN DOING THINGS: NOT AT ALL
1. LITTLE INTEREST OR PLEASURE IN DOING THINGS: NOT AT ALL
SUM OF ALL RESPONSES TO PHQ9 QUESTIONS 1 AND 2: 0

## 2024-05-07 NOTE — PROGRESS NOTES
NUTRITION COMMUNICATION NOTE    Radha Toussaint     REASON FOR COMMUNICATION: RD has not seen patient since original consult, at which time she was doing very poorly. Ran into patients daughter who requested I stop by the room to say hi. Patient is doing very well. Has gained weight, is eating normally. Lives with daughter, daughters family, and , who care for her. Has a hx of anxiety but has been seeing psych NP and reports this is helping immensely. Does admit to SOB, not unexpected in the setting of lung cancer. Nutritionally, patient is doing exceptionally well. No nutrition interventions required at this time. Reminded family RD is here should they every have a need. They voice understanding and are grateful for visit. RD available PRN.

## 2024-05-13 DIAGNOSIS — I73.9 PERIPHERAL VASCULAR DISEASE (CMS-HCC): ICD-10-CM

## 2024-05-13 RX ORDER — CLOPIDOGREL BISULFATE 75 MG/1
75 TABLET ORAL EVERY MORNING
Qty: 90 TABLET | Refills: 1 | Status: SHIPPED | OUTPATIENT
Start: 2024-05-13

## 2024-05-19 ENCOUNTER — TELEMEDICINE (OUTPATIENT)
Dept: BEHAVIORAL HEALTH | Facility: CLINIC | Age: 69
End: 2024-05-19
Payer: MEDICARE

## 2024-05-19 DIAGNOSIS — F41.1 GAD (GENERALIZED ANXIETY DISORDER): ICD-10-CM

## 2024-05-19 DIAGNOSIS — F32.A MILD DEPRESSION: ICD-10-CM

## 2024-05-19 PROCEDURE — 1159F MED LIST DOCD IN RCRD: CPT

## 2024-05-19 PROCEDURE — 3008F BODY MASS INDEX DOCD: CPT

## 2024-05-19 PROCEDURE — 99214 OFFICE O/P EST MOD 30 MIN: CPT

## 2024-05-19 PROCEDURE — 1160F RVW MEDS BY RX/DR IN RCRD: CPT

## 2024-05-19 RX ORDER — SERTRALINE HYDROCHLORIDE 100 MG/1
100 TABLET, FILM COATED ORAL 2 TIMES DAILY
Qty: 180 TABLET | Refills: 3 | Status: SHIPPED | OUTPATIENT
Start: 2024-05-19 | End: 2025-05-19

## 2024-05-19 RX ORDER — LORAZEPAM 0.5 MG/1
0.5 TABLET ORAL EVERY 6 HOURS PRN
Qty: 120 TABLET | Refills: 0 | Status: SHIPPED | OUTPATIENT
Start: 2024-05-19 | End: 2024-06-02 | Stop reason: SDUPTHER

## 2024-05-19 RX ORDER — GABAPENTIN 100 MG/1
100 CAPSULE ORAL 3 TIMES DAILY
Qty: 90 CAPSULE | Refills: 1 | Status: SHIPPED | OUTPATIENT
Start: 2024-05-19 | End: 2025-05-19

## 2024-05-19 NOTE — PROGRESS NOTES
"Mrs Toussaint is a 69 year old female with PMH of a stemi, CAD, hypertension, hyperlipidemia, PAD, nicotine abuse, non small lung cancer, referred to  Psychoncology for a follow up evaluation for  treatment of Anxiety.     Patient provided 2 personal identifiers prior to virtual appointment     Chief Complaint - Anxiety     Patient noticed benefit from medications prescribed at last visit describing her anxiety as improved but ongoing. Still worries a lot. Lorazepam has been more effective than Vistaril. No panic attacks.     Generalized fatigue. Patient wearing oxygen for sleep and ambulation.     Mood is \"ok\" and she rates severity at  \"3/10-\" She denies SI/HI    Gabapentin is ordered for muscle spasms, nerve pain, and tingling   She attributes worsening neuropathy to Ketruda     Sleep has improved, appetite is \"decent.\"     Much family support with daughter helping her out today.        Depression  Visit Type: Follow up   Onset of symptoms: 1 to 6 months ago  Progression since onset: unchanged  Patient presents with the following symptoms: decreased concentration, depressed mood, excessive worry, fatigue, muscle tension, nervousness/anxiety, palpitations, panic, restlessness and shortness of breath.  Frequency of symptoms: most days   Severity: moderate   Sleep per night: 7 hours  Sleep quality: fair  Nighttime awakenings: occasional  Risk factors: recent illness and major life event  Patient has a history of: anxiety/panic attacks, CAD and chronic lung disease  Treatment tried: non-benzodiazephine anxiolytics and benzodiazepine  Compliance with treatment: good  Improvement on treatment: mild     Anxiety  Presents for initial visit. Onset was 1 to 6 months ago. The problem has improved marginally.  Symptoms include decreased concentration, depressed mood, dizziness, excessive worry, muscle tension, nervous/anxious behavior, palpitations, panic, restlessness and shortness of breath. Symptoms occur constantly. The " severity of symptoms is severe and causing significant distress. The symptoms are aggravated by social activities. The patient sleeps 7 hours per night. The quality of sleep is fair. Nighttime awakenings: occasional.      Risk factors include a major life event and recent illness. Her past medical history is significant for anxiety/panic attacks, CAD and chronic lung disease. Past treatments include benzodiazephines and non-benzodiazephine anxiolytics. The treatment has provided marginal relief. Compliance with prior treatments has been good.   Mental Status Examination  General Appearance: Well groomed, appropriate eye contact.  Gait/Station:  gait not assessed, virtual appointment   Speech: Normal rate, volume, prosody  Mood: not too bad   Affect: constricted   Thought Process: Linear, goal directed  Thought Associations: No loosening of associations  Thought Content: normal  Perception: No perceptual abnormalities noted  Level of Consciousness: Alert  Orientation: Alert and oriented to person, place, time and situation  Attention and Concentration: Intact  Recent Memory: Intact as evidenced by ability to recall details from the past 24 hours   Remote Memory: Intact as evidenced by ability to recall previous medical issues   Executive function: Intact  Language: Naming intact  Fund of knowledge: Good  Insight:  Intact  Judgment: Intact, as evidenced by help-seeking behavior, ability to reason through medical decision making, and compliance with treatment recommendations  Review of Systems   Constitutional:  Positive for activity change and fatigue.   Respiratory:  Positive for shortness of breath.    Cardiovascular:  Positive for palpitations.   Neurological:  Positive for dizziness.   Psychiatric/Behavioral:  Positive for decreased concentration, depression and dysphoric mood. The patient is nervous/anxious.                Lab Review:         Lab on 03/25/2024   Component Date Value    WBC 03/25/2024 15.2 (H)      nRBC 03/25/2024 0.0     RBC 03/25/2024 5.41 (H)     Hemoglobin 03/25/2024 15.0     Hematocrit 03/25/2024 47.0 (H)     MCV 03/25/2024 87     MCH 03/25/2024 27.7     MCHC 03/25/2024 31.9 (L)     RDW 03/25/2024 17.6 (H)     Platelets 03/25/2024 259     Neutrophils % 03/25/2024 80.6     Immature Granulocytes %,* 03/25/2024 1.4 (H)     Lymphocytes % 03/25/2024 13.5     Monocytes % 03/25/2024 2.6     Eosinophils % 03/25/2024 1.3     Basophils % 03/25/2024 0.6     Neutrophils Absolute 03/25/2024 12.25 (H)     Immature Granulocytes Ab* 03/25/2024 0.21     Lymphocytes Absolute 03/25/2024 2.05     Monocytes Absolute 03/25/2024 0.39     Eosinophils Absolute 03/25/2024 0.20     Basophils Absolute 03/25/2024 0.09     Glucose 03/25/2024 129 (H)     Sodium 03/25/2024 138     Potassium 03/25/2024 4.3     Chloride 03/25/2024 102     Bicarbonate 03/25/2024 30     Anion Gap 03/25/2024 10     Urea Nitrogen 03/25/2024 20     Creatinine 03/25/2024 0.69     eGFR 03/25/2024 >90     Calcium 03/25/2024 9.6     Albumin 03/25/2024 3.6     Alkaline Phosphatase 03/25/2024 62     Total Protein 03/25/2024 7.1     AST 03/25/2024 15     Bilirubin, Total 03/25/2024 0.4     ALT 03/25/2024 23     Thyroid Stimulating Horm* 03/25/2024 2.67    Admission on 03/11/2024, Discharged on 03/15/2024   Component Date Value    POCT pH, Venous 03/11/2024 7.37     POCT pCO2, Venous 03/11/2024 59 (H)     POCT pO2, Venous 03/11/2024 30 (L)     POCT SO2, Venous 03/11/2024 44 (L)     POCT Oxy Hemoglobin, Oniel* 03/11/2024 40.9 (L)     POCT Hematocrit Calculat* 03/11/2024 47.0 (H)     POCT Sodium, Venous 03/11/2024 135 (L)     POCT Potassium, Venous 03/11/2024 4.5     POCT Chloride, Venous 03/11/2024 101     POCT Ionized Calicum, Ve* 03/11/2024 1.22     POCT Glucose, Venous 03/11/2024 106 (H)     POCT Lactate, Venous 03/11/2024 0.8     POCT Base Excess, Venous 03/11/2024 6.6 (H)     POCT HCO3 Calculated, Ve* 03/11/2024 34.1 (H)     POCT Hemoglobin, Venous 03/11/2024 15.7      POCT Anion Gap, Venous 03/11/2024 4.0 (L)     Patient Temperature 03/11/2024 37.0     FiO2 03/11/2024 32     Ventricular Rate 03/11/2024 82     Atrial Rate 03/11/2024 82     ME Interval 03/11/2024 150     QRS Duration 03/11/2024 90     QT Interval 03/11/2024 356     QTC Calculation(Bazett) 03/11/2024 415     P Axis 03/11/2024 71     R Colton 03/11/2024 64     T Colton 03/11/2024 87     QRS Count 03/11/2024 14     Q Onset 03/11/2024 221     P Onset 03/11/2024 146     P Offset 03/11/2024 194     T Offset 03/11/2024 399     QTC Fredericia 03/11/2024 395     Flu A Result 03/11/2024 Not Detected     Flu B Result 03/11/2024 Not Detected     Coronavirus 2019, PCR 03/11/2024 Not Detected     WBC 03/11/2024 12.5 (H)     nRBC 03/11/2024 0.0     RBC 03/11/2024 5.64 (H)     Hemoglobin 03/11/2024 15.3     Hematocrit 03/11/2024 48.7 (H)     MCV 03/11/2024 86     MCH 03/11/2024 27.1     MCHC 03/11/2024 31.4 (L)     RDW 03/11/2024 19.2 (H)     Platelets 03/11/2024 207     Neutrophils % 03/11/2024 73.4     Immature Granulocytes %,* 03/11/2024 0.4     Lymphocytes % 03/11/2024 17.5     Monocytes % 03/11/2024 6.5     Eosinophils % 03/11/2024 1.6     Basophils % 03/11/2024 0.6     Neutrophils Absolute 03/11/2024 9.21 (H)     Immature Granulocytes Ab* 03/11/2024 0.05     Lymphocytes Absolute 03/11/2024 2.19     Monocytes Absolute 03/11/2024 0.82     Eosinophils Absolute 03/11/2024 0.20     Basophils Absolute 03/11/2024 0.07     Glucose 03/11/2024 91     Sodium 03/11/2024 135 (L)     Potassium 03/11/2024 5.4 (H)     Chloride 03/11/2024 101     Bicarbonate 03/11/2024 30     Anion Gap 03/11/2024 9 (L)     Urea Nitrogen 03/11/2024 14     Creatinine 03/11/2024 0.54     eGFR 03/11/2024 >90     Calcium 03/11/2024 8.9     Albumin 03/11/2024 3.9     Alkaline Phosphatase 03/11/2024 66     Total Protein 03/11/2024 6.9     AST 03/11/2024 26     Bilirubin, Total 03/11/2024 0.5     ALT 03/11/2024 13     Troponin I, High Sensiti* 03/11/2024 12      Troponin I, High Sensiti* 03/11/2024 12     Potassium 03/11/2024 4.3     WBC 03/12/2024 8.6     nRBC 03/12/2024 0.0     RBC 03/12/2024 5.01     Hemoglobin 03/12/2024 13.5     Hematocrit 03/12/2024 44.9     MCV 03/12/2024 90     MCH 03/12/2024 26.9     MCHC 03/12/2024 30.1 (L)     RDW 03/12/2024 18.6 (H)     Platelets 03/12/2024 167     Glucose 03/12/2024 83     Sodium 03/12/2024 140     Potassium 03/12/2024 4.8     Chloride 03/12/2024 106     Bicarbonate 03/12/2024 25     Anion Gap 03/12/2024 14     Urea Nitrogen 03/12/2024 20     Creatinine 03/12/2024 0.75     eGFR 03/12/2024 87     Calcium 03/12/2024 8.7     Thyroid Stimulating Horm* 03/12/2024 4.41 (H)     Hemoglobin A1C 03/12/2024 5.9 (H)     Estimated Average Glucose 03/12/2024 123     Vitamin B12 03/12/2024 417     Thyroxine, Free 03/12/2024 0.83     WBC 03/13/2024 13.1 (H)     nRBC 03/13/2024 0.0     RBC 03/13/2024 5.32 (H)     Hemoglobin 03/13/2024 14.3     Hematocrit 03/13/2024 46.8 (H)     MCV 03/13/2024 88     MCH 03/13/2024 26.9     MCHC 03/13/2024 30.6 (L)     RDW 03/13/2024 18.7 (H)     Platelets 03/13/2024 198     Glucose 03/13/2024 119 (H)     Sodium 03/13/2024 140     Potassium 03/13/2024 4.3     Chloride 03/13/2024 103     Bicarbonate 03/13/2024 29     Anion Gap 03/13/2024 12     Urea Nitrogen 03/13/2024 17     Creatinine 03/13/2024 0.63     eGFR 03/13/2024 >90     Calcium 03/13/2024 9.3             Assessment/Plan   Safety Assessment - no remote or recent SI, HI, or SA. Protective Factors - connected family , no trauma history, limited substance use history . Risk factors - cancer diagnosis and treatment superimposed upon chronic illnesses. Relative Risk is low.      The patient's anxiety symptoms have improved and her mood is manageable. The patient has ongoing anxiety and daily worries of uncertain cancer course. Will continue PRN Ativan and Gabapentin and increase Zoloft to maximize effectiveness.    Diagnoses and all orders for this  visit:  OSITO (generalized anxiety disorder)   Adjustment Disorder with Depression     INCREASE  Sertraline 100 mg - take 2 tablets  by mouth daily for anxiety and depression   CONTINUE  Gabapentin 100 mg - take 1 tablet, 3 times daily for anxiety   CONTINUE  Lorazepam 0.5 mg - take 1 tablet, every 6 hours as needed for anxiety      Follow up in 2 weeks.      Chart Review - 5 minutes   Assessment with patient contact - 25 minutes   Charting 5 minutes     Total Time 35 minutes

## 2024-05-21 PROBLEM — C78.01: Status: ACTIVE | Noted: 2024-04-25

## 2024-05-21 PROBLEM — E87.5 HYPERKALEMIA: Status: ACTIVE | Noted: 2024-04-29

## 2024-05-21 PROBLEM — C78.02: Status: ACTIVE | Noted: 2024-04-25

## 2024-05-21 PROBLEM — C74.90: Status: ACTIVE | Noted: 2024-04-25

## 2024-05-21 PROBLEM — J96.21 ACUTE ON CHRONIC HYPOXIC RESPIRATORY FAILURE (MULTI): Status: ACTIVE | Noted: 2024-04-25

## 2024-05-22 ENCOUNTER — PATIENT OUTREACH (OUTPATIENT)
Dept: CARE COORDINATION | Facility: CLINIC | Age: 69
End: 2024-05-22
Payer: MEDICARE

## 2024-05-28 DIAGNOSIS — J43.8 OTHER EMPHYSEMA (MULTI): ICD-10-CM

## 2024-05-28 RX ORDER — FLUTICASONE FUROATE, UMECLIDINIUM BROMIDE AND VILANTEROL TRIFENATATE 200; 62.5; 25 UG/1; UG/1; UG/1
1 POWDER RESPIRATORY (INHALATION)
Qty: 60 EACH | Refills: 3 | Status: SHIPPED | OUTPATIENT
Start: 2024-05-28

## 2024-06-02 ENCOUNTER — TELEMEDICINE (OUTPATIENT)
Dept: BEHAVIORAL HEALTH | Facility: CLINIC | Age: 69
End: 2024-06-02
Payer: MEDICARE

## 2024-06-02 DIAGNOSIS — F41.1 GAD (GENERALIZED ANXIETY DISORDER): ICD-10-CM

## 2024-06-02 DIAGNOSIS — F32.A MILD DEPRESSION: ICD-10-CM

## 2024-06-02 PROCEDURE — 1160F RVW MEDS BY RX/DR IN RCRD: CPT

## 2024-06-02 PROCEDURE — 1159F MED LIST DOCD IN RCRD: CPT

## 2024-06-02 PROCEDURE — 3008F BODY MASS INDEX DOCD: CPT

## 2024-06-02 PROCEDURE — 99214 OFFICE O/P EST MOD 30 MIN: CPT

## 2024-06-02 RX ORDER — LORAZEPAM 0.5 MG/1
0.5 TABLET ORAL EVERY 6 HOURS PRN
Qty: 120 TABLET | Refills: 0 | Status: SHIPPED | OUTPATIENT
Start: 2024-06-18 | End: 2024-07-18

## 2024-06-02 NOTE — PROGRESS NOTES
"Mrs Toussaint is a 69 year old female with PMH of a stemi, CAD, hypertension, hyperlipidemia, PAD, nicotine abuse, non small lung cancer, referred to  Psychoncology for a follow up evaluation for  treatment of Anxiety.     Patient provided 2 personal identifiers prior to virtual appointment     Chief Complaint - Anxiety     Zoloft is working well for both mood and anxiety. No SI, HI, or panic attacks.     Mood is \"not bad\"    Patient is falling asleep well but she wakes up throughout the night with pain or the need to urinate.     Appetite is \"improving.\"   No problems with the chemo, no nausea and vomitting .     Fatigue and muscle pain     More goal setting and future focused.     Has cut down on smoking     Depression  Visit Type: Follow up   Onset of symptoms: 1 to 6 months ago  Progression since onset: improving   Patient presents with the following symptoms: decreased concentration, mildly depressed mood, occasional worry, fatigue, muscle tension, restlessness and shortness of breath.  Frequency of symptoms: most days   Severity: moderate   Sleep per night: 7 hours  Sleep quality: fair  Nighttime awakenings: occasional  Risk factors: recent illness and major life event  Patient has a history of: anxiety/panic attacks, CAD and chronic lung disease  Treatment tried: non-benzodiazephine anxiolytics and benzodiazepine  Compliance with treatment: good  Improvement on treatment: mild     Anxiety  Presents for follow up visit. Onset was 1 to 6 months ago. The problem has improved.  Symptoms include decreased concentration, mild depression, dizziness, occasional  worry, muscle tension,  and shortness of breath. Symptoms occur intermittently. The severity of symptoms is mild. The symptoms are aggravated by social activities. The patient sleeps 7 hours per night. The quality of sleep is fair. Nighttime awakenings: occasional.      Risk factors include a major life event and recent illness. Her past medical history is " significant for anxiety/panic attacks, CAD and chronic lung disease. Past treatments include benzodiazephines and non-benzodiazephine anxiolytics. The treatment has provided marginal relief. Compliance with prior treatments has been good.   Mental Status Examination  General Appearance: Well groomed, appropriate eye contact.  Gait/Station:  gait not assessed, virtual appointment   Speech: Normal rate, volume, prosody  Mood: not too bad   Affect: constricted   Thought Process: Linear, goal directed  Thought Associations: No loosening of associations  Thought Content: normal  Perception: No perceptual abnormalities noted  Level of Consciousness: Alert  Orientation: Alert and oriented to person, place, time and situation  Attention and Concentration: Intact  Recent Memory: Intact as evidenced by ability to recall details from the past 24 hours   Remote Memory: Intact as evidenced by ability to recall previous medical issues   Executive function: Intact  Language: Naming intact  Fund of knowledge: Good  Insight:  Intact  Judgment: Intact, as evidenced by help-seeking behavior, ability to reason through medical decision making, and compliance with treatment recommendations  Review of Systems   Constitutional:  Positive for activity change and fatigue.   Respiratory:  Positive for shortness of breath.    Cardiovascular:  Positive for palpitations.   Neurological:  Positive for dizziness.   Psychiatric/Behavioral:  Positive for decreased concentration, mild depression. The patient is mildly anxious     Lab Review:             Lab on 03/25/2024   Component Date Value    WBC 03/25/2024 15.2 (H)     nRBC 03/25/2024 0.0     RBC 03/25/2024 5.41 (H)     Hemoglobin 03/25/2024 15.0     Hematocrit 03/25/2024 47.0 (H)     MCV 03/25/2024 87     MCH 03/25/2024 27.7     MCHC 03/25/2024 31.9 (L)     RDW 03/25/2024 17.6 (H)     Platelets 03/25/2024 259     Neutrophils % 03/25/2024 80.6     Immature Granulocytes %,* 03/25/2024 1.4 (H)      Lymphocytes % 03/25/2024 13.5     Monocytes % 03/25/2024 2.6     Eosinophils % 03/25/2024 1.3     Basophils % 03/25/2024 0.6     Neutrophils Absolute 03/25/2024 12.25 (H)     Immature Granulocytes Ab* 03/25/2024 0.21     Lymphocytes Absolute 03/25/2024 2.05     Monocytes Absolute 03/25/2024 0.39     Eosinophils Absolute 03/25/2024 0.20     Basophils Absolute 03/25/2024 0.09     Glucose 03/25/2024 129 (H)     Sodium 03/25/2024 138     Potassium 03/25/2024 4.3     Chloride 03/25/2024 102     Bicarbonate 03/25/2024 30     Anion Gap 03/25/2024 10     Urea Nitrogen 03/25/2024 20     Creatinine 03/25/2024 0.69     eGFR 03/25/2024 >90     Calcium 03/25/2024 9.6     Albumin 03/25/2024 3.6     Alkaline Phosphatase 03/25/2024 62     Total Protein 03/25/2024 7.1     AST 03/25/2024 15     Bilirubin, Total 03/25/2024 0.4     ALT 03/25/2024 23     Thyroid Stimulating Horm* 03/25/2024 2.67    Admission on 03/11/2024, Discharged on 03/15/2024   Component Date Value    POCT pH, Venous 03/11/2024 7.37     POCT pCO2, Venous 03/11/2024 59 (H)     POCT pO2, Venous 03/11/2024 30 (L)     POCT SO2, Venous 03/11/2024 44 (L)     POCT Oxy Hemoglobin, Oniel* 03/11/2024 40.9 (L)     POCT Hematocrit Calculat* 03/11/2024 47.0 (H)     POCT Sodium, Venous 03/11/2024 135 (L)     POCT Potassium, Venous 03/11/2024 4.5     POCT Chloride, Venous 03/11/2024 101     POCT Ionized Calicum, Ve* 03/11/2024 1.22     POCT Glucose, Venous 03/11/2024 106 (H)     POCT Lactate, Venous 03/11/2024 0.8     POCT Base Excess, Venous 03/11/2024 6.6 (H)     POCT HCO3 Calculated, Ve* 03/11/2024 34.1 (H)     POCT Hemoglobin, Venous 03/11/2024 15.7     POCT Anion Gap, Venous 03/11/2024 4.0 (L)     Patient Temperature 03/11/2024 37.0     FiO2 03/11/2024 32     Ventricular Rate 03/11/2024 82     Atrial Rate 03/11/2024 82     SC Interval 03/11/2024 150     QRS Duration 03/11/2024 90     QT Interval 03/11/2024 356     QTC Calculation(Bazett) 03/11/2024 415     P Axis 03/11/2024 71      R Axis 03/11/2024 64     T [place] 03/11/2024 87     QRS Count 03/11/2024 14     Q Onset 03/11/2024 221     P Onset 03/11/2024 146     P Offset 03/11/2024 194     T Offset 03/11/2024 399     QTC Fredericia 03/11/2024 395     Flu A Result 03/11/2024 Not Detected     Flu B Result 03/11/2024 Not Detected     Coronavirus 2019, PCR 03/11/2024 Not Detected     WBC 03/11/2024 12.5 (H)     nRBC 03/11/2024 0.0     RBC 03/11/2024 5.64 (H)     Hemoglobin 03/11/2024 15.3     Hematocrit 03/11/2024 48.7 (H)     MCV 03/11/2024 86     MCH 03/11/2024 27.1     MCHC 03/11/2024 31.4 (L)     RDW 03/11/2024 19.2 (H)     Platelets 03/11/2024 207     Neutrophils % 03/11/2024 73.4     Immature Granulocytes %,* 03/11/2024 0.4     Lymphocytes % 03/11/2024 17.5     Monocytes % 03/11/2024 6.5     Eosinophils % 03/11/2024 1.6     Basophils % 03/11/2024 0.6     Neutrophils Absolute 03/11/2024 9.21 (H)     Immature Granulocytes Ab* 03/11/2024 0.05     Lymphocytes Absolute 03/11/2024 2.19     Monocytes Absolute 03/11/2024 0.82     Eosinophils Absolute 03/11/2024 0.20     Basophils Absolute 03/11/2024 0.07     Glucose 03/11/2024 91     Sodium 03/11/2024 135 (L)     Potassium 03/11/2024 5.4 (H)     Chloride 03/11/2024 101     Bicarbonate 03/11/2024 30     Anion Gap 03/11/2024 9 (L)     Urea Nitrogen 03/11/2024 14     Creatinine 03/11/2024 0.54     eGFR 03/11/2024 >90     Calcium 03/11/2024 8.9     Albumin 03/11/2024 3.9     Alkaline Phosphatase 03/11/2024 66     Total Protein 03/11/2024 6.9     AST 03/11/2024 26     Bilirubin, Total 03/11/2024 0.5     ALT 03/11/2024 13     Troponin I, High Sensiti* 03/11/2024 12     Troponin I, High Sensiti* 03/11/2024 12     Potassium 03/11/2024 4.3     WBC 03/12/2024 8.6     nRBC 03/12/2024 0.0     RBC 03/12/2024 5.01     Hemoglobin 03/12/2024 13.5     Hematocrit 03/12/2024 44.9     MCV 03/12/2024 90     MCH 03/12/2024 26.9     MCHC 03/12/2024 30.1 (L)     RDW 03/12/2024 18.6 (H)     Platelets 03/12/2024 167      Glucose 03/12/2024 83     Sodium 03/12/2024 140     Potassium 03/12/2024 4.8     Chloride 03/12/2024 106     Bicarbonate 03/12/2024 25     Anion Gap 03/12/2024 14     Urea Nitrogen 03/12/2024 20     Creatinine 03/12/2024 0.75     eGFR 03/12/2024 87     Calcium 03/12/2024 8.7     Thyroid Stimulating Horm* 03/12/2024 4.41 (H)     Hemoglobin A1C 03/12/2024 5.9 (H)     Estimated Average Glucose 03/12/2024 123     Vitamin B12 03/12/2024 417     Thyroxine, Free 03/12/2024 0.83     WBC 03/13/2024 13.1 (H)     nRBC 03/13/2024 0.0     RBC 03/13/2024 5.32 (H)     Hemoglobin 03/13/2024 14.3     Hematocrit 03/13/2024 46.8 (H)     MCV 03/13/2024 88     MCH 03/13/2024 26.9     MCHC 03/13/2024 30.6 (L)     RDW 03/13/2024 18.7 (H)     Platelets 03/13/2024 198     Glucose 03/13/2024 119 (H)     Sodium 03/13/2024 140     Potassium 03/13/2024 4.3     Chloride 03/13/2024 103     Bicarbonate 03/13/2024 29     Anion Gap 03/13/2024 12     Urea Nitrogen 03/13/2024 17     Creatinine 03/13/2024 0.63     eGFR 03/13/2024 >90     Calcium 03/13/2024 9.3       Assessment/Plan   Safety Assessment - no remote or recent SI, HI, or SA. Protective Factors - connected family , no trauma history, limited substance use history . Risk factors - cancer diagnosis and treatment superimposed upon chronic illnesses. Relative Risk is low.      The patient's anxiety symptoms have improved and her mood is manageable. The patient has ongoing anxiety and daily worries of uncertain cancer course. Will continue PRN Ativan and Gabapentin and increase Zoloft to maximize effectiveness.    Diagnoses and all orders for this visit:  OSITO (generalized anxiety disorder)   Adjustment Disorder with Depression     INCREASE  Sertraline 100 mg - take 2 tablets  by mouth daily for anxiety and depression   CONTINUE  Gabapentin 100 mg - take 1 tablet, 3 times daily for anxiety   CONTINUE  Lorazepam 0.5 mg - take 1 tablet, every 6 hours as needed for anxiety      Follow up in 2 weeks.       Chart Review - 5 minutes   Assessment with patient contact - 25 minutes   Charting 5 minutes      Total Time 35 minutes                                                  Depression  Visit Type: Follow up   Onset of symptoms: 1 to 6 months ago  Progression since onset: unchanged  Patient presents with the following symptoms: decreased concentration, depressed mood, excessive worry, fatigue, muscle tension, nervousness/anxiety, palpitations, panic, restlessness and shortness of breath.  Frequency of symptoms: most days   Severity: moderate   Sleep per night: 7 hours  Sleep quality: fair  Nighttime awakenings: occasional  Risk factors: recent illness and major life event  Patient has a history of: anxiety/panic attacks, CAD and chronic lung disease  Treatment tried: non-benzodiazephine anxiolytics and benzodiazepine  Compliance with treatment: good  Improvement on treatment: mild     Anxiety  Presents for initial visit. Onset was 1 to 6 months ago. The problem has improved marginally.  Symptoms include decreased concentration, depressed mood, dizziness, excessive worry, muscle tension, nervous/anxious behavior, palpitations, panic, restlessness and shortness of breath. Symptoms occur constantly. The severity of symptoms is severe and causing significant distress. The symptoms are aggravated by social activities. The patient sleeps 7 hours per night. The quality of sleep is fair. Nighttime awakenings: occasional.      Risk factors include a major life event and recent illness. Her past medical history is significant for anxiety/panic attacks, CAD and chronic lung disease. Past treatments include benzodiazephines and non-benzodiazephine anxiolytics. The treatment has provided marginal relief. Compliance with prior treatments has been good.   Mental Status Examination  General Appearance: Well groomed, appropriate eye contact.  Gait/Station:  gait not assessed, virtual appointment   Speech: Normal rate, volume,  prosody  Mood: not too bad   Affect: constricted   Thought Process: Linear, goal directed  Thought Associations: No loosening of associations  Thought Content: normal  Perception: No perceptual abnormalities noted  Level of Consciousness: Alert  Orientation: Alert and oriented to person, place, time and situation  Attention and Concentration: Intact  Recent Memory: Intact as evidenced by ability to recall details from the past 24 hours   Remote Memory: Intact as evidenced by ability to recall previous medical issues   Executive function: Intact  Language: Naming intact  Fund of knowledge: Good  Insight:  Intact  Judgment: Intact, as evidenced by help-seeking behavior, ability to reason through medical decision making, and compliance with treatment recommendations  Review of Systems   Constitutional:  Positive for activity change and fatigue.   Respiratory:  Positive for shortness of breath.    Cardiovascular:  Positive for palpitations.   Neurological:  Positive for dizziness.   Psychiatric/Behavioral:  Positive for decreased concentration, depression and dysphoric mood. The patient is nervous/anxious.                Lab Review:         Lab on 03/25/2024   Component Date Value    WBC 03/25/2024 15.2 (H)     nRBC 03/25/2024 0.0     RBC 03/25/2024 5.41 (H)     Hemoglobin 03/25/2024 15.0     Hematocrit 03/25/2024 47.0 (H)     MCV 03/25/2024 87     MCH 03/25/2024 27.7     MCHC 03/25/2024 31.9 (L)     RDW 03/25/2024 17.6 (H)     Platelets 03/25/2024 259     Neutrophils % 03/25/2024 80.6     Immature Granulocytes %,* 03/25/2024 1.4 (H)     Lymphocytes % 03/25/2024 13.5     Monocytes % 03/25/2024 2.6     Eosinophils % 03/25/2024 1.3     Basophils % 03/25/2024 0.6     Neutrophils Absolute 03/25/2024 12.25 (H)     Immature Granulocytes Ab* 03/25/2024 0.21     Lymphocytes Absolute 03/25/2024 2.05     Monocytes Absolute 03/25/2024 0.39     Eosinophils Absolute 03/25/2024 0.20     Basophils Absolute 03/25/2024 0.09     Glucose  03/25/2024 129 (H)     Sodium 03/25/2024 138     Potassium 03/25/2024 4.3     Chloride 03/25/2024 102     Bicarbonate 03/25/2024 30     Anion Gap 03/25/2024 10     Urea Nitrogen 03/25/2024 20     Creatinine 03/25/2024 0.69     eGFR 03/25/2024 >90     Calcium 03/25/2024 9.6     Albumin 03/25/2024 3.6     Alkaline Phosphatase 03/25/2024 62     Total Protein 03/25/2024 7.1     AST 03/25/2024 15     Bilirubin, Total 03/25/2024 0.4     ALT 03/25/2024 23     Thyroid Stimulating Horm* 03/25/2024 2.67    Admission on 03/11/2024, Discharged on 03/15/2024   Component Date Value    POCT pH, Venous 03/11/2024 7.37     POCT pCO2, Venous 03/11/2024 59 (H)     POCT pO2, Venous 03/11/2024 30 (L)     POCT SO2, Venous 03/11/2024 44 (L)     POCT Oxy Hemoglobin, Oniel* 03/11/2024 40.9 (L)     POCT Hematocrit Calculat* 03/11/2024 47.0 (H)     POCT Sodium, Venous 03/11/2024 135 (L)     POCT Potassium, Venous 03/11/2024 4.5     POCT Chloride, Venous 03/11/2024 101     POCT Ionized Calicum, Ve* 03/11/2024 1.22     POCT Glucose, Venous 03/11/2024 106 (H)     POCT Lactate, Venous 03/11/2024 0.8     POCT Base Excess, Venous 03/11/2024 6.6 (H)     POCT HCO3 Calculated, Ve* 03/11/2024 34.1 (H)     POCT Hemoglobin, Venous 03/11/2024 15.7     POCT Anion Gap, Venous 03/11/2024 4.0 (L)     Patient Temperature 03/11/2024 37.0     FiO2 03/11/2024 32     Ventricular Rate 03/11/2024 82     Atrial Rate 03/11/2024 82     [place] Interval 03/11/2024 150     QRS Duration 03/11/2024 90     QT Interval 03/11/2024 356     QTC Calculation(Bazett) 03/11/2024 415     P Axis 03/11/2024 71     R [place] 03/11/2024 64     T [place] 03/11/2024 87     QRS Count 03/11/2024 14     Q Onset 03/11/2024 221     P Onset 03/11/2024 146     P Offset 03/11/2024 194     T Offset 03/11/2024 399     QTC Fredericia 03/11/2024 395     Flu A Result 03/11/2024 Not Detected     Flu B Result 03/11/2024 Not Detected     Coronavirus 2019, PCR 03/11/2024 Not Detected     WBC 03/11/2024 12.5 (H)      nRBC 03/11/2024 0.0     RBC 03/11/2024 5.64 (H)     Hemoglobin 03/11/2024 15.3     Hematocrit 03/11/2024 48.7 (H)     MCV 03/11/2024 86     MCH 03/11/2024 27.1     MCHC 03/11/2024 31.4 (L)     RDW 03/11/2024 19.2 (H)     Platelets 03/11/2024 207     Neutrophils % 03/11/2024 73.4     Immature Granulocytes %,* 03/11/2024 0.4     Lymphocytes % 03/11/2024 17.5     Monocytes % 03/11/2024 6.5     Eosinophils % 03/11/2024 1.6     Basophils % 03/11/2024 0.6     Neutrophils Absolute 03/11/2024 9.21 (H)     Immature Granulocytes Ab* 03/11/2024 0.05     Lymphocytes Absolute 03/11/2024 2.19     Monocytes Absolute 03/11/2024 0.82     Eosinophils Absolute 03/11/2024 0.20     Basophils Absolute 03/11/2024 0.07     Glucose 03/11/2024 91     Sodium 03/11/2024 135 (L)     Potassium 03/11/2024 5.4 (H)     Chloride 03/11/2024 101     Bicarbonate 03/11/2024 30     Anion Gap 03/11/2024 9 (L)     Urea Nitrogen 03/11/2024 14     Creatinine 03/11/2024 0.54     eGFR 03/11/2024 >90     Calcium 03/11/2024 8.9     Albumin 03/11/2024 3.9     Alkaline Phosphatase 03/11/2024 66     Total Protein 03/11/2024 6.9     AST 03/11/2024 26     Bilirubin, Total 03/11/2024 0.5     ALT 03/11/2024 13     Troponin I, High Sensiti* 03/11/2024 12     Troponin I, High Sensiti* 03/11/2024 12     Potassium 03/11/2024 4.3     WBC 03/12/2024 8.6     nRBC 03/12/2024 0.0     RBC 03/12/2024 5.01     Hemoglobin 03/12/2024 13.5     Hematocrit 03/12/2024 44.9     MCV 03/12/2024 90     MCH 03/12/2024 26.9     MCHC 03/12/2024 30.1 (L)     RDW 03/12/2024 18.6 (H)     Platelets 03/12/2024 167     Glucose 03/12/2024 83     Sodium 03/12/2024 140     Potassium 03/12/2024 4.8     Chloride 03/12/2024 106     Bicarbonate 03/12/2024 25     Anion Gap 03/12/2024 14     Urea Nitrogen 03/12/2024 20     Creatinine 03/12/2024 0.75     eGFR 03/12/2024 87     Calcium 03/12/2024 8.7     Thyroid Stimulating Horm* 03/12/2024 4.41 (H)     Hemoglobin A1C 03/12/2024 5.9 (H)     Estimated Average  Glucose 03/12/2024 123     Vitamin B12 03/12/2024 417     Thyroxine, Free 03/12/2024 0.83     WBC 03/13/2024 13.1 (H)     nRBC 03/13/2024 0.0     RBC 03/13/2024 5.32 (H)     Hemoglobin 03/13/2024 14.3     Hematocrit 03/13/2024 46.8 (H)     MCV 03/13/2024 88     MCH 03/13/2024 26.9     MCHC 03/13/2024 30.6 (L)     RDW 03/13/2024 18.7 (H)     Platelets 03/13/2024 198     Glucose 03/13/2024 119 (H)     Sodium 03/13/2024 140     Potassium 03/13/2024 4.3     Chloride 03/13/2024 103     Bicarbonate 03/13/2024 29     Anion Gap 03/13/2024 12     Urea Nitrogen 03/13/2024 17     Creatinine 03/13/2024 0.63     eGFR 03/13/2024 >90     Calcium 03/13/2024 9.3             Assessment/Plan   Safety Assessment - no remote or recent SI, HI, or SA. Protective Factors - connected family , no trauma history, limited substance use history . Risk factors - cancer diagnosis and treatment superimposed upon chronic illnesses. Relative Risk is low.      The patient's depression and anxiety symptoms have improved significantly with current treatment plan. Will continue current medications and follow up regularly to monitor symptoms     Diagnoses and all orders for this visit:  OSITO (generalized anxiety disorder)   Adjustment Disorder with Depression      CONTINUE Sertraline 100 mg - take 2 tablets  by mouth daily for anxiety and depression   CONTINUE  Gabapentin 100 mg - take 1 tablet, 3 times daily for anxiety   CONTINUE  Lorazepam 0.5 mg - take 1 tablet, every 6 hours as needed for anxiety      Follow up in 1-2  MONTHS     Chart Review - 5 minutes   Assessment with patient contact - 25 minutes   Charting 5 minutes     Total Time 35 minutes

## 2024-06-05 SDOH — SOCIAL STABILITY: SOCIAL INSECURITY: RISK FACTORS: RECENT ILLNESS

## 2024-06-05 SDOH — SOCIAL STABILITY: SOCIAL INSECURITY: RISK FACTORS: MAJOR LIFE EVENT

## 2024-06-05 ASSESSMENT — ENCOUNTER SYMPTOMS
ACTIVITY CHANGE: 1
HOURS OF SLEEP PER NIGHT: 7 HOURS
PANIC: 1
DEPRESSION: 1
DIZZINESS: 1
RESTLESSNESS: 1
NIGHTTIME AWAKENINGS: OCCASIONAL
MUSCLE TENSION: 1
NERVOUS/ANXIOUS: 1
DYSPHORIC MOOD: 1
SHORTNESS OF BREATH: 1
DECREASED CONCENTRATION: 1
FATIGUE: 1
DEPRESSED MOOD: 1
SLEEP QUALITY: FAIR
PALPITATIONS: 1

## 2024-06-06 DIAGNOSIS — C34.90 NON-SMALL CELL LUNG CANCER, UNSPECIFIED LATERALITY (MULTI): ICD-10-CM

## 2024-06-06 DIAGNOSIS — G89.3 CANCER RELATED PAIN: ICD-10-CM

## 2024-06-06 RX ORDER — HYDROCODONE BITARTRATE AND ACETAMINOPHEN 10; 325 MG/1; MG/1
1 TABLET ORAL EVERY 8 HOURS PRN
Qty: 60 TABLET | Refills: 0 | Status: SHIPPED | OUTPATIENT
Start: 2024-06-06 | End: 2024-07-06

## 2024-06-06 NOTE — PATIENT INSTRUCTIONS
1. Reviewed diagnostic impression including subjective and objective data and provided education about depression and anxiety disorder, treatment recommendations including medication, therapy, course of treatment and prognosis. Patient amendable to treatment plan.     2. Plan - Start the medications as prescribed and try to engage in activities that you enjoy. Try to start a walking program.     START Sertraline 100 mg - take 1 table by mouth daily for anxiety and depression   START Gabapentin 100 mg - take 1 tablet, 3 times daily for anxiety   START Lorazepam 0.5 mg - take 1 tablet, every 6 hours as needed for anxiety       3. Reviewed r/b/a, possible side effects of the medication(s). Client is aware benefit/risks      4. Labs reviewed and - placed order for lab work. Please provide at any  facility Please provide Urine Drug Screen to abide by  Substance Use policy.      5. Plan for supportive psychotherapy     6. Follow up with physical health providers as scheduled     7. Follow up in 2 weeks.       May follow up sooner if experiences worsening symptoms by calling  Psychiatry at [phone]      Patient verbalized an understanding to call [facility] at [phone] ([facility]), [phone], or [phone]/go to the nearest emergency room if experiences thoughts of harm to self or others.

## 2024-06-11 ENCOUNTER — OFFICE VISIT (OUTPATIENT)
Dept: WOUND CARE | Facility: CLINIC | Age: 69
End: 2024-06-11
Payer: MEDICARE

## 2024-06-11 ENCOUNTER — OFFICE VISIT (OUTPATIENT)
Dept: CARDIOLOGY | Facility: CLINIC | Age: 69
End: 2024-06-11
Payer: MEDICARE

## 2024-06-11 ENCOUNTER — HOSPITAL ENCOUNTER (OUTPATIENT)
Dept: VASCULAR MEDICINE | Facility: CLINIC | Age: 69
Discharge: HOME | End: 2024-06-11
Payer: MEDICARE

## 2024-06-11 VITALS
BODY MASS INDEX: 26.4 KG/M2 | SYSTOLIC BLOOD PRESSURE: 120 MMHG | HEIGHT: 64 IN | DIASTOLIC BLOOD PRESSURE: 72 MMHG | WEIGHT: 154.6 LBS

## 2024-06-11 DIAGNOSIS — I73.9 PAD (PERIPHERAL ARTERY DISEASE) (CMS-HCC): Primary | ICD-10-CM

## 2024-06-11 DIAGNOSIS — I73.9 PAD (PERIPHERAL ARTERY DISEASE) (CMS-HCC): ICD-10-CM

## 2024-06-11 PROCEDURE — 93922 UPR/L XTREMITY ART 2 LEVELS: CPT

## 2024-06-11 PROCEDURE — 3008F BODY MASS INDEX DOCD: CPT | Performed by: NURSE PRACTITIONER

## 2024-06-11 PROCEDURE — 99213 OFFICE O/P EST LOW 20 MIN: CPT | Mod: 25

## 2024-06-11 PROCEDURE — 3074F SYST BP LT 130 MM HG: CPT | Performed by: NURSE PRACTITIONER

## 2024-06-11 PROCEDURE — 97597 DBRDMT OPN WND 1ST 20 CM/<: CPT

## 2024-06-11 PROCEDURE — 3078F DIAST BP <80 MM HG: CPT | Performed by: NURSE PRACTITIONER

## 2024-06-11 PROCEDURE — 99214 OFFICE O/P EST MOD 30 MIN: CPT | Performed by: NURSE PRACTITIONER

## 2024-06-11 PROCEDURE — 93922 UPR/L XTREMITY ART 2 LEVELS: CPT | Performed by: SURGERY

## 2024-06-11 NOTE — PROGRESS NOTES
Radha Toussaint is a 69 y.o. female     History Of Present Illness   Mrs Toussaint is a 69 year old female with PMH of a stemi, CAD, hypertension, hyperlipidemia, PAD, nicotine abuse, non small lung cancer, here following a left  popliteal/TPT/PT reconstruction with PTA/DCB, Supera/SES to popliteal occlusion with Dr Mike on 1/23/24.  Since her intervention, she  had almost a complete resolution in her left foot wound, however today, she is complaining of a new wound to the left lateral foot for one week and increased pain to the left foot.  She rates the pain a 6/10.  She continues to smoke 1 to 2 ppd.  She is visibly short of breath today.    Social HX  Social History     Tobacco Use    Smoking status: Every Day     Current packs/day: 1.00     Types: Cigarettes    Smokeless tobacco: Never          Family HX  Family History   Problem Relation Name Age of Onset    Aneurysm Mother      Hypertension Mother      Heart attack Father            Review Of Systems   Left foot pain  Left foot is cool  Left foot wound  Black area to left 1st toe    Allergies  Allergies   Allergen Reactions    Ace Inhibitors Angioedema    Prednisone Anxiety and Agitation     & violent    Demerol [Meperidine] Nausea/vomiting    Iodinated Contrast Media Nausea/vomiting          Vitals  There were no vitals taken for this visit.        Physical Exam  There is a dry wound to the left lateral foot.  There is a blackened area to the left 1st toe.  There is no pulses to the left DP or PT.  The left foot is colder than the right      Current/Home Meds    Current Outpatient Medications:     acetaminophen (Tylenol) 325 mg tablet, Take 1 tablet (325 mg) by mouth every 4 hours if needed., Disp: , Rfl:     albuterol 90 mcg/actuation inhaler, Inhale 1 puff every 4 hours if needed for shortness of breath., Disp: 18 g, Rfl: 3    aspirin 81 mg EC tablet, Take 1 tablet (81 mg) by mouth once daily., Disp: , Rfl:     budesonide (Pulmicort) 0.25 mg/2 mL nebulizer  solution, Take 2 mL (0.25 mg) by nebulization 2 times a day. Rinse mouth with water after use to reduce aftertaste and incidence of candidiasis. Do not swallow., Disp: 2 mL, Rfl: 2    clopidogrel (Plavix) 75 mg tablet, Take 1 tablet (75 mg) by mouth once daily in the morning., Disp: 90 tablet, Rfl: 1    dexAMETHasone (Decadron) 4 mg tablet, Take 0.5 tablets (2 mg) by mouth once daily., Disp: 30 tablet, Rfl: 0    ferrous sulfate 325 (65 Fe) MG EC tablet, Take 65 mg by mouth once daily. Do not crush, chew, or split., Disp: , Rfl:     fludrocortisone (Florinef) 0.1 mg tablet, TAKE 1/2 TABLET BY MOUTH ONCE DAILY (Patient not taking: Reported on 5/6/2024), Disp: 30 tablet, Rfl: 3    fluticasone-umeclidin-vilanter (Trelegy Ellipta) 200-62.5-25 mcg blister with device, Inhale 1 puff once daily., Disp: 60 each, Rfl: 3    gabapentin (Neurontin) 100 mg capsule, Take 1 capsule (100 mg) by mouth 3 times a day., Disp: 90 capsule, Rfl: 1    HYDROcodone-acetaminophen (Norco)  mg tablet, Take 1 tablet by mouth every 8 hours if needed for severe pain (7 - 10)., Disp: 60 tablet, Rfl: 0    [START ON 6/18/2024] LORazepam (Ativan) 0.5 mg tablet, Take 1 tablet (0.5 mg) by mouth every 6 hours if needed for anxiety. Do not fill before June 18, 2024., Disp: 120 tablet, Rfl: 0    montelukast (Singulair) 10 mg tablet, Take 1 tablet (10 mg) by mouth once daily at bedtime., Disp: 90 tablet, Rfl: 1    nicotine (Nicoderm CQ) 14 mg/24 hr patch, Place 1 patch on the skin once every 24 hours. Do not fill before May 15, 2024., Disp: , Rfl:     OLANZapine (ZyPREXA) 5 mg tablet, Take 1 tablet (5 mg) by mouth once daily at bedtime. (Patient not taking: Reported on 5/6/2024), Disp: 90 tablet, Rfl: 1    ondansetron ODT (Zofran-ODT) 4 mg disintegrating tablet, DISSOLVE 1 TABLET IN MOUTH BY MOUTH THREE TIMES A DAY AS NEEDED NAUSEA, Disp: 90 tablet, Rfl: 0    oxygen (O2) gas therapy, Inhale 1 each once every 24 hours., Disp: , Rfl:     rosuvastatin  "(Crestor) 10 mg tablet, Take 2 tablets (20 mg) by mouth once daily., Disp: 180 tablet, Rfl: 3    rosuvastatin (Crestor) 20 mg tablet, Take 1 tablet (20 mg) by mouth once daily., Disp: 100 tablet, Rfl: 3    sennosides (senna) 8.6 mg tablet, Take 1 tablet (8.6 mg) by mouth once daily. (Patient taking differently: Take 1 tablet (8.6 mg) by mouth once daily at bedtime.), Disp: 30 tablet, Rfl: 11    sertraline (Zoloft) 100 mg tablet, Take 1 tablet (100 mg) by mouth 2 times a day., Disp: 180 tablet, Rfl: 3    simethicone (Mylicon) 80 mg chewable tablet, Chew 1 tablet (80 mg) every 6 hours if needed., Disp: , Rfl:        Labs   2/2/24 CR 0.68  K 4.3  CRP 3.31     5/2/23  CHOLESTEROL 160  HDL 35.6    TG 89            Cardiac Service Results:   S/P  left  popliteal/TPT/PT reconstruction with PTA/DCB, Supera/SES to popliteal occlusion with Dr Mike on 1/23/24         Assessment/Plan      ISCHEMIC LEFT FOOT:  There is a dry wound to the left lateral foot.  (PICTURES TAKEN AND PLACED IN MEDIA)  There is a blackened area to the left 1st toe.  There is no pulses to the left DP or PT.  The left foot is colder than the right  The patient is having 6/10 left foot pain and rubs the foot to improve her pain  STAT ABIs were completed show no blood flow to the left foot  Left PT 0.43  Left   Left digit 000  Call placed to Dr Reyes.  He will see her today in the wound clinic  Will schedule her for a left leg intervention, however the patient states, \"I have inoperable lung cancer.  I am going to die.  Why are you doing this?\"  Discussed the findings with the patient.  Will plan for left leg intervention with Dr Mike    "

## 2024-06-12 DIAGNOSIS — I73.9 PAD (PERIPHERAL ARTERY DISEASE) (CMS-HCC): Primary | ICD-10-CM

## 2024-06-12 DIAGNOSIS — M79.672 ISCHEMIC PAIN OF LEFT FOOT: ICD-10-CM

## 2024-06-12 DIAGNOSIS — I99.8 ISCHEMIC PAIN OF LEFT FOOT: ICD-10-CM

## 2024-06-13 ENCOUNTER — LAB (OUTPATIENT)
Dept: LAB | Facility: LAB | Age: 69
End: 2024-06-13
Payer: MEDICARE

## 2024-06-13 DIAGNOSIS — M79.672 ISCHEMIC PAIN OF LEFT FOOT: ICD-10-CM

## 2024-06-13 DIAGNOSIS — I73.9 PAD (PERIPHERAL ARTERY DISEASE) (CMS-HCC): ICD-10-CM

## 2024-06-13 DIAGNOSIS — I99.8 ISCHEMIC PAIN OF LEFT FOOT: ICD-10-CM

## 2024-06-13 LAB
ALBUMIN SERPL BCP-MCNC: 3.9 G/DL (ref 3.4–5)
ANION GAP SERPL CALC-SCNC: 11 MMOL/L (ref 10–20)
BASOPHILS # BLD AUTO: 0.08 X10*3/UL (ref 0–0.1)
BASOPHILS NFR BLD AUTO: 0.5 %
BUN SERPL-MCNC: 27 MG/DL (ref 6–23)
CALCIUM SERPL-MCNC: 9.1 MG/DL (ref 8.6–10.3)
CHLORIDE SERPL-SCNC: 105 MMOL/L (ref 98–107)
CO2 SERPL-SCNC: 28 MMOL/L (ref 21–32)
CREAT SERPL-MCNC: 0.91 MG/DL (ref 0.5–1.05)
EGFRCR SERPLBLD CKD-EPI 2021: 69 ML/MIN/1.73M*2
EOSINOPHIL # BLD AUTO: 0.09 X10*3/UL (ref 0–0.7)
EOSINOPHIL NFR BLD AUTO: 0.6 %
ERYTHROCYTE [DISTWIDTH] IN BLOOD BY AUTOMATED COUNT: 14.9 % (ref 11.5–14.5)
GLUCOSE SERPL-MCNC: 96 MG/DL (ref 74–99)
HCT VFR BLD AUTO: 51.3 % (ref 36–46)
HGB BLD-MCNC: 15.9 G/DL (ref 12–16)
IMM GRANULOCYTES # BLD AUTO: 0.13 X10*3/UL (ref 0–0.7)
IMM GRANULOCYTES NFR BLD AUTO: 0.9 % (ref 0–0.9)
INR PPP: 1 (ref 0.9–1.1)
LYMPHOCYTES # BLD AUTO: 2.77 X10*3/UL (ref 1.2–4.8)
LYMPHOCYTES NFR BLD AUTO: 19 %
MCH RBC QN AUTO: 28.6 PG (ref 26–34)
MCHC RBC AUTO-ENTMCNC: 31 G/DL (ref 32–36)
MCV RBC AUTO: 92 FL (ref 80–100)
MONOCYTES # BLD AUTO: 0.7 X10*3/UL (ref 0.1–1)
MONOCYTES NFR BLD AUTO: 4.8 %
NEUTROPHILS # BLD AUTO: 10.84 X10*3/UL (ref 1.2–7.7)
NEUTROPHILS NFR BLD AUTO: 74.2 %
NRBC BLD-RTO: 0 /100 WBCS (ref 0–0)
PHOSPHATE SERPL-MCNC: 3.5 MG/DL (ref 2.5–4.9)
PLATELET # BLD AUTO: 203 X10*3/UL (ref 150–450)
POTASSIUM SERPL-SCNC: 4.5 MMOL/L (ref 3.5–5.3)
PROTHROMBIN TIME: 11.3 SECONDS (ref 9.8–12.8)
RBC # BLD AUTO: 5.56 X10*6/UL (ref 4–5.2)
SODIUM SERPL-SCNC: 139 MMOL/L (ref 136–145)
WBC # BLD AUTO: 14.6 X10*3/UL (ref 4.4–11.3)

## 2024-06-13 PROCEDURE — 85610 PROTHROMBIN TIME: CPT

## 2024-06-13 PROCEDURE — 85025 COMPLETE CBC W/AUTO DIFF WBC: CPT

## 2024-06-13 PROCEDURE — 36415 COLL VENOUS BLD VENIPUNCTURE: CPT

## 2024-06-13 PROCEDURE — 80069 RENAL FUNCTION PANEL: CPT

## 2024-06-14 ENCOUNTER — HOSPITAL ENCOUNTER (OUTPATIENT)
Dept: NEUROLOGY | Facility: HOSPITAL | Age: 69
Discharge: HOME | End: 2024-06-14
Payer: MEDICARE

## 2024-06-14 DIAGNOSIS — R20.2 PARESTHESIAS: ICD-10-CM

## 2024-06-14 PROCEDURE — 95886 MUSC TEST DONE W/N TEST COMP: CPT | Performed by: PSYCHIATRY & NEUROLOGY

## 2024-06-14 PROCEDURE — 95912 NRV CNDJ TEST 11-12 STUDIES: CPT | Performed by: PSYCHIATRY & NEUROLOGY

## 2024-06-14 RX ORDER — PROCHLORPERAZINE MALEATE 10 MG
10 TABLET ORAL EVERY 6 HOURS PRN
Status: CANCELLED | OUTPATIENT
Start: 2024-06-17

## 2024-06-14 RX ORDER — PROCHLORPERAZINE EDISYLATE 5 MG/ML
10 INJECTION INTRAMUSCULAR; INTRAVENOUS EVERY 6 HOURS PRN
Status: CANCELLED | OUTPATIENT
Start: 2024-06-17

## 2024-06-14 RX ORDER — DIPHENHYDRAMINE HYDROCHLORIDE 50 MG/ML
50 INJECTION INTRAMUSCULAR; INTRAVENOUS AS NEEDED
Status: CANCELLED | OUTPATIENT
Start: 2024-06-17

## 2024-06-14 RX ORDER — EPINEPHRINE 0.3 MG/.3ML
0.3 INJECTION SUBCUTANEOUS EVERY 5 MIN PRN
Status: CANCELLED | OUTPATIENT
Start: 2024-06-17

## 2024-06-14 RX ORDER — ALBUTEROL SULFATE 0.83 MG/ML
3 SOLUTION RESPIRATORY (INHALATION) AS NEEDED
Status: CANCELLED | OUTPATIENT
Start: 2024-06-17

## 2024-06-14 RX ORDER — FAMOTIDINE 10 MG/ML
20 INJECTION INTRAVENOUS ONCE AS NEEDED
Status: CANCELLED | OUTPATIENT
Start: 2024-06-17

## 2024-06-17 ENCOUNTER — APPOINTMENT (OUTPATIENT)
Dept: HEMATOLOGY/ONCOLOGY | Facility: CLINIC | Age: 69
End: 2024-06-17
Payer: MEDICARE

## 2024-06-17 ENCOUNTER — SOCIAL WORK (OUTPATIENT)
Dept: HEMATOLOGY/ONCOLOGY | Facility: CLINIC | Age: 69
End: 2024-06-17
Payer: MEDICARE

## 2024-06-17 ENCOUNTER — LAB (OUTPATIENT)
Dept: LAB | Facility: CLINIC | Age: 69
End: 2024-06-17
Payer: MEDICARE

## 2024-06-17 ENCOUNTER — INFUSION (OUTPATIENT)
Dept: HEMATOLOGY/ONCOLOGY | Facility: CLINIC | Age: 69
End: 2024-06-17
Payer: MEDICARE

## 2024-06-17 VITALS
BODY MASS INDEX: 25.74 KG/M2 | DIASTOLIC BLOOD PRESSURE: 60 MMHG | RESPIRATION RATE: 18 BRPM | TEMPERATURE: 97.9 F | WEIGHT: 145.28 LBS | OXYGEN SATURATION: 96 % | HEART RATE: 62 BPM | HEIGHT: 63 IN | SYSTOLIC BLOOD PRESSURE: 114 MMHG

## 2024-06-17 DIAGNOSIS — C34.90 NON-SMALL CELL LUNG CANCER, UNSPECIFIED LATERALITY (MULTI): Primary | ICD-10-CM

## 2024-06-17 DIAGNOSIS — C34.91 NON-SMALL CELL CANCER OF RIGHT LUNG (MULTI): ICD-10-CM

## 2024-06-17 DIAGNOSIS — C34.90 NON-SMALL CELL LUNG CANCER, UNSPECIFIED LATERALITY (MULTI): ICD-10-CM

## 2024-06-17 LAB
ALBUMIN SERPL BCP-MCNC: 3.9 G/DL (ref 3.4–5)
ALP SERPL-CCNC: 46 U/L (ref 33–136)
ALT SERPL W P-5'-P-CCNC: 15 U/L (ref 7–45)
ANION GAP SERPL CALC-SCNC: 9 MMOL/L (ref 10–20)
AST SERPL W P-5'-P-CCNC: 14 U/L (ref 9–39)
BASOPHILS # BLD AUTO: 0.06 X10*3/UL (ref 0–0.1)
BASOPHILS NFR BLD AUTO: 0.4 %
BILIRUB SERPL-MCNC: 0.4 MG/DL (ref 0–1.2)
BUN SERPL-MCNC: 22 MG/DL (ref 6–23)
CALCIUM SERPL-MCNC: 8.8 MG/DL (ref 8.6–10.3)
CHLORIDE SERPL-SCNC: 103 MMOL/L (ref 98–107)
CO2 SERPL-SCNC: 28 MMOL/L (ref 21–32)
CREAT SERPL-MCNC: 0.74 MG/DL (ref 0.5–1.05)
EGFRCR SERPLBLD CKD-EPI 2021: 88 ML/MIN/1.73M*2
EOSINOPHIL # BLD AUTO: 0.09 X10*3/UL (ref 0–0.7)
EOSINOPHIL NFR BLD AUTO: 0.6 %
ERYTHROCYTE [DISTWIDTH] IN BLOOD BY AUTOMATED COUNT: 15.2 % (ref 11.5–14.5)
GLUCOSE SERPL-MCNC: 86 MG/DL (ref 74–99)
HCT VFR BLD AUTO: 49.2 % (ref 36–46)
HGB BLD-MCNC: 15.9 G/DL (ref 12–16)
IMM GRANULOCYTES # BLD AUTO: 0.11 X10*3/UL (ref 0–0.7)
IMM GRANULOCYTES NFR BLD AUTO: 0.8 % (ref 0–0.9)
LYMPHOCYTES # BLD AUTO: 2.46 X10*3/UL (ref 1.2–4.8)
LYMPHOCYTES NFR BLD AUTO: 17.1 %
MCH RBC QN AUTO: 29.5 PG (ref 26–34)
MCHC RBC AUTO-ENTMCNC: 32.3 G/DL (ref 32–36)
MCV RBC AUTO: 91 FL (ref 80–100)
MONOCYTES # BLD AUTO: 0.89 X10*3/UL (ref 0.1–1)
MONOCYTES NFR BLD AUTO: 6.2 %
NEUTROPHILS # BLD AUTO: 10.75 X10*3/UL (ref 1.2–7.7)
NEUTROPHILS NFR BLD AUTO: 74.9 %
NRBC BLD-RTO: 0 /100 WBCS (ref 0–0)
PLATELET # BLD AUTO: 194 X10*3/UL (ref 150–450)
POTASSIUM SERPL-SCNC: 4.1 MMOL/L (ref 3.5–5.3)
PROT SERPL-MCNC: 6.3 G/DL (ref 6.4–8.2)
RBC # BLD AUTO: 5.39 X10*6/UL (ref 4–5.2)
SODIUM SERPL-SCNC: 136 MMOL/L (ref 136–145)
TSH SERPL-ACNC: 3.53 MIU/L (ref 0.44–3.98)
WBC # BLD AUTO: 14.4 X10*3/UL (ref 4.4–11.3)

## 2024-06-17 PROCEDURE — 84075 ASSAY ALKALINE PHOSPHATASE: CPT

## 2024-06-17 PROCEDURE — 85025 COMPLETE CBC W/AUTO DIFF WBC: CPT

## 2024-06-17 PROCEDURE — 96413 CHEMO IV INFUSION 1 HR: CPT

## 2024-06-17 PROCEDURE — 2500000004 HC RX 250 GENERAL PHARMACY W/ HCPCS (ALT 636 FOR OP/ED): Mod: JZ | Performed by: INTERNAL MEDICINE

## 2024-06-17 PROCEDURE — 36415 COLL VENOUS BLD VENIPUNCTURE: CPT

## 2024-06-17 PROCEDURE — 84443 ASSAY THYROID STIM HORMONE: CPT

## 2024-06-17 RX ORDER — FAMOTIDINE 10 MG/ML
20 INJECTION INTRAVENOUS ONCE AS NEEDED
OUTPATIENT
Start: 2024-09-09

## 2024-06-17 RX ORDER — HEPARIN SODIUM,PORCINE/PF 10 UNIT/ML
50 SYRINGE (ML) INTRAVENOUS AS NEEDED
OUTPATIENT
Start: 2024-06-17

## 2024-06-17 RX ORDER — HEPARIN 100 UNIT/ML
500 SYRINGE INTRAVENOUS AS NEEDED
OUTPATIENT
Start: 2024-06-17

## 2024-06-17 RX ORDER — FAMOTIDINE 10 MG/ML
20 INJECTION INTRAVENOUS ONCE AS NEEDED
OUTPATIENT
Start: 2024-07-29

## 2024-06-17 RX ORDER — ALBUTEROL SULFATE 0.83 MG/ML
3 SOLUTION RESPIRATORY (INHALATION) AS NEEDED
OUTPATIENT
Start: 2024-07-29

## 2024-06-17 RX ORDER — PROCHLORPERAZINE MALEATE 5 MG
10 TABLET ORAL EVERY 6 HOURS PRN
OUTPATIENT
Start: 2024-07-29

## 2024-06-17 RX ORDER — ALBUTEROL SULFATE 0.83 MG/ML
3 SOLUTION RESPIRATORY (INHALATION) AS NEEDED
OUTPATIENT
Start: 2024-09-09

## 2024-06-17 RX ORDER — DIPHENHYDRAMINE HYDROCHLORIDE 50 MG/ML
50 INJECTION INTRAMUSCULAR; INTRAVENOUS AS NEEDED
OUTPATIENT
Start: 2024-09-09

## 2024-06-17 RX ORDER — PROCHLORPERAZINE EDISYLATE 5 MG/ML
10 INJECTION INTRAMUSCULAR; INTRAVENOUS EVERY 6 HOURS PRN
OUTPATIENT
Start: 2024-07-29

## 2024-06-17 RX ORDER — DIPHENHYDRAMINE HYDROCHLORIDE 50 MG/ML
50 INJECTION INTRAMUSCULAR; INTRAVENOUS AS NEEDED
OUTPATIENT
Start: 2024-07-29

## 2024-06-17 RX ORDER — PROCHLORPERAZINE MALEATE 5 MG
10 TABLET ORAL EVERY 6 HOURS PRN
OUTPATIENT
Start: 2024-09-09

## 2024-06-17 RX ORDER — EPINEPHRINE 0.3 MG/.3ML
0.3 INJECTION SUBCUTANEOUS EVERY 5 MIN PRN
OUTPATIENT
Start: 2024-09-09

## 2024-06-17 RX ORDER — PROCHLORPERAZINE EDISYLATE 5 MG/ML
10 INJECTION INTRAMUSCULAR; INTRAVENOUS EVERY 6 HOURS PRN
OUTPATIENT
Start: 2024-09-09

## 2024-06-17 RX ORDER — EPINEPHRINE 0.3 MG/.3ML
0.3 INJECTION SUBCUTANEOUS EVERY 5 MIN PRN
OUTPATIENT
Start: 2024-07-29

## 2024-06-17 ASSESSMENT — PAIN SCALES - GENERAL: PAINLEVEL: 0-NO PAIN

## 2024-06-17 NOTE — PROGRESS NOTES
Followed up with pt and . Pt has boot on L foot for foot ulcer. Pt also indicates she has no blood flow per pt to that foot. 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 Pt is going to undergo a procedure to restore blood flow again. Pt is up and ambulating at home but still uses transfer chair for long distances. Pt is eating and sleeping with no current issues. Pt not even on O2 during visit. Pt mentions she continues to see [facility] onc psych and it has really helped her. Pt on ativan and gabapentin and it has really helped pt. Pt mentions she is doing better mentally. She spends time coloring and doing word searches and this keeps her mind busy and away from worry.

## 2024-06-17 NOTE — SIGNIFICANT EVENT
06/17/24 1105   Prechemo Checklist   Has the patient been in the hospital, ED, or urgent care since last date of service No   Chemo/Immuno Consent Completed and Signed Yes  (12/20/2023)   Protocol/Indications Verified Yes   Confirmed to previous date/time of medication Yes   Compared to previous dose Yes   All medications are dated accurately Yes   Pregnancy Test Negative Not applicable   Parameters Met Yes   Dose Calculations Verified (current, total, cumulative) Yes

## 2024-06-18 ENCOUNTER — TELEPHONE (OUTPATIENT)
Dept: PRIMARY CARE | Facility: CLINIC | Age: 69
End: 2024-06-18
Payer: MEDICARE

## 2024-06-18 DIAGNOSIS — R11.2 NAUSEA AND VOMITING, UNSPECIFIED VOMITING TYPE: ICD-10-CM

## 2024-06-18 DIAGNOSIS — C34.90 NON-SMALL CELL LUNG CANCER, UNSPECIFIED LATERALITY (MULTI): ICD-10-CM

## 2024-06-18 DIAGNOSIS — G89.3 CANCER RELATED PAIN: ICD-10-CM

## 2024-06-18 DIAGNOSIS — F51.01 PRIMARY INSOMNIA: ICD-10-CM

## 2024-06-18 NOTE — TELEPHONE ENCOUNTER
Patients daughter calling stating the olanzapine she is taking is not working well for her- she is up during the night a lot, not acting herself  She does get ativan from her psychiatry oncology team as well which she understands is part of this treatment.  She wants to know if you have any other recommendations to help her mom sleep like increasing the dose, or possibly a different medication ?

## 2024-06-20 NOTE — PATIENT INSTRUCTIONS
1. Reviewed diagnostic impression including subjective and objective data and provided education about depression and anxiety disorder, treatment recommendations including medication, therapy, course of treatment and prognosis. Patient amendable to treatment plan.     2. Plan - Increase Zoloft  and continue other medications.     INCREASE Sertraline 100 mg - take 2  table by mouth daily for anxiety and depression   INCREASE Gabapentin 100 mg - take 1 tablet, 3 times daily for anxiety   INCREASE  Lorazepam 0.5 mg - take 1 tablet, every 6 hours as needed for anxiety       3. Reviewed r/b/a, possible side effects of the medication(s). Client is aware benefit/risks      4. Labs reviewed and - placed order for lab work. Please provide at any  facility Please provide Urine Drug Screen to abide by  Substance Use policy.      5. Plan for supportive psychotherapy     6. Follow up with physical health providers as scheduled     7. Follow up in 2 weeks.       May follow up sooner if experiences worsening symptoms by calling  Psychiatry at [phone]      Patient verbalized an understanding to call Mobile Crisis at [phone] ([facility]), 211, or 112/go to the nearest emergency room if experiences thoughts of harm to self or others.

## 2024-06-24 ENCOUNTER — PATIENT OUTREACH (OUTPATIENT)
Dept: CARE COORDINATION | Facility: CLINIC | Age: 69
End: 2024-06-24
Payer: MEDICARE

## 2024-06-24 NOTE — PROGRESS NOTES
Unable to reach patient for one month post discharge follow up call.               LVM with call back number for patient to call if needed

## 2024-06-25 ENCOUNTER — APPOINTMENT (OUTPATIENT)
Dept: NEUROLOGY | Facility: CLINIC | Age: 69
End: 2024-06-25
Payer: MEDICARE

## 2024-06-25 ENCOUNTER — APPOINTMENT (OUTPATIENT)
Dept: CARDIOLOGY | Facility: HOSPITAL | Age: 69
End: 2024-06-25
Payer: MEDICARE

## 2024-06-25 ENCOUNTER — HOSPITAL ENCOUNTER (INPATIENT)
Facility: HOSPITAL | Age: 69
LOS: 4 days | Discharge: HOME | End: 2024-06-29
Attending: INTERNAL MEDICINE | Admitting: INTERNAL MEDICINE
Payer: MEDICARE

## 2024-06-25 ENCOUNTER — APPOINTMENT (OUTPATIENT)
Dept: WOUND CARE | Facility: CLINIC | Age: 69
End: 2024-06-25
Payer: MEDICARE

## 2024-06-25 DIAGNOSIS — I73.9 CLAUDICATION (CMS-HCC): ICD-10-CM

## 2024-06-25 DIAGNOSIS — I74.8 EMBOLISM AND THROMBOSIS OF OTHER ARTERIES (MULTI): ICD-10-CM

## 2024-06-25 DIAGNOSIS — I73.9 INTERMITTENT CLAUDICATION (CMS-HCC): ICD-10-CM

## 2024-06-25 DIAGNOSIS — G89.3 PAIN DUE TO NEOPLASM: ICD-10-CM

## 2024-06-25 DIAGNOSIS — I99.8 ACUTE LOWER LIMB ISCHEMIA: ICD-10-CM

## 2024-06-25 DIAGNOSIS — E78.2 MIXED HYPERLIPIDEMIA: ICD-10-CM

## 2024-06-25 DIAGNOSIS — I73.9 PAD (PERIPHERAL ARTERY DISEASE) (CMS-HCC): Primary | ICD-10-CM

## 2024-06-25 DIAGNOSIS — I70.222 CRITICAL LIMB ISCHEMIA OF LEFT LOWER EXTREMITY (MULTI): ICD-10-CM

## 2024-06-25 DIAGNOSIS — I70.663: ICD-10-CM

## 2024-06-25 DIAGNOSIS — I70.662: ICD-10-CM

## 2024-06-25 DIAGNOSIS — I74.4 PERIPHERAL ARTERY EMBOLISM OR THROMBOSIS (MULTI): ICD-10-CM

## 2024-06-25 DIAGNOSIS — I70.245 ATHEROSCLEROSIS OF NATIVE ARTERIES OF LEFT LEG WITH ULCERATION OF OTHER PART OF FOOT (MULTI): ICD-10-CM

## 2024-06-25 DIAGNOSIS — I70.212 ATHEROSCLEROSIS OF NATIVE ARTERIES OF EXTREMITIES WITH INTERMITTENT CLAUDICATION, LEFT LEG (CMS-HCC): ICD-10-CM

## 2024-06-25 DIAGNOSIS — I73.89 OTHER SPECIFIED PERIPHERAL VASCULAR DISEASES (CMS-HCC): ICD-10-CM

## 2024-06-25 DIAGNOSIS — I70.202: ICD-10-CM

## 2024-06-25 DIAGNOSIS — M21.372 FOOT DROP, LEFT: ICD-10-CM

## 2024-06-25 LAB
ACT BLD: 144 SEC (ref 83–199)
ACT BLD: 258 SEC (ref 83–199)
ACT BLD: 273 SEC (ref 83–199)
ACT BLD: 273 SEC (ref 83–199)
ACT BLD: 284 SEC (ref 83–199)
ACT BLD: 289 SEC (ref 83–199)
ACT BLD: 313 SEC (ref 83–199)
ACT BLD: 324 SEC (ref 83–199)
ACT BLD: NORMAL S
ALBUMIN SERPL BCP-MCNC: 3.5 G/DL (ref 3.4–5)
ALP SERPL-CCNC: 44 U/L (ref 33–136)
ALT SERPL W P-5'-P-CCNC: 12 U/L (ref 7–45)
ANION GAP SERPL CALC-SCNC: 12 MMOL/L (ref 10–20)
AST SERPL W P-5'-P-CCNC: 15 U/L (ref 9–39)
ATRIAL RATE: 55 BPM
B2 GLYCOPROT1 IGA SER-ACNC: 0.8 U/ML
B2 GLYCOPROT1 IGG SER-ACNC: <1.4 U/ML
B2 GLYCOPROT1 IGM SER-ACNC: 1.9 U/ML
BASOPHILS # BLD AUTO: 0.08 X10*3/UL (ref 0–0.1)
BASOPHILS NFR BLD AUTO: 0.6 %
BILIRUB SERPL-MCNC: 0.6 MG/DL (ref 0–1.2)
BUN SERPL-MCNC: 18 MG/DL (ref 6–23)
CALCIUM SERPL-MCNC: 8.4 MG/DL (ref 8.6–10.6)
CHLORIDE SERPL-SCNC: 106 MMOL/L (ref 98–107)
CK SERPL-CCNC: 41 U/L (ref 0–215)
CO2 SERPL-SCNC: 24 MMOL/L (ref 21–32)
CREAT SERPL-MCNC: 0.63 MG/DL (ref 0.5–1.05)
EGFRCR SERPLBLD CKD-EPI 2021: >90 ML/MIN/1.73M*2
EOSINOPHIL # BLD AUTO: 0.11 X10*3/UL (ref 0–0.7)
EOSINOPHIL NFR BLD AUTO: 0.8 %
ERYTHROCYTE [DISTWIDTH] IN BLOOD BY AUTOMATED COUNT: 14.6 % (ref 11.5–14.5)
EST. AVERAGE GLUCOSE BLD GHB EST-MCNC: 120 MG/DL
GLUCOSE SERPL-MCNC: 103 MG/DL (ref 74–99)
HBA1C MFR BLD: 5.8 %
HCT VFR BLD AUTO: 43.3 % (ref 36–46)
HGB BLD-MCNC: 14.2 G/DL (ref 12–16)
IMM GRANULOCYTES # BLD AUTO: 0.14 X10*3/UL (ref 0–0.7)
IMM GRANULOCYTES NFR BLD AUTO: 1 % (ref 0–0.9)
LACTATE SERPL-SCNC: 0.8 MMOL/L (ref 0.4–2)
LYMPHOCYTES # BLD AUTO: 2.94 X10*3/UL (ref 1.2–4.8)
LYMPHOCYTES NFR BLD AUTO: 22 %
MCH RBC QN AUTO: 28.3 PG (ref 26–34)
MCHC RBC AUTO-ENTMCNC: 32.8 G/DL (ref 32–36)
MCV RBC AUTO: 86 FL (ref 80–100)
MONOCYTES # BLD AUTO: 0.82 X10*3/UL (ref 0.1–1)
MONOCYTES NFR BLD AUTO: 6.1 %
NEUTROPHILS # BLD AUTO: 9.25 X10*3/UL (ref 1.2–7.7)
NEUTROPHILS NFR BLD AUTO: 69.5 %
NRBC BLD-RTO: 0 /100 WBCS (ref 0–0)
P AXIS: 79 DEGREES
P OFFSET: 187 MS
P ONSET: 134 MS
PLATELET # BLD AUTO: 168 X10*3/UL (ref 150–450)
POTASSIUM SERPL-SCNC: 4 MMOL/L (ref 3.5–5.3)
PR INTERVAL: 164 MS
PROT SERPL-MCNC: 6.1 G/DL (ref 6.4–8.2)
Q ONSET: 216 MS
QRS COUNT: 9 BEATS
QRS DURATION: 98 MS
QT INTERVAL: 448 MS
QTC CALCULATION(BAZETT): 428 MS
QTC FREDERICIA: 435 MS
R AXIS: 59 DEGREES
RBC # BLD AUTO: 5.02 X10*6/UL (ref 4–5.2)
SODIUM SERPL-SCNC: 138 MMOL/L (ref 136–145)
T AXIS: 91 DEGREES
T OFFSET: 440 MS
VENTRICULAR RATE: 55 BPM
WBC # BLD AUTO: 13.3 X10*3/UL (ref 4.4–11.3)

## 2024-06-25 PROCEDURE — 37227 HC REVASCULARIZE FEM/POP ARTERY,ANGIOPLASTY/STENT/ATHERECTOMY: CPT | Performed by: INTERNAL MEDICINE

## 2024-06-25 PROCEDURE — 2500000001 HC RX 250 WO HCPCS SELF ADMINISTERED DRUGS (ALT 637 FOR MEDICARE OP)

## 2024-06-25 PROCEDURE — 37232 HC REVASCULARIZE TIBIAL/PERON ARTERY,ANGIOPLASTY EA ADD: CPT | Mod: 22 | Performed by: INTERNAL MEDICINE

## 2024-06-25 PROCEDURE — 36415 COLL VENOUS BLD VENIPUNCTURE: CPT | Performed by: INTERNAL MEDICINE

## 2024-06-25 PROCEDURE — 37229 PR REVSC OPN/PRQ TIB/PERO W/ATHRC/ANGIOP SM VSL: CPT | Performed by: INTERNAL MEDICINE

## 2024-06-25 PROCEDURE — 85613 RUSSELL VIPER VENOM DILUTED: CPT | Performed by: INTERNAL MEDICINE

## 2024-06-25 PROCEDURE — 37184 PRIM ART M-THRMBC 1ST VSL: CPT | Performed by: INTERNAL MEDICINE

## 2024-06-25 PROCEDURE — 2780000003 HC OR 278 NO HCPCS: Performed by: INTERNAL MEDICINE

## 2024-06-25 PROCEDURE — 96373 THER/PROPH/DIAG INJ IA: CPT | Performed by: INTERNAL MEDICINE

## 2024-06-25 PROCEDURE — 99153 MOD SED SAME PHYS/QHP EA: CPT | Performed by: INTERNAL MEDICINE

## 2024-06-25 PROCEDURE — 82550 ASSAY OF CK (CPK): CPT

## 2024-06-25 PROCEDURE — 85347 COAGULATION TIME ACTIVATED: CPT | Mod: 91

## 2024-06-25 PROCEDURE — C1769 GUIDE WIRE: HCPCS | Performed by: INTERNAL MEDICINE

## 2024-06-25 PROCEDURE — 83036 HEMOGLOBIN GLYCOSYLATED A1C: CPT

## 2024-06-25 PROCEDURE — C1884 EMBOLIZATION PROTECT SYST: HCPCS | Performed by: INTERNAL MEDICINE

## 2024-06-25 PROCEDURE — 37227 PR REVSC OPN/PRQ FEM/POP W/STNT/ATHRC/ANGIOP SM VSL: CPT | Performed by: INTERNAL MEDICINE

## 2024-06-25 PROCEDURE — 86146 BETA-2 GLYCOPROTEIN ANTIBODY: CPT | Performed by: INTERNAL MEDICINE

## 2024-06-25 PROCEDURE — C1894 INTRO/SHEATH, NON-LASER: HCPCS | Performed by: INTERNAL MEDICINE

## 2024-06-25 PROCEDURE — 37185 PRIM ART M-THRMBC SBSQ VSL: CPT | Performed by: INTERNAL MEDICINE

## 2024-06-25 PROCEDURE — 99152 MOD SED SAME PHYS/QHP 5/>YRS: CPT | Performed by: INTERNAL MEDICINE

## 2024-06-25 PROCEDURE — 2500000004 HC RX 250 GENERAL PHARMACY W/ HCPCS (ALT 636 FOR OP/ED): Performed by: STUDENT IN AN ORGANIZED HEALTH CARE EDUCATION/TRAINING PROGRAM

## 2024-06-25 PROCEDURE — 2500000004 HC RX 250 GENERAL PHARMACY W/ HCPCS (ALT 636 FOR OP/ED): Mod: JZ | Performed by: INTERNAL MEDICINE

## 2024-06-25 PROCEDURE — 2500000001 HC RX 250 WO HCPCS SELF ADMINISTERED DRUGS (ALT 637 FOR MEDICARE OP): Performed by: INTERNAL MEDICINE

## 2024-06-25 PROCEDURE — 86147 CARDIOLIPIN ANTIBODY EA IG: CPT | Performed by: INTERNAL MEDICINE

## 2024-06-25 PROCEDURE — 2500000004 HC RX 250 GENERAL PHARMACY W/ HCPCS (ALT 636 FOR OP/ED): Mod: JZ

## 2024-06-25 PROCEDURE — C1874 STENT, COATED/COV W/DEL SYS: HCPCS | Performed by: INTERNAL MEDICINE

## 2024-06-25 PROCEDURE — 85347 COAGULATION TIME ACTIVATED: CPT | Mod: 91 | Performed by: INTERNAL MEDICINE

## 2024-06-25 PROCEDURE — 99223 1ST HOSP IP/OBS HIGH 75: CPT | Performed by: SURGERY

## 2024-06-25 PROCEDURE — 37229 HC REVASCULARIZE TIBIAL/PERON ARTERY,ANGIOPLASTY/ATHERECTOMY INITIAL: CPT | Performed by: INTERNAL MEDICINE

## 2024-06-25 PROCEDURE — 83605 ASSAY OF LACTIC ACID: CPT | Performed by: INTERNAL MEDICINE

## 2024-06-25 PROCEDURE — C1725 CATH, TRANSLUMIN NON-LASER: HCPCS | Performed by: INTERNAL MEDICINE

## 2024-06-25 PROCEDURE — 80053 COMPREHEN METABOLIC PANEL: CPT | Performed by: INTERNAL MEDICINE

## 2024-06-25 PROCEDURE — 75710 ARTERY X-RAYS ARM/LEG: CPT | Performed by: INTERNAL MEDICINE

## 2024-06-25 PROCEDURE — 2720000007 HC OR 272 NO HCPCS: Performed by: INTERNAL MEDICINE

## 2024-06-25 PROCEDURE — C1724 CATH, TRANS ATHEREC,ROTATION: HCPCS | Performed by: INTERNAL MEDICINE

## 2024-06-25 PROCEDURE — 2500000002 HC RX 250 W HCPCS SELF ADMINISTERED DRUGS (ALT 637 FOR MEDICARE OP, ALT 636 FOR OP/ED)

## 2024-06-25 PROCEDURE — 37232 PR REVSC OPN/PRQ TIB/PERO W/ANGIOPLASTY UNI EA VSL: CPT | Performed by: INTERNAL MEDICINE

## 2024-06-25 PROCEDURE — 94762 N-INVAS EAR/PLS OXIMTRY CONT: CPT

## 2024-06-25 PROCEDURE — 93005 ELECTROCARDIOGRAM TRACING: CPT

## 2024-06-25 PROCEDURE — 1100000001 HC PRIVATE ROOM DAILY

## 2024-06-25 PROCEDURE — 75710 ARTERY X-RAYS ARM/LEG: CPT | Mod: 59 | Performed by: INTERNAL MEDICINE

## 2024-06-25 PROCEDURE — 2550000001 HC RX 255 CONTRASTS: Performed by: INTERNAL MEDICINE

## 2024-06-25 PROCEDURE — 93010 ELECTROCARDIOGRAM REPORT: CPT | Performed by: INTERNAL MEDICINE

## 2024-06-25 PROCEDURE — 2500000005 HC RX 250 GENERAL PHARMACY W/O HCPCS: Performed by: INTERNAL MEDICINE

## 2024-06-25 PROCEDURE — C1887 CATHETER, GUIDING: HCPCS | Performed by: INTERNAL MEDICINE

## 2024-06-25 PROCEDURE — 99291 CRITICAL CARE FIRST HOUR: CPT | Performed by: INTERNAL MEDICINE

## 2024-06-25 PROCEDURE — 85025 COMPLETE CBC W/AUTO DIFF WBC: CPT | Performed by: INTERNAL MEDICINE

## 2024-06-25 PROCEDURE — C1757 CATH, THROMBECTOMY/EMBOLECT: HCPCS | Performed by: INTERNAL MEDICINE

## 2024-06-25 DEVICE — DRUG-ELUTING VASCULAR STENT SYSTEM
Type: IMPLANTABLE DEVICE | Site: LEG | Status: FUNCTIONAL
Brand: ELUVIA™

## 2024-06-25 RX ORDER — EPTIFIBATIDE 2 MG/ML
INJECTION, SOLUTION INTRAVENOUS CONTINUOUS PRN
Status: COMPLETED | OUTPATIENT
Start: 2024-06-25 | End: 2024-06-25

## 2024-06-25 RX ORDER — NAPROXEN SODIUM 220 MG/1
325 TABLET, FILM COATED ORAL DAILY
Status: DISCONTINUED | OUTPATIENT
Start: 2024-06-25 | End: 2024-06-26

## 2024-06-25 RX ORDER — ONDANSETRON 4 MG/1
4 TABLET, FILM COATED ORAL EVERY 8 HOURS PRN
Status: DISCONTINUED | OUTPATIENT
Start: 2024-06-25 | End: 2024-06-29 | Stop reason: HOSPADM

## 2024-06-25 RX ORDER — HEPARIN SODIUM 1000 [USP'U]/ML
INJECTION, SOLUTION INTRAVENOUS; SUBCUTANEOUS AS NEEDED
Status: DISCONTINUED | OUTPATIENT
Start: 2024-06-25 | End: 2024-06-25 | Stop reason: HOSPADM

## 2024-06-25 RX ORDER — OLANZAPINE 5 MG/1
5 TABLET ORAL NIGHTLY
Status: DISCONTINUED | OUTPATIENT
Start: 2024-06-25 | End: 2024-06-29 | Stop reason: HOSPADM

## 2024-06-25 RX ORDER — CLOPIDOGREL BISULFATE 75 MG/1
75 TABLET ORAL EVERY MORNING
Status: DISCONTINUED | OUTPATIENT
Start: 2024-06-26 | End: 2024-06-29 | Stop reason: HOSPADM

## 2024-06-25 RX ORDER — ACETAMINOPHEN 325 MG/1
975 TABLET ORAL EVERY 6 HOURS
Status: DISCONTINUED | OUTPATIENT
Start: 2024-06-25 | End: 2024-06-29 | Stop reason: HOSPADM

## 2024-06-25 RX ORDER — EPTIFIBATIDE 0.75 MG/ML
INJECTION, SOLUTION INTRAVENOUS CONTINUOUS PRN
Status: COMPLETED | OUTPATIENT
Start: 2024-06-25 | End: 2024-06-25

## 2024-06-25 RX ORDER — FENTANYL CITRATE 50 UG/ML
INJECTION, SOLUTION INTRAMUSCULAR; INTRAVENOUS AS NEEDED
Status: DISCONTINUED | OUTPATIENT
Start: 2024-06-25 | End: 2024-06-25 | Stop reason: HOSPADM

## 2024-06-25 RX ORDER — MONTELUKAST SODIUM 10 MG/1
10 TABLET ORAL NIGHTLY
Status: DISCONTINUED | OUTPATIENT
Start: 2024-06-25 | End: 2024-06-29 | Stop reason: HOSPADM

## 2024-06-25 RX ORDER — LIDOCAINE HYDROCHLORIDE 20 MG/ML
INJECTION, SOLUTION INFILTRATION; PERINEURAL AS NEEDED
Status: DISCONTINUED | OUTPATIENT
Start: 2024-06-25 | End: 2024-06-25 | Stop reason: HOSPADM

## 2024-06-25 RX ORDER — GABAPENTIN 100 MG/1
100 CAPSULE ORAL 3 TIMES DAILY
Status: DISCONTINUED | OUTPATIENT
Start: 2024-06-25 | End: 2024-06-29 | Stop reason: HOSPADM

## 2024-06-25 RX ORDER — PROCHLORPERAZINE MALEATE 5 MG
5 TABLET ORAL EVERY 6 HOURS PRN
Status: DISCONTINUED | OUTPATIENT
Start: 2024-06-25 | End: 2024-06-29 | Stop reason: HOSPADM

## 2024-06-25 RX ORDER — DEXAMETHASONE 2 MG/1
2 TABLET ORAL DAILY
Status: DISCONTINUED | OUTPATIENT
Start: 2024-06-25 | End: 2024-06-29 | Stop reason: HOSPADM

## 2024-06-25 RX ORDER — IODIXANOL 320 MG/ML
INJECTION, SOLUTION INTRAVASCULAR AS NEEDED
Status: DISCONTINUED | OUTPATIENT
Start: 2024-06-25 | End: 2024-06-25 | Stop reason: HOSPADM

## 2024-06-25 RX ORDER — ROSUVASTATIN CALCIUM 40 MG/1
20 TABLET, COATED ORAL NIGHTLY
Status: DISCONTINUED | OUTPATIENT
Start: 2024-06-25 | End: 2024-06-29 | Stop reason: HOSPADM

## 2024-06-25 RX ORDER — OXYCODONE HYDROCHLORIDE 5 MG/1
5 TABLET ORAL EVERY 6 HOURS PRN
Status: DISCONTINUED | OUTPATIENT
Start: 2024-06-25 | End: 2024-06-29 | Stop reason: HOSPADM

## 2024-06-25 RX ORDER — MIDAZOLAM HYDROCHLORIDE 1 MG/ML
INJECTION, SOLUTION INTRAMUSCULAR; INTRAVENOUS AS NEEDED
Status: DISCONTINUED | OUTPATIENT
Start: 2024-06-25 | End: 2024-06-25 | Stop reason: HOSPADM

## 2024-06-25 RX ORDER — HYDROMORPHONE HYDROCHLORIDE 1 MG/ML
0.2 INJECTION, SOLUTION INTRAMUSCULAR; INTRAVENOUS; SUBCUTANEOUS
Status: DISCONTINUED | OUTPATIENT
Start: 2024-06-25 | End: 2024-06-27

## 2024-06-25 RX ORDER — SIMETHICONE 80 MG
80 TABLET,CHEWABLE ORAL EVERY 6 HOURS PRN
Status: DISCONTINUED | OUTPATIENT
Start: 2024-06-25 | End: 2024-06-29 | Stop reason: HOSPADM

## 2024-06-25 RX ORDER — HYDROCODONE BITARTRATE AND ACETAMINOPHEN 10; 325 MG/1; MG/1
1 TABLET ORAL EVERY 8 HOURS PRN
Status: DISCONTINUED | OUTPATIENT
Start: 2024-06-25 | End: 2024-06-29 | Stop reason: HOSPADM

## 2024-06-25 RX ORDER — FLUTICASONE FUROATE AND VILANTEROL 100; 25 UG/1; UG/1
1 POWDER RESPIRATORY (INHALATION)
Status: DISCONTINUED | OUTPATIENT
Start: 2024-06-25 | End: 2024-06-29

## 2024-06-25 RX ORDER — LIDOCAINE HYDROCHLORIDE 10 MG/ML
INJECTION, SOLUTION EPIDURAL; INFILTRATION; INTRACAUDAL; PERINEURAL AS NEEDED
Status: DISCONTINUED | OUTPATIENT
Start: 2024-06-25 | End: 2024-06-25 | Stop reason: HOSPADM

## 2024-06-25 RX ORDER — BISACODYL 5 MG
10 TABLET, DELAYED RELEASE (ENTERIC COATED) ORAL DAILY PRN
Status: DISCONTINUED | OUTPATIENT
Start: 2024-06-25 | End: 2024-06-29 | Stop reason: HOSPADM

## 2024-06-25 RX ORDER — CLOPIDOGREL BISULFATE 300 MG/1
300 TABLET, FILM COATED ORAL ONCE
Status: COMPLETED | OUTPATIENT
Start: 2024-06-25 | End: 2024-06-25

## 2024-06-25 RX ORDER — ONDANSETRON HYDROCHLORIDE 2 MG/ML
4 INJECTION, SOLUTION INTRAVENOUS EVERY 8 HOURS PRN
Status: DISCONTINUED | OUTPATIENT
Start: 2024-06-25 | End: 2024-06-29 | Stop reason: HOSPADM

## 2024-06-25 RX ORDER — ACETAMINOPHEN 650 MG/1
650 SUPPOSITORY RECTAL EVERY 6 HOURS
Status: DISCONTINUED | OUTPATIENT
Start: 2024-06-25 | End: 2024-06-29

## 2024-06-25 RX ORDER — ONDANSETRON HYDROCHLORIDE 2 MG/ML
INJECTION, SOLUTION INTRAVENOUS AS NEEDED
Status: DISCONTINUED | OUTPATIENT
Start: 2024-06-25 | End: 2024-06-25 | Stop reason: HOSPADM

## 2024-06-25 RX ORDER — POLYETHYLENE GLYCOL 3350 17 G/17G
17 POWDER, FOR SOLUTION ORAL DAILY
Status: DISCONTINUED | OUTPATIENT
Start: 2024-06-25 | End: 2024-06-29 | Stop reason: HOSPADM

## 2024-06-25 RX ORDER — ASPIRIN 81 MG/1
81 TABLET ORAL DAILY
Status: DISCONTINUED | OUTPATIENT
Start: 2024-06-25 | End: 2024-06-29 | Stop reason: HOSPADM

## 2024-06-25 RX ORDER — HYDROMORPHONE HYDROCHLORIDE 0.2 MG/ML
0.2 INJECTION INTRAMUSCULAR; INTRAVENOUS; SUBCUTANEOUS
Status: DISCONTINUED | OUTPATIENT
Start: 2024-06-25 | End: 2024-06-25 | Stop reason: RX

## 2024-06-25 RX ORDER — NITROGLYCERIN 40 MG/100ML
INJECTION INTRAVENOUS AS NEEDED
Status: DISCONTINUED | OUTPATIENT
Start: 2024-06-25 | End: 2024-06-25 | Stop reason: HOSPADM

## 2024-06-25 RX ORDER — ACETAMINOPHEN 160 MG/5ML
650 SOLUTION ORAL EVERY 6 HOURS
Status: DISCONTINUED | OUTPATIENT
Start: 2024-06-25 | End: 2024-06-29

## 2024-06-25 RX ORDER — PROCHLORPERAZINE MALEATE 5 MG
5 TABLET ORAL EVERY 6 HOURS PRN
COMMUNITY

## 2024-06-25 RX ORDER — EPTIFIBATIDE 0.75 MG/ML
2 INJECTION, SOLUTION INTRAVENOUS CONTINUOUS
Status: DISCONTINUED | OUTPATIENT
Start: 2024-06-25 | End: 2024-06-26

## 2024-06-25 RX ORDER — SERTRALINE HYDROCHLORIDE 100 MG/1
100 TABLET, FILM COATED ORAL 2 TIMES DAILY
Status: DISCONTINUED | OUTPATIENT
Start: 2024-06-25 | End: 2024-06-29 | Stop reason: HOSPADM

## 2024-06-25 RX ORDER — HYDROMORPHONE HYDROCHLORIDE 1 MG/ML
INJECTION, SOLUTION INTRAMUSCULAR; INTRAVENOUS; SUBCUTANEOUS
Status: COMPLETED
Start: 2024-06-25 | End: 2024-06-25

## 2024-06-25 RX ORDER — CLOPIDOGREL BISULFATE 75 MG/1
75 TABLET ORAL DAILY
Status: DISCONTINUED | OUTPATIENT
Start: 2024-06-26 | End: 2024-06-26

## 2024-06-25 SDOH — HEALTH STABILITY: MENTAL HEALTH
DO YOU FEEL STRESS - TENSE, RESTLESS, NERVOUS, OR ANXIOUS, OR UNABLE TO SLEEP AT NIGHT BECAUSE YOUR MIND IS TROUBLED ALL THE TIME - THESE DAYS?: TO SOME EXTENT

## 2024-06-25 SDOH — ECONOMIC STABILITY: FOOD INSECURITY: WITHIN THE PAST 12 MONTHS, YOU WORRIED THAT YOUR FOOD WOULD RUN OUT BEFORE YOU GOT THE MONEY TO BUY MORE.: NEVER TRUE

## 2024-06-25 SDOH — HEALTH STABILITY: MENTAL HEALTH
HOW OFTEN DO YOU NEED TO HAVE SOMEONE HELP YOU WHEN YOU READ INSTRUCTIONS, PAMPHLETS, OR OTHER WRITTEN MATERIAL FROM YOUR DOCTOR OR PHARMACY?: RARELY

## 2024-06-25 SDOH — HEALTH STABILITY: MENTAL HEALTH
STRESS IS WHEN SOMEONE FEELS TENSE, NERVOUS, ANXIOUS, OR CAN'T SLEEP AT NIGHT BECAUSE THEIR MIND IS TROUBLED. HOW STRESSED ARE YOU?: TO SOME EXTENT

## 2024-06-25 SDOH — ECONOMIC STABILITY: FOOD INSECURITY: WITHIN THE PAST 12 MONTHS, THE FOOD YOU BOUGHT JUST DIDN'T LAST AND YOU DIDN'T HAVE MONEY TO GET MORE.: NEVER TRUE

## 2024-06-25 SDOH — ECONOMIC STABILITY: FOOD INSECURITY: WITHIN THE PAST 12 MONTHS, YOU WORRIED THAT YOUR FOOD WOULD RUN OUT BEFORE YOU GOT MONEY TO BUY MORE.: NEVER TRUE

## 2024-06-25 SDOH — ECONOMIC STABILITY: INCOME INSECURITY: IN THE LAST 12 MONTHS, WAS THERE A TIME WHEN YOU WERE NOT ABLE TO PAY THE MORTGAGE OR RENT ON TIME?: NO

## 2024-06-25 SDOH — HEALTH STABILITY: MENTAL HEALTH: HOW MANY DRINKS CONTAINING ALCOHOL DO YOU HAVE ON A TYPICAL DAY WHEN YOU ARE DRINKING?: PATIENT DOES NOT DRINK

## 2024-06-25 SDOH — HEALTH STABILITY: MENTAL HEALTH: HOW OFTEN DO YOU HAVE A DRINK CONTAINING ALCOHOL?: NEVER

## 2024-06-25 SDOH — SOCIAL STABILITY: SOCIAL INSECURITY: WITHIN THE LAST YEAR, HAVE YOU BEEN HUMILIATED OR EMOTIONALLY ABUSED IN OTHER WAYS BY YOUR PARTNER OR EX-PARTNER?: NO

## 2024-06-25 SDOH — SOCIAL STABILITY: SOCIAL INSECURITY
WITHIN THE LAST YEAR, HAVE YOU BEEN KICKED, HIT, SLAPPED, OR OTHERWISE PHYSICALLY HURT BY YOUR PARTNER OR EX-PARTNER?: NO

## 2024-06-25 SDOH — SOCIAL STABILITY: SOCIAL NETWORK: HOW OFTEN DO YOU ATTEND CHURCH OR RELIGIOUS SERVICES?: MORE THAN 4 TIMES PER YEAR

## 2024-06-25 SDOH — SOCIAL STABILITY: SOCIAL NETWORK
DO YOU BELONG TO ANY CLUBS OR ORGANIZATIONS SUCH AS CHURCH GROUPS, UNIONS, FRATERNAL OR ATHLETIC GROUPS, OR SCHOOL GROUPS?: NO

## 2024-06-25 SDOH — ECONOMIC STABILITY: INCOME INSECURITY: IN THE PAST 12 MONTHS HAS THE ELECTRIC, GAS, OIL, OR WATER COMPANY THREATENED TO SHUT OFF SERVICES IN YOUR HOME?: NO

## 2024-06-25 SDOH — ECONOMIC STABILITY: INCOME INSECURITY: IN THE PAST 12 MONTHS, HAS THE ELECTRIC, GAS, OIL, OR WATER COMPANY THREATENED TO SHUT OFF SERVICE IN YOUR HOME?: NO

## 2024-06-25 SDOH — HEALTH STABILITY: PHYSICAL HEALTH: ON AVERAGE, HOW MANY MINUTES DO YOU ENGAGE IN EXERCISE AT THIS LEVEL?: 20 MIN

## 2024-06-25 SDOH — SOCIAL STABILITY: SOCIAL NETWORK: HOW OFTEN DO YOU GET TOGETHER WITH FRIENDS OR RELATIVES?: THREE TIMES A WEEK

## 2024-06-25 SDOH — SOCIAL STABILITY: SOCIAL NETWORK: IN A TYPICAL WEEK, HOW MANY TIMES DO YOU TALK ON THE PHONE WITH FAMILY, FRIENDS, OR NEIGHBORS?: THREE TIMES A WEEK

## 2024-06-25 SDOH — SOCIAL STABILITY: SOCIAL INSECURITY: WITHIN THE LAST YEAR, HAVE YOU BEEN AFRAID OF YOUR PARTNER OR EX-PARTNER?: NO

## 2024-06-25 SDOH — HEALTH STABILITY: MENTAL HEALTH: HOW OFTEN DO YOU HAVE 6 OR MORE DRINKS ON ONE OCCASION?: NEVER

## 2024-06-25 SDOH — SOCIAL STABILITY: SOCIAL NETWORK
DO YOU BELONG TO ANY CLUBS OR ORGANIZATIONS SUCH AS CHURCH GROUPS UNIONS, FRATERNAL OR ATHLETIC GROUPS, OR SCHOOL GROUPS?: NO

## 2024-06-25 SDOH — SOCIAL STABILITY: SOCIAL INSECURITY
WITHIN THE LAST YEAR, HAVE YOU BEEN RAPED OR FORCED TO HAVE ANY KIND OF SEXUAL ACTIVITY BY YOUR PARTNER OR EX-PARTNER?: NO

## 2024-06-25 SDOH — SOCIAL STABILITY: SOCIAL NETWORK: ARE YOU MARRIED, WIDOWED, DIVORCED, SEPARATED, NEVER MARRIED, OR LIVING WITH A PARTNER?: MARRIED

## 2024-06-25 SDOH — HEALTH STABILITY: PHYSICAL HEALTH: ON AVERAGE, HOW MANY DAYS PER WEEK DO YOU ENGAGE IN MODERATE TO STRENUOUS EXERCISE (LIKE A BRISK WALK)?: 7 DAYS

## 2024-06-25 SDOH — SOCIAL STABILITY: SOCIAL INSECURITY: ARE YOU MARRIED, WIDOWED, DIVORCED, SEPARATED, NEVER MARRIED, OR LIVING WITH A PARTNER?: MARRIED

## 2024-06-25 SDOH — SOCIAL STABILITY: SOCIAL INSECURITY
WITHIN THE LAST YEAR, HAVE TO BEEN RAPED OR FORCED TO HAVE ANY KIND OF SEXUAL ACTIVITY BY YOUR PARTNER OR EX-PARTNER?: NO

## 2024-06-25 SDOH — ECONOMIC STABILITY: HOUSING INSECURITY: IN THE LAST 12 MONTHS, WAS THERE A TIME WHEN YOU WERE NOT ABLE TO PAY THE MORTGAGE OR RENT ON TIME?: NO

## 2024-06-25 SDOH — ECONOMIC STABILITY: TRANSPORTATION INSECURITY: IN THE PAST 12 MONTHS, HAS LACK OF TRANSPORTATION KEPT YOU FROM MEDICAL APPOINTMENTS OR FROM GETTING MEDICATIONS?: NO

## 2024-06-25 SDOH — ECONOMIC STABILITY: HOUSING INSECURITY: IN THE LAST 12 MONTHS, HOW MANY PLACES HAVE YOU LIVED?: 1

## 2024-06-25 SDOH — ECONOMIC STABILITY: FOOD INSECURITY: HOW HARD IS IT FOR YOU TO PAY FOR THE VERY BASICS LIKE FOOD, HOUSING, MEDICAL CARE, AND HEATING?: NOT HARD AT ALL

## 2024-06-25 SDOH — HEALTH STABILITY: MENTAL HEALTH: HOW OFTEN DO YOU HAVE SIX OR MORE DRINKS ON ONE OCCASION?: NEVER

## 2024-06-25 SDOH — HEALTH STABILITY: MENTAL HEALTH: HOW MANY STANDARD DRINKS CONTAINING ALCOHOL DO YOU HAVE ON A TYPICAL DAY?: PATIENT DOES NOT DRINK

## 2024-06-25 SDOH — SOCIAL STABILITY: SOCIAL NETWORK: HOW OFTEN DO YOU ATTEND MEETINGS OF THE CLUBS OR ORGANIZATIONS YOU BELONG TO?: NEVER

## 2024-06-25 SDOH — ECONOMIC STABILITY: INCOME INSECURITY: HOW HARD IS IT FOR YOU TO PAY FOR THE VERY BASICS LIKE FOOD, HOUSING, MEDICAL CARE, AND HEATING?: NOT HARD AT ALL

## 2024-06-25 SDOH — SOCIAL STABILITY: SOCIAL NETWORK: HOW OFTEN DO YOU ATTENT MEETINGS OF THE CLUB OR ORGANIZATION YOU BELONG TO?: NEVER

## 2024-06-25 ASSESSMENT — ACTIVITIES OF DAILY LIVING (ADL): LACK_OF_TRANSPORTATION: NO

## 2024-06-25 ASSESSMENT — PAIN SCALES - GENERAL
PAINLEVEL_OUTOF10: 8
PAINLEVEL_OUTOF10: 10 - WORST POSSIBLE PAIN
PAINLEVEL_OUTOF10: 0 - NO PAIN
PAINLEVEL_OUTOF10: 9
PAINLEVEL_OUTOF10: 0 - NO PAIN

## 2024-06-25 ASSESSMENT — PAIN - FUNCTIONAL ASSESSMENT
PAIN_FUNCTIONAL_ASSESSMENT: 0-10

## 2024-06-25 ASSESSMENT — PAIN DESCRIPTION - ORIENTATION: ORIENTATION: LEFT

## 2024-06-25 ASSESSMENT — LIFESTYLE VARIABLES
AUDIT-C TOTAL SCORE: 0
SKIP TO QUESTIONS 9-10: 1

## 2024-06-25 ASSESSMENT — PAIN DESCRIPTION - LOCATION: LOCATION: FOOT

## 2024-06-25 NOTE — Clinical Note
Angioplasty of the L AT lesion. Inflation 1: Pressure = 12 jose; Duration = 15 sec. Inflation 2: Pressure = 12 jose; Duration = 10 sec. Multiple inflations

## 2024-06-25 NOTE — H&P
CARDIAC ICU H and P    Subjective  Radha Toussaint is a 69 y.o. female with PMHx of STEMI almost 10 years ago stable since then, Hypertension, hyperlipidemia, PAD s/p (left  popliteal/TPT/PT reconstruction with PTA/DCB, Supera/SES to popliteal occlusion with Dr Mike on 1/23/24) , nicotine abuse, non small lung cancer, /w worsening  wound to the left lateral foot for 2 weeks and increased pain in the left foot. Pt s/p Laser atherectomy/JETI/PTA fem/pop/TPT/PT, FCO to mid SFA and JETI/PTA AT via 6F antegrade L CFA access currently on Integrilin drip and inn CICU for observation post intervention while on Integrilin gtt , suture removal with q1h NV checks.    O/N events:  Pt complaining of pain, receiving round the clock opiates,     Telemetry: NSR in 75 bpm.    Net IO Since Admission: -250 mL [06/25/24 1748]    CARDIAC DATA:  EKG: SB in 55 BPM, normal axis, no NJ AV blocks or IVCD or BBB. Good R wave progression. Possible old inf infarct.   Echocardiogram: n/a  Stress Testing n/a  Cardiac Catheterization: n/a      Vascular US Ankle Brachial Index (TRAVIS) Without Exercise  6/11/24      CONCLUSIONS:  Right Lower PVR: No evidence of arterial occlusive disease in the right lower extremity at rest. Normal digital perfusion noted. Multiphasic flow is noted in the right common femoral artery, right posterior tibial artery and right dorsalis pedis artery.  Left Lower PVR: Evidence of moderate arterial occlusive disease in the left lower extremity at rest. Decreased digital perfusion noted. Monophasic flow is noted in the left posterior tibial artery. Multiphasic flow is noted in the left common femoral artery. Left dorsalis pedis artery is not audible. Left digit waveform is flatlined.     Comparison:  Compared with study from 3/6/2024, left TRAVIS/TBI has decreased. Right TRAVIS/TBI remain unchanged.     Imaging & Doppler Findings:     RIGHT Lower PVR                Pressures Ratios  Right Posterior Tibial (Ankle) 136 mmHg   "1.08  Right Dorsalis Pedis (Ankle)   140 mmHg  1.11  Right Digit (Great Toe)        82 mmHg   0.65           LEFT Lower PVR                Pressures Ratios  Left Posterior Tibial (Ankle) 54 mmHg   0.43  Left Dorsalis Pedis (Ankle)   0 mmHg    0.00  Left Digit (Great Toe)        0 mmHg    0.00                           Right     Left  Brachial Pressure 126 mmHg 125 mmHg        CT Chest wo con 4/25/24    IMPRESSION:   1. No acute process   2.  Severe emphysema and bronchitis.   3.  Severe three-vessel coronary artery calcifications.   4.  Mediastinal adenopathy may be related to patient's known diagnosis of small cell lung cancer, suboptimally evaluated without IV contrast     Vitals:   /71   Pulse 79   Temp 36.3 °C (97.3 °F)   Resp 26   Ht 1.626 m (5' 4\")   Wt 69.9 kg (154 lb)   SpO2 95%   BMI 26.43 kg/m²          6/17/2024    12:07 PM 6/25/2024     9:13 AM 6/25/2024     3:00 PM 6/25/2024     4:00 PM 6/25/2024     4:15 PM 6/25/2024     4:30 PM 6/25/2024     5:00 PM   Vitals   Systolic 114      118   Diastolic 60      71   Heart Rate 62  59 62 65 68 79   Temp 36.6 °C (97.9 °F)   36.3 °C (97.3 °F)      Resp 18  17 14 13 17 26   Height (in)  1.626 m (5' 4\")        Weight (lb)  154        BMI  26.43 kg/m2        BSA (m2)  1.78 m2            Vent settings:    Most Recent Range Past 24hrs   Mode      FiO2   No data recorded   Rate   No data recorded   Vt    No data recorded   PEEP   No data recorded     Invasive Hemodynamics:    Most Recent Range Past 24hrs   BP (Art) 123/74 Arterial Line BP 1  Min: 98/56  Max: 123/74   MAP(Art) 92 mmHg Arterial Line MAP 1 (mmHg)   Min: 71 mmHg  Max: 92 mmHg   RA/CVP   No data recorded   PA   No data recorded   PA(mean)   No data recorded   PCWP   No data recorded   CO   No data recorded   CI   No data recorded   Mixed Venous   No data recorded   SVR    No data recorded   PVR   No data recorded       Labs:  Results from last 7 days   Lab Units 06/25/24  1544   WBC AUTO " "x10*3/uL 13.3*   HEMOGLOBIN g/dL 14.2   HEMATOCRIT % 43.3   PLATELETS AUTO x10*3/uL 168     Results from last 7 days   Lab Units 06/25/24  1544   SODIUM mmol/L 138   POTASSIUM mmol/L 4.0   CO2 mmol/L 24   ANION GAP mmol/L 12   BUN mg/dL 18   CREATININE mg/dL 0.63   GLUCOSE mg/dL 103*   EGFR mL/min/1.73m*2 >90      Results from last 7 days   Lab Units 06/25/24  1544   ALT U/L 12   AST U/L 15   ALK PHOS U/L 44            No lab exists for component: \"PT\"          Results from last 7 days   Lab Units 06/25/24  1544   LACTATE mmol/L 0.8               Lab Results   Component Value Date    TROPONINI <0.02 11/29/2021      Lab Results   Component Value Date    HGBA1C 5.9 (H) 03/12/2024      Lab Results   Component Value Date    CHOL 160 05/02/2023    CHOL 144 09/29/2020     Lab Results   Component Value Date    HDL 35.6 (A) 05/02/2023    HDL 28.0 (A) 09/29/2020     No results found for: \"LDLCALC\"  Lab Results   Component Value Date    TRIG 89 05/02/2023    TRIG 114 09/29/2020     No components found for: \"CHOLHDL\"     Imaging:  No results found.     Physical Exam  Vitals and nursing note reviewed.   HENT:      Mouth/Throat:      Mouth: Mucous membranes are dry.   Eyes:      Pupils: Pupils are equal, round, and reactive to light.   Cardiovascular:      Rate and Rhythm: Normal rate and regular rhythm.      Pulses: Normal pulses.   Pulmonary:      Effort: Pulmonary effort is normal.      Breath sounds: Wheezing present.   Abdominal:      General: Abdomen is flat.   Feet:      Comments: Right dopplerable PT and DP  Left Dopplerable PT absent DP, warm, paraesthesia, extremely tender to palpatation.  Skin:     Capillary Refill: Capillary refill takes less than 2 seconds.   Neurological:      General: No focal deficit present.      Mental Status: She is oriented to person, place, and time.       Medications  Scheduled medications  acetaminophen, 975 mg, oral, q6h   Or  acetaminophen, 650 mg, nasogastric tube, q6h   " Or  acetaminophen, 650 mg, rectal, q6h  [Transfer Hold] aspirin, 325 mg, oral, Daily  aspirin, 81 mg, oral, Daily  [Transfer Hold] clopidogrel, 75 mg, oral, Daily  [START ON 6/26/2024] clopidogrel, 75 mg, oral, q AM  dexAMETHasone, 2 mg, oral, Daily  tiotropium, 2 puff, inhalation, Daily   And  fluticasone furoate-vilanteroL, 1 puff, inhalation, Daily  gabapentin, 100 mg, oral, TID  lactated Ringer's, 1,000 mL, intravenous, Once  montelukast, 10 mg, oral, Nightly  OLANZapine, 5 mg, oral, Nightly  rosuvastatin, 20 mg, oral, Nightly  sertraline, 100 mg, oral, BID        Continuous medications  eptifibatide, 2 mcg/kg/min, Last Rate: 2 mcg/kg/min (06/25/24 1728)        PRN medications  PRN medications: HYDROcodone-acetaminophen, HYDROmorphone, oxyCODONE, prochlorperazine, simethicone      Assessment/Plan   NEURO  RASS 0, at baseline, No acute concerns  Cont home medications with lorazepam, Zyprexa  Pain control with gabapentin and oxycodone  PRN Dilaudid for breakthrough pain control    CV   #PAD  #s/p Laser Atherectomy  #s/ FCO  Pt s/p Laser atherectomy/JETI/PTA fem/pop/TPT/PT, FCO to mid SFA and JETI/PTA AT via 6F antegrade L CFA access currently on Integrilin drip x16 hrs. Currently in CICU for observation post intervention while on integrelin gtt.     Plan:  Continue Integrilin drip until 0300 hrs. as of 6/26/2021.  Sutures to be removed tomorrow a.m. by fellow.  Continue given neurovascular checks.    PULM   #COPD  # Chronic respiratory failure with hypoxia on home o2  Plan:  Cont breo, PRN albuterol and home Dexamethasone 2mg   Cont. 2 L o2 at baseline.    GI/NUTRITION  No acute concerns    Plan:  Cont. Bowel regiment patient  on opiates.    RENAL/ELECTROLYTES  NAD    HEME/ONC   s/p FCO on Integrilin gtt.  Discussed in Cardio    Plan:  Monitor left groin for access sire bleeding.  FU daily  CBCs    ENDO  FU A1c     ID  No  concerns for ID standpoint.    MSK/RHEUM/SKIN  No acute  concerns  --------------------------------------------------------  F : None  E: PRN  N: Diet cardiac   DVT prophylaxis: On integrellin  GI Prophylaxis:NA  NGT: None  ABX descalation/escalation: None  MCS/IABP:None  LTD:Left fem a line D1  O2: 2 L chronic  ---------------------------------------------------------  Code Status: Full Code (confirmed on admission)   NOK: Extended Emergency Contact Information  Primary Emergency Contact: Opal Gore  Address: [address] [place] [address] [facility]  Home Phone: [phone]  Mobile Phone: [phone]  Relation: Daughter        Luis Kimbrough MD  PGY-2 Internal Medicine

## 2024-06-25 NOTE — NURSING NOTE
Dr. Toussaint is aware of this RN being unable to find the left pedal pulse. I was only able to find the posterior tibial pulse on  the initial assessment from the cath lab.

## 2024-06-25 NOTE — Clinical Note
Vessel(s): left SFA, left popliteal artery, left PTA, left peroneal artery, left KELVIN, left tibio-peroneal trunk and left dorsalis pedis artery. Injected with hand injections.

## 2024-06-25 NOTE — Clinical Note
Angioplasty of the L TPT lesion. Inflation 1: Pressure = 14 jose; Duration = 20 sec. Inflation 2: Pressure = 14 jose; Duration = 20 sec.

## 2024-06-25 NOTE — CARE PLAN
The patient's goals for the shift include pain management    The clinical goals for the shift include remaining hemodynamically stable.  Problem: Pain - Adult  Goal: Verbalizes/displays adequate comfort level or baseline comfort level  Outcome: Progressing     Problem: Safety - Adult  Goal: Free from fall injury  Outcome: Progressing     Problem: Discharge Planning  Goal: Discharge to home or other facility with appropriate resources  Outcome: Progressing     Problem: Chronic Conditions and Co-morbidities  Goal: Patient's chronic conditions and co-morbidity symptoms are monitored and maintained or improved  Outcome: Progressing     Problem: Pain  Goal: Takes deep breaths with improved pain control throughout the shift  Outcome: Progressing  Goal: Turns in bed with improved pain control throughout the shift  Outcome: Progressing  Goal: Walks with improved pain control throughout the shift  Outcome: Progressing  Goal: Performs ADL's with improved pain control throughout shift  Outcome: Progressing  Goal: Participates in PT with improved pain control throughout the shift  Outcome: Progressing  Goal: Free from opioid side effects throughout the shift  Outcome: Progressing  Goal: Free from acute confusion related to pain meds throughout the shift  Outcome: Progressing

## 2024-06-25 NOTE — Clinical Note
Sheath was left in place in the left femoral artery. Site secured by transparent dressing. Antegrade, pressure bag hooked up to sheath

## 2024-06-25 NOTE — Clinical Note
Vessel(s): left popliteal artery, left peroneal artery, left KELVIN, left tibio-peroneal trunk and left dorsalis pedis artery.

## 2024-06-25 NOTE — Clinical Note
Vessel(s): left PTA, left KELVIN and left dorsalis pedis artery. Injected with hand injections. Single view taken.

## 2024-06-25 NOTE — PROGRESS NOTES
1/1 CICU    DC/SDOH Assessments with patient. Reviewed EPIC medical info and verified address / contacts / PCP and pharmacy.  6/25 PT/OT orders and awaiting recs. Currently in CICU for observation post intervention while on integrelin gtt.  Vascular surgery consulted due to concern for compartment syndrome    Jeaneth Garay (LSW, MSW)

## 2024-06-25 NOTE — Clinical Note
Angioplasty of the L AT lesion. Inflation 1: Pressure = 12 jose; Duration = 15 sec. Multiple inflations

## 2024-06-25 NOTE — POST-PROCEDURE NOTE
Physician Transition of Care Summary  Invasive Cardiovascular Lab    Procedure Date: 6/25/2024  Attending:    Eddie Mike - Primary  Resident/Fellow/Other Assistant: Surgeons and Role:     * Jarrod Mitchell MD - Fellow    Indications:   Pre-op Diagnosis     * PAD (peripheral artery disease) (CMS-HCC) [I73.9]    Post-procedure diagnosis:   Post-op Diagnosis     * PAD (peripheral artery disease) (CMS-HCC) [I73.9]    Procedure(s):   Lower Extremity Angiogram  93049 - CHG ANGIOGRAPHY EXTREMITY UNILATERAL RS&I    Lower Extremity Intervention    Thrombectomy - Lower Extremity    Atherectomy - Lower Extremity    FCO Stent - Lower Extremity        Procedure Findings:   LLE recurrent occlusion SFA/pop/tibials    Description of the Procedure:   6F antegrade L CFA access (prior fem-fem) - sutured for removal in CICU    Laser atherectomy/JETI/PTA fem/pop/TPT/PT  FCO to mid SFA  JETI/PTA AT    Complications:   None    Stents/Implants:   Implants       Stent    Stent, Clair, 6 Mm X 120 Mm X 130 Cm - [id number] - Implanted        Inventory item: STENT, CLAIR, 6 MM X 120 MM X 130 CM Model/Cat number: [id number]    : [employer] Lot number: [id number]    Device identifier: [id number]        As of 6/25/2024       Status: Implanted                              Anticoagulation/Antiplatelet Plan:   Integrilin drip x 16h  Aspirin/Plavix on board  APLA testing - likely will plan for DOAC/Plavix on dc if APLA negative - will communicate with oncologist    Estimated Blood Loss:   250 mL    Anesthesia: Moderate Sedation Anesthesia Staff: No anesthesia staff entered.    Any Specimen(s) Removed:   Order Name Source Comment Collection Info Order Time   LUPUS ANTICOAG. WITH INTERPRETATION[GARZA] Blood, Venous   6/25/2024  2:49 PM     Release result to MyChart   Immediate        BETA-2 GLYCOPROTEIN ANTIBODIES Blood, Venous   6/25/2024  2:49 PM     Release result to MyChart   Immediate        ANTI-CARDIOLIPIN ANTIBODY  (IGA, IGG, AND IGM) Blood, Venous   6/25/2024  2:49 PM     Release result to [employer]   Immediate            Disposition:   CICU      Electronically signed by: Willam Mike MD, 6/25/2024 3:02 PM

## 2024-06-25 NOTE — Clinical Note
Vessel(s): left SFA, left popliteal artery, left PTA and left KELVIN. Injected with hand injections. Single view taken.

## 2024-06-25 NOTE — Clinical Note
Vessel(s): left SFA, left popliteal artery and left tibio-peroneal trunk. Thrombolytic catheter infusion started.

## 2024-06-25 NOTE — Clinical Note
Vessel(s): left SFA, left popliteal artery, left PTA, left peroneal artery and left tibio-peroneal trunk. Injected with hand injections.

## 2024-06-25 NOTE — PROGRESS NOTES
Pharmacy Medication History Review    Radha Toussaint is a 69 y.o. female admitted for PAD (peripheral artery disease) (CMS-[facility]). Pharmacy reviewed the patient's [id number] medications and allergies for accuracy.    The list below reflects the updated PTA list.   Comments regarding how patient may be taking medications differently can be found in the Admit Orders Activity  Prior to Admission Medications   Prescriptions Last Dose Informant   HYDROcodone-acetaminophen (Norco)  mg tablet 6/25/2024 at am Self   Sig: Take 1 tablet by mouth every 8 hours if needed for severe pain (7 - 10).  Patient takes scheduled    LORazepam (Ativan) 0.5 mg tablet 6/25/2024 at am Self   Sig: Take 1 tablet (0.5 mg) by mouth every 6 hours if needed for anxiety. Do not fill before June 18, 2024.  Patient takes three times daily    OLANZapine (ZyPREXA) 5 mg tablet 6/24/2024 Self   Sig: Take 1 tablet (5 mg) by mouth once daily at bedtime.   acetaminophen (Tylenol) 325 mg tablet More than a month Self   Sig: Take 1 tablet (325 mg) by mouth every 4 hours if needed.   albuterol 90 mcg/actuation inhaler  Self   Sig: Inhale 1 puff every 4 hours if needed for shortness of breath.  Does not use    aspirin 81 mg EC tablet 6/25/2024 at am Self   Sig: Take 1 tablet (81 mg) by mouth once daily.   budesonide (Pulmicort) 0.25 mg/2 mL nebulizer solution  Self   Sig: Take 2 mL (0.25 mg) by nebulization 2 times a day. Rinse mouth with water after use to reduce aftertaste and incidence of candidiasis. Do not swallow.  Patient says she takes levalbuterol now, said she does not think she takes this but was unsure    clopidogrel (Plavix) 75 mg tablet 6/25/2024 at am Self   Sig: Take 1 tablet (75 mg) by mouth once daily in the morning.   dexAMETHasone (Decadron) 4 mg tablet 6/24/2024 Self   Sig: Take 0.5 tablets (2 mg) by mouth once daily.   ferrous sulfate 325 (65 Fe) MG EC tablet 6/25/2024 at am Self   Sig: Take 65 mg by mouth once daily. Do not  crush, chew, or split.   fludrocortisone (Florinef) 0.1 mg tablet  Self   Sig: TAKE 1/2 TABLET BY MOUTH ONCE DAILY   Patient not taking: Reported on 5/6/2024  Patient not taking    fluticasone-umeclidin-vilanter (Trelegy Ellipta) 200-62.5-25 mcg blister with device 6/25/2024 at am Self   Sig: Inhale 1 puff once daily.   gabapentin (Neurontin) 100 mg capsule 6/25/2024 at am Self   Sig: Take 1 capsule (100 mg) by mouth 3 times a day.   montelukast (Singulair) 10 mg tablet 6/24/2024 Self   Sig: Take 1 tablet (10 mg) by mouth once daily at bedtime.   ondansetron ODT (Zofran-ODT) 4 mg disintegrating tablet Unknown Self   Sig: DISSOLVE 1 TABLET IN MOUTH BY MOUTH THREE TIMES A DAY AS NEEDED NAUSEA  Has not taken over a month    prochlorperazine (Compazine) 5 mg tablet 6/23/2024 Self   Sig: Take 1 tablet (5 mg) by mouth every 6 hours if needed for nausea or vomiting.   rosuvastatin (Crestor) 20 mg tablet 6/25/2024 at am Self   Sig: Take 1 tablet (20 mg) by mouth once daily.   sennosides (senna) 8.6 mg tablet 6/24/2024 Self   Sig: Take 1 tablet (8.6 mg) by mouth once daily.   Patient taking differently: Take 1 tablet (8.6 mg) by mouth once daily at bedtime.   sertraline (Zoloft) 100 mg tablet 6/25/2024 at am Self   Sig: Take 1 tablet (100 mg) by mouth 2 times a day.  Patient takes three times a day (300 mg), says therapy changed 3 weeks ago    simethicone (Mylicon) 80 mg chewable tablet 6/24/2024 Self   Sig: Chew 1 tablet (80 mg) every 6 hours if needed.      Facility-Administered Medications: None        The list below reflects the updated allergy list. Please review each documented allergy for additional clarification and justification.  Allergies  Reviewed by Paul Clemens RN on 6/25/2024        Severity Reactions Comments    Ace Inhibitors Medium Angioedema     Prednisone Medium Anxiety, Agitation & violent    Demerol [meperidine] Low Nausea/vomiting     Iodinated Contrast Media Low Nausea/vomiting             Patient  "declines M2B at discharge. Patient uses [facility] in [place] ([place]).     Sources:   Out patient fill history, OARRS, and patient interview  Cardio 6/9/24  PCP 5/7/24    Additional Comments:  Patient was a fair historian for most medications. However, she was unsure if she uses Pulmicort and thinks she uses levalbuterol instead. She insists that she is not taking Buspirone 15 mg (1 tab TID), although dispense history shows from 4/30/24 that a 90 days supply was dispensed. Additionally, she said that she now takes sertraline 100 mg three times daily instead of two times a day and that this was changed about 3 weeks ago.      Laurie Arauz, PharmD  Transitions of Care Pharmacist  06/25/24     Secure Chat preferred   If no response call [phone] or Vocera \"Med Rec\"   "

## 2024-06-26 ENCOUNTER — APPOINTMENT (OUTPATIENT)
Dept: VASCULAR MEDICINE | Facility: HOSPITAL | Age: 69
End: 2024-06-26
Payer: MEDICARE

## 2024-06-26 ENCOUNTER — ANESTHESIA (OUTPATIENT)
Dept: OPERATING ROOM | Facility: HOSPITAL | Age: 69
End: 2024-06-26
Payer: MEDICARE

## 2024-06-26 ENCOUNTER — APPOINTMENT (OUTPATIENT)
Dept: RADIOLOGY | Facility: HOSPITAL | Age: 69
End: 2024-06-26
Payer: MEDICARE

## 2024-06-26 ENCOUNTER — ANESTHESIA EVENT (OUTPATIENT)
Dept: OPERATING ROOM | Facility: HOSPITAL | Age: 69
End: 2024-06-26
Payer: MEDICARE

## 2024-06-26 ENCOUNTER — APPOINTMENT (OUTPATIENT)
Dept: CARDIOLOGY | Facility: HOSPITAL | Age: 69
End: 2024-06-26
Payer: MEDICARE

## 2024-06-26 PROBLEM — I99.8 ACUTE LOWER LIMB ISCHEMIA: Status: ACTIVE | Noted: 2024-06-11

## 2024-06-26 LAB
ACT BLD: 176 SEC (ref 83–199)
ACT BLD: 186 SEC (ref 83–199)
ACT BLD: 239 SEC (ref 83–199)
ACT BLD: 256 SEC (ref 83–199)
ACT BLD: 284 SEC (ref 83–199)
ATRIAL RATE: 77 BPM
CARDIOLIPIN IGA SERPL-ACNC: 1 APL U/ML
CARDIOLIPIN IGG SER IA-ACNC: <1.6 GPL U/ML
CARDIOLIPIN IGM SER IA-ACNC: 2.3 MPL U/ML
P AXIS: 76 DEGREES
P OFFSET: 192 MS
P ONSET: 142 MS
PR INTERVAL: 142 MS
Q ONSET: 213 MS
QRS COUNT: 12 BEATS
QRS DURATION: 100 MS
QT INTERVAL: 468 MS
QTC CALCULATION(BAZETT): 529 MS
QTC FREDERICIA: 508 MS
R AXIS: 54 DEGREES
T AXIS: 89 DEGREES
T OFFSET: 447 MS
UFH PPP CHRO-ACNC: 0.1 IU/ML
UFH PPP CHRO-ACNC: 0.5 IU/ML
VENTRICULAR RATE: 77 BPM

## 2024-06-26 PROCEDURE — 85347 COAGULATION TIME ACTIVATED: CPT | Mod: 91 | Performed by: INTERNAL MEDICINE

## 2024-06-26 PROCEDURE — 2500000002 HC RX 250 W HCPCS SELF ADMINISTERED DRUGS (ALT 637 FOR MEDICARE OP, ALT 636 FOR OP/ED)

## 2024-06-26 PROCEDURE — 2720000007 HC OR 272 NO HCPCS: Performed by: INTERNAL MEDICINE

## 2024-06-26 PROCEDURE — 75710 ARTERY X-RAYS ARM/LEG: CPT | Performed by: INTERNAL MEDICINE

## 2024-06-26 PROCEDURE — 2500000004 HC RX 250 GENERAL PHARMACY W/ HCPCS (ALT 636 FOR OP/ED): Performed by: STUDENT IN AN ORGANIZED HEALTH CARE EDUCATION/TRAINING PROGRAM

## 2024-06-26 PROCEDURE — 85347 COAGULATION TIME ACTIVATED: CPT

## 2024-06-26 PROCEDURE — 99153 MOD SED SAME PHYS/QHP EA: CPT | Performed by: INTERNAL MEDICINE

## 2024-06-26 PROCEDURE — C1887 CATHETER, GUIDING: HCPCS | Performed by: INTERNAL MEDICINE

## 2024-06-26 PROCEDURE — 2500000004 HC RX 250 GENERAL PHARMACY W/ HCPCS (ALT 636 FOR OP/ED): Performed by: NURSE PRACTITIONER

## 2024-06-26 PROCEDURE — 04CN3ZZ EXTIRPATION OF MATTER FROM LEFT POPLITEAL ARTERY, PERCUTANEOUS APPROACH: ICD-10-PCS | Performed by: INTERNAL MEDICINE

## 2024-06-26 PROCEDURE — 2500000004 HC RX 250 GENERAL PHARMACY W/ HCPCS (ALT 636 FOR OP/ED): Performed by: INTERNAL MEDICINE

## 2024-06-26 PROCEDURE — 1200000002 HC GENERAL ROOM WITH TELEMETRY DAILY

## 2024-06-26 PROCEDURE — 2500000004 HC RX 250 GENERAL PHARMACY W/ HCPCS (ALT 636 FOR OP/ED)

## 2024-06-26 PROCEDURE — 75635 CT ANGIO ABDOMINAL ARTERIES: CPT

## 2024-06-26 PROCEDURE — C1757 CATH, THROMBECTOMY/EMBOLECT: HCPCS | Performed by: INTERNAL MEDICINE

## 2024-06-26 PROCEDURE — B41G1ZZ FLUOROSCOPY OF LEFT LOWER EXTREMITY ARTERIES USING LOW OSMOLAR CONTRAST: ICD-10-PCS | Performed by: INTERNAL MEDICINE

## 2024-06-26 PROCEDURE — 93010 ELECTROCARDIOGRAM REPORT: CPT | Performed by: INTERNAL MEDICINE

## 2024-06-26 PROCEDURE — 99291 CRITICAL CARE FIRST HOUR: CPT | Performed by: INTERNAL MEDICINE

## 2024-06-26 PROCEDURE — C1874 STENT, COATED/COV W/DEL SYS: HCPCS | Performed by: INTERNAL MEDICINE

## 2024-06-26 PROCEDURE — 37228 HC REVASCULARIZE TIBIAL/PERON ARTERY,ANGIOPLASTY INITIAL: CPT | Performed by: INTERNAL MEDICINE

## 2024-06-26 PROCEDURE — 99152 MOD SED SAME PHYS/QHP 5/>YRS: CPT | Performed by: INTERNAL MEDICINE

## 2024-06-26 PROCEDURE — 2780000003 HC OR 278 NO HCPCS: Performed by: INTERNAL MEDICINE

## 2024-06-26 PROCEDURE — 37226 HC REVASCULARIZE FEM/POP ARTERY,ANGIOPLASTY/STENT: CPT | Performed by: INTERNAL MEDICINE

## 2024-06-26 PROCEDURE — 047L34Z DILATION OF LEFT FEMORAL ARTERY WITH DRUG-ELUTING INTRALUMINAL DEVICE, PERCUTANEOUS APPROACH: ICD-10-PCS | Performed by: INTERNAL MEDICINE

## 2024-06-26 PROCEDURE — 93922 UPR/L XTREMITY ART 2 LEVELS: CPT

## 2024-06-26 PROCEDURE — 36415 COLL VENOUS BLD VENIPUNCTURE: CPT

## 2024-06-26 PROCEDURE — 93922 UPR/L XTREMITY ART 2 LEVELS: CPT | Performed by: SURGERY

## 2024-06-26 PROCEDURE — C1894 INTRO/SHEATH, NON-LASER: HCPCS | Performed by: INTERNAL MEDICINE

## 2024-06-26 PROCEDURE — 93926 LOWER EXTREMITY STUDY: CPT

## 2024-06-26 PROCEDURE — 04CS3ZZ EXTIRPATION OF MATTER FROM LEFT POSTERIOR TIBIAL ARTERY, PERCUTANEOUS APPROACH: ICD-10-PCS | Performed by: INTERNAL MEDICINE

## 2024-06-26 PROCEDURE — C1725 CATH, TRANSLUMIN NON-LASER: HCPCS | Performed by: INTERNAL MEDICINE

## 2024-06-26 PROCEDURE — 2550000001 HC RX 255 CONTRASTS: Performed by: INTERNAL MEDICINE

## 2024-06-26 PROCEDURE — 93005 ELECTROCARDIOGRAM TRACING: CPT

## 2024-06-26 PROCEDURE — 75710 ARTERY X-RAYS ARM/LEG: CPT | Mod: 59 | Performed by: INTERNAL MEDICINE

## 2024-06-26 PROCEDURE — 85520 HEPARIN ASSAY: CPT

## 2024-06-26 PROCEDURE — 37226 PR REVSC OPN/PRQ FEM/POP W/STNT/ANGIOP SM VSL: CPT | Performed by: INTERNAL MEDICINE

## 2024-06-26 PROCEDURE — 04CL3ZZ EXTIRPATION OF MATTER FROM LEFT FEMORAL ARTERY, PERCUTANEOUS APPROACH: ICD-10-PCS | Performed by: INTERNAL MEDICINE

## 2024-06-26 PROCEDURE — C1769 GUIDE WIRE: HCPCS | Performed by: INTERNAL MEDICINE

## 2024-06-26 PROCEDURE — 2500000005 HC RX 250 GENERAL PHARMACY W/O HCPCS: Performed by: INTERNAL MEDICINE

## 2024-06-26 PROCEDURE — 75635 CT ANGIO ABDOMINAL ARTERIES: CPT | Performed by: RADIOLOGY

## 2024-06-26 PROCEDURE — 37228 PR REVSC OPN/PRQ TIB/PERO W/ANGIOPLASTY UNI: CPT | Performed by: INTERNAL MEDICINE

## 2024-06-26 PROCEDURE — 2500000001 HC RX 250 WO HCPCS SELF ADMINISTERED DRUGS (ALT 637 FOR MEDICARE OP)

## 2024-06-26 DEVICE — STENT ONYXNG30034UX ONYX 3.00X34RX
Type: IMPLANTABLE DEVICE | Site: LEG | Status: FUNCTIONAL
Brand: ONYX FRONTIER™

## 2024-06-26 DEVICE — STENT ONYXNG40034UX ONYX 4.00X34RX
Type: IMPLANTABLE DEVICE | Site: LEG | Status: FUNCTIONAL
Brand: ONYX FRONTIER™

## 2024-06-26 DEVICE — STENT ONYXNG35038UX ONYX 3.50X38RX
Type: IMPLANTABLE DEVICE | Site: LEG | Status: FUNCTIONAL
Brand: ONYX FRONTIER™

## 2024-06-26 RX ORDER — HEPARIN SODIUM 10000 [USP'U]/100ML
0-4500 INJECTION, SOLUTION INTRAVENOUS CONTINUOUS
Status: DISCONTINUED | OUTPATIENT
Start: 2024-06-26 | End: 2024-06-27

## 2024-06-26 RX ORDER — MIDAZOLAM HYDROCHLORIDE 1 MG/ML
INJECTION INTRAMUSCULAR; INTRAVENOUS CONTINUOUS PRN
Status: DISCONTINUED | OUTPATIENT
Start: 2024-06-26 | End: 2024-06-27 | Stop reason: HOSPADM

## 2024-06-26 RX ORDER — NITROGLYCERIN 40 MG/100ML
INJECTION INTRAVENOUS AS NEEDED
Status: DISCONTINUED | OUTPATIENT
Start: 2024-06-26 | End: 2024-06-26 | Stop reason: HOSPADM

## 2024-06-26 RX ORDER — MIDAZOLAM HYDROCHLORIDE 1 MG/ML
INJECTION INTRAMUSCULAR; INTRAVENOUS AS NEEDED
Status: DISCONTINUED | OUTPATIENT
Start: 2024-06-26 | End: 2024-06-26 | Stop reason: HOSPADM

## 2024-06-26 RX ORDER — FENTANYL CITRATE 50 UG/ML
INJECTION, SOLUTION INTRAMUSCULAR; INTRAVENOUS CONTINUOUS PRN
Status: DISCONTINUED | OUTPATIENT
Start: 2024-06-26 | End: 2024-06-27 | Stop reason: HOSPADM

## 2024-06-26 RX ORDER — HEPARIN SODIUM 1000 [USP'U]/ML
INJECTION, SOLUTION INTRAVENOUS; SUBCUTANEOUS AS NEEDED
Status: DISCONTINUED | OUTPATIENT
Start: 2024-06-26 | End: 2024-06-26 | Stop reason: HOSPADM

## 2024-06-26 RX ORDER — FENTANYL CITRATE 50 UG/ML
INJECTION, SOLUTION INTRAMUSCULAR; INTRAVENOUS AS NEEDED
Status: DISCONTINUED | OUTPATIENT
Start: 2024-06-26 | End: 2024-06-26 | Stop reason: HOSPADM

## 2024-06-26 RX ORDER — IODIXANOL 320 MG/ML
INJECTION, SOLUTION INTRAVASCULAR AS NEEDED
Status: DISCONTINUED | OUTPATIENT
Start: 2024-06-26 | End: 2024-06-26 | Stop reason: HOSPADM

## 2024-06-26 RX ORDER — PROPOFOL 10 MG/ML
INJECTION, EMULSION INTRAVENOUS CONTINUOUS PRN
Status: DISCONTINUED | OUTPATIENT
Start: 2024-06-26 | End: 2024-06-27 | Stop reason: HOSPADM

## 2024-06-26 RX ORDER — ONDANSETRON HYDROCHLORIDE 2 MG/ML
INJECTION, SOLUTION INTRAVENOUS AS NEEDED
Status: DISCONTINUED | OUTPATIENT
Start: 2024-06-26 | End: 2024-06-26 | Stop reason: HOSPADM

## 2024-06-26 RX ORDER — FENTANYL CITRATE 50 UG/ML
25 INJECTION, SOLUTION INTRAMUSCULAR; INTRAVENOUS ONCE
Status: COMPLETED | OUTPATIENT
Start: 2024-06-26 | End: 2024-06-26

## 2024-06-26 RX ORDER — LORAZEPAM 1 MG/1
1 TABLET ORAL EVERY 6 HOURS PRN
Status: DISCONTINUED | OUTPATIENT
Start: 2024-06-26 | End: 2024-06-29 | Stop reason: HOSPADM

## 2024-06-26 RX ORDER — LIDOCAINE HYDROCHLORIDE 10 MG/ML
INJECTION, SOLUTION EPIDURAL; INFILTRATION; INTRACAUDAL; PERINEURAL AS NEEDED
Status: DISCONTINUED | OUTPATIENT
Start: 2024-06-26 | End: 2024-06-26 | Stop reason: HOSPADM

## 2024-06-26 RX ORDER — SODIUM CHLORIDE 9 MG/ML
1 INJECTION, SOLUTION INTRAVENOUS CONTINUOUS
Status: DISCONTINUED | OUTPATIENT
Start: 2024-06-26 | End: 2024-06-27

## 2024-06-26 ASSESSMENT — PAIN - FUNCTIONAL ASSESSMENT
PAIN_FUNCTIONAL_ASSESSMENT: 0-10

## 2024-06-26 ASSESSMENT — PAIN SCALES - GENERAL
PAINLEVEL_OUTOF10: 5 - MODERATE PAIN
PAINLEVEL_OUTOF10: 9
PAINLEVEL_OUTOF10: 0 - NO PAIN
PAINLEVEL_OUTOF10: 8
PAINLEVEL_OUTOF10: 0 - NO PAIN

## 2024-06-26 ASSESSMENT — COGNITIVE AND FUNCTIONAL STATUS - GENERAL
STANDING UP FROM CHAIR USING ARMS: A LITTLE
CLIMB 3 TO 5 STEPS WITH RAILING: A LOT
MOVING TO AND FROM BED TO CHAIR: A LITTLE
MOBILITY SCORE: 19
WALKING IN HOSPITAL ROOM: A LITTLE

## 2024-06-26 ASSESSMENT — PAIN DESCRIPTION - LOCATION: LOCATION: LEG

## 2024-06-26 ASSESSMENT — PAIN DESCRIPTION - DESCRIPTORS: DESCRIPTORS: TENDER;ACHING

## 2024-06-26 NOTE — PROGRESS NOTES
CARDIAC ICU Progress Note    Subjective Data:  Radha Toussaint is a 69 y.o. female with PMHx of STEMI almost 10 years ago stable since then, Hypertension, hyperlipidemia, PAD s/p (left  popliteal/TPT/PT reconstruction with PTA/DCB, Supera/SES to popliteal occlusion with Dr Mike on 1/23/24) , nicotine abuse, non small lung cancer, /w worsening  wound to the left lateral foot for 2 weeks and increased pain in the left foot. Pt s/p Laser atherectomy/JETI/PTA fem/pop/TPT/PT, FCO to mid SFA and JETI/PTA AT via 6F antegrade L CFA access , off integrelin, on heparin gtt, now concern for stent thrombosis and limb ischemia. Planned for LLE angiogram    Overnight Events:  Pt kept complaining of pain, worsening since pt was in the unit. PT pulses heard on doppler, however DP absent. CT angio aorta and bilateral iliofemoral runoff showed complete occlusion of stent graft and occlusion of the left popliteal artery to its terminus, with minimal contrast  extravasation in distal art of LLE.     Telemetry: NSR with avg VR in 65 bpm. No events.    Net IO Since Admission: -629.74 mL [06/26/24 1340]    CARDIAC DATA:  Vascular US TRAVIS 6/26/24  PRELIMINARY CONCLUSIONS:  Right Lower PVR: No evidence of arterial occlusive disease in the right lower extremity at rest. Decreased digital perfusion noted. Multiphasic flow is noted in the right common femoral artery, right posterior tibial artery and right dorsalis pedis artery.  Left Lower PVR: Evidence of severe arterial occlusive disease in the left lower extremity at rest. Decreased digital perfusion noted. Monophasic flow is noted in the left common femoral artery and left posterior tibial artery. Left dorsalis pedis is not audible and left first digit is flatlined.     Imaging & Doppler Findings:     RIGHT Lower PVR                Pressures Ratios  Right Posterior Tibial (Ankle) 120 mmHg  1.01  Right Dorsalis Pedis (Ankle)   105 mmHg  0.88  Right Digit (Great Toe)        31 mmHg   0.26       "     LEFT Lower PVR                Pressures Ratios  Left Posterior Tibial (Ankle) 40 mmHg   0.34  Left Dorsalis Pedis (Ankle)   0 mmHg    0.00  Left Digit (Great Toe)        0 mmHg    0.00                               Left  Brachial Pressure 119 mmHg      CT angio aorta and bilateral iliofemoral runoff w and or wo IV contrast 6/26/24    IMPRESSION:  1. Postsurgical changes of left SFA/popliteal artery stent grafting  with complete occlusion of the stent graft and occlusion of the left  popliteal artery to its terminus.  2. Minimal contrast opacification in the distal arteries of the left  lower extremity.  3. Redemonstration of aortic aneurysm with aortoiliac bypass graft  and bifemoral bypass graft with extensive intraluminal thrombus.  4. Rectal wall thickening with perirectal and perianal fat stranding.  Correlate with concern for proctitis.  5. Interval decrease in size of bilateral adrenal glands with diffuse  hypoattenuation and minimal peripheral enhancement consistent with  improved tumor burden.      EKG: SB in 55 BPM, normal axis, no AR AV blocks or IVCD or BBB. Good R wave progression. Possible old inf infarct   ASCVD RISK: The ASCVD Risk score (Steve DK, et al., 2019) failed to calculate for the following reasons:    The patient has a prior MI or stroke diagnosis       Vitals:   /53   Pulse 68   Temp 36.9 °C (98.4 °F)   Resp 20   Ht 1.626 m (5' 4\")   Wt 70.3 kg (155 lb)   SpO2 98%   BMI 26.61 kg/m²          6/26/2024     8:00 AM 6/26/2024     9:00 AM 6/26/2024    10:00 AM 6/26/2024    11:00 AM 6/26/2024    11:58 AM 6/26/2024    12:00 PM 6/26/2024     1:00 PM   Vitals   Systolic 119 126 124 116  127 123   Diastolic 65 60 67 63  66 53   Heart Rate 66 67 67 66  68 68   Temp 36.1 °C (97 °F)    36.9 °C (98.4 °F)     Resp 21 13 17 22  21 20       Vent settings:    Most Recent Range Past 24hrs   Mode      FiO2 21 % FiO2 (%)  Min: 21 %   Min taken time: 06/25/24 1828  Max: 21 %   Max taken time: " "06/25/24 1828   Rate   No data recorded   Vt    No data recorded   PEEP   No data recorded     Invasive Hemodynamics:    Most Recent Range Past 24hrs   BP (Art) 109/57 Arterial Line BP 1  Min: 89/46  Max: 128/72   MAP(Art) 23 mmHg Arterial Line MAP 1 (mmHg)   Min: 23 mmHg  Max: 92 mmHg   RA/CVP   No data recorded   PA   No data recorded   PA(mean)   No data recorded   PCWP   No data recorded   CO   No data recorded   CI   No data recorded   Mixed Venous   No data recorded   SVR    No data recorded   PVR   No data recorded       Labs:  Results from last 7 days   Lab Units 06/25/24  1544   WBC AUTO x10*3/uL 13.3*   HEMOGLOBIN g/dL 14.2   HEMATOCRIT % 43.3   PLATELETS AUTO x10*3/uL 168     Results from last 7 days   Lab Units 06/25/24  1544   SODIUM mmol/L 138   POTASSIUM mmol/L 4.0   CO2 mmol/L 24   ANION GAP mmol/L 12   BUN mg/dL 18   CREATININE mg/dL 0.63   GLUCOSE mg/dL 103*   EGFR mL/min/1.73m*2 >90      Results from last 7 days   Lab Units 06/25/24  1544   ALT U/L 12   AST U/L 15   ALK PHOS U/L 44            No lab exists for component: \"PT\"          Results from last 7 days   Lab Units 06/25/24  1544   LACTATE mmol/L 0.8               Lab Results   Component Value Date    CKTOTAL 41 06/25/2024    TROPONINI <0.02 11/29/2021      Lab Results   Component Value Date    HGBA1C 5.8 (H) 06/25/2024      Lab Results   Component Value Date    CHOL 160 05/02/2023    CHOL 144 09/29/2020     Lab Results   Component Value Date    HDL 35.6 (A) 05/02/2023    HDL 28.0 (A) 09/29/2020     No results found for: \"LDLCALC\"  Lab Results   Component Value Date    TRIG 89 05/02/2023    TRIG 114 09/29/2020     No components found for: \"CHOLHDL\"     Imaging:  Vascular US Ankle Brachial Index (TRAVIS) Without Exercise    Result Date: 6/26/2024            [place] [id number]   Tel [phone] and Fax [phone]  Vascular Lab Report [facility] US ANKLE BRACHIAL INDEX (TRAVIS) WITHOUT EXERCISE  Patient " Name:      AALIYAH NOLANTON     Reading Physician:  [id number] Matthew Warner MD Study Date:        6/26/2024           Ordering Physician: [id number] JARROD SHELL MRN/PID:           [id number]            Technologist:       Sumi BARBOZA Accession#:        [id number]        Technologist 2: Date of Birth/Age: 1955 / 69 years Encounter#:         [id number] Gender:            F Admission Status:  Inpatient           Location Performed: [facility]  Diagnosis/ICD: Peripheral vascular disease, unspecified-I73.9 Indication:    No DPA pulse CPT Codes:     05989 Peripheral artery Lower arterial Duplex limited  Patient History S/P Stent and aangioplasty LLEA.  **CRITICAL RESULT** Critical Result: Severe arterial disease in LLE. Notification called to Jarrod Brooks MD on 6/26/2024 at 9:25:30 AM by Sumi BARBOZA.  CONCLUSIONS: Right Lower PVR: No evidence of arterial occlusive disease in the right lower extremity at rest. Decreased digital perfusion noted. Multiphasic flow is noted in the right common femoral artery, right posterior tibial artery and right dorsalis pedis artery. Left Lower PVR: Evidence of severe arterial occlusive disease in the left lower extremity at rest. Decreased digital perfusion noted. Monophasic flow is noted in the left common femoral artery and left posterior tibial artery. Left dorsalis pedis is not audible and left first digit is flatlined.  Comparison: Compared with study from 6/11/2024, Today's exam severe arterial occlusive disease in left lower extremity.  Imaging & Doppler Findings:  RIGHT Lower PVR                Pressures Ratios Right Posterior Tibial (Ankle) 120 mmHg  1.01 Right Dorsalis Pedis (Ankle)   105 mmHg  0.88 Right Digit (Great Toe)        31 mmHg   0.26   LEFT Lower PVR                Pressures Ratios Left Posterior Tibial (Ankle) 40 mmHg   0.34 Left Dorsalis Pedis (Ankle)   0 mmHg    0.00 Left Digit (Great Toe)        0 mmHg    0.00                      Left Brachial  Pressure 119 mmHg   [id number] Matthew Warner MD Electronically signed by [id number] Matthew Warner MD on 6/26/2024 at 12:04:57 PM  ** Final **     CT angio aorta and bilateral iliofemoral runoff w and or wo IV contrast    Result Date: 6/26/2024  Interpreted By:  Francisco Norton and Benza Andrew STUDY: CT ANGIO AORTA AND BILATERAL ILIOFEMORAL RUNOFF W AND OR WO IV CONTRAST;  6/26/2024 3:53 am   INDICATION: Signs/Symptoms:LLE PAD s/p atherectomy now with concern for stent reocclusion.   COMPARISON: CT chest abdomen pelvis dated 12/13/2023; CT abdomen and pelvis dated 09/18/2023   ACCESSION NUMBER(S): [id number]   ORDERING CLINICIAN: ALICIA TAYLOR   TECHNIQUE: Thin-section axial images of the abdomen, pelvis and bilateral lower extremities were obtained in the arterial phase after intravenous administration of 120 mL of Omnipaque 350 contrast. Delayed images the legs were obtained for assessment of venous patency. Coronal and sagittal reformatted images were reconstructed from the axial data. Multiplanar MIPs and 3D reconstructions were created on an independent workstation and reviewed.   FINDINGS: VASCULATURE:   Aortic aneurysm is again noted status post aorta to right common iliac bypass grafting. A bifemoral bypass graft is also noted with moderate intraluminal thrombus. There is extensive intramural thrombus within the aortic stent graft causing moderate narrowing of the lumen. Thrombosis of the excluded aneurysm sac is also noted.   There is occlusion of the origin of the left common iliac artery with reconstitution at the level of the iliac bifurcation. Embolization coils are noted in the right internal iliac distribution, similar to prior.   Postsurgical changes of left SFA/popliteal artery stent grafting. No contrast opacification is seen within the stent graft. The popliteal artery remains occluded to its terminus. There is distal reconstitution primarily of the posterior tibial artery with some restored flow in the  anterior tibial artery.   The right common iliac artery is not well evaluated due to streak artifact from embolization coils. The external iliac artery is patent without stenosis. The right common femoral and profunda femoris arteries are patent without hemodynamically significant stenosis. Atherosclerotic changes cause moderate luminal narrowing of the distal right superficial femoral artery and narrowing becomes severe in the P1 segment of the popliteal artery. There is intraluminal thrombus seen throughout the right popliteal artery. The visualized portions of the anterior tibial, posterior tibial, and peroneal arteries on the right are patent without hemodynamically significant stenosis.     ABDOMEN/PELVIS:   LIVER: The liver is again enlarged. A segment VI hemangioma appears unchanged. No new liver lesions are evident.   BILE DUCTS: No significant intrahepatic or extrahepatic dilatation.   GALLBLADDER: Vicarious contrast excretion is noted in the gallbladder lumen. No wall thickening or pericholecystic fluid is evident.   SPLEEN: No significant abnormality.   PANCREAS: There is pancreatic ductal dilatation measuring up to 1.1 cm in the pancreatic head, increased compared to prior examination.   ADRENALS: There is interval decrease in size of the bilateral adrenal glands with diffuse homogeneous hypoattenuation. The right adrenal gland now measures 5.0 x 1.6 cm (previously 5.1 x 2.5 cm). The left adrenal gland measures 3.6 x 1.4 cm (previously 3.5 x 2.0 cm). Mild peripheral enhancement of the adrenal glands is again noted.   KIDNEYS, URETERS, BLADDER: The kidneys are normal in size and enhance symmetrically. Multiple renal cysts appear unchanged. Multiple left renal calculi are again noted measuring up to 1.5 cm. The urinary bladder demonstrates no significant abnormality.   REPRODUCTIVE ORGANS: No significant abnormality.   VESSELS: See above. No additional significant abnormality.   RETROPERITONEUM/LYMPH  NODES: No enlarged lymph nodes.   BOWEL/PERITONEUM: There is a small hiatal hernia. No inflammatory bowel wall thickening or dilatation. There is rectal wall thickening with perirectal and perianal fat stranding. Normal appendix.   No pneumoperitoneum, significant ascites, or abscess.     ABDOMINAL WALL: No significant abnormality.   MUSCULOSKELETAL: No acute osseous abnormality.   LOWER CHEST: No acute abnormality involving the lung bases. Changes of chronic lung disease are again noted.       1. Postsurgical changes of left SFA/popliteal artery stent grafting with complete occlusion of the stent graft and occlusion of the left popliteal artery to its terminus. 2. Minimal contrast opacification in the distal arteries of the left lower extremity. 3. Redemonstration of aortic aneurysm with aortoiliac bypass graft and bifemoral bypass graft with extensive intraluminal thrombus. 4. Rectal wall thickening with perirectal and perianal fat stranding. Correlate with concern for proctitis. 5. Interval decrease in size of bilateral adrenal glands with diffuse hypoattenuation and minimal peripheral enhancement consistent with improved tumor burden. 6. Additional vascular and nonvascular findings as detailed above.     I personally reviewed the images/study and I agree with the findings as stated above by resident physician, Paulo Sutherland MD. This study was interpreted at [facility], [place], [place].   MACRO: Paulo Sutherland discussed the significance and urgency of this critical finding by telephone with  JANEL BRAN on 6/26/2024 at 7:13 am. (**-RCF-**) Findings:  There are multiple critical findings. See findings.   Signed by: Francisco Norton 6/26/2024 9:53 AM Dictation workstation:   [id number]    Electrocardiogram, 12-lead PRN ACS symptoms    Result Date: 6/25/2024  Sinus bradycardia Cannot rule out Inferior infarct (cited on or before 11-MAR-2024) Abnormal ECG When compared with ECG of  11-MAR-2024 12:11, Vent. rate has decreased BY  27 BPM Nonspecific T wave abnormality no longer evident in Inferior leads T wave inversion now evident in Anterior leads Nonspecific T wave abnormality, improved in Lateral leads      Physical Exam  Vitals and nursing note reviewed.   HENT:      Mouth/Throat:      Mouth: Mucous membranes are dry.   Eyes:      Pupils: Pupils are equal, round, and reactive to light.   Cardiovascular:      Rate and Rhythm: Normal rate and regular rhythm.      Pulses: Normal pulses.   Pulmonary:      Effort: Pulmonary effort is normal.      Breath sounds:  Normal bronchovesicular sounds present   Abdominal:      General: Abdomen is flat.   Feet:      Comments: Right dopplerable PT and DP  Left Dopplerable PT absent DP, warm, paraesthesia, extremely tender to palpatation.  Skin:     Capillary Refill: Capillary refill takes less than 2 seconds.   Neurological:      General: No focal deficit present.      Mental Status: She is oriented to person, place, and time.    Medications  Scheduled medications  acetaminophen, 975 mg, oral, q6h   Or  acetaminophen, 650 mg, nasogastric tube, q6h   Or  acetaminophen, 650 mg, rectal, q6h  [Transfer Hold] aspirin, 325 mg, oral, Daily  aspirin, 81 mg, oral, Daily  [Transfer Hold] clopidogrel, 75 mg, oral, Daily  clopidogrel, 75 mg, oral, q AM  dexAMETHasone, 2 mg, oral, Daily  tiotropium, 2 puff, inhalation, Daily   And  fluticasone furoate-vilanteroL, 1 puff, inhalation, Daily  gabapentin, 100 mg, oral, TID  montelukast, 10 mg, oral, Nightly  OLANZapine, 5 mg, oral, Nightly  polyethylene glycol, 17 g, oral, Daily  rosuvastatin, 20 mg, oral, Nightly  sertraline, 100 mg, oral, BID        Continuous medications  heparin, 0-4,500 Units/hr, Last Rate: 1,400 Units/hr (06/26/24 1200)        PRN medications  PRN medications: bisacodyl, heparin, HYDROcodone-acetaminophen, HYDROmorphone, LORazepam, ondansetron **OR** ondansetron, oxyCODONE, prochlorperazine,  simethicone      Assessment/Plan     Principal Problem:    Acute lower limb ischemia  Active Problems:    PAD (peripheral artery disease) (CMS-[facility])    Critical limb ischemia of left lower extremity (Multi)    Radha Toussaint is a 69 y.o. female with PMHx of STEMI almost 10 years ago stable since then, Hypertension, hyperlipidemia, PAD s/p (left  popliteal/TPT/PT reconstruction with PTA/DCB, Supera/SES to popliteal occlusion with Dr Mike on 1/23/24) , nicotine abuse, non small lung cancer, /w worsening  wound to the left lateral foot for 2 weeks and increased pain in the left foot. Pt s/p Laser atherectomy/JETI/PTA fem/pop/TPT/PT, FCO to mid SFA and JETI/PTA AT via 6F antegrade L CFA access , off integrelin, on heparin gtt, now concern for stent thrombosis and limb ischemia. Planned for LLE angiogram     NEURO  RASS 0, at baseline, No acute concerns  Cont home medications with lorazepam, Zyprexa  Pain control with gabapentin and oxycodone  PRN Diladid for breakthrough pain control     CV   #PAD  #s/p Laser Atherectomy  #s/ FCO  Pt s/p Laser atherectomy/JETI/PTA fem/pop/TPT/PT, FCO to mid SFA and JETI/PTA AT via 6F antegrade L CFA access , was on Integrilin, currently on heparin gtt.      Plan:  B/l UL and LL vein mapping.  Planned for LLE angiogram  Continue given neurovascular checks q1h  Pain control     PULM   #COPD  # Chronic respiratory failure with hypoxia on home o2  Plan:  Cont breo, PRN albuterol and home Dexamethasone 2mg   Cont. 2 L o2 at baseline.     GI/NUTRITION  No acute concerns     Plan:  Cont. Bowel regiment patient  on opiates.     RENAL/ELECTROLYTES  NAD     HEME/ONC  s/p FCO on Integrilin gtt. Currently on heparin.  Discussed in Cardio     Plan:  Monitor left groin for access sire bleeding.  FU daily CBCs     ENDO  Non-diabetic      ID  No concerns for ID standpoint.     MSK/RHEUM/SKIN  No acute concerns  --------------------------------------------------------  F : None  E: PRN  N: Diet cardiac    DVT prophylaxis: On hepatin gtt  GI Prophylaxis:NA  NGT: None  ABX descalation/escalation: None  MCS/IABP:None  LTD:Left fem a line D2  O2: 2 L chronic  ---------------------------------------------------------  Code Status: Full Code (confirmed on admission)   NOK: Extended Emergency Contact Information  Primary Emergency Contact: Opal Gore  Address: [address] [place] [address] [facility]  Home Phone: [phone]  Mobile Phone: [phone]  Relation: Daughter            Luis Kimbrough MD  PGY-2 Internal Medicine

## 2024-06-26 NOTE — CONSULTS
"Vascular Surgery Consult  Subjective   HPI:  Radha Toussaint is 69 y.o. female with PMH 69 y.o. female with PMHx of STEMI, HTN, HLD, PAD s/p (left  popliteal/TPT/PT reconstruction with PTA/DCB, Supera/SES to popliteal occlusion with Dr Mike on 1/23/24) , nicotine abuse, non small lung cancer, that presented with worsening left foot wound and is now s/p Laser atherectomy/JETI/PTA fem/pop/TPT/PT, FCO to mid SFA and JETI/PTA AT via L groin access currently on Integrilin drip.     Vascular surgery consulted due to concern for compartment syndrome as patient has had increase in pain after procedure. On exam, vitals within normal limits. Patient endorsing 8/10 pain in LLE. Monophasic L PT, unable to doppler L DP (per bedside RN, this was exam pre-procedure). Sensation loss in L toes, unclear when this started. Motor slightly diminished but intact.    PMH: As above    ROS: 12 system negative except HPI    Objective   Vitals:  Heart Rate:  [59-79]   Temp:  [35.4 °C (95.7 °F)-36.3 °C (97.3 °F)]   Resp:  [13-26]   BP: ()/(42-84)   Height:  [162.6 cm (5' 4\")]   Weight:  [69.9 kg (154 lb)]   SpO2:  [92 %-100 %]     Exam:  Constitutional: Lying in bed, endorsing pain  Neuro:  AOx3, grossly intact  Head/neck: atraumatic  CV: no tachycardia  Pulm: non-labored on 2 L NC  GI: soft, non-tender, non-distended  Skin: warm and dry  Extremities: L groin dressing c/d/I. Palpable L popliteal pulse, monophasic L PT, unable to doppler L DP. L foot appears duskier than R. Multiphasic R DP/PT. Compartments soft, though L less soft than R.    Labs:  Results from last 7 days   Lab Units 06/25/24  1544   WBC AUTO x10*3/uL 13.3*   HEMOGLOBIN g/dL 14.2   PLATELETS AUTO x10*3/uL 168      Results from last 7 days   Lab Units 06/25/24  1544   SODIUM mmol/L 138   POTASSIUM mmol/L 4.0   CHLORIDE mmol/L 106   CO2 mmol/L 24   BUN mg/dL 18   CREATININE mg/dL 0.63   GLUCOSE mg/dL 103*      TRAVIS/PVR 6/11/24:  CONCLUSIONS:  Right Lower PVR: No evidence of " arterial occlusive disease in the right lower extremity at rest. Normal digital perfusion noted. Multiphasic flow is noted in the right common femoral artery, right posterior tibial artery and right dorsalis pedis artery.  Left Lower PVR: Evidence of moderate arterial occlusive disease in the left lower extremity at rest. Decreased digital perfusion noted. Monophasic flow is noted in the left posterior tibial artery. Multiphasic flow is noted in the left common femoral artery. Left dorsalis pedis artery is not audible. Left digit waveform is flatlined.     Comparison:  Compared with study from 3/6/2024, left TRAVIS/TBI has decreased. Right TRAVIS/TBI remain unchanged.     Imaging & Doppler Findings:     RIGHT Lower PVR                Pressures Ratios  Right Posterior Tibial (Ankle) 136 mmHg  1.08  Right Dorsalis Pedis (Ankle)   140 mmHg  1.11  Right Digit (Great Toe)        82 mmHg   0.65           LEFT Lower PVR                Pressures Ratios  Left Posterior Tibial (Ankle) 54 mmHg   0.43  Left Dorsalis Pedis (Ankle)   0 mmHg    0.00  Left Digit (Great Toe)        0 mmHg    0.00         Assessment/Plan   Radha Toussaint is 69 y.o. female with significant PMH and PAD now s/p laser atherectomy/JETI/PTA fem/pop/TPT/PT, FCO to mid SFA and JETI/PTA AT via L groin access. Vascular surgery consulted due to concern for compartment syndrome. Compartment remains soft, signal exam remains the same as pre-procedure.    Recommendations:  - Recommend STAT CTA with run-off  - Will follow up CK  - Vascular surgery will follow    Discussed with fellow, Dr. Morales. To be discussed with attending, Dr. Warner.    Mookie Iqbal MD  Vascular [phone]

## 2024-06-26 NOTE — SIGNIFICANT EVENT
Vascular surgery updated plan of care    Vascular surgery was called for concern of compartment syndrome so patient was assessed overnight with the plan to obtain CK and CTA. Patient seen and examined this morning. The patient complains of toe/foot/leg pain, and she exhibits diminished motor and sensory function; the calf is soft. The patient does not have compartment syndrome. She has a thready monophasic PT but no other pedal signals. CTA shows that the SFA stent and native KELVIN are occluded. Her exam is most consistent with acute on chronic limb ischemia. Recommend making the patient NPO, continuing heparinization and EVLS team re-evaluation.     Discussed with Dr. Alana Downey MD, PhD  Vascular Surgery, PGY2  [phone]

## 2024-06-26 NOTE — PROGRESS NOTES
"2/2 CICU    06/26 REVIEW/UPDATES  Per today's [facility], \"The patient does not have compartment syndrome. Her exam is most consistent with acute on chronic limb ischemia.\" & \"Dr. Lake from endovascular and Dr. Durant from vascular medicine had an emergent meeting and the patient case was discussed and she was deemed too high risk for surgery given the developing compartment syndrome and the complex comorbidity.  Plan was to proceed with lower extremity angiogram with possible intervention to salvage the limb with the plan for fasciotomy after that.Family and patient were updated about the development of the case and agreed to proceed with an emergent lower extremity angiogram\"    06/25 Transcribed note     DC/SDOH Assessments with patient. Reviewed EPIC medical info and verified address / contacts / PCP and pharmacy.  6/25 PT/OT orders and awaiting recs. Currently in CICU for observation post intervention while on integrelin gtt.  Vascular surgery consulted due to concern for compartment syndrome     Jeaneth Garay (LSW, MSW)   "

## 2024-06-26 NOTE — PROGRESS NOTES
Ms. Toussaint was seen and examined by Dr. Durant, Dr. Caraballo, and myself this morning at around 11:00.     On examination she is noted to have some pain in the anterior portion of her left foot with subjective sensory loss of the toes, but intact sensation to the dorsum and plantar surface of the foot. There is foot drop with decrease ability to dorsiflex the foot, which is a change from prior examination. She continues to have a monophasic left PT signal, but no DP signal. She has a 1+ left femoral pulse.     Notably, the anterior, lateral, and posterior compartments of the left leg are soft to palpation.     CK level from 6/25 at 21:16 was 41 (0-215 U/L)  CT angiogram images were reviewed by the vascular surgery team.     Her examination is most consistent with Rosie 2B acute on chronic limb ischemia. There is no clinical evidence of compartment syndrome on examination at this time.     This patient was seen, examined, and discussed with the attending of record, Dr. Durant.    Ruddy Morales MD  Vascular Surgery Fellow  Team Pager [phone]  Available on Secure Chat

## 2024-06-26 NOTE — H&P (VIEW-ONLY)
"Vascular Surgery Consult  Subjective   HPI:  Radha Toussaint is 69 y.o. female with PMH 69 y.o. female with PMHx of STEMI, HTN, HLD, PAD s/p (left  popliteal/TPT/PT reconstruction with PTA/DCB, Supera/SES to popliteal occlusion with Dr Mike on 1/23/24) , nicotine abuse, non small lung cancer, that presented with worsening left foot wound and is now s/p Laser atherectomy/JETI/PTA fem/pop/TPT/PT, FCO to mid SFA and JETI/PTA AT via L groin access currently on Integrilin drip.     Vascular surgery consulted due to concern for compartment syndrome as patient has had increase in pain after procedure. On exam, vitals within normal limits. Patient endorsing 8/10 pain in LLE. Monophasic L PT, unable to doppler L DP (per bedside RN, this was exam pre-procedure). Sensation loss in L toes, unclear when this started. Motor slightly diminished but intact.    PMH: As above    ROS: 12 system negative except HPI    Objective   Vitals:  Heart Rate:  [59-79]   Temp:  [35.4 °C (95.7 °F)-36.3 °C (97.3 °F)]   Resp:  [13-26]   BP: ()/(42-84)   Height:  [162.6 cm (5' 4\")]   Weight:  [69.9 kg (154 lb)]   SpO2:  [92 %-100 %]     Exam:  Constitutional: Lying in bed, endorsing pain  Neuro:  AOx3, grossly intact  Head/neck: atraumatic  CV: no tachycardia  Pulm: non-labored on 2 L NC  GI: soft, non-tender, non-distended  Skin: warm and dry  Extremities: L groin dressing c/d/I. Palpable L popliteal pulse, monophasic L PT, unable to doppler L DP. L foot appears duskier than R. Multiphasic R DP/PT. Compartments soft, though L less soft than R.    Labs:  Results from last 7 days   Lab Units 06/25/24  1544   WBC AUTO x10*3/uL 13.3*   HEMOGLOBIN g/dL 14.2   PLATELETS AUTO x10*3/uL 168      Results from last 7 days   Lab Units 06/25/24  1544   SODIUM mmol/L 138   POTASSIUM mmol/L 4.0   CHLORIDE mmol/L 106   CO2 mmol/L 24   BUN mg/dL 18   CREATININE mg/dL 0.63   GLUCOSE mg/dL 103*      TRAVIS/PVR 6/11/24:  CONCLUSIONS:  Right Lower PVR: No evidence of " arterial occlusive disease in the right lower extremity at rest. Normal digital perfusion noted. Multiphasic flow is noted in the right common femoral artery, right posterior tibial artery and right dorsalis pedis artery.  Left Lower PVR: Evidence of moderate arterial occlusive disease in the left lower extremity at rest. Decreased digital perfusion noted. Monophasic flow is noted in the left posterior tibial artery. Multiphasic flow is noted in the left common femoral artery. Left dorsalis pedis artery is not audible. Left digit waveform is flatlined.     Comparison:  Compared with study from 3/6/2024, left TRAVIS/TBI has decreased. Right TRAVIS/TBI remain unchanged.     Imaging & Doppler Findings:     RIGHT Lower PVR                Pressures Ratios  Right Posterior Tibial (Ankle) 136 mmHg  1.08  Right Dorsalis Pedis (Ankle)   140 mmHg  1.11  Right Digit (Great Toe)        82 mmHg   0.65           LEFT Lower PVR                Pressures Ratios  Left Posterior Tibial (Ankle) 54 mmHg   0.43  Left Dorsalis Pedis (Ankle)   0 mmHg    0.00  Left Digit (Great Toe)        0 mmHg    0.00         Assessment/Plan   Radha Toussaint is 69 y.o. female with significant PMH and PAD now s/p laser atherectomy/JETI/PTA fem/pop/TPT/PT, FCO to mid SFA and JETI/PTA AT via L groin access. Vascular surgery consulted due to concern for compartment syndrome. Compartment remains soft, signal exam remains the same as pre-procedure.    Recommendations:  - Recommend STAT CTA with run-off  - Will follow up CK  - Vascular surgery will follow    Discussed with fellow, Dr. Morales. To be discussed with attending, Dr. Warner.    Mookie Iqbal MD  Vascular [phone]

## 2024-06-26 NOTE — SIGNIFICANT EVENT
Patient was in CICU she developed significant pain in left lower extremity.  Vascular surgery were engaged for suspecting compartment syndrome, recommendation was to obtain a CTA.  CTA was obtained and showed occluded SFA stent with minimal flow through the PT.  On physical exam patient developed motor weakness with foot drop in the morning, pulses heard on doppler, however DP was absent.  Dr. Lake from endovascular and Dr. Durant from vascular medicine had an emergent meeting and the patient case was discussed and she was deemed too high risk for surgery given the developing compartment syndrome and the complex comorbidity.  Plan was to proceed with lower extremity angiogram with possible intervention to salvage the limb with the plan for fasciotomy after that.  Family and patient were updated about the development of the case and agreed to proceed with an emergent lower extremity angiogram      CT angio aorta and bilateral iliofemoral runoff w and or wo IV contrast 6/26/24   1. Postsurgical changes of left SFA/popliteal artery stent grafting  with complete occlusion of the stent graft and occlusion of the left  popliteal artery to its terminus.  2. Minimal contrast opacification in the distal arteries of the left  lower extremity.  3. Redemonstration of aortic aneurysm with aortoiliac bypass graft  and bifemoral bypass graft with extensive intraluminal thrombus.  4. Rectal wall thickening with perirectal and perianal fat stranding.  Correlate with concern for proctitis.  5. Interval decrease in size of bilateral adrenal glands with diffuse  hypoattenuation and minimal peripheral enhancement consistent with  improved tumor burden.       Lower extremity PVR:  Right Lower PVR: No evidence of arterial occlusive disease in the right lower extremity at rest. Decreased digital perfusion noted. Multiphasic flow is noted in the right common femoral artery, right posterior tibial artery and right dorsalis pedis  artery.  Left Lower PVR: Evidence of severe arterial occlusive disease in the left lower extremity at rest. Decreased digital perfusion noted. Monophasic flow is noted in the left common femoral artery and left posterior tibial artery. Left dorsalis pedis is not audible and left first digit is flatlined.     Imaging & Doppler Findings:     RIGHT Lower PVR                Pressures Ratios  Right Posterior Tibial (Ankle) 120 mmHg  1.01  Right Dorsalis Pedis (Ankle)   105 mmHg  0.88  Right Digit (Great Toe)        31 mmHg   0.26          LEFT Lower PVR                Pressures Ratios  Left Posterior Tibial (Ankle) 40 mmHg   0.34  Left Dorsalis Pedis (Ankle)   0 mmHg    0.00  Left Digit (Great Toe)        0 mmHg    0.00

## 2024-06-26 NOTE — CARE PLAN
Problem: Pain - Adult  Goal: Verbalizes/displays adequate comfort level or baseline comfort level  6/26/2024 0842 by Verna Caraballo RN  Outcome: Progressing  Flowsheets (Taken 6/26/2024 0841)  Verbalizes/displays adequate comfort level or baseline comfort level:   Encourage patient to monitor pain and request assistance   Assess pain using appropriate pain scale   Implement non-pharmacological measures as appropriate and evaluate response   Consider cultural and social influences on pain and pain management  6/26/2024 0841 by Verna Caraballo RN  Outcome: Progressing  Flowsheets (Taken 6/26/2024 0841)  Verbalizes/displays adequate comfort level or baseline comfort level:   Encourage patient to monitor pain and request assistance   Assess pain using appropriate pain scale   Implement non-pharmacological measures as appropriate and evaluate response   Consider cultural and social influences on pain and pain management     Problem: Safety - Adult  Goal: Free from fall injury  6/26/2024 0842 by Verna Caraballo RN  Outcome: Progressing  Flowsheets (Taken 6/26/2024 0841)  Free from fall injury:   Instruct family/caregiver on patient safety   Based on caregiver fall risk screen, instruct family/caregiver to ask for assistance with transferring infant if caregiver noted to have fall risk factors  6/26/2024 0841 by Verna Caraballo RN  Outcome: Progressing  Flowsheets (Taken 6/26/2024 0841)  Free from fall injury:   Instruct family/caregiver on patient safety   Based on caregiver fall risk screen, instruct family/caregiver to ask for assistance with transferring infant if caregiver noted to have fall risk factors     Problem: Discharge Planning  Goal: Discharge to home or other facility with appropriate resources  6/26/2024 0842 by Verna Caraballo RN  Outcome: Progressing  Flowsheets (Taken 6/26/2024 0841)  Discharge to home or other facility with appropriate resources:   Arrange for interpreters to assist at discharge as needed    Identify barriers to discharge with patient and caregiver   Identify discharge learning needs (meds, wound care, etc)  6/26/2024 0841 by Verna Caraballo RN  Outcome: Progressing  Flowsheets (Taken 6/26/2024 0841)  Discharge to home or other facility with appropriate resources:   Arrange for interpreters to assist at discharge as needed   Identify barriers to discharge with patient and caregiver   Identify discharge learning needs (meds, wound care, etc)     Problem: Chronic Conditions and Co-morbidities  Goal: Patient's chronic conditions and co-morbidity symptoms are monitored and maintained or improved  6/26/2024 0842 by Verna Caraballo RN  Outcome: Progressing  Flowsheets (Taken 6/26/2024 0841)  Care Plan - Patient's Chronic Conditions and Co-Morbidity Symptoms are Monitored and Maintained or Improved:   Collaborate with multidisciplinary team to address chronic and comorbid conditions and prevent exacerbation or deterioration   Monitor and assess patient's chronic conditions and comorbid symptoms for stability, deterioration, or improvement   Update acute care plan with appropriate goals if chronic or comorbid symptoms are exacerbated and prevent overall improvement and discharge  6/26/2024 0841 by Verna Caraballo RN  Outcome: Progressing  Flowsheets (Taken 6/26/2024 0841)  Care Plan - Patient's Chronic Conditions and Co-Morbidity Symptoms are Monitored and Maintained or Improved:   Collaborate with multidisciplinary team to address chronic and comorbid conditions and prevent exacerbation or deterioration   Monitor and assess patient's chronic conditions and comorbid symptoms for stability, deterioration, or improvement   Update acute care plan with appropriate goals if chronic or comorbid symptoms are exacerbated and prevent overall improvement and discharge     Problem: Skin  Goal: Decreased wound size/increased tissue granulation at next dressing change  Outcome: Progressing  Flowsheets (Taken 6/26/2024  0842)  Decreased wound size/increased tissue granulation at next dressing change:   Promote sleep for wound healing   Protective dressings over bony prominences   Utilize specialty bed per algorithm  Goal: Participates in plan/prevention/treatment measures  Outcome: Progressing  Flowsheets (Taken 6/26/2024 0842)  Participates in plan/prevention/treatment measures:   Elevate heels   Increase activity/out of bed for meals   Discuss with provider PT/OT consult  Goal: Prevent/manage excess moisture  Outcome: Progressing  Flowsheets (Taken 6/26/2024 0842)  Prevent/manage excess moisture:   Monitor for/manage infection if present   Moisturize dry skin   Cleanse incontinence/protect with barrier cream   Follow provider orders for dressing changes  Goal: Prevent/minimize sheer/friction injuries  Outcome: Progressing  Flowsheets (Taken 6/26/2024 0842)  Prevent/minimize sheer/friction injuries:   Increase activity/out of bed for meals   HOB 30 degrees or less   Complete micro-shifts as needed if patient unable. Adjust patient position to relieve pressure points, not a full turn   Use pull sheet   Turn/reposition every 2 hours/use positioning/transfer devices  Goal: Promote/optimize nutrition  Outcome: Progressing  Flowsheets (Taken 6/26/2024 0842)  Promote/optimize nutrition:   Assist with feeding   Offer water/supplements/favorite foods   Monitor/record intake including meals   Discuss with provider if NPO > 2 days   Consume > 50% meals/supplements  Goal: Promote skin healing  Outcome: Progressing  Flowsheets (Taken 6/26/2024 0842)  Promote skin healing:   Ensure correct size (line/device) and apply per  instructions   Rotate device position/do not position patient on device   Turn/reposition every 2 hours/use positioning/transfer devices   Assess skin/pad under line(s)/device(s)    The patient's goals for the shift include  pain control.     The clinical goals for the shift include Patient will remain HDS  throughout the shift.

## 2024-06-26 NOTE — POST-PROCEDURE NOTE
Physician Transition of Care Summary  Invasive Cardiovascular Lab    Procedure Date: 6/26/2024  Attending:    * Bucky Lake - Primary  Resident/Fellow/Other Assistant: Surgeons and Role:     * Jarrod Mitchell MD - Fellow    Indications:   Pre-op Diagnosis     * PAD (peripheral artery disease) (CMS-HCC) [I73.9]     * Critical limb ischemia of left lower extremity (Multi) [I70.222]    Post-procedure diagnosis:   Post-op Diagnosis     * PAD (peripheral artery disease) (CMS-HCC) [I73.9]     * Critical limb ischemia of left lower extremity (Multi) [I70.222]    Procedure(s):     * Lower Extremity Intervention    * Atherectomy - Lower Extremity      Procedure Findings:    Left Lower Extremity Intervention recurrent occlusion SFA/pop/tibials/AT     Thrombectomy - JETI /SFA, POP PT, and TPT  ELEUTERIO to BK Pop (4.0X34mm and 3.5X38mm Sherwood [place]) and TPT (3.0X34mm)  PTA AT      Description of the Procedure:   Anterograde 6Fr --> Manual @ ACT<170     Complications:   None     Stents/Implants:   Implants       Stent    Stent, Annette [place] Eleuterio, 4.00 X 34rx - [id number] - Implanted        Inventory item: STENT, ANNETTE FRONTIER ELEUTERIO, 4.00 X 34RX Model/Cat number: [id number]    : [employer] Lot number: [id number]    Device identifier: [id number]        As of 6/26/2024       Status: Implanted                      Stent, Annette [place] Eleuterio, 3.00 X 34rx - [id number] - Implanted        Inventory item: STENT, ANNETTE FRONTIER ELEUTERIO, 3.00 X 34RX Model/Cat number: [id number]    : [employer] Lot number: [id number]    Device identifier: [id number]        As of 6/26/2024       Status: Implanted                      Stent, [place] Eleuterio, 3.50 X 38rx - [id number] - Implanted        Inventory item: STENT, ANNETTE FRONTIER ELEUTERIO, 3.50 X 38RX Model/Cat number: [id number]    : [employer] Lot number: [id number]    Device identifier: [id number]        As of 6/26/2024       Status: Implanted                               Anticoagulation/Antiplatelet Plan:   Aspirin and plavix + heparin starting tomorrow (morning).     Estimated Blood Loss:   200 mL    Anesthesia: Moderate Sedation Anesthesia Staff: No anesthesia staff entered.    Any Specimen(s) Removed:   No specimens collected during this procedure.    Disposition:   CICU       Electronically signed by: Jarrod Mitchell MD, 6/26/2024 3:32 PM

## 2024-06-27 ENCOUNTER — APPOINTMENT (OUTPATIENT)
Dept: VASCULAR MEDICINE | Facility: HOSPITAL | Age: 69
End: 2024-06-27
Payer: MEDICARE

## 2024-06-27 DIAGNOSIS — I73.9 PAD (PERIPHERAL ARTERY DISEASE) (CMS-HCC): ICD-10-CM

## 2024-06-27 LAB
CHOLEST SERPL-MCNC: 123 MG/DL (ref 0–199)
CHOLESTEROL/HDL RATIO: 5.1
DRVVT SCREEN TO CONFIRM RATIO: 2.63 RATIO
DRVVT/DRVVT CFM NRMLZD PPP-RTO: 1.47 RATIO
DRVVT/DRVVT CFM P DOAC NEUT NORM PPP-RTO: 1.79 RATIO
ERYTHROCYTE [DISTWIDTH] IN BLOOD BY AUTOMATED COUNT: 14.3 % (ref 11.5–14.5)
HCT VFR BLD AUTO: 37.8 % (ref 36–46)
HDLC SERPL-MCNC: 24.1 MG/DL
HGB BLD-MCNC: 12 G/DL (ref 12–16)
LA 2 SCREEN W REFLEX-IMP: ABNORMAL
LDLC SERPL CALC-MCNC: 69 MG/DL
MCH RBC QN AUTO: 28.3 PG (ref 26–34)
MCHC RBC AUTO-ENTMCNC: 31.7 G/DL (ref 32–36)
MCV RBC AUTO: 89 FL (ref 80–100)
NON HDL CHOLESTEROL: 99 MG/DL (ref 0–149)
NORMALIZED SCT PPP-RTO: 0.93 RATIO
NRBC BLD-RTO: 0 /100 WBCS (ref 0–0)
PLATELET # BLD AUTO: 146 X10*3/UL (ref 150–450)
RBC # BLD AUTO: 4.24 X10*6/UL (ref 4–5.2)
SILICA CLOTTING TIME CONFIRMATION: 1.23 RATIO
SILICA CLOTTING TIME SCREEN: 1.15 RATIO
TRIGL SERPL-MCNC: 150 MG/DL (ref 0–149)
UFH PPP CHRO-ACNC: 0.2 IU/ML
UFH PPP CHRO-ACNC: 0.3 IU/ML
UFH PPP CHRO-ACNC: <0.1 IU/ML
VLDL: 30 MG/DL (ref 0–40)
WBC # BLD AUTO: 13.1 X10*3/UL (ref 4.4–11.3)

## 2024-06-27 PROCEDURE — 97161 PT EVAL LOW COMPLEX 20 MIN: CPT | Mod: GP | Performed by: STUDENT IN AN ORGANIZED HEALTH CARE EDUCATION/TRAINING PROGRAM

## 2024-06-27 PROCEDURE — 36415 COLL VENOUS BLD VENIPUNCTURE: CPT

## 2024-06-27 PROCEDURE — 85520 HEPARIN ASSAY: CPT | Mod: 91

## 2024-06-27 PROCEDURE — 99233 SBSQ HOSP IP/OBS HIGH 50: CPT | Performed by: INTERNAL MEDICINE

## 2024-06-27 PROCEDURE — 2500000004 HC RX 250 GENERAL PHARMACY W/ HCPCS (ALT 636 FOR OP/ED)

## 2024-06-27 PROCEDURE — 93922 UPR/L XTREMITY ART 2 LEVELS: CPT | Performed by: SURGERY

## 2024-06-27 PROCEDURE — 2500000001 HC RX 250 WO HCPCS SELF ADMINISTERED DRUGS (ALT 637 FOR MEDICARE OP)

## 2024-06-27 PROCEDURE — 2500000004 HC RX 250 GENERAL PHARMACY W/ HCPCS (ALT 636 FOR OP/ED): Performed by: STUDENT IN AN ORGANIZED HEALTH CARE EDUCATION/TRAINING PROGRAM

## 2024-06-27 PROCEDURE — 93922 UPR/L XTREMITY ART 2 LEVELS: CPT

## 2024-06-27 PROCEDURE — 1200000002 HC GENERAL ROOM WITH TELEMETRY DAILY

## 2024-06-27 PROCEDURE — 2500000002 HC RX 250 W HCPCS SELF ADMINISTERED DRUGS (ALT 637 FOR MEDICARE OP, ALT 636 FOR OP/ED)

## 2024-06-27 PROCEDURE — 80061 LIPID PANEL: CPT | Performed by: NURSE PRACTITIONER

## 2024-06-27 PROCEDURE — 85027 COMPLETE CBC AUTOMATED: CPT

## 2024-06-27 ASSESSMENT — COGNITIVE AND FUNCTIONAL STATUS - GENERAL
STANDING UP FROM CHAIR USING ARMS: A LITTLE
CLIMB 3 TO 5 STEPS WITH RAILING: TOTAL
WALKING IN HOSPITAL ROOM: A LITTLE
TURNING FROM BACK TO SIDE WHILE IN FLAT BAD: A LITTLE
MOVING TO AND FROM BED TO CHAIR: A LITTLE
MOBILITY SCORE: 16
MOVING FROM LYING ON BACK TO SITTING ON SIDE OF FLAT BED WITH BEDRAILS: A LITTLE

## 2024-06-27 ASSESSMENT — PAIN SCALES - GENERAL
PAINLEVEL_OUTOF10: 0 - NO PAIN
PAINLEVEL_OUTOF10: 0 - NO PAIN
PAINLEVEL_OUTOF10: 8
PAINLEVEL_OUTOF10: 5 - MODERATE PAIN
PAINLEVEL_OUTOF10: 5 - MODERATE PAIN
PAINLEVEL_OUTOF10: 7
PAINLEVEL_OUTOF10: 8
PAINLEVEL_OUTOF10: 7
PAINLEVEL_OUTOF10: 8
PAINLEVEL_OUTOF10: 7
PAINLEVEL_OUTOF10: 8

## 2024-06-27 ASSESSMENT — PAIN - FUNCTIONAL ASSESSMENT
PAIN_FUNCTIONAL_ASSESSMENT: 0-10

## 2024-06-27 ASSESSMENT — PAIN DESCRIPTION - LOCATION
LOCATION: LEG
LOCATION: LEG

## 2024-06-27 ASSESSMENT — PAIN DESCRIPTION - DESCRIPTORS
DESCRIPTORS: ACHING;SORE
DESCRIPTORS: ACHING;SORE

## 2024-06-27 ASSESSMENT — ACTIVITIES OF DAILY LIVING (ADL): EFFECT OF PAIN ON DAILY ACTIVITIES: RELIEF

## 2024-06-27 ASSESSMENT — PAIN DESCRIPTION - ORIENTATION
ORIENTATION: RIGHT;LEFT
ORIENTATION: LEFT

## 2024-06-27 NOTE — PROGRESS NOTES
Physical Therapy    Physical Therapy Evaluation    Patient Name: Radha Toussaint  MRN: [id number]  Room: 14/[facility]  Today's Date: 6/27/2024   Time Calculation  Start Time: 1050  Stop Time: 1110  Time Calculation (min): 20 min    Assessment/Plan   PT Assessment  PT Assessment Results: Decreased strength, Decreased range of motion, Decreased endurance, Decreased mobility, Impaired balance, Pain  Rehab Prognosis: Good  Barriers to Discharge: medical acuity, pain control  End of Session Communication: Bedside nurse  End of Session Patient Position: Bed, 3 rail up  IP OR SWING BED PT PLAN  Inpatient or Swing Bed: Inpatient  PT Plan  Treatment/Interventions: Bed mobility, Transfer training, Gait training, Stair training, Balance training, Strengthening, Endurance training, Range of motion, Therapeutic exercise, Therapeutic activity  PT Plan: Ongoing PT  PT Frequency: 3 times per week  PT Discharge Recommendations: Low intensity level of continued care  Equipment Recommended upon Discharge:  (owns necessary equipment)  PT Recommended Transfer Status: Assistive device, Contact guard  PT - OK to Discharge: Yes    Subjective     General Visit Information:  Subjective: Patient is alert, agreeable to PT.  States she has not been OOB since procedure 2/2 pain control.    Reason for Referral: s/p Laser atherectomy/JETI/PTA fem/pop/TPT/PT, FCO to mid SFA and JETI/PTA AT via 6F antegrade L CFA access , was on Integrilin  Past Medical History Relevant to Rehab: PMHx of STEMI almost 10 years ago stable since then, Hypertension, hyperlipidemia, PAD s/p (left  popliteal/TPT/PT reconstruction with PTA/DCB, Supera/SES to popliteal occlusion with Dr Mike on 1/23/24) , nicotine abuse, non small lung cancer, /w worsening  wound  Prior to Session Communication: Bedside nurse  Patient Position Received: Bed, 3 rail up, Alarm off, not on at start of session   Home Living:  Home Living  Type of Home: House  Lives With: Spouse, Grandchildren, Adult  children  Home Adaptive Equipment: Walker rolling or standard, Cane, Wheelchair-manual, Scooter  Home Layout: Able to live on main level with bedroom/bathroom  Home Access: Stairs to enter with rails  Entrance Stairs-Number of Steps: 3  Prior Level of Function:  Prior Function Per Pt/Caregiver Report  Level of [place]: Independent with ADLs and functional transfers  Precautions:  Precautions  Medical Precautions: Fall precautions, Cardiac precautions, Oxygen therapy device and L/min  Vital Signs:  Vital Signs  Heart Rate: 78 (post 80)  SpO2: 100 % (post 99)  BP: 115/54 (post 139/53)  Medical Gas Therapy: Supplemental oxygen    Lines/Tubes/Drains:  External Urinary Catheter (Active)   Number of days: 0     Continuous Medications/Drips:  heparin, 0-4,500 Units/hr, Last Rate: 1,400 Units/hr (06/27/24 0738)        Objective   Pain:  Pain Assessment  0-10 (Numeric) Pain Score: 8 (post 8)  Pain Type: Acute pain  Pain Location: Leg  Pain Orientation: Left    Cognition:  Cognition  Orientation Level: Oriented X4    General Assessments:  Extremity/Trunk Assessments:  Tone: No abnormalities noted  Sensation  Light Touch:  (hypersensitive to pain from light touch RLE distally)  Coordination  Movements are Fluid and Coordinated: Yes  Upper Extremity  ROM: WNL  Strength:WFL  Lower Extremity  ROM: Impaired: L: PROM WFL, no AROM DF   Strength: Not WFL RLE: Hip flexion 4/5, Knee flexion 4/5, Knee extension 4/5, PF 4/5, DF 4/5  LLE: Hip flexion 3-/5, Knee flexion 3-/5, Knee extension 3-/5, PF 2/5, DF 0/5   Sitting Static Balance Normal  Sitting Dynamic Balance Not NormalUE Support Two UE support and Assist Level Supervision  Standing Static Balance Not NormalUE Support Two UE support and Assist Level Contact Guard  Standing Dynamic Balance Not NormalUE Support Two UE support and Assist Level Contact Guard    Functional Assessments:  Bed Mobility  Bed Mobility: Yes  Bed Mobility 1  Bed Mobility 1: Supine to sitting  Level of  Assistance 1: Close supervision  Bed Mobility 2  Bed Mobility  2: Sitting to supine  Level of Assistance 2: Close supervision    Transfers  Transfer: Yes  Transfer 1  Transfer From 1: Sit to  Transfer to 1: Stand  Transfer Device 1: Walker  Transfer Level of Assistance 1: Close supervision  Transfers 2  Transfer From 2: Stand to  Transfer to 2: Sit  Transfer Device 2: Walker  Transfer Level of Assistance 2: Close supervision  Transfers 3  Transfer From 3: Bed to  Transfer to 3: Bed  Transfer Device 3: Walker  Transfer Level of Assistance 3: Close supervision    Ambulation/Gait Training  Ambulation/Gait Training Performed: Yes  Ambulation/Gait Training 1  Surface 1: Level tile  Device 1: Rolling walker  Assistance 1: Contact guard  Quality of Gait 1:  (step to leading LLE, antalgic, decreased step length, foot drop LLE)  Comments/Distance (ft) 1: 20              Outcome Measures:  [facility] Basic Mobility  Turning from your back to your side while in a flat bed without using bedrails: A little  Moving from lying on your back to sitting on the side of a flat bed without using bedrails: A little  Moving to and from bed to chair (including a wheelchair): A little  Standing up from a chair using your arms (e.g. wheelchair or bedside chair): A little  To walk in hospital room: A little  Climbing 3-5 steps with railing: Total  Basic Mobility - Total Score: 16                            Encounter Problems       Encounter Problems (Active)       PT Problem       Patient will complete supine to sit and sit to supine Independent  (Progressing)       Start:  06/27/24            Patient will perform sit<>stand transfer with LRAD, and Modified Independent  (Progressing)       Start:  06/27/24            Patient will ambulate >70' with LRAD and Modified Independent  (Progressing)       Start:  06/27/24            Patient will ascend/descend 3 steps with Right Rail Ascending and supervision  (Progressing)       Start:  06/27/24                  Assessment: Patient presents s/p atherectomy.  Currently CGA for mobility with pain, gait, and balance impairments noted.  Patient would benefit from further therapy to increase functional independence and safety.  Will continue to follow during acute stay.      Education Documentation  Precautions, taught by Norberto Vila PT at 6/27/2024 12:05 PM.  Learner: Patient  Readiness: Acceptance  Method: Explanation, Demonstration  Response: Needs Reinforcement    Body Mechanics, taught by Norberto Vila PT at 6/27/2024 12:05 PM.  Learner: Patient  Readiness: Acceptance  Method: Explanation, Demonstration  Response: Needs Reinforcement    Mobility Training, taught by Norberto Vila PT at 6/27/2024 12:05 PM.  Learner: Patient  Readiness: Acceptance  Method: Explanation, Demonstration  Response: Needs Reinforcement    Education Comments  No comments found.          06/27/24 at 12:06 PM   Norberto Vila PT   Rehab Office: [phone]

## 2024-06-27 NOTE — HOSPITAL COURSE
Radha Toussaint is a 69 y.o. female with PMHx of STEMI, stable CAD, Hypertension, hyperlipidemia, PAD s/p (left popliteal/TPT/PT reconstruction with PTA/DCB, Supera/SES to popliteal occlusion with Dr Mike on 1/23/24), nicotine abuse, non small lung cancer, presenting/w worsening wound to the left lateral foot for 2 weeks and increased pain in the left foot. Pt s/p Laser atherectomy/JETI/PTA fem/pop/TPT/PT, FCO to mid SFA and JETI/PTA AT on (6/25/24 ), c/b stent occlusion s/p repeat thrombectomy on 6/26/24)- JETI /SFA, POP PT, and TPT, FCO to BK Pop and TPT PTA AT, was on heparin gtt post rocedure, transitioned to Eliquis.     Per endovascular, cont triple therapy with eliquis/ASA/plavix x1 week, then drop ASA and cont Eliquis/plavix afterwards. LLE swelling/pain, per EVLS and Vasc Surgery, no c/f compartment syndrome, recommend tubigrip compressions. OP follow-up with repeat TRAVIS's with Dr. Mike on 8/1.    New LLE foot drop (EVLS & vasc. Sx aware): arrange for outpatient follow-up with podiatry (with Mookie Doss, per Dr. Mike) to arrange for foot brace, referral placed.    OT rec Mod intensity, PT note pending. Pt declined SNF or home care. Agreeable to outpatient PT/OT, referral placed.     Medications delivered to bedside prior to discharge. Patient denied having any other home going needs.      Upon review of medications, lab values, and vital signs. Pt was deemed stable for discharge in satisfactory condition.     Discharge weight: 66.8 kg     More than 60 minutes were spent in coordinating patient discharge.

## 2024-06-27 NOTE — PROGRESS NOTES
CARDIAC ICU Progress Note    Subjective Data  Radha Toussaint is a 69 y.o. female with PMHx of STEMI, stable CAD , Hypertension, hyperlipidemia, PAD s/p (left  popliteal/TPT/PT reconstruction with PTA/DCB, Supera/SES to popliteal occlusion with Dr Mike on 1/23/24) , nicotine abuse, non small lung cancer, /w worsening  wound to the left lateral foot for 2 weeks and increased pain in the left foot. Pt s/p Laser atherectomy/JETI/PTA fem/pop/TPT/PT, FCO to mid SFA and JETI/PTA AT on (6/25/24 )  c/b stent occlusion s/p  Thrombectomy - JETI /SFA, POP PT, and TPT  FCO to BK Pop and TPT PTA AT (6/26/24), currently on heparin gtt,     Overnight Events:  Pt tolerated procedure well, pain much better than before, good sensations and qiH NV checks WNL, doppler pulses  on Left  DP and PT intact. On ASA and Plavix, started on heparin gtt.     Telemetry: NSR with avg VR in 65 bpm. No events.    Net IO Since Admission: -546.03 mL [06/27/24 1257]    CARDIAC DATA:  Vascular US TRAVIS 6/26/24  PRELIMINARY CONCLUSIONS:  Right Lower PVR: No evidence of arterial occlusive disease in the right lower extremity at rest. Decreased digital perfusion noted. Multiphasic flow is noted in the right common femoral artery, right posterior tibial artery and right dorsalis pedis artery.  Left Lower PVR: Evidence of severe arterial occlusive disease in the left lower extremity at rest. Decreased digital perfusion noted. Monophasic flow is noted in the left common femoral artery and left posterior tibial artery. Left dorsalis pedis is not audible and left first digit is flatlined.     Imaging & Doppler Findings:     RIGHT Lower PVR                Pressures Ratios  Right Posterior Tibial (Ankle) 120 mmHg  1.01  Right Dorsalis Pedis (Ankle)   105 mmHg  0.88  Right Digit (Great Toe)        31 mmHg   0.26           LEFT Lower PVR                Pressures Ratios  Left Posterior Tibial (Ankle) 40 mmHg   0.34  Left Dorsalis Pedis (Ankle)   0 mmHg    0.00  Left  "Digit (Great Toe)        0 mmHg    0.00                               Left  Brachial Pressure 119 mmHg      CT angio aorta and bilateral iliofemoral runoff w and or wo IV contrast 6/26/24    IMPRESSION:  1. Postsurgical changes of left SFA/popliteal artery stent grafting  with complete occlusion of the stent graft and occlusion of the left  popliteal artery to its terminus.  2. Minimal contrast opacification in the distal arteries of the left  lower extremity.  3. Redemonstration of aortic aneurysm with aortoiliac bypass graft  and bifemoral bypass graft with extensive intraluminal thrombus.  4. Rectal wall thickening with perirectal and perianal fat stranding.  Correlate with concern for proctitis.  5. Interval decrease in size of bilateral adrenal glands with diffuse  hypoattenuation and minimal peripheral enhancement consistent with  improved tumor burden.      EKG: SB in 55 BPM, normal axis, no [place] AV blocks or IVCD or BBB. Good R wave progression. Possible old inf infarct   ASCVD RISK: The ASCVD Risk score (Steve DK, et al., 2019) failed to calculate for the following reasons:    The patient has a prior MI or stroke diagnosis       Vitals:   BP (!) 97/46   Pulse 70   Temp 36.4 °C (97.5 °F) (Temporal)   Resp 21   Ht 1.626 m (5' 4\")   Wt 71.1 kg (156 lb 12 oz)   SpO2 97%   BMI 26.91 kg/m²          6/27/2024     8:00 AM 6/27/2024     9:00 AM 6/27/2024    10:00 AM 6/27/2024    10:50 AM 6/27/2024    11:00 AM 6/27/2024    11:16 AM 6/27/2024    12:00 PM   Vitals   Systolic 104 98 116 115 139  97   Diastolic 54 79 53 54 53  46   Heart Rate 76 73 73 78 78 73 70   Temp 36 °C (96.8 °F)      36.4 °C (97.5 °F)   Resp 16 17 23  18 19 21       Vent settings:    Most Recent Range Past 24hrs   Mode      FiO2 21 % No data recorded   Rate   No data recorded   Vt    No data recorded   PEEP   No data recorded     Invasive Hemodynamics:    Most Recent Range Past 24hrs   BP (Art) 109/57 No data recorded   MAP(Art) 23 mmHg No " "data recorded   RA/CVP   No data recorded   PA   No data recorded   PA(mean)   No data recorded   PCWP   No data recorded   CO   No data recorded   CI   No data recorded   Mixed Venous   No data recorded   SVR    No data recorded   PVR   No data recorded       Labs:  Results from last 7 days   Lab Units 06/27/24  0427 06/25/24  1544   WBC AUTO x10*3/uL 13.1* 13.3*   HEMOGLOBIN g/dL 12.0 14.2   HEMATOCRIT % 37.8 43.3   PLATELETS AUTO x10*3/uL 146* 168     Results from last 7 days   Lab Units 06/25/24  1544   SODIUM mmol/L 138   POTASSIUM mmol/L 4.0   CO2 mmol/L 24   ANION GAP mmol/L 12   BUN mg/dL 18   CREATININE mg/dL 0.63   GLUCOSE mg/dL 103*   EGFR mL/min/1.73m*2 >90      Results from last 7 days   Lab Units 06/25/24  1544   ALT U/L 12   AST U/L 15   ALK PHOS U/L 44            No lab exists for component: \"PT\"          Results from last 7 days   Lab Units 06/25/24  1544   LACTATE mmol/L 0.8               Lab Results   Component Value Date    CKTOTAL 41 06/25/2024    TROPONINI <0.02 11/29/2021      Lab Results   Component Value Date    HGBA1C 5.8 (H) 06/25/2024      Lab Results   Component Value Date    CHOL 123 06/27/2024    CHOL 160 05/02/2023    CHOL 144 09/29/2020     Lab Results   Component Value Date    HDL 24.1 06/27/2024    HDL 35.6 (A) 05/02/2023    HDL 28.0 (A) 09/29/2020     Lab Results   Component Value Date    LDLCALC 69 06/27/2024     Lab Results   Component Value Date    TRIG 150 (H) 06/27/2024    TRIG 89 05/02/2023    TRIG 114 09/29/2020     No components found for: \"CHOLHDL\"     Imaging:  Electrocardiogram 12 Lead    Result Date: 6/26/2024  Normal sinus rhythm Possible Inferior infarct (cited on or before 11-MAR-2024) ST & T wave abnormality, consider anterolateral ischemia Prolonged QT Abnormal ECG When compared with ECG of 25-JUN-2024 15:31, Nonspecific T wave abnormality now evident in Inferior leads T wave inversion now evident in Lateral leads QT has lengthened    Invasive vascular " procedure    Result Date: 6/26/2024   [facility], Cath Lab, [address] [place] [address] Cardiovascular Catheterization Report Patient Name:      AALIYAH SAWANT     Performing Physician:  Анна Mike MD Study Date:        6/25/2024           Verifying Physician:   Анна Mike MD MRN/PID:           [id number]            Cardiologist/Co-scrub: Accession#:        [id number]        Ordering Physician:    Анна MIKE Date of Birth/Age: 1955 / 69 years Fellow: Gender:            F                   Fellow: Encounter#:        [id number]  Study:            Peripheral Angiogram Additional Study: Peripheral Intervention  Indications: AALIYAH SAWANT is a 69 year old female who presents with peripheral artery disease, critical limb ischemia, diabetes mellitus, hypertension, dyslipidemia, tobacco use and stage IV cancer on immunotherapy, prior R-L fem-fem and LLE intervention 1/24 for CLTI with healing of wound. p/w new wound and concerns for reocclusion. Ongoing smoking.  Procedure Description: After infiltration with local anesthetic utilizing two-dimensional ultrasound and fluoroscopic guidance, the left femoral artery was cannulated using a modified Seldinger technique. Subsequently a 5 [place] sheath was placed antegrade in the left femoral artery. The arterial sheath was sized up to 6 [place]. After completion of the procedure, sheath secured in place to be removed in CICU once ACT <170. Following routine access, therapeutic heparin was given. Angiogram performed of the left lower extremity prior to intervention for diagnostic purposes.  Left Lower Extremity Arterial Findings: Left Superior Femoral Artery: The left superficial femoral artery revealed total occlusion originating at the distal segment. Reconstitution in proximal AT and PT. Left Popliteal Artery: The left popliteal artery revealed an occlusion. Left Tibial-Peroneal Trunk: The left tibial-peroneal trunk revealed an occlusion.  Left Anterior Tibial Artery: The left anterior tibial artery revealed an occlusion. Left Posterior Tibial Artery: The left posterior tibial artery revealed an occlusion. Left Peroneal Artery: The left peroneal artery revealed an occlusion.  Peripheral Interventions: We exchanged for a 6F 45 cm Destination sheath. Traversed through the occlusion using Command 18 with 035 [place]. Confirmed true lumen access with selective injection into the PT. Spider filter was deployed into the PT for protection prior to intervention. Laser atherectomy performed using 2.0 mm Turbo Power catheter of the occluded segment from distal SFA/popliteal to TPT/PT. Fluency/rate 40/40 of the entire segment then 59/79 of femoropopliteal segment only. This was followed by JETi mechanical thrombectomy of the distal SFA/popliteal to TPT/PT. PTA performed as noted of fem/pop/TPT/PT. FCO placement of the distal SFA with overlap into the prior popliteal stent. Noted ongoing clot of the mid popliteal segment s/p additional JETi in this location. At this point, Spider filter was retrieved. Spasm is noted of the distal PT following filter retrieval, which did improve on subsequent angiograms. Residual thrombus noted in ostial AT - attempted crossing from popliteal, but not successful. Retrograde access was obtained, [place] crossed into popliteal artery and wire was externalized. JETi thrombectomy was performed of the ostial AT followed by PTA.  Percutaneous peripheral intervention of the distal left superficial femoral artery and left popliteal artery. The vessel was dilated using a compliant 5.0 mm x 200 mm balloon dilated to 6 KENZIE. Lesion was stented with a 6.0 mm x 120 mm Clair stent. The stent was post dilated using a compliant 5.0 mm x 200 mm balloon at 8 KENZIE. The stenosis was successfully reduced from 100% to 10-30%.  Percutaneous peripheral intervention of the entire left tibial-peroneal trunk and left posterior tibial artery. The vessel  was dilated using a Chocolate 3.0 mm x 120 mm balloon dilated to 8 KENZIE. The stenosis was successfully reduced from 100% to 20%.  Percutaneous peripheral intervention of the ostial and proximal left anterior tibial artery. The vessel was dilated using a compliant 3.0 mm x 80 mm balloon dilated to 8 KENZIE. The stenosis was successfully reduced from 100% to <10%.  Peripheral Interventions Comments: At conclusion of procedure, we noted brisk flow into the plantar territory. Spasm was noted in the distal AT access point. We were able to Doppler a biphasic PT pulse and monophasic DP pulse prior to patient leaving. Patient was maintained on Integrilin drip x 16 hour duration, with a plan to remove sheath once ACT is <170 ([facility] Consideration of heparin drip to be initiated after sheath removal and hemostasis - to be determined after.  Hemo Personnel: +----------+---------+ Name      Duty      +----------+---------+ Willam Mike MDPROC MD 1 +----------+---------+  Hemodynamic Pressures:  +----+---------------------+----------+-------------+--------------+---------+ Site      Date Time      Phase NameSystolic mmHgDiastolic mmHgMean mmHg +----+---------------------+----------+-------------+--------------+---------+   AO6/25/2024 12:13:42 PM      Rest          114            53       82 +----+---------------------+----------+-------------+--------------+---------+  Complications: No in-lab complications observed.  Cardiac Cath Post Procedure Notes: Post Procedure Diagnosis: Reocclusion LLE fem/pop/tibials s/p laser                           atherectomy/JETi thrombectomy/FCO fem/pop, laser                           atherectomy/JETi thrombectomy/PTA TPT/PT, JETi/PTA AT. Blood Loss:               Estimated blood loss during the procedure was 10 mls. Specimens Removed:        Number of specimen(s) removed: white thrombi.  ____________________________________________________________________________________ CONCLUSIONS:  1. Indication: new CLTI RC V LLE (new wound).  2. Unilateral angiogram and intervention - appears to be subacute thrombus.  3. Laser atherectomy/JETi thrombectomy/PTA of fem/pop/TPT/PT, FCO of distal femoral artery overlap into popliteal prior stent.  4. JETi thrombectomy/PTA of ostial to proximal AT.  5. Integrilin x16 h infusion; once ACT <170, will need to remove antegrade sheath.  6. APLA testing and consideration of DOAC/P2Y12 on discharge - notified oncologist d/t underlying malignancy and ongoing therapy.  7. Close EVLS follow up. ICD 10 Codes: Atherosclerosis of native arteries of left leg with ulceration of other part of foot-I70.245  CPT Codes: Angiography, Extremity,uni,S&I (PER)-49051; Revasc Fem/Popl w/stent and atherectomy unilat (PER)-22468; Revasc Tib/Per Angioplasty/Atherectomy unilat initial vessel (PER)-34958; Thrombectomy, perc mechanical, noncoronary, arterial or bypass graft including pharm inj, initial vessel-66226; Thrombectomy, perc mechanical, noncoronary, arterial or bypass graft including pharm inj, second & subseq vessels in same [facility] family-38409; Revasc Tib/Per Angioplasty unilat each addl vessel (PER)-13225; Complicated/Unusual Procedure-MOD22; Moderate Sedation Services initial 15 minutes patient >5 years-54937; Moderate Sedation Services 1st additional 15 minutes patient >5 years-40675; Moderate Sedation Services 2nd additional 15 minutes patient >5 years-99153; Moderate Sedation Services 3rd additional 15 minutes patient >5 years-99153; Moderate Sedation Services 4th additional 15 minutes patient >5 years-99153; Moderate Sedation Services 5th additional 15 minutes patient >5 years-99153; Moderate Sedation Services 6th additional 15 minutes patient >5 years-99153; Moderate Sedation Services 7th additional 15 minutes patient >5 years-10544; Moderate Sedation Services 8th additional 15  minutes patient >5 years-25088  [id number] Willam Mike MD Performing Physician Electronically signed by [id number] Mike MD on 6/26/2024 at 5:08:16 PM  ** Final **     Vascular US Ankle Brachial Index (TRAVIS) Without Exercise    Result Date: 6/26/2024            [place] [address]   Tel [phone] and Fax [phone]  Vascular Lab Report VASC US ANKLE BRACHIAL INDEX (TRAVIS) WITHOUT EXERCISE  Patient Name:      AALIYAH SAWANT     Reading Physician:  [id number] Matthew Warner MD Study Date:        6/26/2024           Ordering Physician: [id number] JARROD SHELL MRN/PID:           [id number]            Technologist:       Sumi BARBOZA Accession#:        [id number]        Technologist 2: Date of Birth/Age: 1955 / 69 years Encounter#:         [id number] Gender:            F Admission Status:  Inpatient           Location Performed: [facility]  Diagnosis/ICD: Peripheral vascular disease, unspecified-I73.9 Indication:    No DPA pulse CPT Codes:     36541 Peripheral artery Lower arterial Duplex limited  Patient History S/P Stent and aangioplasty LLEA.  **CRITICAL RESULT** Critical Result: Severe arterial disease in LLE. Notification called to Jarrod Brooks MD on 6/26/2024 at 9:25:30 AM by Sumi BARBOZA.  CONCLUSIONS: Right Lower PVR: No evidence of arterial occlusive disease in the right lower extremity at rest. Decreased digital perfusion noted. Multiphasic flow is noted in the right common femoral artery, right posterior tibial artery and right dorsalis pedis artery. Left Lower PVR: Evidence of severe arterial occlusive disease in the left lower extremity at rest. Decreased digital perfusion noted. Monophasic flow is noted in the left common femoral artery and left posterior tibial artery. Left dorsalis pedis is not audible and left first digit is flatlined.  Comparison: Compared with study from 6/11/2024, Today's exam severe arterial occlusive disease in left lower  extremity.  Imaging & Doppler Findings:  RIGHT Lower PVR                Pressures Ratios Right Posterior Tibial (Ankle) 120 mmHg  1.01 Right Dorsalis Pedis (Ankle)   105 mmHg  0.88 Right Digit (Great Toe)        31 mmHg   0.26   LEFT Lower PVR                Pressures Ratios Left Posterior Tibial (Ankle) 40 mmHg   0.34 Left Dorsalis Pedis (Ankle)   0 mmHg    0.00 Left Digit (Great Toe)        0 mmHg    0.00                      Left Brachial Pressure 119 mmHg   [id number] Matthew Warner MD Electronically signed by [id number] Matthew Warner MD on 6/26/2024 at 12:04:57 PM  ** Final **     CT angio aorta and bilateral iliofemoral runoff w and or wo IV contrast    Result Date: 6/26/2024  Interpreted By:  Francisco Norton  and Ena Bates STUDY: CT ANGIO AORTA AND BILATERAL ILIOFEMORAL RUNOFF W AND OR WO IV CONTRAST;  6/26/2024 3:53 am   INDICATION: Signs/Symptoms:LLE PAD s/p atherectomy now with concern for stent reocclusion.   COMPARISON: CT chest abdomen pelvis dated 12/13/2023; CT abdomen and pelvis dated 09/18/2023   ACCESSION NUMBER(S): [id number]   ORDERING CLINICIAN: ALICIA TAYLOR   TECHNIQUE: Thin-section axial images of the abdomen, pelvis and bilateral lower extremities were obtained in the arterial phase after intravenous administration of 120 mL of Omnipaque 350 contrast. Delayed images the legs were obtained for assessment of venous patency. Coronal and sagittal reformatted images were reconstructed from the axial data. Multiplanar MIPs and 3D reconstructions were created on an independent workstation and reviewed.   FINDINGS: VASCULATURE:   Aortic aneurysm is again noted status post aorta to right common iliac bypass grafting. A bifemoral bypass graft is also noted with moderate intraluminal thrombus. There is extensive intramural thrombus within the aortic stent graft causing moderate narrowing of the lumen. Thrombosis of the excluded aneurysm sac is also noted.   There is occlusion of the origin of the left common  iliac artery with reconstitution at the level of the iliac bifurcation. Embolization coils are noted in the right internal iliac distribution, similar to prior.   Postsurgical changes of left SFA/popliteal artery stent grafting. No contrast opacification is seen within the stent graft. The popliteal artery remains occluded to its terminus. There is distal reconstitution primarily of the posterior tibial artery with some restored flow in the anterior tibial artery.   The right common iliac artery is not well evaluated due to streak artifact from embolization coils. The external iliac artery is patent without stenosis. The right common femoral and profunda femoris arteries are patent without hemodynamically significant stenosis. Atherosclerotic changes cause moderate luminal narrowing of the distal right superficial femoral artery and narrowing becomes severe in the P1 segment of the popliteal artery. There is intraluminal thrombus seen throughout the right popliteal artery. The visualized portions of the anterior tibial, posterior tibial, and peroneal arteries on the right are patent without hemodynamically significant stenosis.     ABDOMEN/PELVIS:   LIVER: The liver is again enlarged. A segment VI hemangioma appears unchanged. No new liver lesions are evident.   BILE DUCTS: No significant intrahepatic or extrahepatic dilatation.   GALLBLADDER: Vicarious contrast excretion is noted in the gallbladder lumen. No wall thickening or pericholecystic fluid is evident.   SPLEEN: No significant abnormality.   PANCREAS: There is pancreatic ductal dilatation measuring up to 1.1 cm in the pancreatic head, increased compared to prior examination.   ADRENALS: There is interval decrease in size of the bilateral adrenal glands with diffuse homogeneous hypoattenuation. The right adrenal gland now measures 5.0 x 1.6 cm (previously 5.1 x 2.5 cm). The left adrenal gland measures 3.6 x 1.4 cm (previously 3.5 x 2.0 cm). Mild peripheral  enhancement of the adrenal glands is again noted.   KIDNEYS, URETERS, BLADDER: The kidneys are normal in size and enhance symmetrically. Multiple renal cysts appear unchanged. Multiple left renal calculi are again noted measuring up to 1.5 cm. The urinary bladder demonstrates no significant abnormality.   REPRODUCTIVE ORGANS: No significant abnormality.   VESSELS: See above. No additional significant abnormality.   RETROPERITONEUM/LYMPH NODES: No enlarged lymph nodes.   BOWEL/PERITONEUM: There is a small hiatal hernia. No inflammatory bowel wall thickening or dilatation. There is rectal wall thickening with perirectal and perianal fat stranding. Normal appendix.   No pneumoperitoneum, significant ascites, or abscess.     ABDOMINAL WALL: No significant abnormality.   MUSCULOSKELETAL: No acute osseous abnormality.   LOWER CHEST: No acute abnormality involving the lung bases. Changes of chronic lung disease are again noted.       1. Postsurgical changes of left SFA/popliteal artery stent grafting with complete occlusion of the stent graft and occlusion of the left popliteal artery to its terminus. 2. Minimal contrast opacification in the distal arteries of the left lower extremity. 3. Redemonstration of aortic aneurysm with aortoiliac bypass graft and bifemoral bypass graft with extensive intraluminal thrombus. 4. Rectal wall thickening with perirectal and perianal fat stranding. Correlate with concern for proctitis. 5. Interval decrease in size of bilateral adrenal glands with diffuse hypoattenuation and minimal peripheral enhancement consistent with improved tumor burden. 6. Additional vascular and nonvascular findings as detailed above.     I personally reviewed the images/study and I agree with the findings as stated above by resident physician, Paulo Sutherland MD. This study was interpreted at [facility], [place], [place].   MACRO: Paulo Sutherland discussed the significance and urgency  of this critical finding by telephone with  JANEL BRAN on 6/26/2024 at 7:13 am. (**-RCF-**) Findings:  There are multiple critical findings. See findings.   Signed by: Francisco Norton 6/26/2024 9:53 AM Dictation workstation:   [id number]    Electrocardiogram, 12-lead PRN ACS symptoms    Result Date: 6/25/2024  Sinus bradycardia Cannot rule out Inferior infarct (cited on or before 11-MAR-2024) Abnormal ECG When compared with ECG of 11-MAR-2024 12:11, Vent. rate has decreased BY  27 BPM Nonspecific T wave abnormality no longer evident in Inferior leads T wave inversion now evident in Anterior leads Nonspecific T wave abnormality, improved in Lateral leads      Physical Exam  Vitals and nursing note reviewed.   HENT:      Mouth/Throat:      Mouth: Mucous membranes are dry.   Eyes:      Pupils: Pupils are equal, round, and reactive to light.   Cardiovascular:      Rate and Rhythm: Normal rate and regular rhythm.      Pulses: Normal pulses.   Pulmonary:      Effort: Pulmonary effort is normal.      Breath sounds:  Normal bronchovesicular sounds present   Abdominal:      General: Abdomen is flat.   Feet:      Comments: Right dopplerable PT and DP  Left Dopplerable PT absent DP, warm, paraesthesia, extremely tender to palpatation.  Skin:     Capillary Refill: Capillary refill takes less than 2 seconds.   Neurological:      General: No focal deficit present.      Mental Status: She is oriented to person, place, and time.    Medications  Scheduled medications  acetaminophen, 975 mg, oral, q6h   Or  acetaminophen, 650 mg, nasogastric tube, q6h   Or  acetaminophen, 650 mg, rectal, q6h  aspirin, 81 mg, oral, Daily  clopidogrel, 75 mg, oral, q AM  dexAMETHasone, 2 mg, oral, Daily  tiotropium, 2 puff, inhalation, Daily   And  fluticasone furoate-vilanteroL, 1 puff, inhalation, Daily  gabapentin, 100 mg, oral, TID  montelukast, 10 mg, oral, Nightly  OLANZapine, 5 mg, oral, Nightly  polyethylene glycol, 17 g, oral,  Daily  rosuvastatin, 20 mg, oral, Nightly  sertraline, 100 mg, oral, BID        Continuous medications  heparin, 0-4,500 Units/hr, Last Rate: 1,400 Units/hr (06/27/24 0738)        PRN medications  PRN medications: bisacodyl, [Held by provider] heparin, HYDROcodone-acetaminophen, LORazepam, ondansetron **OR** ondansetron, oxyCODONE, prochlorperazine, simethicone      Assessment/Plan     Principal Problem:    Acute lower limb ischemia  Active Problems:    PAD (peripheral artery disease) (CMS-[facility])    Critical limb ischemia of left lower extremity (Multi)    Radha Toussaint is a 69 y.o. female with PMHx of STEMI, stable CAD , Hypertension, hyperlipidemia, PAD s/p (left  popliteal/TPT/PT reconstruction with PTA/DCB, Supera/SES to popliteal occlusion with Dr Mike on 1/23/24) , nicotine abuse, non small lung cancer, /w worsening  wound to the left lateral foot for 2 weeks and increased pain in the left foot. Pt s/p Laser atherectomy/JETI/PTA fem/pop/TPT/PT, FCO to mid SFA and JETI/PTA AT on (6/25/24 )  c/b stent occlusion s/p  repeat thrombectomy - JETI /SFA, POP PT, and TPT, FCO to BK Pop and TPT PTA AT (6/26/24), currently on heparin gtt, awaiting transfer to EVLS after repeat TRAVIS on LLE.      Updates 06/27/24  Repeat LLE TRAVIS/ TBI  Cont. uninterrupted DAPT with ASA and Plavix and heparin gtt for now, if TRAVIS/TBI normal, switch to Eliquis and ttf to EVLS service.  Aspirin can be dropped 1 week from now, and keep Eliquis BID with Plavix.  Follow up 1 month with TRAVIS TBI with Dr. Willam Mike       NEURO  RASS 0, at baseline, No acute concerns  Cont home medications with lorazepam, Zyprexa  Pain control with gabapentin and oxycodone    Plan:  Will maintain on home regiment for pain, and DC dilaudid PRN.      CV   #PAD  #s/p Laser Atherectomy  #s/ FCO  Pt s/p Laser atherectomy/JETI/PTA fem/pop/TPT/PT, FCO to mid SFA and JETI/PTA AT on (6/25/24 )  c/b stent occlusion s/p  repeat thrombectomy - JETI /SFA, POP PT, and TPT, FCO to BK Pop  and TPT PTA AT (6/26/24).     Plan:  Repeat LLE TRAVIS/ TBI  Cont uninterrupted DAPT and heparin gtt for nw, if TRAVIS/TBI normal, switch to Eliquis and ttf to EVLS service  Continue given neurovascular checks q1h  Pain control     PULM   #COPD  # Chronic respiratory failure with hypoxia on home o2  Plan:  Cont breo, PRN albuterol and home Dexamethasone 2mg   Cont. 2 L o2 at baseline.     GI/NUTRITION  No acute concerns     Plan:  Cont. Bowel regiment patient  on opiates.     RENAL/ELECTROLYTES  NAD     HEME/ONC  On heparin.     Plan:  FU daily CBCs     ENDO  Non-diabetic.  On Cardiac diet. No acute concerns.      ID  No concerns for ID standpoint.     MSK/RHEUM/SKIN  No acute concerns  --------------------------------------------------------  F : None  E: PRN  N: Diet cardiac   DVT prophylaxis: On heparin gtt  GI Prophylaxis:NA  NGT: None  ABX descalation/escalation: None  MCS/IABP:None  LTD: PIVs  O2: 2 L chronic  ---------------------------------------------------------  Code Status: Full Code (confirmed on admission)   NOK: Extended Emergency Contact Information  Primary Emergency Contact: Opal Gore  Address: [address] [place] [address] [facility]  Home Phone: [phone]  Mobile Phone: [phone]  Relation: Daughter         Luis Kimbrough MD  PGY-2 Internal Medicine

## 2024-06-27 NOTE — SIGNIFICANT EVENT
Patient Transfer Note    Attending: José Miguel Marquez Primary Problem: Acute lower limb ischemia      Hospital Course:  Radha Toussaint is a 69 y.o. female with PMHx of STEMI, stable CAD , Hypertension, hyperlipidemia, PAD s/p (left  popliteal/TPT/PT reconstruction with PTA/DCB, Supera/SES to popliteal occlusion with Dr Mike on 1/23/24) , nicotine abuse, non small lung cancer, /w worsening  wound to the left lateral foot for 2 weeks and increased pain in the left foot. Pt s/p Laser atherectomy/JETI/PTA fem/pop/TPT/PT, FCO to mid SFA and JETI/PTA AT on (6/25/24 )  c/b stent occlusion s/p  repeat thrombectomy on 6/26/24)- JETI /SFA, POP PT, and TPT, FCO to BK Pop and TPT PTA AT, was on heparin gtt post rocedure, transitioned to Eliquis. On DAPT regiment with ASA and Plavix.    Followups:  Cont. uninterrupted DAPT with ASA and Plavix and anticoagulation with Eliquis.  Aspirin to be stopped 1 week from now, and keep Eliquis BID with Plavix until stopped by Vascular specialist physician  Follow up 1 month with TRAVIS TBI with Dr. Willam Mike, scheduled on 8/1/24 at 7:30 am  Podiatry to provide Foot Drop brace for her left leg    Vitals:    06/27/24 1600   BP:    Pulse: 74   Resp: 17   Temp: 37 °C (98.6 °F)   SpO2: 99%       Current Facility-Administered Medications:     acetaminophen (Tylenol) tablet 975 mg, 975 mg, oral, q6h, 975 mg at 06/27/24 1118 **OR** acetaminophen (Tylenol) oral liquid 650 mg, 650 mg, nasogastric tube, q6h **OR** acetaminophen (Tylenol) suppository 650 mg, 650 mg, rectal, q6h, Bea Gary MD    aspirin EC tablet 81 mg, 81 mg, oral, Daily, Luis Kimbrough MD, 81 mg at 06/27/24 0844    bisacodyl (Dulcolax) EC tablet 10 mg, 10 mg, oral, Daily PRN, Luis Kimbrough MD    clopidogrel (Plavix) tablet 75 mg, 75 mg, oral, q AM, Luis Kimbrough MD, 75 mg at 06/27/24 0844    dexAMETHasone (Decadron) tablet 2 mg, 2 mg, oral, Daily, Luis Kimbrough MD, 2 mg at 06/27/24 0844    tiotropium (Spiriva Respimat) 2.5  mcg/actuation inhaler 2 puff, 2 puff, inhalation, Daily **AND** fluticasone furoate-vilanteroL (Breo Ellipta) 100-25 mcg/dose inhaler 1 puff, 1 puff, inhalation, Daily, Luis Kimbrough MD    gabapentin (Neurontin) capsule 100 mg, 100 mg, oral, TID, Luis Kimbrough MD, 100 mg at 06/27/24 1611    heparin 25,000 Units in dextrose 5% 250 mL (100 Units/mL) infusion (premix), 0-4,500 Units/hr, intravenous, Continuous, Luis Kimbrough MD, Last Rate: 14 mL/hr at 06/27/24 1345, 1,400 Units/hr at 06/27/24 1345    [Held by provider] heparin bolus from bag 2,000-4,000 Units, 2,000-4,000 Units, intravenous, q4h PRN, Joanie Hill MD    HYDROcodone-acetaminophen (Norco)  mg per tablet 1 tablet, 1 tablet, oral, q8h PRN, Luis Kimbrough MD, 1 tablet at 06/27/24 1611    LORazepam (Ativan) tablet 1 mg, 1 mg, oral, q6h PRN, Luis Kimbrough MD, 1 mg at 06/27/24 0952    montelukast (Singulair) tablet 10 mg, 10 mg, oral, Nightly, Luis Kimbrough MD, 10 mg at 06/26/24 2009    OLANZapine (ZyPREXA) tablet 5 mg, 5 mg, oral, Nightly, Luis Kimbrough MD, 5 mg at 06/26/24 2009    ondansetron (Zofran) tablet 4 mg, 4 mg, oral, q8h PRN **OR** ondansetron (Zofran) injection 4 mg, 4 mg, intravenous, q8h PRN, Candido Dukes MD, 4 mg at 06/27/24 0952    oxyCODONE (Roxicodone) immediate release tablet 5 mg, 5 mg, oral, q6h PRN, Bea Gary MD, 5 mg at 06/27/24 1116    polyethylene glycol (Glycolax, Miralax) packet 17 g, 17 g, oral, Daily, Luis Kimbrough MD, 17 g at 06/27/24 0844    prochlorperazine (Compazine) tablet 5 mg, 5 mg, oral, q6h PRN, Luis Kimbrough MD    rosuvastatin (Crestor) tablet 20 mg, 20 mg, oral, Nightly, Luis Kimbrough MD, 20 mg at 06/26/24 2009    sertraline (Zoloft) tablet 100 mg, 100 mg, oral, BID, Luis Kimbrouhg MD, 100 mg at 06/27/24 0845    simethicone (Mylicon) chewable tablet 80 mg, 80 mg, oral, q6h PRN, Luis Kimbrough MD    Physical Exam.  Physical Exam  Vitals and nursing note reviewed.   Constitutional:        Appearance: She is normal weight.   HENT:      Head: Normocephalic.   Cardiovascular:      Rate and Rhythm: Normal rate and regular rhythm.      Pulses: Normal pulses.   Pulmonary:      Effort: Pulmonary effort is normal.      Breath sounds: Normal breath sounds.   Abdominal:      General: Abdomen is flat.   Feet:      Comments: B/L:   Normal sensation to crude, sharp touch.  Feels warmth and cold US gel    Left Foot Drop. Doppler pulses on PT and DP(faint)  Right Foot: Doppler Pulses on PT and DP  Skin:     Capillary Refill: Capillary refill takes less than 2 seconds.   Neurological:      Mental Status: She is alert.         Luis Kimbrough MD  PGY2 IM

## 2024-06-28 LAB
ALBUMIN SERPL BCP-MCNC: 3.2 G/DL (ref 3.4–5)
ANION GAP SERPL CALC-SCNC: 11 MMOL/L (ref 10–20)
BUN SERPL-MCNC: 12 MG/DL (ref 6–23)
CALCIUM SERPL-MCNC: 8.2 MG/DL (ref 8.6–10.6)
CHLORIDE SERPL-SCNC: 102 MMOL/L (ref 98–107)
CO2 SERPL-SCNC: 30 MMOL/L (ref 21–32)
CREAT SERPL-MCNC: 0.75 MG/DL (ref 0.5–1.05)
EGFRCR SERPLBLD CKD-EPI 2021: 87 ML/MIN/1.73M*2
ERYTHROCYTE [DISTWIDTH] IN BLOOD BY AUTOMATED COUNT: 14.4 % (ref 11.5–14.5)
GLUCOSE SERPL-MCNC: 95 MG/DL (ref 74–99)
HCT VFR BLD AUTO: 37.9 % (ref 36–46)
HGB BLD-MCNC: 12.2 G/DL (ref 12–16)
MAGNESIUM SERPL-MCNC: 1.77 MG/DL (ref 1.6–2.4)
MCH RBC QN AUTO: 28.9 PG (ref 26–34)
MCHC RBC AUTO-ENTMCNC: 32.2 G/DL (ref 32–36)
MCV RBC AUTO: 90 FL (ref 80–100)
NRBC BLD-RTO: 0 /100 WBCS (ref 0–0)
PHOSPHATE SERPL-MCNC: 2.9 MG/DL (ref 2.5–4.9)
PLATELET # BLD AUTO: 142 X10*3/UL (ref 150–450)
POTASSIUM SERPL-SCNC: 4.3 MMOL/L (ref 3.5–5.3)
RBC # BLD AUTO: 4.22 X10*6/UL (ref 4–5.2)
SODIUM SERPL-SCNC: 139 MMOL/L (ref 136–145)
WBC # BLD AUTO: 10.8 X10*3/UL (ref 4.4–11.3)

## 2024-06-28 PROCEDURE — 2500000004 HC RX 250 GENERAL PHARMACY W/ HCPCS (ALT 636 FOR OP/ED): Performed by: NURSE PRACTITIONER

## 2024-06-28 PROCEDURE — 80069 RENAL FUNCTION PANEL: CPT | Performed by: NURSE PRACTITIONER

## 2024-06-28 PROCEDURE — 97165 OT EVAL LOW COMPLEX 30 MIN: CPT | Mod: GO

## 2024-06-28 PROCEDURE — 2500000001 HC RX 250 WO HCPCS SELF ADMINISTERED DRUGS (ALT 637 FOR MEDICARE OP)

## 2024-06-28 PROCEDURE — 99233 SBSQ HOSP IP/OBS HIGH 50: CPT | Performed by: INTERNAL MEDICINE

## 2024-06-28 PROCEDURE — 2500000004 HC RX 250 GENERAL PHARMACY W/ HCPCS (ALT 636 FOR OP/ED): Performed by: STUDENT IN AN ORGANIZED HEALTH CARE EDUCATION/TRAINING PROGRAM

## 2024-06-28 PROCEDURE — 83735 ASSAY OF MAGNESIUM: CPT | Performed by: NURSE PRACTITIONER

## 2024-06-28 PROCEDURE — 2500000002 HC RX 250 W HCPCS SELF ADMINISTERED DRUGS (ALT 637 FOR MEDICARE OP, ALT 636 FOR OP/ED)

## 2024-06-28 PROCEDURE — 85027 COMPLETE CBC AUTOMATED: CPT | Performed by: NURSE PRACTITIONER

## 2024-06-28 PROCEDURE — 2500000004 HC RX 250 GENERAL PHARMACY W/ HCPCS (ALT 636 FOR OP/ED)

## 2024-06-28 PROCEDURE — 36415 COLL VENOUS BLD VENIPUNCTURE: CPT | Performed by: NURSE PRACTITIONER

## 2024-06-28 PROCEDURE — 1200000002 HC GENERAL ROOM WITH TELEMETRY DAILY

## 2024-06-28 RX ORDER — LANOLIN ALCOHOL/MO/W.PET/CERES
800 CREAM (GRAM) TOPICAL ONCE
Status: COMPLETED | OUTPATIENT
Start: 2024-06-28 | End: 2024-06-28

## 2024-06-28 ASSESSMENT — COGNITIVE AND FUNCTIONAL STATUS - GENERAL
MOVING TO AND FROM BED TO CHAIR: A LITTLE
STANDING UP FROM CHAIR USING ARMS: A LITTLE
MOBILITY SCORE: 17
WALKING IN HOSPITAL ROOM: A LOT
TOILETING: A LOT
DRESSING REGULAR UPPER BODY CLOTHING: A LITTLE
PERSONAL GROOMING: A LITTLE
MOBILITY SCORE: 17
CLIMB 3 TO 5 STEPS WITH RAILING: TOTAL
MOBILITY SCORE: 18
DRESSING REGULAR LOWER BODY CLOTHING: A LITTLE
HELP NEEDED FOR BATHING: A LITTLE
MOVING TO AND FROM BED TO CHAIR: A LITTLE
DRESSING REGULAR LOWER BODY CLOTHING: A LITTLE
MOVING TO AND FROM BED TO CHAIR: A LITTLE
STANDING UP FROM CHAIR USING ARMS: A LITTLE
DRESSING REGULAR UPPER BODY CLOTHING: A LITTLE
TOILETING: A LITTLE
WALKING IN HOSPITAL ROOM: A LOT
WALKING IN HOSPITAL ROOM: A LITTLE
DAILY ACTIVITIY SCORE: 19
DRESSING REGULAR UPPER BODY CLOTHING: A LITTLE
DRESSING REGULAR UPPER BODY CLOTHING: A LITTLE
TOILETING: A LOT
CLIMB 3 TO 5 STEPS WITH RAILING: TOTAL
DAILY ACTIVITIY SCORE: 20
HELP NEEDED FOR BATHING: A LITTLE
STANDING UP FROM CHAIR USING ARMS: A LITTLE
CLIMB 3 TO 5 STEPS WITH RAILING: TOTAL
DRESSING REGULAR LOWER BODY CLOTHING: A LITTLE
DAILY ACTIVITIY SCORE: 20
HELP NEEDED FOR BATHING: A LITTLE
DRESSING REGULAR LOWER BODY CLOTHING: A LITTLE
DAILY ACTIVITIY SCORE: 18
HELP NEEDED FOR BATHING: A LITTLE
TOILETING: A LITTLE

## 2024-06-28 ASSESSMENT — PAIN DESCRIPTION - ORIENTATION: ORIENTATION: LEFT

## 2024-06-28 ASSESSMENT — PAIN SCALES - WONG BAKER
WONGBAKER_NUMERICALRESPONSE: HURTS LITTLE BIT
WONGBAKER_NUMERICALRESPONSE: NO HURT

## 2024-06-28 ASSESSMENT — PAIN SCALES - PAIN ASSESSMENT IN ADVANCED DEMENTIA (PAINAD)
TOTALSCORE: MEDICATION (SEE MAR)
CONSOLABILITY: NO NEED TO CONSOLE
FACIALEXPRESSION: SMILING OR INEXPRESSIVE
BREATHING: NORMAL
TOTALSCORE: 0
TOTALSCORE: 0
CONSOLABILITY: NO NEED TO CONSOLE
BODYLANGUAGE: RELAXED
FACIALEXPRESSION: SMILING OR INEXPRESSIVE
BREATHING: NORMAL
BODYLANGUAGE: RELAXED

## 2024-06-28 ASSESSMENT — PAIN SCALES - GENERAL
PAINLEVEL_OUTOF10: 8
PAINLEVEL_OUTOF10: 5 - MODERATE PAIN
PAINLEVEL_OUTOF10: 0 - NO PAIN
PAINLEVEL_OUTOF10: 5 - MODERATE PAIN
PAINLEVEL_OUTOF10: 3

## 2024-06-28 ASSESSMENT — ACTIVITIES OF DAILY LIVING (ADL)
BATHING_ASSISTANCE: MINIMAL
ADL_ASSISTANCE: INDEPENDENT

## 2024-06-28 ASSESSMENT — PAIN - FUNCTIONAL ASSESSMENT
PAIN_FUNCTIONAL_ASSESSMENT: 0-10
PAIN_FUNCTIONAL_ASSESSMENT: 0-10

## 2024-06-28 ASSESSMENT — PAIN DESCRIPTION - LOCATION: LOCATION: LEG

## 2024-06-28 NOTE — CARE PLAN
Problem: Safety - Adult  Goal: Free from fall injury  Outcome: Progressing     Problem: Pain  Goal: Takes deep breaths with improved pain control throughout the shift  Outcome: Progressing     Problem: Pain  Goal: Turns in bed with improved pain control throughout the shift  Outcome: Progressing     Problem: Pain  Goal: Performs ADL's with improved pain control throughout shift  Outcome: Progressing     Problem: Pain  Goal: Participates in PT with improved pain control throughout the shift  Outcome: Progressing     Problem: Skin  Goal: Prevent/manage excess moisture  Outcome: Progressing       The clinical goals for the shift include pt will remain HDS throughout shift

## 2024-06-28 NOTE — PROGRESS NOTES
Subjective Data:  Stable. PT dopplered easily. Left foot drop.    Overnight Events:    Complains of Left Calf pain and swelling. Vascular surgery evaluated the patient and advised conservative management.      Objective Data:  Last Recorded Vitals:  Vitals:    06/28/24 0507 06/28/24 0720 06/28/24 1127 06/28/24 1514   BP: 138/76 117/68 120/71 116/68   Pulse: 63 69 76 72   Resp: 17 20 18 18   Temp: 36.6 °C (97.9 °F) 36.6 °C (97.9 °F) 36.4 °C (97.5 °F) 36.5 °C (97.7 °F)   TempSrc: Temporal      SpO2: 96% 98% 94% 92%   Weight:       Height:           Last Labs:  CBC - 6/28/2024:  8:31 AM  10.8 12.2 142    37.9      CMP - 6/28/2024:  8:31 AM  8.2 6.1 15 --- 0.6   2.9 3.2 12 44      PTT - 8/30/2023: 11:00 AM  1.0   11.3 36     TROPHS   Date/Time Value Ref Range Status   03/11/2024 02:24 PM 12 0 - 13 ng/L Final   03/11/2024 12:39 PM 12 0 - 13 ng/L Final   11/28/2023 09:16 AM 2,735 0 - 13 ng/L Final     Comment:     Previous result verified on 11/28/2023 0805 on specimen/case [id number] called with component [facility] for procedure Troponin I, High Sensitivity with value 2,902 ng/L.     BNP   Date/Time Value Ref Range Status   11/28/2023 07:12  0 - 99 pg/mL Final   10/22/2023 06:37 PM 82 0 - 99 pg/mL Final     HGBA1C   Date/Time Value Ref Range Status   06/25/2024 03:44 PM 5.8 see below % Final   03/12/2024 06:45 AM 5.9 see below % Final     LDLCALC   Date/Time Value Ref Range Status   06/27/2024 04:27 AM 69 <=99 mg/dL Final     Comment:                                 Near   Borderline      AGE      Desirable  Optimal    High     High     Very High     0-19 Y     0 - 109     ---    110-129   >/= 130     ----    20-24 Y     0 - 119     ---    120-159   >/= 160     ----      >24 Y     0 -  99   100-129  130-159   160-189     >/=190       VLDL   Date/Time Value Ref Range Status   06/27/2024 04:27 AM 30 0 - 40 mg/dL Final   05/02/2023 09:07 AM 18 0 - 40 mg/dL Final   09/29/2020 07:46 AM 23 0 - 40 mg/dL Final      Last  I/O:  I/O last 3 completed shifts:  In: 590.6 (8.7 mL/kg) [I.V.:590.6 (8.7 mL/kg)]  Out: 100 (1.5 mL/kg) [Urine:100 (0 mL/kg/hr)]  Weight: 67.7 kg     Past Cardiology Tests (Last 3 Years):  Vascular ultrasound 6/27/2024  CONCLUSIONS:  Right Lower PVR: No evidence of arterial occlusive disease in the right lower extremity at rest. Normal digital perfusion noted. Multiphasic flow is noted in the right common femoral artery, right posterior tibial artery and right dorsalis pedis artery.  Left Lower PVR: Evidence of mild arterial occlusive disease in the left lower extremity at rest. Monophasic flow is noted in the left posterior tibial artery and left dorsalis pedis artery. Unable to obtain the left CFA waveform due to dressings/bandaging.     Comparison:  Compared with study from 6/26/2024, No significant change on the right. Severity of disease improved from severe to mild PAD since prior exam.     Imaging & Doppler Findings:     RIGHT Lower PVR                Pressures Ratios  Right Posterior Tibial (Ankle) 128 mmHg  1.02  Right Dorsalis Pedis (Ankle)   113 mmHg  0.90  Right Digit (Great Toe)        82 mmHg   0.66           LEFT Lower PVR                Pressures Ratios  Left Posterior Tibial (Ankle) 94 mmHg   0.75  Left Dorsalis Pedis (Ankle)   88 mmHg   0.70  Left Digit (Great Toe)        24 mmHg   0.19            Inpatient Medications:  Scheduled medications   Medication Dose Route Frequency    acetaminophen  975 mg oral q6h    Or    acetaminophen  650 mg nasogastric tube q6h    Or    acetaminophen  650 mg rectal q6h    apixaban  5 mg oral BID    aspirin  81 mg oral Daily    clopidogrel  75 mg oral q AM    dexAMETHasone  2 mg oral Daily    tiotropium  2 puff inhalation Daily    And    fluticasone furoate-vilanteroL  1 puff inhalation Daily    gabapentin  100 mg oral TID    montelukast  10 mg oral Nightly    OLANZapine  5 mg oral Nightly    polyethylene glycol  17 g oral Daily    rosuvastatin  20 mg oral Nightly     sertraline  100 mg oral BID     PRN medications   Medication    bisacodyl    HYDROcodone-acetaminophen    LORazepam    ondansetron    Or    ondansetron    oxyCODONE    prochlorperazine    simethicone     Continuous Medications   Medication Dose Last Rate       Physical Exam:  GEN      Mild discomfort d/t left calf pain, dry mucous membranes   HENT     NCAT,   Eyes      PERRL. EOMI. Anicteric  NECK     Supple, No JVD, No thyromegaly  CHEST   No resp distress, normal diaphragmatic movement.  HEART   RRR, S1S2+, No M/R/G  ABD       Soft, NT, ND.  EXT       No clubbing, cyanosis or edema.  NEURO  Alert, oriented, and appropriately interactive. Moving all extremities  Skin       Warm and dry, No rashes      6/25/24:  CONCLUSIONS:   1. Indication: new CLTI RC V LLE (new wound).   2. Unilateral angiogram and intervention - appears to be subacute thrombus.   3. Laser atherectomy/JETi thrombectomy/PTA of fem/pop/TPT/PT, FCO of distal femoral artery overlap into popliteal prior stent.   4. JETi thrombectomy/PTA of ostial to proximal AT.   5. Integrilin x16 h infusion; once ACT <170, will need to remove antegrade sheath.   6. APLA testing and consideration of DOAC/P2Y12 on discharge - notified oncologist d/t underlying malignancy and ongoing therapy.   7. Close EVLS follow up.       6/26/2024:  Left Lower Extremity Intervention recurrent occlusion SFA/pop/tibials/AT  S/P Thrombectomy - JETI /SFA, POP PT, and TPT  FCO to BK Pop and TPT  PTA AT        Assessment/Plan   69 F with a history of CAD (and prior STEMI), HTN, HLD, PAD s/p multiple prior interventions, NSCLC who presented due to a worsening wound of the left lateral foot and pain. She underwent peripheral revascularization on 6/25 which was complicated by stent occlusion requiring repeat thrombectomy on 6/26.   - Plan for triple therapy for a week and then transition to dual therapy Eliquis + Plavix.   - Advise Tubigrips for leg swelling.   - Outpatient follow up  with DR. Mike on 08/01/24.            Peripheral IV 06/25/24 20 G Distal;Right;Dorsal Forearm (Active)   Site Assessment Clean;Dry;Intact 06/28/24 0836   Dressing Status Dry;Clean 06/28/24 0836   Number of days: 3       Peripheral IV 06/27/24 22 G Right;Anterior Forearm (Active)   Site Assessment Clean;Dry;Intact 06/28/24 0836   Dressing Status Dry;Clean 06/28/24 0836   Number of days: 1       Code Status:  Full Code  Patient was seen/examined with DR. Mike at bedside.   I spent 30 minutes in the professional and overall care of this patient.        Jordon Smith MD

## 2024-06-28 NOTE — CARE PLAN
Patient was transferred from CICU, in stable condition:    69 y.o. female with PMHx of STEMI, stable CAD , Hypertension, hyperlipidemia, PAD s/p (left  popliteal/TPT/PT reconstruction with PTA/DCB, Supera/SES to popliteal occlusion with Dr Mike on 1/23/24) , nicotine abuse, non small lung cancer, /w worsening  wound to the left lateral foot for 2 weeks and increased pain in the left foot. Pt s/p Laser atherectomy/JETI/PTA fem/pop/TPT/PT, FCO to mid SFA and JETI/PTA AT on (6/25/24 )  c/b stent occlusion s/p  repeat thrombectomy on 6/26/24)- JETI /SFA, POP PT, and TPT, FCO to BK Pop and TPT PTA AT, was on heparin gtt post rocedure, transitioned to Eliquis. On DAPT regiment with ASA and Plavix.     BP:     Pulse: 74   Resp: 17   Temp: 37 °C (98.6 °F)   SpO2: 99%         Plan of care:  Repeat TRAVIS/TBI inpatient prior to discharge  Cont. uninterrupted DAPT with ASA and Plavix and anticoagulation with Eliquis.  Aspirin to be stopped 1 week from now, and keep Eliquis BID with Plavix until stopped by Vascular specialist physician  Follow up 1 month with TRAVIS TBI with Dr. Willam Mike, scheduled on 8/1/24 at 7:30 am  Podiatry to provide Foot Drop brace for her left leg

## 2024-06-28 NOTE — PROGRESS NOTES
Subjective Data:  Patient reporting LLE pain (along her anterior ankle), had continued swelling. EVLS and vascular surgery aware/have no concerns for compartment syndrome. Otherwise, denies CP/SOB at rest. Very unmotivated to ambulate in her room (opting to use the bedpan despite frequent encouragement to use the restroom). Declines [facility] services.    - per EVLS and vasc. Surgery, no c/f compartment syndrome d/t current LLE pain/swelling. Post-procedural TRAVIS's unremarkable. Recommend tubigribs, Follow-up w/repeat TRAVIS's arranged for 8/1  - needs LLE foot-drop brace with podiatry (recommended Mookie Doss, per Dr. Mike; only able to obtain on an OP basis)  - PT/OT visited patient; PT note's pending, per OT rec mod. Intensity  - discharge pending improvement in patient's ambulation and dispo planning     Overnight Events:    No acute events overnight. [facility] last evening.     Objective Data:  Last Recorded Vitals:  Vitals:    06/28/24 0507 06/28/24 0720 06/28/24 1127 06/28/24 1514   BP: 138/76 117/68 120/71 116/68   Pulse: 63 69 76 72   Resp: 17 20 18 18   Temp: 36.6 °C (97.9 °F) 36.6 °C (97.9 °F) 36.4 °C (97.5 °F) 36.5 °C (97.7 °F)   TempSrc: Temporal      SpO2: 96% 98% 94% 92%   Weight:       Height:         Last Labs:  CBC - 6/28/2024:  8:31 AM  10.8 12.2 142    37.9      CMP - 6/28/2024:  8:31 AM  8.2 6.1 15 --- 0.6   2.9 3.2 12 44      PTT - 8/30/2023: 11:00 AM  1.0   11.3 36     TROPHS   Date/Time Value Ref Range Status   03/11/2024 02:24 PM 12 0 - 13 ng/L Final   03/11/2024 12:39 PM 12 0 - 13 ng/L Final   11/28/2023 09:16 AM 2,735 0 - 13 ng/L Final     Comment:     Previous result verified on 11/28/2023 0805 on specimen/case [id number] called with component [facility] for procedure Troponin I, High Sensitivity with value 2,902 ng/L.     BNP   Date/Time Value Ref Range Status   11/28/2023 07:12  0 - 99 pg/mL Final   10/22/2023 06:37 PM 82 0 - 99 pg/mL Final     HGBA1C   Date/Time  "Value Ref Range Status   06/25/2024 03:44 PM 5.8 see below % Final   03/12/2024 06:45 AM 5.9 see below % Final     LDLCALC   Date/Time Value Ref Range Status   06/27/2024 04:27 AM 69 <=99 mg/dL Final     Comment:                                 Near   Borderline      AGE      Desirable  Optimal    High     High     Very High     0-19 Y     0 - 109     ---    110-129   >/= 130     ----    20-24 Y     0 - 119     ---    120-159   >/= 160     ----      >24 Y     0 -  99   100-129  130-159   160-189     >/=190       VLDL   Date/Time Value Ref Range Status   06/27/2024 04:27 AM 30 0 - 40 mg/dL Final   05/02/2023 09:07 AM 18 0 - 40 mg/dL Final   09/29/2020 07:46 AM 23 0 - 40 mg/dL Final      Last I/O:  I/O last 3 completed shifts:  In: 590.6 (8.7 mL/kg) [I.V.:590.6 (8.7 mL/kg)]  Out: 100 (1.5 mL/kg) [Urine:100 (0 mL/kg/hr)]  Weight: 67.7 kg     Past Cardiology Tests (Last 3 Years):  EKG:  Electrocardiogram 12 Lead 06/26/2024 (Preliminary)  Electrocardiogram, 12-lead PRN ACS symptoms 06/25/2024 (Preliminary)  ECG 12 lead 03/11/2024  Electrocardiogram 12 Lead 01/23/2024  ECG 12 lead 11/28/2023  ECG 12 Lead 11/28/2023  ECG 12 lead 11/01/2023  Echo:  No results found for this or any previous visit from the past 1095 days.  Ejection Fractions:  No results found for: \"EF\"  Cath:  No results found for this or any previous visit from the past 1095 days.  Stress Test:  No results found for this or any previous visit from the past 1095 days.  Cardiac Imaging:  No results found for this or any previous visit from the past 1095 days.    Inpatient Medications:  Scheduled medications   Medication Dose Route Frequency    acetaminophen  975 mg oral q6h    Or    acetaminophen  650 mg nasogastric tube q6h    Or    acetaminophen  650 mg rectal q6h    apixaban  5 mg oral BID    aspirin  81 mg oral Daily    clopidogrel  75 mg oral q AM    dexAMETHasone  2 mg oral Daily    tiotropium  2 puff inhalation Daily    And    fluticasone " furoate-vilanteroL  1 puff inhalation Daily    gabapentin  100 mg oral TID    montelukast  10 mg oral Nightly    OLANZapine  5 mg oral Nightly    polyethylene glycol  17 g oral Daily    rosuvastatin  20 mg oral Nightly    sertraline  100 mg oral BID     PRN medications   Medication    bisacodyl    HYDROcodone-acetaminophen    LORazepam    ondansetron    Or    ondansetron    oxyCODONE    prochlorperazine    simethicone     Continuous Medications   Medication Dose Last Rate     Physical Exam:  General: NAD, lying in bed  Skin: warm and dry throughout, LLE erythema and localized swelling  Head/ neck: no JVD seen at 90 degrees  Cardiac: RRR, S1, S2   Pulm: CTAB, room air   GI: soft, nontender   Extremities: no LE edema   Neuro: no focal neuro deficits   Psych: appropriate mood and behavior       Assessment/Plan   Radha Toussaint is a 69 y.o. female with PMHx of STEMI, stable CAD , Hypertension, hyperlipidemia, PAD s/p (left  popliteal/TPT/PT reconstruction with PTA/DCB, Supera/SES to popliteal occlusion with Dr Mike on 1/23/24) , nicotine abuse, non small lung cancer, /w worsening  wound to the left lateral foot for 2 weeks and increased pain in the left foot. Pt s/p Laser atherectomy/JETI/PTA fem/pop/TPT/PT, FCO to mid SFA and JETI/PTA AT on (6/25/24 )  c/b stent occlusion s/p  repeat thrombectomy - JETI /SFA, POP PT, and TPT, FCO to BK Pop and TPT PTA AT (6/26/24), currently on heparin gtt, awaiting transfer to EVLS after repeat TRAVIS on LLE.     PAD  New L foot drop  - Pt s/p Laser atherectomy/JETI/PTA fem/pop/TPT/PT, FCO to mid SFA and JETI/PTA AT on (6/25/24 )  c/b stent occlusion s/p  repeat thrombectomy - JETI /SFA, POP PT, and TPT, FCO to BK Pop and TPT PTA AT (6/26/24)  - post-procedural TRAVIS (6/27 evening): RLE 1.02, 0.90, 0.66, LLE 0.75, 0.70, 0.19 (mild)  - LLE swelling/pain; per EVLS and vasc. Surgery, no c/f compartment syndrome, recommend tubigrip compressions  - new LLE foot drop (EVLS & vasc. Sx aware):  arrange for OP follow-up (with Mookie Doss, per Dr. Mike) to arrange for foot brace (can only be done on an OP basis)  - cont. Triple therapy with eliquis/ASA/plavix x1 week per Dr. Mike (eliquis started 6/27 evening), then drop ASA and cont. Eliquis/plavix afterwards  - cont. Rosuvastatin 20mg daily (increased from home 10mg during this stay)  - has OP follow-up with repeat TRAVIS's with Dr. Mike on 8/1  - cont. Current pain regimen (correlates with home medications as well, OARRS reviewed)     COPD  Chronic respiratory failure with hypoxia on home o2  Tobacco use  H/o non small cell lung cancer  - cont. Baseline O2 (2L N/C)  - encourage tobacco cessation; stressed importance of this   - Cont breo, PRN albuterol  - cont. home Dexamethasone 2mg for h/o lung CA     DVT ppx: eliquis   DISPO: patient declines HHC services, OT recs mod-intensity, PT recs pending   - discharge pending improvement in patient's ambulation and dispo planning     Patient seen and discussed with Dr. Smith     Code Status:  Full Code    Caroline Self, APRN-CNP

## 2024-06-28 NOTE — PROGRESS NOTES
Occupational Therapy    Evaluation    Patient Name: Radha Toussaint  MRN: [id number]  Today's Date: 6/28/2024  Time Calculation  Start Time: 1345  Stop Time: 1405  Time Calculation (min): 20 min      Assessment:  OT Assessment: Pt will benefit from continued skilled OT to increase independence in ADLs, functional mobility, activity tolerance, safety, and balance  Prognosis: Excellent  Barriers to Discharge: None  Evaluation/Treatment Tolerance: Patient tolerated treatment well  Medical Staff Made Aware: Yes  End of Session Communication: Bedside nurse  End of Session Patient Position: Bed, 3 rail up, Alarm on  OT Assessment Results: Decreased ADL status, Decreased upper extremity strength, Decreased safe judgment during ADL, Decreased endurance, Decreased functional mobility, Decreased IADLs  Prognosis: Excellent  Barriers to Discharge: None  Evaluation/Treatment Tolerance: Patient tolerated treatment well  Medical Staff Made Aware: Yes  Strengths: Attitude of self  Barriers to Participation: Comorbidities  Plan:  Treatment Interventions: ADL retraining, Functional transfer training, UE strengthening/ROM, Endurance training, Equipment evaluation/education, Patient/family training, Compensatory technique education  OT Frequency: 3 times per week  OT Discharge Recommendations: Moderate intensity level of continued care  Equipment Recommended upon Discharge:  (TBD)  OT Recommended Transfer Status: Assist of 1  OT - OK to Discharge: Yes (upon medical clearance)  Treatment Interventions: ADL retraining, Functional transfer training, UE strengthening/ROM, Endurance training, Equipment evaluation/education, Patient/family training, Compensatory technique education    Subjective   Current Problem:  1. PAD (peripheral artery disease) (CMS-[facility])  Invasive vascular procedure    Invasive vascular procedure    Case Request Cath Lab: Lower Extremity Intervention    Case Request Cath Lab: Lower Extremity Intervention    Invasive  vascular procedure    Invasive vascular procedure    CANCELED: Case Request Operating Room: Creation Bypass Graft Femoral Popliteal Artery    CANCELED: Case Request Operating Room: Creation Bypass Graft Femoral Popliteal Artery    CANCELED: Vascular US lower extremity arterial duplex left with TRAVIS    CANCELED: Vascular US lower extremity arterial duplex left with TRAVIS    CANCELED: Vascular US lower extremity arterial duplex left with TRAVIS    CANCELED: Vascular US lower extremity arterial duplex left with TRAVIS      2. Claudication (CMS-HCC)  Vascular US Ankle Brachial Index (TRAVIS) Without Exercise    Vascular US Ankle Brachial Index (TRAVIS) Without Exercise    CANCELED: Vascular US Lower Extremity Arterial Duplex Left With TRAVIS    CANCELED: Vascular US Lower Extremity Arterial Duplex Left With TRAVIS    CANCELED: Vascular US lower extremity arterial duplex left with TRAVIS    CANCELED: Vascular US lower extremity arterial duplex left with TRAVIS    CANCELED: Vascular US lower extremity arterial duplex left with TRAVIS    CANCELED: Vascular US lower extremity arterial duplex left with TRAVIS      3. Critical limb ischemia of left lower extremity (Multi)  Case Request Cath Lab: Lower Extremity Intervention    Case Request Cath Lab: Lower Extremity Intervention    Invasive vascular procedure    Invasive vascular procedure    CANCELED: Lower extremity vein mapping bilateral    CANCELED: Upper extremity vein mapping bilateral    CANCELED: Lower extremity vein mapping bilateral    CANCELED: Upper extremity vein mapping bilateral    CANCELED: Case Request Cath Lab: Lower Extremity Angiogram    CANCELED: Case Request Cath Lab: Lower Extremity Angiogram    CANCELED: Invasive vascular procedure    CANCELED: Invasive vascular procedure    CANCELED: Case Request Operating Room: Creation Bypass Graft Femoral Popliteal Artery    CANCELED: Case Request Operating Room: Creation Bypass Graft Femoral Popliteal Artery    CANCELED: Vascular US Lower  Extremity Arterial Duplex Bilateral With TRAVIS    CANCELED: Vascular US Lower Extremity Arterial Duplex Bilateral With TRAVIS    CANCELED: Vascular US Lower Extremity Arterial Duplex Bilateral With TRAVIS    CANCELED: Vascular US Lower Extremity Arterial Duplex Bilateral With TRAVIS    CANCELED: Vascular US lower extremity arterial duplex left with TRAVIS    CANCELED: Vascular US lower extremity arterial duplex left with TRAVIS    CANCELED: Vascular US lower extremity arterial duplex left with TRAVIS    CANCELED: Vascular US lower extremity arterial duplex left with TRAVIS      4. 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, bilateral legs (Multi)  CANCELED: Lower extremity vein mapping bilateral    CANCELED: Upper extremity vein mapping bilateral    CANCELED: Lower extremity vein mapping bilateral    CANCELED: Upper extremity vein mapping bilateral    CANCELED: Vascular US Lower Extremity Arterial Duplex Bilateral With TRAVIS    CANCELED: Vascular US Lower Extremity Arterial Duplex Bilateral With TRAVIS    CANCELED: Vascular US Lower Extremity Arterial Duplex Bilateral With TRAVIS    CANCELED: Vascular US Lower Extremity Arterial Duplex Bilateral With TRAVIS    CANCELED: Vascular US lower extremity arterial duplex left with TRAVIS    CANCELED: Vascular US lower extremity arterial duplex left with TRAVIS    CANCELED: Vascular US lower extremity arterial duplex left with TRAVIS    CANCELED: Vascular US lower extremity arterial duplex left with TRAVIS      5. Occlusion of left popliteal artery (CMS-HCC)  Vascular US ankle brachial index (TRAVIS) without exercise    Vascular US ankle brachial index (TRAVIS) without exercise    CANCELED: Vascular US Lower Extremity Vein Mapping Bilateral    CANCELED: Vascular US Lower Extremity Vein Mapping Bilateral    CANCELED: Vascular US Lower Extremity Arterial Duplex Bilateral With TRAVIS    CANCELED: Vascular US Lower Extremity Arterial Duplex Bilateral With TRAVIS    CANCELED: Vascular US Lower  Extremity Arterial Duplex Bilateral With TRAVIS    CANCELED: Vascular US Lower Extremity Arterial Duplex Bilateral With TRAVIS    CANCELED: Vascular US lower extremity arterial duplex left with TRAVIS    CANCELED: Vascular US lower extremity arterial duplex left with TRAVIS    CANCELED: Vascular US lower extremity arterial duplex left with TRAVIS    CANCELED: Vascular US lower extremity arterial duplex left with TRAVIS    CANCELED: Vascular US Lower Extremity Vein Mapping Bilateral    CANCELED: Vascular US Lower Extremity Vein Mapping Bilateral      6. Peripheral artery embolism or thrombosis (Multi)  Vascular US ankle brachial index (TRAVIS) without exercise    Vascular US ankle brachial index (TRAVIS) without exercise    CANCELED: Vascular US Lower Extremity Vein Mapping Bilateral    CANCELED: Vascular US Lower Extremity Vein Mapping Bilateral    CANCELED: Vascular US Lower Extremity Arterial Duplex Bilateral With TRAVIS    CANCELED: Vascular US Lower Extremity Arterial Duplex Bilateral With TRAVIS    CANCELED: Vascular US Lower Extremity Arterial Duplex Bilateral With TRAVIS    CANCELED: Vascular US Lower Extremity Arterial Duplex Bilateral With TRAVIS    CANCELED: Vascular US Lower Extremity Vein Mapping Bilateral    CANCELED: Vascular US Lower Extremity Vein Mapping Bilateral      7. 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, left leg (Multi)  Vascular US ankle brachial index (TRAVIS) without exercise    Vascular US ankle brachial index (TRAVIS) without exercise    CANCELED: Vascular US Lower Extremity Vein Mapping Bilateral    CANCELED: Vascular US Lower Extremity Vein Mapping Bilateral    CANCELED: Vascular US Lower Extremity Arterial Duplex Bilateral With TRAVIS    CANCELED: Vascular US Lower Extremity Arterial Duplex Bilateral With TRAVIS    CANCELED: Vascular US Lower Extremity Arterial Duplex Bilateral With TRAVIS    CANCELED: Vascular US Lower Extremity Arterial Duplex Bilateral With TRAVIS    CANCELED: Vascular US  lower extremity arterial duplex left with TRAVIS    CANCELED: Vascular US lower extremity arterial duplex left with TRAVIS    CANCELED: Vascular US lower extremity arterial duplex left with TRAVIS    CANCELED: Vascular US lower extremity arterial duplex left with TRAVIS    CANCELED: Vascular US Lower Extremity Vein Mapping Bilateral    CANCELED: Vascular US Lower Extremity Vein Mapping Bilateral      8. Acute lower limb ischemia  Vascular US ankle brachial index (TRAVIS) without exercise    Vascular US ankle brachial index (TRAVIS) without exercise    CANCELED: Case Request Cath Lab: Lower Extremity Angiogram    CANCELED: Case Request Cath Lab: Lower Extremity Angiogram    CANCELED: Invasive vascular procedure    CANCELED: Invasive vascular procedure    CANCELED: Case Request Cath Lab: Lower Extremity Angiogram    CANCELED: Case Request Cath Lab: Lower Extremity Angiogram    CANCELED: Invasive vascular procedure    CANCELED: Invasive vascular procedure    CANCELED: Vascular US Lower Extremity Arterial Duplex Bilateral With TRAVIS    CANCELED: Vascular US Lower Extremity Arterial Duplex Bilateral With TRAVIS    CANCELED: Vascular US Lower Extremity Arterial Duplex Bilateral With TRAVIS    CANCELED: Vascular US Lower Extremity Arterial Duplex Bilateral With TRAVIS    CANCELED: Vascular US lower extremity arterial duplex left with TRAVIS    CANCELED: Vascular US lower extremity arterial duplex left with TRAVIS    CANCELED: Vascular US lower extremity arterial duplex left with TRAVIS    CANCELED: Vascular US lower extremity arterial duplex left with TRAVIS    left      9. Acute lower limb ischemia  Vascular US ankle brachial index (TRAVIS) without exercise    Vascular US ankle brachial index (TRAVIS) without exercise    CANCELED: Case Request Cath Lab: Lower Extremity Angiogram    CANCELED: Case Request Cath Lab: Lower Extremity Angiogram    CANCELED: Invasive vascular procedure    CANCELED: Invasive vascular procedure    CANCELED: Case Request Cath Lab: Lower  Extremity Angiogram    CANCELED: Case Request Cath Lab: Lower Extremity Angiogram    CANCELED: Invasive vascular procedure    CANCELED: Invasive vascular procedure    CANCELED: Vascular US Lower Extremity Arterial Duplex Bilateral With TRAVIS    CANCELED: Vascular US Lower Extremity Arterial Duplex Bilateral With TRAVIS    CANCELED: Vascular US Lower Extremity Arterial Duplex Bilateral With TRAVIS    CANCELED: Vascular US Lower Extremity Arterial Duplex Bilateral With TRAVIS    CANCELED: Vascular US lower extremity arterial duplex left with TRAVIS    CANCELED: Vascular US lower extremity arterial duplex left with TRAVIS    CANCELED: Vascular US lower extremity arterial duplex left with TRAVIS    CANCELED: Vascular US lower extremity arterial duplex left with TRAVIS      10. Atherosclerosis of native arteries of left leg with ulceration of other part of foot (Multi)  Invasive vascular procedure    Vascular US ankle brachial index (TRAVIS) without exercise    Vascular US ankle brachial index (TRAVIS) without exercise    CANCELED: Vascular US lower extremity arterial duplex left with TRAVIS    CANCELED: Vascular US lower extremity arterial duplex left with TRAVIS    CANCELED: Vascular US lower extremity arterial duplex left with TRAVIS    CANCELED: Vascular US lower extremity arterial duplex left with TRAVIS      11. Other specified peripheral vascular diseases (CMS-HCC)  Invasive vascular procedure      12. Atherosclerosis of native arteries of extremities with intermittent claudication, left leg (CMS-HCC)  Invasive vascular procedure      13. Embolism and thrombosis of other arteries (Multi)  Invasive vascular procedure        General:  General  Reason for Referral: s/p Laser atherectomy/JETI/PTA fem/pop/TPT/PT, FCO to mid SFA and JETI/PTA AT via 6F antegrade L CFA access , was on Integrilin  Past Medical History Relevant to Rehab: PMHx of STEMI almost 10 years ago stable since then, Hypertension, hyperlipidemia, PAD s/p (left  popliteal/TPT/PT  reconstruction with PTA/DCB, Supera/SES to popliteal occlusion with Dr Mike on 1/23/24) , nicotine abuse, non small lung cancer, /w worsening  wound  Family/Caregiver Present: No  Prior to Session Communication: Bedside nurse  Patient Position Received: Bed, 4 rail up, Alarm off, not on at start of session  Preferred Learning Style: verbal, visual  General Comment: Pt supine in bed upon arrival, agreeable to OT  Precautions:  Medical Precautions: Fall precautions, Cardiac precautions, Oxygen therapy device and L/min    Pain:  Pain Assessment  Pain Assessment: 0-10  0-10 (Numeric) Pain Score: 5 - Moderate pain  Pain Type: Acute pain  Pain Location: Leg  Pain Orientation: Left  Pain Interventions: Repositioned, Distraction    Objective   Cognition:  Overall Cognitive Status: Within Functional Limits  Orientation Level: Oriented X4  Insight: Mild  Impulsive: Mildly  Processing Speed: Within funtional limits     Home Living:  Type of Home: House  Lives With: Spouse, Grandchildren, Adult children  Home Adaptive Equipment: Walker rolling or standard, Cane, Wheelchair-manual, Scooter  Home Access: Stairs to enter with rails  Entrance Stairs-Number of Steps: 2  Bathroom Shower/Tub: Walk-in shower  Bathroom Equipment: Grab bars in shower, Shower chair without back  Home Living Comments: (-) falls  Prior Function:  Level of Omar: Independent with ADLs and functional transfers  ADL Assistance: Independent  Homemaking Assistance: Independent  Ambulatory Assistance: Needs assistance (no AD household distance, transport w/c community mob)  Leisure: camping  Hand Dominance: Right  IADL History:  Homemaking Responsibilities: Yes  Meal Prep Responsibility: Primary  Laundry Responsibility: Primary  Cleaning Responsibility: Primary  Bill Paying/Finance Responsibility: Primary  Shopping Responsibility: Primary  Current License: No  Mode of Transportation:  (family drives)  Leisure and Hobbies: camping  ADL:  Eating Assistance:  Independent (anticipated)  Grooming Assistance: Stand by (anticipated)  Bathing Assistance: Minimal (anticipated)  UE Dressing Assistance: Minimal (anticipated)  LE Dressing Assistance: Minimal (donned B socks supine in bed min A)  Toileting Assistance with Device: Moderate (Pt performed toileting seated, STS mod A FWW, SBA lesvia care seated, VCs for safety and positioning)  Activity Tolerance:  Endurance: Tolerates 10 - 20 min exercise with multiple rests  Bed Mobility/Transfers: Bed Mobility  Bed Mobility: Yes  Bed Mobility 1  Bed Mobility 1: Supine to sitting, Sitting to supine  Level of Assistance 1: Contact guard  Bed Mobility Comments 1: HOB elevated    Transfers  Transfer: Yes  Transfer 1  Transfer From 1: Sit to, Stand to  Transfer to 1: Stand, Sit  Technique 1: Sit to stand, Stand to sit  Transfer Device 1: Walker  Transfer Level of Assistance 1: Contact guard  Trials/Comments 1: 2x, VCs for hand placement  Transfers 2  Transfer From 2: Stand to, Toilet to  Transfer to 2: Toilet, Stand  Technique 2: Sit to stand, Stand to sit  Transfer Device 2: Walker  Transfer Level of Assistance 2: Contact guard  Trials/Comments 2: VCs for hand placement    Functional Mobility:  Functional Mobility  Functional Mobility Performed: Yes  Functional Mobility 1  Surface 1: Level tile  Device 1: Rolling walker  Assistance 1: Contact guard, Moderate verbal cues, Moderate assistance  Comments 1: fxnl mob min household distance bed <> bathroom CGA FWW, 2x LOB mod A to recover, SOB with mobility, extended seated rest break required, vitals stable  Sitting Balance:  Static Sitting Balance  Static Sitting-Balance Support: Feet supported  Static Sitting-Level of Assistance: Close supervision  Dynamic Sitting Balance  Dynamic Sitting-Balance Support: Feet supported  Dynamic Sitting-Comments: SBA  Standing Balance:  Static Standing Balance  Static Standing-Balance Support: Bilateral upper extremity supported  Static Standing-Level of  Assistance: Contact guard  Dynamic Standing Balance  Dynamic Standing-Balance Support: Bilateral upper extremity supported  Dynamic Standing-Comments: CGA-mod A   Modalities:  Modalities Used: No  Vision:Vision - Basic Assessment  Current Vision: No visual deficits  Sensation:  Light Touch: No apparent deficits  Sharp/Dull: No apparent deficits  Stereognosis: No apparent deficits  Proprioception: No apparent deficits  Sensation Comment: denies N/T  Strength:  Strength Comments: BUE at least 3/5 grossly, observed through functional observation  Perception:  Inattention/Neglect: Appears intact  Initiation: Appears intact  Motor Planning: Appears intact  Perseveration: Not present  Coordination:  Movements are Fluid and Coordinated: Yes   Hand Function:  Gross Grasp: Functional  Coordination: Functional  Extremities: RUE   RUE : Within Functional Limits and LUE   LUE: Within Functional Limits    Outcome Measures:[facility] Daily Activity  Putting on and taking off regular lower body clothing: A little  Bathing (including washing, rinsing, drying): A little  Putting on and taking off regular upper body clothing: A little  Toileting, which includes using toilet, bedpan or urinal: A lot  Taking care of personal grooming such as brushing teeth: A little  Eating Meals: None  Daily Activity - Total Score: 18     and OT Adult Other Outcome Measures  4AT: negative    Education Documentation  Body Mechanics, taught by Sylvester Atkins OT at 6/28/2024  2:26 PM.  Learner: Patient  Readiness: Acceptance  Method: Explanation  Response: Verbalizes Understanding    Precautions, taught by Sylvester Atkins OT at 6/28/2024  2:26 PM.  Learner: Patient  Readiness: Acceptance  Method: Explanation  Response: Verbalizes Understanding    ADL Training, taught by Sylvester Atkins OT at 6/28/2024  2:26 PM.  Learner: Patient  Readiness: Acceptance  Method: Explanation  Response: Verbalizes Understanding    Education Comments  No comments  found.      Goals:  Encounter Problems       Encounter Problems (Active)       ADLs       Patient with complete lower body dressing with modified independent level of assistance donning and doffing all LE clothes  with PRN adaptive equipment while supported sitting and standing (Progressing)       Start:  06/28/24    Expected End:  07/19/24            Patient will complete toileting including hygiene clothing management/hygiene with modified independent level of assistance and grab bars. (Progressing)       Start:  06/28/24    Expected End:  07/19/24               BALANCE       Pt will maintain dynamic standing balance during ADL task with modified independent level of assistance in order to demonstrate decreased risk of falling and improved postural control. (Progressing)       Start:  06/28/24    Expected End:  07/19/24               MOBILITY       Patient will perform Functional mobility max Household distances/Community Distances with modified independent level of assistance and least restrictive device in order to improve safety and functional mobility. (Progressing)       Start:  06/28/24    Expected End:  07/19/24               TRANSFERS       Patient will complete sit to stand transfer with modified independent level of assistance and least restrictive device in order to improve safety and prepare for out of bed mobility. (Progressing)       Start:  06/28/24    Expected End:  07/19/24                  Sylvester Atkins OTR/L

## 2024-06-28 NOTE — PROGRESS NOTES
"6/28/2024 Care coordination  Radha Toussaint is a 69 y.o. female on day 3 of admission presenting with Acute lower limb ischemia.  Pt transferred from ICU.  States she lives with spouse and other family members.  Has  all the DME equipment she needs at home.  Does admit to home 02.  Does not recall  who supplies 02(said she \"bought it outright\").    Advised  that she should have family bring her portable 02 cylinder for transport home.  She stated she only uses when she is lying down .  Does not use it on exertion.    Discussed PT /OT recs for HHC.  She declined said she has plenty of help at home.   Team aware.  "

## 2024-06-28 NOTE — PROGRESS NOTES
[facility] Attending Shared Visit Note    This is a shared visit. Please see Advanced Practice Provider's encounter note for additional details.      Overnight events/Subjective: left foot still hurts    Exam:   Physical exam: tired appearing, Normal rate, regular rhythm, non labored breathing, clear to auscultation, abdomen non distended, mild rubor of the left leg, faint pulses by palpation on the right, left need to be dopplered, she has a left foot drop.     A/P:   69 F with a history of CAD (and prior STEMI), HTN, HLD, PAD s/p multiple prior interventions, NSCLC who presented due to a worsening wound of the left lateral foot and pain. She underwent peripheral revascularization on 6/25 which was complicated by stent occlusion requiring repeat thrombectomy on 6/26.   -DAPT + Eliquis with plans to stop aspirin ~1 week post procedure.   -work with PT for assessment of homegoing needs v rehab.     Dispo: has follow up on 8/1 with Dr. Mike. Pending assessment by PT as she has not been motivated to get up and move around.       Alban Smith MD    ________________________________________________________________________________  Problems:   Principal Problem:    Acute lower limb ischemia  Active Problems:    PAD (peripheral artery disease) (CMS-[facility])    COPD (chronic obstructive pulmonary disease) (Multi)    Critical limb ischemia of left lower extremity (Multi)      Objective   Admit Date: 6/25/2024  Hospital Length of Stay: 3   Home: [facility] [id number]    Vitals:      6/28/2024     3:14 PM 6/28/2024    11:27 AM 6/28/2024     7:20 AM 6/28/2024     5:07 AM 6/28/2024     5:04 AM 6/28/2024    12:24 AM 6/27/2024     8:00 PM   Vitals   Systolic 116 120 117 138  117    Diastolic 68 71 68 76  67    Heart Rate 72 76 69 63  69 76   Temp 36.5 °C (97.7 °F) 36.4 °C (97.5 °F) 36.6 °C (97.9 °F) 36.6 °C (97.9 °F)  36.5 °C (97.7 °F) 36.5 °C (97.7 °F)   Resp 18 18 20 17  16 22   Weight (lb)     149.3     BMI     25.63 kg/m2     BSA (m2)     1.75  m2       Wt Readings from Last 5 Encounters:   06/28/24 67.7 kg (149 lb 4.8 oz)   06/17/24 65.9 kg (145 lb 4.5 oz)   06/11/24 70.1 kg (154 lb 9.6 oz)   05/07/24 65.3 kg (144 lb)   05/06/24 64 kg (141 lb 1.5 oz)       Intake/Output Summary (Last 24 hours) at 6/28/2024 1644  Last data filed at 6/28/2024 1510  Gross per 24 hour   Intake 265.9 ml   Output 0 ml   Net 265.9 ml         MEDICATIONS  Infusions:     Scheduled:  acetaminophen, 975 mg, q6h   Or  acetaminophen, 650 mg, q6h   Or  acetaminophen, 650 mg, q6h  apixaban, 5 mg, BID  aspirin, 81 mg, Daily  clopidogrel, 75 mg, q AM  dexAMETHasone, 2 mg, Daily  tiotropium, 2 puff, Daily   And  fluticasone furoate-vilanteroL, 1 puff, Daily  gabapentin, 100 mg, TID  montelukast, 10 mg, Nightly  OLANZapine, 5 mg, Nightly  polyethylene glycol, 17 g, Daily  rosuvastatin, 20 mg, Nightly  sertraline, 100 mg, BID      PRN:  bisacodyl, 10 mg, Daily PRN  HYDROcodone-acetaminophen, 1 tablet, q8h PRN  LORazepam, 1 mg, q6h PRN  ondansetron, 4 mg, q8h PRN   Or  ondansetron, 4 mg, q8h PRN  oxyCODONE, 5 mg, q6h PRN  prochlorperazine, 5 mg, q6h PRN  simethicone, 80 mg, q6h PRN      Prior to Admission Meds:  Medications Prior to Admission   Medication Sig Dispense Refill Last Dose    aspirin 81 mg EC tablet Take 1 tablet (81 mg) by mouth once daily.   6/25/2024 at am    clopidogrel (Plavix) 75 mg tablet Take 1 tablet (75 mg) by mouth once daily in the morning. 90 tablet 1 6/25/2024 at am    dexAMETHasone (Decadron) 4 mg tablet Take 0.5 tablets (2 mg) by mouth once daily. 30 tablet 0 6/24/2024    ferrous sulfate 325 (65 Fe) MG EC tablet Take 65 mg by mouth once daily. Do not crush, chew, or split.   6/25/2024 at am    fluticasone-umeclidin-vilanter (Trelegy Ellipta) 200-62.5-25 mcg blister with device Inhale 1 puff once daily. 60 each 3 6/25/2024 at am    gabapentin (Neurontin) 100 mg capsule Take 1 capsule (100 mg) by mouth 3 times a day. 90 capsule 1 6/25/2024 at am     HYDROcodone-acetaminophen (Norco)  mg tablet Take 1 tablet by mouth every 8 hours if needed for severe pain (7 - 10). 60 tablet 0 6/25/2024 at am    LORazepam (Ativan) 0.5 mg tablet Take 1 tablet (0.5 mg) by mouth every 6 hours if needed for anxiety. Do not fill before June 18, 2024. 120 tablet 0 6/25/2024 at am    montelukast (Singulair) 10 mg tablet Take 1 tablet (10 mg) by mouth once daily at bedtime. 90 tablet 1 6/24/2024    OLANZapine (ZyPREXA) 5 mg tablet Take 1 tablet (5 mg) by mouth once daily at bedtime. 90 tablet 1 6/24/2024    rosuvastatin (Crestor) 20 mg tablet Take 1 tablet (20 mg) by mouth once daily. 100 tablet 3 6/25/2024 at am    sennosides (senna) 8.6 mg tablet Take 1 tablet (8.6 mg) by mouth once daily. (Patient taking differently: Take 1 tablet (8.6 mg) by mouth once daily at bedtime.) 30 tablet 11 6/24/2024    sertraline (Zoloft) 100 mg tablet Take 1 tablet (100 mg) by mouth 2 times a day. 180 tablet 3 6/25/2024 at am    simethicone (Mylicon) 80 mg chewable tablet Chew 1 tablet (80 mg) every 6 hours if needed.   6/24/2024    acetaminophen (Tylenol) 325 mg tablet Take 1 tablet (325 mg) by mouth every 4 hours if needed.   More than a month    budesonide (Pulmicort) 0.25 mg/2 mL nebulizer solution Take 2 mL (0.25 mg) by nebulization 2 times a day. Rinse mouth with water after use to reduce aftertaste and incidence of candidiasis. Do not swallow. 2 mL 2 Unknown    ondansetron ODT (Zofran-ODT) 4 mg disintegrating tablet DISSOLVE 1 TABLET IN MOUTH BY MOUTH THREE TIMES A DAY AS NEEDED NAUSEA 90 tablet 0 Unknown    prochlorperazine (Compazine) 5 mg tablet Take 1 tablet (5 mg) by mouth every 6 hours if needed for nausea or vomiting.   Unknown       DATA:  CMP:  Recent Labs     06/28/24  0831 06/25/24  1544 06/17/24  1012 06/13/24  0915 05/06/24  0845 03/25/24  0856 03/13/24  0858 03/12/24  0645 11/29/23  0418 11/28/23  0712 09/18/23  1802 09/06/23  1628 09/02/23  0814 09/01/23  0752 08/31/23  1234  08/30/23  1457 08/22/23  0552 08/21/23  0925    138 136 139 137 138 140 140   < > 137   < > 137 142 139 134* 131*   < > 131*   K 4.3 4.0 4.1 4.5 4.3 4.3 4.3 4.8   < > 4.3   < > 3.8 3.8 4.6 4.8 4.4   < > 5.1    106 103 105 104 102 103 106   < > 99   < > 99 105 106 99 98   < > 95*   CO2 30 24 28 28 27 30 29 25   < > 28   < > 26 28 26 24 26   < > 28   ANIONGAP 11 12 9* 11 10 10 12 14   < > 14   < > 16 13 12 16 11   < > 13   BUN 12 18 22 27* 26* 20 17 20   < > 12   < > 10 13 9 9 13   < > 20   CREATININE 0.75 0.63 0.74 0.91 0.72 0.69 0.63 0.75   < > 0.67   < > 0.46* 0.55 0.67 0.65 0.61   < > 0.79   EGFR 87 >90 88 69 >90 >90 >90 87   < > >90   < >  --   --   --   --   --   --   --    MG 1.77  --   --   --   --   --   --   --   --  1.73  --  1.99 2.02 2.13 2.06 1.59*  --  1.88    < > = values in this interval not displayed.     Recent Labs     06/28/24  0831 06/25/24  1544 06/17/24  1012 06/13/24  0915 05/06/24  0845 03/25/24  0856 03/11/24  1239 02/02/24  0859 01/05/24  1400 12/20/23  1121 09/19/23  0756 09/18/23  1802 08/30/23  0557 08/29/23  1257 05/21/20  2150 11/21/19  1156   ALBUMIN 3.2* 3.5 3.9 3.9 3.8 3.6 3.9 3.7 4.1 3.7   < > 2.8*   < > 3.0*   < > 4.0   ALT  --  12 15  --  17 23 13 11 12 7   < > 21   < > 12   < > 15   AST  --  15 14  --  13 15 26 11 13 11   < > 15   < > 15   < > 16   BILITOT  --  0.6 0.4  --  0.3 0.4 0.5 0.4 0.3 0.3   < > 0.4   < > 0.6   < > 0.5   LIPASE  --   --   --   --   --   --   --   --   --   --   --  50  --  40  --  45    < > = values in this interval not displayed.     CBC:  Recent Labs     06/28/24  0831 06/27/24  0427 06/25/24  1544 06/17/24  1012 06/13/24  0915 05/06/24  0845 03/25/24  0856 03/13/24  0858   WBC 10.8 13.1* 13.3* 14.4* 14.6* 16.3* 15.2* 13.1*   HGB 12.2 12.0 14.2 15.9 15.9 14.4 15.0 14.3   HCT 37.9 37.8 43.3 49.2* 51.3* 45.6 47.0* 46.8*   * 146* 168 194 203 245 259 198   MCV 90 89 86 91 92 89 87 88     COAG:   Recent Labs     06/27/24  1649  06/27/24  1307 06/27/24  0427 06/26/24  1319 06/26/24  0601 06/13/24  0915 11/28/23  1555 09/18/23  1824 08/30/23  1100 08/30/23  1054 08/11/23  0716 08/10/22  0303 08/09/22  1930 05/21/20  2150 11/21/19  1156   INR  --   --   --   --   --  1.0  --  1.3* 1.5*  --  1.2*  --  1.1  --  1.2*   HAUF 0.2 0.3 <0.1 0.5 0.1  --    < >  --   --   --   --    < >  --   --   --    DDIMERVTE  --   --   --   --   --   --   --   --   --   --   --   --   --  2,110*  --    HAPTOGLOBIN  --   --   --   --   --   --   --   --   --  CANCELED  --   --   --   --   --    FIBRINOGEN  --   --   --   --   --   --   --   --   --  CANCELED  --   --   --   --   --     < > = values in this interval not displayed.     ABO:   Recent Labs     09/18/23  1802   ABO A     HEME/ENDO:   Recent Labs     06/25/24  1544 06/17/24  1012 05/06/24  0845 03/25/24  0856 03/12/24  0645 11/01/23  0842 09/19/23  0756 09/06/23  1628 09/05/23  0848 08/30/23  0557 05/02/23  0907 05/02/23  0907 09/29/20  0746   FERRITIN  --   --   --   --   --   --  712*  --   --   --   --   --   --    IRONSAT  --   --   --   --   --   --  11*  --   --   --   --   --  26   TSH  --  3.53 3.46 2.67 4.41*   < >  --    < > 6.39* 6.15*   < > 1.75  --    FREET4  --   --   --   --  0.83  --   --   --  1.20* 0.84  --   --   --    HGBA1C 5.8*  --   --   --  5.9*  --   --   --   --   --   --  5.8*  --     < > = values in this interval not displayed.     CARDIAC:   Recent Labs     03/11/24  1424 03/11/24  1239 11/28/23  0916 11/28/23  0712 10/23/23  0528 10/22/23  2338 10/22/23  1837 09/18/23  1802 08/30/23  1054 07/26/23  1928 11/28/21  1545   LDH  --   --   --   --   --   --   --   --  CANCELED  --   --    TROPHS 12 12 2,735* 2,902* 24* 42* 15* 7  --    < >  --    BNP  --   --   --  185*  --   --  82  --   --   --  65    < > = values in this interval not displayed.     Recent Labs     06/27/24  0427 05/02/23  0907 09/29/20  0746   CHOL 123 160 144   LDLF  --  107* 93   LDLCALC 69  --   --    HDL  "24.1 35.6* 28.0*   TRIG 150* 89 114     TOX:No results for input(s): \"AMPHETAMINE\", \"BENZO\", \"CANNABINOID\", \"COCAI\", \"FENTANYL\", \"OPIATE\", \"OXYCODONE\", \"PCP\" in the last 81211 hours.    No lab exists for component: \"BARBSCRUR\"  MICRO:   Recent Labs     01/05/24  1400   CRP 3.31*     No results found for the last 90 days.      FOLLOWUP:   Future Appointments   Date Time Provider Department Center   7/8/2024  9:30 AM Berny Burnham MD [id number] [place]   7/8/2024 10:00 AM INF 01 PARMA [id number] [place]   7/30/2024  8:00 AM DO KRISSY [id number] [place]   8/1/2024  7:30 AM PAR MAC 3 VASC LAB [id number] [facility] 3300   8/1/2024  8:30 AM Willam Mike MD [id number] [place]   8/11/2024  2:00 PM Nam Mason, APRN-CNP [id number] [place]           "

## 2024-06-29 ENCOUNTER — PHARMACY VISIT (OUTPATIENT)
Dept: PHARMACY | Facility: CLINIC | Age: 69
End: 2024-06-29
Payer: COMMERCIAL

## 2024-06-29 VITALS
OXYGEN SATURATION: 90 % | HEIGHT: 64 IN | RESPIRATION RATE: 18 BRPM | DIASTOLIC BLOOD PRESSURE: 64 MMHG | TEMPERATURE: 99.1 F | WEIGHT: 147.2 LBS | SYSTOLIC BLOOD PRESSURE: 121 MMHG | BODY MASS INDEX: 25.13 KG/M2 | HEART RATE: 84 BPM

## 2024-06-29 PROBLEM — J96.11 CHRONIC HYPOXEMIC RESPIRATORY FAILURE (MULTI): Status: ACTIVE | Noted: 2024-06-29

## 2024-06-29 PROBLEM — I99.8 ACUTE LOWER LIMB ISCHEMIA: Status: RESOLVED | Noted: 2024-06-11 | Resolved: 2024-06-29

## 2024-06-29 PROBLEM — M21.372 FOOT DROP, LEFT: Status: ACTIVE | Noted: 2024-06-29

## 2024-06-29 PROBLEM — I70.222 CRITICAL LIMB ISCHEMIA OF LEFT LOWER EXTREMITY (MULTI): Status: RESOLVED | Noted: 2024-01-18 | Resolved: 2024-06-29

## 2024-06-29 LAB
ALBUMIN SERPL BCP-MCNC: 3.3 G/DL (ref 3.4–5)
ANION GAP SERPL CALC-SCNC: 10 MMOL/L (ref 10–20)
BUN SERPL-MCNC: 15 MG/DL (ref 6–23)
CALCIUM SERPL-MCNC: 8.5 MG/DL (ref 8.6–10.6)
CHLORIDE SERPL-SCNC: 100 MMOL/L (ref 98–107)
CO2 SERPL-SCNC: 31 MMOL/L (ref 21–32)
CREAT SERPL-MCNC: 0.84 MG/DL (ref 0.5–1.05)
EGFRCR SERPLBLD CKD-EPI 2021: 76 ML/MIN/1.73M*2
ERYTHROCYTE [DISTWIDTH] IN BLOOD BY AUTOMATED COUNT: 14.3 % (ref 11.5–14.5)
GLUCOSE SERPL-MCNC: 110 MG/DL (ref 74–99)
HCT VFR BLD AUTO: 38.3 % (ref 36–46)
HGB BLD-MCNC: 12 G/DL (ref 12–16)
MAGNESIUM SERPL-MCNC: 1.88 MG/DL (ref 1.6–2.4)
MCH RBC QN AUTO: 28.8 PG (ref 26–34)
MCHC RBC AUTO-ENTMCNC: 31.3 G/DL (ref 32–36)
MCV RBC AUTO: 92 FL (ref 80–100)
NRBC BLD-RTO: 0 /100 WBCS (ref 0–0)
PHOSPHATE SERPL-MCNC: 3.2 MG/DL (ref 2.5–4.9)
PLATELET # BLD AUTO: 161 X10*3/UL (ref 150–450)
POTASSIUM SERPL-SCNC: 4.2 MMOL/L (ref 3.5–5.3)
RBC # BLD AUTO: 4.17 X10*6/UL (ref 4–5.2)
SODIUM SERPL-SCNC: 137 MMOL/L (ref 136–145)
WBC # BLD AUTO: 14.4 X10*3/UL (ref 4.4–11.3)

## 2024-06-29 PROCEDURE — 99239 HOSP IP/OBS DSCHRG MGMT >30: CPT | Performed by: INTERNAL MEDICINE

## 2024-06-29 PROCEDURE — 2500000004 HC RX 250 GENERAL PHARMACY W/ HCPCS (ALT 636 FOR OP/ED): Performed by: STUDENT IN AN ORGANIZED HEALTH CARE EDUCATION/TRAINING PROGRAM

## 2024-06-29 PROCEDURE — RXMED WILLOW AMBULATORY MEDICATION CHARGE

## 2024-06-29 PROCEDURE — 2500000001 HC RX 250 WO HCPCS SELF ADMINISTERED DRUGS (ALT 637 FOR MEDICARE OP)

## 2024-06-29 PROCEDURE — 36415 COLL VENOUS BLD VENIPUNCTURE: CPT | Performed by: NURSE PRACTITIONER

## 2024-06-29 PROCEDURE — 2500000002 HC RX 250 W HCPCS SELF ADMINISTERED DRUGS (ALT 637 FOR MEDICARE OP, ALT 636 FOR OP/ED)

## 2024-06-29 PROCEDURE — 83735 ASSAY OF MAGNESIUM: CPT | Performed by: NURSE PRACTITIONER

## 2024-06-29 PROCEDURE — 85027 COMPLETE CBC AUTOMATED: CPT | Performed by: NURSE PRACTITIONER

## 2024-06-29 PROCEDURE — 80069 RENAL FUNCTION PANEL: CPT | Performed by: NURSE PRACTITIONER

## 2024-06-29 PROCEDURE — 2500000004 HC RX 250 GENERAL PHARMACY W/ HCPCS (ALT 636 FOR OP/ED)

## 2024-06-29 RX ORDER — ASPIRIN 81 MG/1
81 TABLET ORAL DAILY
Start: 2024-06-29 | End: 2024-07-04

## 2024-06-29 RX ORDER — ROSUVASTATIN CALCIUM 20 MG/1
20 TABLET, COATED ORAL NIGHTLY
Start: 2024-06-29

## 2024-06-29 RX ORDER — ACETAMINOPHEN 325 MG/1
975 TABLET ORAL EVERY 6 HOURS PRN
Start: 2024-06-29

## 2024-06-29 ASSESSMENT — COGNITIVE AND FUNCTIONAL STATUS - GENERAL
MOVING TO AND FROM BED TO CHAIR: A LITTLE
MOBILITY SCORE: 17
DRESSING REGULAR UPPER BODY CLOTHING: A LITTLE
STANDING UP FROM CHAIR USING ARMS: A LITTLE
DRESSING REGULAR LOWER BODY CLOTHING: A LITTLE
WALKING IN HOSPITAL ROOM: A LOT
CLIMB 3 TO 5 STEPS WITH RAILING: TOTAL
HELP NEEDED FOR BATHING: A LITTLE
TOILETING: A LOT
DAILY ACTIVITIY SCORE: 19

## 2024-06-29 ASSESSMENT — PAIN SCALES - GENERAL: PAINLEVEL_OUTOF10: 0 - NO PAIN

## 2024-06-29 NOTE — DISCHARGE SUMMARY
Discharge Diagnosis  Acute lower limb ischemia    Issues Requiring Follow-Up  PAD s/p LLE interventions with Dr. Cee Goff L foot drop  COPD (home O2), tobacco use, H/o lung CA    Test Results Pending At Discharge  Pending Labs       No current pending labs.            Hospital Course  Radha Toussaint is a 69 y.o. female with PMHx of STEMI, stable CAD, Hypertension, hyperlipidemia, PAD s/p (left popliteal/TPT/PT reconstruction with PTA/DCB, Supera/SES to popliteal occlusion with Dr Mike on 1/23/24), nicotine abuse, non small lung cancer, presenting/w worsening wound to the left lateral foot for 2 weeks and increased pain in the left foot. Pt s/p Laser atherectomy/JETI/PTA fem/pop/TPT/PT, FCO to mid SFA and JETI/PTA AT on (6/25/24 ), c/b stent occlusion s/p repeat thrombectomy on 6/26/24)- JETI /SFA, POP PT, and TPT, FCO to BK Pop and TPT PTA AT, was on heparin gtt post rocedure, transitioned to Eliquis.     Per endovascular, cont triple therapy with eliquis/ASA/plavix x1 week, then drop ASA and cont Eliquis/plavix afterwards. LLE swelling/pain, per EVLS and Vasc Surgery, no c/f compartment syndrome, recommend tubigrip compressions. OP follow-up with repeat TRAIVS's with Dr. Mike on 8/1.    New LLE foot drop (EVLS & vasc. Sx aware): arrange for outpatient follow-up with podiatry (with Mookie Doss, per Dr. Mike) to arrange for foot brace, referral placed.    OT rec Mod intensity, PT note pending. Pt declined SNF or home care. Agreeable to outpatient PT/OT, referral placed.     Medications delivered to bedside prior to discharge. Patient denied having any other home going needs.      Upon review of medications, lab values, and vital signs. Pt was deemed stable for discharge in satisfactory condition.     Discharge weight: 66.8 kg     More than 60 minutes were spent in coordinating patient discharge.          Physical Exam At Time of Discharge:  Physical Exam:  General: NAD, lying in bed  Skin: warm and dry throughout, LLE erythema  and localized swelling  Head/ neck: no JVD seen at 90 degrees  Cardiac: RRR, S1, S2   Pulm: CTA, on home 2L O2  GI: soft, nontender   Extremities: LLE mild rubor and mild edema , left foot drop  Neuro: no focal neuro deficits   Psych: appropriate mood and behavior     Home Medications     Medication List      START taking these medications     apixaban 5 mg tablet; Commonly known as: Eliquis; Take 1 tablet (5 mg)   by mouth 2 times a day.     CHANGE how you take these medications     acetaminophen 325 mg tablet; Commonly known as: Tylenol; Take 3 tablets   (975 mg) by mouth every 6 hours if needed for mild pain (1 - 3) or   moderate pain (4 - 6).; What changed: how much to take, when to take this,   reasons to take this   aspirin 81 mg EC tablet; Take 1 tablet (81 mg) by mouth once daily for 5   days. Stop taking after last dose on 7/4.; What changed: additional   instructions   rosuvastatin 20 mg tablet; Commonly known as: Crestor; Take 1 tablet (20   mg) by mouth once daily at bedtime.; What changed: when to take this   sennosides 8.6 mg tablet; Commonly known as: senna; Take 1 tablet (8.6   mg) by mouth once daily.; What changed: when to take this     CONTINUE taking these medications     budesonide 0.25 mg/2 mL nebulizer solution; Commonly known as:   Pulmicort; Take 2 mL (0.25 mg) by nebulization 2 times a day. Rinse mouth   with water after use to reduce aftertaste and incidence of candidiasis. Do   not swallow.   clopidogrel 75 mg tablet; Commonly known as: Plavix; Take 1 tablet (75   mg) by mouth once daily in the morning.   dexAMETHasone 4 mg tablet; Commonly known as: Decadron; Take 0.5 tablets   (2 mg) by mouth once daily.   ferrous sulfate 325 (65 Fe) MG EC tablet   gabapentin 100 mg capsule; Commonly known as: Neurontin; Take 1 capsule   (100 mg) by mouth 3 times a day.   HYDROcodone-acetaminophen  mg tablet; Commonly known as: Norco;   Take 1 tablet by mouth every 8 hours if needed for severe  pain (7 - 10).   LORazepam 0.5 mg tablet; Commonly known as: Ativan; Take 1 tablet (0.5   mg) by mouth every 6 hours if needed for anxiety. Do not fill before June 18, 2024.   montelukast 10 mg tablet; Commonly known as: Singulair; Take 1 tablet   (10 mg) by mouth once daily at bedtime.   OLANZapine 5 mg tablet; Commonly known as: ZyPREXA; Take 1 tablet (5 mg)   by mouth once daily at bedtime.   ondansetron ODT 4 mg disintegrating tablet; Commonly known as:   Zofran-ODT; DISSOLVE 1 TABLET IN MOUTH BY MOUTH THREE TIMES A DAY AS   NEEDED NAUSEA   prochlorperazine 5 mg tablet; Commonly known as: Compazine   sertraline 100 mg tablet; Commonly known as: Zoloft; Take 1 tablet (100   mg) by mouth 2 times a day.   simethicone 80 mg chewable tablet; Commonly known as: Mylicon   Trelegy Ellipta 200-62.5-25 mcg blister with device; Generic drug:   fluticasone-umeclidin-vilanter; Inhale 1 puff once daily.       Outpatient Follow-Up  Future Appointments   Date Time Provider Department Center   7/8/2024  9:30 AM Berny Burnham MD [id number] [place]   7/8/2024 10:00 AM INF 01 [id number] [place]   7/30/2024  8:00 AM Francheska Rogel DO [id number] [place]   8/1/2024  7:30 AM PAR MAC 3 VASC LAB [id number] [facility] 3300   8/1/2024  8:30 AM Willam Mike MD [id number] [place]   8/11/2024  2:00 PM Nam Mason APRN-CNP [id number] [place]       Porter Barker APRN-CNP

## 2024-06-29 NOTE — NURSING NOTE
Patient was given discharge instructions. Patient verbalized an understanding. Patients IV were removed. Tele box returned. Patient discharged with belongings she came with.

## 2024-06-29 NOTE — DISCHARGE INSTRUCTIONS
Your medications have changed. Take aspirin 81 mg daily through 7/4. Starting 7/5 you will be on Eliquis and Plavix only for blood thinner/anti-platelet therapy. Please take the medications listed on these instructions as prescribed until you are seen at a follow up appointment.    Please follow up with Podiatry for a LLE foot-drop brace, a referral has been placed. (Recommended Mookie Doss, per Dr. Mike; Please call [phone] to schedule. Office is at [address] [place] [address] Rd.)     Referral has been placed for outpatient physical and occupational therapy.    It was a pleasure to have met and care for you!

## 2024-07-08 ENCOUNTER — INFUSION (OUTPATIENT)
Dept: HEMATOLOGY/ONCOLOGY | Facility: CLINIC | Age: 69
End: 2024-07-08
Payer: MEDICARE

## 2024-07-08 ENCOUNTER — OFFICE VISIT (OUTPATIENT)
Dept: HEMATOLOGY/ONCOLOGY | Facility: CLINIC | Age: 69
End: 2024-07-08
Payer: MEDICARE

## 2024-07-08 ENCOUNTER — LAB (OUTPATIENT)
Dept: LAB | Facility: CLINIC | Age: 69
End: 2024-07-08
Payer: MEDICARE

## 2024-07-08 VITALS — WEIGHT: 138.89 LBS | BODY MASS INDEX: 23.84 KG/M2 | RESPIRATION RATE: 20 BRPM

## 2024-07-08 DIAGNOSIS — C34.91 NON-SMALL CELL CANCER OF RIGHT LUNG (MULTI): ICD-10-CM

## 2024-07-08 DIAGNOSIS — C34.90 NON-SMALL CELL LUNG CANCER, UNSPECIFIED LATERALITY (MULTI): ICD-10-CM

## 2024-07-08 DIAGNOSIS — G89.3 CANCER RELATED PAIN: ICD-10-CM

## 2024-07-08 DIAGNOSIS — J44.1 COPD EXACERBATION (MULTI): ICD-10-CM

## 2024-07-08 LAB
ALBUMIN SERPL BCP-MCNC: 3.3 G/DL (ref 3.4–5)
ALP SERPL-CCNC: 61 U/L (ref 33–136)
ALT SERPL W P-5'-P-CCNC: 28 U/L (ref 7–45)
ANION GAP SERPL CALC-SCNC: 12 MMOL/L (ref 10–20)
AST SERPL W P-5'-P-CCNC: 22 U/L (ref 9–39)
BASOPHILS # BLD MANUAL: 0 X10*3/UL (ref 0–0.1)
BASOPHILS NFR BLD MANUAL: 0 %
BILIRUB SERPL-MCNC: 0.4 MG/DL (ref 0–1.2)
BUN SERPL-MCNC: 20 MG/DL (ref 6–23)
CALCIUM SERPL-MCNC: 9.2 MG/DL (ref 8.6–10.3)
CHLORIDE SERPL-SCNC: 104 MMOL/L (ref 98–107)
CO2 SERPL-SCNC: 28 MMOL/L (ref 21–32)
CREAT SERPL-MCNC: 0.7 MG/DL (ref 0.5–1.05)
EGFRCR SERPLBLD CKD-EPI 2021: >90 ML/MIN/1.73M*2
EOSINOPHIL # BLD MANUAL: 0.18 X10*3/UL (ref 0–0.7)
EOSINOPHIL NFR BLD MANUAL: 1 %
ERYTHROCYTE [DISTWIDTH] IN BLOOD BY AUTOMATED COUNT: 14.6 % (ref 11.5–14.5)
GLUCOSE SERPL-MCNC: 81 MG/DL (ref 74–99)
HCT VFR BLD AUTO: 37.5 % (ref 36–46)
HGB BLD-MCNC: 11.8 G/DL (ref 12–16)
IMM GRANULOCYTES # BLD AUTO: 0.97 X10*3/UL (ref 0–0.7)
IMM GRANULOCYTES NFR BLD AUTO: 5.3 % (ref 0–0.9)
LYMPHOCYTES # BLD MANUAL: 2.18 X10*3/UL (ref 1.2–4.8)
LYMPHOCYTES NFR BLD MANUAL: 12 %
MCH RBC QN AUTO: 28.8 PG (ref 26–34)
MCHC RBC AUTO-ENTMCNC: 31.5 G/DL (ref 32–36)
MCV RBC AUTO: 92 FL (ref 80–100)
MONOCYTES # BLD MANUAL: 0.36 X10*3/UL (ref 0.1–1)
MONOCYTES NFR BLD MANUAL: 2 %
NEUTROPHILS # BLD MANUAL: 15.47 X10*3/UL (ref 1.2–7.7)
NEUTS BAND # BLD MANUAL: 0.73 X10*3/UL (ref 0–0.7)
NEUTS BAND NFR BLD MANUAL: 4 %
NEUTS SEG # BLD MANUAL: 14.74 X10*3/UL (ref 1.2–7)
NEUTS SEG NFR BLD MANUAL: 81 %
NRBC BLD-RTO: 0 /100 WBCS (ref 0–0)
OVALOCYTES BLD QL SMEAR: ABNORMAL
PLATELET # BLD AUTO: 380 X10*3/UL (ref 150–450)
POLYCHROMASIA BLD QL SMEAR: ABNORMAL
POTASSIUM SERPL-SCNC: 4.2 MMOL/L (ref 3.5–5.3)
PROT SERPL-MCNC: 6.8 G/DL (ref 6.4–8.2)
RBC # BLD AUTO: 4.1 X10*6/UL (ref 4–5.2)
RBC MORPH BLD: ABNORMAL
SODIUM SERPL-SCNC: 140 MMOL/L (ref 136–145)
T4 FREE SERPL-MCNC: 0.86 NG/DL (ref 0.61–1.12)
TOTAL CELLS COUNTED BLD: 100
TSH SERPL-ACNC: 8.87 MIU/L (ref 0.44–3.98)
WBC # BLD AUTO: 18.2 X10*3/UL (ref 4.4–11.3)

## 2024-07-08 PROCEDURE — 99214 OFFICE O/P EST MOD 30 MIN: CPT | Performed by: INTERNAL MEDICINE

## 2024-07-08 PROCEDURE — 3008F BODY MASS INDEX DOCD: CPT | Performed by: INTERNAL MEDICINE

## 2024-07-08 PROCEDURE — 84443 ASSAY THYROID STIM HORMONE: CPT

## 2024-07-08 PROCEDURE — 1125F AMNT PAIN NOTED PAIN PRSNT: CPT | Performed by: INTERNAL MEDICINE

## 2024-07-08 PROCEDURE — 85007 BL SMEAR W/DIFF WBC COUNT: CPT

## 2024-07-08 PROCEDURE — 1111F DSCHRG MED/CURRENT MED MERGE: CPT | Performed by: INTERNAL MEDICINE

## 2024-07-08 PROCEDURE — 36415 COLL VENOUS BLD VENIPUNCTURE: CPT

## 2024-07-08 PROCEDURE — 1159F MED LIST DOCD IN RCRD: CPT | Performed by: INTERNAL MEDICINE

## 2024-07-08 PROCEDURE — 85027 COMPLETE CBC AUTOMATED: CPT

## 2024-07-08 PROCEDURE — 84075 ASSAY ALKALINE PHOSPHATASE: CPT

## 2024-07-08 PROCEDURE — 84439 ASSAY OF FREE THYROXINE: CPT

## 2024-07-08 RX ORDER — BUDESONIDE 0.25 MG/2ML
0.25 INHALANT ORAL
Qty: 2 ML | Refills: 2 | Status: SHIPPED | OUTPATIENT
Start: 2024-07-08

## 2024-07-08 RX ORDER — HYDROCODONE BITARTRATE AND ACETAMINOPHEN 10; 325 MG/1; MG/1
1 TABLET ORAL EVERY 6 HOURS PRN
Qty: 60 TABLET | Refills: 0 | Status: SHIPPED | OUTPATIENT
Start: 2024-07-08 | End: 2024-07-09 | Stop reason: SDUPTHER

## 2024-07-08 ASSESSMENT — PAIN SCALES - GENERAL: PAINLEVEL: 8

## 2024-07-08 NOTE — PROGRESS NOTES
LOCATION:   [facility], at [facility].     HEMATOLOGY ONCOLOGY PROBLEMS:  1.  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         PD-L1 70%.  No actionable alterations.       a.  Ist -line therapy with carbo/Taxol/Keytruda x 3 cycles from Sep to Nov 2023.       b.  On maintenance Keytruda beginning Dec 2023.     CHIEF COMPLAINT:    The patient is in the clinic today for followup of poorly differentiated carcinoma and for continuation of treatment and management of therapy related effects.          HISTORY:  Ms. Toussaint is a 68-year-old female with poorly differentiated carcinoma of most probable primary bronchogenic origin.  She has multiple medical problems and was admitted at [facility] in Aug 2023 with complaint worsening shortness of breath, cough and weakness.  There was also history of progressive weight loss and recent falls.  Work-up revealed stable pulmonary nodules but there was concern regarding new bilateral adrenal metastatic lesions along with questionable liver lesions and right hilar lymphadenopathy. She is a lifelong heavy smoker. CT-guided biopsy of the adrenal lesion was suggestive of poorly differentiated carcinoma. 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  She was also evaluated by Dr. Francheska Parada at [facility] and case was reviewed by  pathology and as per tumor board evaluation, overall clinical and radiological finding were considered mainly of probable primary bronchogenic origin.  She was treated with carbo/Taxol/Keytruda combination for 3 cycles.  4th cycle was discontinued due to tolerability issues.  Follow-up CT scan showed interval decrease in bilateral adrenal lesions and she was transitioned to maintenance Keytruda beginning Dec 2023.    INTERVAL HISTORY:  Since her last visit she again had hospitalization requiring extensive lower leg/foot vascular surgery procedures.  Still feeling  tired and fatigue.  Also complaining of worsening pain in the left leg.     PAST MEDICAL HISTORY:   1.  COPD on home O2.   2.  Coronary artery disease   3.  Hypertension   4.  Hyperlipidemia   5.  Multiple vascular aneurysms.  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  6.  Peripheral vascular disease.  S/p femoral-femoral bypass   7.  AAA graft repair procedure.  8.  History of tubal ligation, bladder lift, lithotripsy surgeries     SOCIAL HISTORY:    for 46 years and lives in [place].  1 and half to 2 pack a day for 47 years smoking history.  Nonalcoholic.  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  Born [place] raised  in [place]     FAMILY HISTORY:    Father  at age 52 from myocardial infarction.  Mother  at age 75 or from aortic aneurysm related complications.  1 sister  at age 68 from depression and related complications.  3 children and 5 grandkids.  Her son and daughter has history of Crohn's disease.  No other specific history of bleeding, clotting or malignant disorder in the family.     REVIEW OF SYSTEMS:  Pertinent finding as per the history above.  All other systems have been reviewed and generally negative and noncontributory.     PHYSICAL EXAMINATION:    Detailed physical examination not done     ALLERGY & MEDICATIONS:  Allergies and latest outpatient medications list were reviewed in the EMR.    LAB RESULTS:  CBC from today shows a white cell count of 18.2 with hemoglobin of 11.8 and platelets of 380.  MCV is 92.  Metabolic profile is unremarkable.  TSH is 8.8.  Last 3 sets of blood work were reviewed and the trend was noted.    RADIOLOGY RESULTS:  CT angiogram 3/4/2024 2024  Impression:  IMPRESSION:   1. No acute process   2.  Severe emphysema and bronchitis.   3.  Severe three-vessel coronary artery calcifications.   4.  Mediastinal adenopathy may be related to patient's known diagnosis of small cell lung cancer, suboptimally evaluated without IV  contrast.      PATHOLOGY RESULTS:  Surgical Pathology [Aug 23 2023 1:48PM] ([id number])  Specimens: RIGHT ADRENAL LESION CORE   Name AALIYAH SAWANT   Accession #: [id number]   Pathologist: BOLIVAR BARRETO ASA, MD, PhD   Date of Procedure: 8/11/2023   Submitting  Physician: MAUREEN CHAVIS CNP   Location: [facility]   Copy To/Referring/Attending:   MARTHA CORDERO MD Other External #     FINAL DIAGNOSIS   A. RIGHT ADRENAL LESION CORE:     METASTATIC POORLY DIFFERENTIATED  CARCINOMA: SOFT TISSUE (IDENTIFIED AS RIGHT ADRENAL) BIOPSY   Note   COMMENT : The biopsyconsists entirely of a poorly differentiated malignancy in soft  tissue; no adrenal is seen. The tumor stains for keratins (CAM5.2 and  CK7 but not CK20) and is negative for TTF1 and Napsin as well as p40. Staining for SF1 is completely negative.     Electronically Signed Out By BOLIVAR BARRETO ASA, MD, PhD/AN       ASSESSMENT AND PLAN:   1.  Poorly differentiated carcinoma of unknown primary origin.  Please refer the details of initial presentation and management as outlined above.  In summary, patient with lifelong history of heavy smoking.  She was admitted at [facility]  with complaint of worsening shortness of breath in Aug 2023.  Work-up revealed stable pulmonary nodules but there was concern regarding new bilateral adrenal metastatic lesions along with questionable liver lesions and right hilar lymphadenopathy.  CT-guided biopsy of the adrenal lesion was suggestive of poorly differentiated carcinoma.  Cancer type ID molecular test results were suggestive of questionable sarcoma.  She was also evaluated by Dr. Francheska Parada at [facility] and her case was reviewed by  pathology and as per tumor board discussions, overall clinical and radiological finding were considered mainly of probable primary bronchogenic origin.  She was treated with carbo/Taxol/Keytruda combination for 3 cycles.  4th cycle was discontinued due to tolerability issues.   Follow-up CT scan showed interval decrease in bilateral adrenal lesions and she was transitioned to maintenance Keytruda beginning Dec 2023.     Overall plan is to continue with  Keytruda at the current dose and schedule.  As stated above there are issues with frequent COPD exacerbation and lower extremity vascular issues leading to treatment breaks and hospitalizations.    Probable side effects of Keytruda mainly weakness, fatigue, pneumonitis, colitis, endocrinopathies, pleuritis, skin rash etc. were explained to them in detail.  Her overall long-term prognosis remains guarded.    2.  COPD exacerbation/anxiety.  I strongly advised her to continue to follow-up with pulmonary clinic closely.  There is also a significant component of anxiety and panic attacks and she should continue to follow-up with onco-psychiatry clinic.     3.  Left lower leg wound/vascular issues.  Really appreciate wound and vascular surgery teams from help.  She will continue to follow-up with them.    3.  Follow-up.  She will return to the clinic in 6 weeks.    This note has been transcribed using Dragon voice recognition system and there is a possibility of unintentional typing misprints.

## 2024-07-09 DIAGNOSIS — C34.90 NON-SMALL CELL LUNG CANCER, UNSPECIFIED LATERALITY (MULTI): ICD-10-CM

## 2024-07-09 DIAGNOSIS — G89.3 CANCER RELATED PAIN: ICD-10-CM

## 2024-07-09 DIAGNOSIS — E27.40 ADRENAL INSUFFICIENCY (MULTI): ICD-10-CM

## 2024-07-09 LAB
ATRIAL RATE: 55 BPM
ATRIAL RATE: 77 BPM
P AXIS: 76 DEGREES
P AXIS: 79 DEGREES
P OFFSET: 187 MS
P OFFSET: 192 MS
P ONSET: 134 MS
P ONSET: 142 MS
PR INTERVAL: 142 MS
PR INTERVAL: 164 MS
Q ONSET: 213 MS
Q ONSET: 216 MS
QRS COUNT: 12 BEATS
QRS COUNT: 9 BEATS
QRS DURATION: 100 MS
QRS DURATION: 98 MS
QT INTERVAL: 448 MS
QT INTERVAL: 468 MS
QTC CALCULATION(BAZETT): 428 MS
QTC CALCULATION(BAZETT): 529 MS
QTC FREDERICIA: 435 MS
QTC FREDERICIA: 508 MS
R AXIS: 54 DEGREES
R AXIS: 59 DEGREES
T AXIS: 89 DEGREES
T AXIS: 91 DEGREES
T OFFSET: 440 MS
T OFFSET: 447 MS
VENTRICULAR RATE: 55 BPM
VENTRICULAR RATE: 77 BPM

## 2024-07-09 RX ORDER — HYDROCODONE BITARTRATE AND ACETAMINOPHEN 10; 325 MG/1; MG/1
1 TABLET ORAL EVERY 6 HOURS PRN
Qty: 60 TABLET | Refills: 0 | Status: SHIPPED | OUTPATIENT
Start: 2024-07-09 | End: 2024-08-08

## 2024-07-09 RX ORDER — DEXAMETHASONE 4 MG/1
2 TABLET ORAL DAILY
Qty: 30 TABLET | Refills: 0 | Status: SHIPPED | OUTPATIENT
Start: 2024-07-09

## 2024-07-13 NOTE — PATIENT INSTRUCTIONS
1. Reviewed diagnostic impression including subjective and objective data and provided education about depression and anxiety disorder, treatment recommendations including medication, therapy, course of treatment and prognosis. Patient amendable to treatment plan.     2. Plan - You are doing great, continue current medications and contact me if you have any questions or concerns.      CONTINUE  Sertraline 100 mg - take 2  table by mouth daily for anxiety and depression   CONTINUE  Gabapentin 100 mg - take 1 tablet, 3 times daily for anxiety   CONTINUE   Lorazepam 0.5 mg - take 1 tablet, every 6 hours as needed for anxiety       3. Reviewed r/b/a, possible side effects of the medication(s). Client is aware benefit/risks      4. Labs reviewed and - placed order for lab work. Please provide at any  facility Please provide Urine Drug Screen to abide by  Substance Use policy.      5. Plan for supportive psychotherapy     6. Follow up with physical health providers as scheduled     7. Follow up in 1-2 months      May follow up sooner if experiences worsening symptoms by calling  Psychiatry at [phone]      Patient verbalized an understanding to call [facility] at [phone] ([facility]), [phone], or [phone]/go to the nearest emergency room if experiences thoughts of harm to self or others.

## 2024-07-17 NOTE — PROGRESS NOTES
Physical Therapy    Physical Therapy Evaluation and Treatment      Patient Name: Radha Toussaint  MRN: [id number]  Today's Date: 7/18/2024  Time Calculation  Start Time: 0750  Stop Time: 0830  Time Calculation (min): 40 min    Insurance - reviewed   Visit number: 1 (updated 07/18/24)   Payor: [employer] MEDICARE / Plan: [employer] MEDICARE / Product Type: *No Product type* /    VISITS ARE MED NEC NO AUTH NEEDED PAYS % OOP MET   Referring Provider: Porter Barker APRN-C*   Surgery: Lower Extremity Intervention and Lower Extremity Angiogram, 6/26/2024.  Days since surgery: 22       Assessment:  Radha Toussaint  is a 69 y.o. old patient who participated in a physical therapy evaluation today due to L foot drop s/p surgery for PAD on 6/25/24. Unable to activate L ankle into DF or EV. Extended time spent educating pt on purpose of AFO and PT. Pt receptive to all information provided: voiced willingness to schedule for AFO and attend PT (goal to develop strength/balance program prior to AFO and address any remaining concerns after AFO). Therapist educated pt on high fall risk, recommended use of walker: pt voiced agreement.  Radha's impairments include: balance deficits, gait deficits, pain, decreased strength, decreased range of motion, and decreased functional mobility. Due to these impairments, she has the following functional limitations and participation restrictions:  increased fall risk, difficulty with ambulation, difficulty with stair negotiation, difficulty performing household activities, and difficulty performing recreational activities. Radha's clinical presentation is stable and/or uncomplicated characteristics with the level of complexity being low. Radha's potential for rehab is good.  Skilled physical therapy services are appropriate and beneficial in order to achieve measurable and meaningful change in the objective tests and measures. Utilization of skilled physical therapy  "services will aid in advancing her functional status and attaining her therapy-related goals. Radha verbalized understanding and is in agreement with all goals and plan of care.  Radha left session with all questions answered and no increase in symptoms.      PT Assessment  PT Assessment Results: Decreased strength, Decreased range of motion, Impaired balance, Decreased mobility, Pain  Rehab Prognosis: Good     Plan: develop global LE strength & balance program; pt not interested in attending PT frequently due to numerous medical appointments; recommended pt schedule another follow-up appointment after AFO to address any remaining concerns   OP PT Plan  Treatment/Interventions: Cryotherapy, Education/ Instruction, Electrical stimulation, Gait training, Hot pack, Manual therapy, Neuromuscular re-education, Self care/ home management, Therapeutic activities, Therapeutic exercises, Orthosis fit/ training  PT Plan: Skilled PT  PT Frequency: 1 time per week  Duration: 4 visits  Rehab Potential: Good  Plan of Care Agreement: Patient    Current Problem:   Problem List Items Addressed This Visit             ICD-10-CM       Cardiac and Vasculature    PAD (peripheral artery disease) (CMS-[facility]) I73.9    Relevant Orders    Follow Up In Physical Therapy    Intermittent claudication (CMS-[facility]) I73.9    Relevant Orders    Follow Up In Physical Therapy       Neuro    Foot drop, left M21.372    Relevant Orders    Follow Up In Physical Therapy         Subjective      Radha Toussaint  is a 69 y.o. female presenting to the clinic with chief concern of L foot drop beginning 3 weeks ago following surgery (Lower Extremity Intervention and Lower Extremity Angiogram). \"Pt states I don't know why I am here. I do everything for myself\". Pt arrived to session in wheelchair, accompanied by . States she only uses wheelchair in community due to breathing. Does not use assistive device, however, owns \"it all\" (walker, rollator, shower " "chair, cane). Denies any falls since onset of L foot drop. Received order for AFO at [facility]- considering scheduling appointment.     Radha Toussaint  denies:  numbness, tingling, diplopia, drop attacks, dysarthria, dysphagia, dizziness, saddle anesthesia, bowel changes, bladder changes, unexpected weight loss within the past 4 weeks, and unexpected weight gain within the past 4 weeks    Prior Treatment/diagnostic images:  VASCULAR ULTRASOUND CONCLUSIONS 6/9/24:  Right Lower PVR: No evidence of arterial occlusive disease in the right lower extremity at rest. Normal digital perfusion noted. Multiphasic flow is noted in the right common femoral artery, right posterior tibial artery and right dorsalis pedis artery.  Left Lower PVR: Evidence of mild arterial occlusive disease in the left lower extremity at rest. Monophasic flow is noted in the left posterior tibial artery and left dorsalis pedis artery. Unable to obtain the left CFA waveform due to dressings/bandaging.  Comparison: Compared with study from 6/26/2024, No significant change on the right. Severity of disease improved from severe to mild PAD since prior exam    Medical History Form: Reviewed (scanned into chart)    Living Environment: Lives with  in single level house: 1 step to enter (uses door frame for support)    Occupation: Retired      Prior Level of Function: IND with all mobility, ADLs, IADLs, driving    Exercise: regularly as directed 7 times/week- walking    Functional Limitations: household chores, recreational activities, and exercise    Pt goals for therapy:  \"Just to not lose balance\"    Precautions:  Fall Risk: Low   Denies: Pacemaker, Diabetes, Hypertension, latex allergy, epilepsy/seizures, other known cardiac problems, other known neurologic problem, other known pulmonary problems, unexpected weight gain or loss, saddle anesthesia, night sweats, night pain, diplopia, and drop attacks  Past Surgical history: recoiled aneurysm " 2010 and ischemia surgery 2024  Past Medical history:  hx of adrenal carcinoma, hx of MI, aneurysms x4, DVT 4 years ago, PAD      Pain:  5/10  pain location: L shin      Objective   Posture: forward head, rounded shoulders, posterior pelvic tilt  Transfers: Alok using hands  Bed Mobility: NP  Gait: mod instability; excessive hip and knee flex during swing due to lack of active DF  Stairs: NP    Numbness/Tingling/Paresthesia: denies    Dermatomes B LE: grossly intact with eyes closed to light touch    Myotomes/Strength: MMT in sitting unless otherwise documented; * indicates pain  Hip Flex (L2) R/L: 3+/3+  Hip Add R/L: 4/4  Hip Abd R/L: 4+/4+  Hip Ext R/L: NP  Knee Ext (L3) R/L: 5/5  Knee Flex R/L: 5/5  Ankle DF (L4) R/L: 5/0  Ankle PF (S1) R/L: 5/4+  Ankle IV R/L: 5/4+  Ankle EV R/L: 5/0  Great Toe Ext (L5) R/L:  Abdominals (sitting unsupported): fair  Back Extensors (sitting unsupported): fair    Range of Motion:   L ankle PROM full in all directions  Unable to actively move L ankle into DF or EV    Erythema: L anterior shin    Palpation: TTP over L anterior shin       Outcome Measures:  Other Measures  Lower Extremity Funtional Score (LEFS): 38       Baseline: 0.40 m/s using RW with CGA    Treatments:    Therapeutic Exercise:  3 minutes    Access Code: [email or web address]  - Seated March  - 1 x daily - 7 x weekly - 3 sets - 10 reps  - Supine Ankle Dorsiflexion Stretch with Caregiver  - 5 x daily - 7 x weekly - 1 sets - 5 reps - 10-20 sec hold    Therapeutic Activity: 7 minutes  Education: pt receptive to all of the following information  - Benefits of scheduling for L AFO to return to PLOF  - High fall risk due to L foot drop: recommended use of walker  - Importance of daily stretching into DF to maintain ankle mobility required for future AFO  - Importance of safe mobility to prevent further injuries as a result of falls          EDUCATION:  Outpatient Education  Individual(s) Educated: Patient, Spouse  Education Provided:  POC, Home Exercise Program  Risk and Benefits Discussed with Patient/Caregiver/Other: yes  Patient/Caregiver Demonstrated Understanding: yes  Plan of Care Discussed and Agreed Upon: yes  Patient Response to Education: Patient/Caregiver Verbalized Understanding of Information, Patient/Caregiver Performed Return Demonstration of Exercises/Activities    -Educated Radha  on the role of PT and PT POC  -Educated Radha regarding benefit and purpose of skilled PT services along with results of examination findings and how this correlates to their chief concern  -Answered Radha's questions in full  -Educated pt on benefit of scheduling appointment for AFO, pt agreeable  -Radha Toussaint advised to hold any ex if it increases pain, Radha Norbert verbalized understanding    Goals:  STG's (within 2 weeks)  Radha Toussaint will demonstrate independence,  excellent understanding of and report compliance with at least 3 exercises in her HEP to facilitate the learning process to maximize PT benefits and to facilitate independent rehab program upon discharge.    LTG's (by discharge)  Radha will perform 5x sit to  < 15 seconds to decrease fall risk (15 seconds is the cutoff score for risk of recurrent falls).   Radha will increase the 10mWT score by >/=0.16m/s (MCID) to maximize IND at home and in the community. Gait speed on the 10mWT in regards to ambulation classification: </= 0.4 m/s household ambulator; 0.4 m/s-0.8 m/s limited community ambulation; > 0.8 m/s community ambulator). Baseline: 0.40 m/s using RW with CGA  Radha will ambulate on even surfaces for community distances without imbalance using LRAD to improve ease of functional mobility, participation in ADLs and other household recreational activities, and to enhance access to the community during IADLs.   Radha will be able to ascend/descend >/= 8 stairs with handrails in self-selected pattern without increase in pain or instability in  order for Radha to increase safe access within the community.   Radha will demonstrate independence and report compliance with HEP to facilitate independent rehab program upon discharge for long-term maintenance of condition and gains from PT POC.      Time Calculation  Start Time: 0750  Stop Time: 0830  Time Calculation (min): 40 min  PT Evaluation Time Entry  PT Evaluation (Low) Time Entry: 30  PT Therapeutic Procedures Time Entry  Therapeutic Exercise Time Entry: 3  Therapeutic Activity Time Entry: 7

## 2024-07-18 ENCOUNTER — EVALUATION (OUTPATIENT)
Dept: PHYSICAL THERAPY | Facility: CLINIC | Age: 69
End: 2024-07-18
Payer: MEDICARE

## 2024-07-18 DIAGNOSIS — M21.372 FOOT DROP, LEFT: ICD-10-CM

## 2024-07-18 DIAGNOSIS — I73.9 INTERMITTENT CLAUDICATION (CMS-HCC): ICD-10-CM

## 2024-07-18 DIAGNOSIS — I73.9 PAD (PERIPHERAL ARTERY DISEASE) (CMS-HCC): ICD-10-CM

## 2024-07-18 PROCEDURE — 97161 PT EVAL LOW COMPLEX 20 MIN: CPT | Mod: GP

## 2024-07-18 PROCEDURE — 97530 THERAPEUTIC ACTIVITIES: CPT | Mod: GP

## 2024-07-18 SDOH — ECONOMIC STABILITY: FOOD INSECURITY: WITHIN THE PAST 12 MONTHS, THE FOOD YOU BOUGHT JUST DIDN'T LAST AND YOU DIDN'T HAVE MONEY TO GET MORE.: NEVER TRUE

## 2024-07-18 SDOH — ECONOMIC STABILITY: FOOD INSECURITY: WITHIN THE PAST 12 MONTHS, YOU WORRIED THAT YOUR FOOD WOULD RUN OUT BEFORE YOU GOT MONEY TO BUY MORE.: NEVER TRUE

## 2024-07-18 ASSESSMENT — ENCOUNTER SYMPTOMS
DEPRESSION: 0
LOSS OF SENSATION IN FEET: 0
OCCASIONAL FEELINGS OF UNSTEADINESS: 0

## 2024-07-18 ASSESSMENT — PATIENT HEALTH QUESTIONNAIRE - PHQ9
2. FEELING DOWN, DEPRESSED OR HOPELESS: NOT AT ALL
1. LITTLE INTEREST OR PLEASURE IN DOING THINGS: NOT AT ALL
SUM OF ALL RESPONSES TO PHQ9 QUESTIONS 1 AND 2: 0

## 2024-07-24 NOTE — PROGRESS NOTES
"Physical Therapy    Physical Therapy Treatment    Patient Name: Radha Toussaint  MRN: [id number]  Today's Date: 7/25/2024  Time Calculation  Start Time: 0745  Stop Time: 0827  Time Calculation (min): 42 min    Insurance - reviewed   Visit number: 2 (updated 07/25/24)   Payor: [employer] MEDICARE / Plan: [employer] MEDICARE / Product Type: *No Product type* /    VISITS ARE MED NEC NO AUTH NEEDED PAYS % OOP MET   Referring Provider: Porter Barker APRN-C*     Assessment:  Developed mat-level strength and counter balance program for pt to perform IND at home. Pt not interested in returning to PT at this time- feels like she is at her baseline level. Therapist continued to encourage pt to make appointment for AFO due to new onset of L drop foot, pt agreeable. Re-educated pt and spouse in [facility] on high fall risk and recommendation of RW for safe ambulation, pt partially receptive. Pt instructed to call clinic for any questions or if interested in returning to therapy. Radha Toussaint left session with all questions answered and no increased symptoms. Skilled physical therapy services continue to be recommended to reduce fall risk and return pt to highest level of mobility s/p L foot drop.       Plan: hold on PT services for 90 days (10/23/24)- if no contact is made with clinic, pt will be discharged      Current Problem  Problem List Items Addressed This Visit             ICD-10-CM       Cardiac and Vasculature    PAD (peripheral artery disease) (CMS-[facility]) I73.9    Intermittent claudication (CMS-[facility]) I73.9       Neuro    Foot drop, left M21.372         General  Subjective      Radha Toussaint denies any falls or complications since last session. Radha Toussaint reports she has not been using device at home as previously recommended. Pt states \"I have lots of furniture to hold onto\". Has not called about AFO- daughter plans on doing it today.     Radha Toussaint reports good compliance with " HEP.    Pain:  0/10  Pain location: n/a    Precautions  Fall Risk: Low   Denies: Pacemaker, Diabetes, Hypertension, latex allergy, epilepsy/seizures, other known cardiac problems, other known neurologic problem, other known pulmonary problems, unexpected weight gain or loss, saddle anesthesia, night sweats, night pain, diplopia, and drop attacks  Past Surgical history: recoiled aneurysm 2010 and ischemia surgery 2024  Past Medical history:  hx of adrenal carcinoma, hx of MI, aneurysms x4, DVT 4 years ago, PAD      Objective     Treatments:    Therapeutic Exercise:  38 minutes    Access Code: [email or web address]  - Supine Bridge - 3 sets - 10 reps - 5 sec hold  - Supine Active Straight Leg Raise - 3 sets - 10 reps each side  - Seated Knee Extension with RED Resistance - 3 sets - 10 reps each side  - Seated Knee Flexion with Anchored RED Resistance  - 3 sets - 10 reps each side  - Sidelying Hip Abduction  - 3 sets - 10 reps each side    Verbally reviewed, not performed  - Supine Ankle Dorsiflexion Stretch with Caregiver  - 5 x daily - 7 x weekly - 1 sets - 5 reps - 10-20 sec hold    Neuromuscular Re-education: 4 minutes   - Semi-Tandem Balance at Counter Top Eyes Open  - 1 sets - 5 reps - 10 sec hold  - Forward Backward Weight Shift with Counter Support- 2 sets - 1 reps - 1 min hold      OP EDUCATION: Reviewed home exercise program. Provided verbal feedback to improve exercise technique. Use of towels between theraband and skin to protect circulation in presence of PAD. Ice & elevation for edema.     Time Calculation  Start Time: 0745  Stop Time: 0827  Time Calculation (min): 42 min     PT Therapeutic Procedures Time Entry  Neuromuscular Re-Education Time Entry: 4  Therapeutic Exercise Time Entry: 38

## 2024-07-25 ENCOUNTER — TREATMENT (OUTPATIENT)
Dept: PHYSICAL THERAPY | Facility: CLINIC | Age: 69
End: 2024-07-25
Payer: MEDICARE

## 2024-07-25 DIAGNOSIS — I73.9 INTERMITTENT CLAUDICATION (CMS-HCC): ICD-10-CM

## 2024-07-25 DIAGNOSIS — I73.9 PAD (PERIPHERAL ARTERY DISEASE) (CMS-HCC): ICD-10-CM

## 2024-07-25 DIAGNOSIS — C34.90 NON-SMALL CELL LUNG CANCER, UNSPECIFIED LATERALITY (MULTI): ICD-10-CM

## 2024-07-25 DIAGNOSIS — G89.3 CANCER RELATED PAIN: ICD-10-CM

## 2024-07-25 DIAGNOSIS — M21.372 FOOT DROP, LEFT: ICD-10-CM

## 2024-07-25 PROCEDURE — 97110 THERAPEUTIC EXERCISES: CPT | Mod: GP

## 2024-07-25 RX ORDER — HYDROCODONE BITARTRATE AND ACETAMINOPHEN 10; 325 MG/1; MG/1
1 TABLET ORAL EVERY 6 HOURS PRN
Qty: 60 TABLET | Refills: 0 | Status: SHIPPED | OUTPATIENT
Start: 2024-07-25 | End: 2024-08-24

## 2024-07-29 ENCOUNTER — INFUSION (OUTPATIENT)
Dept: HEMATOLOGY/ONCOLOGY | Facility: CLINIC | Age: 69
End: 2024-07-29
Payer: MEDICARE

## 2024-07-29 ENCOUNTER — SOCIAL WORK (OUTPATIENT)
Dept: HEMATOLOGY/ONCOLOGY | Facility: CLINIC | Age: 69
End: 2024-07-29
Payer: MEDICARE

## 2024-07-29 ENCOUNTER — LAB (OUTPATIENT)
Dept: LAB | Facility: CLINIC | Age: 69
End: 2024-07-29
Payer: MEDICARE

## 2024-07-29 VITALS
HEART RATE: 67 BPM | WEIGHT: 140.87 LBS | TEMPERATURE: 96.8 F | BODY MASS INDEX: 24.05 KG/M2 | DIASTOLIC BLOOD PRESSURE: 73 MMHG | HEIGHT: 64 IN | RESPIRATION RATE: 18 BRPM | SYSTOLIC BLOOD PRESSURE: 120 MMHG | OXYGEN SATURATION: 94 %

## 2024-07-29 DIAGNOSIS — C34.90 NON-SMALL CELL LUNG CANCER, UNSPECIFIED LATERALITY (MULTI): ICD-10-CM

## 2024-07-29 DIAGNOSIS — E03.9 HYPOTHYROIDISM, UNSPECIFIED TYPE: ICD-10-CM

## 2024-07-29 DIAGNOSIS — C34.91 NON-SMALL CELL CANCER OF RIGHT LUNG (MULTI): ICD-10-CM

## 2024-07-29 LAB
ALBUMIN SERPL BCP-MCNC: 3.8 G/DL (ref 3.4–5)
ALP SERPL-CCNC: 53 U/L (ref 33–136)
ALT SERPL W P-5'-P-CCNC: 12 U/L (ref 7–45)
ANION GAP SERPL CALC-SCNC: 12 MMOL/L (ref 10–20)
AST SERPL W P-5'-P-CCNC: 15 U/L (ref 9–39)
BASOPHILS # BLD AUTO: 0.06 X10*3/UL (ref 0–0.1)
BASOPHILS NFR BLD AUTO: 0.5 %
BILIRUB SERPL-MCNC: 0.4 MG/DL (ref 0–1.2)
BUN SERPL-MCNC: 20 MG/DL (ref 6–23)
CALCIUM SERPL-MCNC: 9.2 MG/DL (ref 8.6–10.3)
CHLORIDE SERPL-SCNC: 103 MMOL/L (ref 98–107)
CO2 SERPL-SCNC: 27 MMOL/L (ref 21–32)
CREAT SERPL-MCNC: 0.81 MG/DL (ref 0.5–1.05)
EGFRCR SERPLBLD CKD-EPI 2021: 79 ML/MIN/1.73M*2
EOSINOPHIL # BLD AUTO: 0.17 X10*3/UL (ref 0–0.7)
EOSINOPHIL NFR BLD AUTO: 1.4 %
ERYTHROCYTE [DISTWIDTH] IN BLOOD BY AUTOMATED COUNT: 14.9 % (ref 11.5–14.5)
GLUCOSE SERPL-MCNC: 121 MG/DL (ref 74–99)
HCT VFR BLD AUTO: 43.7 % (ref 36–46)
HGB BLD-MCNC: 14.1 G/DL (ref 12–16)
IMM GRANULOCYTES # BLD AUTO: 0.12 X10*3/UL (ref 0–0.7)
IMM GRANULOCYTES NFR BLD AUTO: 1 % (ref 0–0.9)
LYMPHOCYTES # BLD AUTO: 2.55 X10*3/UL (ref 1.2–4.8)
LYMPHOCYTES NFR BLD AUTO: 20.8 %
MCH RBC QN AUTO: 29.5 PG (ref 26–34)
MCHC RBC AUTO-ENTMCNC: 32.3 G/DL (ref 32–36)
MCV RBC AUTO: 91 FL (ref 80–100)
MONOCYTES # BLD AUTO: 0.5 X10*3/UL (ref 0.1–1)
MONOCYTES NFR BLD AUTO: 4.1 %
NEUTROPHILS # BLD AUTO: 8.84 X10*3/UL (ref 1.2–7.7)
NEUTROPHILS NFR BLD AUTO: 72.2 %
NRBC BLD-RTO: 0 /100 WBCS (ref 0–0)
PLATELET # BLD AUTO: 224 X10*3/UL (ref 150–450)
POTASSIUM SERPL-SCNC: 4.6 MMOL/L (ref 3.5–5.3)
PROT SERPL-MCNC: 7 G/DL (ref 6.4–8.2)
RBC # BLD AUTO: 4.78 X10*6/UL (ref 4–5.2)
SODIUM SERPL-SCNC: 137 MMOL/L (ref 136–145)
TSH SERPL-ACNC: 3.38 MIU/L (ref 0.44–3.98)
WBC # BLD AUTO: 12.2 X10*3/UL (ref 4.4–11.3)

## 2024-07-29 PROCEDURE — 84443 ASSAY THYROID STIM HORMONE: CPT

## 2024-07-29 PROCEDURE — 2500000004 HC RX 250 GENERAL PHARMACY W/ HCPCS (ALT 636 FOR OP/ED): Performed by: INTERNAL MEDICINE

## 2024-07-29 PROCEDURE — 96413 CHEMO IV INFUSION 1 HR: CPT

## 2024-07-29 PROCEDURE — 36415 COLL VENOUS BLD VENIPUNCTURE: CPT

## 2024-07-29 PROCEDURE — 80053 COMPREHEN METABOLIC PANEL: CPT

## 2024-07-29 PROCEDURE — 96409 CHEMO IV PUSH SNGL DRUG: CPT

## 2024-07-29 PROCEDURE — 85025 COMPLETE CBC W/AUTO DIFF WBC: CPT

## 2024-07-29 RX ORDER — DIPHENHYDRAMINE HYDROCHLORIDE 50 MG/ML
50 INJECTION INTRAMUSCULAR; INTRAVENOUS AS NEEDED
Status: DISCONTINUED | OUTPATIENT
Start: 2024-07-29 | End: 2024-07-29 | Stop reason: HOSPADM

## 2024-07-29 RX ORDER — PROCHLORPERAZINE MALEATE 10 MG
10 TABLET ORAL EVERY 6 HOURS PRN
Status: DISCONTINUED | OUTPATIENT
Start: 2024-07-29 | End: 2024-07-29 | Stop reason: HOSPADM

## 2024-07-29 RX ORDER — EPINEPHRINE 0.3 MG/.3ML
0.3 INJECTION SUBCUTANEOUS EVERY 5 MIN PRN
Status: DISCONTINUED | OUTPATIENT
Start: 2024-07-29 | End: 2024-07-29 | Stop reason: HOSPADM

## 2024-07-29 RX ORDER — ALBUTEROL SULFATE 0.83 MG/ML
3 SOLUTION RESPIRATORY (INHALATION) AS NEEDED
Status: DISCONTINUED | OUTPATIENT
Start: 2024-07-29 | End: 2024-07-29 | Stop reason: HOSPADM

## 2024-07-29 RX ORDER — FAMOTIDINE 10 MG/ML
20 INJECTION INTRAVENOUS ONCE AS NEEDED
Status: DISCONTINUED | OUTPATIENT
Start: 2024-07-29 | End: 2024-07-29 | Stop reason: HOSPADM

## 2024-07-29 RX ORDER — HEPARIN 100 UNIT/ML
500 SYRINGE INTRAVENOUS AS NEEDED
OUTPATIENT
Start: 2024-07-29

## 2024-07-29 RX ORDER — HEPARIN SODIUM,PORCINE/PF 10 UNIT/ML
50 SYRINGE (ML) INTRAVENOUS AS NEEDED
OUTPATIENT
Start: 2024-07-29

## 2024-07-29 RX ORDER — PROCHLORPERAZINE EDISYLATE 5 MG/ML
10 INJECTION INTRAMUSCULAR; INTRAVENOUS EVERY 6 HOURS PRN
Status: DISCONTINUED | OUTPATIENT
Start: 2024-07-29 | End: 2024-07-29 | Stop reason: HOSPADM

## 2024-07-29 ASSESSMENT — PAIN SCALES - GENERAL: PAINLEVEL: 7

## 2024-07-29 NOTE — SIGNIFICANT EVENT

## 2024-07-29 NOTE — PROGRESS NOTES
Followed up with pt and her . Pt had her procedure done to increase blood flow in her L leg. It has to be done twice. They are hoping it worked this time. Pt still has boot on L foot for ulcer. Pt wishing her foot was better. Pt is doing well on keytruda and has been out and active with her . She is attending a wedding shower this weekend for her son's wedding that is coming up in Oct. Pt is excited for the day. Pt still gets great support from her dtr/son in law and their three kids who live with them. Pt is doing well with her anxious thoughts and continues on anti anxiety medication. Provided support and encouragement.

## 2024-07-30 ENCOUNTER — APPOINTMENT (OUTPATIENT)
Dept: PRIMARY CARE | Facility: CLINIC | Age: 69
End: 2024-07-30
Payer: MEDICARE

## 2024-07-30 VITALS
BODY MASS INDEX: 24.07 KG/M2 | HEART RATE: 72 BPM | HEIGHT: 64 IN | DIASTOLIC BLOOD PRESSURE: 70 MMHG | WEIGHT: 141 LBS | SYSTOLIC BLOOD PRESSURE: 110 MMHG

## 2024-07-30 DIAGNOSIS — F17.200 CURRENT SMOKER: ICD-10-CM

## 2024-07-30 DIAGNOSIS — M21.372 LEFT FOOT DROP: ICD-10-CM

## 2024-07-30 DIAGNOSIS — C34.90 NON-SMALL CELL LUNG CANCER, UNSPECIFIED LATERALITY (MULTI): ICD-10-CM

## 2024-07-30 DIAGNOSIS — J43.9 PULMONARY EMPHYSEMA, UNSPECIFIED EMPHYSEMA TYPE (MULTI): ICD-10-CM

## 2024-07-30 DIAGNOSIS — J43.8 OTHER EMPHYSEMA (MULTI): ICD-10-CM

## 2024-07-30 DIAGNOSIS — F51.01 PRIMARY INSOMNIA: ICD-10-CM

## 2024-07-30 DIAGNOSIS — Z00.00 MEDICARE ANNUAL WELLNESS VISIT, SUBSEQUENT: ICD-10-CM

## 2024-07-30 DIAGNOSIS — I73.9 PERIPHERAL VASCULAR DISEASE (CMS-HCC): ICD-10-CM

## 2024-07-30 DIAGNOSIS — K59.03 DRUG INDUCED CONSTIPATION: Primary | ICD-10-CM

## 2024-07-30 DIAGNOSIS — R11.2 NAUSEA AND VOMITING, UNSPECIFIED VOMITING TYPE: ICD-10-CM

## 2024-07-30 DIAGNOSIS — G89.3 CANCER RELATED PAIN: ICD-10-CM

## 2024-07-30 PROCEDURE — 3008F BODY MASS INDEX DOCD: CPT | Performed by: STUDENT IN AN ORGANIZED HEALTH CARE EDUCATION/TRAINING PROGRAM

## 2024-07-30 PROCEDURE — 1170F FXNL STATUS ASSESSED: CPT | Performed by: STUDENT IN AN ORGANIZED HEALTH CARE EDUCATION/TRAINING PROGRAM

## 2024-07-30 PROCEDURE — 1160F RVW MEDS BY RX/DR IN RCRD: CPT | Performed by: STUDENT IN AN ORGANIZED HEALTH CARE EDUCATION/TRAINING PROGRAM

## 2024-07-30 PROCEDURE — G0439 PPPS, SUBSEQ VISIT: HCPCS | Performed by: STUDENT IN AN ORGANIZED HEALTH CARE EDUCATION/TRAINING PROGRAM

## 2024-07-30 PROCEDURE — 99214 OFFICE O/P EST MOD 30 MIN: CPT | Performed by: STUDENT IN AN ORGANIZED HEALTH CARE EDUCATION/TRAINING PROGRAM

## 2024-07-30 PROCEDURE — 1159F MED LIST DOCD IN RCRD: CPT | Performed by: STUDENT IN AN ORGANIZED HEALTH CARE EDUCATION/TRAINING PROGRAM

## 2024-07-30 PROCEDURE — 3078F DIAST BP <80 MM HG: CPT | Performed by: STUDENT IN AN ORGANIZED HEALTH CARE EDUCATION/TRAINING PROGRAM

## 2024-07-30 PROCEDURE — 4004F PT TOBACCO SCREEN RCVD TLK: CPT | Performed by: STUDENT IN AN ORGANIZED HEALTH CARE EDUCATION/TRAINING PROGRAM

## 2024-07-30 PROCEDURE — 1124F ACP DISCUSS-NO DSCNMKR DOCD: CPT | Performed by: STUDENT IN AN ORGANIZED HEALTH CARE EDUCATION/TRAINING PROGRAM

## 2024-07-30 PROCEDURE — 3074F SYST BP LT 130 MM HG: CPT | Performed by: STUDENT IN AN ORGANIZED HEALTH CARE EDUCATION/TRAINING PROGRAM

## 2024-07-30 RX ORDER — FLUTICASONE FUROATE, UMECLIDINIUM BROMIDE AND VILANTEROL TRIFENATATE 200; 62.5; 25 UG/1; UG/1; UG/1
1 POWDER RESPIRATORY (INHALATION)
Qty: 56 EACH | Refills: 0 | COMMUNITY
Start: 2024-07-30

## 2024-07-30 RX ORDER — OLANZAPINE 5 MG/1
5 TABLET ORAL NIGHTLY
Qty: 90 TABLET | Refills: 1 | Status: SHIPPED | OUTPATIENT
Start: 2024-07-30 | End: 2025-01-26

## 2024-07-30 RX ORDER — FLUTICASONE FUROATE, UMECLIDINIUM BROMIDE AND VILANTEROL TRIFENATATE 200; 62.5; 25 UG/1; UG/1; UG/1
1 POWDER RESPIRATORY (INHALATION)
Qty: 60 EACH | Refills: 3 | Status: SHIPPED | OUTPATIENT
Start: 2024-07-30

## 2024-07-30 RX ORDER — ONDANSETRON 4 MG/1
TABLET, ORALLY DISINTEGRATING ORAL
Qty: 90 TABLET | Refills: 0 | Status: SHIPPED | OUTPATIENT
Start: 2024-07-30 | End: 2025-07-30

## 2024-07-30 RX ORDER — LUBIPROSTONE 24 UG/1
24 CAPSULE ORAL
Qty: 60 CAPSULE | Refills: 0 | Status: SHIPPED | OUTPATIENT
Start: 2024-07-30 | End: 2024-08-29

## 2024-07-30 ASSESSMENT — PATIENT HEALTH QUESTIONNAIRE - PHQ9
1. LITTLE INTEREST OR PLEASURE IN DOING THINGS: NOT AT ALL
SUM OF ALL RESPONSES TO PHQ9 QUESTIONS 1 AND 2: 0
2. FEELING DOWN, DEPRESSED OR HOPELESS: NOT AT ALL
2. FEELING DOWN, DEPRESSED OR HOPELESS: NOT AT ALL
1. LITTLE INTEREST OR PLEASURE IN DOING THINGS: NOT AT ALL
SUM OF ALL RESPONSES TO PHQ9 QUESTIONS 1 AND 2: 0

## 2024-07-30 ASSESSMENT — ENCOUNTER SYMPTOMS
WEAKNESS: 1
PSYCHIATRIC NEGATIVE: 1
CONSTIPATION: 1
SHORTNESS OF BREATH: 1
CONSTITUTIONAL NEGATIVE: 1
CARDIOVASCULAR NEGATIVE: 1

## 2024-07-30 ASSESSMENT — ACTIVITIES OF DAILY LIVING (ADL)
DOING_HOUSEWORK: NEEDS ASSISTANCE
GROCERY_SHOPPING: NEEDS ASSISTANCE
TAKING_MEDICATION: NEEDS ASSISTANCE
DRESSING: INDEPENDENT
BATHING: INDEPENDENT
MANAGING_FINANCES: NEEDS ASSISTANCE

## 2024-07-30 NOTE — PROGRESS NOTES
Subjective   Patient ID: Radha Toussaint is a 69 y.o. female who presents for Medicare Annual Wellness Visit Subsequent (Medicare wellness visit - constipation - maybe going every other day but trying very hard and in the bathroom for about 20 mins. Taking colace and senna ).  Had 2 additional procedures on her left leg due to vascular occlusion.  Has follow-up imaging and appointment with vascular soon.    Has left leg drop foot. Ordered a boot.    Stools very hard. Suspected due to her opioid pain medication. Taking senna and colace. Still straining at times.     Anxiety doing much better. Rare episodes of panic. Working with psychiatrist, doing well.    Nausea controlled with as needed Zofran and Compazine.    Sleeping well.     Smoking 1 pack/day.    No other concerns today.        Review of Systems   Constitutional: Negative.    HENT: Negative.     Respiratory:  Positive for shortness of breath.    Cardiovascular: Negative.    Gastrointestinal:  Positive for constipation.   Musculoskeletal:  Positive for gait problem.   Neurological:  Positive for weakness.   Psychiatric/Behavioral: Negative.     All other systems reviewed and are negative.      Objective   Physical Exam  Vitals reviewed.   Constitutional:       General: She is not in acute distress.     Appearance: She is not toxic-appearing.      Comments: Chronically ill apperaing   HENT:      Head: Normocephalic.      Mouth/Throat:      Mouth: Mucous membranes are moist.   Eyes:      Pupils: Pupils are equal, round, and reactive to light.   Cardiovascular:      Rate and Rhythm: Normal rate and regular rhythm.   Pulmonary:      Effort: Pulmonary effort is normal. No respiratory distress.      Breath sounds: Normal breath sounds. No wheezing or rhonchi.   Abdominal:      Palpations: Abdomen is soft.   Skin:     General: Skin is warm and dry.   Neurological:      General: No focal deficit present.      Mental Status: She is alert. Mental status is at baseline.       Comments: Left foot drop   Psychiatric:         Mood and Affect: Mood normal.         Behavior: Behavior normal.       Body mass index is 24.19 kg/m².      Current Outpatient Medications:     acetaminophen (Tylenol) 325 mg tablet, Take 3 tablets (975 mg) by mouth every 6 hours if needed for mild pain (1 - 3) or moderate pain (4 - 6)., Disp: , Rfl:     apixaban (Eliquis) 5 mg tablet, Take 1 tablet (5 mg) by mouth 2 times a day., Disp: 60 tablet, Rfl: 5    budesonide (Pulmicort) 0.25 mg/2 mL nebulizer solution, Take 2 mL (0.25 mg) by nebulization 2 times a day. Rinse mouth with water after use to reduce aftertaste and incidence of candidiasis. Do not swallow., Disp: 2 mL, Rfl: 2    clopidogrel (Plavix) 75 mg tablet, Take 1 tablet (75 mg) by mouth once daily in the morning., Disp: 90 tablet, Rfl: 1    dexAMETHasone (Decadron) 4 mg tablet, Take 0.5 tablets (2 mg) by mouth once daily., Disp: 30 tablet, Rfl: 0    ferrous sulfate 325 (65 Fe) MG EC tablet, Take 65 mg by mouth once daily. Do not crush, chew, or split., Disp: , Rfl:     fluticasone-umeclidin-vilanter (Trelegy Ellipta) 200-62.5-25 mcg blister with device, Inhale 1 puff once daily., Disp: 60 each, Rfl: 3    fluticasone-umeclidin-vilanter (Trelegy Ellipta) 200-62.5-25 mcg blister with device, Inhale 1 puff once daily in the morning. Take before meals., Disp: 56 each, Rfl: 0    gabapentin (Neurontin) 100 mg capsule, Take 1 capsule (100 mg) by mouth 3 times a day., Disp: 90 capsule, Rfl: 1    HYDROcodone-acetaminophen (Norco)  mg tablet, Take 1 tablet by mouth every 6 hours if needed for severe pain (7 - 10)., Disp: 60 tablet, Rfl: 0    LORazepam (Ativan) 0.5 mg tablet, Take 1 tablet (0.5 mg) by mouth every 6 hours if needed for anxiety. Do not fill before June 18, 2024., Disp: 120 tablet, Rfl: 0    lubiprostone (Amitiza) 24 mcg capsule, Take 1 capsule (24 mcg) by mouth 2 times daily (morning and late afternoon)., Disp: 60 capsule, Rfl: 0    montelukast  (Singulair) 10 mg tablet, Take 1 tablet (10 mg) by mouth once daily at bedtime., Disp: 90 tablet, Rfl: 1    OLANZapine (ZyPREXA) 5 mg tablet, Take 1 tablet (5 mg) by mouth once daily at bedtime., Disp: 90 tablet, Rfl: 1    ondansetron ODT (Zofran-ODT) 4 mg disintegrating tablet, DISSOLVE 1 TABLET IN MOUTH BY MOUTH THREE TIMES A DAY AS NEEDED NAUSEA, Disp: 90 tablet, Rfl: 0    prochlorperazine (Compazine) 5 mg tablet, Take 1 tablet (5 mg) by mouth every 6 hours if needed for nausea or vomiting., Disp: , Rfl:     rosuvastatin (Crestor) 20 mg tablet, Take 1 tablet (20 mg) by mouth once daily at bedtime., Disp: , Rfl:     sennosides (senna) 8.6 mg tablet, Take 1 tablet (8.6 mg) by mouth once daily. (Patient taking differently: Take 1 tablet (8.6 mg) by mouth once daily at bedtime.), Disp: 30 tablet, Rfl: 11    sertraline (Zoloft) 100 mg tablet, Take 1 tablet (100 mg) by mouth 2 times a day., Disp: 180 tablet, Rfl: 3    simethicone (Mylicon) 80 mg chewable tablet, Chew 1 tablet (80 mg) every 6 hours if needed., Disp: , Rfl:       Assessment/Plan   Diagnoses and all orders for this visit:  Drug induced constipation  Comments:  start amitiza   declines powdered med options  continue senna and colace  Orders:  -     lubiprostone (Amitiza) 24 mcg capsule; Take 1 capsule (24 mcg) by mouth 2 times daily (morning and late afternoon).  Other emphysema (Multi)  Comments:  working on cutting down on smoking  continue trelegy  Orders:  -     fluticasone-umeclidin-vilanter (Trelegy Ellipta) 200-62.5-25 mcg blister with device; Inhale 1 puff once daily.  Primary insomnia  -     OLANZapine (ZyPREXA) 5 mg tablet; Take 1 tablet (5 mg) by mouth once daily at bedtime.  Non-small cell lung cancer, unspecified laterality (Multi)  -     ondansetron ODT (Zofran-ODT) 4 mg disintegrating tablet; DISSOLVE 1 TABLET IN MOUTH BY MOUTH THREE TIMES A DAY AS NEEDED NAUSEA  Cancer related pain  -     ondansetron ODT (Zofran-ODT) 4 mg disintegrating  tablet; DISSOLVE 1 TABLET IN MOUTH BY MOUTH THREE TIMES A DAY AS NEEDED NAUSEA  Nausea and vomiting, unspecified vomiting type  Comments:  zofran and compazine as needed  Orders:  -     ondansetron ODT (Zofran-ODT) 4 mg disintegrating tablet; DISSOLVE 1 TABLET IN MOUTH BY MOUTH THREE TIMES A DAY AS NEEDED NAUSEA  Pulmonary emphysema, unspecified emphysema type (Multi)  -     fluticasone-umeclidin-vilanter (Trelegy Ellipta) 200-62.5-25 mcg blister with device; Inhale 1 puff once daily in the morning. Take before meals.  Medicare annual wellness visit, subsequent  Comments:  declines colon cancer screening  Peripheral vascular disease (CMS-[facility])  Comments:  following with vascular surgery  Left foot drop  Comments:  planned for brace  Current smoker    Follow up in 3 months or sooner as needed       Francheska Rogel,  07/30/24 10:27 PM

## 2024-08-01 ENCOUNTER — HOSPITAL ENCOUNTER (OUTPATIENT)
Dept: VASCULAR MEDICINE | Facility: CLINIC | Age: 69
Discharge: HOME | End: 2024-08-01
Payer: MEDICARE

## 2024-08-01 ENCOUNTER — APPOINTMENT (OUTPATIENT)
Dept: CARDIOLOGY | Facility: CLINIC | Age: 69
End: 2024-08-01
Payer: MEDICARE

## 2024-08-01 VITALS
DIASTOLIC BLOOD PRESSURE: 70 MMHG | SYSTOLIC BLOOD PRESSURE: 122 MMHG | OXYGEN SATURATION: 95 % | HEIGHT: 64 IN | BODY MASS INDEX: 24.07 KG/M2 | HEART RATE: 69 BPM | WEIGHT: 141 LBS

## 2024-08-01 DIAGNOSIS — F17.200 TOBACCO DEPENDENCE SYNDROME: ICD-10-CM

## 2024-08-01 DIAGNOSIS — I73.9 PAD (PERIPHERAL ARTERY DISEASE) (CMS-HCC): ICD-10-CM

## 2024-08-01 DIAGNOSIS — I73.9 PERIPHERAL VASCULAR DISEASE, UNSPECIFIED (CMS-HCC): Primary | ICD-10-CM

## 2024-08-01 PROCEDURE — 93922 UPR/L XTREMITY ART 2 LEVELS: CPT | Performed by: SURGERY

## 2024-08-01 PROCEDURE — 93922 UPR/L XTREMITY ART 2 LEVELS: CPT

## 2024-08-01 NOTE — PATIENT INSTRUCTIONS
Retrial eliquis - CANNOT stop without telling us!  If you have shakes again we can trial switch to Xarelto.  Continue Plavix    SMOKING CESSATION!

## 2024-08-01 NOTE — LETTER
"August 2, 2024     Francheska Rogel DO  [address] [facility] [address]    Patient: Radha Toussaint   YOB: 1955   Date of Visit: 8/1/2024       Dear Dr. Francheska Rogel DO:    Thank you for referring Radha Toussaint to me for evaluation. Below are my notes for this consultation.  If you have questions, please do not hesitate to call me. I look forward to following your patient along with you.       Sincerely,     Willam Mike MD      CC: Margoth Doss DPM  ______________________________________________________________________________________    PCP: Francheska Rogel DO  Podiatrist: Amy/GOLD Doss    Subjective  Radha Toussaint is a 69 y.o. female who is here for follow up of nonhealing wound .  Since last procedure, endorses improving symptoms.      Recall pt reoccluded LLE after intervention 6/25/24. Redo performed 6/26/24.  Still has drop foot from delayed presentation for ALI LLE. Pending brace.  States she stopped Eliquis 1 week prior due to \"shakes\" though uncertain if \"shakes\" improved after cessation.  Recall there is a residual clot in the fem-fem bypass (likely to be culprit for ALI).    Review of Systems:  Otherwise, limited cardiovascular review of systems is negative.    Past Medical History:  She has a past medical history of Aneurysm of carotid artery (CMS-HCC), DVT (deep venous thrombosis) (Multi), History of tubal ligation, Old myocardial infarction, Other specified disorders of kidney and ureter, Personal history of other diseases of the circulatory system, Personal history of other diseases of the circulatory system, Personal history of other diseases of the respiratory system, Personal history of urinary calculi, and Right upper quadrant pain (09/25/2020).    Surgical History:   She has a past surgical history that includes Other surgical history (09/30/2020); Other surgical history (09/30/2020); CT angio abdomen pelvis w and or wo IV IV contrast (11/21/2019); CT angio head w and wo " IV contrast (5/22/2020); CT angio aorta and bilateral iliofemoral runoff w and or wo IV contrast (8/9/2022); Invasive Vascular Procedure (N/A, 1/23/2024); Invasive Vascular Procedure (N/A, 1/23/2024); Invasive Vascular Procedure (N/A, 6/25/2024); Invasive Vascular Procedure (Left, 6/25/2024); Invasive Vascular Procedure (N/A, 6/25/2024); Invasive Vascular Procedure (N/A, 6/25/2024); Invasive Vascular Procedure (Left, 6/25/2024); Invasive Vascular Procedure (N/A, 6/26/2024); and Invasive Vascular Procedure (N/A, 6/26/2024).    Family History:   Family History   Problem Relation Name Age of Onset   • Aneurysm Mother     • Hypertension Mother     • Heart attack Father       Family History   Problem Relation Name Age of Onset   • Aneurysm Mother     • Hypertension Mother     • Heart attack Father         Social History:   Tobacco Use: High Risk (7/30/2024)    Patient History    • Smoking Tobacco Use: Every Day    • Smokeless Tobacco Use: Never    • Passive Exposure: Past       Outpatient Medications:    Current Outpatient Medications:   •  acetaminophen (Tylenol) 325 mg tablet, Take 3 tablets (975 mg) by mouth every 6 hours if needed for mild pain (1 - 3) or moderate pain (4 - 6)., Disp: , Rfl:   •  budesonide (Pulmicort) 0.25 mg/2 mL nebulizer solution, Take 2 mL (0.25 mg) by nebulization 2 times a day. Rinse mouth with water after use to reduce aftertaste and incidence of candidiasis. Do not swallow., Disp: 2 mL, Rfl: 2  •  clopidogrel (Plavix) 75 mg tablet, Take 1 tablet (75 mg) by mouth once daily in the morning., Disp: 90 tablet, Rfl: 1  •  dexAMETHasone (Decadron) 4 mg tablet, Take 0.5 tablets (2 mg) by mouth once daily., Disp: 30 tablet, Rfl: 0  •  ferrous sulfate 325 (65 Fe) MG EC tablet, Take 65 mg by mouth once daily. Do not crush, chew, or split., Disp: , Rfl:   •  fluticasone-umeclidin-vilanter (Trelegy Ellipta) 200-62.5-25 mcg blister with device, Inhale 1 puff once daily., Disp: 60 each, Rfl: 3  •   fluticasone-umeclidin-vilanter (Trelegy Ellipta) 200-62.5-25 mcg blister with device, Inhale 1 puff once daily in the morning. Take before meals., Disp: 56 each, Rfl: 0  •  gabapentin (Neurontin) 100 mg capsule, Take 1 capsule (100 mg) by mouth 3 times a day., Disp: 90 capsule, Rfl: 1  •  HYDROcodone-acetaminophen (Norco)  mg tablet, Take 1 tablet by mouth every 6 hours if needed for severe pain (7 - 10)., Disp: 60 tablet, Rfl: 0  •  lubiprostone (Amitiza) 24 mcg capsule, Take 1 capsule (24 mcg) by mouth 2 times daily (morning and late afternoon)., Disp: 60 capsule, Rfl: 0  •  montelukast (Singulair) 10 mg tablet, Take 1 tablet (10 mg) by mouth once daily at bedtime., Disp: 90 tablet, Rfl: 1  •  OLANZapine (ZyPREXA) 5 mg tablet, Take 1 tablet (5 mg) by mouth once daily at bedtime., Disp: 90 tablet, Rfl: 1  •  ondansetron ODT (Zofran-ODT) 4 mg disintegrating tablet, DISSOLVE 1 TABLET IN MOUTH BY MOUTH THREE TIMES A DAY AS NEEDED NAUSEA, Disp: 90 tablet, Rfl: 0  •  prochlorperazine (Compazine) 5 mg tablet, Take 1 tablet (5 mg) by mouth every 6 hours if needed for nausea or vomiting., Disp: , Rfl:   •  rosuvastatin (Crestor) 20 mg tablet, Take 1 tablet (20 mg) by mouth once daily at bedtime., Disp: , Rfl:   •  sennosides (senna) 8.6 mg tablet, Take 1 tablet (8.6 mg) by mouth once daily. (Patient taking differently: Take 1 tablet (8.6 mg) by mouth once daily at bedtime.), Disp: 30 tablet, Rfl: 11  •  sertraline (Zoloft) 100 mg tablet, Take 1 tablet (100 mg) by mouth 2 times a day., Disp: 180 tablet, Rfl: 3  •  simethicone (Mylicon) 80 mg chewable tablet, Chew 1 tablet (80 mg) every 6 hours if needed., Disp: , Rfl:   •  apixaban (Eliquis) 5 mg tablet, Take 1 tablet (5 mg) by mouth 2 times a day., Disp: 60 tablet, Rfl: 5  •  LORazepam (Ativan) 0.5 mg tablet, Take 1 tablet (0.5 mg) by mouth every 6 hours if needed for anxiety. Do not fill before June 18, 2024., Disp: 120 tablet, Rfl: 0     Allergies:  Ace inhibitors,  "Prednisone, Demerol [meperidine], and Iodinated contrast media       Objective  Vital Signs:  /70 (BP Location: Left arm, Patient Position: Sitting, BP Cuff Size: Adult)   Pulse 69   Ht 1.626 m (5' 4\")   Wt 64 kg (141 lb)   SpO2 95%   BMI 24.20 kg/m²     Physical Exam:  General: no acute distress  HEENT: EOMI, no scleral icterus.  Lungs: Clear to auscultation bilaterally without wheezing, rales, or rhonchi.  Cardiovascular: Regular rhythm and rate. Normal S1 and S2. No murmurs, rubs, or gallops are appreciated. JVP normal.  no bruits  Abdomen: Soft, nontender, nondistended. Bowel sounds present.  Extremities: trivial edema; warm to touch. Persistent footdrop. L fifth met head ulcer slightly smaller. There is a small hemorrhage on first nailbed from trauma.  Neurologic: Alert and oriented x3.      Pertinent Recent Cardiovascular Studies (personally reviewed):  Vascular studies:  TRAVIS/TBI 8/1/2024: normal RLE TRAVIS, normal LLE TRAVIS, and diminished LLE TBI and/or PPG      Laboratory values:  CMP:  Recent Labs     07/29/24  0903 07/08/24  0930 06/29/24  1041 06/28/24  0831 06/25/24  1544 06/17/24  1012 06/13/24  0915 05/06/24  0845 11/29/23  0418 11/28/23  0712 09/18/23  1802 09/06/23  1628 09/02/23  0814 09/01/23  0752 08/31/23  1234 08/30/23  1457    140 137 139 138 136 139 137   < > 137   < > 137 142 139 134* 131*   K 4.6 4.2 4.2 4.3 4.0 4.1 4.5 4.3   < > 4.3   < > 3.8 3.8 4.6 4.8 4.4    104 100 102 106 103 105 104   < > 99   < > 99 105 106 99 98   CO2 27 28 31 30 24 28 28 27   < > 28   < > 26 28 26 24 26   ANIONGAP 12 12 10 11 12 9* 11 10   < > 14   < > 16 13 12 16 11   BUN 20 20 15 12 18 22 27* 26*   < > 12   < > 10 13 9 9 13   CREATININE 0.81 0.70 0.84 0.75 0.63 0.74 0.91 0.72   < > 0.67   < > 0.46* 0.55 0.67 0.65 0.61   EGFR 79 >90 76 87 >90 88 69 >90   < > >90   < >  --   --   --   --   --    MG  --   --  1.88 1.77  --   --   --   --   --  1.73  --  1.99 2.02 2.13 2.06 1.59*    < > = values in " this interval not displayed.     Recent Labs     07/29/24  0903 07/08/24  0930 06/29/24  1041 06/28/24  0831 06/25/24  1544 06/17/24  1012 06/13/24  0915 05/06/24  0845 09/19/23  0756 09/18/23  1802 08/30/23  0557 08/29/23  1257 05/21/20  2150 11/21/19  1156   ALBUMIN 3.8 3.3* 3.3* 3.2* 3.5 3.9   < > 3.8   < > 2.8*   < > 3.0*   < > 4.0   ALKPHOS 53 61  --   --  44 46  --  47   < > 86   < > 97   < > 65   ALT 12 28  --   --  12 15  --  17   < > 21   < > 12   < > 15   AST 15 22  --   --  15 14  --  13   < > 15   < > 15   < > 16   BILITOT 0.4 0.4  --   --  0.6 0.4  --  0.3   < > 0.4   < > 0.6   < > 0.5   LIPASE  --   --   --   --   --   --   --   --   --  50  --  40  --  45    < > = values in this interval not displayed.     CBC:  Recent Labs     07/29/24  0903 07/08/24  0930 06/29/24  1041 06/28/24  0831 06/27/24  0427 06/25/24  1544 06/17/24  1012 06/13/24  0915   WBC 12.2* 18.2* 14.4* 10.8 13.1* 13.3* 14.4* 14.6*   HGB 14.1 11.8* 12.0 12.2 12.0 14.2 15.9 15.9   HCT 43.7 37.5 38.3 37.9 37.8 43.3 49.2* 51.3*    380 161 142* 146* 168 194 203   MCV 91 92 92 90 89 86 91 92     COAG:   Recent Labs     06/27/24  1647 06/27/24  1307 06/27/24  0427 06/26/24  1319 06/26/24  0601 06/13/24  0915 12/01/23  0535 11/30/23  0918 11/30/23  0546 11/28/23  1555 09/18/23  1824 08/30/23  1100 08/30/23  1054 08/11/23  0716 08/10/22  0303 08/09/22  1930 05/21/20  2150 11/21/19  1156   INR  --   --   --   --   --  1.0  --   --   --   --  1.3* 1.5*  --  1.2*  --  1.1  --  1.2*   HAUF 0.2 0.3 <0.1 0.5 0.1  --  0.3 0.2 0.3   < >  --   --   --   --    < >  --   --   --    DDIMERVTE  --   --   --   --   --   --   --   --   --   --   --   --   --   --   --   --  2,110*  --    HAPTOGLOBIN  --   --   --   --   --   --   --   --   --   --   --   --  CANCELED  --   --   --   --   --    FIBRINOGEN  --   --   --   --   --   --   --   --   --   --   --   --  CANCELED  --   --   --   --   --     < > = values in this interval not displayed.      ABO:   Recent Labs     09/18/23  1802   ABO A     HEME/ENDO:  Recent Labs     07/29/24  0903 07/08/24  0930 06/25/24  1544 06/17/24  1012 05/06/24  0845 03/25/24  0856 03/12/24  0645 11/01/23  0842 09/19/23  0756 08/30/23  0557 05/02/23  0907 09/29/20  0746   FERRITIN  --   --   --   --   --   --   --   --  712*  --   --   --    IRONSAT  --   --   --   --   --   --   --   --  11*  --   --  26   TSH 3.38 8.87*  --  3.53 3.46   < > 4.41*   < >  --    < > 1.75  --    HGBA1C  --   --  5.8*  --   --   --  5.9*  --   --   --  5.8*  --     < > = values in this interval not displayed.      CARDIAC:   Recent Labs     03/11/24  1424 03/11/24  1239 11/28/23  0916 11/28/23  0712 10/23/23  0528 10/22/23  2338 10/22/23  1837 09/18/23  1802 08/30/23  1054 07/26/23  1928 11/28/21  1545   LDH  --   --   --   --   --   --   --   --  CANCELED  --   --    TROPHS 12 12 2,735* 2,902* 24* 42* 15* 7  --    < >  --    BNP  --   --   --  185*  --   --  82  --   --   --  65    < > = values in this interval not displayed.     Recent Labs     06/27/24  0427 05/02/23  0907 09/29/20  0746   CHOL 123 160 144   LDLF  --  107* 93   HDL 24.1 35.6* 28.0*   TRIG 150* 89 114     MICRO:   Recent Labs     01/05/24  1400   CRP 3.31*     No results found for the last 90 days.         I have personally reviewed most recent PCP, cardiology, vascular, and/or podiatry documentation.      Assessment/Plan  69 y.o. female with chronic limb threatening ischemia in the background of smoking/ tobacco exposure and known PAD s/p prior intervention with Dr. Lindsay (R-L fem-fem bypass) and Dr. Garcia (PTA/DCB LLE SFA/popliteal - rescue to PT), poorly differentiated carcinoma of bilateral adrenal glands (likely lung primary) on carboplatin/paclitaxel and pembro .    We performed intervention of the left popliteal artery to TPT/PT 1/24.  Unfortunately, patient presented with delayed acute presentation for recurrent left lower extremity ischemia.  She is status post  "rescue x 2 (6/25/24 and 6/26/24).    During her hospitalization, CTA showed new thrombus within the fem-fem bypass, likely accounting for recurrent closures of the left lower extremity.  Unfortunately, she was deemed to be nonsurgical candidate.    Anticoagulation was initiated with Eliquis.  Patient actually had stopped Eliquis herself 1 week ago due to \"shakes\".  She previously smoked 2 packs/day (self rolled), now down to about a pack a day.   also smokes but seems motivated to help quit.    Plan:  Resume Eliquis, patient cannot stop this medication without telling our team.  I advised her that if she does have tremors with Eliquis, we can trial Xarelto 20 mg daily.  She will call us if symptoms recur.  Continue Plavix.  Smoking cessation reinforced again today.  I would consider Acculaser therapy.  Follow-up in October with TRAVIS/TBI.         Willam Mike MD, FACC, FSCAI, RPVI  Co-Director, Vascular Center, and  Co-Director, Pulmonary Embolism Response Team,   [facility] [place]                                 of Medicine,                                                                 [facility] [employer]              "
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
## 2024-08-11 ENCOUNTER — APPOINTMENT (OUTPATIENT)
Dept: BEHAVIORAL HEALTH | Facility: CLINIC | Age: 69
End: 2024-08-11
Payer: MEDICARE

## 2024-08-11 DIAGNOSIS — F32.A MILD DEPRESSION: ICD-10-CM

## 2024-08-11 DIAGNOSIS — F41.1 GAD (GENERALIZED ANXIETY DISORDER): ICD-10-CM

## 2024-08-11 PROCEDURE — 1160F RVW MEDS BY RX/DR IN RCRD: CPT

## 2024-08-11 PROCEDURE — 99214 OFFICE O/P EST MOD 30 MIN: CPT

## 2024-08-11 PROCEDURE — 1159F MED LIST DOCD IN RCRD: CPT

## 2024-08-11 RX ORDER — GABAPENTIN 300 MG/1
300 CAPSULE ORAL 3 TIMES DAILY
Qty: 270 CAPSULE | Refills: 3 | Status: SHIPPED | OUTPATIENT
Start: 2024-08-11 | End: 2025-08-11

## 2024-08-11 RX ORDER — LORAZEPAM 0.5 MG/1
0.5 TABLET ORAL EVERY 6 HOURS PRN
Qty: 120 TABLET | Refills: 0 | Status: SHIPPED | OUTPATIENT
Start: 2024-08-11 | End: 2024-09-10

## 2024-08-11 NOTE — PATIENT INSTRUCTIONS
1. Reviewed diagnostic impression including subjective and objective data and provided education about depression and anxiety disorder, treatment recommendations including medication, therapy, course of treatment and prognosis. Patient amendable to treatment plan.     2. Plan - You are doing well, we will bump up the dose on your Gabapentin to see if it helps with anxiety   CONTINUE  Sertraline 100 mg - take 2  table by mouth daily for anxiety and depression   INCREASE   Gabapentin 300 mg - take 1 tablet, 3 times daily for anxiety   CONTINUE   Lorazepam 0.5 mg - take 1 tablet, every 6 hours as needed for anxiety         3. Reviewed r/b/a, possible side effects of the medication(s). Client is aware benefit/risks      4. Labs reviewed and - placed order for lab work. Please provide at any  facility Please provide Urine Drug Screen to abide by  Substance Use policy.      5. Plan for supportive psychotherapy     6. Follow up with physical health providers as scheduled     7. Follow up October 6      May follow up sooner if experiences worsening symptoms by calling  Psychiatry at [phone]      Patient verbalized an understanding to call [place] [facility] at [phone] ([facility]), [phone], or 663/go to the nearest emergency room if experiences thoughts of harm to self or others.
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
## 2024-08-12 ENCOUNTER — APPOINTMENT (OUTPATIENT)
Dept: PHYSICAL THERAPY | Facility: CLINIC | Age: 69
End: 2024-08-12
Payer: MEDICARE

## 2024-08-19 ENCOUNTER — APPOINTMENT (OUTPATIENT)
Dept: HEMATOLOGY/ONCOLOGY | Facility: CLINIC | Age: 69
End: 2024-08-19
Payer: MEDICARE

## 2024-08-20 DIAGNOSIS — C34.90 NON-SMALL CELL LUNG CANCER, UNSPECIFIED LATERALITY (MULTI): ICD-10-CM

## 2024-08-20 DIAGNOSIS — G89.3 CANCER RELATED PAIN: ICD-10-CM

## 2024-08-20 RX ORDER — HYDROCODONE BITARTRATE AND ACETAMINOPHEN 10; 325 MG/1; MG/1
1 TABLET ORAL EVERY 6 HOURS PRN
Qty: 60 TABLET | Refills: 0 | Status: SHIPPED | OUTPATIENT
Start: 2024-08-20 | End: 2024-09-19

## 2024-08-21 DIAGNOSIS — K59.03 DRUG INDUCED CONSTIPATION: ICD-10-CM

## 2024-08-21 DIAGNOSIS — J45.40 MODERATE PERSISTENT ASTHMA, UNSPECIFIED WHETHER COMPLICATED (HHS-HCC): ICD-10-CM

## 2024-08-21 RX ORDER — LUBIPROSTONE 24 UG/1
24 CAPSULE ORAL
Qty: 60 CAPSULE | Refills: 0 | Status: SHIPPED | OUTPATIENT
Start: 2024-08-21 | End: 2024-09-20

## 2024-08-21 RX ORDER — MONTELUKAST SODIUM 10 MG/1
10 TABLET ORAL NIGHTLY
Qty: 90 TABLET | Refills: 1 | Status: SHIPPED | OUTPATIENT
Start: 2024-08-21

## 2024-09-04 ENCOUNTER — TELEPHONE (OUTPATIENT)
Dept: PHARMACY | Facility: HOSPITAL | Age: 69
End: 2024-09-04
Payer: MEDICARE

## 2024-09-04 NOTE — TELEPHONE ENCOUNTER
I reviewed the telephone encounter and agree with the student’s findings and plans as written. Case discussed with student.    Lashay Martini, PharmD

## 2024-09-04 NOTE — TELEPHONE ENCOUNTER
Population Health: Outreach by Ambulatory Pharmacy Team    Payor: [employer] [place]  Reason: Adherence  Medication: Rosuvastatin  Outcome: Left Voicemail    KATHY NEAL

## 2024-09-09 ENCOUNTER — OFFICE VISIT (OUTPATIENT)
Dept: HEMATOLOGY/ONCOLOGY | Facility: CLINIC | Age: 69
End: 2024-09-09
Payer: MEDICARE

## 2024-09-09 ENCOUNTER — INFUSION (OUTPATIENT)
Dept: HEMATOLOGY/ONCOLOGY | Facility: CLINIC | Age: 69
End: 2024-09-09
Payer: MEDICARE

## 2024-09-09 ENCOUNTER — LAB (OUTPATIENT)
Dept: LAB | Facility: CLINIC | Age: 69
End: 2024-09-09
Payer: MEDICARE

## 2024-09-09 ENCOUNTER — SOCIAL WORK (OUTPATIENT)
Dept: HEMATOLOGY/ONCOLOGY | Facility: CLINIC | Age: 69
End: 2024-09-09

## 2024-09-09 VITALS
RESPIRATION RATE: 18 BRPM | WEIGHT: 145.28 LBS | TEMPERATURE: 97.2 F | HEIGHT: 63 IN | DIASTOLIC BLOOD PRESSURE: 58 MMHG | HEART RATE: 65 BPM | SYSTOLIC BLOOD PRESSURE: 128 MMHG | BODY MASS INDEX: 25.74 KG/M2

## 2024-09-09 DIAGNOSIS — C34.90 NON-SMALL CELL LUNG CANCER, UNSPECIFIED LATERALITY (MULTI): ICD-10-CM

## 2024-09-09 DIAGNOSIS — C34.90 NON-SMALL CELL LUNG CANCER, UNSPECIFIED LATERALITY (MULTI): Primary | ICD-10-CM

## 2024-09-09 DIAGNOSIS — G89.3 CANCER RELATED PAIN: ICD-10-CM

## 2024-09-09 LAB
ALBUMIN SERPL BCP-MCNC: 3.6 G/DL (ref 3.4–5)
ALP SERPL-CCNC: 48 U/L (ref 33–136)
ALT SERPL W P-5'-P-CCNC: 11 U/L (ref 7–45)
ANION GAP SERPL CALC-SCNC: 9 MMOL/L (ref 10–20)
AST SERPL W P-5'-P-CCNC: 12 U/L (ref 9–39)
BASOPHILS # BLD AUTO: 0.08 X10*3/UL (ref 0–0.1)
BASOPHILS NFR BLD AUTO: 0.6 %
BILIRUB SERPL-MCNC: 0.3 MG/DL (ref 0–1.2)
BUN SERPL-MCNC: 24 MG/DL (ref 6–23)
CALCIUM SERPL-MCNC: 8.9 MG/DL (ref 8.6–10.3)
CHLORIDE SERPL-SCNC: 102 MMOL/L (ref 98–107)
CO2 SERPL-SCNC: 28 MMOL/L (ref 21–32)
CREAT SERPL-MCNC: 0.78 MG/DL (ref 0.5–1.05)
EGFRCR SERPLBLD CKD-EPI 2021: 83 ML/MIN/1.73M*2
EOSINOPHIL # BLD AUTO: 0.1 X10*3/UL (ref 0–0.7)
EOSINOPHIL NFR BLD AUTO: 0.7 %
ERYTHROCYTE [DISTWIDTH] IN BLOOD BY AUTOMATED COUNT: 14.1 % (ref 11.5–14.5)
GLUCOSE SERPL-MCNC: 84 MG/DL (ref 74–99)
HCT VFR BLD AUTO: 45 % (ref 36–46)
HGB BLD-MCNC: 14.3 G/DL (ref 12–16)
IMM GRANULOCYTES # BLD AUTO: 0.1 X10*3/UL (ref 0–0.7)
IMM GRANULOCYTES NFR BLD AUTO: 0.7 % (ref 0–0.9)
LYMPHOCYTES # BLD AUTO: 2.4 X10*3/UL (ref 1.2–4.8)
LYMPHOCYTES NFR BLD AUTO: 17.2 %
MCH RBC QN AUTO: 28.4 PG (ref 26–34)
MCHC RBC AUTO-ENTMCNC: 31.8 G/DL (ref 32–36)
MCV RBC AUTO: 89 FL (ref 80–100)
MONOCYTES # BLD AUTO: 0.77 X10*3/UL (ref 0.1–1)
MONOCYTES NFR BLD AUTO: 5.5 %
NEUTROPHILS # BLD AUTO: 10.49 X10*3/UL (ref 1.2–7.7)
NEUTROPHILS NFR BLD AUTO: 75.3 %
NRBC BLD-RTO: 0 /100 WBCS (ref 0–0)
PLATELET # BLD AUTO: 202 X10*3/UL (ref 150–450)
POTASSIUM SERPL-SCNC: 4.4 MMOL/L (ref 3.5–5.3)
PROT SERPL-MCNC: 6.8 G/DL (ref 6.4–8.2)
RBC # BLD AUTO: 5.04 X10*6/UL (ref 4–5.2)
SODIUM SERPL-SCNC: 135 MMOL/L (ref 136–145)
TSH SERPL-ACNC: 2.53 MIU/L (ref 0.44–3.98)
WBC # BLD AUTO: 13.9 X10*3/UL (ref 4.4–11.3)

## 2024-09-09 PROCEDURE — 99214 OFFICE O/P EST MOD 30 MIN: CPT | Mod: 25 | Performed by: INTERNAL MEDICINE

## 2024-09-09 PROCEDURE — 36415 COLL VENOUS BLD VENIPUNCTURE: CPT

## 2024-09-09 PROCEDURE — 96413 CHEMO IV INFUSION 1 HR: CPT

## 2024-09-09 PROCEDURE — 85025 COMPLETE CBC W/AUTO DIFF WBC: CPT

## 2024-09-09 PROCEDURE — 99214 OFFICE O/P EST MOD 30 MIN: CPT | Performed by: INTERNAL MEDICINE

## 2024-09-09 PROCEDURE — 2500000004 HC RX 250 GENERAL PHARMACY W/ HCPCS (ALT 636 FOR OP/ED): Performed by: INTERNAL MEDICINE

## 2024-09-09 PROCEDURE — 84075 ASSAY ALKALINE PHOSPHATASE: CPT

## 2024-09-09 PROCEDURE — 84443 ASSAY THYROID STIM HORMONE: CPT

## 2024-09-09 RX ORDER — HYDROCODONE BITARTRATE AND ACETAMINOPHEN 10; 325 MG/1; MG/1
1 TABLET ORAL EVERY 6 HOURS PRN
Qty: 60 TABLET | Refills: 0 | Status: SHIPPED | OUTPATIENT
Start: 2024-09-09 | End: 2024-10-09

## 2024-09-09 RX ORDER — HEPARIN SODIUM,PORCINE/PF 10 UNIT/ML
50 SYRINGE (ML) INTRAVENOUS AS NEEDED
OUTPATIENT
Start: 2024-09-09

## 2024-09-09 RX ORDER — HEPARIN 100 UNIT/ML
500 SYRINGE INTRAVENOUS AS NEEDED
OUTPATIENT
Start: 2024-09-09

## 2024-09-09 ASSESSMENT — PAIN SCALES - GENERAL: PAINLEVEL: 0-NO PAIN

## 2024-09-09 NOTE — SIGNIFICANT EVENT
09/09/24 1303   Prechemo Checklist   Has the patient been in the hospital, ED, or urgent care since last date of service No   Chemo/Immuno Consent Completed and Signed Yes   Confirmed to previous date/time of medication Yes   Compared to previous dose Yes   All medications are dated accurately Yes   Pregnancy Test Negative Not applicable   Parameters Met Yes   BSA/Weight-Height Verified Yes   Dose Calculations Verified (current, total, cumulative) Yes

## 2024-09-09 NOTE — PROGRESS NOTES
Followed up with pt and her . Pt has a leg brace on her L shin and ankle to help. Per pt when she had her blood flow opened up to her leg it left her with a foot drop. Pt still wearing boot on foot due to the ulcer. Pt does notice the ulcer on her feet did heal more. Pt saving her nicer shoes to wear for her son's upcoming wedding in a few weeks in Oct. Pt has now two different sized feet and now facing that she will have to buy two pairs of shoes to have them match and fit. Pt accepting her leg will be like this now and she will have to live with it. Let pt know Onc [facility] will look to see if anyone sells mismatched shoes for leg and foot issues and let her know of the findings. Pt remains in good spirits despite this and is going out and staying busy with her . Pt came in transport chair to [facility].

## 2024-09-09 NOTE — PROGRESS NOTES
LOCATION:   [facility], at [facility].     HEMATOLOGY ONCOLOGY PROBLEMS:  1.  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         PD-L1 70%.  No actionable alterations.       a.  Ist -line therapy with carbo/Taxol/Keytruda x 3 cycles from Sep to Nov 2023.       b.  On maintenance Keytruda beginning Dec 2023.     CHIEF COMPLAINT:    The patient is in the clinic today for followup of poorly differentiated carcinoma and for continuation of treatment and management of therapy related effects.          HISTORY:  Ms. Toussaint is a 68-year-old female with poorly differentiated carcinoma of most probable primary bronchogenic origin.  She has multiple medical problems and was admitted at [facility] in Aug 2023 with complaint worsening shortness of breath, cough and weakness.  There was also history of progressive weight loss and recent falls.  Work-up revealed stable pulmonary nodules but there was concern regarding new bilateral adrenal metastatic lesions along with questionable liver lesions and right hilar lymphadenopathy. She is a lifelong heavy smoker. CT-guided biopsy of the adrenal lesion was suggestive of poorly differentiated carcinoma. 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  She was also evaluated by Dr. Francheska Parada at [facility] and case was reviewed by  pathology and as per tumor board evaluation, overall clinical and radiological finding were considered mainly of probable primary bronchogenic origin.  She was treated with carbo/Taxol/Keytruda combination for 3 cycles.  4th cycle was discontinued due to tolerability issues.  Follow-up CT scan showed interval decrease in bilateral adrenal lesions and she was transitioned to maintenance Keytruda beginning Dec 2023.    INTERVAL HISTORY:  Since her last visit she again had hospitalization requiring extensive lower leg/foot vascular surgery procedures.  Still feeling  tired and fatigue.  Also complaining of worsening pain in the left leg.     PAST MEDICAL HISTORY:   1.  COPD on home O2.   2.  Coronary artery disease   3.  Hypertension   4.  Hyperlipidemia   5.  Multiple vascular aneurysms.  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  6.  Peripheral vascular disease.  S/p femoral-femoral bypass   7.  AAA graft repair procedure.  8.  History of tubal ligation, bladder lift, lithotripsy surgeries     SOCIAL HISTORY:    for 46 years and lives in [place].  1 and half to 2 pack a day for 47 years smoking history.  Nonalcoholic.  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  Born and raised  in [place]     FAMILY HISTORY:    Father  at age 52 from myocardial infarction.  Mother  at age 75 or from aortic aneurysm related complications.  1 sister  at age 68 from depression and related complications.  3 children and 5 grandkids.  Her son and daughter has history of Crohn's disease.  No other specific history of bleeding, clotting or malignant disorder in the family.     REVIEW OF SYSTEMS:  Pertinent finding as per the history above.  All other systems have been reviewed and generally negative and noncontributory.     PHYSICAL EXAMINATION:    Detailed physical examination not done     ALLERGY & MEDICATIONS:  Allergies and latest outpatient medications list were reviewed in the EMR.    LAB RESULTS:  CBC from today shows a white cell count of 13.9 with hemoglobin of 14.3 and platelets of 202.   Metabolic profile is unremarkable.  TSH is 2.5.  Last 3 sets of blood work were reviewed and the trend was noted.    RADIOLOGY RESULTS:  CT chest 2024  Impression:  IMPRESSION:   1. No acute process   2.  Severe emphysema and bronchitis.   3.  Severe three-vessel coronary artery calcifications.   4.  Mediastinal adenopathy may be related to patient's known diagnosis of small cell lung cancer, suboptimally evaluated without IV contrast.      PATHOLOGY  RESULTS:  Surgical Pathology [Aug 23 2023 1:48PM] ([id number])  Specimens: RIGHT ADRENAL LESION CORE   Name AALIYAH SAWANT   Accession #: [id number]   Pathologist: BOLIVAR BARRETO ASA, MD, PhD   Date of Procedure: 8/11/2023   Submitting  Physician: MAUREEN CHAVIS CNP   Location: [facility]   Copy To/Referring/Attending:   MARTHA CORDERO MD Other External #     FINAL DIAGNOSIS   A. RIGHT ADRENAL LESION CORE:     METASTATIC POORLY DIFFERENTIATED  CARCINOMA: SOFT TISSUE (IDENTIFIED AS RIGHT ADRENAL) BIOPSY   Note   COMMENT : The biopsyconsists entirely of a poorly differentiated malignancy in soft  tissue; no adrenal is seen. The tumor stains for keratins (CAM5.2 and  CK7 but not CK20) and is negative for TTF1 and Napsin as well as p40. Staining for SF1 is completely negative.     Electronically Signed Out By BOLIVAR BARRETO ASA, MD, PhD/AN       ASSESSMENT AND PLAN:   1.  Poorly differentiated carcinoma of unknown primary origin.  Please refer the details of initial presentation and management as outlined above.  In summary, patient with lifelong history of heavy smoking.  She was admitted at [facility]  with complaint of worsening shortness of breath in Aug 2023.  Work-up revealed stable pulmonary nodules but there was concern regarding new bilateral adrenal metastatic lesions along with questionable liver lesions and right hilar lymphadenopathy.  CT-guided biopsy of the adrenal lesion was suggestive of poorly differentiated carcinoma.  Cancer type ID molecular test results were suggestive of questionable sarcoma.  She was also evaluated by Dr. Francheska Parada at [facility] and her case was reviewed by  pathology and as per tumor board discussions, overall clinical and radiological finding were considered mainly of probable primary bronchogenic origin.  She was treated with carbo/Taxol/Keytruda combination for 3 cycles.  4th cycle was discontinued due to tolerability issues.  Follow-up CT scan showed interval  decrease in bilateral adrenal lesions and she was transitioned to maintenance Keytruda beginning Dec 2023.     Overall plan is to continue with  Keytruda at the current dose and schedule.  As stated above there are issues with frequent COPD exacerbation and lower extremity vascular issues leading to treatment breaks and hospitalizations.    Probable side effects of Keytruda mainly weakness, fatigue, pneumonitis, colitis, endocrinopathies, pleuritis, skin rash etc. were explained to them in detail.  Her overall long-term prognosis remains guarded.    2.  COPD exacerbation/anxiety.  I strongly advised her to continue to follow-up with pulmonary clinic closely.  There is also a significant component of anxiety and panic attacks and she should continue to follow-up with onco-psychiatry clinic.     3.  Left lower leg wound/vascular issues.  Really appreciate wound and vascular surgery teams from help.  She will continue to follow-up with them.    4.  Follow-up.  She will return to the clinic in 6 weeks.  She will be scheduled for follow-up CT chest/abdomen prior to her next visit.    This note has been transcribed using Dragon voice recognition system and there is a possibility of unintentional typing misprints.

## 2024-09-10 DIAGNOSIS — I73.9 PERIPHERAL VASCULAR DISEASE (CMS-HCC): ICD-10-CM

## 2024-09-10 DIAGNOSIS — E27.40 ADRENAL INSUFFICIENCY (MULTI): ICD-10-CM

## 2024-09-10 DIAGNOSIS — E78.2 MIXED HYPERLIPIDEMIA: ICD-10-CM

## 2024-09-10 DIAGNOSIS — C34.90 NON-SMALL CELL LUNG CANCER, UNSPECIFIED LATERALITY (MULTI): ICD-10-CM

## 2024-09-10 RX ORDER — CLOPIDOGREL BISULFATE 75 MG/1
75 TABLET ORAL EVERY MORNING
Qty: 90 TABLET | Refills: 1 | Status: SHIPPED | OUTPATIENT
Start: 2024-09-10

## 2024-09-10 RX ORDER — DEXAMETHASONE 4 MG/1
2 TABLET ORAL DAILY
Qty: 30 TABLET | Refills: 1 | Status: SHIPPED | OUTPATIENT
Start: 2024-09-10

## 2024-09-10 RX ORDER — ROSUVASTATIN CALCIUM 20 MG/1
20 TABLET, COATED ORAL NIGHTLY
Qty: 90 TABLET | Refills: 1 | Status: SHIPPED | OUTPATIENT
Start: 2024-09-10

## 2024-09-16 DIAGNOSIS — F41.1 GAD (GENERALIZED ANXIETY DISORDER): ICD-10-CM

## 2024-09-16 RX ORDER — LORAZEPAM 0.5 MG/1
0.5 TABLET ORAL EVERY 6 HOURS PRN
Qty: 120 TABLET | Refills: 0 | Status: SHIPPED | OUTPATIENT
Start: 2024-09-16 | End: 2024-10-16

## 2024-09-26 DIAGNOSIS — K59.03 DRUG INDUCED CONSTIPATION: ICD-10-CM

## 2024-09-26 RX ORDER — LUBIPROSTONE 24 UG/1
24 CAPSULE ORAL
Qty: 60 CAPSULE | Refills: 0 | Status: SHIPPED | OUTPATIENT
Start: 2024-09-26 | End: 2024-10-26

## 2024-09-27 ENCOUNTER — TELEPHONE (OUTPATIENT)
Dept: HEMATOLOGY/ONCOLOGY | Facility: CLINIC | Age: 69
End: 2024-09-27
Payer: MEDICARE

## 2024-09-27 DIAGNOSIS — G89.3 CANCER RELATED PAIN: ICD-10-CM

## 2024-09-27 DIAGNOSIS — C34.90 NON-SMALL CELL LUNG CANCER, UNSPECIFIED LATERALITY (MULTI): Primary | ICD-10-CM

## 2024-09-27 RX ORDER — HYDROCODONE BITARTRATE AND ACETAMINOPHEN 10; 325 MG/1; MG/1
1 TABLET ORAL EVERY 6 HOURS PRN
Qty: 60 TABLET | Refills: 0 | Status: SHIPPED | OUTPATIENT
Start: 2024-09-27 | End: 2024-10-27

## 2024-09-27 NOTE — TELEPHONE ENCOUNTER
Pts daughter is requesting a refill for her Moms Norco. Last filled 9/9/24. Dr. Burnham aware. OAARS report run

## 2024-10-02 ENCOUNTER — TELEPHONE (OUTPATIENT)
Dept: PRIMARY CARE | Facility: CLINIC | Age: 69
End: 2024-10-02
Payer: MEDICARE

## 2024-10-02 DIAGNOSIS — F51.01 PRIMARY INSOMNIA: ICD-10-CM

## 2024-10-02 RX ORDER — OLANZAPINE 10 MG/1
10 TABLET ORAL NIGHTLY
Qty: 90 TABLET | Refills: 1 | Status: SHIPPED | OUTPATIENT
Start: 2024-10-02 | End: 2025-03-31

## 2024-10-02 NOTE — TELEPHONE ENCOUNTER
Patients daughter called stating nevin isnt sleeping well and would like to discuss increasing her olanzapine dose.   Right now she is taking the 5 mg tab at bed time

## 2024-10-02 NOTE — PROGRESS NOTES
Subjective   Patient ID: Radha Toussaint is a 69 y.o. female who presents for No chief complaint on file..  HPI    Review of Systems    Objective   Physical Exam    Assessment/Plan            Francheska Rogel DO 10/02/24 4:35 PM

## 2024-10-06 ENCOUNTER — APPOINTMENT (OUTPATIENT)
Dept: BEHAVIORAL HEALTH | Facility: CLINIC | Age: 69
End: 2024-10-06
Payer: MEDICARE

## 2024-10-06 DIAGNOSIS — F41.1 GAD (GENERALIZED ANXIETY DISORDER): ICD-10-CM

## 2024-10-06 DIAGNOSIS — F32.0 MILD MAJOR DEPRESSION (CMS-HCC): ICD-10-CM

## 2024-10-06 PROCEDURE — 1159F MED LIST DOCD IN RCRD: CPT

## 2024-10-06 PROCEDURE — 1160F RVW MEDS BY RX/DR IN RCRD: CPT

## 2024-10-06 PROCEDURE — 4004F PT TOBACCO SCREEN RCVD TLK: CPT

## 2024-10-06 PROCEDURE — 99214 OFFICE O/P EST MOD 30 MIN: CPT

## 2024-10-06 RX ORDER — HYDROXYZINE PAMOATE 25 MG/1
25 CAPSULE ORAL 3 TIMES DAILY PRN
Qty: 270 CAPSULE | Refills: 3 | Status: SHIPPED | OUTPATIENT
Start: 2024-10-06 | End: 2025-10-01

## 2024-10-06 NOTE — PROGRESS NOTES
"  Mrs Toussaint is a 69 year old female with PMH of a stemi, CAD, hypertension, hyperlipidemia, PAD, nicotine abuse, non small lung cancer, referred to  Psychoncology for a follow up evaluation for  treatment of Anxiety.      Patient provided 2 personal identifiers prior to virtual appointment     Chief Complaint - Anxiety      Leg has improved.      Appetite is Ok, no problem.      Sleeping - improved. Upped Olanzapine.      Mood - \"down\"     Anxiety is up.  A few anxiety attacks. Shakes, difficulty breathing.      Depression  Visit Type: Follow up   Onset of symptoms: 1 to 6 months ago  Progression since onset: improving   Patient presents with the following symptoms: decreased concentration, mildly depressed mood, occasional worry, fatigue, muscle tension, restlessness and shortness of breath.  Frequency of symptoms: most days   Severity: moderate   Sleep per night: 7 hours  Sleep quality: fair  Nighttime awakenings: occasional  Risk factors: recent illness and major life event  Patient has a history of: anxiety/panic attacks, CAD and chronic lung disease  Treatment tried: non-benzodiazephine anxiolytics and benzodiazepine  Compliance with treatment: good  Improvement on treatment: mild     Anxiety  Presents for follow up visit. Onset was 1 to 6 months ago. The problem has improved.  Symptoms include decreased concentration, mild depression, dizziness, occasional  worry, muscle tension,  and shortness of breath. Symptoms occur intermittently. The severity of symptoms is mild. The symptoms are aggravated by social activities. The patient sleeps 7 hours per night. The quality of sleep is fair. Nighttime awakenings: occasional.      Risk factors include a major life event and recent illness. Her past medical history is significant for anxiety/panic attacks, CAD and chronic lung disease. Past treatments include benzodiazephines and non-benzodiazephine anxiolytics. The treatment has provided marginal relief. Compliance " with prior treatments has been good.   Mental Status Examination  General Appearance: Well groomed, appropriate eye contact.  Gait/Station:  gait not assessed, virtual appointment   Speech: Normal rate, volume, prosody  Mood: ok  Affect: constricted   Thought Process: Linear, goal directed  Thought Associations: No loosening of associations  Thought Content: normal  Perception: No perceptual abnormalities noted  Level of Consciousness: Alert  Orientation: Alert and oriented to person, place, time and situation  Attention and Concentration: Intact  Recent Memory: Intact as evidenced by ability to recall details from the past 24 hours   Remote Memory: Intact as evidenced by ability to recall previous medical issues   Executive function: Intact  Language: Naming intact  Fund of knowledge: Good  Insight:  Intact  Judgment: Intact, as evidenced by help-seeking behavior, ability to reason through medical decision making, and compliance with treatment recommendations  Review of Systems   Constitutional:  Positive for activity change and fatigue.   Respiratory:  Positive for shortness of breath.    Cardiovascular:  Positive for palpitations.   Neurological:  Positive for dizziness.   Psychiatric/Behavioral:  Positive for decreased concentration, mild depression. The patient is mildly anxious     Lab Review:             Lab on 03/25/2024   Component Date Value    WBC 03/25/2024 15.2 (H)     nRBC 03/25/2024 0.0     RBC 03/25/2024 5.41 (H)     Hemoglobin 03/25/2024 15.0     Hematocrit 03/25/2024 47.0 (H)     MCV 03/25/2024 87     MCH 03/25/2024 27.7     MCHC 03/25/2024 31.9 (L)     RDW 03/25/2024 17.6 (H)     Platelets 03/25/2024 259     Neutrophils % 03/25/2024 80.6     Immature Granulocytes %,* 03/25/2024 1.4 (H)     Lymphocytes % 03/25/2024 13.5     Monocytes % 03/25/2024 2.6     Eosinophils % 03/25/2024 1.3     Basophils % 03/25/2024 0.6     Neutrophils Absolute 03/25/2024 12.25 (H)     Immature Granulocytes Ab* 03/25/2024  0.21     Lymphocytes Absolute 03/25/2024 2.05     Monocytes Absolute 03/25/2024 0.39     Eosinophils Absolute 03/25/2024 0.20     Basophils Absolute 03/25/2024 0.09     Glucose 03/25/2024 129 (H)     Sodium 03/25/2024 138     Potassium 03/25/2024 4.3     Chloride 03/25/2024 102     Bicarbonate 03/25/2024 30     Anion Gap 03/25/2024 10     Urea Nitrogen 03/25/2024 20     Creatinine 03/25/2024 0.69     eGFR 03/25/2024 >90     Calcium 03/25/2024 9.6     Albumin 03/25/2024 3.6     Alkaline Phosphatase 03/25/2024 62     Total Protein 03/25/2024 7.1     AST 03/25/2024 15     Bilirubin, Total 03/25/2024 0.4     ALT 03/25/2024 23     Thyroid Stimulating Horm* 03/25/2024 2.67    Admission on 03/11/2024, Discharged on 03/15/2024   Component Date Value    POCT pH, Venous 03/11/2024 7.37     POCT pCO2, Venous 03/11/2024 59 (H)     POCT pO2, Venous 03/11/2024 30 (L)     POCT SO2, Venous 03/11/2024 44 (L)     POCT Oxy Hemoglobin, Oniel* 03/11/2024 40.9 (L)     POCT Hematocrit Calculat* 03/11/2024 47.0 (H)     POCT Sodium, Venous 03/11/2024 135 (L)     POCT Potassium, Venous 03/11/2024 4.5     POCT Chloride, Venous 03/11/2024 101     POCT Ionized Calicum, Ve* 03/11/2024 1.22     POCT Glucose, Venous 03/11/2024 106 (H)     POCT Lactate, Venous 03/11/2024 0.8     POCT Base Excess, Venous 03/11/2024 6.6 (H)     POCT HCO3 Calculated, Ve* 03/11/2024 34.1 (H)     POCT Hemoglobin, Venous 03/11/2024 15.7     POCT Anion Gap, Venous 03/11/2024 4.0 (L)     Patient Temperature 03/11/2024 37.0     FiO2 03/11/2024 32     Ventricular Rate 03/11/2024 82     Atrial Rate 03/11/2024 82     [place] Interval 03/11/2024 150     QRS Duration 03/11/2024 90     QT Interval 03/11/2024 356     QTC Calculation(Bazett) 03/11/2024 415     P Axis 03/11/2024 71     R [place] 03/11/2024 64     T White Post 03/11/2024 87     QRS Count 03/11/2024 14     Q Onset 03/11/2024 221     P Onset 03/11/2024 146     P Offset 03/11/2024 194     T Offset 03/11/2024 399     QTC Fredericia  03/11/2024 395     Flu A Result 03/11/2024 Not Detected     Flu B Result 03/11/2024 Not Detected     Coronavirus 2019, PCR 03/11/2024 Not Detected     WBC 03/11/2024 12.5 (H)     nRBC 03/11/2024 0.0     RBC 03/11/2024 5.64 (H)     Hemoglobin 03/11/2024 15.3     Hematocrit 03/11/2024 48.7 (H)     MCV 03/11/2024 86     MCH 03/11/2024 27.1     MCHC 03/11/2024 31.4 (L)     RDW 03/11/2024 19.2 (H)     Platelets 03/11/2024 207     Neutrophils % 03/11/2024 73.4     Immature Granulocytes %,* 03/11/2024 0.4     Lymphocytes % 03/11/2024 17.5     Monocytes % 03/11/2024 6.5     Eosinophils % 03/11/2024 1.6     Basophils % 03/11/2024 0.6     Neutrophils Absolute 03/11/2024 9.21 (H)     Immature Granulocytes Ab* 03/11/2024 0.05     Lymphocytes Absolute 03/11/2024 2.19     Monocytes Absolute 03/11/2024 0.82     Eosinophils Absolute 03/11/2024 0.20     Basophils Absolute 03/11/2024 0.07     Glucose 03/11/2024 91     Sodium 03/11/2024 135 (L)     Potassium 03/11/2024 5.4 (H)     Chloride 03/11/2024 101     Bicarbonate 03/11/2024 30     Anion Gap 03/11/2024 9 (L)     Urea Nitrogen 03/11/2024 14     Creatinine 03/11/2024 0.54     eGFR 03/11/2024 >90     Calcium 03/11/2024 8.9     Albumin 03/11/2024 3.9     Alkaline Phosphatase 03/11/2024 66     Total Protein 03/11/2024 6.9     AST 03/11/2024 26     Bilirubin, Total 03/11/2024 0.5     ALT 03/11/2024 13     Troponin I, High Sensiti* 03/11/2024 12     Troponin I, High Sensiti* 03/11/2024 12     Potassium 03/11/2024 4.3     WBC 03/12/2024 8.6     nRBC 03/12/2024 0.0     RBC 03/12/2024 5.01     Hemoglobin 03/12/2024 13.5     Hematocrit 03/12/2024 44.9     MCV 03/12/2024 90     MCH 03/12/2024 26.9     MCHC 03/12/2024 30.1 (L)     RDW 03/12/2024 18.6 (H)     Platelets 03/12/2024 167     Glucose 03/12/2024 83     Sodium 03/12/2024 140     Potassium 03/12/2024 4.8     Chloride 03/12/2024 106     Bicarbonate 03/12/2024 25     Anion Gap 03/12/2024 14     Urea Nitrogen 03/12/2024 20      Creatinine 03/12/2024 0.75     eGFR 03/12/2024 87     Calcium 03/12/2024 8.7     Thyroid Stimulating Horm* 03/12/2024 4.41 (H)     Hemoglobin A1C 03/12/2024 5.9 (H)     Estimated Average Glucose 03/12/2024 123     Vitamin B12 03/12/2024 417     Thyroxine, Free 03/12/2024 0.83     WBC 03/13/2024 13.1 (H)     nRBC 03/13/2024 0.0     RBC 03/13/2024 5.32 (H)     Hemoglobin 03/13/2024 14.3     Hematocrit 03/13/2024 46.8 (H)     MCV 03/13/2024 88     MCH 03/13/2024 26.9     MCHC 03/13/2024 30.6 (L)     RDW 03/13/2024 18.7 (H)     Platelets 03/13/2024 198     Glucose 03/13/2024 119 (H)     Sodium 03/13/2024 140     Potassium 03/13/2024 4.3     Chloride 03/13/2024 103     Bicarbonate 03/13/2024 29     Anion Gap 03/13/2024 12     Urea Nitrogen 03/13/2024 17     Creatinine 03/13/2024 0.63     eGFR 03/13/2024 >90     Calcium 03/13/2024 9.3       Assessment/Plan   Safety Assessment - no remote or recent SI, HI, or SA. Protective Factors - connected family , no trauma history, limited substance use history . Risk factors - cancer diagnosis and treatment superimposed upon chronic illnesses. Relative Risk is low.      The patient reports that her mood is low and her anxiety has increase. She would like to increase her Lorazepam but will not do so due to concern for addiction and increasing risk of respiratory depression. Will start Hydroxyzine PRN dose to help alleviate anxiety symptoms. She can continue her other medications   Diagnoses and all orders for this visit:  OSITO (generalized anxiety disorder)   Adjustment Disorder with Depression     CONTINUE  Sertraline 100 mg - take 2 tablets  by mouth daily for anxiety and depression   CONTINUE   Gabapentin 300 mg - take 1 tablet, 3 times daily for anxiety   CONTINUE  Lorazepam 0.5 mg - take 1 tablet, every 6 hours as needed for anxiety  START Hydroxyzine 25 mg PO - take 1 tablet TID PRN for anxiety       Follow up in 4 weeks.      Chart Review - 5 minutes   Assessment with patient  contact - 25 minutes

## 2024-10-08 ENCOUNTER — APPOINTMENT (OUTPATIENT)
Dept: RADIOLOGY | Facility: HOSPITAL | Age: 69
End: 2024-10-08
Payer: MEDICARE

## 2024-10-08 ENCOUNTER — HOSPITAL ENCOUNTER (OUTPATIENT)
Dept: RADIOLOGY | Facility: HOSPITAL | Age: 69
Discharge: HOME | End: 2024-10-08
Payer: MEDICARE

## 2024-10-08 DIAGNOSIS — C34.90 NON-SMALL CELL LUNG CANCER, UNSPECIFIED LATERALITY (MULTI): ICD-10-CM

## 2024-10-08 DIAGNOSIS — G89.3 CANCER RELATED PAIN: ICD-10-CM

## 2024-10-08 PROCEDURE — 71250 CT THORAX DX C-: CPT | Performed by: RADIOLOGY

## 2024-10-08 PROCEDURE — 74176 CT ABD & PELVIS W/O CONTRAST: CPT

## 2024-10-08 PROCEDURE — 74176 CT ABD & PELVIS W/O CONTRAST: CPT | Performed by: RADIOLOGY

## 2024-10-17 ENCOUNTER — HOSPITAL ENCOUNTER (OUTPATIENT)
Dept: VASCULAR MEDICINE | Facility: CLINIC | Age: 69
Discharge: HOME | End: 2024-10-17
Payer: MEDICARE

## 2024-10-17 ENCOUNTER — OFFICE VISIT (OUTPATIENT)
Dept: CARDIOLOGY | Facility: CLINIC | Age: 69
End: 2024-10-17
Payer: MEDICARE

## 2024-10-17 VITALS
SYSTOLIC BLOOD PRESSURE: 118 MMHG | DIASTOLIC BLOOD PRESSURE: 60 MMHG | WEIGHT: 146.2 LBS | BODY MASS INDEX: 24.96 KG/M2 | HEIGHT: 64 IN | HEART RATE: 74 BPM | OXYGEN SATURATION: 93 %

## 2024-10-17 DIAGNOSIS — I99.8 ACUTE LOWER LIMB ISCHEMIA: ICD-10-CM

## 2024-10-17 DIAGNOSIS — I73.9 PERIPHERAL VASCULAR DISEASE (CMS-HCC): ICD-10-CM

## 2024-10-17 DIAGNOSIS — I73.9 PERIPHERAL VASCULAR DISEASE, UNSPECIFIED (CMS-HCC): ICD-10-CM

## 2024-10-17 DIAGNOSIS — E78.2 MIXED HYPERLIPIDEMIA: ICD-10-CM

## 2024-10-17 PROBLEM — C74.90: Status: RESOLVED | Noted: 2024-04-25 | Resolved: 2024-10-17

## 2024-10-17 PROBLEM — J18.9 PNEUMONIA DUE TO INFECTIOUS ORGANISM: Status: RESOLVED | Noted: 2023-10-22 | Resolved: 2024-10-17

## 2024-10-17 PROBLEM — I70.222 ATHEROSCLEROSIS OF NATIVE ARTERIES OF EXTREMITIES WITH REST PAIN, LEFT LEG (MULTI): Status: ACTIVE | Noted: 2024-10-17

## 2024-10-17 PROBLEM — C80.1: Status: RESOLVED | Noted: 2023-10-25 | Resolved: 2024-10-17

## 2024-10-17 PROCEDURE — 99215 OFFICE O/P EST HI 40 MIN: CPT | Performed by: INTERNAL MEDICINE

## 2024-10-17 PROCEDURE — 99406 BEHAV CHNG SMOKING 3-10 MIN: CPT | Performed by: INTERNAL MEDICINE

## 2024-10-17 PROCEDURE — 93922 UPR/L XTREMITY ART 2 LEVELS: CPT | Performed by: SURGERY

## 2024-10-17 PROCEDURE — 93922 UPR/L XTREMITY ART 2 LEVELS: CPT

## 2024-10-17 RX ORDER — CLOPIDOGREL BISULFATE 75 MG/1
75 TABLET ORAL EVERY MORNING
Qty: 90 TABLET | Refills: 3 | Status: SHIPPED | OUTPATIENT
Start: 2024-10-17 | End: 2025-10-17

## 2024-10-17 RX ORDER — ROSUVASTATIN CALCIUM 40 MG/1
40 TABLET, COATED ORAL DAILY
Qty: 90 TABLET | Refills: 3 | Status: SHIPPED | OUTPATIENT
Start: 2024-10-17 | End: 2025-10-17

## 2024-10-17 NOTE — PROGRESS NOTES
PCP: Francheska Rogel DO  Podiatrist: Amy/GOLD Burk   Radha Toussaint is a 69 y.o. female who is here for follow up of nonhealing wound .  Since last procedure, endorses improving symptoms.      Recall pt reoccluded LLE after intervention 6/25/24. Redo performed 6/26/24. Did not perform procedure for fem-fem clot noted on CTA ([facility]).    Wound on LLE foot has completely healed. Still has drop foot from delayed presentation for ALI LLE. Wears brace. Has been compliant with Eliquis.      Review of Systems:  Otherwise, limited cardiovascular review of systems is negative.    Past Medical History:  She has a past medical history of Aneurysm of carotid artery ([facility]), DVT (deep venous thrombosis) ([facility]), History of tubal ligation, Old myocardial infarction, Other specified disorders of kidney and ureter, Personal history of other diseases of the circulatory system, Personal history of other diseases of the circulatory system, Personal history of other diseases of the respiratory system, Personal history of urinary calculi, and Right upper quadrant pain (09/25/2020).    Surgical History:   She has a past surgical history that includes Other surgical history (09/30/2020); Other surgical history (09/30/2020); CT angio abdomen pelvis w and or wo IV IV contrast (11/21/2019); CT angio head w and wo IV contrast (5/22/2020); CT angio aorta and bilateral iliofemoral runoff w and or wo IV contrast (8/9/2022); Invasive Vascular Procedure (N/A, 1/23/2024); Invasive Vascular Procedure (N/A, 1/23/2024); Invasive Vascular Procedure (N/A, 6/25/2024); Invasive Vascular Procedure (Left, 6/25/2024); Invasive Vascular Procedure (N/A, 6/25/2024); Invasive Vascular Procedure (N/A, 6/25/2024); Invasive Vascular Procedure (Left, 6/25/2024); Invasive Vascular Procedure (N/A, 6/26/2024); and Invasive Vascular Procedure (N/A, 6/26/2024).    Family History:   Family History   Problem Relation Name Age of Onset     Aneurysm Mother      Hypertension Mother      Heart attack Father       Family History   Problem Relation Name Age of Onset    Aneurysm Mother      Hypertension Mother      Heart attack Father         Social History:   Tobacco Use: High Risk (7/30/2024)    Patient History     Smoking Tobacco Use: Every Day     Smokeless Tobacco Use: Never     Passive Exposure: Past       Outpatient Medications:    Current Outpatient Medications:     acetaminophen (Tylenol) 325 mg tablet, Take 3 tablets (975 mg) by mouth every 6 hours if needed for mild pain (1 - 3) or moderate pain (4 - 6)., Disp: , Rfl:     budesonide (Pulmicort) 0.25 mg/2 mL nebulizer solution, Take 2 mL (0.25 mg) by nebulization 2 times a day. Rinse mouth with water after use to reduce aftertaste and incidence of candidiasis. Do not swallow., Disp: 2 mL, Rfl: 2    dexAMETHasone (Decadron) 4 mg tablet, Take 0.5 tablets (2 mg) by mouth once daily., Disp: 30 tablet, Rfl: 1    fluticasone-umeclidin-vilanter (Trelegy Ellipta) 200-62.5-25 mcg blister with device, Inhale 1 puff once daily., Disp: 60 each, Rfl: 3    gabapentin (Neurontin) 300 mg capsule, Take 1 capsule (300 mg) by mouth 3 times a day., Disp: 270 capsule, Rfl: 3    HYDROcodone-acetaminophen (Norco)  mg tablet, Take 1 tablet by mouth every 6 hours if needed for severe pain (7 - 10)., Disp: 60 tablet, Rfl: 0    hydrOXYzine pamoate (VistariL) 25 mg capsule, Take 1 capsule (25 mg) by mouth 3 times a day as needed for itching., Disp: 270 capsule, Rfl: 3    lubiprostone (Amitiza) 24 mcg capsule, Take 1 capsule (24 mcg) by mouth 2 times daily (morning and late afternoon)., Disp: 60 capsule, Rfl: 0    montelukast (Singulair) 10 mg tablet, Take 1 tablet (10 mg) by mouth once daily at bedtime., Disp: 90 tablet, Rfl: 1    OLANZapine (ZyPREXA) 10 mg tablet, Take 1 tablet (10 mg) by mouth once daily at bedtime., Disp: 90 tablet, Rfl: 1    ondansetron ODT (Zofran-ODT) 4 mg disintegrating tablet, DISSOLVE 1 TABLET  "IN MOUTH BY MOUTH THREE TIMES A DAY AS NEEDED NAUSEA, Disp: 90 tablet, Rfl: 0    prochlorperazine (Compazine) 5 mg tablet, Take 1 tablet (5 mg) by mouth every 6 hours if needed for nausea or vomiting., Disp: , Rfl:     sertraline (Zoloft) 100 mg tablet, Take 1 tablet (100 mg) by mouth 2 times a day., Disp: 180 tablet, Rfl: 3    apixaban (Eliquis) 5 mg tablet, Take 1 tablet (5 mg) by mouth 2 times a day., Disp: 180 tablet, Rfl: 3    clopidogrel (Plavix) 75 mg tablet, Take 1 tablet (75 mg) by mouth once daily in the morning., Disp: 90 tablet, Rfl: 3    LORazepam (Ativan) 0.5 mg tablet, Take 1 tablet (0.5 mg) by mouth every 6 hours if needed for anxiety., Disp: 120 tablet, Rfl: 0    rosuvastatin (Crestor) 40 mg tablet, Take 1 tablet (40 mg) by mouth once daily., Disp: 90 tablet, Rfl: 3     Allergies:  Methylprednisolone, Ace inhibitors, Prednisone, Betamethasone dipropionate, Demerol [meperidine], and Iodinated contrast media       Objective   Vital Signs:  /60 (BP Location: Left arm, Patient Position: Sitting, BP Cuff Size: Adult)   Pulse 74   Ht 1.626 m (5' 4\")   Wt 66.3 kg (146 lb 3.2 oz)   SpO2 93%   BMI 25.10 kg/m²     Physical Exam:  General: no acute distress  HEENT: EOMI, no scleral icterus.  Lungs: Clear to auscultation bilaterally without wheezing, rales, or rhonchi.  Cardiovascular: Regular rhythm and rate. Normal S1 and S2. No murmurs, rubs, or gallops are appreciated. JVP normal.  no bruits  Abdomen: Soft, nontender, nondistended. Bowel sounds present.  Extremities: trivial edema; warm to touch. Persistent footdrop. Diminished distal pulses.  Neurologic: Alert and oriented x3.      Pertinent Recent Cardiovascular Studies (personally reviewed):  Vascular studies:  TRAVIS/TBI 10/17/2024: normal RLE TRAVIS, normal LLE TRAVIS, and diminished LLE TBI and/or PPG.  There has been a drop in PT TRAVIS to 0.59 but DP TRAVIS remains 0.98.    Laboratory values:  CMP:  Recent Labs     09/09/24  1141 07/29/24  0903 " 07/08/24  0930 06/29/24  1041 06/28/24  0831 06/25/24  1544 06/17/24  1012 06/13/24  0915 11/29/23  0418 11/28/23  0712 09/18/23  1802 09/06/23  1628 09/02/23  0814 09/01/23  0752 08/31/23  1234 08/30/23  1457   * 137 140 137 139 138 136 139   < > 137   < > 137 142 139 134* 131*   K 4.4 4.6 4.2 4.2 4.3 4.0 4.1 4.5   < > 4.3   < > 3.8 3.8 4.6 4.8 4.4    103 104 100 102 106 103 105   < > 99   < > 99 105 106 99 98   CO2 28 27 28 31 30 24 28 28   < > 28   < > 26 28 26 24 26   ANIONGAP 9* 12 12 10 11 12 9* 11   < > 14   < > 16 13 12 16 11   BUN 24* 20 20 15 12 18 22 27*   < > 12   < > 10 13 9 9 13   CREATININE 0.78 0.81 0.70 0.84 0.75 0.63 0.74 0.91   < > 0.67   < > 0.46* 0.55 0.67 0.65 0.61   EGFR 83 79 >90 76 87 >90 88 69   < > >90   < >  --   --   --   --   --    MG  --   --   --  1.88 1.77  --   --   --   --  1.73  --  1.99 2.02 2.13 2.06 1.59*    < > = values in this interval not displayed.     Recent Labs     09/09/24  1141 07/29/24  0903 07/08/24  0930 06/29/24  1041 06/28/24  0831 06/25/24  1544 06/17/24  1012 09/19/23  0756 09/18/23  1802 08/30/23  0557 08/29/23  1257 05/21/20  2150 11/21/19  1156   ALBUMIN 3.6 3.8 3.3* 3.3* 3.2* 3.5 3.9   < > 2.8*   < > 3.0*   < > 4.0   ALKPHOS 48 53 61  --   --  44 46   < > 86   < > 97   < > 65   ALT 11 12 28  --   --  12 15   < > 21   < > 12   < > 15   AST 12 15 22  --   --  15 14   < > 15   < > 15   < > 16   BILITOT 0.3 0.4 0.4  --   --  0.6 0.4   < > 0.4   < > 0.6   < > 0.5   LIPASE  --   --   --   --   --   --   --   --  50  --  40  --  45    < > = values in this interval not displayed.     CBC:  Recent Labs     09/09/24  1141 07/29/24  0903 07/08/24  0930 06/29/24  1041 06/28/24  0831 06/27/24  0427 06/25/24  1544 06/17/24  1012   WBC 13.9* 12.2* 18.2* 14.4* 10.8 13.1* 13.3* 14.4*   HGB 14.3 14.1 11.8* 12.0 12.2 12.0 14.2 15.9   HCT 45.0 43.7 37.5 38.3 37.9 37.8 43.3 49.2*    224 380 161 142* 146* 168 194   MCV 89 91 92 92 90 89 86 91     COAG:   Recent  Labs     06/27/24  1647 06/27/24  1307 06/27/24  0427 06/26/24  1319 06/26/24  0601 06/13/24  0915 12/01/23  0535 11/30/23  0918 11/30/23  0546 11/28/23  1555 09/18/23  1824 08/30/23  1100 08/30/23  1054 08/11/23  0716 08/10/22  0303 08/09/22  1930 05/21/20  2150 11/21/19  1156   INR  --   --   --   --   --  1.0  --   --   --   --  1.3* 1.5*  --  1.2*  --  1.1  --  1.2*   HAUF 0.2 0.3 <0.1 0.5 0.1  --  0.3 0.2 0.3   < >  --   --   --   --    < >  --   --   --    DDIMERVTE  --   --   --   --   --   --   --   --   --   --   --   --   --   --   --   --  2,110*  --    HAPTOGLOBIN  --   --   --   --   --   --   --   --   --   --   --   --  CANCELED  --   --   --   --   --    FIBRINOGEN  --   --   --   --   --   --   --   --   --   --   --   --  CANCELED  --   --   --   --   --     < > = values in this interval not displayed.     ABO:   Recent Labs     09/18/23  1802   ABO A     HEME/ENDO:  Recent Labs     09/09/24  1141 07/29/24  0903 07/08/24  0930 06/25/24  1544 06/17/24  1012 03/25/24  0856 03/12/24  0645 11/01/23  0842 09/19/23  0756 08/30/23  0557 05/02/23  0907 09/29/20  0746   FERRITIN  --   --   --   --   --   --   --   --  712*  --   --   --    IRONSAT  --   --   --   --   --   --   --   --  11*  --   --  26   TSH 2.53 3.38 8.87*  --  3.53   < > 4.41*   < >  --    < > 1.75  --    HGBA1C  --   --   --  5.8*  --   --  5.9*  --   --   --  5.8*  --     < > = values in this interval not displayed.      CARDIAC:   Recent Labs     03/11/24  1424 03/11/24  1239 11/28/23  0916 11/28/23  0712 10/23/23  0528 10/22/23  2338 10/22/23  1837 09/18/23  1802 08/30/23  1054 07/26/23  1928 11/28/21  1545   LDH  --   --   --   --   --   --   --   --  CANCELED  --   --    TROPHS 12 12 2,735* 2,902* 24* 42* 15* 7  --    < >  --    BNP  --   --   --  185*  --   --  82  --   --   --  65    < > = values in this interval not displayed.     Recent Labs     06/27/24  0427 05/02/23  0907 09/29/20  0746   CHOL 123 160 144   LDLF 69 107* 93    HDL 24.1 35.6* 28.0*   TRIG 150* 89 114     MICRO:   Recent Labs     01/05/24  1400   CRP 3.31*        I have personally reviewed most recent PCP, cardiology, vascular, and/or podiatry documentation.      Assessment/Plan   69 y.o. female with chronic limb threatening ischemia in the background of smoking/ tobacco exposure and known PAD s/p prior intervention with Dr. Lindsay (R-L fem-fem bypass) and Dr. Garcia (PTA/DCB LLE SFA/popliteal - rescue to PT), poorly differentiated carcinoma of bilateral adrenal glands (likely lung primary) on carboplatin/paclitaxel and pembro .    We performed intervention of the left popliteal artery to TPT/PT 1/24.  Unfortunately, patient presented with delayed acute presentation for recurrent left lower extremity ischemia.  She is status post rescue x 2 (6/25/24 and 6/26/24).    During her hospitalization, CTA showed new thrombus within the fem-fem bypass, likely accounting for recurrent closures of the left lower extremity.  Unfortunately, she was deemed to be nonsurgical candidate.    Anticoagulation was initiated with Eliquis.  She previously smoked 2 packs/day (self rolled), now down to about a pack a day.   also smokes.  Seems less motivated today for smoking cessation.  States she will be cremated with a pack of cigarettes.    Plan:  We will obtain duplex to evaluate fem-fem as well as left popliteal segment due to drop in PT pressures.  Continue Eliquis, Plavix, statin.  These are renewed for the patient.  We will need a repeat FLP at some point with goal LDL <55.  Reinforced smoking cessation x 3 minutes, though patient very resistant today.  Will plan for 6-month follow-up with TARVIS/TBI and duplex of the left lower extremity and fem-fem bypass.  Pt to follow with Gisele Bojorquez CNP in 6 months and myself in 1 year.         Willam Mike MD, FACC, FSCAI, RPVI  Co-Director, Vascular Center, and  Co-Director, Pulmonary Embolism Response Team,   [facility]  [facility]                                 of Medicine,                                                                 [employer]

## 2024-10-17 NOTE — PATIENT INSTRUCTIONS
Keep taking the medications as prescribed.  Keep working on smoking cessation.  6 month follow up with ultrasound.

## 2024-10-17 NOTE — LETTER
October 19, 2024     Francheska Rogel DO  [address] [facility] [address]    Patient: Radha Toussaint   YOB: 1955   Date of Visit: 10/17/2024       Dear Dr. Francheska Rogel DO:    Thank you for referring Radha Toussaint to me for evaluation. Below are my notes for this consultation.  If you have questions, please do not hesitate to call me. I look forward to following your patient along with you.       Sincerely,     Willam Mike MD      CC: Mathieu Reyes DPM  ______________________________________________________________________________________    PCP: Francheska Rogel DO  Podiatrist: Amy/GOLD Burk  Radha Toussaint is a 69 y.o. female who is here for follow up of nonhealing wound .  Since last procedure, endorses improving symptoms.      Recall pt reoccluded LLE after intervention 6/25/24. Redo performed 6/26/24. Did not perform procedure for fem-fem clot noted on CT[place] [facility] [place]    Wound on LLE foot has completely healed. Still has drop foot from delayed presentation for ALI LLE. Wears brace. Has been compliant with Eliquis.      Review of Systems:  Otherwise, limited cardiovascular review of systems is negative.    Past Medical History:  She has a past medical history of Aneurysm of carotid artery (CMS-HCC), DVT (deep venous thrombosis) (Multi), History of tubal ligation, Old myocardial infarction, Other specified disorders of kidney and ureter, Personal history of other diseases of the circulatory system, Personal history of other diseases of the circulatory system, Personal history of other diseases of the respiratory system, Personal history of urinary calculi, and Right upper quadrant pain (09/25/2020).    Surgical History:   She has a past surgical history that includes Other surgical history (09/30/2020); Other surgical history (09/30/2020); CT angio abdomen pelvis w and or wo IV IV contrast (11/21/2019); CT angio head w and wo IV contrast (5/22/2020); CT angio  aorta and bilateral iliofemoral runoff w and or wo IV contrast (8/9/2022); Invasive Vascular Procedure (N/A, 1/23/2024); Invasive Vascular Procedure (N/A, 1/23/2024); Invasive Vascular Procedure (N/A, 6/25/2024); Invasive Vascular Procedure (Left, 6/25/2024); Invasive Vascular Procedure (N/A, 6/25/2024); Invasive Vascular Procedure (N/A, 6/25/2024); Invasive Vascular Procedure (Left, 6/25/2024); Invasive Vascular Procedure (N/A, 6/26/2024); and Invasive Vascular Procedure (N/A, 6/26/2024).    Family History:   Family History   Problem Relation Name Age of Onset   • Aneurysm Mother     • Hypertension Mother     • Heart attack Father       Family History   Problem Relation Name Age of Onset   • Aneurysm Mother     • Hypertension Mother     • Heart attack Father         Social History:   Tobacco Use: High Risk (7/30/2024)    Patient History    • Smoking Tobacco Use: Every Day    • Smokeless Tobacco Use: Never    • Passive Exposure: Past       Outpatient Medications:    Current Outpatient Medications:   •  acetaminophen (Tylenol) 325 mg tablet, Take 3 tablets (975 mg) by mouth every 6 hours if needed for mild pain (1 - 3) or moderate pain (4 - 6)., Disp: , Rfl:   •  budesonide (Pulmicort) 0.25 mg/2 mL nebulizer solution, Take 2 mL (0.25 mg) by nebulization 2 times a day. Rinse mouth with water after use to reduce aftertaste and incidence of candidiasis. Do not swallow., Disp: 2 mL, Rfl: 2  •  dexAMETHasone (Decadron) 4 mg tablet, Take 0.5 tablets (2 mg) by mouth once daily., Disp: 30 tablet, Rfl: 1  •  fluticasone-umeclidin-vilanter (Trelegy Ellipta) 200-62.5-25 mcg blister with device, Inhale 1 puff once daily., Disp: 60 each, Rfl: 3  •  gabapentin (Neurontin) 300 mg capsule, Take 1 capsule (300 mg) by mouth 3 times a day., Disp: 270 capsule, Rfl: 3  •  HYDROcodone-acetaminophen (Norco)  mg tablet, Take 1 tablet by mouth every 6 hours if needed for severe pain (7 - 10)., Disp: 60 tablet, Rfl: 0  •  hydrOXYzine  "pamoate (VistariL) 25 mg capsule, Take 1 capsule (25 mg) by mouth 3 times a day as needed for itching., Disp: 270 capsule, Rfl: 3  •  lubiprostone (Amitiza) 24 mcg capsule, Take 1 capsule (24 mcg) by mouth 2 times daily (morning and late afternoon)., Disp: 60 capsule, Rfl: 0  •  montelukast (Singulair) 10 mg tablet, Take 1 tablet (10 mg) by mouth once daily at bedtime., Disp: 90 tablet, Rfl: 1  •  OLANZapine (ZyPREXA) 10 mg tablet, Take 1 tablet (10 mg) by mouth once daily at bedtime., Disp: 90 tablet, Rfl: 1  •  ondansetron ODT (Zofran-ODT) 4 mg disintegrating tablet, DISSOLVE 1 TABLET IN MOUTH BY MOUTH THREE TIMES A DAY AS NEEDED NAUSEA, Disp: 90 tablet, Rfl: 0  •  prochlorperazine (Compazine) 5 mg tablet, Take 1 tablet (5 mg) by mouth every 6 hours if needed for nausea or vomiting., Disp: , Rfl:   •  sertraline (Zoloft) 100 mg tablet, Take 1 tablet (100 mg) by mouth 2 times a day., Disp: 180 tablet, Rfl: 3  •  apixaban (Eliquis) 5 mg tablet, Take 1 tablet (5 mg) by mouth 2 times a day., Disp: 180 tablet, Rfl: 3  •  clopidogrel (Plavix) 75 mg tablet, Take 1 tablet (75 mg) by mouth once daily in the morning., Disp: 90 tablet, Rfl: 3  •  LORazepam (Ativan) 0.5 mg tablet, Take 1 tablet (0.5 mg) by mouth every 6 hours if needed for anxiety., Disp: 120 tablet, Rfl: 0  •  rosuvastatin (Crestor) 40 mg tablet, Take 1 tablet (40 mg) by mouth once daily., Disp: 90 tablet, Rfl: 3     Allergies:  Methylprednisolone, Ace inhibitors, Prednisone, Betamethasone dipropionate, Demerol [meperidine], and Iodinated contrast media       Objective  Vital Signs:  /60 (BP Location: Left arm, Patient Position: Sitting, BP Cuff Size: Adult)   Pulse 74   Ht 1.626 m (5' 4\")   Wt 66.3 kg (146 lb 3.2 oz)   SpO2 93%   BMI 25.10 kg/m²     Physical Exam:  General: no acute distress  HEENT: EOMI, no scleral icterus.  Lungs: Clear to auscultation bilaterally without wheezing, rales, or rhonchi.  Cardiovascular: Regular rhythm and rate. " Normal S1 and S2. No murmurs, rubs, or gallops are appreciated. JVP normal.  no bruits  Abdomen: Soft, nontender, nondistended. Bowel sounds present.  Extremities: trivial edema; warm to touch. Persistent footdrop. Diminished distal pulses.  Neurologic: Alert and oriented x3.      Pertinent Recent Cardiovascular Studies (personally reviewed):  Vascular studies:  TRAVIS/TBI 10/17/2024: normal RLE TRAVIS, normal LLE TRAVIS, and diminished LLE TBI and/or PPG.  There has been a drop in PT TRAVIS to 0.59 but DP TRAVIS remains 0.98.    Laboratory values:  CMP:  Recent Labs     09/09/24  1141 07/29/24  0903 07/08/24  0930 06/29/24  1041 06/28/24  0831 06/25/24  1544 06/17/24  1012 06/13/24  0915 11/29/23  0418 11/28/23  0712 09/18/23  1802 09/06/23  1628 09/02/23  0814 09/01/23  0752 08/31/23  1234 08/30/23  1457   * 137 140 137 139 138 136 139   < > 137   < > 137 142 139 134* 131*   K 4.4 4.6 4.2 4.2 4.3 4.0 4.1 4.5   < > 4.3   < > 3.8 3.8 4.6 4.8 4.4    103 104 100 102 106 103 105   < > 99   < > 99 105 106 99 98   CO2 28 27 28 31 30 24 28 28   < > 28   < > 26 28 26 24 26   ANIONGAP 9* 12 12 10 11 12 9* 11   < > 14   < > 16 13 12 16 11   BUN 24* 20 20 15 12 18 22 27*   < > 12   < > 10 13 9 9 13   CREATININE 0.78 0.81 0.70 0.84 0.75 0.63 0.74 0.91   < > 0.67   < > 0.46* 0.55 0.67 0.65 0.61   EGFR 83 79 >90 76 87 >90 88 69   < > >90   < >  --   --   --   --   --    MG  --   --   --  1.88 1.77  --   --   --   --  1.73  --  1.99 2.02 2.13 2.06 1.59*    < > = values in this interval not displayed.     Recent Labs     09/09/24  1141 07/29/24  0903 07/08/24  0930 06/29/24  1041 06/28/24  0831 06/25/24  1544 06/17/24  1012 09/19/23  0756 09/18/23  1802 08/30/23  0557 08/29/23  1257 05/21/20  2150 11/21/19  1156   ALBUMIN 3.6 3.8 3.3* 3.3* 3.2* 3.5 3.9   < > 2.8*   < > 3.0*   < > 4.0   ALKPHOS 48 53 61  --   --  44 46   < > 86   < > 97   < > 65   ALT 11 12 28  --   --  12 15   < > 21   < > 12   < > 15   AST 12 15 22  --   --  15 14    < > 15   < > 15   < > 16   BILITOT 0.3 0.4 0.4  --   --  0.6 0.4   < > 0.4   < > 0.6   < > 0.5   LIPASE  --   --   --   --   --   --   --   --  50  --  40  --  45    < > = values in this interval not displayed.     CBC:  Recent Labs     09/09/24  1141 07/29/24  0903 07/08/24  0930 06/29/24  1041 06/28/24  0831 06/27/24  0427 06/25/24  1544 06/17/24  1012   WBC 13.9* 12.2* 18.2* 14.4* 10.8 13.1* 13.3* 14.4*   HGB 14.3 14.1 11.8* 12.0 12.2 12.0 14.2 15.9   HCT 45.0 43.7 37.5 38.3 37.9 37.8 43.3 49.2*    224 380 161 142* 146* 168 194   MCV 89 91 92 92 90 89 86 91     COAG:   Recent Labs     06/27/24  1647 06/27/24  1307 06/27/24  0427 06/26/24  1319 06/26/24  0601 06/13/24  0915 12/01/23  0535 11/30/23  0918 11/30/23  0546 11/28/23  1555 09/18/23  1824 08/30/23  1100 08/30/23  1054 08/11/23  0716 08/10/22  0303 08/09/22  1930 05/21/20  2150 11/21/19  1156   INR  --   --   --   --   --  1.0  --   --   --   --  1.3* 1.5*  --  1.2*  --  1.1  --  1.2*   HAUF 0.2 0.3 <0.1 0.5 0.1  --  0.3 0.2 0.3   < >  --   --   --   --    < >  --   --   --    DDIMERVTE  --   --   --   --   --   --   --   --   --   --   --   --   --   --   --   --  2,110*  --    HAPTOGLOBIN  --   --   --   --   --   --   --   --   --   --   --   --  CANCELED  --   --   --   --   --    FIBRINOGEN  --   --   --   --   --   --   --   --   --   --   --   --  CANCELED  --   --   --   --   --     < > = values in this interval not displayed.     ABO:   Recent Labs     09/18/23  1802   ABO A     HEME/ENDO:  Recent Labs     09/09/24  1141 07/29/24  0903 07/08/24  0930 06/25/24  1544 06/17/24  1012 03/25/24  0856 03/12/24  0645 11/01/23  0842 09/19/23  0756 08/30/23  0557 05/02/23  0907 09/29/20  0746   FERRITIN  --   --   --   --   --   --   --   --  712*  --   --   --    IRONSAT  --   --   --   --   --   --   --   --  11*  --   --  26   TSH 2.53 3.38 8.87*  --  3.53   < > 4.41*   < >  --    < > 1.75  --    HGBA1C  --   --   --  5.8*  --   --  5.9*  --   --    --  5.8*  --     < > = values in this interval not displayed.      CARDIAC:   Recent Labs     03/11/24  1424 03/11/24  1239 11/28/23  0916 11/28/23  0712 10/23/23  0528 10/22/23  2338 10/22/23  1837 09/18/23  1802 08/30/23  1054 07/26/23  1928 11/28/21  1545   LDH  --   --   --   --   --   --   --   --  CANCELED  --   --    TROPHS 12 12 2,735* 2,902* 24* 42* 15* 7  --    < >  --    BNP  --   --   --  185*  --   --  82  --   --   --  65    < > = values in this interval not displayed.     Recent Labs     06/27/24  0427 05/02/23  0907 09/29/20  0746   CHOL 123 160 144   LDLF 69 107* 93   HDL 24.1 35.6* 28.0*   TRIG 150* 89 114     MICRO:   Recent Labs     01/05/24  1400   CRP 3.31*        I have personally reviewed most recent PCP, cardiology, vascular, and/or podiatry documentation.      Assessment/Plan  69 y.o. female with chronic limb threatening ischemia in the background of smoking/ tobacco exposure and known PAD s/p prior intervention with Dr. Lindsay (R-L fem-fem bypass) and Dr. Garcia (PTA/DCB LLE SFA/popliteal - rescue to PT), poorly differentiated carcinoma of bilateral adrenal glands (likely lung primary) on carboplatin/paclitaxel and pembro .    We performed intervention of the left popliteal artery to TPT/PT 1/24.  Unfortunately, patient presented with delayed acute presentation for recurrent left lower extremity ischemia.  She is status post rescue x 2 (6/25/24 and 6/26/24).    During her hospitalization, CTA showed new thrombus within the fem-fem bypass, likely accounting for recurrent closures of the left lower extremity.  Unfortunately, she was deemed to be nonsurgical candidate.    Anticoagulation was initiated with Eliquis.  She previously smoked 2 packs/day (self rolled), now down to about a pack a day.   also smokes.  Seems less motivated today for smoking cessation.  States she will be cremated with a pack of cigarettes.    Plan:  We will obtain duplex to evaluate fem-fem as well as left  popliteal segment due to drop in PT pressures.  Continue Eliquis, Plavix, statin.  These are renewed for the patient.  We will need a repeat FLP at some point with goal LDL <55.  Reinforced smoking cessation x 3 minutes, though patient very resistant today.  Will plan for 6-month follow-up with TRAVIS/TBI and duplex of the left lower extremity and fem-fem bypass.  Pt to follow with Gisele Bojorquez CNP in 6 months and myself in 1 year.         Willam Mike MD, FACC, FSCAI, RPVI  Co-Director, Vascular Center, and  Co-Director, Pulmonary Embolism Response Team,   [facility] [place]                                 of Medicine,                                                                 [facility]

## 2024-10-18 RX ORDER — PROCHLORPERAZINE EDISYLATE 5 MG/ML
10 INJECTION INTRAMUSCULAR; INTRAVENOUS EVERY 6 HOURS PRN
Status: CANCELLED | OUTPATIENT
Start: 2024-10-21

## 2024-10-18 RX ORDER — PROCHLORPERAZINE MALEATE 10 MG
10 TABLET ORAL EVERY 6 HOURS PRN
Status: CANCELLED | OUTPATIENT
Start: 2024-10-21

## 2024-10-21 ENCOUNTER — LAB (OUTPATIENT)
Dept: LAB | Facility: CLINIC | Age: 69
End: 2024-10-21
Payer: COMMERCIAL

## 2024-10-21 ENCOUNTER — SOCIAL WORK (OUTPATIENT)
Dept: HEMATOLOGY/ONCOLOGY | Facility: CLINIC | Age: 69
End: 2024-10-21

## 2024-10-21 ENCOUNTER — INFUSION (OUTPATIENT)
Dept: HEMATOLOGY/ONCOLOGY | Facility: CLINIC | Age: 69
End: 2024-10-21
Payer: COMMERCIAL

## 2024-10-21 ENCOUNTER — OFFICE VISIT (OUTPATIENT)
Dept: HEMATOLOGY/ONCOLOGY | Facility: CLINIC | Age: 69
End: 2024-10-21
Payer: COMMERCIAL

## 2024-10-21 VITALS
TEMPERATURE: 97.2 F | BODY MASS INDEX: 24.92 KG/M2 | DIASTOLIC BLOOD PRESSURE: 69 MMHG | HEIGHT: 64 IN | HEART RATE: 80 BPM | RESPIRATION RATE: 18 BRPM | WEIGHT: 145.94 LBS | OXYGEN SATURATION: 96 % | SYSTOLIC BLOOD PRESSURE: 123 MMHG

## 2024-10-21 DIAGNOSIS — C34.90 NON-SMALL CELL LUNG CANCER, UNSPECIFIED LATERALITY (MULTI): ICD-10-CM

## 2024-10-21 DIAGNOSIS — C34.90 NON-SMALL CELL LUNG CANCER, UNSPECIFIED LATERALITY (MULTI): Primary | ICD-10-CM

## 2024-10-21 LAB
ALBUMIN SERPL BCP-MCNC: 3.7 G/DL (ref 3.4–5)
ALP SERPL-CCNC: 45 U/L (ref 33–136)
ALT SERPL W P-5'-P-CCNC: 14 U/L (ref 7–45)
ANION GAP SERPL CALC-SCNC: 10 MMOL/L (ref 10–20)
AST SERPL W P-5'-P-CCNC: 14 U/L (ref 9–39)
BASOPHILS # BLD AUTO: 0.08 X10*3/UL (ref 0–0.1)
BASOPHILS NFR BLD AUTO: 0.4 %
BILIRUB SERPL-MCNC: 0.3 MG/DL (ref 0–1.2)
BUN SERPL-MCNC: 30 MG/DL (ref 6–23)
CALCIUM SERPL-MCNC: 9.1 MG/DL (ref 8.6–10.3)
CHLORIDE SERPL-SCNC: 105 MMOL/L (ref 98–107)
CO2 SERPL-SCNC: 26 MMOL/L (ref 21–32)
CREAT SERPL-MCNC: 0.86 MG/DL (ref 0.5–1.05)
EGFRCR SERPLBLD CKD-EPI 2021: 74 ML/MIN/1.73M*2
EOSINOPHIL # BLD AUTO: 0.11 X10*3/UL (ref 0–0.7)
EOSINOPHIL NFR BLD AUTO: 0.6 %
ERYTHROCYTE [DISTWIDTH] IN BLOOD BY AUTOMATED COUNT: 14.9 % (ref 11.5–14.5)
GLUCOSE SERPL-MCNC: 113 MG/DL (ref 74–99)
HCT VFR BLD AUTO: 44.1 % (ref 36–46)
HGB BLD-MCNC: 13.6 G/DL (ref 12–16)
IMM GRANULOCYTES # BLD AUTO: 0.16 X10*3/UL (ref 0–0.7)
IMM GRANULOCYTES NFR BLD AUTO: 0.9 % (ref 0–0.9)
LYMPHOCYTES # BLD AUTO: 3.09 X10*3/UL (ref 1.2–4.8)
LYMPHOCYTES NFR BLD AUTO: 16.8 %
MCH RBC QN AUTO: 26.7 PG (ref 26–34)
MCHC RBC AUTO-ENTMCNC: 30.8 G/DL (ref 32–36)
MCV RBC AUTO: 87 FL (ref 80–100)
MONOCYTES # BLD AUTO: 1.06 X10*3/UL (ref 0.1–1)
MONOCYTES NFR BLD AUTO: 5.8 %
NEUTROPHILS # BLD AUTO: 13.93 X10*3/UL (ref 1.2–7.7)
NEUTROPHILS NFR BLD AUTO: 75.5 %
NRBC BLD-RTO: 0 /100 WBCS (ref 0–0)
PLATELET # BLD AUTO: 234 X10*3/UL (ref 150–450)
POTASSIUM SERPL-SCNC: 4.6 MMOL/L (ref 3.5–5.3)
PROT SERPL-MCNC: 6.9 G/DL (ref 6.4–8.2)
RBC # BLD AUTO: 5.1 X10*6/UL (ref 4–5.2)
SODIUM SERPL-SCNC: 136 MMOL/L (ref 136–145)
TSH SERPL-ACNC: 3.57 MIU/L (ref 0.44–3.98)
WBC # BLD AUTO: 18.4 X10*3/UL (ref 4.4–11.3)

## 2024-10-21 PROCEDURE — 96413 CHEMO IV INFUSION 1 HR: CPT

## 2024-10-21 PROCEDURE — 99215 OFFICE O/P EST HI 40 MIN: CPT | Performed by: INTERNAL MEDICINE

## 2024-10-21 PROCEDURE — 80053 COMPREHEN METABOLIC PANEL: CPT

## 2024-10-21 PROCEDURE — 36415 COLL VENOUS BLD VENIPUNCTURE: CPT

## 2024-10-21 PROCEDURE — 84443 ASSAY THYROID STIM HORMONE: CPT

## 2024-10-21 PROCEDURE — 85025 COMPLETE CBC W/AUTO DIFF WBC: CPT

## 2024-10-21 PROCEDURE — 2500000004 HC RX 250 GENERAL PHARMACY W/ HCPCS (ALT 636 FOR OP/ED): Performed by: INTERNAL MEDICINE

## 2024-10-21 RX ORDER — HEPARIN 100 UNIT/ML
500 SYRINGE INTRAVENOUS AS NEEDED
OUTPATIENT
Start: 2024-10-21

## 2024-10-21 RX ORDER — HEPARIN 100 UNIT/ML
500 SYRINGE INTRAVENOUS AS NEEDED
Status: DISCONTINUED | OUTPATIENT
Start: 2024-10-21 | End: 2024-10-21 | Stop reason: HOSPADM

## 2024-10-21 RX ORDER — HEPARIN SODIUM,PORCINE/PF 10 UNIT/ML
50 SYRINGE (ML) INTRAVENOUS AS NEEDED
Status: DISCONTINUED | OUTPATIENT
Start: 2024-10-21 | End: 2024-10-21 | Stop reason: HOSPADM

## 2024-10-21 RX ORDER — HEPARIN SODIUM,PORCINE/PF 10 UNIT/ML
50 SYRINGE (ML) INTRAVENOUS AS NEEDED
OUTPATIENT
Start: 2024-10-21

## 2024-10-21 RX ORDER — FAMOTIDINE 10 MG/ML
20 INJECTION INTRAVENOUS ONCE AS NEEDED
Status: DISCONTINUED | OUTPATIENT
Start: 2024-10-21 | End: 2024-10-21 | Stop reason: HOSPADM

## 2024-10-21 RX ORDER — EPINEPHRINE 0.3 MG/.3ML
0.3 INJECTION SUBCUTANEOUS EVERY 5 MIN PRN
Status: DISCONTINUED | OUTPATIENT
Start: 2024-10-21 | End: 2024-10-21 | Stop reason: HOSPADM

## 2024-10-21 RX ORDER — DIPHENHYDRAMINE HYDROCHLORIDE 50 MG/ML
50 INJECTION INTRAMUSCULAR; INTRAVENOUS AS NEEDED
Status: DISCONTINUED | OUTPATIENT
Start: 2024-10-21 | End: 2024-10-21 | Stop reason: HOSPADM

## 2024-10-21 RX ORDER — ALBUTEROL SULFATE 0.83 MG/ML
3 SOLUTION RESPIRATORY (INHALATION) AS NEEDED
Status: DISCONTINUED | OUTPATIENT
Start: 2024-10-21 | End: 2024-10-21 | Stop reason: HOSPADM

## 2024-10-21 ASSESSMENT — PAIN SCALES - GENERAL: PAINLEVEL_OUTOF10: 0-NO PAIN

## 2024-10-21 NOTE — PROGRESS NOTES
LOCATION:   [facility], at [facility].     HEMATOLOGY ONCOLOGY PROBLEMS:  1.  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         PD-L1 70%.  No actionable alterations.       a.  Ist -line therapy with carbo/Taxol/Keytruda x 3 cycles from Sep to Nov 2023.       b.  On maintenance Keytruda beginning Dec 2023.     CHIEF COMPLAINT:    The patient is in the clinic today for followup of poorly differentiated carcinoma and for continuation of treatment and management of therapy related effects.          HISTORY:  Ms. Toussaint is a 68-year-old female with poorly differentiated carcinoma of most probable primary bronchogenic origin.  She has multiple medical problems and was admitted at [facility] in Aug 2023 with complaint worsening shortness of breath, cough and weakness.  There was also history of progressive weight loss and recent falls.  Work-up revealed stable pulmonary nodules but there was concern regarding new bilateral adrenal metastatic lesions along with questionable liver lesions and right hilar lymphadenopathy. She is a lifelong heavy smoker. CT-guided biopsy of the adrenal lesion was suggestive of poorly differentiated carcinoma. 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  She was also evaluated by Dr. Francheska Parada at [facility] and case was reviewed by  pathology and as per tumor board evaluation, overall clinical and radiological finding were considered mainly of probable primary bronchogenic origin.  She was treated with carbo/Taxol/Keytruda combination for 3 cycles.  4th cycle was discontinued due to tolerability issues.  Follow-up CT scan showed interval decrease in bilateral adrenal lesions and she was transitioned to maintenance Keytruda beginning Dec 2023.    INTERVAL HISTORY:  Overall doing okay other than chronic issues with fatigue, shortness of breath etc. Also complaining of worsening pain in the left  MEDICARE WELLNESS VISIT NOTE      HISTORY OF PRESENT ILLNESS:   CEDRIC Lopez presents for his Subsequent Annual Medicare Wellness Visit.   He has complaints or concerns which include SEE H&P.  .      Patient Care Team:  Osito Alcantara MD as PCP - General (Internal Medicine)  Neville Perez MD (Neurological Surgery)  Alex Schneider OD as Referring Provider (Optometry)        Patient Active Problem List    Diagnosis Date Noted   • Atrial fibrillation with rapid ventricular response (CMS/HCC) 2018     Priority: Low   • Nuclear sclerosis of both eyes 2018     Priority: Low   • Macular pseudohole of right eye 2018     Priority: Low   • Hyperopia with presbyopia of both eyes 2018     Priority: Low   • Asteatotic dermatitis 2017     Priority: Low   • Seborrheic dermatitis 2017     Priority: Low   • IGT (impaired glucose tolerance) 05/10/2017     Priority: Low   • Benign essential HTN 2016     Priority: Low   • Pure hypercholesterolemia 2016     Priority: Low   • Chronic lower back pain 10/29/2015     Priority: Low   • Coronary artery disease due to lipid rich plaque 10/28/2015     Priority: Low         Past Medical History:   Diagnosis Date   • CAD (coronary artery disease)     SEEING [facility]   • GERD (gastroesophageal reflux disease)    • High cholesterol     pt denies   • Myocardial infarction (CMS/[facility])    • Nuclear sclerosis of both eyes 2018   • Spinal stenosis          Past Surgical History:   Procedure Laterality Date   • Appendectomy     • Carpal tunnel release Left 2014   • Coronary stent placement  1991 X1    SEEING AT [facility]   • Lumbar laminectomy     • Spine surgery Left     LAMINECTOMY L5-S1   • Vasectomy           Social History     Tobacco Use   • Smoking status: Former Smoker     Last attempt to quit: 2010     Years since quittin.1   • Smokeless tobacco: Never Used   Substance Use Topics   • Alcohol use: Yes     Comment: seldom to never    • Drug  leg.  Latest follow-up CT scan results showed stable to improved findings.     PAST MEDICAL HISTORY:   1.  COPD on home O2.   2.  Coronary artery disease   3.  Hypertension   4.  Hyperlipidemia   5.  Multiple vascular aneurysms.  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  6.  Peripheral vascular disease.  S/p femoral-femoral bypass   7.  AAA graft repair procedure.  8.  History of tubal ligation, bladder lift, lithotripsy surgeries     SOCIAL HISTORY:    for 46 years and lives in [place].  1 and half to 2 pack a day for 47 years smoking history.  Nonalcoholic.  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  Born and raised  in [place]     FAMILY HISTORY:    Father  at age 52 from myocardial infarction.  Mother  at age 75 or from aortic aneurysm related complications.  1 sister  at age 68 from depression and related complications.  3 children and 5 grandkids.  Her son and daughter has history of Crohn's disease.  No other specific history of bleeding, clotting or malignant disorder in the family.     REVIEW OF SYSTEMS:  Pertinent finding as per the history above.  All other systems have been reviewed and generally negative and noncontributory.     PHYSICAL EXAMINATION:    Detailed physical examination not done     ALLERGY & MEDICATIONS:  Allergies and latest outpatient medications list were reviewed in the EMR.    LAB RESULTS:  CBC from today shows a white cell count of 18.4 with hemoglobin of 13.6 and platelets of 234.   Metabolic profile is pending but it was unremarkable on 2024 unremarkable.  TSH was 2.5.  Last 3 sets of blood work were reviewed and the trend was noted.    RADIOLOGY RESULTS:  CT chest 10/09/2024  Impression:  Overall, there is minimally decreased tumor burden when compared to the most recent exam as evidenced by:   1. Stable to slightly decreased size of pulmonary nodules. No new or enlarging pulmonary nodules or masses.  2. Interval slightly  use: No     Drug use:    Drug Use:    No              Family History - Reviewed    Current Outpatient Medications   Medication Sig Dispense Refill   • omeprazole (PRILOSEC) 20 MG capsule Take 1 capsule by mouth daily as needed for GERD     • sildenafil (VIAGRA) 100 MG tablet Take 1 tablet by mouth as needed for Erectile Dysfunction. 10 tablet 0   • MULTIPLE VITAMINS PO Take 1 tablet by mouth daily.      • Naproxen Sodium (ALEVE) 220 MG capsule Take 220 mg by mouth 2 times daily (with meals).     • digoxin (LANOXIN) 0.25 MG tablet Take by mouth daily. 1/2 tab     • carvedilol (COREG CR) 20 MG 24 hr capsule Take 20 mg by mouth daily.     • lisinopril (ZESTRIL) 5 MG tablet Take 5 mg by mouth daily.     • aspirin 325 MG tablet Take 162 mg by mouth daily. Pt taking 162     • rosuvastatin (CRESTOR) 20 MG tablet Take 20 mg by mouth daily.       No current facility-administered medications for this visit.         The following items on the Medicare Health Risk Assessment were found to be positive  1.) Do you have an Advance directive, living will, or power of  for health care document that contains your wishes for end of life care?:  No     6 a.) How many servings of Fruits and Vegetables do you have each day ( 1 serving = 1 piece of fruit, 1/2 cup fruits or vegetables):  1 per day     6 b.) How many servings of High Fiber / Whole Grain Foods to you have each day ( 1 serving = 1 cup cold cereal, 1/2 cup cooked cereal, 1 slice bread):  1 per day         Vision and hearing screens: performed and reviewed  Advance Directive document: No  The patient has the following Advance Directive documents:     Cognitive Assessment: no evidence of cognitive dysfunction by direct observation    Recent PHQ 2/9 Score    PHQ 2:  Date Adult PHQ 2 Score   2/11/2019 0       PHQ 9:       DEPRESSION ASSESSMENT/PLAN:  Depression screening is negative no further plan needed.     Body mass index is 30.47 kg/m².    BMI ASSESSMENT/PLAN:  Join  decreased size of bilateral adrenal metastases.   3. No new metastatic disease in the chest, abdomen or pelvis.   4. Redemonstration of the dilatation of the pancreatic duct most prominently in the pancreatic head. Findings are likely due to coarse calcification of the pancreatic head and uncinate process with  possible intraductal stones. Recommend attention on follow-up.  5. Left nephrolithiasis without evidence of hydronephrosis. There is a stable hyperdense lesion in the left midpole, which might represent a hemorrhagic or proteinaceous cysts. There are additional stable  hypodense lesions throughout bilateral kidneys.  6. Diverticulosis without evidence of acute diverticulitis.  7. Redemonstration of abdominal aortic aneurysm with stable postsurgical changes. Evaluation of the vasculature is limited in the absence of intravenous contrast.  8. Additional chronic and incidental findings as above.     PATHOLOGY RESULTS:  Surgical Pathology [Aug 23 2023 1:48PM] ([id number])  Specimens: RIGHT ADRENAL LESION CORE   Name AALIYAH SAWANT   Accession #: [id number]   FINAL DIAGNOSIS   A. RIGHT ADRENAL LESION CORE:   METASTATIC POORLY DIFFERENTIATED  CARCINOMA: SOFT TISSUE (IDENTIFIED AS RIGHT ADRENAL) COMMENT : The biopsyconsists entirely of a poorly differentiated malignancy in soft  tissue; no adrenal is seen. The tumor stains for keratins (CAM5.2 and  CK7 but not CK20) and is negative for TTF1 and Napsin as well as p40. Staining for SF1 is completely negative.   Electronically Signed Out By BOLIVAR BARRETO ASA, MD, PhD/SLA     ASSESSMENT AND PLAN:   1.  Stage IV metastatic poorly differentiated carcinoma of unknown primary origin.  Please refer the details of initial presentation and management as outlined above.  In summary, patient with history of lifelong heavy smoking.  She was admitted at [facility]  with complaint of worsening shortness of breath in Aug 2023.  Work-up revealed stable pulmonary nodules but  health club     Needed Screening/Treatment:   none  Needed follow up:  None    See orders.   See Patient Instructions section.   No Follow-up on file.   there was concern regarding new bilateral adrenal metastatic lesions along with questionable liver lesions and right hilar lymphadenopathy.  CT-guided biopsy of the adrenal lesion was suggestive of poorly differentiated carcinoma.  Cancer type ID molecular test results were suggestive of questionable sarcoma.  She was also evaluated by Dr. Francheska Parada at [facility] and her case was reviewed by  pathology and as per tumor board discussions, overall clinical and radiological finding were considered mainly of probable primary bronchogenic origin.  She was treated with carbo/Taxol/Keytruda combination for 3 cycles.  4th cycle was discontinued due to tolerability issues.  Follow-up CT scan showed interval decrease in bilateral adrenal lesions and she was transitioned to maintenance Keytruda beginning Dec 2023.     Tolerating Keytruda well and plan is to continue with  Keytruda at the current dose and schedule.  Latest follow-up CT scan from 10/9/2024 showed stable to improved findings.  As stated above there are issues with frequent COPD exacerbation and lower extremity vascular issues leading to treatment breaks and hospitalizations.   Probable side effects of Keytruda mainly weakness, fatigue, pneumonitis, colitis, endocrinopathies, pleuritis, skin rash etc. were explained to them in detail.  Her overall long-term prognosis remains guarded.    2.  COPD exacerbation/anxiety.  I strongly advised her to continue to follow-up with pulmonary clinic closely.  There is also a significant component of anxiety and panic attacks and she should continue to follow-up with onco-psychiatry clinic.     3.  Left lower leg wound/vascular issues.  Really appreciate wound and vascular surgery teams from help.  She will continue to follow-up with them.    4.  Follow-up.  She will return to the clinic in 6 weeks.  She will be scheduled for follow-up CT chest/abdomen prior to her next visit.    This note has been transcribed using  [employer] voice recognition system and there is a possibility of unintentional typing misprints.

## 2024-10-21 NOTE — PROGRESS NOTES
Followed up with pt and her . Pt here today for laura. Pt came in without a wc today and is now walking with a new cane. Pt seems to be doing better with her leg brace and is feeling good about her progress. Was able to give pt resources on getting two different size shoes for her feet. Pt has been able to go about her normal routine. Pt is eating and sleeping with no real issues to date. Pt at this time feels she is tolerating treatment well and going about her normal routine. Reminded pt of her strength and how good she is doing.

## 2024-10-24 DIAGNOSIS — K59.03 DRUG INDUCED CONSTIPATION: ICD-10-CM

## 2024-10-24 RX ORDER — LUBIPROSTONE 24 UG/1
24 CAPSULE ORAL
Qty: 60 CAPSULE | Refills: 0 | Status: SHIPPED | OUTPATIENT
Start: 2024-10-24 | End: 2024-11-23

## 2024-10-24 NOTE — PATIENT INSTRUCTIONS
1. Reviewed diagnostic impression including subjective and objective data and provided education about depression and anxiety disorder, treatment recommendations including medication, therapy, course of treatment and prognosis. Patient amendable to treatment plan.     2. Plan - Try to limit Ativan use and substitute hydroxyzine to be used as needed , 3 times at most during the day.   CONTINUE  Sertraline 100 mg - take 2  table by mouth daily for anxiety and depression   CONTINUE  Gabapentin 300 mg - take 1 tablet, 3 times daily for anxiety   CONTINUE   Lorazepam 0.5 mg - take 1 tablet, every 6 hours as needed for anxiety   START Hydroxyzine 25 mg PO - give 1 tablet, 3 times per day as needed for anxiety         3. Reviewed r/b/a, possible side effects of the medication(s). Client is aware benefit/risks      4. Labs reviewed and - placed order for lab work. Please provide at any  facility Please provide Urine Drug Screen to abide by  Substance Use policy.      5. Plan for supportive psychotherapy     6. Follow up with physical health providers as scheduled     7. Follow up in 4 weeks      May follow up sooner if experiences worsening symptoms by calling  Psychiatry at [phone]      Patient verbalized an understanding to call Mobile Crisis at [phone] ([facility]), [phone], or 726/go to the nearest emergency room if experiences thoughts of harm to self or others.

## 2024-10-25 DIAGNOSIS — F41.1 GAD (GENERALIZED ANXIETY DISORDER): ICD-10-CM

## 2024-10-25 RX ORDER — LORAZEPAM 0.5 MG/1
0.5 TABLET ORAL EVERY 6 HOURS PRN
Qty: 120 TABLET | Refills: 0 | Status: SHIPPED | OUTPATIENT
Start: 2024-10-25 | End: 2024-11-24

## 2024-10-28 ENCOUNTER — TELEPHONE (OUTPATIENT)
Dept: HEMATOLOGY/ONCOLOGY | Facility: CLINIC | Age: 69
End: 2024-10-28
Payer: MEDICARE

## 2024-10-28 DIAGNOSIS — G89.3 CANCER RELATED PAIN: ICD-10-CM

## 2024-10-28 DIAGNOSIS — C34.90 NON-SMALL CELL LUNG CANCER, UNSPECIFIED LATERALITY (MULTI): ICD-10-CM

## 2024-10-28 RX ORDER — HYDROCODONE BITARTRATE AND ACETAMINOPHEN 10; 325 MG/1; MG/1
1 TABLET ORAL EVERY 6 HOURS PRN
Qty: 60 TABLET | Refills: 0 | Status: SHIPPED | OUTPATIENT
Start: 2024-10-28 | End: 2024-11-27

## 2024-11-14 ENCOUNTER — HOSPITAL ENCOUNTER (OUTPATIENT)
Dept: VASCULAR MEDICINE | Facility: CLINIC | Age: 69
Discharge: HOME | End: 2024-11-14
Payer: MEDICARE

## 2024-11-14 DIAGNOSIS — I73.9 PERIPHERAL VASCULAR DISEASE, UNSPECIFIED (CMS-HCC): ICD-10-CM

## 2024-11-14 PROCEDURE — 93926 LOWER EXTREMITY STUDY: CPT | Performed by: SURGERY

## 2024-11-14 PROCEDURE — 93922 UPR/L XTREMITY ART 2 LEVELS: CPT

## 2024-11-14 PROCEDURE — 93922 UPR/L XTREMITY ART 2 LEVELS: CPT | Performed by: SURGERY

## 2024-11-15 ENCOUNTER — TELEPHONE (OUTPATIENT)
Dept: HEMATOLOGY/ONCOLOGY | Facility: CLINIC | Age: 69
End: 2024-11-15
Payer: MEDICARE

## 2024-11-15 DIAGNOSIS — C34.90 NON-SMALL CELL LUNG CANCER, UNSPECIFIED LATERALITY (MULTI): ICD-10-CM

## 2024-11-15 DIAGNOSIS — G89.3 CANCER RELATED PAIN: ICD-10-CM

## 2024-11-15 RX ORDER — HYDROCODONE BITARTRATE AND ACETAMINOPHEN 10; 325 MG/1; MG/1
1 TABLET ORAL EVERY 6 HOURS PRN
Qty: 60 TABLET | Refills: 0 | Status: SHIPPED | OUTPATIENT
Start: 2024-11-15

## 2024-11-25 RX ORDER — EPINEPHRINE 0.3 MG/.3ML
0.3 INJECTION SUBCUTANEOUS EVERY 5 MIN PRN
OUTPATIENT
Start: 2024-12-03

## 2024-11-25 RX ORDER — FAMOTIDINE 10 MG/ML
20 INJECTION INTRAVENOUS ONCE AS NEEDED
OUTPATIENT
Start: 2024-12-03

## 2024-11-25 RX ORDER — ALBUTEROL SULFATE 0.83 MG/ML
3 SOLUTION RESPIRATORY (INHALATION) AS NEEDED
OUTPATIENT
Start: 2024-12-03

## 2024-11-25 RX ORDER — PROCHLORPERAZINE MALEATE 5 MG
10 TABLET ORAL EVERY 6 HOURS PRN
OUTPATIENT
Start: 2024-12-03

## 2024-11-25 RX ORDER — DIPHENHYDRAMINE HYDROCHLORIDE 50 MG/ML
50 INJECTION INTRAMUSCULAR; INTRAVENOUS AS NEEDED
OUTPATIENT
Start: 2024-12-03

## 2024-11-25 RX ORDER — PROCHLORPERAZINE EDISYLATE 5 MG/ML
10 INJECTION INTRAMUSCULAR; INTRAVENOUS EVERY 6 HOURS PRN
OUTPATIENT
Start: 2024-12-03

## 2024-12-02 ENCOUNTER — APPOINTMENT (OUTPATIENT)
Dept: RADIOLOGY | Facility: HOSPITAL | Age: 69
End: 2024-12-02
Payer: MEDICARE

## 2024-12-02 ENCOUNTER — APPOINTMENT (OUTPATIENT)
Dept: CARDIOLOGY | Facility: HOSPITAL | Age: 69
End: 2024-12-02
Payer: MEDICARE

## 2024-12-02 ENCOUNTER — HOSPITAL ENCOUNTER (INPATIENT)
Facility: HOSPITAL | Age: 69
LOS: 4 days | Discharge: HOME | End: 2024-12-06
Attending: EMERGENCY MEDICINE | Admitting: STUDENT IN AN ORGANIZED HEALTH CARE EDUCATION/TRAINING PROGRAM
Payer: MEDICARE

## 2024-12-02 DIAGNOSIS — J18.9 PNEUMONIA OF LEFT LUNG DUE TO INFECTIOUS ORGANISM, UNSPECIFIED PART OF LUNG: Primary | ICD-10-CM

## 2024-12-02 DIAGNOSIS — C74.91: ICD-10-CM

## 2024-12-02 LAB
ALBUMIN SERPL BCP-MCNC: 3.4 G/DL (ref 3.4–5)
ALP SERPL-CCNC: 50 U/L (ref 33–136)
ALT SERPL W P-5'-P-CCNC: 16 U/L (ref 7–45)
ANION GAP BLDA CALCULATED.4IONS-SCNC: 6 MMO/L (ref 10–25)
ANION GAP BLDV CALCULATED.4IONS-SCNC: 8 MMOL/L (ref 10–25)
ANION GAP SERPL CALC-SCNC: 9 MMOL/L (ref 10–20)
APPARATUS: ABNORMAL
APPEARANCE UR: CLEAR
AST SERPL W P-5'-P-CCNC: 15 U/L (ref 9–39)
BASE EXCESS BLDA CALC-SCNC: 3.3 MMOL/L (ref -2–3)
BASE EXCESS BLDV CALC-SCNC: -1.4 MMOL/L (ref -2–3)
BASOPHILS # BLD AUTO: 0.06 X10*3/UL (ref 0–0.1)
BASOPHILS NFR BLD AUTO: 0.5 %
BILIRUB SERPL-MCNC: 0.4 MG/DL (ref 0–1.2)
BILIRUB UR STRIP.AUTO-MCNC: NEGATIVE MG/DL
BNP SERPL-MCNC: 110 PG/ML (ref 0–99)
BODY TEMPERATURE: 37 DEGREES CELSIUS
BODY TEMPERATURE: 37 DEGREES CELSIUS
BUN SERPL-MCNC: 12 MG/DL (ref 6–23)
CA-I BLDA-SCNC: 1.15 MMOL/L (ref 1.1–1.33)
CA-I BLDV-SCNC: 1.07 MMOL/L (ref 1.1–1.33)
CALCIUM SERPL-MCNC: 8.6 MG/DL (ref 8.6–10.3)
CARDIAC TROPONIN I PNL SERPL HS: 13 NG/L (ref 0–13)
CARDIAC TROPONIN I PNL SERPL HS: 15 NG/L (ref 0–13)
CARDIAC TROPONIN I PNL SERPL HS: 17 NG/L (ref 0–13)
CHLORIDE BLDA-SCNC: 104 MMOL/L (ref 98–107)
CHLORIDE BLDV-SCNC: 106 MMOL/L (ref 98–107)
CHLORIDE SERPL-SCNC: 101 MMOL/L (ref 98–107)
CO2 SERPL-SCNC: 34 MMOL/L (ref 21–32)
COLOR UR: YELLOW
CREAT SERPL-MCNC: 0.73 MG/DL (ref 0.5–1.05)
CRITICAL CALL TIME: 841
CRITICAL CALLED BY: ABNORMAL
CRITICAL CALLED TO: ABNORMAL
CRITICAL READ BACK: ABNORMAL
EGFRCR SERPLBLD CKD-EPI 2021: 90 ML/MIN/1.73M*2
EOSINOPHIL # BLD AUTO: 0.06 X10*3/UL (ref 0–0.7)
EOSINOPHIL NFR BLD AUTO: 0.5 %
ERYTHROCYTE [DISTWIDTH] IN BLOOD BY AUTOMATED COUNT: 16 % (ref 11.5–14.5)
FLUAV RNA RESP QL NAA+PROBE: NOT DETECTED
FLUBV RNA RESP QL NAA+PROBE: NOT DETECTED
GLUCOSE BLD MANUAL STRIP-MCNC: 71 MG/DL (ref 74–99)
GLUCOSE BLDA-MCNC: 78 MG/DL (ref 74–99)
GLUCOSE BLDV-MCNC: 70 MG/DL (ref 74–99)
GLUCOSE SERPL-MCNC: 87 MG/DL (ref 74–99)
GLUCOSE UR STRIP.AUTO-MCNC: NORMAL MG/DL
HCO3 BLDA-SCNC: 28.5 MMOL/L (ref 22–26)
HCO3 BLDV-SCNC: 26.8 MMOL/L (ref 22–26)
HCT VFR BLD AUTO: 38.9 % (ref 36–46)
HCT VFR BLD EST: 32 % (ref 36–46)
HCT VFR BLD EST: 35 % (ref 36–46)
HGB BLD-MCNC: 11.5 G/DL (ref 12–16)
HGB BLDA-MCNC: 10.5 G/DL (ref 12–16)
HGB BLDV-MCNC: 11.6 G/DL (ref 12–16)
IMM GRANULOCYTES # BLD AUTO: 0.07 X10*3/UL (ref 0–0.7)
IMM GRANULOCYTES NFR BLD AUTO: 0.6 % (ref 0–0.9)
INHALED O2 CONCENTRATION: 21 %
INHALED O2 CONCENTRATION: 36 %
INR PPP: 1.6 (ref 0.9–1.1)
KETONES UR STRIP.AUTO-MCNC: ABNORMAL MG/DL
LACTATE BLDA-SCNC: 0.7 MMOL/L (ref 0.4–2)
LACTATE BLDV-SCNC: 0.9 MMOL/L (ref 0.4–2)
LACTATE SERPL-SCNC: 1 MMOL/L (ref 0.4–2)
LEUKOCYTE ESTERASE UR QL STRIP.AUTO: NEGATIVE
LYMPHOCYTES # BLD AUTO: 1.07 X10*3/UL (ref 1.2–4.8)
LYMPHOCYTES NFR BLD AUTO: 8.9 %
MAGNESIUM SERPL-MCNC: 1.67 MG/DL (ref 1.6–2.4)
MCH RBC QN AUTO: 25.7 PG (ref 26–34)
MCHC RBC AUTO-ENTMCNC: 29.6 G/DL (ref 32–36)
MCV RBC AUTO: 87 FL (ref 80–100)
MONOCYTES # BLD AUTO: 0.86 X10*3/UL (ref 0.1–1)
MONOCYTES NFR BLD AUTO: 7.2 %
NEUTROPHILS # BLD AUTO: 9.87 X10*3/UL (ref 1.2–7.7)
NEUTROPHILS NFR BLD AUTO: 82.3 %
NITRITE UR QL STRIP.AUTO: NEGATIVE
NRBC BLD-RTO: 0 /100 WBCS (ref 0–0)
OXYHGB MFR BLDA: 93.3 % (ref 94–98)
OXYHGB MFR BLDV: 51.9 % (ref 45–75)
PCO2 BLDA: 45 MM HG (ref 38–42)
PCO2 BLDV: 61 MM HG (ref 41–51)
PH BLDA: 7.41 PH (ref 7.38–7.42)
PH BLDV: 7.25 PH (ref 7.33–7.43)
PH UR STRIP.AUTO: 5.5 [PH]
PLATELET # BLD AUTO: 213 X10*3/UL (ref 150–450)
PO2 BLDA: 72 MM HG (ref 85–95)
PO2 BLDV: 34 MM HG (ref 35–45)
POTASSIUM BLDA-SCNC: 3.6 MMOL/L (ref 3.5–5.3)
POTASSIUM BLDV-SCNC: 3.1 MMOL/L (ref 3.5–5.3)
POTASSIUM SERPL-SCNC: 3.8 MMOL/L (ref 3.5–5.3)
PROT SERPL-MCNC: 6.4 G/DL (ref 6.4–8.2)
PROT UR STRIP.AUTO-MCNC: ABNORMAL MG/DL
PROTHROMBIN TIME: 17.9 SECONDS (ref 9.8–12.8)
RBC # BLD AUTO: 4.47 X10*6/UL (ref 4–5.2)
RBC # UR STRIP.AUTO: ABNORMAL /UL
RBC #/AREA URNS AUTO: NORMAL /HPF
SAO2 % BLDA: 96 % (ref 94–100)
SAO2 % BLDV: 54 % (ref 45–75)
SARS-COV-2 RNA RESP QL NAA+PROBE: NOT DETECTED
SODIUM BLDA-SCNC: 135 MMOL/L (ref 136–145)
SODIUM BLDV-SCNC: 138 MMOL/L (ref 136–145)
SODIUM SERPL-SCNC: 140 MMOL/L (ref 136–145)
SP GR UR STRIP.AUTO: 1.02
SPECIMEN DRAWN FROM PATIENT: ABNORMAL
UROBILINOGEN UR STRIP.AUTO-MCNC: NORMAL MG/DL
WBC # BLD AUTO: 12 X10*3/UL (ref 4.4–11.3)
WBC #/AREA URNS AUTO: NORMAL /HPF

## 2024-12-02 PROCEDURE — 72146 MRI CHEST SPINE W/O DYE: CPT

## 2024-12-02 PROCEDURE — 36600 WITHDRAWAL OF ARTERIAL BLOOD: CPT

## 2024-12-02 PROCEDURE — 84132 ASSAY OF SERUM POTASSIUM: CPT

## 2024-12-02 PROCEDURE — 96360 HYDRATION IV INFUSION INIT: CPT

## 2024-12-02 PROCEDURE — 1200000002 HC GENERAL ROOM WITH TELEMETRY DAILY

## 2024-12-02 PROCEDURE — 81001 URINALYSIS AUTO W/SCOPE: CPT | Performed by: NURSE PRACTITIONER

## 2024-12-02 PROCEDURE — 72148 MRI LUMBAR SPINE W/O DYE: CPT | Performed by: STUDENT IN AN ORGANIZED HEALTH CARE EDUCATION/TRAINING PROGRAM

## 2024-12-02 PROCEDURE — 83605 ASSAY OF LACTIC ACID: CPT | Performed by: NURSE PRACTITIONER

## 2024-12-02 PROCEDURE — 72148 MRI LUMBAR SPINE W/O DYE: CPT

## 2024-12-02 PROCEDURE — 84484 ASSAY OF TROPONIN QUANT: CPT

## 2024-12-02 PROCEDURE — 85610 PROTHROMBIN TIME: CPT | Performed by: NURSE PRACTITIONER

## 2024-12-02 PROCEDURE — 93005 ELECTROCARDIOGRAM TRACING: CPT

## 2024-12-02 PROCEDURE — 36415 COLL VENOUS BLD VENIPUNCTURE: CPT | Performed by: NURSE PRACTITIONER

## 2024-12-02 PROCEDURE — 96361 HYDRATE IV INFUSION ADD-ON: CPT

## 2024-12-02 PROCEDURE — 94640 AIRWAY INHALATION TREATMENT: CPT

## 2024-12-02 PROCEDURE — 2500000002 HC RX 250 W HCPCS SELF ADMINISTERED DRUGS (ALT 637 FOR MEDICARE OP, ALT 636 FOR OP/ED): Performed by: NURSE PRACTITIONER

## 2024-12-02 PROCEDURE — 71045 X-RAY EXAM CHEST 1 VIEW: CPT | Mod: FOREIGN READ | Performed by: RADIOLOGY

## 2024-12-02 PROCEDURE — 99223 1ST HOSP IP/OBS HIGH 75: CPT | Performed by: NURSE PRACTITIONER

## 2024-12-02 PROCEDURE — 99232 SBSQ HOSP IP/OBS MODERATE 35: CPT

## 2024-12-02 PROCEDURE — 99285 EMERGENCY DEPT VISIT HI MDM: CPT | Performed by: EMERGENCY MEDICINE

## 2024-12-02 PROCEDURE — 93010 ELECTROCARDIOGRAM REPORT: CPT | Performed by: INTERNAL MEDICINE

## 2024-12-02 PROCEDURE — 87899 AGENT NOS ASSAY W/OPTIC: CPT | Mod: PARLAB | Performed by: NURSE PRACTITIONER

## 2024-12-02 PROCEDURE — 2500000005 HC RX 250 GENERAL PHARMACY W/O HCPCS

## 2024-12-02 PROCEDURE — 80053 COMPREHEN METABOLIC PANEL: CPT | Performed by: NURSE PRACTITIONER

## 2024-12-02 PROCEDURE — 83880 ASSAY OF NATRIURETIC PEPTIDE: CPT | Performed by: NURSE PRACTITIONER

## 2024-12-02 PROCEDURE — 2500000001 HC RX 250 WO HCPCS SELF ADMINISTERED DRUGS (ALT 637 FOR MEDICARE OP): Performed by: NURSE PRACTITIONER

## 2024-12-02 PROCEDURE — 2500000004 HC RX 250 GENERAL PHARMACY W/ HCPCS (ALT 636 FOR OP/ED)

## 2024-12-02 PROCEDURE — 85025 COMPLETE CBC W/AUTO DIFF WBC: CPT | Performed by: NURSE PRACTITIONER

## 2024-12-02 PROCEDURE — 87449 NOS EACH ORGANISM AG IA: CPT | Mod: PARLAB | Performed by: NURSE PRACTITIONER

## 2024-12-02 PROCEDURE — 84132 ASSAY OF SERUM POTASSIUM: CPT | Performed by: NURSE PRACTITIONER

## 2024-12-02 PROCEDURE — 2500000004 HC RX 250 GENERAL PHARMACY W/ HCPCS (ALT 636 FOR OP/ED): Performed by: NURSE PRACTITIONER

## 2024-12-02 PROCEDURE — 2500000002 HC RX 250 W HCPCS SELF ADMINISTERED DRUGS (ALT 637 FOR MEDICARE OP, ALT 636 FOR OP/ED): Performed by: STUDENT IN AN ORGANIZED HEALTH CARE EDUCATION/TRAINING PROGRAM

## 2024-12-02 PROCEDURE — 87040 BLOOD CULTURE FOR BACTERIA: CPT | Mod: PARLAB | Performed by: NURSE PRACTITIONER

## 2024-12-02 PROCEDURE — 82947 ASSAY GLUCOSE BLOOD QUANT: CPT

## 2024-12-02 PROCEDURE — 84484 ASSAY OF TROPONIN QUANT: CPT | Performed by: NURSE PRACTITIONER

## 2024-12-02 PROCEDURE — 71045 X-RAY EXAM CHEST 1 VIEW: CPT

## 2024-12-02 PROCEDURE — 87636 SARSCOV2 & INF A&B AMP PRB: CPT | Performed by: NURSE PRACTITIONER

## 2024-12-02 PROCEDURE — 72146 MRI CHEST SPINE W/O DYE: CPT | Performed by: STUDENT IN AN ORGANIZED HEALTH CARE EDUCATION/TRAINING PROGRAM

## 2024-12-02 PROCEDURE — 83735 ASSAY OF MAGNESIUM: CPT | Performed by: NURSE PRACTITIONER

## 2024-12-02 RX ORDER — ACETAMINOPHEN 325 MG/1
975 TABLET ORAL EVERY 6 HOURS PRN
Status: DISCONTINUED | OUTPATIENT
Start: 2024-12-02 | End: 2024-12-06 | Stop reason: HOSPADM

## 2024-12-02 RX ORDER — IBUPROFEN 200 MG
1 TABLET ORAL DAILY
Status: DISCONTINUED | OUTPATIENT
Start: 2024-12-02 | End: 2024-12-06 | Stop reason: HOSPADM

## 2024-12-02 RX ORDER — CEFTRIAXONE 1 G/50ML
1 INJECTION, SOLUTION INTRAVENOUS ONCE
Status: COMPLETED | OUTPATIENT
Start: 2024-12-02 | End: 2024-12-02

## 2024-12-02 RX ORDER — IPRATROPIUM BROMIDE AND ALBUTEROL SULFATE 2.5; .5 MG/3ML; MG/3ML
3 SOLUTION RESPIRATORY (INHALATION) ONCE
Status: COMPLETED | OUTPATIENT
Start: 2024-12-02 | End: 2024-12-02

## 2024-12-02 RX ORDER — CEFTRIAXONE 1 G/50ML
1 INJECTION, SOLUTION INTRAVENOUS EVERY 24 HOURS
Status: DISCONTINUED | OUTPATIENT
Start: 2024-12-02 | End: 2024-12-02

## 2024-12-02 RX ORDER — BUDESONIDE 0.25 MG/2ML
0.25 INHALANT ORAL
Status: DISCONTINUED | OUTPATIENT
Start: 2024-12-02 | End: 2024-12-06 | Stop reason: HOSPADM

## 2024-12-02 RX ORDER — ROSUVASTATIN CALCIUM 10 MG/1
40 TABLET, COATED ORAL EVERY MORNING
Status: DISCONTINUED | OUTPATIENT
Start: 2024-12-03 | End: 2024-12-06 | Stop reason: HOSPADM

## 2024-12-02 RX ORDER — IPRATROPIUM BROMIDE AND ALBUTEROL SULFATE 2.5; .5 MG/3ML; MG/3ML
3 SOLUTION RESPIRATORY (INHALATION)
Status: DISCONTINUED | OUTPATIENT
Start: 2024-12-02 | End: 2024-12-04

## 2024-12-02 RX ORDER — MONTELUKAST SODIUM 10 MG/1
10 TABLET ORAL NIGHTLY
Status: DISCONTINUED | OUTPATIENT
Start: 2024-12-02 | End: 2024-12-06 | Stop reason: HOSPADM

## 2024-12-02 RX ORDER — CEFTRIAXONE 1 G/50ML
1 INJECTION, SOLUTION INTRAVENOUS EVERY 24 HOURS
Status: DISCONTINUED | OUTPATIENT
Start: 2024-12-03 | End: 2024-12-06 | Stop reason: HOSPADM

## 2024-12-02 RX ORDER — ALBUTEROL SULFATE 0.83 MG/ML
2.5 SOLUTION RESPIRATORY (INHALATION) EVERY 2 HOUR PRN
Status: DISCONTINUED | OUTPATIENT
Start: 2024-12-02 | End: 2024-12-02

## 2024-12-02 RX ORDER — MICONAZOLE NITRATE 2 %
2 CREAM (GRAM) TOPICAL EVERY 2 HOUR PRN
Status: DISCONTINUED | OUTPATIENT
Start: 2024-12-02 | End: 2024-12-06 | Stop reason: HOSPADM

## 2024-12-02 RX ORDER — IPRATROPIUM BROMIDE AND ALBUTEROL SULFATE 2.5; .5 MG/3ML; MG/3ML
3 SOLUTION RESPIRATORY (INHALATION)
Status: DISCONTINUED | OUTPATIENT
Start: 2024-12-02 | End: 2024-12-02

## 2024-12-02 RX ORDER — GUAIFENESIN 600 MG/1
600 TABLET, EXTENDED RELEASE ORAL 2 TIMES DAILY PRN
Status: DISCONTINUED | OUTPATIENT
Start: 2024-12-02 | End: 2024-12-06 | Stop reason: HOSPADM

## 2024-12-02 RX ORDER — FLUTICASONE FUROATE AND VILANTEROL 200; 25 UG/1; UG/1
1 POWDER RESPIRATORY (INHALATION)
Status: DISCONTINUED | OUTPATIENT
Start: 2024-12-02 | End: 2024-12-06 | Stop reason: HOSPADM

## 2024-12-02 RX ORDER — HYDROXYZINE PAMOATE 25 MG/1
25 CAPSULE ORAL 3 TIMES DAILY PRN
Status: DISCONTINUED | OUTPATIENT
Start: 2024-12-02 | End: 2024-12-06 | Stop reason: HOSPADM

## 2024-12-02 RX ORDER — MORPHINE SULFATE 2 MG/ML
2 INJECTION, SOLUTION INTRAMUSCULAR; INTRAVENOUS ONCE
Status: COMPLETED | OUTPATIENT
Start: 2024-12-02 | End: 2024-12-02

## 2024-12-02 RX ORDER — ONDANSETRON HYDROCHLORIDE 2 MG/ML
4 INJECTION, SOLUTION INTRAVENOUS EVERY 6 HOURS PRN
Status: DISCONTINUED | OUTPATIENT
Start: 2024-12-02 | End: 2024-12-06 | Stop reason: HOSPADM

## 2024-12-02 RX ORDER — HYDROCODONE BITARTRATE AND ACETAMINOPHEN 10; 325 MG/1; MG/1
1 TABLET ORAL EVERY 6 HOURS PRN
Status: DISCONTINUED | OUTPATIENT
Start: 2024-12-02 | End: 2024-12-06 | Stop reason: HOSPADM

## 2024-12-02 RX ORDER — OLANZAPINE 5 MG/1
10 TABLET ORAL NIGHTLY
Status: DISCONTINUED | OUTPATIENT
Start: 2024-12-02 | End: 2024-12-06 | Stop reason: HOSPADM

## 2024-12-02 RX ORDER — LORAZEPAM 0.5 MG/1
0.5 TABLET ORAL EVERY 6 HOURS PRN
Status: DISCONTINUED | OUTPATIENT
Start: 2024-12-02 | End: 2024-12-06 | Stop reason: HOSPADM

## 2024-12-02 RX ORDER — PROCHLORPERAZINE MALEATE 5 MG
5 TABLET ORAL EVERY 6 HOURS PRN
Status: DISCONTINUED | OUTPATIENT
Start: 2024-12-02 | End: 2024-12-06 | Stop reason: HOSPADM

## 2024-12-02 RX ORDER — SERTRALINE HYDROCHLORIDE 100 MG/1
100 TABLET, FILM COATED ORAL 2 TIMES DAILY
Status: DISCONTINUED | OUTPATIENT
Start: 2024-12-02 | End: 2024-12-06 | Stop reason: HOSPADM

## 2024-12-02 RX ORDER — ALBUTEROL SULFATE 0.83 MG/ML
2.5 SOLUTION RESPIRATORY (INHALATION) EVERY 2 HOUR PRN
Status: DISCONTINUED | OUTPATIENT
Start: 2024-12-02 | End: 2024-12-06 | Stop reason: HOSPADM

## 2024-12-02 RX ORDER — CLOPIDOGREL BISULFATE 75 MG/1
75 TABLET ORAL EVERY MORNING
Status: DISCONTINUED | OUTPATIENT
Start: 2024-12-03 | End: 2024-12-06 | Stop reason: HOSPADM

## 2024-12-02 RX ORDER — FAMOTIDINE 10 MG/ML
40 INJECTION INTRAVENOUS ONCE
Status: COMPLETED | OUTPATIENT
Start: 2024-12-02 | End: 2024-12-02

## 2024-12-02 RX ORDER — GABAPENTIN 300 MG/1
300 CAPSULE ORAL 3 TIMES DAILY
Status: DISCONTINUED | OUTPATIENT
Start: 2024-12-02 | End: 2024-12-06 | Stop reason: HOSPADM

## 2024-12-02 RX ORDER — LIDOCAINE 560 MG/1
1 PATCH PERCUTANEOUS; TOPICAL; TRANSDERMAL DAILY
Status: DISCONTINUED | OUTPATIENT
Start: 2024-12-02 | End: 2024-12-06 | Stop reason: HOSPADM

## 2024-12-02 SDOH — ECONOMIC STABILITY: FOOD INSECURITY: WITHIN THE PAST 12 MONTHS, THE FOOD YOU BOUGHT JUST DIDN'T LAST AND YOU DIDN'T HAVE MONEY TO GET MORE.: NEVER TRUE

## 2024-12-02 SDOH — ECONOMIC STABILITY: FOOD INSECURITY: WITHIN THE PAST 12 MONTHS, YOU WORRIED THAT YOUR FOOD WOULD RUN OUT BEFORE YOU GOT THE MONEY TO BUY MORE.: NEVER TRUE

## 2024-12-02 SDOH — SOCIAL STABILITY: SOCIAL INSECURITY: DOES ANYONE TRY TO KEEP YOU FROM HAVING/CONTACTING OTHER FRIENDS OR DOING THINGS OUTSIDE YOUR HOME?: NO

## 2024-12-02 SDOH — SOCIAL STABILITY: SOCIAL INSECURITY: DO YOU FEEL ANYONE HAS EXPLOITED OR TAKEN ADVANTAGE OF YOU FINANCIALLY OR OF YOUR PERSONAL PROPERTY?: NO

## 2024-12-02 SDOH — SOCIAL STABILITY: SOCIAL INSECURITY: WITHIN THE LAST YEAR, HAVE YOU BEEN HUMILIATED OR EMOTIONALLY ABUSED IN OTHER WAYS BY YOUR PARTNER OR EX-PARTNER?: NO

## 2024-12-02 SDOH — SOCIAL STABILITY: SOCIAL INSECURITY: HAS ANYONE EVER THREATENED TO HURT YOUR FAMILY OR YOUR PETS?: NO

## 2024-12-02 SDOH — SOCIAL STABILITY: SOCIAL INSECURITY: ABUSE: ADULT

## 2024-12-02 SDOH — SOCIAL STABILITY: SOCIAL INSECURITY: WITHIN THE LAST YEAR, HAVE YOU BEEN AFRAID OF YOUR PARTNER OR EX-PARTNER?: NO

## 2024-12-02 SDOH — SOCIAL STABILITY: SOCIAL INSECURITY: WERE YOU ABLE TO COMPLETE ALL THE BEHAVIORAL HEALTH SCREENINGS?: YES

## 2024-12-02 SDOH — SOCIAL STABILITY: SOCIAL INSECURITY: DO YOU FEEL UNSAFE GOING BACK TO THE PLACE WHERE YOU ARE LIVING?: NO

## 2024-12-02 SDOH — SOCIAL STABILITY: SOCIAL INSECURITY: ARE YOU OR HAVE YOU BEEN THREATENED OR ABUSED PHYSICALLY, EMOTIONALLY, OR SEXUALLY BY ANYONE?: NO

## 2024-12-02 SDOH — ECONOMIC STABILITY: INCOME INSECURITY: IN THE PAST 12 MONTHS HAS THE ELECTRIC, GAS, OIL, OR WATER COMPANY THREATENED TO SHUT OFF SERVICES IN YOUR HOME?: NO

## 2024-12-02 SDOH — SOCIAL STABILITY: SOCIAL INSECURITY: HAVE YOU HAD THOUGHTS OF HARMING ANYONE ELSE?: NO

## 2024-12-02 SDOH — SOCIAL STABILITY: SOCIAL INSECURITY: ARE THERE ANY APPARENT SIGNS OF INJURIES/BEHAVIORS THAT COULD BE RELATED TO ABUSE/NEGLECT?: NO

## 2024-12-02 SDOH — SOCIAL STABILITY: SOCIAL INSECURITY: HAVE YOU HAD ANY THOUGHTS OF HARMING ANYONE ELSE?: NO

## 2024-12-02 ASSESSMENT — ACTIVITIES OF DAILY LIVING (ADL)
ADEQUATE_TO_COMPLETE_ADL: YES
WALKS IN HOME: NEEDS ASSISTANCE
LACK_OF_TRANSPORTATION: NO
BATHING: NEEDS ASSISTANCE
PATIENT'S MEMORY ADEQUATE TO SAFELY COMPLETE DAILY ACTIVITIES?: NO
TOILETING: NEEDS ASSISTANCE
HEARING - LEFT EAR: FUNCTIONAL
DRESSING YOURSELF: NEEDS ASSISTANCE
HEARING - RIGHT EAR: FUNCTIONAL
GROOMING: NEEDS ASSISTANCE
JUDGMENT_ADEQUATE_SAFELY_COMPLETE_DAILY_ACTIVITIES: YES
ASSISTIVE_DEVICE: WALKER;CANE
FEEDING YOURSELF: NEEDS ASSISTANCE

## 2024-12-02 ASSESSMENT — LIFESTYLE VARIABLES
PRESCIPTION_ABUSE_PAST_12_MONTHS: NO
HOW OFTEN DO YOU HAVE 6 OR MORE DRINKS ON ONE OCCASION: NEVER
HOW OFTEN DO YOU HAVE A DRINK CONTAINING ALCOHOL: NEVER
AUDIT-C TOTAL SCORE: 0
HOW MANY STANDARD DRINKS CONTAINING ALCOHOL DO YOU HAVE ON A TYPICAL DAY: PATIENT DOES NOT DRINK
AUDIT-C TOTAL SCORE: 0
SKIP TO QUESTIONS 9-10: 1
SUBSTANCE_ABUSE_PAST_12_MONTHS: NO

## 2024-12-02 ASSESSMENT — PAIN SCALES - GENERAL
PAINLEVEL_OUTOF10: 0 - NO PAIN
PAINLEVEL_OUTOF10: 0 - NO PAIN
PAINLEVEL_OUTOF10: 5 - MODERATE PAIN

## 2024-12-02 ASSESSMENT — COGNITIVE AND FUNCTIONAL STATUS - GENERAL
MOVING TO AND FROM BED TO CHAIR: A LITTLE
DRESSING REGULAR LOWER BODY CLOTHING: A LITTLE
CLIMB 3 TO 5 STEPS WITH RAILING: A LOT
DRESSING REGULAR UPPER BODY CLOTHING: A LITTLE
WALKING IN HOSPITAL ROOM: A LOT
PERSONAL GROOMING: A LITTLE
TURNING FROM BACK TO SIDE WHILE IN FLAT BAD: A LITTLE
PATIENT BASELINE BEDBOUND: NO
MOBILITY SCORE: 17
STANDING UP FROM CHAIR USING ARMS: A LITTLE
TOILETING: A LITTLE
DAILY ACTIVITIY SCORE: 19
HELP NEEDED FOR BATHING: A LITTLE

## 2024-12-02 ASSESSMENT — PAIN - FUNCTIONAL ASSESSMENT
PAIN_FUNCTIONAL_ASSESSMENT: 0-10
PAIN_FUNCTIONAL_ASSESSMENT: 0-10

## 2024-12-02 NOTE — PROGRESS NOTES
Pharmacy Medication History Review    Radha Toussaint is a 69 y.o. female admitted for No Principal Problem: There is no principal problem currently on the Problem List. Please update the Problem List and refresh.. Pharmacy reviewed the patient's [id number] medications and allergies for accuracy.    The list below reflectives the updated PTA list. Please review each medication in order reconciliation for additional clarification and justification.  Prior to Admission medications    Medication Sig Start Date End Date Taking? Authorizing Provider   acetaminophen (Tylenol) 325 mg tablet Take 3 tablets (975 mg) by mouth every 6 hours if needed for mild pain (1 - 3) or moderate pain (4 - 6). 6/29/24  Yes KERA Chung-CNP   apixaban (Eliquis) 5 mg tablet Take 1 tablet (5 mg) by mouth 2 times a day. 10/17/24 10/17/25 Yes Willam Mike MD   budesonide (Pulmicort) 0.25 mg/2 mL nebulizer solution Take 2 mL (0.25 mg) by nebulization 2 times a day. Rinse mouth with water after use to reduce aftertaste and incidence of candidiasis. Do not swallow. 7/8/24  Yes Francheska Rogel, DO   clopidogrel (Plavix) 75 mg tablet Take 1 tablet (75 mg) by mouth once daily in the morning. 10/17/24 10/17/25 Yes Willam Mike MD   dexAMETHasone (Decadron) 4 mg tablet Take 0.5 tablets (2 mg) by mouth once daily. 9/10/24  Yes Francheska Roegl, DO   fluticasone-umeclidin-vilanter (Trelegy Ellipta) 200-62.5-25 mcg blister with device Inhale 1 puff once daily. 7/30/24  Yes Francheska Rogel, DO   gabapentin (Neurontin) 300 mg capsule Take 1 capsule (300 mg) by mouth 3 times a day. 8/11/24 8/11/25 Yes KERA Romero-MARYBEL   HYDROcodone-acetaminophen (Norco)  mg tablet Take 1 tablet by mouth every 6 hours if needed for severe pain (7 - 10). 11/15/24  Yes KERA Gale-CNP   LORazepam (Ativan) 0.5 mg tablet Take 1 tablet (0.5 mg) by mouth every 6 hours if needed for anxiety. 10/25/24 12/2/24 Yes Nam Mason, APRN-CNP   devendrast  (Singulair) 10 mg tablet Take 1 tablet (10 mg) by mouth once daily at bedtime. 8/21/24  Yes Francheska Rogel DO   OLANZapine (ZyPREXA) 10 mg tablet Take 1 tablet (10 mg) by mouth once daily at bedtime. 10/2/24 3/31/25 Yes Francheska Rogel DO   rosuvastatin (Crestor) 40 mg tablet Take 1 tablet (40 mg) by mouth once daily.  Patient taking differently: Take 1 tablet (40 mg) by mouth once daily in the morning. 10/17/24 10/17/25 Yes Willam Mike MD   sertraline (Zoloft) 100 mg tablet Take 1 tablet (100 mg) by mouth 2 times a day. 5/19/24 5/19/25 Yes SHRUTHI Romero   hydrOXYzine pamoate (VistariL) 25 mg capsule Take 1 capsule (25 mg) by mouth 3 times a day as needed for itching. 10/6/24 10/1/25 Yes SHRUTHI Romero   lubiprostone (Amitiza) 24 mcg capsule Take 1 capsule (24 mcg) by mouth 2 times daily (morning and late afternoon). 10/24/24 11/23/24 Yes Francheska Rogel DO   ondansetron ODT (Zofran-ODT) 4 mg disintegrating tablet DISSOLVE 1 TABLET IN MOUTH BY MOUTH THREE TIMES A DAY AS NEEDED NAUSEA 7/30/24 7/30/25 Yes Francheska Rogel DO   prochlorperazine (Compazine) 5 mg tablet Take 1 tablet (5 mg) by mouth every 6 hours if needed for nausea or vomiting.   Yes Historical Provider, MD        The list below reflectives the updated allergy list. Please review each documented allergy for additional clarification and justification.  Allergies  Reviewed by SHRUTHI Gibbs on 12/2/2024        Severity Reactions Comments    Methylprednisolone High Agitation, Confusion, Other Steroid induced psychosis    Ace Inhibitors Medium Angioedema     Prednisone Medium Anxiety, Agitation & violent    Betamethasone Dipropionate Not Specified Confusion     Demerol [meperidine] Low Nausea/vomiting     Iodinated Contrast Media Low Nausea/vomiting             Below are additional concerns with the patient's PTA list.    Patient holding Amitiza for diarrhea symptoms at home.    Beatrice Bee

## 2024-12-02 NOTE — ED TRIAGE NOTES
Pt BIBA from home for c/o SOB. HX of stage 4 non-small cell carcinoma of the lung. Pt was reported to have Spo2 of 85% her routine 2L. Upon EMS arrival pt was placed on 4L where she rebounded to 95%. Pt is A&O x4 and reports no pain,

## 2024-12-02 NOTE — CARE PLAN
The patient's goals for the shift include      The clinical goals for the shift include spo2 >92%    Over the shift, the patient did not make progress toward the following goals. Barriers to progression include . Recommendations to address these barriers include .

## 2024-12-02 NOTE — SIGNIFICANT EVENT
Subjective: Rapid response called at approximately 2:30 PM this afternoon for chest pain.  Patient endorses sharp epigastric stabbing chest pain that began shortly after she arrived to the floor, 5 minutes prior to rapid response being called.  She denies any prior episodes of this.  She is admitted and is being treated for pneumonia.      Objective:  Blood pressure 119/55, oxygen saturation 92% on 5 L, heart rate 78, T 97.2F  Physical exam:  General: Thin, frail, ill-appearing  Cardiovascular: Regular rate and rhythm, no murmurs, chest TTP with pressure  Respiratory: On 5 L supplemental oxygen, decreased breath sounds bilaterally  Extremities: Moving all 4 extremities  Neuro: Alert and oriented to person place and date, no focal deficits    Assessment and plan:   69 year old female with poorly differentiated carcinoma probable primary bronchogenic origin with bilateral adrenal metastisis s/p chemo (last Tx 12/2023, current Tx Keytruda q6wk), HTN, HLD, MI, CAD s/p PCI/stent, cerebral aneurysm x 2 s/p craniotomy and clipping, AAA s/p repair x 2, PAD s/p s/p revascularization  left popliteal/TPT/PT reconstruction with PTA/DCB, Supera/SES to popliteal occlusion) and s/p limb rescue x 2 (Laser atherectomy/JETI/PTA fem/pop/TPT/PT, FCO to mid SFA and JETI/PTA AT on (6/25/24 ), c/b stent occlusion s/p repeat thrombectomy on 6/26/24)- JETI /SFA, POP PT, and TPT, FCO to BK Pop and TPT PTA AT), Left foot drop, COPD, chronic hypoxic respiratory failure on home supplemental oxygen (2L NC at night and as needed), nicotine dependence for whom rapid response was called for chest pain.    #Chest pain, ACS rule out  #Coronary artery disease status post PCI and stenting  #History of hyperlipidemia  #Acute on chronic hypoxic respiratory failure on home 2 L of oxygen  #CAP  #Possible costochondritis    -2 mg of morphine and 40 mg IV Pepcid now, lidocaine patch for chest pain  -EKG obtained during rapid response with poor quality, no  obvious ST segment elevations, will repeat EKG  -Continue telemetry monitoring  -Repeat labs sent, follow-up troponin, troponin was 17 and 15 in the ED earlier today

## 2024-12-02 NOTE — NURSING NOTE
Admission complete. Patient A&O 2-3. Daughter at bedside. Patient last received immunotherapy/chemotherapy in October. She was due for an infusion tomorrow.

## 2024-12-02 NOTE — ED PROVIDER NOTES
Limitations to History: None     HPI:      Radha Toussaint is a 69 y.o. with PMH for COPD on 2 L nasal cannula, CAD, HTN, HLD, multiple vascular aneurysms: Left MCA aneurysm s/p clipping, peripheral vascular disease s/p femorofemoral bypass, AAA graft repair procedure, right adrenal carcinoma currently on Keytruda, tubal ligation, bladder lift and lithotripsy presenting to ED today from home with daughter for evaluation of shortness of breath.  Daughter states the patient has not been doing well for the last 4 days endorsing shortness of breath and intermittent bloody noses.  Better check pulse ox this morning, 85% on baseline 2 L.  O2 sat increased to 95% when oxygen was increased to 4 L nasal cannula.  Reports loose cough and multiple episodes of foul-smelling diarrhea over the last 2 days.  Mild history of C. difficile.  No recent hospitalizations, sick contacts or recent antibiotic use.  Due to for Keytruda infusion tomorrow.  Denies fever/chills, nasal congestion, chest pain, nausea/vomiting, abdominal pain, urinary symptoms, bloody bowel movements or any other complaints.  Smoker, patch in place, no EtOH or IV drugs.  PCP is Dr. Rogel.    Additional History Obtained from: Triage/nursing notes reviewed    ------------------------------------------------------------------------------------------------------------------------------------------    VS: As documented in the triage note and EMR flowsheet from this visit were reviewed.    Physical Exam:  Gen: Chronically ill-appearing 69-year-old  female, awake and alert, oriented x 3.  Thin with some muscle wasting.  Lips dry.  Appears weak but nontoxic.  Head/Neck: NCAT, neck w/ FROM  Eyes: EOMI, PERRL, anicteric sclerae, noninjected conjunctivae  Ears: TMs clear b/l without sign of infection  Nose: Nares patent w/o rhinorrhea  Mouth: Mucous membranes slightly dry.  No OP lesions noted  Heart: RRR no MRG  Lungs: Loose cough.  Few crackles bilateral bases.   No rhonchi or wheezing.  No accessory muscle use or stridor.  Abdomen: Rounded soft with bowel sounds, nontender.  No CVA tenderness  Musculoskeletal: BLANCHE x 4.  MSPs intact.  Skin intact.  No deformities.  Neurologic: Alert, symmetrical facies, phonates clearly, moves all extremities equally, responsive to touch, ambulates normally   Skin: Pink, warm and dry.  No erythema, edema or ecchymosis.  No rashes noted  Psychological: calm, no SI/HI      ------------------------------------------------------------------------------------------------------------------------------------------    Medical Decision Makin y.o. with PMH for COPD on 2 L nasal cannula, CAD, HTN, HLD, multiple vascular aneurysms: Left MCA aneurysm s/p clipping, peripheral vascular disease s/p femorofemoral bypass, AAA graft repair procedure, right adrenal carcinoma currently on Keytruda, tubal ligation, bladder lift and lithotripsy is evaluated at the bedside for increasing shortness of breath and diarrhea over the last couple days.  O2 sat 85% on baseline 2 L this morning, rebounded to 95% when increased to 4 L nasal cannula.  On arrival to the ED, awake and alert, blood pressure 114/57 with a heart rate of 82, patient is not hypoxic or febrile at this time.  Few crackles bilateral bases, appears weak but nontoxic.  Abdomen round soft with bowel sounds, nontender.  Appears dry    Differential includes but is not limited to dehydration, electrolyte derangement, pneumonia and viral illness.    IV established, continuous cardiac/O2 sat monitoring.  Basic labs, EKG, chest x-ray, viral swabs, UA/urine culture be obtained.  1 L IV normal saline wide open over 2 hours.  Anticipate admission.      ED Course as of 12/02/24 1002   Mon Dec 02, 2024   0851 Laboratory studies were reviewed, mild leukocytosis at 12.0 with a left shift.  Hemoglobin stable at 11.5.  Normal kidney function and electrolytes.  Bicarb 34.  .  Bump in troponin at 17, delta  pending.  Coags within normal limits.  VBG notable for pH of 7.25, suspect metabolic acidosis due to the patient's recent multiple episodes of diarrhea. [SB]   0936 ED Attending Documentation:  This patient was seen by the advanced practice provider.  I have personally performed a substantive portion of the encounter.  I have seen and examined the patient; agree with the workup, evaluation, MDM, management and diagnosis.  The care plan has been discussed.  I personally saw the patient and made/approved the management plan and take responsibility for the patient management. I have looked at the EKG and agree with the interpretation.   History: 69-year-old female with a history of stage IV colon cancer, due for her medication tomorrow presents to the ED with diarrhea and weakness.  No vomiting, no blood in her stool, no abdominal pain  Exam: Awake, weak appearing, bilateral wheezing but moves all of her extremities  MDM: Patient presents emergency room with concern for diarrhea in the setting of known colon cancer.  No others signs of intra-abdominal process, will be fluid rehydrated and kept in the hospital given her weakness.  Go Brito MD  EM Attending Physician   [RG]   0940 Laboratory studies were reviewed, mild leukocytosis of 12.0 with a left shift.  Hemoglobin stable at 11.5.  Normal kidney function, electrolytes and LFTs.  BNP slightly elevated at 110.  Bump in troponin at 17, delta pending.  Influenza and COVID are negative.  VBG shows the patient is acidotic at 7.25 pCO2 mildly elevated at 61.  Bicarb slightly elevated at 26.8.  Metabolic acidosis from diarrhea.  Chest x-ray shows patchy opacity in the left mid to lower lung field could be due to pneumonia.  Lactate and cultures will be obtained.  Covered with IV Zithromax/Rocephin for pneumonia.  Requires admission for IV antibiotics, fluids and further monitoring. [SB]   7637 Dr. Davies agrees to full admission to telemetry. Diagnosis, treatment and plan  for admission discussed with patient, she verbalizes understanding and is in agreement.  Condition fair. [SB]      ED Course User Index  [RG] Go Brito MD  [SB] KERA Gibbs-CNP         Diagnoses as of 12/02/24 1002   Pneumonia of left lung due to infectious organism, unspecified part of lung   Carcinoma of right adrenal gland (Multi)       EKG interpreted by Dr. Brito 8:21 AM, normal sinus rhythm at a rate of 70, no ST segment depression or elevation consistent with ischemia or infarction.    Chronic Medical Conditions Significantly Affecting Care:     External Records Reviewed: I reviewed recent and relevant outside records including:     Discussion of Management with Other Providers: Seen and evaluated with ED attending physician, Dr. rBito, she agrees with the treatment plan and final disposition of the patient.    I discussed the patient/results with: KERA Villasenor-MARYBEL  12/02/24 1002

## 2024-12-02 NOTE — PROGRESS NOTES
"[facility] Medicine Rapid Response Note    Name: Radha Toussaint  Age: 69 y.o.  Location: [address] [place] [address]  Code Status: Full Code      Assessment/Plan:    Chest pain, likely musculoskeletal      Initial vitals: /58, HR 77, SpO2 100%.  EKG taken, shows normal sinus rhythm.  Patient describes stabbing chest pain, is reproducible on palpation.   Stat troponin and ABG ordered.  2 mg IV morphine, 40 mg IV Pepcid, and lidocaine patch ordered.  Repeat vitals similar to initial vitals.  Patient's chest pain was partly relieved before morphine is administered.  Pending troponin and ABG.      Disposition: Acute Care      Narrative Summary:    Rapid response was called at 1441 on 12/2/2024 for chest pain. 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   Patient vitals at time of rapid: /58, HR 77, SpO2 100%.  During questioning, the patient stated that she had a stabbing chest pain that was worse with palpation.  EKG was taken which showed normal sinus rhythm.  Troponin and ABG were ordered, and the patient was ordered 40 mg Pepcid IV and 2 mg morphine IV.  Lidocaine patch was ordered for the patient to be administered to her chest.  Physical examination was notable for coarse lung sounds more prominent in the left lung fields and pain on sternal palpation.  Repeat vitals were similar to initial results.  The patient's chest pain improved partially before the morphine was administered.      Physical Exam:    /55 (BP Location: Left arm)   Pulse 78   Temp 36.2 °C (97.2 °F) (Temporal)   Resp 18   Ht 1.626 m (5' 4.02\")   Wt 59.9 kg (132 lb)   SpO2 92%   BMI 22.65 kg/m²     Physical Exam:    GENERAL: Frail-appearing. Alert and cooperative.  HEENT: Normocephalic and atraumatic.  " Mucous membranes moist.  Clear sclera, PERRL, EOMI.  CARDIOVASCULAR: Regular rate and rhythm.  No murmurs, rubs, or gallops. S1/S2.  RESPIRATORY: Coarse lungs sounds L>R  ABDOMEN: Soft, non-tender. Not distended. No rebound or guarding.  MUSCULOSKELETAL: Grossly normal inspection. No joint swelling or obvious deformities. ROM intact  EXTREMITIES: No peripheral edema.  NEUROLOGICAL: A&O X 3. CN II-XII are grossly intact. No focal deficits.   SKIN: Warm and dry, no lesions, no rashes.  PSYCH: Appropriate mood and affect.      Leopoldo Bowles MD  [facility]

## 2024-12-02 NOTE — H&P
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 with bilateral adrenal metastisis s/p chemo (last Tx 12/2023, current Tx Keytruda q6wk), HTN, HLD, MI, CAD s/p PCI/stent, cerebral aneurysm x 2 s/p craniotomy and clipping, AAA s/p repair x 2, PAD s/p s/p revascularization  left popliteal/TPT/PT reconstruction with PTA/DCB, Supera/SES to popliteal occlusion) and s/p limb rescue x 2 (Laser atherectomy/JETI/PTA fem/pop/TPT/PT, FCO to mid SFA and JETI/PTA AT on (6/25/24 ), c/b stent occlusion s/p repeat thrombectomy on 6/26/24)- JETI /SFA, POP PT, and TPT, FCO to BK Pop and TPT PTA AT), Left foot drop, COPD, chronic hypoxic respiratory failure on home supplemental oxygen (2L NC at night and as needed), nicotine dependence.  Patient presents with shortness of breath and bilateral lower extremities numbness. She was unable to stand this morning 2/2 lower extremity numbness. Also reporting multiple episodes of incontinence of urine and bladder overnight. She had been having some incontinence to a lesser degree a few days earlier. Stool, per her daughter is brown, very sticky, soft/loose. No reported black or bloody stools, back pain, abdominal pain, N/V. No recent antibiotics. She reports cough has been severe and productive with yellow sputum since Friday. Reports feeling feverish/chills, SOB, wheezing. Has been needing oxygen during the day. Additionally reports large epistaxis on Thursday. Denies dysuria. Denies recent falls.    ER course: Hemodynamically stable, afebrile, SpO2 96% on 3 L nasal cannula. EKG per ER provider normal sinus rhythm at a rate of 70, no ST segment depression or elevation consistent with ischemia or infarction.  CXR showed patchy opacity in the left mid/lower lung field.  WBC 12, hemoglobin 11.5 (baseline 12-14), platelets 213. INR 1.6.  Bicarbonate 34 otherwise CMP is normal.  Magnesium 1.67.  .   High-sensitivity troponin 17, repeat 15. Egative covid and Flu VBG: pH 7.25, pCO2 61, pO2 34, blood cultures in process.  Patient received normal saline bolus L, DuoNebs, and was also ordered azithromycin and ceftriaxone for empiric treatment of pneumonia    Past Medical History: as above  Past Surgical History: As above: [facility] with PCI, fem-fem bypass, L MCA aneurysm s/p craniotomy and clipping, AAA graft and stent for endoleak repair, tubal ligation, bladder lift, lithotripsy  Social History: 1 1/2 -2 ppd x 47 years, denies EtOH, and illicit drug use. Retired teacher.  Lives with her , son and daughter-in-law and their children.  Family History: HTN, HLD, CAD     Past Medical History  Past Medical History:   Diagnosis Date    Aneurysm of carotid artery (CMS-[facility])     Neck aneurysm    DVT (deep venous thrombosis) (Multi)     2022    History of tubal ligation     Old myocardial infarction     History of heart attack    Other specified disorders of kidney and ureter     Obstruction of kidney    Personal history of other diseases of the circulatory system     History of intracranial aneurysm    Personal history of other diseases of the circulatory system     History of abdominal aortic aneurysm (AAA)    Personal history of other diseases of the respiratory system     History of chronic obstructive lung disease    Personal history of urinary calculi     History of kidney stones    Right upper quadrant pain 09/25/2020    Abdominal pain, RUQ (right upper quadrant)       Surgical History  Past Surgical History:   Procedure Laterality Date    CT ABDOMEN PELVIS ANGIOGRAM W AND/OR WO IV CONTRAST  11/21/2019    CT ABDOMEN PELVIS ANGIOGRAM W AND/OR WO IV CONTRAST 11/21/2019 [facility]    CT ANGIO AORTA AND BILATERAL ILIOFEMORAL RUN OFF INCLUDING WITHOUT CONTRAST IF PERFORMED  8/9/2022    CT AORTA AND BILATERAL ILIOFEMORAL RUNOFF ANGIOGRAM W AND/OR WO IV CONTRAST 8/9/2022 [facility]GAC[facility]    CT HEAD ANGIO W  AND WO IV CONTRAST  5/22/2020    CT HEAD ANGIO W AND WO IV CONTRAST 5/22/2020 [facility]    INVASIVE VASCULAR PROCEDURE N/A 1/23/2024    Procedure: Lower Extremity Angiogram;  Surgeon: Willam Mike MD;  Location: [place] [address] Cardiac Cath Lab;  Service: Cardiovascular;  Laterality: N/A;  [id number];LLE CLTI    INVASIVE VASCULAR PROCEDURE N/A 1/23/2024    Procedure: Lower Extremity Intervention;  Surgeon: Willam Mike MD;  Location: [place] [address] Cardiac Cath Lab;  Service: Cardiovascular;  Laterality: N/A;    INVASIVE VASCULAR PROCEDURE N/A 6/25/2024    Procedure: Lower Extremity Angiogram;  Surgeon: Willam Mike MD;  Location: [facility] Cardiac Cath Lab;  Service: Cardiovascular;  Laterality: N/A;  6/25/24 with Cee    INVASIVE VASCULAR PROCEDURE Left 6/25/2024    Procedure: Lower Extremity Intervention;  Surgeon: Willam Mike MD;  Location: [place] [address] Cardiac Cath Lab;  Service: Cardiovascular;  Laterality: Left;    INVASIVE VASCULAR PROCEDURE N/A 6/25/2024    Procedure: Thrombectomy - Lower Extremity;  Surgeon: Willam Mike MD;  Location: [place] [address] Cardiac Cath Lab;  Service: Cardiovascular;  Laterality: N/A;    INVASIVE VASCULAR PROCEDURE N/A 6/25/2024    Procedure: Atherectomy - Lower Extremity;  Surgeon: Willam Mike MD;  Location: [place] [address] Cardiac Cath Lab;  Service: Cardiovascular;  Laterality: N/A;    INVASIVE VASCULAR PROCEDURE Left 6/25/2024    Procedure: FCO Stent - Lower Extremity;  Surgeon: Willam Mike MD;  Location: [place] [address] Cardiac Cath Lab;  Service: Cardiovascular;  Laterality: Left;    INVASIVE VASCULAR PROCEDURE N/A 6/26/2024    Procedure: Lower Extremity Intervention;  Surgeon: Bucky Lake DO;  Location: [facility] Cardiac Cath Lab;  Service: Cardiovascular;  Laterality: N/A;    INVASIVE VASCULAR PROCEDURE N/A 6/26/2024    Procedure: Lower Extremity Angiogram;  Surgeon: Bucky Lake DO;  Location: [facility] Cardiac Cath Lab;  Service: Cardiovascular;  Laterality: N/A;    OTHER SURGICAL  HISTORY  09/30/2020    Tubal ligation    OTHER SURGICAL HISTORY  09/30/2020    Cardiac catheterization        Social History  She reports that she has been smoking cigarettes. She has been exposed to tobacco smoke. She has never used smokeless tobacco. She reports that she does not drink alcohol and does not use drugs.    Family History  Family History   Problem Relation Name Age of Onset    Aneurysm Mother      Hypertension Mother      Heart attack Father          Allergies  Methylprednisolone, Ace inhibitors, Prednisone, Betamethasone dipropionate, Demerol [meperidine], and Iodinated contrast media    Review of Systems     10 point ROS negative except as noted above in the HPI    Physical Exam  Constitutional:       General: She is awake. She is not in acute distress.     Appearance: Normal appearance. She is not toxic-appearing.   HENT:      Head: Atraumatic.      Nose: Nose normal.      Mouth/Throat:      Mouth: Mucous membranes are moist.   Eyes:      Extraocular Movements: Extraocular movements intact.      Conjunctiva/sclera: Conjunctivae normal.      Pupils: Pupils are equal, round, and reactive to light.   Cardiovascular:      Rate and Rhythm: Normal rate and regular rhythm.      Pulses: Normal pulses.      Heart sounds: No murmur heard.  Pulmonary:      Comments: Bilateral posterior lung fields diminished. Anterior chest wall wheezing. Unlabored with conversation. + cough with yellow sputtum   Abdominal:      General: Bowel sounds are normal. There is no distension.      Palpations: Abdomen is soft.      Tenderness: There is no abdominal tenderness. There is no guarding.   Musculoskeletal:         General: No swelling, deformity or signs of injury. Normal range of motion.      Cervical back: Neck supple.      Right lower leg: No edema.      Left lower leg: No edema.   Skin:     General: Skin is warm and dry.      Capillary Refill: Capillary refill takes less than 2 seconds.      Findings: No ecchymosis,  "erythema or wound.   Neurological:      General: No focal deficit present.      Mental Status: She is alert and oriented to person, place, and time.      Sensory: Sensory deficit present.      Motor: No tremor.      Comments: Patient has decreased sensation in her legs, however was able to correctly identify the soft vs sharp in BLE. Left foot drop.   Psychiatric:         Mood and Affect: Mood normal.         Behavior: Behavior is cooperative.          Last Recorded Vitals  Blood pressure 119/55, pulse 78, temperature 36.2 °C (97.2 °F), height 1.626 m (5' 4\"), weight 59.9 kg (132 lb), SpO2 92%.    Relevant Results      XR chest 1 view    Result Date: 12/2/2024  STUDY: Chest Radiograph;  12/02/2024 7:27 AM INDICATION: Shortness of breath. COMPARISON: CXR 3/11/2024. ACCESSION NUMBER(S): [id number] ORDERING CLINICIAN: MARISABEL LUNSFORD TECHNIQUE:  Frontal chest was obtained at 07:26 hours. FINDINGS: CARDIOMEDIASTINAL SILHOUETTE: Cardiomediastinal silhouette is normal in size and configuration.  LUNGS: Patchy opacity in the left mid/lower lung field. The right lung appears clear.  ABDOMEN: No remarkable upper abdominal findings.  BONES: No acute osseous changes.    Patchy opacity in the left mid/lower lung field. This could be due to pneumonia. Follow-up is recommended. Signed by Ibrahima Jimenes MD    Vascular US lower extremity arterial duplex with graft left    Result Date: 11/14/2024           [place] [address] Tel [phone] and Fax [phone]  Vascular Lab Report VASC US LOWER EXTREMITY ARTERIAL DUPLEX W/ GRAFT LEFT W/ TRAVIS  Patient Name:      AALIYAH SAWANT     Reading Physician:  [id number] Nico Dubois DO Study Date:        11/14/2024          Ordering Physician: [id number] HEIDI MORALES MRN/PID:           [id number]            Technologist:       Myriam BARBOZA Accession#:        [id number]        Technologist 2: Date of Birth/Age: 1955 / 69 years Encounter#:         [id number] " Gender:            F Admission Status:  Outpatient          Location Performed: [facility]  Diagnosis/ICD: Peripheral vascular disease, unspecified-I73.9 CPT Codes:     16837 Peripheral artery Lower arterial Duplex BPG limited  Patient History Vascular surgery. R-L Fem-Fem bypass graft and multiple left                 lower extremity interventions. Left lower extremity stent.  **CRITICAL RESULT** Critical Result: left stents occluded Notification called to Dr. Mike office on 11/14/2024 at 11:28:14 AM by Myriam BARBOZA.  CONCLUSIONS: Left Lower Arterial: The left distal femoral artery and popliteal artery stents appear occluded. There are distal femoral artery collaterals noted. Did not visualize KELVIN. Right Bypass Graft: The right to left femoral-femoral bypass graft appears widely patent. Stent: Lower Extremity Arterial - the stent located in the superficial femoral artery distal left appears occluded. Lower Extremity Arterial - the stent located in the popliteal artery left appears occluded.  Additional Findings: Previous TRAVIS results done on 10/[id number] Rt TRAVIS 1.1 Lt TRAVIS .98.  Imaging & Doppler Findings:  Bypass Graft Surveillance: Right to Left Fem-Fem Inflow Artery    86 cm/s Prox Anast       138 cm/s Prox Graft       128 cm/s Prox/Mid Graft   77 cm/s Mid Graft        100 cm/s Mid/Distal Graft 74 cm/s Distal Graft     85 cm/s Distal Anast     114 cm/s Outflow Artery   68 cm/s  Right                    Left  PSV                      PSV              CFA        78 cm/s       Profunda Proximal 40 cm/s         SFA Proximal    54 cm/s            SFA Mid      41 cm/s       Peroneal Proximal 0 cm/s         Peroneal Mid    6 cm/s        Peroneal Distal  6 cm/s         PTA Proximal    13 cm/s            PTA Mid      19 cm/s          PTA Distal     9 cm/s  Left Superficial Femoral Distal Stent 1 0 cm/s Left Popliteal Stent 1                  0 cm/s   [id number] Nico Dubois DO Electronically signed by [id number] Walsh  Silvino DO on 11/14/2024 at 12:19:29 PM  ** Final **      Results for orders placed or performed during the hospital encounter of 12/02/24 (from the past 24 hours)   CBC and Auto Differential   Result Value Ref Range    WBC 12.0 (H) 4.4 - 11.3 x10*3/uL    nRBC 0.0 0.0 - 0.0 /100 WBCs    RBC 4.47 4.00 - 5.20 x10*6/uL    Hemoglobin 11.5 (L) 12.0 - 16.0 g/dL    Hematocrit 38.9 36.0 - 46.0 %    MCV 87 80 - 100 fL    MCH 25.7 (L) 26.0 - 34.0 pg    MCHC 29.6 (L) 32.0 - 36.0 g/dL    RDW 16.0 (H) 11.5 - 14.5 %    Platelets 213 150 - 450 x10*3/uL    Neutrophils % 82.3 40.0 - 80.0 %    Immature Granulocytes %, Automated 0.6 0.0 - 0.9 %    Lymphocytes % 8.9 13.0 - 44.0 %    Monocytes % 7.2 2.0 - 10.0 %    Eosinophils % 0.5 0.0 - 6.0 %    Basophils % 0.5 0.0 - 2.0 %    Neutrophils Absolute 9.87 (H) 1.20 - 7.70 x10*3/uL    Immature Granulocytes Absolute, Automated 0.07 0.00 - 0.70 x10*3/uL    Lymphocytes Absolute 1.07 (L) 1.20 - 4.80 x10*3/uL    Monocytes Absolute 0.86 0.10 - 1.00 x10*3/uL    Eosinophils Absolute 0.06 0.00 - 0.70 x10*3/uL    Basophils Absolute 0.06 0.00 - 0.10 x10*3/uL   Magnesium   Result Value Ref Range    Magnesium 1.67 1.60 - 2.40 mg/dL   Comprehensive metabolic panel   Result Value Ref Range    Glucose 87 74 - 99 mg/dL    Sodium 140 136 - 145 mmol/L    Potassium 3.8 3.5 - 5.3 mmol/L    Chloride 101 98 - 107 mmol/L    Bicarbonate 34 (H) 21 - 32 mmol/L    Anion Gap 9 (L) 10 - 20 mmol/L    Urea Nitrogen 12 6 - 23 mg/dL    Creatinine 0.73 0.50 - 1.05 mg/dL    eGFR 90 >60 mL/min/1.73m*2    Calcium 8.6 8.6 - 10.3 mg/dL    Albumin 3.4 3.4 - 5.0 g/dL    Alkaline Phosphatase 50 33 - 136 U/L    Total Protein 6.4 6.4 - 8.2 g/dL    AST 15 9 - 39 U/L    Bilirubin, Total 0.4 0.0 - 1.2 mg/dL    ALT 16 7 - 45 U/L   Protime-INR   Result Value Ref Range    Protime 17.9 (H) 9.8 - 12.8 seconds    INR 1.6 (H) 0.9 - 1.1   Troponin I, High Sensitivity   Result Value Ref Range    Troponin I, High Sensitivity 17 (H) 0 - 13 ng/L    B-Type Natriuretic Peptide   Result Value Ref Range     (H) 0 - 99 pg/mL   Influenza A, and B PCR   Result Value Ref Range    Flu A Result Not Detected Not Detected    Flu B Result Not Detected Not Detected   Sars-CoV-2 PCR   Result Value Ref Range    Coronavirus 2019, PCR Not Detected Not Detected   Blood Gas Venous Full Panel   Result Value Ref Range    POCT pH, Venous 7.25 (LL) 7.33 - 7.43 pH    POCT pCO2, Venous 61 (H) 41 - 51 mm Hg    POCT pO2, Venous 34 (L) 35 - 45 mm Hg    POCT SO2, Venous 54 45 - 75 %    POCT Oxy Hemoglobin, Venous 51.9 45.0 - 75.0 %    POCT Hematocrit Calculated, Venous 35.0 (L) 36.0 - 46.0 %    POCT Sodium, Venous 138 136 - 145 mmol/L    POCT Potassium, Venous 3.1 (L) 3.5 - 5.3 mmol/L    POCT Chloride, Venous 106 98 - 107 mmol/L    POCT Ionized Calicum, Venous 1.07 (L) 1.10 - 1.33 mmol/L    POCT Glucose, Venous 70 (L) 74 - 99 mg/dL    POCT Lactate, Venous 0.9 0.4 - 2.0 mmol/L    POCT Base Excess, Venous -1.4 -2.0 - 3.0 mmol/L    POCT HCO3 Calculated, Venous 26.8 (H) 22.0 - 26.0 mmol/L    POCT Hemoglobin, Venous 11.6 (L) 12.0 - 16.0 g/dL    POCT Anion Gap, Venous 8.0 (L) 10.0 - 25.0 mmol/L    Patient Temperature 37.0 degrees Celsius    FiO2 21 %    Critical Called By GUNNER SULLIVAN RRT     Critical Called To DR BANUELOS     Critical Call Time 841     Critical Read Back Y    Troponin I, High Sensitivity   Result Value Ref Range    Troponin I, High Sensitivity 15 (H) 0 - 13 ng/L   Lactate   Result Value Ref Range    Lactate 1.0 0.4 - 2.0 mmol/L          Assessment/Plan   Assessment & Plan  Pneumonia of left lung due to infectious organism, unspecified part of lung    69 year old female with past medical history of poorly differentiated carcinoma probable primary bronchogenic origin with bilateral adrenal metastisis s/p chemo (last Tx 12/2023, current Tx Keytruda q6wk), HTN, HLD, MI, CAD s/p PCI/stent, cerebral aneurysm x 2 s/p craniotomy and clipping, AAA s/p repair x 2, PAD s/p s/p  revascularization  left popliteal/TPT/PT reconstruction with PTA/DCB, Supera/SES to popliteal occlusion) and s/p limb rescue x 2 (Laser atherectomy/JETI/PTA fem/pop/TPT/PT, FCO to mid SFA and JETI/PTA AT on (6/25/24 ), c/b stent occlusion s/p repeat thrombectomy on 6/26/24)- JETI /SFA, POP PT, and TPT, FCO to BK Pop and TPT PTA AT), Left foot drop, COPD, chronic hypoxic respiratory failure on home. Present with shortness of breath and bilateral lower extremities numbness.    #Community acquired pneumonia  #Acute on chronic hypoxic respiratory failure  #COPD exacerbation    Telemetry monitoring  Solu-Medrol 40 mg every 8 hours  Continue with Rocephin and azithromycin  supplemental oxygen  Nebulizers as needed and scheduled  Consult pulmonology  Mucinex 600 mg every 12 hours as needed  Supportive Care    #BLE numbness  #BLE Weakness  #Incontinence of bowel and bladder    Stat MRI Lumbar and Thoracic spine  Check UA  PT/OT  Neurology vs neuro surgery consult pending above findings from MRI    #Diarrhea    Stool studies for C. Diff and bacterial panel PCR  Monitor electrolytes  Consider additional abdominal imaging pending clinical course  Consider GI consult pending clinical course  Monitor    #nicotine use disorder  Encouraged smoking cessation  Nicotine patch and gum ordered      #poorly differentiated [id number]  #PAD s/p multiple revascularization  #HTN  #HLD  #anxiety  #CAD s/p PCI/Stents    Continue home meds as appropriate    #DVT ppx  SCD  Continue humza Spivey, APRN-CNP

## 2024-12-02 NOTE — CONSULTS
Reason For Consult    Chief complaint-possible pneumonia    History Of Present Illness  Radha Toussaint is a 69 y.o. female presenting with complaints of dyspnea and cough.  Patient's has chronic hypoxia and she uses supplemental oxygen at home at 2 L/min nasal cannula.  Her symptoms started approximately 2 to 3 days prior to admission.  According to her there were multiple family members with respiratory problems during the last week.  Since her dyspnea was getting progressively worse she decided to go to emergency room.  Currently she is in her bed.  Denies dyspnea at rest.  She has cough which is nonproductive.  No hemoptysis.  No fever or chills.  Denies nausea vomiting or diarrhea.  No recent travel.  Patient also has history of poorly differentiated carcinoma with metastasis to the lungs and adrenal glands.     Past Medical History  She has a past medical history of Aneurysm of carotid artery (CMS-HCC), DVT (deep venous thrombosis) (Multi), History of tubal ligation, Old myocardial infarction, Other specified disorders of kidney and ureter, Personal history of other diseases of the circulatory system, Personal history of other diseases of the circulatory system, Personal history of other diseases of the respiratory system, Personal history of urinary calculi, and Right upper quadrant pain (09/25/2020).    Surgical History  She has a past surgical history that includes Other surgical history (09/30/2020); Other surgical history (09/30/2020); CT angio abdomen pelvis w and or wo IV IV contrast (11/21/2019); CT angio head w and wo IV contrast (5/22/2020); CT angio aorta and bilateral iliofemoral runoff including without contrast if performed (8/9/2022); Invasive Vascular Procedure (N/A, 1/23/2024); Invasive Vascular Procedure (N/A, 1/23/2024); Invasive Vascular Procedure (N/A, 6/25/2024); Invasive Vascular Procedure (Left, 6/25/2024); Invasive Vascular Procedure (N/A, 6/25/2024); Invasive Vascular Procedure (N/A,  "6/25/2024); Invasive Vascular Procedure (Left, 6/25/2024); Invasive Vascular Procedure (N/A, 6/26/2024); and Invasive Vascular Procedure (N/A, 6/26/2024).     Social History  She reports that she has been smoking cigarettes. She has been exposed to tobacco smoke. She has never used smokeless tobacco. She reports that she does not drink alcohol and does not use drugs.    Family History  Family History   Problem Relation Name Age of Onset    Aneurysm Mother      Hypertension Mother      Heart attack Father          Allergies  Methylprednisolone, Ace inhibitors, Prednisone, Betamethasone dipropionate, Demerol [meperidine], and Iodinated contrast media    Review of Systems    10 system review of systems performed and negative for any complaints outside of the ones mentioned in history of present illness     Physical Exam    Head and face no deformities  Oropharynx normal mucosa  Neck is supple no thyromegaly  Chest is symmetric no crackles or wheezing  Heart is regular no murmurs  Abdomen is soft and nontender  Skin is intact  Joints are normal  Neurologically patient is moving all 4 limbs.     Last Recorded Vitals  Blood pressure 106/53, pulse 74, temperature 36.3 °C (97.3 °F), resp. rate 18, height 1.626 m (5' 4.02\"), weight 59.9 kg (132 lb), SpO2 95%.          Assessment/Plan       Patient with progressively worsening cough dyspnea and hypoxia currently on 4 L/min oxygen nasal cannula.  Chest x-ray demonstrates alveolar infiltrative changes in the left lung base.  Suspicion for pneumonia.    Underlying COPD.  History of poorly differentiated cancer with adrenal and lung metastasis.    Plan:  Continue antibiotics.  Monitor white blood cell count.  Monitor blood cultures.  Titrate oxygen for saturation between 89 and 95%.  Bronchodilators as needed.  DVT prophylaxis.    Jamey Phillips MD    "

## 2024-12-03 ENCOUNTER — TELEPHONE (OUTPATIENT)
Dept: HEMATOLOGY/ONCOLOGY | Facility: CLINIC | Age: 69
End: 2024-12-03
Payer: MEDICARE

## 2024-12-03 ENCOUNTER — APPOINTMENT (OUTPATIENT)
Dept: HEMATOLOGY/ONCOLOGY | Facility: CLINIC | Age: 69
End: 2024-12-03
Payer: MEDICARE

## 2024-12-03 DIAGNOSIS — C34.90 NON-SMALL CELL LUNG CANCER, UNSPECIFIED LATERALITY (MULTI): Primary | ICD-10-CM

## 2024-12-03 LAB
ANION GAP SERPL CALC-SCNC: 14 MMOL/L (ref 10–20)
ATRIAL RATE: 79 BPM
BUN SERPL-MCNC: 18 MG/DL (ref 6–23)
CALCIUM SERPL-MCNC: 8.4 MG/DL (ref 8.6–10.3)
CHLORIDE SERPL-SCNC: 105 MMOL/L (ref 98–107)
CO2 SERPL-SCNC: 22 MMOL/L (ref 21–32)
CREAT SERPL-MCNC: 0.68 MG/DL (ref 0.5–1.05)
EGFRCR SERPLBLD CKD-EPI 2021: >90 ML/MIN/1.73M*2
ERYTHROCYTE [DISTWIDTH] IN BLOOD BY AUTOMATED COUNT: 16.2 % (ref 11.5–14.5)
GLUCOSE SERPL-MCNC: 110 MG/DL (ref 74–99)
HCT VFR BLD AUTO: 38.9 % (ref 36–46)
HGB BLD-MCNC: 10.9 G/DL (ref 12–16)
HOLD SPECIMEN: NORMAL
LEGIONELLA AG UR QL: NEGATIVE
MCH RBC QN AUTO: 24.9 PG (ref 26–34)
MCHC RBC AUTO-ENTMCNC: 28 G/DL (ref 32–36)
MCV RBC AUTO: 89 FL (ref 80–100)
NRBC BLD-RTO: 0 /100 WBCS (ref 0–0)
P AXIS: 79 DEGREES
P OFFSET: 189 MS
P ONSET: 140 MS
PLATELET # BLD AUTO: 198 X10*3/UL (ref 150–450)
POTASSIUM SERPL-SCNC: 4.2 MMOL/L (ref 3.5–5.3)
PR INTERVAL: 146 MS
Q ONSET: 213 MS
QRS COUNT: 13 BEATS
QRS DURATION: 98 MS
QT INTERVAL: 394 MS
QTC CALCULATION(BAZETT): 451 MS
QTC FREDERICIA: 432 MS
R AXIS: 72 DEGREES
RBC # BLD AUTO: 4.38 X10*6/UL (ref 4–5.2)
S PNEUM AG UR QL: NEGATIVE
SODIUM SERPL-SCNC: 137 MMOL/L (ref 136–145)
T AXIS: 28 DEGREES
T OFFSET: 410 MS
VENTRICULAR RATE: 79 BPM
WBC # BLD AUTO: 11 X10*3/UL (ref 4.4–11.3)

## 2024-12-03 PROCEDURE — 36415 COLL VENOUS BLD VENIPUNCTURE: CPT | Performed by: NURSE PRACTITIONER

## 2024-12-03 PROCEDURE — 2500000001 HC RX 250 WO HCPCS SELF ADMINISTERED DRUGS (ALT 637 FOR MEDICARE OP): Performed by: NURSE PRACTITIONER

## 2024-12-03 PROCEDURE — 94640 AIRWAY INHALATION TREATMENT: CPT

## 2024-12-03 PROCEDURE — 2500000002 HC RX 250 W HCPCS SELF ADMINISTERED DRUGS (ALT 637 FOR MEDICARE OP, ALT 636 FOR OP/ED): Performed by: STUDENT IN AN ORGANIZED HEALTH CARE EDUCATION/TRAINING PROGRAM

## 2024-12-03 PROCEDURE — 97162 PT EVAL MOD COMPLEX 30 MIN: CPT | Mod: GP

## 2024-12-03 PROCEDURE — 80048 BASIC METABOLIC PNL TOTAL CA: CPT | Performed by: NURSE PRACTITIONER

## 2024-12-03 PROCEDURE — 85027 COMPLETE CBC AUTOMATED: CPT | Performed by: NURSE PRACTITIONER

## 2024-12-03 PROCEDURE — 97165 OT EVAL LOW COMPLEX 30 MIN: CPT | Mod: GO

## 2024-12-03 PROCEDURE — 99223 1ST HOSP IP/OBS HIGH 75: CPT | Performed by: STUDENT IN AN ORGANIZED HEALTH CARE EDUCATION/TRAINING PROGRAM

## 2024-12-03 PROCEDURE — 2500000002 HC RX 250 W HCPCS SELF ADMINISTERED DRUGS (ALT 637 FOR MEDICARE OP, ALT 636 FOR OP/ED): Performed by: NURSE PRACTITIONER

## 2024-12-03 PROCEDURE — 1200000002 HC GENERAL ROOM WITH TELEMETRY DAILY

## 2024-12-03 PROCEDURE — 2500000004 HC RX 250 GENERAL PHARMACY W/ HCPCS (ALT 636 FOR OP/ED): Performed by: NURSE PRACTITIONER

## 2024-12-03 PROCEDURE — 2500000005 HC RX 250 GENERAL PHARMACY W/O HCPCS

## 2024-12-03 PROCEDURE — S4991 NICOTINE PATCH NONLEGEND: HCPCS | Performed by: NURSE PRACTITIONER

## 2024-12-03 ASSESSMENT — COGNITIVE AND FUNCTIONAL STATUS - GENERAL
DRESSING REGULAR LOWER BODY CLOTHING: A LITTLE
CLIMB 3 TO 5 STEPS WITH RAILING: A LOT
DAILY ACTIVITIY SCORE: 18
MOVING TO AND FROM BED TO CHAIR: A LITTLE
TURNING FROM BACK TO SIDE WHILE IN FLAT BAD: A LITTLE
DRESSING REGULAR UPPER BODY CLOTHING: A LITTLE
DRESSING REGULAR UPPER BODY CLOTHING: A LITTLE
HELP NEEDED FOR BATHING: A LOT
CLIMB 3 TO 5 STEPS WITH RAILING: TOTAL
DAILY ACTIVITIY SCORE: 16
DRESSING REGULAR LOWER BODY CLOTHING: A LOT
PERSONAL GROOMING: A LITTLE
MOBILITY SCORE: 17
TOILETING: TOTAL
TOILETING: A LITTLE
EATING MEALS: A LITTLE
WALKING IN HOSPITAL ROOM: A LOT
WALKING IN HOSPITAL ROOM: A LOT
MOVING FROM LYING ON BACK TO SITTING ON SIDE OF FLAT BED WITH BEDRAILS: A LITTLE
TURNING FROM BACK TO SIDE WHILE IN FLAT BAD: A LITTLE
MOVING TO AND FROM BED TO CHAIR: A LITTLE
HELP NEEDED FOR BATHING: A LITTLE
MOBILITY SCORE: 15
STANDING UP FROM CHAIR USING ARMS: A LITTLE
STANDING UP FROM CHAIR USING ARMS: A LITTLE

## 2024-12-03 ASSESSMENT — PAIN - FUNCTIONAL ASSESSMENT
PAIN_FUNCTIONAL_ASSESSMENT: 0-10

## 2024-12-03 ASSESSMENT — PAIN SCALES - GENERAL
PAINLEVEL_OUTOF10: 0 - NO PAIN
PAINLEVEL_OUTOF10: 5 - MODERATE PAIN

## 2024-12-03 ASSESSMENT — PAIN DESCRIPTION - LOCATION: LOCATION: LEG

## 2024-12-03 ASSESSMENT — ACTIVITIES OF DAILY LIVING (ADL)
ADL_ASSISTANCE: INDEPENDENT
ADL_ASSISTANCE: INDEPENDENT
BATHING_ASSISTANCE: MODERATE

## 2024-12-03 NOTE — PROGRESS NOTES
"Radha Toussaint is a 69 y.o. female on day 1 of admission presenting with Pneumonia of left lung due to infectious organism, unspecified part of lung.    Subjective     Patient is in her bed.  Complains of cough productive of minimal amount of phlegm no fever.  Denies chest pain.  She still has dyspnea on exertion.       Objective     Physical Exam  Head and face no deformities  Oropharynx normal mucosa  Neck is supple no thyromegaly  Chest is symmetric no crackles  Heart is regular no murmurs  Abdomen is soft and nontender  Skin is intact  Joints are normal  Neurologically patient is moving all 4 limbs  Last Recorded Vitals  Blood pressure 134/59, pulse 64, temperature 36 °C (96.8 °F), resp. rate 15, height 1.626 m (5' 4.02\"), weight 59.9 kg (132 lb), SpO2 94%.  Intake/Output last 3 Shifts:  I/O last 3 completed shifts:  In: 1050 (17.5 mL/kg) [IV Piggyback:1050]  Out: - (0 mL/kg)   Weight: 59.9 kg                       Assessment/Plan      Suspicion for pneumonia involving left lower lobe.  Patient is on antibiotics.  Hypoxia.  COPD.  History of poorly differentiated cancer with lung and adrenal metastasis.    Plan:  Monitor white blood cell count.  Monitor blood cultures.  Continue antibiotics and finish 5-day course of antibiotics.  Titrate oxygen for saturation between 89 and 95%.  DVT prophylaxis.  Bronchodilators as needed.      Jamey Phillips MD      "

## 2024-12-03 NOTE — PROGRESS NOTES
Physical Therapy    Physical Therapy Evaluation    Patient Name: Radha Toussaint  MRN: [id number]  Today's Date: 12/3/2024   Time Calculation  Start Time: 1034  Stop Time: 1052  Time Calculation (min): 18 min  615/615-A    Assessment/Plan   PT Assessment  PT Assessment Results: Decreased strength, Decreased endurance, Impaired balance  Rehab Prognosis: Fair  Evaluation/Treatment Tolerance: Patient limited by fatigue  End of Session Communication: Bedside nurse  Assessment Comment: Pt admitted 12/2 with SOB and found to have PNA. Pt with history of COPD and is on 2L at baseline and currently requires 3L NC. Pt demos decreased balance, endurance and activity tolerance. Recommend mod intensity therapy.  End of Session Patient Position: Bed, 3 rail up, Alarm off, not on at start of session  IP OR SWING BED PT PLAN  Inpatient or Swing Bed: Inpatient  PT Plan  Treatment/Interventions: Bed mobility, Transfer training, Gait training, Balance training, Strengthening, Endurance training, Range of motion, Therapeutic exercise, Therapeutic activity, Positioning  PT Plan: Ongoing PT  PT Frequency: 3 times per week  PT Discharge Recommendations: Moderate intensity level of continued care  PT - OK to Discharge: Yes (once medically cleared)    Subjective     Current Problem:  1. Pneumonia of left lung due to infectious organism, unspecified part of lung        2. Carcinoma of right adrenal gland (Multi)          Patient Active Problem List   Diagnosis    Angioedema    Anxiety    CAD (coronary artery disease)    Cellulitis of left lower extremity    COPD (chronic obstructive pulmonary disease) (Multi)    COVID-19    Peripheral vascular disease (CMS-HCC)    Insomnia    Intermittent claudication (CMS-HCC)    Leg pain    Liver lesion    Paresthesias    Vitamin D deficiency    S/P arteriogram of extremity    Non-small cell lung cancer (NSCLC) (Multi)    Pneumonia due to gram-negative bacteria (Multi)    Anemia in other chronic diseases  classified elsewhere    Abdominal aortic aneurysm (CMS-HCC)    Abdominal pain    Adrenal insufficiency (Multi)    Aneurysm of iliac artery (CMS-HCC)    Arthritis    Colitis    Constipation    Loss of appetite    Dehydration    Dyspnea on exertion    Generalized weakness    Mixed hyperlipidemia    Fibroids    Leiomyoma    Mixed anxiety depressive disorder    Myocardial infarction (Multi)    STEMI (ST elevation myocardial infarction) (Multi)    Pain due to neoplasm    Nephrolithiasis    Nonruptured cerebral aneurysm (HHS-HCC)    Tobacco dependence syndrome    Hypertension    Hypotension    Claudication (CMS-HCC)    Secondary malignant neoplasm of left adrenal gland (Multi)    Moderate protein-calorie malnutrition (Multi)    Contact with and (suspected) exposure to covid-19    Cough    Current smoker    Dilation of renal shen    Hyperglycemia    Noncompliance with medication regimen    Physical deconditioning    Thyroid nodule    Occlusion of left popliteal artery (CMS-HCC)    COPD exacerbation (Multi)    Acute on chronic hypoxic respiratory failure (Multi)    Hyperkalemia    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 (PMH: CAD, COPD, HTN, HLD, stage 4 non-small cell carcinoma of lung, L MCA aneurysm s/p clipping)  Co-Treatment: OT  Prior to Session Communication: Bedside nurse  Patient Position Received: Bed, 3 rail up, Alarm off, not on at start of session  General Comment: Pt pleasant and agreeable to therapy.    Home Living:  Home Living  Lives With: Spouse, Adult  children  Home Adaptive Equipment: Cane, Wheelchair-manual  Home Layout: One level  Bathroom Shower/Tub: Walk-in shower  Bathroom Toilet: Standard  Bathroom Equipment: Shower chair with back    Prior Level of Function:  Prior Function Per Pt/Caregiver Report  Level of [place]: Independent with ADLs and functional transfers  ADL Assistance: Independent  Homemaking Assistance: Independent  Ambulatory Assistance:  (SPC in home and manual w/c for long distances)    Precautions:  Precautions  Medical Precautions: Fall precautions (3L O2, Cdiff rule out)  Precautions Comment: L drop foot and has AFO at home     Objective     Pain:  Pain Assessment  Pain Assessment: 0-10  0-10 (Numeric) Pain Score: 0 - No pain    Cognition:  Cognition  Overall Cognitive Status: Within Functional Limits    General Assessments:  General Observation  General Observation: SPo2 WFL >95% throughout   Activity Tolerance  Endurance: Decreased tolerance for upright activites  Sensation  Light Touch: No apparent deficits  Strength  Strength Comments: BLE WFL    Functional Assessments:     Bed Mobility  Bed Mobility:  (Pt completed supine <> sit with SBA and increased time)  Transfers  Transfer:  (completed from EOB to FWW with Lizette. Denies dizziness. Limited by fatigue.)  Ambulation/Gait Training  Ambulation/Gait Training Performed:  (completed a few lateral steps and forward/backward with ModA x1 and HHA. Denies dizziness. Limited by fatigue and decreased balance.)        Extremity/Trunk Assessments:        RLE   RLE : Within Functional Limits  LLE   LLE : Within Functional Limits    Outcome Measures:     [facility] Basic Mobility  Turning from your back to your side while in a flat bed without using bedrails: A little  Moving from lying on your back to sitting on the side of a flat bed without using bedrails: A little  Moving to and from bed to chair (including a wheelchair): A little  Standing up from a chair using your arms (e.g. wheelchair or  bedside chair): A little  To walk in hospital room: A lot  Climbing 3-5 steps with railing: Total  Basic Mobility - Total Score: 15       Goals:  Encounter Problems       Encounter Problems (Active)       PT Problem       PT Goal 1 STG - Pt will transition supine <> sitting with MOD I  (Progressing)       Start:  12/03/24    Expected End:  12/17/24            PT Goal 2 STG - Pt will transfer STS with SBA  (Progressing)       Start:  12/03/24    Expected End:  12/17/24            PT Goal 3 STG - Pt will amb 50' using LRD with SBA  (Progressing)       Start:  12/03/24    Expected End:  12/17/24            PT Goal 4 STG - Pt will perform a B LE ther ex program of 2-3 sets of 10  (Progressing)       Start:  12/03/24    Expected End:  12/17/24               Pain - Adult            Education Documentation  Precautions, taught by Radha Casanova, PT at 12/3/2024  2:35 PM.  Learner: Patient  Readiness: Acceptance  Method: Explanation  Response: Verbalizes Understanding, Needs Reinforcement    Mobility Training, taught by Radha Casanova PT at 12/3/2024  2:35 PM.  Learner: Patient  Readiness: Acceptance  Method: Explanation  Response: Verbalizes Understanding, Needs Reinforcement    Education Comments  No comments found.

## 2024-12-03 NOTE — CONSULTS
Nutrition Note:   Reason for Assessment: Admission nursing screening (MST-0 with wound)    Patient is a 69 y.o. female presenting with pneumonia of left lung due to infectious organism. Nutrition consult generated from admission screening for MST-0 with wounds. Per review of chart, wounds include: no wounds noted in flowsheets or physician notes. Nutrition consult and full assessment not warranted at this time. Please reconsult for changes in plan of care or need for RD.      Time Spent (min): 5 minutes

## 2024-12-03 NOTE — CARE PLAN
The patient's goals for the shift include  comfort and safety    The clinical goals for the shift include comfort and safety    Over the shift, the patient is making progress toward the following goals.

## 2024-12-03 NOTE — TELEPHONE ENCOUNTER
Opal called to get her mom rescheduled for MD visit and Keytruda infusion. Per Dr. Burnham she is to wait 2 week recovery.  Offered Opal 12.20 @ 1000 for blood work, 1030 for MD visit and 1100 for Keytruda if ok.

## 2024-12-03 NOTE — PROGRESS NOTES
12/03/24 1344   Discharge Planning   Living Arrangements Spouse/significant other;Children   Support Systems Spouse/significant other;Children   Assistance Needed Independent, ambulates with 4-prong cane.  Does not drive.   Type of Residence Private residence   Number of Stairs to Enter Residence 1   Number of Stairs Within Residence   (does not use basement)   Do you have animals or pets at home? Yes   Type of Animals or Pets 5 dogs, 3 cats, 2 turtles   Home or Post Acute Services None   Expected Discharge Disposition Home   Does the patient need discharge transport arranged? No   Stroke Family Assessment   Stroke Family Assessment Needed No   Intensity of Service   Intensity of Service 0-30 min     This TCC met with patient at bedside, introduced self and explained role.  Demographic information and insurance verified.  Patient is from home with spouse and adult children, independent with 4-prong cane.  Does not drive, spouse provides transportation needs.  Denies SW needs at this time.  Patient plans to return home at discharge, no needs.  Discussed PT/OT evals may recommend therapy at home with [facility].  Patient declined.  Disposition pending therapy notes.  Patient is on home O2 at 2 L/ NC at baseline.  Patient's spouse is planned to provide transportation home at discharge.  Care Transitions will continue to follow.    1:55 pm addendum   RONI Santana notified of patient's existing home O2 needs.  Care Transitions will continue to follow.

## 2024-12-03 NOTE — PROGRESS NOTES
Occupational Therapy    Evaluation    Patient Name: Radha Toussaint  MRN: [id number]  Today's Date: 12/3/2024  Time Calculation  Start Time: 1035  Stop Time: 1054  Time Calculation (min): 19 min  615/615-A    Assessment  IP OT Assessment  OT Assessment: impaired adls  Evaluation/Treatment Tolerance: Patient limited by fatigue  Medical Staff Made Aware: Yes  End of Session Communication: Bedside nurse  End of Session Patient Position: Bed, 3 rail up, Alarm off, not on at start of session    Plan:  Treatment Interventions: ADL retraining, Visual perceptual retraining, Functional transfer training, UE strengthening/ROM, Endurance training, Patient/family training, Equipment evaluation/education  OT Frequency: 3 times per week  OT Discharge Recommendations: Moderate intensity level of continued care  OT - OK to Discharge: Yes (once medically appropriate)    Subjective     Current Problem:  1. Pneumonia of left lung due to infectious organism, unspecified part of lung        2. Carcinoma of right adrenal gland (Multi)            General:  General  Reason for Referral: OT eval and treat  Referred By: Rich Spivey, APRN-CNP  Past Medical History Relevant to Rehab: poorly differentiated carcinoma probable primary bronchogenic origin with bilateral adrenal metastisis s/p chemo (last Tx 12/2023, current Tx Keytruda q6wk), HTN, HLD, MI, CAD s/p PCI/stent, cerebral aneurysm x 2 s/p craniotomy and clipping, AAA s/p repair x 2, PAD s/p s/p revascularization  left popliteal/TPT/PT reconstruction with PTA/DCB, Supera/SES to popliteal occlusion) and s/p limb rescue x 2 (Laser atherectomy/JETI/PTA fem/pop/TPT/PT, FCO to mid SFA and JETI/PTA AT on (6/25/24 ), c/b stent occlusion s/p repeat thrombectomy on 6/26/24)- JETI /SFA, POP PT, and TPT, FCO to BK Pop and TPT PTA AT), Left foot drop, COPD, chronic hypoxic respiratory failure on home supplemental oxygen (2L NC at night and as needed), nicotine dependence  Co-Treatment: PT  Prior to  Session Communication: Bedside nurse  Patient Position Received: Bed, 3 rail up, Alarm off, not on at start of session  General Comment: 70 y/o F presents with c/o SOB and bilat LE weakness; reports of multiple episodes of loose stool.  CXR showed patchy opacity in the left mid/lower lung field    Precautions:  Medical Precautions: Fall precautions, Infection precautions, Oxygen therapy device and L/min (cdiff r/o; 3 L)  Precautions Comment: L drop foot    Pain:  Pain Assessment  Pain Assessment: 0-10  0-10 (Numeric) Pain Score: 0 - No pain    Objective     Cognition:  Overall Cognitive Status: Within Functional Limits             Home Living:  Type of Home: House  Lives With: Spouse, Adult children, Grandchildren (, dtr, MADHURI, grand children)  Home Adaptive Equipment: Cane, Wheelchair-manual  Home Layout: One level  Bathroom Shower/Tub: Walk-in shower  Bathroom Toilet: Standard  Bathroom Equipment: Shower chair with back     Prior Function:  Level of [place]: Independent with ADLs and functional transfers  ADL Assistance: Independent  Homemaking Assistance: Independent  Ambulatory Assistance:  (SPC all of the time, manual w/c for longer distances)  Prior Function Comments: Pt reports indep PTA with PRN assist from family for IADLs. Cane for functional mobility. No falls. Does not drive        ADL:  Eating Assistance: Independent  Grooming Assistance: Stand by  Bathing Assistance: Moderate  UE Dressing Assistance: Minimal  LE Dressing Assistance: Maximal  Toileting Assistance with Device: Total    Activity Tolerance:  Endurance: Decreased tolerance for upright activites    Bed Mobility/Transfers:   Bed Mobility  Bed Mobility: Yes  Bed Mobility 1  Bed Mobility 1: Supine to sitting, Sitting to supine  Bed Mobility Comments 1: SBA  Transfers  Transfer: Yes  Transfer 1  Technique 1: Sit to stand, Stand to sit  Transfer Device 1: Walker  Transfer Level of Assistance 1: Minimum assistance  Trials/Comments 1:  from EOB, cues for hand placement and technique    Ambulation/Gait Training:  Functional Mobility  Functional Mobility Performed: Yes  Functional Mobility 1  Device 1: Rolling walker  Assistance 1: Moderate assistance  Comments 1: pt completes simple mobility at bedside using FWW and mod A overall for safety, steadying and walker management; pt unsteady, high falls risk        Vision: Vision - Basic Assessment  Current Vision: No visual deficits   and      Sensation:  Light Touch: No apparent deficits    Strength:  Strength Comments: bilat UE grossly 4/5    Extremities: RUE   RUE : Within Functional Limits and LUE   LUE: Within Functional Limits    Outcome Measures: [facility] Daily Activity  Putting on and taking off regular lower body clothing: A lot  Bathing (including washing, rinsing, drying): A lot  Putting on and taking off regular upper body clothing: A little  Toileting, which includes using toilet, bedpan or urinal: Total  Taking care of personal grooming such as brushing teeth: None  Eating Meals: None  Daily Activity - Total Score: 16                       EDUCATION:     Education Documentation  Body Mechanics, taught by Randi Rollins OT at 12/3/2024  1:11 PM.  Learner: Patient  Readiness: Acceptance  Method: Explanation  Response: Verbalizes Understanding    Precautions, taught by Randi Rollins OT at 12/3/2024  1:11 PM.  Learner: Patient  Readiness: Acceptance  Method: Explanation  Response: Verbalizes Understanding    ADL Training, taught by Randi Rollins OT at 12/3/2024  1:11 PM.  Learner: Patient  Readiness: Acceptance  Method: Explanation  Response: Verbalizes Understanding    Education Comments  No comments found.        Goals:   Encounter Problems       Encounter Problems (Active)       OT Goals       Mod I for all functional transfers (Progressing)       Start:  12/03/24    Expected End:  12/17/24            Mod I for simulated HH distances (Progressing)       Start:  12/03/24    Expected  End:  12/17/24            Pt will complete upper and lower body bathing/dressing; toileting with modified independence using adaptive equipment as needed    (Progressing)       Start:  12/03/24    Expected End:  12/17/24

## 2024-12-03 NOTE — H&P
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 with bilateral adrenal metastisis s/p chemo (last Tx 12/2023, current Tx Keytruda q6wk), HTN, HLD, MI, CAD s/p PCI/stent, cerebral aneurysm x 2 s/p craniotomy and clipping, AAA s/p repair x 2, PAD s/p s/p revascularization  left popliteal/TPT/PT reconstruction with PTA/DCB, Supera/SES to popliteal occlusion) and s/p limb rescue x 2 (Laser atherectomy/JETI/PTA fem/pop/TPT/PT, FCO to mid SFA and JETI/PTA AT on (6/25/24 ), c/b stent occlusion s/p repeat thrombectomy on 6/26/24)- JETI /SFA, POP PT, and TPT, FCO to BK Pop and TPT PTA AT), Left foot drop, COPD, chronic hypoxic respiratory failure on home supplemental oxygen (2L NC at night and as needed), nicotine dependence.  Patient presents with shortness of breath and bilateral lower extremities numbness. She was unable to stand this morning 2/2 lower extremity numbness. Also reporting multiple episodes of incontinence of urine and bladder overnight. She had been having some incontinence to a lesser degree a few days earlier. Stool, per her daughter is brown, very sticky, soft/loose. No reported black or bloody stools, back pain, abdominal pain, N/V. No recent antibiotics. She reports cough has been severe and productive with yellow sputum since Friday. Reports feeling feverish/chills, SOB, wheezing. Has been needing oxygen during the day. Additionally reports large epistaxis on Thursday. Denies dysuria. Denies recent falls.    ER course: Hemodynamically stable, afebrile, SpO2 96% on 3 L nasal cannula. EKG per ER provider normal sinus rhythm at a rate of 70, no ST segment depression or elevation consistent with ischemia or infarction.  CXR showed patchy opacity in the left mid/lower lung field.  WBC 12, hemoglobin 11.5 (baseline 12-14), platelets 213. INR 1.6.  Bicarbonate 34 otherwise CMP is normal.  Magnesium 1.67.  .   High-sensitivity troponin 17, repeat 15. Egative covid and Flu VBG: pH 7.25, pCO2 61, pO2 34, blood cultures in process.  Patient received normal saline bolus L, DuoNebs, and was also ordered azithromycin and ceftriaxone for empiric treatment of pneumonia    Past Medical History: as above  Past Surgical History: As above: [facility] with PCI, fem-fem bypass, L MCA aneurysm s/p craniotomy and clipping, AAA graft and stent for endoleak repair, tubal ligation, bladder lift, lithotripsy  Social History: 1 1/2 -2 ppd x 47 years, denies EtOH, and illicit drug use. Retired teacher.  Lives with her , son and daughter-in-law and their children.  Family History: HTN, HLD, CAD     Past Medical History  Past Medical History:   Diagnosis Date    Aneurysm of carotid artery ([facility])     Neck aneurysm    DVT (deep venous thrombosis) ([facility])     2022    History of tubal ligation     Old myocardial infarction     History of heart attack    Other specified disorders of kidney and ureter     Obstruction of kidney    Personal history of other diseases of the circulatory system     History of intracranial aneurysm    Personal history of other diseases of the circulatory system     History of abdominal aortic aneurysm (AAA)    Personal history of other diseases of the respiratory system     History of chronic obstructive lung disease    Personal history of urinary calculi     History of kidney stones    Right upper quadrant pain 09/25/2020    Abdominal pain, RUQ (right upper quadrant)       Surgical History  Past Surgical History:   Procedure Laterality Date    CT ABDOMEN PELVIS ANGIOGRAM W AND/OR WO IV CONTRAST  11/21/2019    CT ABDOMEN PELVIS ANGIOGRAM W AND/OR WO IV CONTRAST 11/21/2019 [facility]    CT ANGIO AORTA AND BILATERAL ILIOFEMORAL RUN OFF INCLUDING WITHOUT CONTRAST IF PERFORMED  8/9/2022    CT AORTA AND BILATERAL ILIOFEMORAL RUNOFF ANGIOGRAM W AND/OR WO IV CONTRAST 8/9/2022 [facility]ICAL [facility]GAC[facility]    CT HEAD ANGIO W  AND WO IV CONTRAST  5/22/2020    CT HEAD ANGIO W AND WO IV CONTRAST 5/22/2020 [facility]    INVASIVE VASCULAR PROCEDURE N/A 1/23/2024    Procedure: Lower Extremity Angiogram;  Surgeon: Willam Mike MD;  Location: [place] [address] Cardiac Cath Lab;  Service: Cardiovascular;  Laterality: N/A;  [id number];LLE CLTI    INVASIVE VASCULAR PROCEDURE N/A 1/23/2024    Procedure: Lower Extremity Intervention;  Surgeon: Willam Mike MD;  Location: [place] [address] Cardiac Cath Lab;  Service: Cardiovascular;  Laterality: N/A;    INVASIVE VASCULAR PROCEDURE N/A 6/25/2024    Procedure: Lower Extremity Angiogram;  Surgeon: Willam Mike MD;  Location: [facility] Cardiac Cath Lab;  Service: Cardiovascular;  Laterality: N/A;  6/25/24 with Cee    INVASIVE VASCULAR PROCEDURE Left 6/25/2024    Procedure: Lower Extremity Intervention;  Surgeon: Willam Mike MD;  Location: [place] [address] Cardiac Cath Lab;  Service: Cardiovascular;  Laterality: Left;    INVASIVE VASCULAR PROCEDURE N/A 6/25/2024    Procedure: Thrombectomy - Lower Extremity;  Surgeon: Willam Mike MD;  Location: [place] [address] Cardiac Cath Lab;  Service: Cardiovascular;  Laterality: N/A;    INVASIVE VASCULAR PROCEDURE N/A 6/25/2024    Procedure: Atherectomy - Lower Extremity;  Surgeon: Willam Miek MD;  Location: [place] [address] Cardiac Cath Lab;  Service: Cardiovascular;  Laterality: N/A;    INVASIVE VASCULAR PROCEDURE Left 6/25/2024    Procedure: FCO Stent - Lower Extremity;  Surgeon: Willam Mike MD;  Location: [place] [address] Cardiac Cath Lab;  Service: Cardiovascular;  Laterality: Left;    INVASIVE VASCULAR PROCEDURE N/A 6/26/2024    Procedure: Lower Extremity Intervention;  Surgeon: Bucky Lake DO;  Location: [facility] Cardiac Cath Lab;  Service: Cardiovascular;  Laterality: N/A;    INVASIVE VASCULAR PROCEDURE N/A 6/26/2024    Procedure: Lower Extremity Angiogram;  Surgeon: Bucky Lake DO;  Location: [facility] Cardiac Cath Lab;  Service: Cardiovascular;  Laterality: N/A;    OTHER SURGICAL  HISTORY  09/30/2020    Tubal ligation    OTHER SURGICAL HISTORY  09/30/2020    Cardiac catheterization        Social History  She reports that she has been smoking cigarettes. She has been exposed to tobacco smoke. She has never used smokeless tobacco. She reports that she does not drink alcohol and does not use drugs.    Family History  Family History   Problem Relation Name Age of Onset    Aneurysm Mother      Hypertension Mother      Heart attack Father          Allergies  Methylprednisolone, Ace inhibitors, Prednisone, Betamethasone dipropionate, Demerol [meperidine], and Iodinated contrast media    Review of Systems     10 point ROS negative except as noted above in the HPI    Physical Exam  Constitutional:       General: She is awake. She is not in acute distress.     Appearance: Normal appearance. She is not toxic-appearing.   HENT:      Head: Atraumatic.      Nose: Nose normal.      Mouth/Throat:      Mouth: Mucous membranes are moist.   Eyes:      Extraocular Movements: Extraocular movements intact.      Conjunctiva/sclera: Conjunctivae normal.      Pupils: Pupils are equal, round, and reactive to light.   Cardiovascular:      Rate and Rhythm: Normal rate and regular rhythm.      Pulses: Normal pulses.      Heart sounds: No murmur heard.  Pulmonary:      Comments: Bilateral posterior lung fields diminished. Anterior chest wall wheezing. Unlabored with conversation. + cough with yellow sputtum   Abdominal:      General: Bowel sounds are normal. There is no distension.      Palpations: Abdomen is soft.      Tenderness: There is no abdominal tenderness. There is no guarding.   Musculoskeletal:         General: No swelling, deformity or signs of injury. Normal range of motion.      Cervical back: Neck supple.      Right lower leg: No edema.      Left lower leg: No edema.   Skin:     General: Skin is warm and dry.      Capillary Refill: Capillary refill takes less than 2 seconds.      Findings: No ecchymosis,  "erythema or wound.   Neurological:      General: No focal deficit present.      Mental Status: She is alert and oriented to person, place, and time.      Sensory: Sensory deficit present.      Motor: No tremor.      Comments: Patient has decreased sensation in her legs, however was able to correctly identify the soft vs sharp in BLE. Left foot drop.   Psychiatric:         Mood and Affect: Mood normal.         Behavior: Behavior is cooperative.          Last Recorded Vitals  Blood pressure 134/59, pulse 64, temperature 36 °C (96.8 °F), resp. rate 15, height 1.626 m (5' 4.02\"), weight 59.9 kg (132 lb), SpO2 94%.    Relevant Results      XR chest 1 view    Result Date: 12/2/2024  STUDY: Chest Radiograph;  12/02/2024 7:27 AM INDICATION: Shortness of breath. COMPARISON: CXR 3/11/2024. ACCESSION NUMBER(S): [id number] ORDERING CLINICIAN: MARISABEL LUNSFORD TECHNIQUE:  Frontal chest was obtained at 07:26 hours. FINDINGS: CARDIOMEDIASTINAL SILHOUETTE: Cardiomediastinal silhouette is normal in size and configuration.  LUNGS: Patchy opacity in the left mid/lower lung field. The right lung appears clear.  ABDOMEN: No remarkable upper abdominal findings.  BONES: No acute osseous changes.    Patchy opacity in the left mid/lower lung field. This could be due to pneumonia. Follow-up is recommended. Signed by Ibrahima Jimenes MD    Vascular US lower extremity arterial duplex with graft left    Result Date: 11/14/2024           [place] [address] Tel [phone] and Fax [phone]  Vascular Lab Report VASC US LOWER EXTREMITY ARTERIAL DUPLEX W/ GRAFT LEFT W/ TRAVIS  Patient Name:      AALIYAH SAWANT     Reading Physician:  [id number] Nico Dubois DO Study Date:        11/14/2024          Ordering Physician: [id number] HEIDI MORALES MRN/PID:           [id number]            Technologist:       Myriam BARBOZA Accession#:        [id number]        Technologist 2: Date of Birth/Age: 1955 / 69 years Encounter#:       "   [id number] Gender:            F Admission Status:  Outpatient          Location Performed: [facility]  Diagnosis/ICD: Peripheral vascular disease, unspecified-I73.9 CPT Codes:     95996 Peripheral artery Lower arterial Duplex BPG limited  Patient History Vascular surgery. R-L Fem-Fem bypass graft and multiple left                 lower extremity interventions. Left lower extremity stent.  **CRITICAL RESULT** Critical Result: left stents occluded Notification called to Dr. Mike office on 11/14/2024 at 11:28:14 AM by Myriam BARBOZA.  CONCLUSIONS: Left Lower Arterial: The left distal femoral artery and popliteal artery stents appear occluded. There are distal femoral artery collaterals noted. Did not visualize KELVIN. Right Bypass Graft: The right to left femoral-femoral bypass graft appears widely patent. Stent: Lower Extremity Arterial - the stent located in the superficial femoral artery distal left appears occluded. Lower Extremity Arterial - the stent located in the popliteal artery left appears occluded.  Additional Findings: Previous TRAVIS results done on 10/[id number] Rt TRAVIS 1.1 Lt TRAVIS .98.  Imaging & Doppler Findings:  Bypass Graft Surveillance: Right to Left Fem-Fem Inflow Artery    86 cm/s Prox Anast       138 cm/s Prox Graft       128 cm/s Prox/Mid Graft   77 cm/s Mid Graft        100 cm/s Mid/Distal Graft 74 cm/s Distal Graft     85 cm/s Distal Anast     114 cm/s Outflow Artery   68 cm/s  Right                    Left  PSV                      PSV              CFA        78 cm/s       Profunda Proximal 40 cm/s         SFA Proximal    54 cm/s            SFA Mid      41 cm/s       Peroneal Proximal 0 cm/s         Peroneal Mid    6 cm/s        Peroneal Distal  6 cm/s         PTA Proximal    13 cm/s            PTA Mid      19 cm/s          PTA Distal     9 cm/s  Left Superficial Femoral Distal Stent 1 0 cm/s Left Popliteal Stent 1                  0 cm/s   [id number] Nico Dubois DO Electronically signed by  [id number] Nico Dubois DO on 11/14/2024 at 12:19:29 PM  ** Final **      Results for orders placed or performed during the hospital encounter of 12/02/24 (from the past 24 hours)   POCT GLUCOSE   Result Value Ref Range    POCT Glucose 71 (L) 74 - 99 mg/dL   Troponin I, High Sensitivity   Result Value Ref Range    Troponin I, High Sensitivity 13 0 - 13 ng/L   Blood Gas Arterial Full Panel   Result Value Ref Range    POCT pH, Arterial 7.41 7.38 - 7.42 pH    POCT pCO2, Arterial 45 (H) 38 - 42 mm Hg    POCT pO2, Arterial 72 (L) 85 - 95 mm Hg    POCT SO2, Arterial 96 94 - 100 %    POCT Oxy Hemoglobin, Arterial 93.3 (L) 94.0 - 98.0 %    POCT Hematocrit Calculated, Arterial 32.0 (L) 36.0 - 46.0 %    POCT Sodium, Arterial 135 (L) 136 - 145 mmol/L    POCT Potassium, Arterial 3.6 3.5 - 5.3 mmol/L    POCT Chloride, Arterial 104 98 - 107 mmol/L    POCT Ionized Calcium, Arterial 1.15 1.10 - 1.33 mmol/L    POCT Glucose, Arterial 78 74 - 99 mg/dL    POCT Lactate, Arterial 0.7 0.4 - 2.0 mmol/L    POCT Base Excess, Arterial 3.3 (H) -2.0 - 3.0 mmol/L    POCT HCO3 Calculated, Arterial 28.5 (H) 22.0 - 26.0 mmol/L    POCT Hemoglobin, Arterial 10.5 (L) 12.0 - 16.0 g/dL    POCT Anion Gap, Arterial 6 (L) 10 - 25 mmo/L    Patient Temperature 37.0 degrees Celsius    FiO2 36 %    Apparatus CANNULA     Site of Arterial Puncture Brachial Right    ECG 12 lead   Result Value Ref Range    Ventricular Rate 71 BPM    Atrial Rate 71 BPM    OH Interval 140 ms    QRS Duration 100 ms    QT Interval 446 ms    QTC Calculation(Bazett) 484 ms    P Axis 80 degrees    R Axis 66 degrees    T Axis 72 degrees    QRS Count 11 beats    Q Onset 212 ms    P Onset 142 ms    P Offset 196 ms    T Offset 435 ms    QTC Fredericia 472 ms   Urinalysis with Reflex Culture and Microscopic   Result Value Ref Range    Color, Urine Yellow Light-Yellow, Yellow, Dark-Yellow    Appearance, Urine Clear Clear    Specific Gravity, Urine 1.021 1.005 - 1.035    pH, Urine 5.5 5.0, 5.5, 6.0,  6.5, 7.0, 7.5, 8.0    Protein, Urine 20 (TRACE) NEGATIVE, 10 (TRACE), 20 (TRACE) mg/dL    Glucose, Urine Normal Normal mg/dL    Blood, Urine 1.0 (3+) (A) NEGATIVE    Ketones, Urine TRACE (A) NEGATIVE mg/dL    Bilirubin, Urine NEGATIVE NEGATIVE    Urobilinogen, Urine Normal Normal mg/dL    Nitrite, Urine NEGATIVE NEGATIVE    Leukocyte Esterase, Urine NEGATIVE NEGATIVE   Extra Urine Gray Tube   Result Value Ref Range    Extra Tube Hold for add-ons.    Legionella Antigen, Urine    Specimen: Clean Catch/Voided; Urine   Result Value Ref Range    L. pneumophila Urine Ag Negative Negative   Streptococcus pneumoniae Antigen, Urine    Specimen: Clean Catch/Voided; Urine   Result Value Ref Range    Streptococcus pneumoniae Ag, Urine Negative Negative   Urinalysis Microscopic   Result Value Ref Range    WBC, Urine 1-5 1-5, NONE /HPF    RBC, Urine 1-2 NONE, 1-2, 3-5 /HPF   CBC   Result Value Ref Range    WBC 11.0 4.4 - 11.3 x10*3/uL    nRBC 0.0 0.0 - 0.0 /100 WBCs    RBC 4.38 4.00 - 5.20 x10*6/uL    Hemoglobin 10.9 (L) 12.0 - 16.0 g/dL    Hematocrit 38.9 36.0 - 46.0 %    MCV 89 80 - 100 fL    MCH 24.9 (L) 26.0 - 34.0 pg    MCHC 28.0 (L) 32.0 - 36.0 g/dL    RDW 16.2 (H) 11.5 - 14.5 %    Platelets 198 150 - 450 x10*3/uL   Basic metabolic panel   Result Value Ref Range    Glucose 110 (H) 74 - 99 mg/dL    Sodium 137 136 - 145 mmol/L    Potassium 4.2 3.5 - 5.3 mmol/L    Chloride 105 98 - 107 mmol/L    Bicarbonate 22 21 - 32 mmol/L    Anion Gap 14 10 - 20 mmol/L    Urea Nitrogen 18 6 - 23 mg/dL    Creatinine 0.68 0.50 - 1.05 mg/dL    eGFR >90 >60 mL/min/1.73m*2    Calcium 8.4 (L) 8.6 - 10.3 mg/dL          Assessment/Plan   Assessment & Plan  Pneumonia of left lung due to infectious organism, unspecified part of lung    69 year old female with past medical history of poorly differentiated carcinoma probable primary bronchogenic origin with bilateral adrenal metastisis s/p chemo (last Tx 12/2023, current Tx Keytruda q6wk), HTN, HLD,  MI, CAD s/p PCI/stent, cerebral aneurysm x 2 s/p craniotomy and clipping, AAA s/p repair x 2, PAD s/p s/p revascularization  left popliteal/TPT/PT reconstruction with PTA/DCB, Supera/SES to popliteal occlusion) and s/p limb rescue x 2 (Laser atherectomy/JETI/PTA fem/pop/TPT/PT, FCO to mid SFA and JETI/PTA AT on (6/25/24 ), c/b stent occlusion s/p repeat thrombectomy on 6/26/24)- JETI /SFA, POP PT, and TPT, FCO to BK Pop and TPT PTA AT), Left foot drop, COPD, chronic hypoxic respiratory failure on home. Present with shortness of breath and bilateral lower extremities numbness.    #Community acquired pneumonia  #Acute on chronic hypoxic respiratory failure  #COPD exacerbation    Telemetry monitoring  Solu-Medrol 40 mg every 8 hours  Continue with Rocephin and azithromycin  supplemental oxygen  Nebulizers as needed and scheduled  Consult pulmonology  Mucinex 600 mg every 12 hours as needed  Supportive Care    #BLE numbness  #BLE Weakness  #Incontinence of bowel and bladder    Stat MRI Lumbar and Thoracic spine  Check UA  PT/OT  Neurology vs neuro surgery consult pending above findings from MRI    #Diarrhea    Stool studies for C. Diff and bacterial panel PCR  Monitor electrolytes  Consider additional abdominal imaging pending clinical course  Consider GI consult pending clinical course  Monitor    #nicotine use disorder  Encouraged smoking cessation  Nicotine patch and gum ordered      #poorly differentiated [id number]  #PAD s/p multiple revascularization  #HTN  #HLD  #anxiety  #CAD s/p PCI/Stents    Continue home meds as appropriate    #DVT ppx  SCD  Continue humza Davies,

## 2024-12-04 LAB — C DIF TOX TCDA+TCDB STL QL NAA+PROBE: NOT DETECTED

## 2024-12-04 PROCEDURE — 94640 AIRWAY INHALATION TREATMENT: CPT

## 2024-12-04 PROCEDURE — S4991 NICOTINE PATCH NONLEGEND: HCPCS | Performed by: NURSE PRACTITIONER

## 2024-12-04 PROCEDURE — 87493 C DIFF AMPLIFIED PROBE: CPT | Mod: PARLAB | Performed by: NURSE PRACTITIONER

## 2024-12-04 PROCEDURE — 2500000001 HC RX 250 WO HCPCS SELF ADMINISTERED DRUGS (ALT 637 FOR MEDICARE OP): Performed by: NURSE PRACTITIONER

## 2024-12-04 PROCEDURE — 99232 SBSQ HOSP IP/OBS MODERATE 35: CPT | Performed by: STUDENT IN AN ORGANIZED HEALTH CARE EDUCATION/TRAINING PROGRAM

## 2024-12-04 PROCEDURE — 87506 IADNA-DNA/RNA PROBE TQ 6-11: CPT | Mod: PARLAB | Performed by: STUDENT IN AN ORGANIZED HEALTH CARE EDUCATION/TRAINING PROGRAM

## 2024-12-04 PROCEDURE — 9420000001 HC RT PATIENT EDUCATION 5 MIN

## 2024-12-04 PROCEDURE — 2500000002 HC RX 250 W HCPCS SELF ADMINISTERED DRUGS (ALT 637 FOR MEDICARE OP, ALT 636 FOR OP/ED): Performed by: STUDENT IN AN ORGANIZED HEALTH CARE EDUCATION/TRAINING PROGRAM

## 2024-12-04 PROCEDURE — 1200000002 HC GENERAL ROOM WITH TELEMETRY DAILY

## 2024-12-04 PROCEDURE — 2500000004 HC RX 250 GENERAL PHARMACY W/ HCPCS (ALT 636 FOR OP/ED): Performed by: NURSE PRACTITIONER

## 2024-12-04 PROCEDURE — 2500000002 HC RX 250 W HCPCS SELF ADMINISTERED DRUGS (ALT 637 FOR MEDICARE OP, ALT 636 FOR OP/ED): Performed by: NURSE PRACTITIONER

## 2024-12-04 RX ORDER — IPRATROPIUM BROMIDE AND ALBUTEROL SULFATE 2.5; .5 MG/3ML; MG/3ML
3 SOLUTION RESPIRATORY (INHALATION)
Status: DISCONTINUED | OUTPATIENT
Start: 2024-12-04 | End: 2024-12-04

## 2024-12-04 RX ORDER — ALBUTEROL SULFATE 0.83 MG/ML
2.5 SOLUTION RESPIRATORY (INHALATION)
Status: DISCONTINUED | OUTPATIENT
Start: 2024-12-04 | End: 2024-12-06 | Stop reason: HOSPADM

## 2024-12-04 ASSESSMENT — COGNITIVE AND FUNCTIONAL STATUS - GENERAL
STANDING UP FROM CHAIR USING ARMS: A LITTLE
DRESSING REGULAR LOWER BODY CLOTHING: A LITTLE
CLIMB 3 TO 5 STEPS WITH RAILING: A LITTLE
CLIMB 3 TO 5 STEPS WITH RAILING: A LITTLE
DRESSING REGULAR LOWER BODY CLOTHING: A LITTLE
DAILY ACTIVITIY SCORE: 23
MOBILITY SCORE: 20
DAILY ACTIVITIY SCORE: 23
MOVING TO AND FROM BED TO CHAIR: A LITTLE
WALKING IN HOSPITAL ROOM: A LITTLE
WALKING IN HOSPITAL ROOM: A LITTLE
CLIMB 3 TO 5 STEPS WITH RAILING: A LITTLE
MOBILITY SCORE: 22
WALKING IN HOSPITAL ROOM: A LITTLE
MOBILITY SCORE: 22

## 2024-12-04 ASSESSMENT — PAIN SCALES - GENERAL
PAINLEVEL_OUTOF10: 0 - NO PAIN
PAINLEVEL_OUTOF10: 0 - NO PAIN

## 2024-12-04 NOTE — PROGRESS NOTES
"Radha Toussaint is a 69 y.o. female on day 2 of admission presenting with Pneumonia of left lung due to infectious organism, unspecified part of lung.    Subjective   Doing better, contineu treatments     Objective     Physical Exam  Constitutional:       Appearance: Normal appearance.   HENT:      Head: Normocephalic and atraumatic.      Right Ear: Tympanic membrane and ear canal normal.      Left Ear: Tympanic membrane and ear canal normal.      Mouth/Throat:      Mouth: Mucous membranes are moist.      Pharynx: Oropharynx is clear.   Eyes:      Extraocular Movements: Extraocular movements intact.      Conjunctiva/sclera: Conjunctivae normal.      Pupils: Pupils are equal, round, and reactive to light.   Cardiovascular:      Rate and Rhythm: Normal rate and regular rhythm.      Pulses: Normal pulses.      Heart sounds: Normal heart sounds.   Pulmonary:      Effort: Pulmonary effort is normal.      Breath sounds: Normal breath sounds.   Abdominal:      General: Abdomen is flat. Bowel sounds are normal.      Palpations: Abdomen is soft.   Musculoskeletal:         General: Normal range of motion.      Cervical back: Normal range of motion and neck supple.   Skin:     General: Skin is warm and dry.      Capillary Refill: Capillary refill takes 2 to 3 seconds.   Neurological:      General: No focal deficit present.      Mental Status: She is alert and oriented to person, place, and time. Mental status is at baseline.   Psychiatric:         Mood and Affect: Mood normal.         Behavior: Behavior normal.         Thought Content: Thought content normal.         Judgment: Judgment normal.         Last Recorded Vitals  Blood pressure 126/71, pulse 72, temperature 36.1 °C (97 °F), resp. rate 16, height 1.626 m (5' 4.02\"), weight 64.5 kg (142 lb 3.2 oz), SpO2 95%.  Intake/Output last 3 Shifts:  I/O last 3 completed shifts:  In: 550 (9.2 mL/kg) [IV Piggyback:550]  Out: - (0 mL/kg)   Weight: 59.9 kg     Relevant Results       "                        Assessment/Plan   Assessment & Plan  Pneumonia of left lung due to infectious organism, unspecified part of lung    69 year old female with past medical history of poorly differentiated carcinoma probable primary bronchogenic origin with bilateral adrenal metastisis s/p chemo (last Tx 12/2023, current Tx Keytruda q6wk), HTN, HLD, MI, CAD s/p PCI/stent, cerebral aneurysm x 2 s/p craniotomy and clipping, AAA s/p repair x 2, PAD s/p s/p revascularization  left popliteal/TPT/PT reconstruction with PTA/DCB, Supera/SES to popliteal occlusion) and s/p limb rescue x 2 (Laser atherectomy/JETI/PTA fem/pop/TPT/PT, FCO to mid SFA and JETI/PTA AT on (6/25/24 ), c/b stent occlusion s/p repeat thrombectomy on 6/26/24)- JETI /SFA, POP PT, and TPT, FCO to BK Pop and TPT PTA AT), Left foot drop, COPD, chronic hypoxic respiratory failure on home. Present with shortness of breath and bilateral lower extremities numbness.     #Community acquired pneumonia  #Acute on chronic hypoxic respiratory failure  #COPD exacerbation     Telemetry monitoring  Solu-Medrol 40 mg every 8 hours  Continue with Rocephin and azithromycin  supplemental oxygen  Nebulizers as needed and scheduled  Consult pulmonology  Mucinex 600 mg every 12 hours as needed  Supportive Care     #BLE numbness  #BLE Weakness  #Incontinence of bowel and bladder     Stat MRI Lumbar and Thoracic spine  Check UA  PT/OT  Neurology vs neuro surgery consult pending above findings from MRI     #Diarrhea     Stool studies for C. Diff and bacterial panel PCR  Monitor electrolytes  Consider additional abdominal imaging pending clinical course  Consider GI consult pending clinical course  Monitor     #nicotine use disorder  Encouraged smoking cessation  Nicotine patch and gum ordered        #poorly differentiated [id number]  #PAD s/p multiple revascularization  #HTN  #HLD  #anxiety  #CAD s/p PCI/Stents     Continue home meds as appropriate     #DVT ppx  SCD  Continue  eliquis    12/4: contineu treatments, c diff pending, diarreh abetter breathing improving         I spent 35 minutes in the professional and overall care of this patient.      Nico Davies, DO

## 2024-12-04 NOTE — NURSING NOTE
Pulmonary Disease Navigator Documentation:    Pulmonary disease education was performed by the Respiratory Therapist with a good understanding: yes  Home oxygen: yes, 2 lpm nc ([employer])  COPD triggers discussed and when to notify physician? yes  COPD Education booklet given to patient with education? no  Home medication usage education done? Yes, Albuterol MDI; Duoneb; Trelegy; Singulair    Comments: Patient known to me from previous admission.  Patient follows with her PCP, Dr. Rogel, for her outpatient pulmonary needs.  Patient denied need for respiratory medication refills or assistance making follow-up appointment at this time.    Smoking cessation has been done with this patient. Patient has been advised that [facility] are smoke-free facilities. Patient has been advised of the dangers associated with smoking and exposure to second-hand smoke.  Patient politely stated she is not ready to quit at this time.

## 2024-12-05 LAB

## 2024-12-05 PROCEDURE — 99232 SBSQ HOSP IP/OBS MODERATE 35: CPT | Performed by: STUDENT IN AN ORGANIZED HEALTH CARE EDUCATION/TRAINING PROGRAM

## 2024-12-05 PROCEDURE — 2500000002 HC RX 250 W HCPCS SELF ADMINISTERED DRUGS (ALT 637 FOR MEDICARE OP, ALT 636 FOR OP/ED): Performed by: NURSE PRACTITIONER

## 2024-12-05 PROCEDURE — 2500000002 HC RX 250 W HCPCS SELF ADMINISTERED DRUGS (ALT 637 FOR MEDICARE OP, ALT 636 FOR OP/ED): Performed by: STUDENT IN AN ORGANIZED HEALTH CARE EDUCATION/TRAINING PROGRAM

## 2024-12-05 PROCEDURE — 1200000002 HC GENERAL ROOM WITH TELEMETRY DAILY

## 2024-12-05 PROCEDURE — 94640 AIRWAY INHALATION TREATMENT: CPT

## 2024-12-05 PROCEDURE — S4991 NICOTINE PATCH NONLEGEND: HCPCS | Performed by: NURSE PRACTITIONER

## 2024-12-05 PROCEDURE — 2500000004 HC RX 250 GENERAL PHARMACY W/ HCPCS (ALT 636 FOR OP/ED): Performed by: NURSE PRACTITIONER

## 2024-12-05 PROCEDURE — 2500000001 HC RX 250 WO HCPCS SELF ADMINISTERED DRUGS (ALT 637 FOR MEDICARE OP): Performed by: NURSE PRACTITIONER

## 2024-12-05 RX ORDER — AZITHROMYCIN 250 MG/1
500 TABLET, FILM COATED ORAL
Status: DISCONTINUED | OUTPATIENT
Start: 2024-12-06 | End: 2024-12-06 | Stop reason: HOSPADM

## 2024-12-05 RX ORDER — BUDESONIDE 0.5 MG/2ML
INHALANT ORAL
Status: DISPENSED
Start: 2024-12-05 | End: 2024-12-05

## 2024-12-05 ASSESSMENT — COGNITIVE AND FUNCTIONAL STATUS - GENERAL
DAILY ACTIVITIY SCORE: 23
WALKING IN HOSPITAL ROOM: A LITTLE
WALKING IN HOSPITAL ROOM: A LITTLE
CLIMB 3 TO 5 STEPS WITH RAILING: A LITTLE
DRESSING REGULAR LOWER BODY CLOTHING: A LITTLE
MOBILITY SCORE: 22
MOBILITY SCORE: 22
CLIMB 3 TO 5 STEPS WITH RAILING: A LITTLE
DAILY ACTIVITIY SCORE: 23
DRESSING REGULAR LOWER BODY CLOTHING: A LITTLE

## 2024-12-05 ASSESSMENT — PAIN DESCRIPTION - ORIENTATION: ORIENTATION: RIGHT;LEFT

## 2024-12-05 ASSESSMENT — PAIN SCALES - GENERAL
PAINLEVEL_OUTOF10: 3
PAINLEVEL_OUTOF10: 0 - NO PAIN
PAINLEVEL_OUTOF10: 6

## 2024-12-05 ASSESSMENT — PAIN DESCRIPTION - LOCATION: LOCATION: LEG

## 2024-12-05 NOTE — PROGRESS NOTES
"Radha Toussaint is a 69 y.o. female on day 3 of admission presenting with Pneumonia of left lung due to infectious organism, unspecified part of lung.    Subjective   Doing better, contineu treatments , repeta pt/ot, diarrhea is better  Objective     Physical Exam  Constitutional:       Appearance: Normal appearance.   HENT:      Head: Normocephalic and atraumatic.      Right Ear: Tympanic membrane and ear canal normal.      Left Ear: Tympanic membrane and ear canal normal.      Mouth/Throat:      Mouth: Mucous membranes are moist.      Pharynx: Oropharynx is clear.   Eyes:      Extraocular Movements: Extraocular movements intact.      Conjunctiva/sclera: Conjunctivae normal.      Pupils: Pupils are equal, round, and reactive to light.   Cardiovascular:      Rate and Rhythm: Normal rate and regular rhythm.      Pulses: Normal pulses.      Heart sounds: Normal heart sounds.   Pulmonary:      Effort: Pulmonary effort is normal.      Breath sounds: Normal breath sounds.   Abdominal:      General: Abdomen is flat. Bowel sounds are normal.      Palpations: Abdomen is soft.   Musculoskeletal:         General: Normal range of motion.      Cervical back: Normal range of motion and neck supple.   Skin:     General: Skin is warm and dry.      Capillary Refill: Capillary refill takes 2 to 3 seconds.   Neurological:      General: No focal deficit present.      Mental Status: She is alert and oriented to person, place, and time. Mental status is at baseline.   Psychiatric:         Mood and Affect: Mood normal.         Behavior: Behavior normal.         Thought Content: Thought content normal.         Judgment: Judgment normal.         Last Recorded Vitals  Blood pressure 114/63, pulse 68, temperature 35.7 °C (96.3 °F), resp. rate 18, height 1.626 m (5' 4.02\"), weight 63.5 kg (139 lb 15.9 oz), SpO2 95%.  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 Present with shortness of breath and bilateral lower extremities numbness.     #Community acquired pneumonia  #Acute on chronic hypoxic respiratory failure  #COPD exacerbation     Telemetry monitoring  Solu-Medrol 40 mg every 8 hours  Continue with Rocephin and azithromycin  supplemental oxygen  Nebulizers as needed and scheduled  Consult pulmonology  Mucinex 600 mg every 12 hours as needed  Supportive Care     #BLE numbness  #BLE Weakness  #Incontinence of bowel and bladder     Stat MRI Lumbar and Thoracic spine  Check UA  PT/OT  Neurology vs neuro surgery consult pending above findings from MRI     #Diarrhea     Stool studies for C. Diff and bacterial panel PCR  Monitor electrolytes  Consider additional abdominal imaging pending clinical course  Consider GI consult pending clinical course  Monitor     #nicotine use disorder  Encouraged smoking cessation  Nicotine patch and gum ordered        #poorly differentiated [id number]  #PAD s/p multiple revascularization  #HTN  #HLD  #anxiety  #CAD s/p PCI/Stents     Continue home meds as  appropriate     #DVT ppx  SCD  Continue eliquis    12/4: contineu treatments, c diff pending, diarreh abetter breathing improving    12/5: diarrhea is better, still some weakness, continue treatments repeat PT       I spent 35 minutes in the professional and overall care of this patient.      Nico Davies, DO

## 2024-12-05 NOTE — PROGRESS NOTES
IV to PO Conversion    Radha Toussaint is a 69 y.o. female who qualifies for IV to PO therapy conversion.    Inclusion and exclusion criteria have been evaluated. Will change existing order for azithromycin 500 mg IV every 24 hours  to azithromycin 500 mg PO every 24 hours  per protocol/policy.      Please call with any questions.  Daniella Dos Santos, PharmD, [facility]  [phone]

## 2024-12-05 NOTE — PROGRESS NOTES
"Radha Toussaint is a 69 y.o. female on day 3 of admission presenting with Pneumonia of left lung due to infectious organism, unspecified part of lung.    Subjective   Patient feels better today.  She has less cough and less dyspnea.  She still on oxygen.  No fever or chills.       Objective     Physical Exam   Head and face no deformities  Oropharynx normal mucosa  Neck is supple no thyromegaly  Chest is symmetric , lungs are clear to auscultation  Heart is regular no murmurs  Abdomen is soft and nontender  Skin is intact  Joints are normal  Neurologically patient is moving all 4 limbs  Last Recorded Vitals  Blood pressure 127/66, pulse 66, temperature 36.1 °C (97 °F), resp. rate 18, height 1.626 m (5' 4.02\"), weight 63.5 kg (139 lb 15.9 oz), SpO2 97%.  Intake/Output last 3 Shifts:  I/O last 3 completed shifts:  In: 300 (4.7 mL/kg) [IV Piggyback:300]  Out: - (0 mL/kg)   Weight: 64.5 kg                       Assessment/Plan      Pneumonia involving left lower lobe improving.  Hypoxia currently on 3 L/min oxygen nasal cannula.  COPD.  Poorly differentiated cancer with lung and adrenal metastasis.    Plan:  Titrate oxygen for saturation between 89 and 95%.  Finish 5-day course of antibiotics.  DVT prophylaxis.  Bronchodilators as needed.  Ambulate as tolerated.    Jamey Phillips MD      "

## 2024-12-05 NOTE — PROGRESS NOTES
Physical Therapy                 Therapy Communication Note    Patient Name: Radha Toussaint  MRN: [id number]  Department: [facility] 6  Room: [facility]61-  Today's Date: 12/5/2024     Discipline: Physical Therapy    Missed Visit Reason:  (pt politely refused session this date. will attempt at later time/date when able / appropriate)

## 2024-12-06 VITALS
RESPIRATION RATE: 18 BRPM | HEART RATE: 63 BPM | TEMPERATURE: 97 F | WEIGHT: 139.99 LBS | OXYGEN SATURATION: 95 % | SYSTOLIC BLOOD PRESSURE: 134 MMHG | HEIGHT: 64 IN | DIASTOLIC BLOOD PRESSURE: 62 MMHG | BODY MASS INDEX: 23.9 KG/M2

## 2024-12-06 LAB
ANION GAP SERPL CALC-SCNC: 11 MMOL/L (ref 10–20)
ATRIAL RATE: 71 BPM
BACTERIA BLD CULT: NORMAL
BACTERIA BLD CULT: NORMAL
BUN SERPL-MCNC: 23 MG/DL (ref 6–23)
CALCIUM SERPL-MCNC: 8.5 MG/DL (ref 8.6–10.3)
CHLORIDE SERPL-SCNC: 105 MMOL/L (ref 98–107)
CO2 SERPL-SCNC: 27 MMOL/L (ref 21–32)
CREAT SERPL-MCNC: 0.63 MG/DL (ref 0.5–1.05)
EGFRCR SERPLBLD CKD-EPI 2021: >90 ML/MIN/1.73M*2
ERYTHROCYTE [DISTWIDTH] IN BLOOD BY AUTOMATED COUNT: 16.4 % (ref 11.5–14.5)
GLUCOSE SERPL-MCNC: 100 MG/DL (ref 74–99)
HCT VFR BLD AUTO: 36.6 % (ref 36–46)
HGB BLD-MCNC: 10.9 G/DL (ref 12–16)
MCH RBC QN AUTO: 25.7 PG (ref 26–34)
MCHC RBC AUTO-ENTMCNC: 29.8 G/DL (ref 32–36)
MCV RBC AUTO: 86 FL (ref 80–100)
NRBC BLD-RTO: 0 /100 WBCS (ref 0–0)
P AXIS: 80 DEGREES
P OFFSET: 196 MS
P ONSET: 142 MS
PLATELET # BLD AUTO: 242 X10*3/UL (ref 150–450)
POTASSIUM SERPL-SCNC: 4.8 MMOL/L (ref 3.5–5.3)
PR INTERVAL: 140 MS
Q ONSET: 212 MS
QRS COUNT: 11 BEATS
QRS DURATION: 100 MS
QT INTERVAL: 446 MS
QTC CALCULATION(BAZETT): 484 MS
QTC FREDERICIA: 472 MS
R AXIS: 66 DEGREES
RBC # BLD AUTO: 4.24 X10*6/UL (ref 4–5.2)
SODIUM SERPL-SCNC: 138 MMOL/L (ref 136–145)
T AXIS: 72 DEGREES
T OFFSET: 435 MS
VENTRICULAR RATE: 71 BPM
WBC # BLD AUTO: 15.7 X10*3/UL (ref 4.4–11.3)

## 2024-12-06 PROCEDURE — 2500000002 HC RX 250 W HCPCS SELF ADMINISTERED DRUGS (ALT 637 FOR MEDICARE OP, ALT 636 FOR OP/ED): Performed by: NURSE PRACTITIONER

## 2024-12-06 PROCEDURE — 94640 AIRWAY INHALATION TREATMENT: CPT

## 2024-12-06 PROCEDURE — 36415 COLL VENOUS BLD VENIPUNCTURE: CPT | Performed by: STUDENT IN AN ORGANIZED HEALTH CARE EDUCATION/TRAINING PROGRAM

## 2024-12-06 PROCEDURE — 85027 COMPLETE CBC AUTOMATED: CPT | Performed by: STUDENT IN AN ORGANIZED HEALTH CARE EDUCATION/TRAINING PROGRAM

## 2024-12-06 PROCEDURE — S4991 NICOTINE PATCH NONLEGEND: HCPCS | Performed by: NURSE PRACTITIONER

## 2024-12-06 PROCEDURE — 2500000004 HC RX 250 GENERAL PHARMACY W/ HCPCS (ALT 636 FOR OP/ED): Performed by: NURSE PRACTITIONER

## 2024-12-06 PROCEDURE — 80048 BASIC METABOLIC PNL TOTAL CA: CPT | Performed by: STUDENT IN AN ORGANIZED HEALTH CARE EDUCATION/TRAINING PROGRAM

## 2024-12-06 PROCEDURE — 2500000001 HC RX 250 WO HCPCS SELF ADMINISTERED DRUGS (ALT 637 FOR MEDICARE OP): Performed by: NURSE PRACTITIONER

## 2024-12-06 PROCEDURE — 2500000002 HC RX 250 W HCPCS SELF ADMINISTERED DRUGS (ALT 637 FOR MEDICARE OP, ALT 636 FOR OP/ED): Performed by: STUDENT IN AN ORGANIZED HEALTH CARE EDUCATION/TRAINING PROGRAM

## 2024-12-06 PROCEDURE — 99238 HOSP IP/OBS DSCHRG MGMT 30/<: CPT | Performed by: STUDENT IN AN ORGANIZED HEALTH CARE EDUCATION/TRAINING PROGRAM

## 2024-12-06 RX ORDER — BENZONATATE 200 MG/1
200 CAPSULE ORAL 3 TIMES DAILY PRN
Qty: 42 CAPSULE | Refills: 0 | Status: SHIPPED | OUTPATIENT
Start: 2024-12-06

## 2024-12-06 RX ORDER — AZITHROMYCIN 250 MG/1
TABLET, FILM COATED ORAL
Qty: 6 TABLET | Refills: 0 | Status: SHIPPED | OUTPATIENT
Start: 2024-12-06 | End: 2024-12-11

## 2024-12-06 ASSESSMENT — PAIN SCALES - WONG BAKER: WONGBAKER_NUMERICALRESPONSE: NO HURT

## 2024-12-06 ASSESSMENT — PAIN SCALES - GENERAL: PAINLEVEL_OUTOF10: 0 - NO PAIN

## 2024-12-06 NOTE — CARE PLAN
The patient's goals for the shift include      The clinical goals for the shift include pt will remain hemodynamically stable throught shift    Over the shift, the patient did  make progress toward the following goals.

## 2024-12-09 DIAGNOSIS — G89.3 CANCER RELATED PAIN: ICD-10-CM

## 2024-12-09 DIAGNOSIS — C34.90 NON-SMALL CELL LUNG CANCER, UNSPECIFIED LATERALITY (MULTI): ICD-10-CM

## 2024-12-09 RX ORDER — HYDROCODONE BITARTRATE AND ACETAMINOPHEN 10; 325 MG/1; MG/1
1 TABLET ORAL EVERY 6 HOURS PRN
Qty: 60 TABLET | Refills: 0 | Status: SHIPPED | OUTPATIENT
Start: 2024-12-09

## 2024-12-09 NOTE — TELEPHONE ENCOUNTER
Daughter phoned for refill of patient's [place] to her local pharmacy. [place] so message to be given to Dr. Doran to ask for refill.

## 2024-12-09 NOTE — DISCHARGE SUMMARY
Discharge Diagnosis  Pneumonia of left lung due to infectious organism, unspecified part of lung    Issues Requiring Follow-Up  pna    Test Results Pending At Discharge  Pending Labs       No current pending labs.            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 with bilateral adrenal metastisis s/p chemo (last Tx 12/2023, current Tx Keytruda q6wk), HTN, HLD, MI, CAD s/p PCI/stent, cerebral aneurysm x 2 s/p craniotomy and clipping, AAA s/p repair x 2, PAD s/p s/p revascularization  left popliteal/TPT/PT reconstruction with PTA/DCB, Supera/SES to popliteal occlusion) and s/p limb rescue x 2 (Laser atherectomy/JETI/PTA fem/pop/TPT/PT, FCO to mid SFA and JETI/PTA AT on (6/25/24 ), c/b stent occlusion s/p repeat thrombectomy on 6/26/24)- JETI /SFA, POP PT, and TPT, FCO to BK Pop and TPT PTA AT), Left foot drop, COPD, chronic hypoxic respiratory failure on home. Present with shortness of breath and bilateral lower extremities numbness.     #Community acquired pneumonia  #Acute on chronic hypoxic respiratory failure  #COPD exacerbation     Telemetry monitoring  Solu-Medrol 40 mg every 8 hours  Continue with Rocephin and azithromycin  supplemental oxygen  Nebulizers as needed and scheduled  Consult pulmonology  Mucinex 600 mg every 12 hours as needed  Supportive Care     #BLE numbness  #BLE Weakness  #Incontinence of bowel and bladder     Stat MRI Lumbar and Thoracic spine  Check UA  PT/OT  Neurology vs neuro surgery consult pending above findings from MRI     #Diarrhea     Stool studies for C. Diff and bacterial panel PCR  Monitor electrolytes  Consider additional abdominal imaging pending clinical course  Consider GI consult pending clinical course  Monitor     #nicotine use disorder  Encouraged smoking cessation  Nicotine patch and gum ordered        #poorly differentiated [id number]  #PAD s/p multiple  revascularization  #HTN  #HLD  #anxiety  #CAD s/p PCI/Stents     Continue home meds as appropriate     #DVT ppx  SCD  Continue eliquis     12/4: contineu treatments, c diff pending, diarreh abetter breathing improving     12/5: diarrhea is better, still some weakness, continue treatments repeat PT       Pertinent Physical Exam At Time of Discharge  Physical Exam  Constitutional:       Appearance: Normal appearance.   HENT:      Head: Normocephalic and atraumatic.      Right Ear: Tympanic membrane and ear canal normal.      Left Ear: Tympanic membrane and ear canal normal.      Mouth/Throat:      Mouth: Mucous membranes are moist.      Pharynx: Oropharynx is clear.   Eyes:      Extraocular Movements: Extraocular movements intact.      Conjunctiva/sclera: Conjunctivae normal.      Pupils: Pupils are equal, round, and reactive to light.   Cardiovascular:      Rate and Rhythm: Normal rate and regular rhythm.      Pulses: Normal pulses.      Heart sounds: Normal heart sounds.   Pulmonary:      Effort: Pulmonary effort is normal.      Breath sounds: Normal breath sounds.   Abdominal:      General: Abdomen is flat. Bowel sounds are normal.      Palpations: Abdomen is soft.   Musculoskeletal:         General: Normal range of motion.      Cervical back: Normal range of motion and neck supple.   Skin:     General: Skin is warm and dry.      Capillary Refill: Capillary refill takes 2 to 3 seconds.   Neurological:      General: No focal deficit present.      Mental Status: She is alert and oriented to person, place, and time. Mental status is at baseline.   Psychiatric:         Mood and Affect: Mood normal.         Behavior: Behavior normal.         Thought Content: Thought content normal.         Judgment: Judgment normal.         Home Medications     Medication List      START taking these medications     azithromycin 250 mg tablet; Commonly known as: Zithromax; Take 2 tabs   (500 mg) by mouth today, than 1 tab (250 mg) daily  for 4 days.   benzonatate 200 mg capsule; Commonly known as: Tessalon; Take 1 capsule   (200 mg) by mouth 3 times a day as needed for cough. Do not crush or chew.     CHANGE how you take these medications     lubiprostone 24 mcg capsule; Commonly known as: Amitiza; Take 1 capsule   (24 mcg) by mouth 2 times daily (morning and late afternoon).; What   changed: when to take this   rosuvastatin 40 mg tablet; Commonly known as: Crestor; Take 1 tablet (40   mg) by mouth once daily.; What changed: when to take this     CONTINUE taking these medications     acetaminophen 325 mg tablet; Commonly known as: Tylenol; Take 3 tablets   (975 mg) by mouth every 6 hours if needed for mild pain (1 - 3) or   moderate pain (4 - 6).   apixaban 5 mg tablet; Commonly known as: Eliquis; Take 1 tablet (5 mg)   by mouth 2 times a day.   budesonide 0.25 mg/2 mL nebulizer solution; Commonly known as:   Pulmicort; Take 2 mL (0.25 mg) by nebulization 2 times a day. Rinse mouth   with water after use to reduce aftertaste and incidence of candidiasis. Do   not swallow.   clopidogrel 75 mg tablet; Commonly known as: Plavix; Take 1 tablet (75   mg) by mouth once daily in the morning.   dexAMETHasone 4 mg tablet; Commonly known as: Decadron; Take 0.5 tablets   (2 mg) by mouth once daily.   gabapentin 300 mg capsule; Commonly known as: Neurontin; Take 1 capsule   (300 mg) by mouth 3 times a day.   hydrOXYzine pamoate 25 mg capsule; Commonly known as: VistariL; Take 1   capsule (25 mg) by mouth 3 times a day as needed for itching.   LORazepam 0.5 mg tablet; Commonly known as: Ativan; Take 1 tablet (0.5   mg) by mouth every 6 hours if needed for anxiety.   montelukast 10 mg tablet; Commonly known as: Singulair; Take 1 tablet   (10 mg) by mouth once daily at bedtime.   OLANZapine 10 mg tablet; Commonly known as: ZyPREXA; Take 1 tablet (10   mg) by mouth once daily at bedtime.   ondansetron ODT 4 mg disintegrating tablet; Commonly known as:   Zofran-ODT;  DISSOLVE 1 TABLET IN MOUTH BY MOUTH THREE TIMES A DAY AS   NEEDED NAUSEA   prochlorperazine 5 mg tablet; Commonly known as: Compazine   sertraline 100 mg tablet; Commonly known as: Zoloft; Take 1 tablet (100   mg) by mouth 2 times a day.   Trelegy Ellipta 200-62.5-25 mcg blister with device; Generic drug:   fluticasone-umeclidin-vilanter; Inhale 1 puff once daily.       Outpatient Follow-Up  Future Appointments   Date Time Provider Department Center   12/20/2024 10:30 AM Berny Burnham MD [id number] [place]   12/20/2024 11:00 AM INF 01 PARMA [id number] [place]   1/5/2025  2:30 PM KERA Romero-CNP [id number] [place]   2/4/2025  8:00 AM DO KRISSY [id number] [place]   4/15/2025 10:30 AM PAR MAC 3 VASC LAB [id number] [id number]   4/15/2025 11:30 AM PAR MAC 3 VASC LAB [id number] [facility]   4/15/2025  1:00 PM Dona Bojorquez APRN-CNP [id number] [place]       Nico Davies DO

## 2024-12-10 ENCOUNTER — PATIENT OUTREACH (OUTPATIENT)
Dept: PRIMARY CARE | Facility: CLINIC | Age: 69
End: 2024-12-10
Payer: MEDICARE

## 2024-12-10 NOTE — PROGRESS NOTES
Discharge Facility: [facility]  Discharge Diagnosis: Pneumonia of left lung  Admission Date: 12/2/2024  Discharge Date: 12/6/2024    PCP Appointment Date: none- 2/4/2025  Specialist Appointment Date:   -hem/onc 12/20/2024  - 1/5/2025    Hospital Encounter and Summary Linked: Yes    Issues Requiring Follow-Up  Pna    START taking these medications     azithromycin 250 mg tablet; Commonly known as: Zithromax; Take 2 tabs (500 mg) by mouth today, than 1 tab (250 mg) daily for 4 days.   benzonatate 200 mg capsule; Commonly known as: Tessalon; Take 1 capsule (200 mg) by mouth 3 times a day as needed for cough. Do not crush or chew    Two attempts were made to reach patient within two business days after discharge. Voicemail left with contact information for patient to call back with any non-emergent questions or concerns.

## 2024-12-13 DIAGNOSIS — F41.1 GAD (GENERALIZED ANXIETY DISORDER): ICD-10-CM

## 2024-12-13 LAB
ATRIAL RATE: 79 BPM
P AXIS: 79 DEGREES
P OFFSET: 189 MS
P ONSET: 140 MS
PR INTERVAL: 146 MS
Q ONSET: 213 MS
QRS COUNT: 13 BEATS
QRS DURATION: 98 MS
QT INTERVAL: 394 MS
QTC CALCULATION(BAZETT): 451 MS
QTC FREDERICIA: 432 MS
R AXIS: 72 DEGREES
T AXIS: 28 DEGREES
T OFFSET: 410 MS
VENTRICULAR RATE: 79 BPM

## 2024-12-13 RX ORDER — LORAZEPAM 0.5 MG/1
0.5 TABLET ORAL EVERY 6 HOURS PRN
Qty: 120 TABLET | Refills: 0 | Status: SHIPPED | OUTPATIENT
Start: 2024-12-13 | End: 2025-01-12

## 2024-12-20 ENCOUNTER — PATIENT OUTREACH (OUTPATIENT)
Dept: PRIMARY CARE | Facility: CLINIC | Age: 69
End: 2024-12-20

## 2024-12-20 ENCOUNTER — LAB (OUTPATIENT)
Dept: LAB | Facility: CLINIC | Age: 69
End: 2024-12-20
Payer: MEDICARE

## 2024-12-20 ENCOUNTER — OFFICE VISIT (OUTPATIENT)
Dept: HEMATOLOGY/ONCOLOGY | Facility: CLINIC | Age: 69
End: 2024-12-20
Payer: MEDICARE

## 2024-12-20 ENCOUNTER — SOCIAL WORK (OUTPATIENT)
Dept: HEMATOLOGY/ONCOLOGY | Facility: CLINIC | Age: 69
End: 2024-12-20

## 2024-12-20 ENCOUNTER — INFUSION (OUTPATIENT)
Dept: HEMATOLOGY/ONCOLOGY | Facility: CLINIC | Age: 69
End: 2024-12-20
Payer: MEDICARE

## 2024-12-20 VITALS
RESPIRATION RATE: 18 BRPM | TEMPERATURE: 97.2 F | HEART RATE: 64 BPM | SYSTOLIC BLOOD PRESSURE: 97 MMHG | OXYGEN SATURATION: 96 % | DIASTOLIC BLOOD PRESSURE: 51 MMHG

## 2024-12-20 VITALS
TEMPERATURE: 97.9 F | BODY MASS INDEX: 26.37 KG/M2 | SYSTOLIC BLOOD PRESSURE: 109 MMHG | WEIGHT: 143.3 LBS | DIASTOLIC BLOOD PRESSURE: 58 MMHG | HEART RATE: 66 BPM | RESPIRATION RATE: 20 BRPM | HEIGHT: 62 IN

## 2024-12-20 DIAGNOSIS — C34.90 NON-SMALL CELL LUNG CANCER, UNSPECIFIED LATERALITY (MULTI): ICD-10-CM

## 2024-12-20 LAB
ALBUMIN SERPL BCP-MCNC: 3.6 G/DL (ref 3.4–5)
ALP SERPL-CCNC: 46 U/L (ref 33–136)
ALT SERPL W P-5'-P-CCNC: 18 U/L (ref 7–45)
ANION GAP SERPL CALC-SCNC: 11 MMOL/L (ref 10–20)
AST SERPL W P-5'-P-CCNC: 12 U/L (ref 9–39)
BASOPHILS # BLD AUTO: 0.05 X10*3/UL (ref 0–0.1)
BASOPHILS NFR BLD AUTO: 0.3 %
BILIRUB SERPL-MCNC: 0.4 MG/DL (ref 0–1.2)
BUN SERPL-MCNC: 16 MG/DL (ref 6–23)
CALCIUM SERPL-MCNC: 8.8 MG/DL (ref 8.6–10.3)
CHLORIDE SERPL-SCNC: 105 MMOL/L (ref 98–107)
CO2 SERPL-SCNC: 26 MMOL/L (ref 21–32)
CREAT SERPL-MCNC: 0.66 MG/DL (ref 0.5–1.05)
EGFRCR SERPLBLD CKD-EPI 2021: >90 ML/MIN/1.73M*2
EOSINOPHIL # BLD AUTO: 0.12 X10*3/UL (ref 0–0.7)
EOSINOPHIL NFR BLD AUTO: 0.8 %
ERYTHROCYTE [DISTWIDTH] IN BLOOD BY AUTOMATED COUNT: 16.7 % (ref 11.5–14.5)
GLUCOSE SERPL-MCNC: 117 MG/DL (ref 74–99)
HCT VFR BLD AUTO: 34.6 % (ref 36–46)
HGB BLD-MCNC: 10.3 G/DL (ref 12–16)
IMM GRANULOCYTES # BLD AUTO: 0.1 X10*3/UL (ref 0–0.7)
IMM GRANULOCYTES NFR BLD AUTO: 0.7 % (ref 0–0.9)
LYMPHOCYTES # BLD AUTO: 2.12 X10*3/UL (ref 1.2–4.8)
LYMPHOCYTES NFR BLD AUTO: 14 %
MCH RBC QN AUTO: 25.3 PG (ref 26–34)
MCHC RBC AUTO-ENTMCNC: 29.8 G/DL (ref 32–36)
MCV RBC AUTO: 85 FL (ref 80–100)
MONOCYTES # BLD AUTO: 0.75 X10*3/UL (ref 0.1–1)
MONOCYTES NFR BLD AUTO: 4.9 %
NEUTROPHILS # BLD AUTO: 12.02 X10*3/UL (ref 1.2–7.7)
NEUTROPHILS NFR BLD AUTO: 79.3 %
NRBC BLD-RTO: 0 /100 WBCS (ref 0–0)
PLATELET # BLD AUTO: 253 X10*3/UL (ref 150–450)
POTASSIUM SERPL-SCNC: 3.9 MMOL/L (ref 3.5–5.3)
PROT SERPL-MCNC: 6.4 G/DL (ref 6.4–8.2)
RBC # BLD AUTO: 4.07 X10*6/UL (ref 4–5.2)
SODIUM SERPL-SCNC: 138 MMOL/L (ref 136–145)
TSH SERPL-ACNC: 2.71 MIU/L (ref 0.44–3.98)
WBC # BLD AUTO: 15.2 X10*3/UL (ref 4.4–11.3)

## 2024-12-20 PROCEDURE — 84443 ASSAY THYROID STIM HORMONE: CPT

## 2024-12-20 PROCEDURE — 99215 OFFICE O/P EST HI 40 MIN: CPT | Performed by: INTERNAL MEDICINE

## 2024-12-20 PROCEDURE — 3074F SYST BP LT 130 MM HG: CPT | Performed by: INTERNAL MEDICINE

## 2024-12-20 PROCEDURE — 85025 COMPLETE CBC W/AUTO DIFF WBC: CPT

## 2024-12-20 PROCEDURE — 3008F BODY MASS INDEX DOCD: CPT | Performed by: INTERNAL MEDICINE

## 2024-12-20 PROCEDURE — 80053 COMPREHEN METABOLIC PANEL: CPT

## 2024-12-20 PROCEDURE — 1126F AMNT PAIN NOTED NONE PRSNT: CPT | Performed by: INTERNAL MEDICINE

## 2024-12-20 PROCEDURE — 1111F DSCHRG MED/CURRENT MED MERGE: CPT | Performed by: INTERNAL MEDICINE

## 2024-12-20 PROCEDURE — 1159F MED LIST DOCD IN RCRD: CPT | Performed by: INTERNAL MEDICINE

## 2024-12-20 PROCEDURE — 36415 COLL VENOUS BLD VENIPUNCTURE: CPT

## 2024-12-20 PROCEDURE — 2500000004 HC RX 250 GENERAL PHARMACY W/ HCPCS (ALT 636 FOR OP/ED): Performed by: INTERNAL MEDICINE

## 2024-12-20 PROCEDURE — 96413 CHEMO IV INFUSION 1 HR: CPT

## 2024-12-20 PROCEDURE — 3078F DIAST BP <80 MM HG: CPT | Performed by: INTERNAL MEDICINE

## 2024-12-20 RX ORDER — HEPARIN SODIUM,PORCINE/PF 10 UNIT/ML
50 SYRINGE (ML) INTRAVENOUS AS NEEDED
OUTPATIENT
Start: 2024-12-20

## 2024-12-20 RX ORDER — HEPARIN 100 UNIT/ML
500 SYRINGE INTRAVENOUS AS NEEDED
OUTPATIENT
Start: 2024-12-20

## 2024-12-20 ASSESSMENT — PAIN SCALES - GENERAL: PAINLEVEL_OUTOF10: 0-NO PAIN

## 2024-12-20 NOTE — SIGNIFICANT EVENT
12/20/24 1207   Prechemo Checklist   Has the patient been in the hospital, ED, or urgent care since last date of service Yes  (new dx of pneumonia, was seen by the doctor today)   Chemo/Immuno Consent Completed and Signed Yes   Protocol/Indications Verified Yes   Confirmed to previous date/time of medication Yes   Compared to previous dose Yes   All medications are dated accurately Yes   Pregnancy Test Negative Not applicable   Parameters Met Yes   BSA/Weight-Height Verified Yes   Dose Calculations Verified (current, total, cumulative) Yes

## 2024-12-20 NOTE — PROGRESS NOTES
LOCATION:   [facility], at [facility].     HEMATOLOGY ONCOLOGY PROBLEMS:  1.  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         PD-L1 70%.  No actionable alterations.       a.  Ist -line therapy with carbo/Taxol/Keytruda x 3 cycles from Sep to Nov 2023.       b.  On maintenance Keytruda beginning Dec 2023.     CHIEF COMPLAINT:    The patient is in the clinic today for followup of poorly differentiated carcinoma and for continuation of treatment and management of therapy related effects.          HISTORY:  Ms. Toussaint is a 69-year-old female with poorly differentiated carcinoma of most probable primary bronchogenic origin.  She has multiple medical problems and was admitted at [facility] in Aug 2023 with complaint worsening shortness of breath, cough and weakness.  There was also history of progressive weight loss and recent falls.  Work-up revealed stable pulmonary nodules but there was concern regarding new bilateral adrenal metastatic lesions along with questionable liver lesions and right hilar lymphadenopathy. She is a lifelong heavy smoker. CT-guided biopsy of the adrenal lesion was suggestive of poorly differentiated carcinoma. 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  She was also evaluated by Dr. Francheska Parada at [facility] and case was reviewed by  pathology and as per tumor board evaluation, overall clinical and radiological finding were considered mainly of probable primary bronchogenic origin.  She was treated with carbo/Taxol/Keytruda combination for 3 cycles.  4th cycle was discontinued due to tolerability issues.  Follow-up CT scan showed interval decrease in bilateral adrenal lesions and she was transitioned to maintenance Keytruda beginning Dec 2023.    INTERVAL HISTORY:  Overall doing okay other than chronic issues with fatigue, shortness of breath etc. Also complaining of worsening pain in the left  leg.  Recent hospitalization with episode of pneumonia.  She was treated with course of antibiotic and steroids.      PAST MEDICAL HISTORY:   1.  COPD on home O2.   2.  Coronary artery disease   3.  Hypertension   4.  Hyperlipidemia   5.  Multiple vascular aneurysms.  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  6.  Peripheral vascular disease.  S/p femoral-femoral bypass   7.  AAA graft repair procedure.  8.  History of tubal ligation, bladder lift, lithotripsy surgeries     SOCIAL HISTORY:    for 46 years and lives in [place].  1 and half to 2 pack a day for 47 years smoking history.  Nonalcoholic.  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  Born and raised  in [place]     FAMILY HISTORY:    Father  at age 52 from myocardial infarction.  Mother  at age 75 or from aortic aneurysm related complications.  1 sister  at age 68 from depression and related complications.  3 children and 5 grandkids.  Her son and daughter has history of Crohn's disease.  No other specific history of bleeding, clotting or malignant disorder in the family.     REVIEW OF SYSTEMS:  Pertinent finding as per the history above.  All other systems have been reviewed and generally negative and noncontributory.     PHYSICAL EXAMINATION:    Detailed physical examination not done     ALLERGY & MEDICATIONS:  Allergies and latest outpatient medications list were reviewed in the EMR.    LAB RESULTS:  CBC from today shows a white cell count of 18.4 with hemoglobin of 13.6 and platelets of 234.   Metabolic profile is pending but it was unremarkable on 2024 unremarkable.  TSH was 2.5.  Last 3 sets of blood work were reviewed and the trend was noted.    RADIOLOGY RESULTS:  CT chest 10/09/2024  Impression:  Overall, there is minimally decreased tumor burden when compared to the most recent exam as evidenced by:   1. Stable to slightly decreased size of pulmonary nodules. No new or enlarging pulmonary  nodules or masses.  2. Interval slightly decreased size of bilateral adrenal metastases.   3. No new metastatic disease in the chest, abdomen or pelvis.   4. Redemonstration of the dilatation of the pancreatic duct most prominently in the pancreatic head. Findings are likely due to coarse calcification of the pancreatic head and uncinate process with  possible intraductal stones. Recommend attention on follow-up.  5. Left nephrolithiasis without evidence of hydronephrosis. There is a stable hyperdense lesion in the left midpole, which might represent a hemorrhagic or proteinaceous cysts. There are additional stable  hypodense lesions throughout bilateral kidneys.  6. Diverticulosis without evidence of acute diverticulitis.  7. Redemonstration of abdominal aortic aneurysm with stable postsurgical changes. Evaluation of the vasculature is limited in the absence of intravenous contrast.  8. Additional chronic and incidental findings as above.     PATHOLOGY RESULTS:  Surgical Pathology [Aug 23 2023 1:48PM] ([id number])  Specimens: RIGHT ADRENAL LESION CORE   Name AALIYAH SAWANT   Accession #: [id number]   FINAL DIAGNOSIS   A. RIGHT ADRENAL LESION CORE:   METASTATIC POORLY DIFFERENTIATED  CARCINOMA: SOFT TISSUE (IDENTIFIED AS RIGHT ADRENAL) COMMENT : The biopsyconsists entirely of a poorly differentiated malignancy in soft  tissue; no adrenal is seen. The tumor stains for keratins (CAM5.2 and  CK7 but not CK20) and is negative for TTF1 and Napsin as well as p40. Staining for SF1 is completely negative.   Electronically Signed Out By BOLIVAR BARRETO ASA, MD, PhD/SLA     ASSESSMENT AND PLAN:   1.  Stage IV metastatic poorly differentiated carcinoma of unknown primary origin.  Please refer the details of initial presentation and management as outlined above.  In summary, patient with history of lifelong heavy smoking.  She was admitted at [facility]  with complaint of worsening shortness of breath in Aug 2023.  Work-up  revealed stable pulmonary nodules but there was concern regarding new bilateral adrenal metastatic lesions along with questionable liver lesions and right hilar lymphadenopathy.  CT-guided biopsy of the adrenal lesion was suggestive of poorly differentiated carcinoma.  Cancer type ID molecular test results were suggestive of questionable sarcoma.  She was also evaluated by Dr. Francheska Parada at [facility] and her case was reviewed by  pathology and as per tumor board discussions, overall clinical and radiological finding were considered mainly of probable primary bronchogenic origin.  She was treated with carbo/Taxol/Keytruda combination for 3 cycles.  4th cycle was discontinued due to tolerability issues.  Follow-up CT scan showed interval decrease in bilateral adrenal lesions and she was transitioned to maintenance Keytruda beginning Dec 2023.     Tolerating Keytruda well.  For now we will continue with  Keytruda at the current dose and schedule.  Latest follow-up CT scan from 10/9/2024 showed stable to improved findings.  As stated above there are issues with frequent COPD exacerbation and lower extremity vascular issues leading to treatment breaks and hospitalizations.  Probable side effects of Keytruda mainly weakness, fatigue, pneumonitis, colitis, endocrinopathies, pleuritis, skin rash etc. were explained to them in detail.  Her overall long-term prognosis remains guarded.    2.  COPD exacerbation/anxiety.  I strongly advised her to continue to follow-up with pulmonary clinic closely.  There is also a significant component of anxiety and panic attacks and she should continue to follow-up with psychiatry clinic.     3.  Left lower leg wound/vascular issues.  Really appreciate wound and vascular surgery teams from help.  She will continue to follow-up with them.    4.  Follow-up.  She will return to the clinic in 6 weeks.  She will be scheduled for follow-up CT chest/abdomen prior to her next visit.    This  note has been transcribed using Dragon voice recognition system and there is a possibility of unintentional typing misprints.

## 2024-12-20 NOTE — PROGRESS NOTES
Followed up with pt and her . Pt is here for laura. Pt was in the hospital for four days because she developed pneumonia. Pt mentions she was exposed to family not feeling well on thanksgiving. Pt is better and back to herself. Pt is ambulating with her cane in the home and able to take care of her own ADLS. Pt's , dtr and son in law who live with her assist with chores and duties that need to be done around the house. Pt will be helping prepare the food for the colin holiday they will be having again at their home. Discussed pt being immunocompromised. Reminded them to ask anyone who is not feeling well to put on a mask or not come to ensure pt stays healthy and on schedule for her treatments. Pt and  have asked for all of them to be considerate of this. Pt does have masks at her house if needed and should provide one for anyone not feeling well or wear one herself. Pt although she knew some of her family were not feeling well she waned everyone to be together. Pt continues to be followed by Nam Mason NP and is managing and coping at this time.

## 2024-12-27 ENCOUNTER — TELEPHONE (OUTPATIENT)
Dept: HEMATOLOGY/ONCOLOGY | Facility: CLINIC | Age: 69
End: 2024-12-27
Payer: MEDICARE

## 2024-12-27 DIAGNOSIS — G89.3 CANCER RELATED PAIN: ICD-10-CM

## 2024-12-27 DIAGNOSIS — C34.90 NON-SMALL CELL LUNG CANCER, UNSPECIFIED LATERALITY (MULTI): ICD-10-CM

## 2024-12-27 RX ORDER — HYDROCODONE BITARTRATE AND ACETAMINOPHEN 10; 325 MG/1; MG/1
1 TABLET ORAL EVERY 6 HOURS PRN
Qty: 60 TABLET | Refills: 0 | Status: SHIPPED | OUTPATIENT
Start: 2024-12-27

## 2024-12-27 RX ORDER — NALOXONE HYDROCHLORIDE 4 MG/.1ML
1 SPRAY NASAL AS NEEDED
Qty: 2 EACH | Refills: 0 | Status: SHIPPED | OUTPATIENT
Start: 2024-12-27

## 2024-12-27 NOTE — TELEPHONE ENCOUNTER
Dr. Burnham patient. Patients daughter Emilia called states that patients pain medication Hydrocodone will run out mid week next week and needs to get a refill that goes to [facility] [phone]

## 2025-01-05 ENCOUNTER — APPOINTMENT (OUTPATIENT)
Dept: BEHAVIORAL HEALTH | Facility: CLINIC | Age: 70
End: 2025-01-05
Payer: MEDICARE

## 2025-01-05 DIAGNOSIS — F33.0 MILD EPISODE OF RECURRENT MAJOR DEPRESSIVE DISORDER (CMS-HCC): ICD-10-CM

## 2025-01-05 DIAGNOSIS — F41.1 GAD (GENERALIZED ANXIETY DISORDER): ICD-10-CM

## 2025-01-05 PROCEDURE — 99214 OFFICE O/P EST MOD 30 MIN: CPT

## 2025-01-05 PROCEDURE — 1111F DSCHRG MED/CURRENT MED MERGE: CPT

## 2025-01-05 PROCEDURE — 1160F RVW MEDS BY RX/DR IN RCRD: CPT

## 2025-01-05 PROCEDURE — 1159F MED LIST DOCD IN RCRD: CPT

## 2025-01-05 RX ORDER — LORAZEPAM 0.5 MG/1
0.5 TABLET ORAL EVERY 6 HOURS PRN
Qty: 120 TABLET | Refills: 3 | Status: SHIPPED | OUTPATIENT
Start: 2025-01-05 | End: 2025-05-05

## 2025-01-05 RX ORDER — BUPROPION HYDROCHLORIDE 75 MG/1
75 TABLET ORAL DAILY
Qty: 30 TABLET | Refills: 1 | Status: SHIPPED | OUTPATIENT
Start: 2025-01-05 | End: 2026-01-05

## 2025-01-05 NOTE — PROGRESS NOTES
Mrs Toussaint is a 69 year old female with PMH of a stemi, CAD, hypertension, hyperlipidemia, PAD, nicotine abuse, non small lung cancer, referred to  Psychoncology for a follow up evaluation for  treatment of Anxiety.      Patient provided 2 personal identifiers prior to virtual appointment     Chief Complaint - Anxiety     Anxiety was up for the holidays.     Foot is getting worse - sores and scabs. She will have to go to the wound care center     Returned to baseline. Ongoing fatigue. Wearing oxygen at bedtime.     She had antibiotics         Depression  Visit Type: Follow up   Onset of symptoms: 1 to 6 months ago  Progression since onset: improving   Patient presents with the following symptoms: decreased concentration, mildly depressed mood, occasional worry, fatigue, muscle tension, restlessness and shortness of breath.  Frequency of symptoms: most days   Severity: moderate   Sleep per night: 7 hours  Sleep quality: fair  Nighttime awakenings: occasional  Risk factors: recent illness and major life event  Patient has a history of: anxiety/panic attacks, CAD and chronic lung disease  Treatment tried: non-benzodiazephine anxiolytics and benzodiazepine  Compliance with treatment: good  Improvement on treatment: mild     Anxiety  Presents for follow up visit. Onset was 1 to 6 months ago. The problem has improved.  Symptoms include decreased concentration, mild depression, dizziness, occasional  worry, muscle tension,  and shortness of breath. Symptoms occur intermittently. The severity of symptoms is mild. The symptoms are aggravated by social activities. The patient sleeps 7 hours per night. The quality of sleep is fair. Nighttime awakenings: occasional.      Risk factors include a major life event and recent illness. Her past medical history is significant for anxiety/panic attacks, CAD and chronic lung disease. Past treatments include benzodiazephines and non-benzodiazephine anxiolytics. The treatment has  provided marginal relief. Compliance with prior treatments has been good.   Mental Status Examination  General Appearance: Well groomed, appropriate eye contact.  Gait/Station:  gait not assessed, virtual appointment   Speech: Normal rate, volume, prosody  Mood: ok  Affect: constricted   Thought Process: Linear, goal directed  Thought Associations: No loosening of associations  Thought Content: normal  Perception: No perceptual abnormalities noted  Level of Consciousness: Alert  Orientation: Alert and oriented to person, place, time and situation  Attention and Concentration: Intact  Recent Memory: Intact as evidenced by ability to recall details from the past 24 hours   Remote Memory: Intact as evidenced by ability to recall previous medical issues   Executive function: Intact  Language: Naming intact  Fund of knowledge: Good  Insight:  Intact  Judgment: Intact, as evidenced by help-seeking behavior, ability to reason through medical decision making, and compliance with treatment recommendations  Review of Systems   Constitutional:  Positive for activity change and fatigue.   Respiratory:  Positive for shortness of breath.    Cardiovascular:  Positive for palpitations.   Neurological:  Positive for dizziness.   Psychiatric/Behavioral:  Positive for decreased concentration, mild depression. The patient is mildly anxious     Lab Review:             Lab on 03/25/2024   Component Date Value    WBC 03/25/2024 15.2 (H)     nRBC 03/25/2024 0.0     RBC 03/25/2024 5.41 (H)     Hemoglobin 03/25/2024 15.0     Hematocrit 03/25/2024 47.0 (H)     MCV 03/25/2024 87     MCH 03/25/2024 27.7     MCHC 03/25/2024 31.9 (L)     RDW 03/25/2024 17.6 (H)     Platelets 03/25/2024 259     Neutrophils % 03/25/2024 80.6     Immature Granulocytes %,* 03/25/2024 1.4 (H)     Lymphocytes % 03/25/2024 13.5     Monocytes % 03/25/2024 2.6     Eosinophils % 03/25/2024 1.3     Basophils % 03/25/2024 0.6     Neutrophils Absolute 03/25/2024 12.25 (H)      Immature Granulocytes Ab* 03/25/2024 0.21     Lymphocytes Absolute 03/25/2024 2.05     Monocytes Absolute 03/25/2024 0.39     Eosinophils Absolute 03/25/2024 0.20     Basophils Absolute 03/25/2024 0.09     Glucose 03/25/2024 129 (H)     Sodium 03/25/2024 138     Potassium 03/25/2024 4.3     Chloride 03/25/2024 102     Bicarbonate 03/25/2024 30     Anion Gap 03/25/2024 10     Urea Nitrogen 03/25/2024 20     Creatinine 03/25/2024 0.69     eGFR 03/25/2024 >90     Calcium 03/25/2024 9.6     Albumin 03/25/2024 3.6     Alkaline Phosphatase 03/25/2024 62     Total Protein 03/25/2024 7.1     AST 03/25/2024 15     Bilirubin, Total 03/25/2024 0.4     ALT 03/25/2024 23     Thyroid Stimulating Horm* 03/25/2024 2.67    Admission on 03/11/2024, Discharged on 03/15/2024   Component Date Value    POCT pH, Venous 03/11/2024 7.37     POCT pCO2, Venous 03/11/2024 59 (H)     POCT pO2, Venous 03/11/2024 30 (L)     POCT SO2, Venous 03/11/2024 44 (L)     POCT Oxy Hemoglobin, Oniel* 03/11/2024 40.9 (L)     POCT Hematocrit Calculat* 03/11/2024 47.0 (H)     POCT Sodium, Venous 03/11/2024 135 (L)     POCT Potassium, Venous 03/11/2024 4.5     POCT Chloride, Venous 03/11/2024 101     POCT Ionized Calicum, Ve* 03/11/2024 1.22     POCT Glucose, Venous 03/11/2024 106 (H)     POCT Lactate, Venous 03/11/2024 0.8     POCT Base Excess, Venous 03/11/2024 6.6 (H)     POCT HCO3 Calculated, Ve* 03/11/2024 34.1 (H)     POCT Hemoglobin, Venous 03/11/2024 15.7     POCT Anion Gap, Venous 03/11/2024 4.0 (L)     Patient Temperature 03/11/2024 37.0     FiO2 03/11/2024 32     Ventricular Rate 03/11/2024 82     Atrial Rate 03/11/2024 82     FL Interval 03/11/2024 150     QRS Duration 03/11/2024 90     QT Interval 03/11/2024 356     QTC Calculation(Bazett) 03/11/2024 415     P Axis 03/11/2024 71     R Dixie 03/11/2024 64     T Dixie 03/11/2024 87     QRS Count 03/11/2024 14     Q Onset 03/11/2024 221     P Onset 03/11/2024 146     P Offset 03/11/2024 194     T Offset  03/11/2024 399     QTC Fredericia 03/11/2024 395     Flu A Result 03/11/2024 Not Detected     Flu B Result 03/11/2024 Not Detected     Coronavirus 2019, PCR 03/11/2024 Not Detected     WBC 03/11/2024 12.5 (H)     nRBC 03/11/2024 0.0     RBC 03/11/2024 5.64 (H)     Hemoglobin 03/11/2024 15.3     Hematocrit 03/11/2024 48.7 (H)     MCV 03/11/2024 86     MCH 03/11/2024 27.1     MCHC 03/11/2024 31.4 (L)     RDW 03/11/2024 19.2 (H)     Platelets 03/11/2024 207     Neutrophils % 03/11/2024 73.4     Immature Granulocytes %,* 03/11/2024 0.4     Lymphocytes % 03/11/2024 17.5     Monocytes % 03/11/2024 6.5     Eosinophils % 03/11/2024 1.6     Basophils % 03/11/2024 0.6     Neutrophils Absolute 03/11/2024 9.21 (H)     Immature Granulocytes Ab* 03/11/2024 0.05     Lymphocytes Absolute 03/11/2024 2.19     Monocytes Absolute 03/11/2024 0.82     Eosinophils Absolute 03/11/2024 0.20     Basophils Absolute 03/11/2024 0.07     Glucose 03/11/2024 91     Sodium 03/11/2024 135 (L)     Potassium 03/11/2024 5.4 (H)     Chloride 03/11/2024 101     Bicarbonate 03/11/2024 30     Anion Gap 03/11/2024 9 (L)     Urea Nitrogen 03/11/2024 14     Creatinine 03/11/2024 0.54     eGFR 03/11/2024 >90     Calcium 03/11/2024 8.9     Albumin 03/11/2024 3.9     Alkaline Phosphatase 03/11/2024 66     Total Protein 03/11/2024 6.9     AST 03/11/2024 26     Bilirubin, Total 03/11/2024 0.5     ALT 03/11/2024 13     Troponin I, High Sensiti* 03/11/2024 12     Troponin I, High Sensiti* 03/11/2024 12     Potassium 03/11/2024 4.3     WBC 03/12/2024 8.6     nRBC 03/12/2024 0.0     RBC 03/12/2024 5.01     Hemoglobin 03/12/2024 13.5     Hematocrit 03/12/2024 44.9     MCV 03/12/2024 90     MCH 03/12/2024 26.9     MCHC 03/12/2024 30.1 (L)     RDW 03/12/2024 18.6 (H)     Platelets 03/12/2024 167     Glucose 03/12/2024 83     Sodium 03/12/2024 140     Potassium 03/12/2024 4.8     Chloride 03/12/2024 106     Bicarbonate 03/12/2024 25     Anion Gap 03/12/2024 14     Urea  Nitrogen 03/12/2024 20     Creatinine 03/12/2024 0.75     eGFR 03/12/2024 87     Calcium 03/12/2024 8.7     Thyroid Stimulating Horm* 03/12/2024 4.41 (H)     Hemoglobin A1C 03/12/2024 5.9 (H)     Estimated Average Glucose 03/12/2024 123     Vitamin B12 03/12/2024 417     Thyroxine, Free 03/12/2024 0.83     WBC 03/13/2024 13.1 (H)     nRBC 03/13/2024 0.0     RBC 03/13/2024 5.32 (H)     Hemoglobin 03/13/2024 14.3     Hematocrit 03/13/2024 46.8 (H)     MCV 03/13/2024 88     MCH 03/13/2024 26.9     MCHC 03/13/2024 30.6 (L)     RDW 03/13/2024 18.7 (H)     Platelets 03/13/2024 198     Glucose 03/13/2024 119 (H)     Sodium 03/13/2024 140     Potassium 03/13/2024 4.3     Chloride 03/13/2024 103     Bicarbonate 03/13/2024 29     Anion Gap 03/13/2024 12     Urea Nitrogen 03/13/2024 17     Creatinine 03/13/2024 0.63     eGFR 03/13/2024 >90     Calcium 03/13/2024 9.3       Assessment/Plan   Safety Assessment - no remote or recent SI, HI, or SA. Protective Factors - connected family , no trauma history, limited substance use history . Risk factors - cancer diagnosis and treatment superimposed upon chronic illnesses. Relative Risk is low.      The patient reports that her mood and anxiety are controlled and at baseline but she experiences significant fatigue and lack of motivation. Hydroxyzine cause too much sleepiness and will discontinue. Will start Bupropion for mood activation and follow up in 1 month to monitor symptoms.   Diagnoses and all orders for this visit:  OSITO (generalized anxiety disorder)   Adjustment Disorder with Depression     CONTINUE  Sertraline 100 mg - take 2 tablets  by mouth daily for anxiety and depression   CONTINUE   Gabapentin 300 mg - take 1 tablet, 3 times daily for anxiety   CONTINUE  Lorazepam 0.5 mg - take 1 tablet, every 6 hours as needed for anxiety  DISCONTINUE Hydroxyzine 25 mg PO - take 1 tablet TID PRN for anxiety     START Wellbutrin 75 mg PO every day in the am for depression     Follow up  in 4 weeks  2/2/2025 at 3 pm       Chart Review - 2 minutes   Assessment with patient contact - 25 minutes   Charting 5 minutes     Total time 32 minutes

## 2025-01-06 DIAGNOSIS — G89.3 CANCER RELATED PAIN: ICD-10-CM

## 2025-01-06 DIAGNOSIS — C34.90 NON-SMALL CELL LUNG CANCER, UNSPECIFIED LATERALITY (MULTI): ICD-10-CM

## 2025-01-06 DIAGNOSIS — R11.2 NAUSEA AND VOMITING, UNSPECIFIED VOMITING TYPE: ICD-10-CM

## 2025-01-06 RX ORDER — ONDANSETRON 4 MG/1
TABLET, ORALLY DISINTEGRATING ORAL
Qty: 90 TABLET | Refills: 0 | Status: SHIPPED | OUTPATIENT
Start: 2025-01-06 | End: 2026-01-06

## 2025-01-08 ENCOUNTER — TELEPHONE (OUTPATIENT)
Dept: HEMATOLOGY/ONCOLOGY | Facility: CLINIC | Age: 70
End: 2025-01-08
Payer: MEDICARE

## 2025-01-08 NOTE — TELEPHONE ENCOUNTER
Patient needing scheduled for next treatment; asked  to get her scheduled for 1.31 or 2.3 and call Opal to schedule.

## 2025-01-14 ENCOUNTER — TELEPHONE (OUTPATIENT)
Dept: HEMATOLOGY/ONCOLOGY | Facility: CLINIC | Age: 70
End: 2025-01-14
Payer: MEDICARE

## 2025-01-14 DIAGNOSIS — C34.90 NON-SMALL CELL LUNG CANCER, UNSPECIFIED LATERALITY (MULTI): ICD-10-CM

## 2025-01-14 DIAGNOSIS — G89.3 CANCER RELATED PAIN: ICD-10-CM

## 2025-01-14 NOTE — TELEPHONE ENCOUNTER
Daughter called for patient's Criders to be filled to her [facility]. Message given to Dr. Burnham.

## 2025-01-15 RX ORDER — HYDROCODONE BITARTRATE AND ACETAMINOPHEN 10; 325 MG/1; MG/1
1 TABLET ORAL EVERY 6 HOURS PRN
Qty: 60 TABLET | Refills: 0 | Status: SHIPPED | OUTPATIENT
Start: 2025-01-15

## 2025-01-16 DIAGNOSIS — I73.9 CLAUDICATION (CMS-HCC): ICD-10-CM

## 2025-01-16 DIAGNOSIS — I73.89 OTHER SPECIFIED PERIPHERAL VASCULAR DISEASES: ICD-10-CM

## 2025-01-17 ENCOUNTER — PATIENT OUTREACH (OUTPATIENT)
Dept: PRIMARY CARE | Facility: CLINIC | Age: 70
End: 2025-01-17

## 2025-01-17 ENCOUNTER — HOSPITAL ENCOUNTER (OUTPATIENT)
Dept: VASCULAR MEDICINE | Facility: CLINIC | Age: 70
Discharge: HOME | End: 2025-01-17
Payer: MEDICARE

## 2025-01-17 ENCOUNTER — TELEMEDICINE (OUTPATIENT)
Dept: CARDIOLOGY | Facility: CLINIC | Age: 70
End: 2025-01-17
Payer: MEDICARE

## 2025-01-17 DIAGNOSIS — I73.9 PERIPHERAL VASCULAR DISEASE (CMS-HCC): Primary | ICD-10-CM

## 2025-01-17 DIAGNOSIS — I73.89 OTHER SPECIFIED PERIPHERAL VASCULAR DISEASES: ICD-10-CM

## 2025-01-17 DIAGNOSIS — I73.9 CLAUDICATION (CMS-HCC): ICD-10-CM

## 2025-01-17 PROBLEM — Z91.148 NONCOMPLIANCE WITH MEDICATION REGIMEN: Status: RESOLVED | Noted: 2023-09-02 | Resolved: 2025-01-17

## 2025-01-17 PROCEDURE — 99214 OFFICE O/P EST MOD 30 MIN: CPT | Performed by: INTERNAL MEDICINE

## 2025-01-17 PROCEDURE — 93922 UPR/L XTREMITY ART 2 LEVELS: CPT | Performed by: INTERNAL MEDICINE

## 2025-01-17 PROCEDURE — 93922 UPR/L XTREMITY ART 2 LEVELS: CPT

## 2025-01-17 NOTE — PROGRESS NOTES
PCP: Francheska Rogel DO  Podiatrist: Amy/GOLD Burk   Radha Toussaint is a 69 y.o. female who is here for follow up of  PAD with recurrent closure .     Recall pt reoccluded LLE after intervention 6/25/24. Redo performed 6/26/24. Did not perform procedure for fem-fem clot noted on CTA (VS deferred at [facility]    At last visit LLE wound was healed but she had drop foot from delayed ALI presentation. However, she continues to smoke. In fact, when we started the virtual appt, she was actively smoking on camera. LLE with recurrent occlusion sxs.    Review of Systems:  Otherwise, limited cardiovascular review of systems is negative.    Past Medical History:  She has a past medical history of Aneurysm of carotid artery ([facility]), DVT (deep venous thrombosis) ([facility]), History of tubal ligation, Old myocardial infarction, Other specified disorders of kidney and ureter, Personal history of other diseases of the circulatory system, Personal history of other diseases of the circulatory system, Personal history of other diseases of the respiratory system, Personal history of urinary calculi, and Right upper quadrant pain (09/25/2020).    Surgical History:   She has a past surgical history that includes Other surgical history (09/30/2020); Other surgical history (09/30/2020); CT angio abdomen pelvis w and or wo IV IV contrast (11/21/2019); CT angio head w and wo IV contrast (5/22/2020); CT angio aorta and bilateral iliofemoral runoff including without contrast if performed (8/9/2022); Invasive Vascular Procedure (N/A, 1/23/2024); Invasive Vascular Procedure (N/A, 1/23/2024); Invasive Vascular Procedure (N/A, 6/25/2024); Invasive Vascular Procedure (Left, 6/25/2024); Invasive Vascular Procedure (N/A, 6/25/2024); Invasive Vascular Procedure (N/A, 6/25/2024); Invasive Vascular Procedure (Left, 6/25/2024); Invasive Vascular Procedure (N/A, 6/26/2024); and Invasive Vascular Procedure (N/A, 6/26/2024).    Family History:    Family History   Problem Relation Name Age of Onset    Aneurysm Mother      Hypertension Mother      Heart attack Father       Family History   Problem Relation Name Age of Onset    Aneurysm Mother      Hypertension Mother      Heart attack Father         Social History:   Tobacco Use: High Risk (1/5/2025)    Patient History     Smoking Tobacco Use: Every Day     Smokeless Tobacco Use: Never     Passive Exposure: Past       Outpatient Medications:    Current Outpatient Medications:     acetaminophen (Tylenol) 325 mg tablet, Take 3 tablets (975 mg) by mouth every 6 hours if needed for mild pain (1 - 3) or moderate pain (4 - 6)., Disp: , Rfl:     apixaban (Eliquis) 5 mg tablet, Take 1 tablet (5 mg) by mouth 2 times a day., Disp: 180 tablet, Rfl: 3    benzonatate (Tessalon) 200 mg capsule, Take 1 capsule (200 mg) by mouth 3 times a day as needed for cough. Do not crush or chew., Disp: 42 capsule, Rfl: 0    budesonide (Pulmicort) 0.25 mg/2 mL nebulizer solution, Take 2 mL (0.25 mg) by nebulization 2 times a day. Rinse mouth with water after use to reduce aftertaste and incidence of candidiasis. Do not swallow., Disp: 2 mL, Rfl: 2    buPROPion (Wellbutrin) 75 mg tablet, Take 1 tablet (75 mg) by mouth once daily., Disp: 30 tablet, Rfl: 1    clopidogrel (Plavix) 75 mg tablet, Take 1 tablet (75 mg) by mouth once daily in the morning., Disp: 90 tablet, Rfl: 3    dexAMETHasone (Decadron) 4 mg tablet, Take 0.5 tablets (2 mg) by mouth once daily., Disp: 30 tablet, Rfl: 1    fluticasone-umeclidin-vilanter (Trelegy Ellipta) 200-62.5-25 mcg blister with device, Inhale 1 puff once daily., Disp: 60 each, Rfl: 3    gabapentin (Neurontin) 300 mg capsule, Take 1 capsule (300 mg) by mouth 3 times a day., Disp: 270 capsule, Rfl: 3    HYDROcodone-acetaminophen (Norco)  mg tablet, Take 1 tablet by mouth every 6 hours if needed for severe pain (7 - 10)., Disp: 60 tablet, Rfl: 0    hydrOXYzine pamoate (VistariL) 25 mg capsule, Take 1  capsule (25 mg) by mouth 3 times a day as needed for itching., Disp: 270 capsule, Rfl: 3    LORazepam (Ativan) 0.5 mg tablet, Take 1 tablet (0.5 mg) by mouth every 6 hours if needed for anxiety., Disp: 120 tablet, Rfl: 3    lubiprostone (Amitiza) 24 mcg capsule, Take 1 capsule (24 mcg) by mouth 2 times daily (morning and late afternoon). (Patient taking differently: Take 1 capsule (24 mcg) by mouth once daily at bedtime.), Disp: 60 capsule, Rfl: 0    montelukast (Singulair) 10 mg tablet, Take 1 tablet (10 mg) by mouth once daily at bedtime., Disp: 90 tablet, Rfl: 1    naloxone (Narcan) 4 mg/0.1 mL nasal spray, Administer 1 spray (4 mg) into affected nostril(s) if needed for opioid reversal. May repeat every 2-3 minutes if needed, alternating nostrils, until medical assistance becomes available., Disp: 2 each, Rfl: 0    OLANZapine (ZyPREXA) 10 mg tablet, Take 1 tablet (10 mg) by mouth once daily at bedtime., Disp: 90 tablet, Rfl: 1    ondansetron ODT (Zofran-ODT) 4 mg disintegrating tablet, DISSOLVE 1 TABLET IN MOUTH BY MOUTH THREE TIMES A DAY AS NEEDED NAUSEA, Disp: 90 tablet, Rfl: 0    prochlorperazine (Compazine) 5 mg tablet, Take 1 tablet (5 mg) by mouth every 6 hours if needed for nausea or vomiting., Disp: , Rfl:     rosuvastatin (Crestor) 40 mg tablet, Take 1 tablet (40 mg) by mouth once daily., Disp: 90 tablet, Rfl: 3    sertraline (Zoloft) 100 mg tablet, Take 1 tablet (100 mg) by mouth 2 times a day., Disp: 180 tablet, Rfl: 3     Allergies:  Methylprednisolone, Ace inhibitors, Prednisone, Betamethasone dipropionate, Demerol [meperidine], and Iodinated contrast media       Objective   Vital Signs:  There were no vitals taken for this visit.    Physical Exam:  General: no acute distress      Pertinent Recent Cardiovascular Studies (personally reviewed):  Vascular studies:  TRAVIS/TBI 1/17/2025: normal RLE TRAVIS, normal LLE TRAVIS, and diminished LLE TBI and/or PPG.    Laboratory values:  CMP:  Recent Labs      12/20/24  1029 12/06/24  0646 12/03/24  0657 12/02/24  0729 10/21/24  0953 09/09/24  1141 07/29/24  0903 07/08/24  0930 06/29/24  1041 06/28/24  0831 11/29/23  0418 11/28/23  0712 09/18/23  1802 09/06/23  1628 09/02/23  0814 09/01/23  0752 08/31/23  1234    138 137 140 136 135* 137 140 137 139   < > 137   < > 137 142 139 134*   K 3.9 4.8 4.2 3.8 4.6 4.4 4.6 4.2 4.2 4.3   < > 4.3   < > 3.8 3.8 4.6 4.8    105 105 101 105 102 103 104 100 102   < > 99   < > 99 105 106 99   CO2 26 27 22 34* 26 28 27 28 31 30   < > 28   < > 26 28 26 24   ANIONGAP 11 11 14 9* 10 9* 12 12 10 11   < > 14   < > 16 13 12 16   BUN 16 23 18 12 30* 24* 20 20 15 12   < > 12   < > 10 13 9 9   CREATININE 0.66 0.63 0.68 0.73 0.86 0.78 0.81 0.70 0.84 0.75   < > 0.67   < > 0.46* 0.55 0.67 0.65   EGFR >90 >90 >90 90 74 83 79 >90 76 87   < > >90   < >  --   --   --   --    MG  --   --   --  1.67  --   --   --   --  1.88 1.77  --  1.73  --  1.99 2.02 2.13 2.06    < > = values in this interval not displayed.     Recent Labs     12/20/24  1029 12/02/24  0729 10/21/24  0953 09/09/24  1141 07/29/24  0903 09/19/23  0756 09/18/23  1802 08/30/23  0557 08/29/23  1257 05/21/20  2150 11/21/19  1156   ALBUMIN 3.6 3.4 3.7 3.6 3.8   < > 2.8*   < > 3.0*   < > 4.0   ALKPHOS 46 50 45 48 53   < > 86   < > 97   < > 65   ALT 18 16 14 11 12   < > 21   < > 12   < > 15   AST 12 15 14 12 15   < > 15   < > 15   < > 16   BILITOT 0.4 0.4 0.3 0.3 0.4   < > 0.4   < > 0.6   < > 0.5   LIPASE  --   --   --   --   --   --  50  --  40  --  45    < > = values in this interval not displayed.     CBC:  Recent Labs     12/20/24  1029 12/06/24  0646 12/03/24  0657 12/02/24  0729 10/21/24  0953 09/09/24  1141 07/29/24  0903 07/08/24  0930   WBC 15.2* 15.7* 11.0 12.0* 18.4* 13.9* 12.2* 18.2*   HGB 10.3* 10.9* 10.9* 11.5* 13.6 14.3 14.1 11.8*   HCT 34.6* 36.6 38.9 38.9 44.1 45.0 43.7 37.5    242 198 213 234 202 224 380   MCV 85 86 89 87 87 89 91 92     COAG:   Recent Labs      12/02/24  0729 06/27/24  1647 06/27/24  1307 06/27/24  0427 06/26/24  1319 06/26/24  0601 06/13/24  0915 12/01/23  0535 11/30/23  0918 11/30/23  0546 11/28/23  1555 09/18/23  1824 08/30/23  1100 08/30/23  1054 08/11/23  0716 08/10/22  0303 08/09/22  1930 05/21/20  2150 11/21/19  1156   INR 1.6*  --   --   --   --   --  1.0  --   --   --   --  1.3* 1.5*  --  1.2*  --  1.1  --  1.2*   HAUF  --  0.2 0.3 <0.1 0.5 0.1  --  0.3 0.2 0.3   < >  --   --   --   --    < >  --   --   --    DDIMERVTE  --   --   --   --   --   --   --   --   --   --   --   --   --   --   --   --   --  2,110*  --    HAPTOGLOBIN  --   --   --   --   --   --   --   --   --   --   --   --   --  CANCELED  --   --   --   --   --    FIBRINOGEN  --   --   --   --   --   --   --   --   --   --   --   --   --  CANCELED  --   --   --   --   --     < > = values in this interval not displayed.     ABO:   Recent Labs     09/18/23  1802   ABO A     HEME/ENDO:  Recent Labs     12/20/24  1029 10/21/24  0953 09/09/24  1141 07/29/24  0903 07/08/24  0930 06/25/24  1544 03/25/24  0856 03/12/24  0645 11/01/23  0842 09/19/23  0756 08/30/23  0557 05/02/23  0907 09/29/20  0746   FERRITIN  --   --   --   --   --   --   --   --   --  712*  --   --   --    IRONSAT  --   --   --   --   --   --   --   --   --  11*  --   --  26   TSH 2.71 3.57 2.53 3.38   < >  --    < > 4.41*   < >  --    < > 1.75  --    HGBA1C  --   --   --   --   --  5.8*  --  5.9*  --   --   --  5.8*  --     < > = values in this interval not displayed.      CARDIAC:   Recent Labs     12/02/24  1458 12/02/24  0914 12/02/24  0729 03/11/24  1424 03/11/24  1239 11/28/23  0916 11/28/23  0712 10/23/23  0528 10/22/23  2338 10/22/23  1837 09/18/23  1802 08/30/23  1054 07/26/23  1928 11/28/21  1545   LDH  --   --   --   --   --   --   --   --   --   --   --  CANCELED  --   --    TROPHS 13 15* 17* 12 12 2,735* 2,902* 24*   < > 15*   < >  --    < >  --    BNP  --   --  110*  --   --   --  185*  --   --  82  --   --    --  65    < > = values in this interval not displayed.     Recent Labs     06/27/24  0427 05/02/23  0907 09/29/20  0746   CHOL 123 160 144   LDLF 69 107* 93   HDL 24.1 35.6* 28.0*   TRIG 150* 89 114     MICRO:   Recent Labs     01/05/24  1400   CRP 3.31*        I have personally reviewed most recent PCP, cardiology, vascular, and/or podiatry documentation.      Assessment/Plan   69 y.o. female with chronic limb threatening ischemia in the background of smoking/ tobacco exposure and known PAD s/p prior intervention with Dr. Lindsay (R-L fem-fem bypass) and Dr. Garcia (PTA/DCB LLE SFA/popliteal - rescue to PT), poorly differentiated carcinoma of bilateral adrenal glands (likely lung primary) on carboplatin/paclitaxel and pembro .    We performed intervention of the left popliteal artery to TPT/PT 1/24.  Unfortunately, patient presented with delayed acute presentation for recurrent left lower extremity ischemia.  She is status post rescue x 2 (6/25/24 and 6/26/24).    During her hospitalization, CTA showed new thrombus within the fem-fem bypass, likely accounting for recurrent closures of the left lower extremity.  Unfortunately, she was deemed to be nonsurgical candidate.    Anticoagulation was initiated with Eliquis.  She previously smoked 2 packs/day (self rolled), now down to about a pack a day.   also smokes.  Seems less motivated today for smoking cessation.  At our last visit together, she stated she will be cremated with a pack of cigarettes.    She continues to smoke - and on our visit initiation today, she was smoking a cigarette on camera. She has closed down the LLE again.    Plan:  No further endovascular options are available from my perspective.  I have explained to pt many times before re: hazards of smoking with threatened limbs.  She does not have interest in quitting.    I have alerted her podiatrist and VS. We will discuss on LSAC on Monday for consensus.       Willam Mike MD, FACC, [facility],  LENA  Co-Director, Vascular Center, and  Co-Director, Pulmonary Embolism Response Team,   [facility] [place]                                 of Medicine,                                                                 [employer]

## 2025-01-17 NOTE — LETTER
January 17, 2025     Francheska Rogel DO  [address] [facility] [address]    Patient: Radha Toussaint   YOB: 1955   Date of Visit: 1/17/2025       Dear Dr. Francheska Rogel DO:    Thank you for referring Radha Toussaint to me for evaluation. Below are my notes for this consultation.  If you have questions, please do not hesitate to call me. I look forward to following your patient along with you.       Sincerely,     Willam Mike MD      CC: Berny Burnham MD  ______________________________________________________________________________________    PCP: Francheska Rogel DO  Podiatrist: Amy/GOLD Burk  Radha Toussaint is a 69 y.o. female who is here for follow up of  PAD with recurrent closure .     Recall pt reoccluded LLE after intervention 6/25/24. Redo performed 6/26/24. Did not perform procedure for fem-fem clot noted on CTA (VS deferred at [facility]).    At last visit LLE wound was healed but she had drop foot from delayed ALI presentation. However, she continues to smoke. In fact, when we started the virtual appt, she was actively smoking on camera. LLE with recurrent occlusion sxs.    Review of Systems:  Otherwise, limited cardiovascular review of systems is negative.    Past Medical History:  She has a past medical history of Aneurysm of carotid artery (CMS-HCC), DVT (deep venous thrombosis) (Multi), History of tubal ligation, Old myocardial infarction, Other specified disorders of kidney and ureter, Personal history of other diseases of the circulatory system, Personal history of other diseases of the circulatory system, Personal history of other diseases of the respiratory system, Personal history of urinary calculi, and Right upper quadrant pain (09/25/2020).    Surgical History:   She has a past surgical history that includes Other surgical history (09/30/2020); Other surgical history (09/30/2020); CT angio abdomen pelvis w and or wo IV IV contrast (11/21/2019); CT angio head w and  wo IV contrast (5/22/2020); CT angio aorta and bilateral iliofemoral runoff including without contrast if performed (8/9/2022); Invasive Vascular Procedure (N/A, 1/23/2024); Invasive Vascular Procedure (N/A, 1/23/2024); Invasive Vascular Procedure (N/A, 6/25/2024); Invasive Vascular Procedure (Left, 6/25/2024); Invasive Vascular Procedure (N/A, 6/25/2024); Invasive Vascular Procedure (N/A, 6/25/2024); Invasive Vascular Procedure (Left, 6/25/2024); Invasive Vascular Procedure (N/A, 6/26/2024); and Invasive Vascular Procedure (N/A, 6/26/2024).    Family History:   Family History   Problem Relation Name Age of Onset   • Aneurysm Mother     • Hypertension Mother     • Heart attack Father       Family History   Problem Relation Name Age of Onset   • Aneurysm Mother     • Hypertension Mother     • Heart attack Father         Social History:   Tobacco Use: High Risk (1/5/2025)    Patient History    • Smoking Tobacco Use: Every Day    • Smokeless Tobacco Use: Never    • Passive Exposure: Past       Outpatient Medications:    Current Outpatient Medications:   •  acetaminophen (Tylenol) 325 mg tablet, Take 3 tablets (975 mg) by mouth every 6 hours if needed for mild pain (1 - 3) or moderate pain (4 - 6)., Disp: , Rfl:   •  apixaban (Eliquis) 5 mg tablet, Take 1 tablet (5 mg) by mouth 2 times a day., Disp: 180 tablet, Rfl: 3  •  benzonatate (Tessalon) 200 mg capsule, Take 1 capsule (200 mg) by mouth 3 times a day as needed for cough. Do not crush or chew., Disp: 42 capsule, Rfl: 0  •  budesonide (Pulmicort) 0.25 mg/2 mL nebulizer solution, Take 2 mL (0.25 mg) by nebulization 2 times a day. Rinse mouth with water after use to reduce aftertaste and incidence of candidiasis. Do not swallow., Disp: 2 mL, Rfl: 2  •  buPROPion (Wellbutrin) 75 mg tablet, Take 1 tablet (75 mg) by mouth once daily., Disp: 30 tablet, Rfl: 1  •  clopidogrel (Plavix) 75 mg tablet, Take 1 tablet (75 mg) by mouth once daily in the morning., Disp: 90 tablet,  Rfl: 3  •  dexAMETHasone (Decadron) 4 mg tablet, Take 0.5 tablets (2 mg) by mouth once daily., Disp: 30 tablet, Rfl: 1  •  fluticasone-umeclidin-vilanter (Trelegy Ellipta) 200-62.5-25 mcg blister with device, Inhale 1 puff once daily., Disp: 60 each, Rfl: 3  •  gabapentin (Neurontin) 300 mg capsule, Take 1 capsule (300 mg) by mouth 3 times a day., Disp: 270 capsule, Rfl: 3  •  HYDROcodone-acetaminophen (Norco)  mg tablet, Take 1 tablet by mouth every 6 hours if needed for severe pain (7 - 10)., Disp: 60 tablet, Rfl: 0  •  hydrOXYzine pamoate (VistariL) 25 mg capsule, Take 1 capsule (25 mg) by mouth 3 times a day as needed for itching., Disp: 270 capsule, Rfl: 3  •  LORazepam (Ativan) 0.5 mg tablet, Take 1 tablet (0.5 mg) by mouth every 6 hours if needed for anxiety., Disp: 120 tablet, Rfl: 3  •  lubiprostone (Amitiza) 24 mcg capsule, Take 1 capsule (24 mcg) by mouth 2 times daily (morning and late afternoon). (Patient taking differently: Take 1 capsule (24 mcg) by mouth once daily at bedtime.), Disp: 60 capsule, Rfl: 0  •  montelukast (Singulair) 10 mg tablet, Take 1 tablet (10 mg) by mouth once daily at bedtime., Disp: 90 tablet, Rfl: 1  •  naloxone (Narcan) 4 mg/0.1 mL nasal spray, Administer 1 spray (4 mg) into affected nostril(s) if needed for opioid reversal. May repeat every 2-3 minutes if needed, alternating nostrils, until medical assistance becomes available., Disp: 2 each, Rfl: 0  •  OLANZapine (ZyPREXA) 10 mg tablet, Take 1 tablet (10 mg) by mouth once daily at bedtime., Disp: 90 tablet, Rfl: 1  •  ondansetron ODT (Zofran-ODT) 4 mg disintegrating tablet, DISSOLVE 1 TABLET IN MOUTH BY MOUTH THREE TIMES A DAY AS NEEDED NAUSEA, Disp: 90 tablet, Rfl: 0  •  prochlorperazine (Compazine) 5 mg tablet, Take 1 tablet (5 mg) by mouth every 6 hours if needed for nausea or vomiting., Disp: , Rfl:   •  rosuvastatin (Crestor) 40 mg tablet, Take 1 tablet (40 mg) by mouth once daily., Disp: 90 tablet, Rfl: 3  •   sertraline (Zoloft) 100 mg tablet, Take 1 tablet (100 mg) by mouth 2 times a day., Disp: 180 tablet, Rfl: 3     Allergies:  Methylprednisolone, Ace inhibitors, Prednisone, Betamethasone dipropionate, Demerol [meperidine], and Iodinated contrast media       Objective  Vital Signs:  There were no vitals taken for this visit.    Physical Exam:  General: no acute distress      Pertinent Recent Cardiovascular Studies (personally reviewed):  Vascular studies:  TRAVIS/TBI 1/17/2025: normal RLE TRAVIS, normal LLE TRAVIS, and diminished LLE TBI and/or PPG.    Laboratory values:  CMP:  Recent Labs     12/20/24  1029 12/06/24  0646 12/03/24  0657 12/02/24  0729 10/21/24  0953 09/09/24  1141 07/29/24  0903 07/08/24  0930 06/29/24  1041 06/28/24  0831 11/29/23  0418 11/28/23  0712 09/18/23  1802 09/06/23  1628 09/02/23  0814 09/01/23  0752 08/31/23  1234    138 137 140 136 135* 137 140 137 139   < > 137   < > 137 142 139 134*   K 3.9 4.8 4.2 3.8 4.6 4.4 4.6 4.2 4.2 4.3   < > 4.3   < > 3.8 3.8 4.6 4.8    105 105 101 105 102 103 104 100 102   < > 99   < > 99 105 106 99   CO2 26 27 22 34* 26 28 27 28 31 30   < > 28   < > 26 28 26 24   ANIONGAP 11 11 14 9* 10 9* 12 12 10 11   < > 14   < > 16 13 12 16   BUN 16 23 18 12 30* 24* 20 20 15 12   < > 12   < > 10 13 9 9   CREATININE 0.66 0.63 0.68 0.73 0.86 0.78 0.81 0.70 0.84 0.75   < > 0.67   < > 0.46* 0.55 0.67 0.65   EGFR >90 >90 >90 90 74 83 79 >90 76 87   < > >90   < >  --   --   --   --    MG  --   --   --  1.67  --   --   --   --  1.88 1.77  --  1.73  --  1.99 2.02 2.13 2.06    < > = values in this interval not displayed.     Recent Labs     12/20/24  1029 12/02/24  0729 10/21/24  0953 09/09/24  1141 07/29/24  0903 09/19/23  0756 09/18/23  1802 08/30/23  0557 08/29/23  1257 05/21/20  2150 11/21/19  1156   ALBUMIN 3.6 3.4 3.7 3.6 3.8   < > 2.8*   < > 3.0*   < > 4.0   ALKPHOS 46 50 45 48 53   < > 86   < > 97   < > 65   ALT 18 16 14 11 12   < > 21   < > 12   < > 15   AST 12 15 14 12  15   < > 15   < > 15   < > 16   BILITOT 0.4 0.4 0.3 0.3 0.4   < > 0.4   < > 0.6   < > 0.5   LIPASE  --   --   --   --   --   --  50  --  40  --  45    < > = values in this interval not displayed.     CBC:  Recent Labs     12/20/24  1029 12/06/24  0646 12/03/24  0657 12/02/24  0729 10/21/24  0953 09/09/24  1141 07/29/24  0903 07/08/24  0930   WBC 15.2* 15.7* 11.0 12.0* 18.4* 13.9* 12.2* 18.2*   HGB 10.3* 10.9* 10.9* 11.5* 13.6 14.3 14.1 11.8*   HCT 34.6* 36.6 38.9 38.9 44.1 45.0 43.7 37.5    242 198 213 234 202 224 380   MCV 85 86 89 87 87 89 91 92     COAG:   Recent Labs     12/02/24  0729 06/27/24  1647 06/27/24  1307 06/27/24  0427 06/26/24  1319 06/26/24  0601 06/13/24  0915 12/01/23  0535 11/30/23  0918 11/30/23  0546 11/28/23  1555 09/18/23  1824 08/30/23  1100 08/30/23  1054 08/11/23  0716 08/10/22  0303 08/09/22  1930 05/21/20  2150 11/21/19  1156   INR 1.6*  --   --   --   --   --  1.0  --   --   --   --  1.3* 1.5*  --  1.2*  --  1.1  --  1.2*   HAUF  --  0.2 0.3 <0.1 0.5 0.1  --  0.3 0.2 0.3   < >  --   --   --   --    < >  --   --   --    DDIMERVTE  --   --   --   --   --   --   --   --   --   --   --   --   --   --   --   --   --  2,110*  --    HAPTOGLOBIN  --   --   --   --   --   --   --   --   --   --   --   --   --  CANCELED  --   --   --   --   --    FIBRINOGEN  --   --   --   --   --   --   --   --   --   --   --   --   --  CANCELED  --   --   --   --   --     < > = values in this interval not displayed.     ABO:   Recent Labs     09/18/23  1802   ABO A     HEME/ENDO:  Recent Labs     12/20/24  1029 10/21/24  0953 09/09/24  1141 07/29/24  0903 07/08/24  0930 06/25/24  1544 03/25/24  0856 03/12/24  0645 11/01/23  0842 09/19/23  0756 08/30/23  0557 05/02/23  0907 09/29/20  0746   FERRITIN  --   --   --   --   --   --   --   --   --  712*  --   --   --    IRONSAT  --   --   --   --   --   --   --   --   --  11*  --   --  26   TSH 2.71 3.57 2.53 3.38   < >  --    < > 4.41*   < >  --    < > 1.75   --    HGBA1C  --   --   --   --   --  5.8*  --  5.9*  --   --   --  5.8*  --     < > = values in this interval not displayed.      CARDIAC:   Recent Labs     12/02/24  1458 12/02/24  0914 12/02/24  0729 03/11/24  1424 03/11/24  1239 11/28/23  0916 11/28/23  0712 10/23/23  0528 10/22/23  2338 10/22/23  1837 09/18/23  1802 08/30/23  1054 07/26/23  1928 11/28/21  1545   LDH  --   --   --   --   --   --   --   --   --   --   --  CANCELED  --   --    TROPHS 13 15* 17* 12 12 2,735* 2,902* 24*   < > 15*   < >  --    < >  --    BNP  --   --  110*  --   --   --  185*  --   --  82  --   --   --  65    < > = values in this interval not displayed.     Recent Labs     06/27/24  0427 05/02/23  0907 09/29/20  0746   CHOL 123 160 144   LDLF 69 107* 93   HDL 24.1 35.6* 28.0*   TRIG 150* 89 114     MICRO:   Recent Labs     01/05/24  1400   CRP 3.31*        I have personally reviewed most recent PCP, cardiology, vascular, and/or podiatry documentation.      Assessment/Plan  69 y.o. female with chronic limb threatening ischemia in the background of smoking/ tobacco exposure and known PAD s/p prior intervention with Dr. Lindsay (R-L fem-fem bypass) and Dr. Garcia (PTA/DCB LLE SFA/popliteal - rescue to PT), poorly differentiated carcinoma of bilateral adrenal glands (likely lung primary) on carboplatin/paclitaxel and pembro .    We performed intervention of the left popliteal artery to TPT/PT 1/24.  Unfortunately, patient presented with delayed acute presentation for recurrent left lower extremity ischemia.  She is status post rescue x 2 (6/25/24 and 6/26/24).    During her hospitalization, CTA showed new thrombus within the fem-fem bypass, likely accounting for recurrent closures of the left lower extremity.  Unfortunately, she was deemed to be nonsurgical candidate.    Anticoagulation was initiated with Eliquis.  She previously smoked 2 packs/day (self rolled), now down to about a pack a day.   also smokes.  Seems less  motivated today for smoking cessation.  At our last visit together, she stated she will be cremated with a pack of cigarettes.    She continues to smoke - and on our visit initiation today, she was smoking a cigarette on camera. She has closed down the LLE again.    Plan:  No further endovascular options are available from my perspective.  I have explained to pt many times before re: hazards of smoking with threatened limbs.  She does not have interest in quitting.    I have alerted her podiatrist and VS. We will discuss on LSAC on Monday for consensus.       Willam Mike MD, FACC, FSCAI, RPVI  Co-Director, Vascular Center, and  Co-Director, Pulmonary Embolism Response Team,   [facility]                                 of Medicine,                                                                 [facility] [employer]

## 2025-01-20 DIAGNOSIS — Z01.818 ENCOUNTER FOR OTHER PREPROCEDURAL EXAMINATION: ICD-10-CM

## 2025-01-20 DIAGNOSIS — I73.9 PERIPHERAL VASCULAR DISEASE (CMS-HCC): ICD-10-CM

## 2025-01-20 DIAGNOSIS — F17.200 CURRENT SMOKER: ICD-10-CM

## 2025-01-20 DIAGNOSIS — I74.4: ICD-10-CM

## 2025-01-20 DIAGNOSIS — I73.9 PAD (PERIPHERAL ARTERY DISEASE) (CMS-HCC): Primary | ICD-10-CM

## 2025-01-20 NOTE — PROGRESS NOTES
LSAC Discussion    Case and angiogram/vascular studies reviewed with multidisciplinary team.   Will plan for CTA A/P with runoff to evaluate fem-fem (inflow).     If inflow ok, then medical/palliative care.    If wound emerges, and pt stops smoking, can consider bypass (PFA-PT).   Will also obtain venous duplex for mapping.       Willam Mike MD, FACC, FSCAI, RPVI  Co-Director, Vascular Center, and  Co-Director, Pulmonary Embolism Response Team,   [facility] [place]                                 of Medicine,                                                                 [employer] [facility] [employer]

## 2025-01-22 DIAGNOSIS — E27.40 ADRENAL INSUFFICIENCY (MULTI): ICD-10-CM

## 2025-01-22 RX ORDER — DEXAMETHASONE 4 MG/1
2 TABLET ORAL DAILY
Qty: 30 TABLET | Refills: 1 | Status: SHIPPED | OUTPATIENT
Start: 2025-01-22

## 2025-01-22 NOTE — PROGRESS NOTES
Subjective   Patient ID: Radha Toussaint is a 69 y.o. female who presents for No chief complaint on file..  HPI    Review of Systems    Objective   Physical Exam    Assessment/Plan            Francheska Rogel DO 01/22/25 1:32 PM

## 2025-01-23 ENCOUNTER — TELEPHONE (OUTPATIENT)
Dept: CARDIOLOGY | Facility: CLINIC | Age: 70
End: 2025-01-23
Payer: MEDICARE

## 2025-01-23 NOTE — TELEPHONE ENCOUNTER
I was simply advising that is what the [employer] [place] recommendation is after we met on Monday. Can cancel the testing.

## 2025-01-29 RX ORDER — PROCHLORPERAZINE MALEATE 10 MG
10 TABLET ORAL EVERY 6 HOURS PRN
Status: CANCELLED | OUTPATIENT
Start: 2025-01-31

## 2025-01-29 RX ORDER — PROCHLORPERAZINE EDISYLATE 5 MG/ML
10 INJECTION INTRAMUSCULAR; INTRAVENOUS EVERY 6 HOURS PRN
Status: CANCELLED | OUTPATIENT
Start: 2025-01-31

## 2025-01-31 ENCOUNTER — INFUSION (OUTPATIENT)
Dept: HEMATOLOGY/ONCOLOGY | Facility: CLINIC | Age: 70
End: 2025-01-31
Payer: MEDICARE

## 2025-01-31 ENCOUNTER — OFFICE VISIT (OUTPATIENT)
Dept: HEMATOLOGY/ONCOLOGY | Facility: CLINIC | Age: 70
End: 2025-01-31
Payer: MEDICARE

## 2025-01-31 ENCOUNTER — APPOINTMENT (OUTPATIENT)
Dept: WOUND CARE | Facility: CLINIC | Age: 70
End: 2025-01-31
Payer: MEDICARE

## 2025-01-31 ENCOUNTER — LAB (OUTPATIENT)
Dept: LAB | Facility: CLINIC | Age: 70
End: 2025-01-31
Payer: MEDICARE

## 2025-01-31 ENCOUNTER — SOCIAL WORK (OUTPATIENT)
Dept: HEMATOLOGY/ONCOLOGY | Facility: CLINIC | Age: 70
End: 2025-01-31

## 2025-01-31 VITALS
DIASTOLIC BLOOD PRESSURE: 76 MMHG | HEART RATE: 84 BPM | SYSTOLIC BLOOD PRESSURE: 116 MMHG | OXYGEN SATURATION: 95 % | TEMPERATURE: 98.2 F | RESPIRATION RATE: 18 BRPM

## 2025-01-31 VITALS
BODY MASS INDEX: 26.01 KG/M2 | SYSTOLIC BLOOD PRESSURE: 101 MMHG | WEIGHT: 141.31 LBS | OXYGEN SATURATION: 94 % | DIASTOLIC BLOOD PRESSURE: 56 MMHG | HEART RATE: 82 BPM | HEIGHT: 62 IN | RESPIRATION RATE: 18 BRPM | TEMPERATURE: 98.2 F

## 2025-01-31 DIAGNOSIS — C34.90 NON-SMALL CELL LUNG CANCER, UNSPECIFIED LATERALITY (MULTI): ICD-10-CM

## 2025-01-31 DIAGNOSIS — C34.90 NON-SMALL CELL LUNG CANCER, UNSPECIFIED LATERALITY (MULTI): Primary | ICD-10-CM

## 2025-01-31 DIAGNOSIS — I99.8 ACUTE LOWER LIMB ISCHEMIA: ICD-10-CM

## 2025-01-31 DIAGNOSIS — G89.3 CANCER RELATED PAIN: ICD-10-CM

## 2025-01-31 LAB
ALBUMIN SERPL BCP-MCNC: 3.6 G/DL (ref 3.4–5)
ALP SERPL-CCNC: 48 U/L (ref 33–136)
ALT SERPL W P-5'-P-CCNC: 10 U/L (ref 7–45)
ANION GAP SERPL CALC-SCNC: 13 MMOL/L (ref 10–20)
AST SERPL W P-5'-P-CCNC: 13 U/L (ref 9–39)
BASOPHILS # BLD AUTO: 0.06 X10*3/UL (ref 0–0.1)
BASOPHILS NFR BLD AUTO: 0.4 %
BILIRUB SERPL-MCNC: 0.3 MG/DL (ref 0–1.2)
BUN SERPL-MCNC: 18 MG/DL (ref 6–23)
CALCIUM SERPL-MCNC: 8.6 MG/DL (ref 8.6–10.3)
CHLORIDE SERPL-SCNC: 101 MMOL/L (ref 98–107)
CO2 SERPL-SCNC: 26 MMOL/L (ref 21–32)
CREAT SERPL-MCNC: 0.79 MG/DL (ref 0.5–1.05)
EGFRCR SERPLBLD CKD-EPI 2021: 82 ML/MIN/1.73M*2
EOSINOPHIL # BLD AUTO: 0.11 X10*3/UL (ref 0–0.7)
EOSINOPHIL NFR BLD AUTO: 0.7 %
ERYTHROCYTE [DISTWIDTH] IN BLOOD BY AUTOMATED COUNT: 17.7 % (ref 11.5–14.5)
GLUCOSE SERPL-MCNC: 172 MG/DL (ref 74–99)
HCT VFR BLD AUTO: 32.2 % (ref 36–46)
HGB BLD-MCNC: 9.2 G/DL (ref 12–16)
IMM GRANULOCYTES # BLD AUTO: 0.35 X10*3/UL (ref 0–0.7)
IMM GRANULOCYTES NFR BLD AUTO: 2.4 % (ref 0–0.9)
LYMPHOCYTES # BLD AUTO: 2.12 X10*3/UL (ref 1.2–4.8)
LYMPHOCYTES NFR BLD AUTO: 14.4 %
MCH RBC QN AUTO: 22.5 PG (ref 26–34)
MCHC RBC AUTO-ENTMCNC: 28.6 G/DL (ref 32–36)
MCV RBC AUTO: 79 FL (ref 80–100)
MONOCYTES # BLD AUTO: 0.78 X10*3/UL (ref 0.1–1)
MONOCYTES NFR BLD AUTO: 5.3 %
NEUTROPHILS # BLD AUTO: 11.27 X10*3/UL (ref 1.2–7.7)
NEUTROPHILS NFR BLD AUTO: 76.8 %
NRBC BLD-RTO: 0 /100 WBCS (ref 0–0)
PLATELET # BLD AUTO: 285 X10*3/UL (ref 150–450)
POTASSIUM SERPL-SCNC: 3.9 MMOL/L (ref 3.5–5.3)
PROT SERPL-MCNC: 6.5 G/DL (ref 6.4–8.2)
RBC # BLD AUTO: 4.08 X10*6/UL (ref 4–5.2)
SODIUM SERPL-SCNC: 136 MMOL/L (ref 136–145)
TSH SERPL-ACNC: 3.05 MIU/L (ref 0.44–3.98)
WBC # BLD AUTO: 14.7 X10*3/UL (ref 4.4–11.3)

## 2025-01-31 PROCEDURE — 80053 COMPREHEN METABOLIC PANEL: CPT

## 2025-01-31 PROCEDURE — 1159F MED LIST DOCD IN RCRD: CPT | Performed by: INTERNAL MEDICINE

## 2025-01-31 PROCEDURE — 3078F DIAST BP <80 MM HG: CPT | Performed by: INTERNAL MEDICINE

## 2025-01-31 PROCEDURE — 36415 COLL VENOUS BLD VENIPUNCTURE: CPT

## 2025-01-31 PROCEDURE — 3008F BODY MASS INDEX DOCD: CPT | Performed by: INTERNAL MEDICINE

## 2025-01-31 PROCEDURE — 96413 CHEMO IV INFUSION 1 HR: CPT

## 2025-01-31 PROCEDURE — 85025 COMPLETE CBC W/AUTO DIFF WBC: CPT

## 2025-01-31 PROCEDURE — 84443 ASSAY THYROID STIM HORMONE: CPT

## 2025-01-31 PROCEDURE — 3074F SYST BP LT 130 MM HG: CPT | Performed by: INTERNAL MEDICINE

## 2025-01-31 PROCEDURE — 2500000004 HC RX 250 GENERAL PHARMACY W/ HCPCS (ALT 636 FOR OP/ED): Performed by: INTERNAL MEDICINE

## 2025-01-31 PROCEDURE — 99215 OFFICE O/P EST HI 40 MIN: CPT | Performed by: INTERNAL MEDICINE

## 2025-01-31 PROCEDURE — 99215 OFFICE O/P EST HI 40 MIN: CPT | Mod: 25 | Performed by: INTERNAL MEDICINE

## 2025-01-31 PROCEDURE — 1125F AMNT PAIN NOTED PAIN PRSNT: CPT | Performed by: INTERNAL MEDICINE

## 2025-01-31 RX ORDER — EPINEPHRINE 0.3 MG/.3ML
0.3 INJECTION SUBCUTANEOUS EVERY 5 MIN PRN
Status: DISCONTINUED | OUTPATIENT
Start: 2025-01-31 | End: 2025-01-31 | Stop reason: HOSPADM

## 2025-01-31 RX ORDER — HEPARIN 100 UNIT/ML
500 SYRINGE INTRAVENOUS AS NEEDED
OUTPATIENT
Start: 2025-01-31

## 2025-01-31 RX ORDER — FAMOTIDINE 10 MG/ML
20 INJECTION INTRAVENOUS ONCE AS NEEDED
Status: DISCONTINUED | OUTPATIENT
Start: 2025-01-31 | End: 2025-01-31 | Stop reason: HOSPADM

## 2025-01-31 RX ORDER — DIPHENHYDRAMINE HYDROCHLORIDE 50 MG/ML
50 INJECTION INTRAMUSCULAR; INTRAVENOUS AS NEEDED
Status: DISCONTINUED | OUTPATIENT
Start: 2025-01-31 | End: 2025-01-31 | Stop reason: HOSPADM

## 2025-01-31 RX ORDER — ALBUTEROL SULFATE 0.83 MG/ML
3 SOLUTION RESPIRATORY (INHALATION) AS NEEDED
Status: DISCONTINUED | OUTPATIENT
Start: 2025-01-31 | End: 2025-01-31 | Stop reason: HOSPADM

## 2025-01-31 RX ORDER — HEPARIN 100 UNIT/ML
500 SYRINGE INTRAVENOUS AS NEEDED
Status: DISCONTINUED | OUTPATIENT
Start: 2025-01-31 | End: 2025-01-31 | Stop reason: HOSPADM

## 2025-01-31 RX ORDER — HEPARIN SODIUM,PORCINE/PF 10 UNIT/ML
50 SYRINGE (ML) INTRAVENOUS AS NEEDED
Status: DISCONTINUED | OUTPATIENT
Start: 2025-01-31 | End: 2025-01-31 | Stop reason: HOSPADM

## 2025-01-31 RX ORDER — HEPARIN SODIUM,PORCINE/PF 10 UNIT/ML
50 SYRINGE (ML) INTRAVENOUS AS NEEDED
OUTPATIENT
Start: 2025-01-31

## 2025-01-31 RX ORDER — HYDROCODONE BITARTRATE AND ACETAMINOPHEN 10; 325 MG/1; MG/1
1 TABLET ORAL EVERY 6 HOURS PRN
Qty: 60 TABLET | Refills: 0 | Status: SHIPPED | OUTPATIENT
Start: 2025-01-31

## 2025-01-31 RX ADMIN — SODIUM CHLORIDE 400 MG: 9 INJECTION, SOLUTION INTRAVENOUS at 15:03

## 2025-01-31 ASSESSMENT — COLUMBIA-SUICIDE SEVERITY RATING SCALE - C-SSRS
1. IN THE PAST MONTH, HAVE YOU WISHED YOU WERE DEAD OR WISHED YOU COULD GO TO SLEEP AND NOT WAKE UP?: NO
6. HAVE YOU EVER DONE ANYTHING, STARTED TO DO ANYTHING, OR PREPARED TO DO ANYTHING TO END YOUR LIFE?: NO
2. HAVE YOU ACTUALLY HAD ANY THOUGHTS OF KILLING YOURSELF?: NO

## 2025-01-31 ASSESSMENT — PAIN SCALES - GENERAL: PAINLEVEL_OUTOF10: 10-WORST PAIN EVER

## 2025-01-31 ASSESSMENT — PATIENT HEALTH QUESTIONNAIRE - PHQ9
1. LITTLE INTEREST OR PLEASURE IN DOING THINGS: SEVERAL DAYS
2. FEELING DOWN, DEPRESSED OR HOPELESS: SEVERAL DAYS
SUM OF ALL RESPONSES TO PHQ9 QUESTIONS 1 AND 2: 2
10. IF YOU CHECKED OFF ANY PROBLEMS, HOW DIFFICULT HAVE THESE PROBLEMS MADE IT FOR YOU TO DO YOUR WORK, TAKE CARE OF THINGS AT HOME, OR GET ALONG WITH OTHER PEOPLE: VERY DIFFICULT

## 2025-01-31 NOTE — PROGRESS NOTES
LOCATION:   [facility], at [facility].     HEMATOLOGY ONCOLOGY PROBLEMS:  1.  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         PD-L1 70%.  No actionable alterations.       a.  Ist -line therapy with carbo/Taxol/Keytruda x 3 cycles from Sep to Nov 2023.       b.  On maintenance Keytruda beginning Dec 2023.     CHIEF COMPLAINT:    The patient is in the clinic today for followup of poorly differentiated carcinoma and for continuation of treatment and management of therapy related effects.          HISTORY:  Ms. Toussaint is a 69-year-old female with poorly differentiated carcinoma of most probable primary bronchogenic origin.  She has multiple medical problems and was admitted at [facility] in Aug 2023 with complaint worsening shortness of breath, cough and weakness.  There was also history of progressive weight loss and recent falls.  Work-up revealed stable pulmonary nodules but there was concern regarding new bilateral adrenal metastatic lesions along with questionable liver lesions and right hilar lymphadenopathy. She is a lifelong heavy smoker. CT-guided biopsy of the adrenal lesion was suggestive of poorly differentiated carcinoma. 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  She was also evaluated by Dr. Francheska Parada at [facility] and case was reviewed by  pathology and as per tumor board evaluation, overall clinical and radiological finding were considered mainly of probable primary bronchogenic origin.  She was treated with carbo/Taxol/Keytruda combination for 3 cycles.  4th cycle was discontinued due to tolerability issues.  Follow-up CT scan showed interval decrease in bilateral adrenal lesions and she was transitioned to maintenance Keytruda beginning Dec 2023.    INTERVAL HISTORY:  Persistent issues with shortness of breath, weakness and fatigue.  She has also been noted to have left lower extremity vascular  blockade and is being followed in the vascular surgery clinic and there is a possibility that she may need some procedures.    PAST MEDICAL HISTORY:   1.  COPD on home O2.   2.  Coronary artery disease   3.  Hypertension   4.  Hyperlipidemia   5.  Multiple vascular aneurysms.  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  6.  Peripheral vascular disease.  S/p femoral-femoral bypass   7.  AAA graft repair procedure.  8.  History of tubal ligation, bladder lift, lithotripsy surgeries     SOCIAL HISTORY:    for 46 years and lives in [place].  1 and half to 2 pack a day for 47 years smoking history.  Nonalcoholic.  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  Born and raised  in [place]     FAMILY HISTORY:    Father  at age 52 from myocardial infarction.  Mother  at age 75 or from aortic aneurysm related complications.  1 sister  at age 68 from depression and related complications.  3 children and 5 grandkids.  Her son and daughter has history of Crohn's disease.  No other specific history of bleeding, clotting or malignant disorder in the family.     REVIEW OF SYSTEMS:  Pertinent finding as per the history above.  All other systems have been reviewed and generally negative and noncontributory.     PHYSICAL EXAMINATION:    Detailed physical examination not done     ALLERGY & MEDICATIONS:  Allergies and latest outpatient medications list were reviewed in the EMR.    LAB RESULTS:  CBC from today shows a white cell count of 14.7 with hemoglobin of 9.2 and platelets of 285.   Metabolic profile is unremarkable other than glucose of 172.  TSH is pending.  Last 3 sets of blood work were reviewed and the trend was noted.    RADIOLOGY RESULTS:  CT chest 10/09/2024  Impression:  Overall, there is minimally decreased tumor burden when compared to the most recent exam as evidenced by:   1. Stable to slightly decreased size of pulmonary nodules. No new or enlarging pulmonary nodules or  masses.  2. Interval slightly decreased size of bilateral adrenal metastases.   3. No new metastatic disease in the chest, abdomen or pelvis.   4. Redemonstration of the dilatation of the pancreatic duct most prominently in the pancreatic head. Findings are likely due to coarse calcification of the pancreatic head and uncinate process with  possible intraductal stones. Recommend attention on follow-up.  5. Left nephrolithiasis without evidence of hydronephrosis. There is a stable hyperdense lesion in the left midpole, which might represent a hemorrhagic or proteinaceous cysts. There are additional stable  hypodense lesions throughout bilateral kidneys.  6. Diverticulosis without evidence of acute diverticulitis.  7. Redemonstration of abdominal aortic aneurysm with stable postsurgical changes. Evaluation of the vasculature is limited in the absence of intravenous contrast.  8. Additional chronic and incidental findings as above.     PATHOLOGY RESULTS:  Surgical Pathology [Aug 23 2023 1:48PM] ([id number])  Specimens: RIGHT ADRENAL LESION CORE   Name AALIYAH SAWANT   Accession #: [id number]   FINAL DIAGNOSIS   A. RIGHT ADRENAL LESION CORE:   METASTATIC POORLY DIFFERENTIATED  CARCINOMA: SOFT TISSUE (IDENTIFIED AS RIGHT ADRENAL) COMMENT : The biopsyconsists entirely of a poorly differentiated malignancy in soft  tissue; no adrenal is seen. The tumor stains for keratins (CAM5.2 and  CK7 but not CK20) and is negative for TTF1 and Napsin as well as p40. Staining for SF1 is completely negative.   Electronically Signed Out By BOLIVAR BARRETO ASA, MD, PhD/SLA     ASSESSMENT AND PLAN:   1.  Stage IV metastatic poorly differentiated carcinoma of unknown primary origin.  Please refer the details of initial presentation and management as outlined above.  In summary, patient with history of lifelong heavy smoking.  She was admitted at [facility]  with complaint of worsening shortness of breath in Aug 2023.  Work-up revealed  stable pulmonary nodules but there was concern regarding new bilateral adrenal metastatic lesions along with questionable liver lesions and right hilar lymphadenopathy.  CT-guided biopsy of the adrenal lesion was suggestive of poorly differentiated carcinoma.  Cancer type ID molecular test results were suggestive of questionable sarcoma.  She was also evaluated by Dr. Francheska Parada at [facility] and her case was reviewed by  pathology and as per tumor board discussions, overall clinical and radiological finding were considered mainly of probable primary bronchogenic origin.  She was treated with carbo/Taxol/Keytruda combination for 3 cycles.  4th cycle was discontinued due to tolerability issues.  Follow-up CT scan showed interval decrease in bilateral adrenal lesions and she was transitioned to maintenance Keytruda beginning Dec 2023.      For now we will continue with  Keytruda at the current dose and schedule.  Latest follow-up CT scan from 10/9/2024 showed stable to improved findings.  As stated above there are issues with frequent COPD exacerbation and lower extremity vascular issues leading to treatment breaks and hospitalizations.  Probable side effects of Keytruda mainly weakness, fatigue, pneumonitis, colitis, endocrinopathies, pleuritis, skin rash etc. were explained to them in detail.  Her overall long-term prognosis remains guarded.    2.  COPD exacerbation/anxiety.  I strongly advised her to continue to follow-up with pulmonary clinic closely.  There is also a significant component of anxiety and panic attacks and she should continue to follow-up with psychiatry clinic.     3.  Left lower leg wound/vascular issues.  Really appreciate wound and vascular surgery teams from help.  She will continue to follow-up with them.    4.  Follow-up.  She will return to the clinic in 6 weeks.  She will be scheduled for follow-up PET scan in the next few weeks.    This note has been transcribed using Dragon voice  recognition system and there is a possibility of unintentional typing misprints.

## 2025-01-31 NOTE — PROGRESS NOTES
Followed up with pt and her . Pt for the most part has been going about her normal routine. Pt having difficulty paying the $400+ for her eliquis now. Reviewed with pt her household income which is over 425% above federal poverty level. This makes her ineligible for [facility] alphonse as well. Discussed Medicare payment plan and if they enrolled in it. Neither seemed to know what that was. Provided information and direction on how to enroll if this may work better for only having to pay $166.67 a month for 12 months. Pt's eliquis still sent to [facility] in an effort to see if they have any other ideas. Pt and  will call Onc LISW-S if they are unable to enroll in program.

## 2025-02-02 ENCOUNTER — APPOINTMENT (OUTPATIENT)
Dept: BEHAVIORAL HEALTH | Facility: CLINIC | Age: 70
End: 2025-02-02
Payer: MEDICARE

## 2025-02-03 ENCOUNTER — OFFICE VISIT (OUTPATIENT)
Dept: WOUND CARE | Facility: CLINIC | Age: 70
End: 2025-02-03
Payer: MEDICARE

## 2025-02-03 ENCOUNTER — SPECIALTY PHARMACY (OUTPATIENT)
Dept: PHARMACY | Facility: CLINIC | Age: 70
End: 2025-02-03

## 2025-02-03 PROCEDURE — 97602 WOUND(S) CARE NON-SELECTIVE: CPT

## 2025-02-03 PROCEDURE — 99212 OFFICE O/P EST SF 10 MIN: CPT

## 2025-02-04 ENCOUNTER — APPOINTMENT (OUTPATIENT)
Dept: PRIMARY CARE | Facility: CLINIC | Age: 70
End: 2025-02-04
Payer: MEDICARE

## 2025-02-04 VITALS
SYSTOLIC BLOOD PRESSURE: 100 MMHG | BODY MASS INDEX: 26.5 KG/M2 | HEIGHT: 62 IN | WEIGHT: 144 LBS | DIASTOLIC BLOOD PRESSURE: 64 MMHG

## 2025-02-04 DIAGNOSIS — L97.529 NON-PRESSURE CHRONIC ULCER OF OTHER PART OF LEFT FOOT WITH UNSPECIFIED SEVERITY (MULTI): ICD-10-CM

## 2025-02-04 DIAGNOSIS — D56.1 BETA THALASSEMIA (MULTI): ICD-10-CM

## 2025-02-04 DIAGNOSIS — F51.01 PRIMARY INSOMNIA: Chronic | ICD-10-CM

## 2025-02-04 DIAGNOSIS — G89.3 CANCER RELATED PAIN: ICD-10-CM

## 2025-02-04 DIAGNOSIS — I74.4 PERIPHERAL ARTERY EMBOLISM OR THROMBOSIS (MULTI): Chronic | ICD-10-CM

## 2025-02-04 DIAGNOSIS — C79.72 SECONDARY MALIGNANT NEOPLASM OF LEFT ADRENAL GLAND (MULTI): ICD-10-CM

## 2025-02-04 DIAGNOSIS — C34.90 NON-SMALL CELL LUNG CANCER, UNSPECIFIED LATERALITY (MULTI): Primary | ICD-10-CM

## 2025-02-04 DIAGNOSIS — R11.2 NAUSEA AND VOMITING, UNSPECIFIED VOMITING TYPE: ICD-10-CM

## 2025-02-04 PROBLEM — J96.11 CHRONIC HYPOXEMIC RESPIRATORY FAILURE (MULTI): Status: RESOLVED | Noted: 2024-06-29 | Resolved: 2025-02-04

## 2025-02-04 PROBLEM — E11.51 TYPE 2 DIABETES MELLITUS WITH DIABETIC PERIPHERAL ANGIOPATHY WITHOUT GANGRENE, WITHOUT LONG-TERM CURRENT USE OF INSULIN (MULTI): Status: RESOLVED | Noted: 2025-02-04 | Resolved: 2025-02-04

## 2025-02-04 PROBLEM — J44.1 COPD EXACERBATION (MULTI): Status: RESOLVED | Noted: 2024-03-11 | Resolved: 2025-02-04

## 2025-02-04 PROBLEM — J96.21 ACUTE ON CHRONIC HYPOXIC RESPIRATORY FAILURE (MULTI): Status: RESOLVED | Noted: 2024-04-25 | Resolved: 2025-02-04

## 2025-02-04 PROBLEM — E11.51 TYPE 2 DIABETES MELLITUS WITH DIABETIC PERIPHERAL ANGIOPATHY WITHOUT GANGRENE, WITHOUT LONG-TERM CURRENT USE OF INSULIN (MULTI): Status: ACTIVE | Noted: 2025-02-04

## 2025-02-04 PROBLEM — J44.9 COPD (CHRONIC OBSTRUCTIVE PULMONARY DISEASE) (MULTI): Status: RESOLVED | Noted: 2023-03-24 | Resolved: 2025-02-04

## 2025-02-04 PROCEDURE — 1160F RVW MEDS BY RX/DR IN RCRD: CPT | Performed by: STUDENT IN AN ORGANIZED HEALTH CARE EDUCATION/TRAINING PROGRAM

## 2025-02-04 PROCEDURE — 3078F DIAST BP <80 MM HG: CPT | Performed by: STUDENT IN AN ORGANIZED HEALTH CARE EDUCATION/TRAINING PROGRAM

## 2025-02-04 PROCEDURE — 1159F MED LIST DOCD IN RCRD: CPT | Performed by: STUDENT IN AN ORGANIZED HEALTH CARE EDUCATION/TRAINING PROGRAM

## 2025-02-04 PROCEDURE — 99214 OFFICE O/P EST MOD 30 MIN: CPT | Performed by: STUDENT IN AN ORGANIZED HEALTH CARE EDUCATION/TRAINING PROGRAM

## 2025-02-04 PROCEDURE — 3008F BODY MASS INDEX DOCD: CPT | Performed by: STUDENT IN AN ORGANIZED HEALTH CARE EDUCATION/TRAINING PROGRAM

## 2025-02-04 PROCEDURE — 3074F SYST BP LT 130 MM HG: CPT | Performed by: STUDENT IN AN ORGANIZED HEALTH CARE EDUCATION/TRAINING PROGRAM

## 2025-02-04 RX ORDER — OLANZAPINE 15 MG/1
15 TABLET ORAL NIGHTLY
Qty: 90 TABLET | Refills: 1 | Status: SHIPPED | OUTPATIENT
Start: 2025-02-04 | End: 2025-08-03

## 2025-02-04 RX ORDER — PROCHLORPERAZINE MALEATE 5 MG
5 TABLET ORAL EVERY 6 HOURS PRN
Qty: 30 TABLET | Refills: 3 | Status: SHIPPED | OUTPATIENT
Start: 2025-02-04

## 2025-02-04 RX ORDER — ONDANSETRON 8 MG/1
8 TABLET, ORALLY DISINTEGRATING ORAL EVERY 8 HOURS PRN
Qty: 90 TABLET | Refills: 0 | Status: SHIPPED | OUTPATIENT
Start: 2025-02-04 | End: 2026-02-04

## 2025-02-04 ASSESSMENT — ENCOUNTER SYMPTOMS
SLEEP DISTURBANCE: 1
CARDIOVASCULAR NEGATIVE: 1
NEUROLOGICAL NEGATIVE: 1
SHORTNESS OF BREATH: 1
ARTHRALGIAS: 1
VOMITING: 0
FATIGUE: 1
NAUSEA: 1

## 2025-02-04 NOTE — PROGRESS NOTES
Subjective   Patient ID: Radha Toussaint is a 69 y.o. female who presents for Follow-up (Follow up visit /).  Left foot pain has been worse lately. Planned for vascular surgery next week with osorio. Chronic PAD with multiple prior procedures and revascularization.     Seeing wound center.     Nausea has been more bothersome lately. Has been taking compazine and zofran. Asks about increasing dose.      Following with psychiatrist.     Sleep is better with olanzapine but says could be improved. Interested in increasing dose.     Generally having daily bowel movements.     Breathing stable. Still smoking. No interest in quitting.     No other concerns today.         Review of Systems   Constitutional:  Positive for fatigue.   HENT: Negative.     Respiratory:  Positive for shortness of breath.    Cardiovascular: Negative.    Gastrointestinal:  Positive for nausea. Negative for vomiting.   Musculoskeletal:  Positive for arthralgias.   Neurological: Negative.    Psychiatric/Behavioral:  Positive for sleep disturbance.    All other systems reviewed and are negative.      Objective   Physical Exam  Vitals reviewed.   Constitutional:       General: She is not in acute distress.     Appearance: She is ill-appearing. She is not toxic-appearing.   Cardiovascular:      Rate and Rhythm: Normal rate and regular rhythm.   Musculoskeletal:         General: Tenderness present.   Skin:     Comments: Right foot with gauze wrapping   Neurological:      Mental Status: She is alert.         Body mass index is 26.16 kg/m².      Current Outpatient Medications:     acetaminophen (Tylenol) 325 mg tablet, Take 3 tablets (975 mg) by mouth every 6 hours if needed for mild pain (1 - 3) or moderate pain (4 - 6)., Disp: , Rfl:     apixaban (Eliquis) 5 mg tablet, Take 1 tablet (5 mg) by mouth 2 times a day., Disp: 180 tablet, Rfl: 3    benzonatate (Tessalon) 200 mg capsule, Take 1 capsule (200 mg) by mouth 3 times a day as needed for cough. Do  not crush or chew., Disp: 42 capsule, Rfl: 0    budesonide (Pulmicort) 0.25 mg/2 mL nebulizer solution, Take 2 mL (0.25 mg) by nebulization 2 times a day. Rinse mouth with water after use to reduce aftertaste and incidence of candidiasis. Do not swallow., Disp: 2 mL, Rfl: 2    buPROPion (Wellbutrin) 75 mg tablet, Take 1 tablet (75 mg) by mouth once daily., Disp: 30 tablet, Rfl: 1    clopidogrel (Plavix) 75 mg tablet, Take 1 tablet (75 mg) by mouth once daily in the morning., Disp: 90 tablet, Rfl: 3    dexAMETHasone (Decadron) 4 mg tablet, Take 0.5 tablets (2 mg) by mouth once daily., Disp: 30 tablet, Rfl: 1    fluticasone-umeclidin-vilanter (Trelegy Ellipta) 200-62.5-25 mcg blister with device, Inhale 1 puff once daily., Disp: 60 each, Rfl: 3    gabapentin (Neurontin) 300 mg capsule, Take 1 capsule (300 mg) by mouth 3 times a day., Disp: 270 capsule, Rfl: 3    HYDROcodone-acetaminophen (Norco)  mg tablet, Take 1 tablet by mouth every 6 hours if needed for severe pain (7 - 10)., Disp: 60 tablet, Rfl: 0    hydrOXYzine pamoate (VistariL) 25 mg capsule, Take 1 capsule (25 mg) by mouth 3 times a day as needed for itching., Disp: 270 capsule, Rfl: 3    LORazepam (Ativan) 0.5 mg tablet, Take 1 tablet (0.5 mg) by mouth every 6 hours if needed for anxiety., Disp: 120 tablet, Rfl: 3    lubiprostone (Amitiza) 24 mcg capsule, Take 1 capsule (24 mcg) by mouth 2 times daily (morning and late afternoon)., Disp: 60 capsule, Rfl: 0    montelukast (Singulair) 10 mg tablet, Take 1 tablet (10 mg) by mouth once daily at bedtime., Disp: 90 tablet, Rfl: 1    naloxone (Narcan) 4 mg/0.1 mL nasal spray, Administer 1 spray (4 mg) into affected nostril(s) if needed for opioid reversal. May repeat every 2-3 minutes if needed, alternating nostrils, until medical assistance becomes available., Disp: 2 each, Rfl: 0    OLANZapine (ZyPREXA) 15 mg tablet, Take 1 tablet (15 mg) by mouth once daily at bedtime., Disp: 90 tablet, Rfl: 1     ondansetron ODT (Zofran-ODT) 8 mg disintegrating tablet, Dissolve 1 tablet (8 mg) in the mouth every 8 hours if needed for nausea or vomiting., Disp: 90 tablet, Rfl: 0    prochlorperazine (Compazine) 5 mg tablet, Take 1 tablet (5 mg) by mouth every 6 hours if needed for nausea or vomiting., Disp: 30 tablet, Rfl: 3    rosuvastatin (Crestor) 40 mg tablet, Take 1 tablet (40 mg) by mouth once daily., Disp: 90 tablet, Rfl: 3    sertraline (Zoloft) 100 mg tablet, Take 1 tablet (100 mg) by mouth 2 times a day., Disp: 180 tablet, Rfl: 3      Assessment/Plan   Diagnoses and all orders for this visit:  Non-small cell lung cancer, unspecified laterality (Multi)  -     ondansetron ODT (Zofran-ODT) 8 mg disintegrating tablet; Dissolve 1 tablet (8 mg) in the mouth every 8 hours if needed for nausea or vomiting.  Primary insomnia  Comments:  increase olanzapine to 15mg to aid with sleep  Orders:  -     OLANZapine (ZyPREXA) 15 mg tablet; Take 1 tablet (15 mg) by mouth once daily at bedtime.  Cancer related pain  Comments:  following with oncology  Orders:  -     ondansetron ODT (Zofran-ODT) 8 mg disintegrating tablet; Dissolve 1 tablet (8 mg) in the mouth every 8 hours if needed for nausea or vomiting.  Nausea and vomiting, unspecified vomiting type  Comments:  increase zofran to 8mg as needed  Orders:  -     prochlorperazine (Compazine) 5 mg tablet; Take 1 tablet (5 mg) by mouth every 6 hours if needed for nausea or vomiting.  -     ondansetron ODT (Zofran-ODT) 8 mg disintegrating tablet; Dissolve 1 tablet (8 mg) in the mouth every 8 hours if needed for nausea or vomiting.  Non-pressure chronic ulcer of other part of left foot with unspecified severity (Multi)  Beta thalassemia (Multi)  Secondary malignant neoplasm of left adrenal gland (Multi)  Comments:  following with oncology  Peripheral artery embolism or thrombosis (Multi)  Comments:  will be having procedure next week for left foot    Follow up in 6 months or sooner as  needed       Francheska Rogel,  02/04/25 2:27 PM

## 2025-02-11 LAB
NON-UH HIE BUN/CREAT RATIO: 27.5
NON-UH HIE BUN: 22 MG/DL (ref 9–23)
NON-UH HIE CALCIUM: 9.3 MG/DL (ref 8.7–10.4)
NON-UH HIE CALCULATED OSMOLALITY: 288 MOSM/KG (ref 275–295)
NON-UH HIE CHLORIDE: 108 MMOL/L (ref 98–107)
NON-UH HIE CO2, VENOUS: 28 MMOL/L (ref 20–31)
NON-UH HIE CREATININE: 0.8 MG/DL (ref 0.5–0.8)
NON-UH HIE DXH ACTIONS: ABNORMAL
NON-UH HIE GFR AA: >60
NON-UH HIE GLOMERULAR FILTRATION RATE: >60 ML/MIN/1.73M?
NON-UH HIE GLUCOSE: 83 MG/DL (ref 74–106)
NON-UH HIE HCT: 29.2 % (ref 36–46)
NON-UH HIE HGB: 8.8 G/DL (ref 12–16)
NON-UH HIE INSTR WBC ND: 15.6
NON-UH HIE K: 4.4 MMOL/L (ref 3.5–5.1)
NON-UH HIE MCH: 21.5 PG (ref 27–34)
NON-UH HIE MCHC: 30.3 G/DL (ref 32–37)
NON-UH HIE MCV: 70.7 FL (ref 80–100)
NON-UH HIE MPV: 8.5 FL (ref 7.4–10.4)
NON-UH HIE NA: 143 MMOL/L (ref 135–145)
NON-UH HIE PLATELET: 284 X10 (ref 150–450)
NON-UH HIE RBC: 4.12 X10 (ref 4.2–5.4)
NON-UH HIE RDW: 19.6 % (ref 11.5–14.5)
NON-UH HIE WBC: 15.6 X10 (ref 4.5–11)

## 2025-02-13 ENCOUNTER — OFFICE VISIT (OUTPATIENT)
Dept: WOUND CARE | Facility: CLINIC | Age: 70
End: 2025-02-13
Payer: MEDICARE

## 2025-02-13 PROCEDURE — 99214 OFFICE O/P EST MOD 30 MIN: CPT

## 2025-02-14 ENCOUNTER — APPOINTMENT (OUTPATIENT)
Dept: RADIOLOGY | Facility: CLINIC | Age: 70
End: 2025-02-14
Payer: MEDICARE

## 2025-02-17 ENCOUNTER — DOCUMENTATION (OUTPATIENT)
Dept: PRIMARY CARE | Facility: CLINIC | Age: 70
End: 2025-02-17
Payer: MEDICARE

## 2025-02-18 ENCOUNTER — TELEPHONE (OUTPATIENT)
Dept: HEMATOLOGY/ONCOLOGY | Facility: CLINIC | Age: 70
End: 2025-02-18
Payer: MEDICARE

## 2025-02-18 DIAGNOSIS — C34.90 NON-SMALL CELL LUNG CANCER, UNSPECIFIED LATERALITY (MULTI): ICD-10-CM

## 2025-02-18 DIAGNOSIS — G89.3 CANCER RELATED PAIN: ICD-10-CM

## 2025-02-18 RX ORDER — HYDROCODONE BITARTRATE AND ACETAMINOPHEN 10; 325 MG/1; MG/1
1 TABLET ORAL EVERY 6 HOURS PRN
Qty: 60 TABLET | Refills: 0 | Status: SHIPPED | OUTPATIENT
Start: 2025-02-18

## 2025-02-18 NOTE — PROGRESS NOTES
Discharge Facility: [facility]  Discharge Diagnosis:Atherosclerosis of left lower extremity with ulceration of midfoot   Admission Date: 2/14/2025  Discharge Date: 2/16/2025    PCP Appointment Date: none- 8/12/2025  Specialist Appointment Date:   -wound care 2/20/2025  -hem/onc 3/14/2025    Hospital Encounter and Summary Linked: Yes    Hospital Discharge Instructions    Follow Up Care   02/04/2025 10:35:52   With: MERNA HOLLIS, Vascular Surgery  Address:  [address]  [address] [address]  [phone] Business (1)    When: Call for Appointment     Two attempts were made to reach patient within two business days after discharge. Voicemail left with contact information for patient to call back with any non-emergent questions or concerns.

## 2025-02-18 NOTE — TELEPHONE ENCOUNTER
Daughter phoned to update that Radha had her vascular surgery [place] [facility] [place] Lindsay on her Left leg and is doing exceptionally well.  Also asked for pain med refill and Dr. Burnham sent that in to her pharmacy. Asked  staff to call and help her schedule Radha's PET as they had to move it related to the surgery.

## 2025-02-20 ENCOUNTER — OFFICE VISIT (OUTPATIENT)
Dept: WOUND CARE | Facility: CLINIC | Age: 70
End: 2025-02-20
Payer: MEDICARE

## 2025-02-20 DIAGNOSIS — F41.1 GAD (GENERALIZED ANXIETY DISORDER): ICD-10-CM

## 2025-02-20 DIAGNOSIS — L97.522 CHRONIC TOE ULCER, LEFT, WITH FAT LAYER EXPOSED (MULTI): Primary | ICD-10-CM

## 2025-02-20 DIAGNOSIS — I25.10 ATHEROSCLEROSIS OF NATIVE CORONARY ARTERY OF NATIVE HEART WITHOUT ANGINA PECTORIS: ICD-10-CM

## 2025-02-20 PROCEDURE — 99215 OFFICE O/P EST HI 40 MIN: CPT

## 2025-02-20 RX ORDER — SERTRALINE HYDROCHLORIDE 100 MG/1
100 TABLET, FILM COATED ORAL 2 TIMES DAILY
Qty: 180 TABLET | Refills: 3 | Status: SHIPPED | OUTPATIENT
Start: 2025-02-20

## 2025-02-23 LAB
BACTERIA SPEC AEROBE CULT: ABNORMAL
BACTERIA SPEC ANAEROBE CULT: ABNORMAL

## 2025-02-24 ENCOUNTER — HOSPITAL ENCOUNTER (OUTPATIENT)
Dept: RADIOLOGY | Facility: CLINIC | Age: 70
Discharge: HOME | End: 2025-02-24
Payer: MEDICARE

## 2025-02-24 DIAGNOSIS — C34.90 NON-SMALL CELL LUNG CANCER, UNSPECIFIED LATERALITY (MULTI): ICD-10-CM

## 2025-02-24 DIAGNOSIS — G89.3 CANCER RELATED PAIN: ICD-10-CM

## 2025-02-24 PROCEDURE — 78815 PET IMAGE W/CT SKULL-THIGH: CPT | Mod: PS

## 2025-02-24 PROCEDURE — A9552 F18 FDG: HCPCS | Performed by: INTERNAL MEDICINE

## 2025-02-24 PROCEDURE — 78815 PET IMAGE W/CT SKULL-THIGH: CPT | Mod: PET TUMOR SUBSQ TX STRATEGY | Performed by: NUCLEAR MEDICINE

## 2025-02-24 PROCEDURE — 3430000001 HC RX 343 DIAGNOSTIC RADIOPHARMACEUTICALS: Performed by: INTERNAL MEDICINE

## 2025-02-24 RX ORDER — FLUDEOXYGLUCOSE F 18 200 MCI/ML
13.8 INJECTION, SOLUTION INTRAVENOUS
Status: COMPLETED | OUTPATIENT
Start: 2025-02-24 | End: 2025-02-24

## 2025-02-24 RX ADMIN — FLUDEOXYGLUCOSE F 18 13.8 MILLICURIE: 200 INJECTION, SOLUTION INTRAVENOUS at 13:02

## 2025-02-26 LAB
BACTERIA SPEC AEROBE CULT: ABNORMAL
BACTERIA SPEC ANAEROBE CULT: ABNORMAL

## 2025-02-27 ENCOUNTER — OFFICE VISIT (OUTPATIENT)
Dept: WOUND CARE | Facility: CLINIC | Age: 70
End: 2025-02-27
Payer: MEDICARE

## 2025-02-27 PROCEDURE — 11045 DBRDMT SUBQ TISS EACH ADDL: CPT

## 2025-02-27 PROCEDURE — 11042 DBRDMT SUBQ TIS 1ST 20SQCM/<: CPT

## 2025-03-03 ENCOUNTER — PRE-ADMISSION TESTING (OUTPATIENT)
Dept: PREADMISSION TESTING | Facility: HOSPITAL | Age: 70
End: 2025-03-03
Payer: MEDICARE

## 2025-03-03 VITALS
BODY MASS INDEX: 26.34 KG/M2 | RESPIRATION RATE: 16 BRPM | SYSTOLIC BLOOD PRESSURE: 107 MMHG | HEIGHT: 62 IN | TEMPERATURE: 96.4 F | HEART RATE: 83 BPM | DIASTOLIC BLOOD PRESSURE: 70 MMHG | OXYGEN SATURATION: 97 %

## 2025-03-03 DIAGNOSIS — Z01.818 PREOP TESTING: Primary | ICD-10-CM

## 2025-03-03 DIAGNOSIS — L97.522 ULCER OF TOE OF LEFT FOOT, WITH FAT LAYER EXPOSED (MULTI): Primary | ICD-10-CM

## 2025-03-03 LAB
ERYTHROCYTE [DISTWIDTH] IN BLOOD BY AUTOMATED COUNT: 19.9 % (ref 11.5–14.5)
ERYTHROCYTE [SEDIMENTATION RATE] IN BLOOD BY WESTERGREN METHOD: 46 MM/H (ref 0–30)
HCT VFR BLD AUTO: 28.2 % (ref 36–46)
HGB BLD-MCNC: 7.9 G/DL (ref 12–16)
MCH RBC QN AUTO: 21.1 PG (ref 26–34)
MCHC RBC AUTO-ENTMCNC: 28 G/DL (ref 32–36)
MCV RBC AUTO: 75 FL (ref 80–100)
NRBC BLD-RTO: 0 /100 WBCS (ref 0–0)
PLATELET # BLD AUTO: 332 X10*3/UL (ref 150–450)
RBC # BLD AUTO: 3.74 X10*6/UL (ref 4–5.2)
WBC # BLD AUTO: 17.1 X10*3/UL (ref 4.4–11.3)

## 2025-03-03 PROCEDURE — 85027 COMPLETE CBC AUTOMATED: CPT

## 2025-03-03 PROCEDURE — 85652 RBC SED RATE AUTOMATED: CPT | Performed by: PODIATRIST

## 2025-03-03 PROCEDURE — 99204 OFFICE O/P NEW MOD 45 MIN: CPT

## 2025-03-03 PROCEDURE — 36415 COLL VENOUS BLD VENIPUNCTURE: CPT

## 2025-03-03 RX ORDER — CHLORHEXIDINE GLUCONATE ORAL RINSE 1.2 MG/ML
15 SOLUTION DENTAL DAILY
Qty: 30 ML | Refills: 0 | Status: SHIPPED | OUTPATIENT
Start: 2025-03-03 | End: 2025-03-05

## 2025-03-03 RX ORDER — CHLORHEXIDINE GLUCONATE 40 MG/ML
SOLUTION TOPICAL DAILY
Qty: 118 ML | Refills: 0 | Status: SHIPPED | OUTPATIENT
Start: 2025-03-03 | End: 2025-03-08

## 2025-03-03 ASSESSMENT — DUKE ACTIVITY SCORE INDEX (DASI)
CAN YOU DO LIGHT WORK AROUND THE HOUSE LIKE DUSTING OR WASHING DISHES: YES
CAN YOU WALK A BLOCK OR TWO ON LEVEL GROUND: NO
CAN YOU PARTICIPATE IN MODERATE RECREATIONAL ACTIVITIES LIKE GOLF, BOWLING, DANCING, DOUBLES TENNIS OR THROWING A BASEBALL OR FOOTBALL: NO
TOTAL_SCORE: 7.2
CAN YOU CLIMB A FLIGHT OF STAIRS OR WALK UP A HILL: NO
CAN YOU DO MODERATE WORK AROUND THE HOUSE LIKE VACUUMING, SWEEPING FLOORS OR CARRYING GROCERIES: NO
CAN YOU HAVE SEXUAL RELATIONS: NO
CAN YOU WALK INDOORS, SUCH AS AROUND YOUR HOUSE: YES
CAN YOU DO HEAVY WORK AROUND THE HOUSE LIKE SCRUBBING FLOORS OR LIFTING AND MOVING HEAVY FURNITURE: NO
DASI METS SCORE: 3.6
CAN YOU PARTICIPATE IN STRENOUS SPORTS LIKE SWIMMING, SINGLES TENNIS, FOOTBALL, BASKETBALL, OR SKIING: NO
CAN YOU TAKE CARE OF YOURSELF (EAT, DRESS, BATHE, OR USE TOILET): YES
CAN YOU RUN A SHORT DISTANCE: NO
CAN YOU DO YARD WORK LIKE RAKING LEAVES, WEEDING OR PUSHING A MOWER: NO

## 2025-03-03 NOTE — H&P (VIEW-ONLY)
CPM/PAT Evaluation       Name: Radha Toussaint (Radha Toussaint)  /Age: 1955/69 y.o.     Visit Type:   In-Person       Chief Complaint: Left toe pain and nonhealing wound requiring intervention     HPI  Patient is a 69 year old female with a history of HTN, HLD, CAD with FCO x2, severe PAD s/p multiple interventions (aortic aneurysm repair, intracranial aneurysm coiling, iliac grafts, fem-fem graft, multiple lower extremity stents and angioplasties - most recently on 25 at ), DVT, AF, COPD with home oxygen, current smoker, lung cancer w/ mets to adrenal glad, beta thalassemia and anemia who presents today for preoperative evaluation. Patient follows with Dr. 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 She is scheduled for left 4th toe and possible 3rd toe amputation, and left allograft skin application under MAC anesthesia on 3/12/25 with Dr. Doss. Patient denies recent illness, fever, chills, fatigue, cough, shortness of breath, chest pain, lower extremity edema, urinary or GI symptoms.    Patient was hospitalized on 25 for a planned LEFT femoral angioplasty with stent and balloon angioplasty of the left anterior tibial artery. Postoperatively she was mildly hypotensive and was on a Reji-Synephrine drip for a short time. She was also anemic with a Hg as low as 6.9 and was transfused 1 unit > Hg incremented to 7.7 after transfusion and was up to 8.7 on day of discharge . On postop visit from  patient reported she fell twice due to lightheadedness. Today she states that she has had no other occurences or issues with dizziness or lightheadedness.     Past Medical History:   Diagnosis Date    Adrenal cancer (Multi)     Aneurysm of carotid artery (CMS-HCC)     Neck aneurysm    Anxiety     COPD (chronic obstructive pulmonary disease) (Multi)     Depression     DVT (deep venous thrombosis) (Multi)         DVT (deep venous thrombosis) (Multi)     RLE    History of blood  transfusion     History of tubal ligation     Hyperlipidemia     Old myocardial infarction     History of heart attack    Other specified disorders of kidney and ureter     Obstruction of kidney    Personal history of other diseases of the circulatory system     History of intracranial aneurysm    Personal history of other diseases of the circulatory system     History of abdominal aortic aneurysm (AAA)    Personal history of other diseases of the respiratory system     History of chronic obstructive lung disease    Personal history of urinary calculi     History of kidney stones    Right upper quadrant pain 09/25/2020    Abdominal pain, RUQ (right upper quadrant)    Vision loss        Past Surgical History:   Procedure Laterality Date    COLONOSCOPY      CT ABDOMEN PELVIS ANGIOGRAM W AND/OR WO IV CONTRAST  11/21/2019    CT ABDOMEN PELVIS ANGIOGRAM W AND/OR WO IV CONTRAST 11/21/2019 [facility] EMERGENCY LEGACY    CT ANGIO AORTA AND BILATERAL ILIOFEMORAL RUN OFF INCLUDING WITHOUT CONTRAST IF PERFORMED  08/09/2022    CT AORTA AND BILATERAL ILIOFEMORAL RUNOFF ANGIOGRAM W AND/OR WO IV CONTRAST 8/9/2022 [facility] CLINICAL LEGACY    CT HEAD ANGIO W AND WO IV CONTRAST  05/22/2020    CT HEAD ANGIO W AND WO IV CONTRAST 5/22/2020 [facility]ERGENCY LEGAC[facility]    INVASIVE VASCULAR PROCEDURE N/A 01/23/2024    Procedure: Lower Extremity Angiogram;  Surgeon: Willam Mike MD;  Location: [place] [address] Cardiac Cath Lab;  Service: Cardiovascular;  Laterality: N/A;  [id number];LLE CLTI    INVASIVE VASCULAR PROCEDURE N/A 01/23/2024    Procedure: Lower Extremity Intervention;  Surgeon: Willam Mike MD;  Location: [place] [address] Cardiac Cath Lab;  Service: Cardiovascular;  Laterality: N/A;    INVASIVE VASCULAR PROCEDURE N/A 06/25/2024    Procedure: Lower Extremity Angiogram;  Surgeon: Willam Mike MD;  Location: [place] [address] Cardiac Cath Lab;  Service: Cardiovascular;  Laterality: N/A;  6/25/24 with Cee    INVASIVE VASCULAR PROCEDURE Left 06/25/2024    Procedure: Lower Extremity  Intervention;  Surgeon: Willam Mike MD;  Location: [facility] Cardiac Cath Lab;  Service: Cardiovascular;  Laterality: Left;    INVASIVE VASCULAR PROCEDURE N/A 06/25/2024    Procedure: Thrombectomy - Lower Extremity;  Surgeon: Willam Mike MD;  Location: [place] [address] Cardiac Cath Lab;  Service: Cardiovascular;  Laterality: N/A;    INVASIVE VASCULAR PROCEDURE N/A 06/25/2024    Procedure: Atherectomy - Lower Extremity;  Surgeon: Willam Mike MD;  Location: [place] [address] Cardiac Cath Lab;  Service: Cardiovascular;  Laterality: N/A;    INVASIVE VASCULAR PROCEDURE Left 06/25/2024    Procedure: FCO Stent - Lower Extremity;  Surgeon: Willam Mike MD;  Location: [place] [address] Cardiac Cath Lab;  Service: Cardiovascular;  Laterality: Left;    INVASIVE VASCULAR PROCEDURE N/A 06/26/2024    Procedure: Lower Extremity Intervention;  Surgeon: Bucky Lake DO;  Location: [facility] Cardiac Cath Lab;  Service: Cardiovascular;  Laterality: N/A;    INVASIVE VASCULAR PROCEDURE N/A 06/26/2024    Procedure: Lower Extremity Angiogram;  Surgeon: Bucky Lake DO;  Location: [facility] Cardiac Cath Lab;  Service: Cardiovascular;  Laterality: N/A;    OTHER SURGICAL HISTORY  09/30/2020    Tubal ligation    OTHER SURGICAL HISTORY  09/30/2020    Cardiac catheterization    TUBAL LIGATION         Patient  has no history on file for sexual activity.    Family History   Problem Relation Name Age of Onset    Aneurysm Mother      Hypertension Mother      Heart attack Father      Cancer Father's Sister         Allergies   Allergen Reactions    Methylprednisolone Agitation, Confusion and Other     Steroid induced psychosis    Ace Inhibitors Angioedema    Prednisone Anxiety and Agitation     & violent    Betamethasone Dipropionate Confusion    Iodinated Contrast Media Nausea/vomiting and Rash    Meperidine Nausea/vomiting       Prior to Admission medications    Medication Sig Start Date End Date Taking? Authorizing Provider   acetaminophen (Tylenol) 325  mg tablet Take 3 tablets (975 mg) by mouth every 6 hours if needed for mild pain (1 - 3) or moderate pain (4 - 6). 6/29/24   SHRUTHI Chung   apixaban (Eliquis) 5 mg tablet Take 1 tablet (5 mg) by mouth 2 times a day. 1/31/25 1/31/26  Berny Burnham MD   buPROPion (Wellbutrin) 75 mg tablet Take 1 tablet (75 mg) by mouth once daily. 1/5/25 1/5/26  SHRUTHI Romero   chlorhexidine (Hibiclens) 4 % external liquid Apply topically early in the morning. for 5 days. Use wash as directed daily for 5 days prior to surgery with the 5th day being the morning of surgery. 3/3/25 3/8/25  SHRUTHI Randall   chlorhexidine (Peridex) 0.12 % solution Use 15 mL in the mouth or throat early in the morning. for 2 days. Rinse mouth by swishing medication and spitting. Use daily for 2 days prior to surgery with the 2nd day being the morning of surgery. 3/3/25 3/5/25  SHRUTHI Randall   clopidogrel (Plavix) 75 mg tablet Take 1 tablet (75 mg) by mouth once daily in the morning. 10/17/24 10/17/25  Willam Mike MD   dexAMETHasone (Decadron) 4 mg tablet Take 0.5 tablets (2 mg) by mouth once daily. 1/22/25   Francheska Rogel DO   fluticasone-umeclidin-vilanter (Trelegy Ellipta) 200-62.5-25 mcg blister with device Inhale 1 puff once daily. 7/30/24   Francheska Rogel DO   gabapentin (Neurontin) 300 mg capsule Take 1 capsule (300 mg) by mouth 3 times a day. 8/11/24 8/11/25  SHRUTHI Romero   HYDROcodone-acetaminophen (Norco)  mg tablet Take 1 tablet by mouth every 6 hours if needed for severe pain (7 - 10). 2/18/25   Berny Burnham MD   hydrOXYzine pamoate (VistariL) 25 mg capsule Take 1 capsule (25 mg) by mouth 3 times a day as needed for itching. 10/6/24 10/1/25  SHRUTHI Romero   LORazepam (Ativan) 0.5 mg tablet Take 1 tablet (0.5 mg) by mouth every 6 hours if needed for anxiety. 1/5/25 5/5/25  SHRUTHI Romero   lubiprostone (Amitiza) 24 mcg capsule Take 1 capsule (24 mcg)  by mouth 2 times daily (morning and late afternoon). 10/24/24 1/31/25  Francheska Rogel DO   montelukast (Singulair) 10 mg tablet Take 1 tablet (10 mg) by mouth once daily at bedtime. 8/21/24   Francheska Rogel DO   naloxone (Narcan) 4 mg/0.1 mL nasal spray Administer 1 spray (4 mg) into affected nostril(s) if needed for opioid reversal. May repeat every 2-3 minutes if needed, alternating nostrils, until medical assistance becomes available. 12/27/24   KERA Polanco-CNP   OLANZapine (ZyPREXA) 15 mg tablet Take 1 tablet (15 mg) by mouth once daily at bedtime. 2/4/25 8/3/25  Francheska Rogel DO   ondansetron ODT (Zofran-ODT) 8 mg disintegrating tablet Dissolve 1 tablet (8 mg) in the mouth every 8 hours if needed for nausea or vomiting. 2/4/25 2/4/26  Francheska Rogel DO   oxygen (O2) gas therapy Inhale 1 each continuously. 2 L at bedtime and naps    Historical MD Slade   prochlorperazine (Compazine) 5 mg tablet Take 1 tablet (5 mg) by mouth every 6 hours if needed for nausea or vomiting. 2/4/25   Francheska Rogel DO   rosuvastatin (Crestor) 40 mg tablet Take 1 tablet (40 mg) by mouth once daily. 10/17/24 10/17/25  Willam Mike MD   sertraline (Zoloft) 100 mg tablet TAKE 1 TABLET(100 MG) BY MOUTH TWICE DAILY 2/20/25   Nam Mason APRN-CNP   benzonatate (Tessalon) 200 mg capsule Take 1 capsule (200 mg) by mouth 3 times a day as needed for cough. Do not crush or chew. 12/6/24 3/3/25  Nico Davies DO   budesonide (Pulmicort) 0.25 mg/2 mL nebulizer solution Take 2 mL (0.25 mg) by nebulization 2 times a day. Rinse mouth with water after use to reduce aftertaste and incidence of candidiasis. Do not swallow. 7/8/24 3/3/25  Francheska E Hadney, DO        A ten-point review of systems was completed and is otherwise negative except for what is mentioned in the HPI above.    Physical Exam:    GENERAL: Well developed, chronically ill appearing, awake/alert/oriented x3, no distress, alert and cooperative  HEENT: MMM  NECK:  Trachea midline, no lymphadenopathy  CARDIOVASCULAR: RRR, normal S1 and S 2, no murmurs, 2+ equal pulses of the extremities  RESPIRATORY: Patent airways, CTAB, diminished breath sounds throughout with good chest expansion, thorax symmetric  ABDOMEN: Soft, non-tender, no distention  SKIN: Warm and dry  EXTREMITIES: No cyanosis, edema. Dressed right foot with orthotic shoe in place. Dressing dry and intact  NEURO: A&O x 3, normal motor and sensation, no focal deficits   PSYCH: Appropriate mood and behavior      PAT AIRWAY:   Airway:     Mallampati::  II    TM distance::  >3 FB    Neck ROM::  Full  normal          Visit Vitals  OB Status Postmenopausal   Smoking Status Every Day       DASI Risk Score      Flowsheet Row Pre-Admission Testing from 3/3/2025 in [facility]   Can you take care of yourself (eat, dress, bathe, or use toilet)?  2.75 filed at 03/03/2025 1133   Can you walk indoors, such as around your house? 1.75 filed at 03/03/2025 1133   Can you walk a block or two on level ground?  0 filed at 03/03/2025 1133   Can you climb a flight of stairs or walk up a hill? 0 filed at 03/03/2025 1133   Can you run a short distance? 0 filed at 03/03/2025 1133   Can you do light work around the house like dusting or washing dishes? 2.7 filed at 03/03/2025 1133   Can you do moderate work around the house like vacuuming, sweeping floors or carrying groceries? 0 filed at 03/03/2025 1133   Can you do heavy work around the house like scrubbing floors or lifting and moving heavy furniture?  0 filed at 03/03/2025 1133   Can you do yard work like raking leaves, weeding or pushing a mower? 0 filed at 03/03/2025 1133   Can you have sexual relations? 0 filed at 03/03/2025 1133   Can you participate in moderate recreational activities like golf, bowling, dancing, doubles tennis or throwing a baseball or football? 0 filed at 03/03/2025 1133   Can you participate in strenous sports like swimming, singles tennis, football,  basketball, or skiing? 0 filed at 03/03/2025 1133   DASI SCORE 7.2 filed at 03/03/2025 1133   METS Score (Will be calculated only when all the questions are answered) 3.6 filed at 03/03/2025 1133          Caprini DVT Assessment      Flowsheet Row ED to Hosp-Admission (Discharged) from 12/2/2024 in [facility] 6 with Nico Davies DO and Go Brito MD Admission (Discharged) from 6/25/2024 in [facility] 7 with Alban Smith MD   DVT Score (IF A SCORE IS NOT CALCULATING, MUST SELECT A BMI TO COMPLETE) 3 filed at 12/02/2024 1123 6 filed at 06/26/2024 1550   Medical Factors Present cancer, chemotherapy, or previous malignancy, COPD, Serious lung disease including pneumonia <1 month filed at 12/02/2024 1123 --   BMI (BMI MUST BE CHOSEN) -- 30 or less filed at 06/26/2024 1550   RETIRED: Current Status -- Major surgery planned, lasting 2-3 hours filed at 06/26/2024 1550   RETIRED: Age -- 60-75 years filed at 06/26/2024 1550          Modified Frailty Index    No data to display       [id number] Stroke Risk Points  Current as of just now        N/A 0 to 9 Points:      Last Change: N/A          The [id number] risk score (Lip GH, et al. 2009. © 2010 [employer]) quantifies the risk of stroke for a patient with atrial fibrillation. For patients without atrial fibrillation or under the age of 18 this score appears as N/A. Higher score values generally indicate higher risk of stroke.        This score is not applicable to this patient. Components are not calculated.          Revised Cardiac Risk Index    No data to display       Apfel Simplified Score    No data to display       Break-the-Glass       Encounters that might contain data have been deemed confidential and restricted.            Risk Analysis Index Results This Encounter    No data found from available encounters.       Stop Bang Score      Flowsheet Row Pre-Admission Testing from 3/3/2025 in   [facility]   Do you snore loudly? 0 filed at 03/03/2025 1133   Do you often feel tired or fatigued after your sleep? 0 filed at 03/03/2025 1133   Has anyone ever observed you stop breathing in your sleep? 0 filed at 03/03/2025 1133   Do you have or are you being treated for high blood pressure? 0 filed at 03/03/2025 1133   Recent BMI (Calculated) 26.2 filed at 03/03/2025 1133   Is BMI greater than 35 kg/m2? 0=No filed at 03/03/2025 1133   Age older than 50 years old? 1=Yes filed at 03/03/2025 1133   Is your neck circumference greater than 17 inches (Male) or 16 inches (Female)? 0 filed at 03/03/2025 1133   Gender - Male 0=No filed at 03/03/2025 1133   STOP-BANG Total Score 1 filed at 03/03/2025 1133          Prodigy: High Risk  Total Score: 11              Prodigy Age Score      Prodigy Previous Opioid Use Score           ARISCAT Score for Postoperative Pulmonary Complications    No data to display       Moore Perioperative Risk for Myocardial Infarction or Cardiac Arrest (NATHAN)    No data to display         Assessment and Plan:   Patient is a 69 year old female with a history of HTN, HLD, CAD with FCO x2, severe PAD s/p multiple interventions (aortic aneurysm repair, intracranial aneurysm coiling, iliac grafts, fem-fem graft, multiple lower extremity stents and angioplasties - most recently on 2/12/25 at ), DVT, AF, COPD with home oxygen, current smoker, lung cancer w/ mets to adrenal glad, beta thalassemia and anemia.    Patient was hospitalized on 2/12/25 for a planned LEFT femoral angioplasty with stent and balloon angioplasty of the left anterior tibial artery. Postoperatively she was mildly hypotensive and was on a Reji-Synephrine drip for a short time. She was also anemic with a Hg as low as 6.9 and was transfused 1 unit > Hg incremented to 7.7 after transfusion and was up to 8.7 on day of discharge 2/17. On postop visit from 2/21 patient reported she fell twice due to lightheadedness. Today she  states that she has had no other occurences or issues with dizziness or lightheadedness.       #Ulcer of the left foot with gangrene and necrosis  -She is scheduled for left 4th toe and possible 3rd toe amputation, and left allograft skin application under MAC anesthesia on 3/12/25 with Dr. Doss.  -2/23/2025 wound culture positive for MRSA and Pseudomonas > prescribed chlorhexidine wash and oral rinse    #HTN, Hx  #HLD, Hx  Currently maintained on rosuvastatin  Not currently on BP medication with low normal BP on today's visit    #CAD s/p FCO x 2  #Severe PAD s/p multiple interventions  #DVT of RLE, Hx  #Paroxysmal A-fib, Hx  -Vascular interventions include aortic aneurysm repair, intracranial aneurysm coiling, iliac grafts, fem-fem graft, multiple lower extremity stents and angioplasties > most recently on 2/12/25 at  patient had LEFT femoral angioplasty with stent and balloon angioplasty of the left anterior tibial artery  -Currently maintained on Eliquis and Plavix > verified with Dr. Doss that she does not need these held during perioperative.    #COPD with home O2  #Current smoker  Currently smokes 1 PPD with a 90-pack-year history  Oxygen HS and  as needed  Maintained on dexamethasone, Trelegy and montelukast  No signs of current exacerbation or respiratory fraction    #Beta thalassemia, Hx  #Anemia, Hx  Status post transfusion during hospitallization at  in February   Hg 6.9 > post transfusion 7.7 > 8.7 > repeat cbc obtained today and demonstrates WBC 17.1, Hg 7.9, Hc 28.2 >> results reported to Misty Doss and GERARDO Rogel via epic chat message on 3/3/25  No overt bleeding, dizziness, syncope or fatigue. No fever or chills. On Dexamethasone.    #Lung cancer with metastasis to adrenal gland  Follows with Dr. Burnham for oncology last seen 1/31/2025  Currently maintained on Keytruda every 6 weeks next dose coming on 3/14/2025    #Anxiety, Hx  Managed with Wellbutrin, hydroxyzine, lorazepam, sertraline and  olanzapine as needed    CBC from 2/17 showed hemoglobin 8.7 posttransfusion > repeat CBC obtained today and remarkable for WBC 17.1, Hg 7.9, Hc 28.2 >> results reported to Misty Doss and PCP Juan F via epic chat message on 3/3/25  EKG from 12/2/2024 demonstrates normal sinus rhythm with anterolateral ST-T wave abnormality and possible inferior infarct seen in March 2024    I spent 45 minutes in the professional and overall care of this patient. Greater than 50% of this time was spent counseling patient, reviewing plan of care and discussing medication perioperative management.    Soraida Quintana, APRN-CNP

## 2025-03-03 NOTE — PREPROCEDURE INSTRUCTIONS
Medication List            Accurate as of March 3, 2025 12:10 PM. Always use your most recent med list.                acetaminophen 325 mg tablet  Commonly known as: Tylenol  Take 3 tablets (975 mg) by mouth every 6 hours if needed for mild pain (1 - 3) or moderate pain (4 - 6).  Medication Adjustments for Surgery: Take/Use as prescribed     apixaban 5 mg tablet  Commonly known as: Eliquis  Take 1 tablet (5 mg) by mouth 2 times a day.  Medication Adjustments for Surgery: Take/Use as prescribed     buPROPion 75 mg tablet  Commonly known as: Wellbutrin  Take 1 tablet (75 mg) by mouth once daily.  Medication Adjustments for Surgery: Take/Use as prescribed     * chlorhexidine 4 % external liquid  Commonly known as: Hibiclens  Apply topically early in the morning. for 5 days. Use wash as directed daily for 5 days prior to surgery with the 5th day being the morning of surgery.  Medication Adjustments for Surgery: Take/Use as prescribed     * chlorhexidine 0.12 % solution  Commonly known as: Peridex  Use 15 mL in the mouth or throat early in the morning. for 2 days. Rinse mouth by swishing medication and spitting. Use daily for 2 days prior to surgery with the 2nd day being the morning of surgery.  Medication Adjustments for Surgery: Take/Use as prescribed     clopidogrel 75 mg tablet  Commonly known as: Plavix  Take 1 tablet (75 mg) by mouth once daily in the morning.  Medication Adjustments for Surgery: Take/Use as prescribed     dexAMETHasone 4 mg tablet  Commonly known as: Decadron  Take 0.5 tablets (2 mg) by mouth once daily.  Medication Adjustments for Surgery: Take/Use as prescribed     gabapentin 300 mg capsule  Commonly known as: Neurontin  Take 1 capsule (300 mg) by mouth 3 times a day.  Medication Adjustments for Surgery: Take/Use as prescribed     HYDROcodone-acetaminophen  mg tablet  Commonly known as: Norco  Take 1 tablet by mouth every 6 hours if needed for severe pain (7 - 10).  Medication  Adjustments for Surgery: Take/Use as prescribed     hydrOXYzine pamoate 25 mg capsule  Commonly known as: VistariL  Take 1 capsule (25 mg) by mouth 3 times a day as needed for itching.  Medication Adjustments for Surgery: Do Not take on the morning of surgery     LORazepam 0.5 mg tablet  Commonly known as: Ativan  Take 1 tablet (0.5 mg) by mouth every 6 hours if needed for anxiety.  Medication Adjustments for Surgery: Take/Use as prescribed     lubiprostone 24 mcg capsule  Commonly known as: Amitiza  Take 1 capsule (24 mcg) by mouth 2 times daily (morning and late afternoon).  Medication Adjustments for Surgery: Do Not take on the morning of surgery     montelukast 10 mg tablet  Commonly known as: Singulair  Take 1 tablet (10 mg) by mouth once daily at bedtime.  Medication Adjustments for Surgery: Take/Use as prescribed     naloxone 4 mg/0.1 mL nasal spray  Commonly known as: Narcan  Administer 1 spray (4 mg) into affected nostril(s) if needed for opioid reversal. May repeat every 2-3 minutes if needed, alternating nostrils, until medical assistance becomes available.  Medication Adjustments for Surgery: Take/Use as prescribed     OLANZapine 15 mg tablet  Commonly known as: ZyPREXA  Take 1 tablet (15 mg) by mouth once daily at bedtime.  Medication Adjustments for Surgery: Take/Use as prescribed     ondansetron ODT 8 mg disintegrating tablet  Commonly known as: Zofran-ODT  Dissolve 1 tablet (8 mg) in the mouth every 8 hours if needed for nausea or vomiting.  Medication Adjustments for Surgery: Take/Use as prescribed     oxygen gas therapy  Commonly known as: O2  Medication Adjustments for Surgery: Take/Use as prescribed     prochlorperazine 5 mg tablet  Commonly known as: Compazine  Take 1 tablet (5 mg) by mouth every 6 hours if needed for nausea or vomiting.  Medication Adjustments for Surgery: Take/Use as prescribed     rosuvastatin 40 mg tablet  Commonly known as: Crestor  Take 1 tablet (40 mg) by mouth once  daily.  Medication Adjustments for Surgery: Take/Use as prescribed     sertraline 100 mg tablet  Commonly known as: Zoloft  TAKE 1 TABLET(100 MG) BY MOUTH TWICE DAILY  Medication Adjustments for Surgery: Take/Use as prescribed     Trelegy Ellipta 200-62.5-25 mcg blister with device  Generic drug: fluticasone-umeclidin-vilanter  Inhale 1 puff once daily.  Medication Adjustments for Surgery: Take/Use as prescribed           * This list has 2 medication(s) that are the same as other medications prescribed for you. Read the directions carefully, and ask your doctor or other care provider to review them with you.                One of our staff members will call you one business day before your surgery between 12-4pm to let you know the time to arrive at the hospital. If you have not received a phone call by 2pm call [phone].     When you arrive at the hospital, go to Registration on the ground floor.   You will need a responsible adult to drive you home.     Prior to surgery date:  One (1) week prior to surgery STOP:  -Stop aspirin products. Do not take NSAIDS/ Ibuprofen, Aleve, Motrin. Okay to take Tylenol or Acetaminophen. Do not take vitamins, supplements, herbals, diet pills.   -Stop these medications:   -No alcohol for 24 hours prior to surgery.  -No smoking 24 hours prior. No Marijuana, CBD oil, or Vaping 48 hours prior to surgery.  -Enjoy a light supper the evening before surgery.    Day of Surgery:  -Nothing to eat or drink after midnight. This includes food of any kind (including hard candy, cough drops, mints and gum, coffee).   -You can have the 13oz of Clear liquids 2hrs prior to surgery time.   -No acrylic nails or nail polish on at least one fingernail; no polish on toes for foot surgery.   -No body piercing or jewelry.   -Shower as directed. No deodorant, lotion, power, oils, perfume, make-up.   -Wear loose comfortable clothing to accommodate your bandages.     -Bring CPAP machine  -Bring as needed  inhalers  -Bring crutches/ walker if directed       NPO Instructions:    Do not eat any food after midnight the night before your surgery/procedure.    Additional Instructions:     The Day before Surgery:  Review your medication instructions, stop indicated medications  You will be contacted regarding the time of your arrival to facility and surgery time  Do not eat any food after Midnight

## 2025-03-03 NOTE — CPM/PAT H&P
CPM/PAT Evaluation       Name: Radha Toussaint (Radha Toussaint)  /Age: 1955/69 y.o.     Visit Type:   In-Person       Chief Complaint: Left toe pain and nonhealing wound requiring intervention     HPI  Patient is a 69 year old female with a history of HTN, HLD, CAD with FCO x2, severe PAD s/p multiple interventions (aortic aneurysm repair, intracranial aneurysm coiling, iliac grafts, fem-fem graft, multiple lower extremity stents and angioplasties - most recently on 25 at ), DVT, AF, COPD with home oxygen, current smoker, lung cancer w/ mets to adrenal glad, beta thalassemia and anemia who presents today for preoperative evaluation. Patient follows with Dr. 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 She is scheduled for left 4th toe and possible 3rd toe amputation, and left allograft skin application under MAC anesthesia on 3/12/25 with Dr. Doss. Patient denies recent illness, fever, chills, fatigue, cough, shortness of breath, chest pain, lower extremity edema, urinary or GI symptoms.    Patient was hospitalized on 25 for a planned LEFT femoral angioplasty with stent and balloon angioplasty of the left anterior tibial artery. Postoperatively she was mildly hypotensive and was on a Reji-Synephrine drip for a short time. She was also anemic with a Hg as low as 6.9 and was transfused 1 unit > Hg incremented to 7.7 after transfusion and was up to 8.7 on day of discharge . On postop visit from  patient reported she fell twice due to lightheadedness. Today she states that she has had no other occurences or issues with dizziness or lightheadedness.     Past Medical History:   Diagnosis Date    Adrenal cancer (Multi)     Aneurysm of carotid artery (CMS-HCC)     Neck aneurysm    Anxiety     COPD (chronic obstructive pulmonary disease) (Multi)     Depression     DVT (deep venous thrombosis) (Multi)         DVT (deep venous thrombosis) (Multi)     RLE    History of blood  transfusion     History of tubal ligation     Hyperlipidemia     Old myocardial infarction     History of heart attack    Other specified disorders of kidney and ureter     Obstruction of kidney    Personal history of other diseases of the circulatory system     History of intracranial aneurysm    Personal history of other diseases of the circulatory system     History of abdominal aortic aneurysm (AAA)    Personal history of other diseases of the respiratory system     History of chronic obstructive lung disease    Personal history of urinary calculi     History of kidney stones    Right upper quadrant pain 09/25/2020    Abdominal pain, RUQ (right upper quadrant)    Vision loss        Past Surgical History:   Procedure Laterality Date    COLONOSCOPY      CT ABDOMEN PELVIS ANGIOGRAM W AND/OR WO IV CONTRAST  11/21/2019    CT ABDOMEN PELVIS ANGIOGRAM W AND/OR WO IV CONTRAST 11/21/2019 [facility] EMERGENCY LEGACY    CT ANGIO AORTA AND BILATERAL ILIOFEMORAL RUN OFF INCLUDING WITHOUT CONTRAST IF PERFORMED  08/09/2022    CT AORTA AND BILATERAL ILIOFEMORAL RUNOFF ANGIOGRAM W AND/OR WO IV CONTRAST 8/9/2022 [facility] CLINICAL LEGACY    CT HEAD ANGIO W AND WO IV CONTRAST  05/22/2020    CT HEAD ANGIO W AND WO IV CONTRAST 5/22/2020 [facility]ERGENCY LEGAC[facility]    INVASIVE VASCULAR PROCEDURE N/A 01/23/2024    Procedure: Lower Extremity Angiogram;  Surgeon: Willam Mike MD;  Location: [place] [address] Cardiac Cath Lab;  Service: Cardiovascular;  Laterality: N/A;  [id number];LLE CLTI    INVASIVE VASCULAR PROCEDURE N/A 01/23/2024    Procedure: Lower Extremity Intervention;  Surgeon: Willam Mike MD;  Location: [place] [address] Cardiac Cath Lab;  Service: Cardiovascular;  Laterality: N/A;    INVASIVE VASCULAR PROCEDURE N/A 06/25/2024    Procedure: Lower Extremity Angiogram;  Surgeon: Willam Mike MD;  Location: [place] [address] Cardiac Cath Lab;  Service: Cardiovascular;  Laterality: N/A;  6/25/24 with Cee    INVASIVE VASCULAR PROCEDURE Left 06/25/2024    Procedure: Lower Extremity  Intervention;  Surgeon: Willam Mike MD;  Location: [facility] Cardiac Cath Lab;  Service: Cardiovascular;  Laterality: Left;    INVASIVE VASCULAR PROCEDURE N/A 06/25/2024    Procedure: Thrombectomy - Lower Extremity;  Surgeon: Willam Mike MD;  Location: [place] [address] Cardiac Cath Lab;  Service: Cardiovascular;  Laterality: N/A;    INVASIVE VASCULAR PROCEDURE N/A 06/25/2024    Procedure: Atherectomy - Lower Extremity;  Surgeon: Willam Mike MD;  Location: [place] [address] Cardiac Cath Lab;  Service: Cardiovascular;  Laterality: N/A;    INVASIVE VASCULAR PROCEDURE Left 06/25/2024    Procedure: FCO Stent - Lower Extremity;  Surgeon: Willam Mike MD;  Location: [place] [address] Cardiac Cath Lab;  Service: Cardiovascular;  Laterality: Left;    INVASIVE VASCULAR PROCEDURE N/A 06/26/2024    Procedure: Lower Extremity Intervention;  Surgeon: Bucky Lake DO;  Location: [facility] Cardiac Cath Lab;  Service: Cardiovascular;  Laterality: N/A;    INVASIVE VASCULAR PROCEDURE N/A 06/26/2024    Procedure: Lower Extremity Angiogram;  Surgeon: Bucky Lake DO;  Location: [facility] Cardiac Cath Lab;  Service: Cardiovascular;  Laterality: N/A;    OTHER SURGICAL HISTORY  09/30/2020    Tubal ligation    OTHER SURGICAL HISTORY  09/30/2020    Cardiac catheterization    TUBAL LIGATION         Patient  has no history on file for sexual activity.    Family History   Problem Relation Name Age of Onset    Aneurysm Mother      Hypertension Mother      Heart attack Father      Cancer Father's Sister         Allergies   Allergen Reactions    Methylprednisolone Agitation, Confusion and Other     Steroid induced psychosis    Ace Inhibitors Angioedema    Prednisone Anxiety and Agitation     & violent    Betamethasone Dipropionate Confusion    Iodinated Contrast Media Nausea/vomiting and Rash    Meperidine Nausea/vomiting       Prior to Admission medications    Medication Sig Start Date End Date Taking? Authorizing Provider   acetaminophen (Tylenol) 325  mg tablet Take 3 tablets (975 mg) by mouth every 6 hours if needed for mild pain (1 - 3) or moderate pain (4 - 6). 6/29/24   SHRUTHI Chung   apixaban (Eliquis) 5 mg tablet Take 1 tablet (5 mg) by mouth 2 times a day. 1/31/25 1/31/26  Berny Burnham MD   buPROPion (Wellbutrin) 75 mg tablet Take 1 tablet (75 mg) by mouth once daily. 1/5/25 1/5/26  SHRUTHI Romero   chlorhexidine (Hibiclens) 4 % external liquid Apply topically early in the morning. for 5 days. Use wash as directed daily for 5 days prior to surgery with the 5th day being the morning of surgery. 3/3/25 3/8/25  SHRUTHI Randall   chlorhexidine (Peridex) 0.12 % solution Use 15 mL in the mouth or throat early in the morning. for 2 days. Rinse mouth by swishing medication and spitting. Use daily for 2 days prior to surgery with the 2nd day being the morning of surgery. 3/3/25 3/5/25  SHRUTHI Randall   clopidogrel (Plavix) 75 mg tablet Take 1 tablet (75 mg) by mouth once daily in the morning. 10/17/24 10/17/25  Willam Mike MD   dexAMETHasone (Decadron) 4 mg tablet Take 0.5 tablets (2 mg) by mouth once daily. 1/22/25   Francheska Rogel DO   fluticasone-umeclidin-vilanter (Trelegy Ellipta) 200-62.5-25 mcg blister with device Inhale 1 puff once daily. 7/30/24   Francheska Rogel DO   gabapentin (Neurontin) 300 mg capsule Take 1 capsule (300 mg) by mouth 3 times a day. 8/11/24 8/11/25  SHRUTHI Romero   HYDROcodone-acetaminophen (Norco)  mg tablet Take 1 tablet by mouth every 6 hours if needed for severe pain (7 - 10). 2/18/25   Berny Burnham MD   hydrOXYzine pamoate (VistariL) 25 mg capsule Take 1 capsule (25 mg) by mouth 3 times a day as needed for itching. 10/6/24 10/1/25  SHRUTHI Romero   LORazepam (Ativan) 0.5 mg tablet Take 1 tablet (0.5 mg) by mouth every 6 hours if needed for anxiety. 1/5/25 5/5/25  SHRUTHI Romero   lubiprostone (Amitiza) 24 mcg capsule Take 1 capsule (24 mcg)  by mouth 2 times daily (morning and late afternoon). 10/24/24 1/31/25  Francheska Rogel DO   montelukast (Singulair) 10 mg tablet Take 1 tablet (10 mg) by mouth once daily at bedtime. 8/21/24   Francheska Rogel DO   naloxone (Narcan) 4 mg/0.1 mL nasal spray Administer 1 spray (4 mg) into affected nostril(s) if needed for opioid reversal. May repeat every 2-3 minutes if needed, alternating nostrils, until medical assistance becomes available. 12/27/24   KERA Polanco-CNP   OLANZapine (ZyPREXA) 15 mg tablet Take 1 tablet (15 mg) by mouth once daily at bedtime. 2/4/25 8/3/25  Francheska Rogel DO   ondansetron ODT (Zofran-ODT) 8 mg disintegrating tablet Dissolve 1 tablet (8 mg) in the mouth every 8 hours if needed for nausea or vomiting. 2/4/25 2/4/26  Francheska Rogel DO   oxygen (O2) gas therapy Inhale 1 each continuously. 2 L at bedtime and naps    Historical MD Slade   prochlorperazine (Compazine) 5 mg tablet Take 1 tablet (5 mg) by mouth every 6 hours if needed for nausea or vomiting. 2/4/25   Francheska Rogel DO   rosuvastatin (Crestor) 40 mg tablet Take 1 tablet (40 mg) by mouth once daily. 10/17/24 10/17/25  Willam Mike MD   sertraline (Zoloft) 100 mg tablet TAKE 1 TABLET(100 MG) BY MOUTH TWICE DAILY 2/20/25   Nam Mason APRN-CNP   benzonatate (Tessalon) 200 mg capsule Take 1 capsule (200 mg) by mouth 3 times a day as needed for cough. Do not crush or chew. 12/6/24 3/3/25  Nico Davies DO   budesonide (Pulmicort) 0.25 mg/2 mL nebulizer solution Take 2 mL (0.25 mg) by nebulization 2 times a day. Rinse mouth with water after use to reduce aftertaste and incidence of candidiasis. Do not swallow. 7/8/24 3/3/25  Francheska E Hadney, DO        A ten-point review of systems was completed and is otherwise negative except for what is mentioned in the HPI above.    Physical Exam:    GENERAL: Well developed, chronically ill appearing, awake/alert/oriented x3, no distress, alert and cooperative  HEENT: MMM  NECK:  Trachea midline, no lymphadenopathy  CARDIOVASCULAR: RRR, normal S1 and S 2, no murmurs, 2+ equal pulses of the extremities  RESPIRATORY: Patent airways, CTAB, diminished breath sounds throughout with good chest expansion, thorax symmetric  ABDOMEN: Soft, non-tender, no distention  SKIN: Warm and dry  EXTREMITIES: No cyanosis, edema. Dressed right foot with orthotic shoe in place. Dressing dry and intact  NEURO: A&O x 3, normal motor and sensation, no focal deficits   PSYCH: Appropriate mood and behavior      PAT AIRWAY:   Airway:     Mallampati::  II    TM distance::  >3 FB    Neck ROM::  Full  normal          Visit Vitals  OB Status Postmenopausal   Smoking Status Every Day       DASI Risk Score      Flowsheet Row Pre-Admission Testing from 3/3/2025 in [facility]   Can you take care of yourself (eat, dress, bathe, or use toilet)?  2.75 filed at 03/03/2025 1133   Can you walk indoors, such as around your house? 1.75 filed at 03/03/2025 1133   Can you walk a block or two on level ground?  0 filed at 03/03/2025 1133   Can you climb a flight of stairs or walk up a hill? 0 filed at 03/03/2025 1133   Can you run a short distance? 0 filed at 03/03/2025 1133   Can you do light work around the house like dusting or washing dishes? 2.7 filed at 03/03/2025 1133   Can you do moderate work around the house like vacuuming, sweeping floors or carrying groceries? 0 filed at 03/03/2025 1133   Can you do heavy work around the house like scrubbing floors or lifting and moving heavy furniture?  0 filed at 03/03/2025 1133   Can you do yard work like raking leaves, weeding or pushing a mower? 0 filed at 03/03/2025 1133   Can you have sexual relations? 0 filed at 03/03/2025 1133   Can you participate in moderate recreational activities like golf, bowling, dancing, doubles tennis or throwing a baseball or football? 0 filed at 03/03/2025 1133   Can you participate in strenous sports like swimming, singles tennis, football,  basketball, or skiing? 0 filed at 03/03/2025 1133   DASI SCORE 7.2 filed at 03/03/2025 1133   METS Score (Will be calculated only when all the questions are answered) 3.6 filed at 03/03/2025 1133          Caprini DVT Assessment      Flowsheet Row ED to Hosp-Admission (Discharged) from 12/2/2024 in [facility] 6 with Nico Davies DO and Go Brito MD Admission (Discharged) from 6/25/2024 in [facility] 7 with Alban Smith MD   DVT Score (IF A SCORE IS NOT CALCULATING, MUST SELECT A BMI TO COMPLETE) 3 filed at 12/02/2024 1123 6 filed at 06/26/2024 1550   Medical Factors Present cancer, chemotherapy, or previous malignancy, COPD, Serious lung disease including pneumonia <1 month filed at 12/02/2024 1123 --   BMI (BMI MUST BE CHOSEN) -- 30 or less filed at 06/26/2024 1550   RETIRED: Current Status -- Major surgery planned, lasting 2-3 hours filed at 06/26/2024 1550   RETIRED: Age -- 60-75 years filed at 06/26/2024 1550          Modified Frailty Index    No data to display       [id number] Stroke Risk Points  Current as of just now        N/A 0 to 9 Points:      Last Change: N/A          The [id number] risk score (Lip GH, et al. 2009. © 2010 American [employer]) quantifies the risk of stroke for a patient with atrial fibrillation. For patients without atrial fibrillation or under the age of 18 this score appears as N/A. Higher score values generally indicate higher risk of stroke.        This score is not applicable to this patient. Components are not calculated.          Revised Cardiac Risk Index    No data to display       Apfel Simplified Score    No data to display       Break-the-Glass       Encounters that might contain data have been deemed confidential and restricted.            Risk Analysis Index Results This Encounter    No data found from available encounters.       Stop Bang Score      Flowsheet Row Pre-Admission Testing from 3/3/2025 in   [facility]   Do you snore loudly? 0 filed at 03/03/2025 1133   Do you often feel tired or fatigued after your sleep? 0 filed at 03/03/2025 1133   Has anyone ever observed you stop breathing in your sleep? 0 filed at 03/03/2025 1133   Do you have or are you being treated for high blood pressure? 0 filed at 03/03/2025 1133   Recent BMI (Calculated) 26.2 filed at 03/03/2025 1133   Is BMI greater than 35 kg/m2? 0=No filed at 03/03/2025 1133   Age older than 50 years old? 1=Yes filed at 03/03/2025 1133   Is your neck circumference greater than 17 inches (Male) or 16 inches (Female)? 0 filed at 03/03/2025 1133   Gender - Male 0=No filed at 03/03/2025 1133   STOP-BANG Total Score 1 filed at 03/03/2025 1133          Prodigy: High Risk  Total Score: 11              Prodigy Age Score      Prodigy Previous Opioid Use Score           ARISCAT Score for Postoperative Pulmonary Complications    No data to display       Moore Perioperative Risk for Myocardial Infarction or Cardiac Arrest (NATHAN)    No data to display         Assessment and Plan:   Patient is a 69 year old female with a history of HTN, HLD, CAD with FCO x2, severe PAD s/p multiple interventions (aortic aneurysm repair, intracranial aneurysm coiling, iliac grafts, fem-fem graft, multiple lower extremity stents and angioplasties - most recently on 2/12/25 at ), DVT, AF, COPD with home oxygen, current smoker, lung cancer w/ mets to adrenal glad, beta thalassemia and anemia.    Patient was hospitalized on 2/12/25 for a planned LEFT femoral angioplasty with stent and balloon angioplasty of the left anterior tibial artery. Postoperatively she was mildly hypotensive and was on a Reji-Synephrine drip for a short time. She was also anemic with a Hg as low as 6.9 and was transfused 1 unit > Hg incremented to 7.7 after transfusion and was up to 8.7 on day of discharge 2/17. On postop visit from 2/21 patient reported she fell twice due to lightheadedness. Today she  states that she has had no other occurences or issues with dizziness or lightheadedness.       #Ulcer of the left foot with gangrene and necrosis  -She is scheduled for left 4th toe and possible 3rd toe amputation, and left allograft skin application under MAC anesthesia on 3/12/25 with Dr. Doss.  -2/23/2025 wound culture positive for MRSA and Pseudomonas > prescribed chlorhexidine wash and oral rinse    #HTN, Hx  #HLD, Hx  Currently maintained on rosuvastatin  Not currently on BP medication with low normal BP on today's visit    #CAD s/p FCO x 2  #Severe PAD s/p multiple interventions  #DVT of RLE, Hx  #Paroxysmal A-fib, Hx  -Vascular interventions include aortic aneurysm repair, intracranial aneurysm coiling, iliac grafts, fem-fem graft, multiple lower extremity stents and angioplasties > most recently on 2/12/25 at  patient had LEFT femoral angioplasty with stent and balloon angioplasty of the left anterior tibial artery  -Currently maintained on Eliquis and Plavix > verified with Dr. Doss that she does not need these held during perioperative.    #COPD with home O2  #Current smoker  Currently smokes 1 PPD with a 90-pack-year history  Oxygen HS and  as needed  Maintained on dexamethasone, Trelegy and montelukast  No signs of current exacerbation or respiratory fraction    #Beta thalassemia, Hx  #Anemia, Hx  Status post transfusion during hospitallization at  in February   Hg 6.9 > post transfusion 7.7 > 8.7 > repeat cbc obtained today and demonstrates WBC 17.1, Hg 7.9, Hc 28.2 >> results reported to Misty Doss and GERARDO Rogel via epic chat message on 3/3/25  No overt bleeding, dizziness, syncope or fatigue. No fever or chills. On Dexamethasone.    #Lung cancer with metastasis to adrenal gland  Follows with Dr. Burnham for oncology last seen 1/31/2025  Currently maintained on Keytruda every 6 weeks next dose coming on 3/14/2025    #Anxiety, Hx  Managed with Wellbutrin, hydroxyzine, lorazepam, sertraline and  olanzapine as needed    CBC from 2/17 showed hemoglobin 8.7 posttransfusion > repeat CBC obtained today and remarkable for WBC 17.1, Hg 7.9, Hc 28.2 >> results reported to Misty Doss and PCP Juan F via epic chat message on 3/3/25  EKG from 12/2/2024 demonstrates normal sinus rhythm with anterolateral ST-T wave abnormality and possible inferior infarct seen in March 2024    I spent 45 minutes in the professional and overall care of this patient. Greater than 50% of this time was spent counseling patient, reviewing plan of care and discussing medication perioperative management.    Soraida Quintana, APRN-CNP

## 2025-03-04 ENCOUNTER — TELEPHONE (OUTPATIENT)
Dept: HEMATOLOGY/ONCOLOGY | Facility: CLINIC | Age: 70
End: 2025-03-04
Payer: MEDICARE

## 2025-03-04 ENCOUNTER — PATIENT OUTREACH (OUTPATIENT)
Dept: PRIMARY CARE | Facility: CLINIC | Age: 70
End: 2025-03-04
Payer: MEDICARE

## 2025-03-04 NOTE — TELEPHONE ENCOUNTER
Patient having taste changes and throat pain with swallowing - sounds like esophagitis symptoms, and family states she does not appear to have any thrush at all. Will discuss with Dr. Burnham in AM of what he can give her to help and let daughter know.

## 2025-03-05 DIAGNOSIS — C34.90 NON-SMALL CELL LUNG CANCER, UNSPECIFIED LATERALITY (MULTI): Primary | ICD-10-CM

## 2025-03-05 NOTE — TELEPHONE ENCOUNTER
Dr. Burnham sent Magic Mouthwash in for patient and patient and daughter aware of instructions. Will update me on how she's feeling.

## 2025-03-06 ENCOUNTER — OFFICE VISIT (OUTPATIENT)
Dept: WOUND CARE | Facility: CLINIC | Age: 70
End: 2025-03-06
Payer: MEDICARE

## 2025-03-06 DIAGNOSIS — G89.3 CANCER RELATED PAIN: ICD-10-CM

## 2025-03-06 DIAGNOSIS — C34.90 NON-SMALL CELL LUNG CANCER, UNSPECIFIED LATERALITY (MULTI): ICD-10-CM

## 2025-03-06 PROCEDURE — 11042 DBRDMT SUBQ TIS 1ST 20SQCM/<: CPT

## 2025-03-06 RX ORDER — HYDROCODONE BITARTRATE AND ACETAMINOPHEN 10; 325 MG/1; MG/1
1 TABLET ORAL EVERY 6 HOURS PRN
Qty: 60 TABLET | Refills: 0 | Status: SHIPPED | OUTPATIENT
Start: 2025-03-06

## 2025-03-12 ENCOUNTER — APPOINTMENT (OUTPATIENT)
Dept: RADIOLOGY | Facility: HOSPITAL | Age: 70
DRG: 853 | End: 2025-03-12
Payer: MEDICARE

## 2025-03-12 ENCOUNTER — APPOINTMENT (OUTPATIENT)
Dept: WOUND CARE | Facility: CLINIC | Age: 70
DRG: 853 | End: 2025-03-12
Payer: MEDICARE

## 2025-03-12 ENCOUNTER — ANESTHESIA EVENT (OUTPATIENT)
Dept: OPERATING ROOM | Facility: HOSPITAL | Age: 70
End: 2025-03-12
Payer: MEDICARE

## 2025-03-12 ENCOUNTER — ANESTHESIA (OUTPATIENT)
Dept: OPERATING ROOM | Facility: HOSPITAL | Age: 70
End: 2025-03-12
Payer: MEDICARE

## 2025-03-12 ENCOUNTER — HOSPITAL ENCOUNTER (OUTPATIENT)
Facility: HOSPITAL | Age: 70
Setting detail: OUTPATIENT SURGERY
Discharge: HOME | DRG: 853 | End: 2025-03-12
Attending: PODIATRIST | Admitting: PODIATRIST
Payer: MEDICARE

## 2025-03-12 VITALS
RESPIRATION RATE: 16 BRPM | DIASTOLIC BLOOD PRESSURE: 55 MMHG | HEART RATE: 66 BPM | SYSTOLIC BLOOD PRESSURE: 93 MMHG | OXYGEN SATURATION: 96 % | WEIGHT: 143.96 LBS | BODY MASS INDEX: 26.33 KG/M2 | TEMPERATURE: 98.1 F

## 2025-03-12 DIAGNOSIS — L97.524 ULCER OF TOE OF LEFT FOOT, WITH NECROSIS OF BONE (MULTI): ICD-10-CM

## 2025-03-12 DIAGNOSIS — I96 GANGRENE (MULTI): ICD-10-CM

## 2025-03-12 DIAGNOSIS — L97.522 ULCER OF TOE OF LEFT FOOT, WITH FAT LAYER EXPOSED (MULTI): ICD-10-CM

## 2025-03-12 DIAGNOSIS — Z98.890 POST-OPERATIVE STATE: Primary | ICD-10-CM

## 2025-03-12 PROCEDURE — 6360000003 HC OR 636 NO HCPCS: Performed by: PODIATRIST

## 2025-03-12 PROCEDURE — 88304 TISSUE EXAM BY PATHOLOGIST: CPT | Performed by: PATHOLOGY

## 2025-03-12 PROCEDURE — 3600000003 HC OR TIME - INITIAL BASE CHARGE - PROCEDURE LEVEL THREE: Performed by: PODIATRIST

## 2025-03-12 PROCEDURE — 0HRMXK3 REPLACEMENT OF RIGHT FOOT SKIN WITH NONAUTOLOGOUS TISSUE SUBSTITUTE, FULL THICKNESS, EXTERNAL APPROACH: ICD-10-PCS | Performed by: PODIATRIST

## 2025-03-12 PROCEDURE — 2720000007 HC OR 272 NO HCPCS: Performed by: PODIATRIST

## 2025-03-12 PROCEDURE — 2500000005 HC RX 250 GENERAL PHARMACY W/O HCPCS: Performed by: PODIATRIST

## 2025-03-12 PROCEDURE — 76000 FLUOROSCOPY <1 HR PHYS/QHP: CPT

## 2025-03-12 PROCEDURE — 2500000004 HC RX 250 GENERAL PHARMACY W/ HCPCS (ALT 636 FOR OP/ED): Performed by: PODIATRIST

## 2025-03-12 PROCEDURE — 3600000008 HC OR TIME - EACH INCREMENTAL 1 MINUTE - PROCEDURE LEVEL THREE: Performed by: PODIATRIST

## 2025-03-12 PROCEDURE — 3700000002 HC GENERAL ANESTHESIA TIME - EACH INCREMENTAL 1 MINUTE: Performed by: PODIATRIST

## 2025-03-12 PROCEDURE — 2500000004 HC RX 250 GENERAL PHARMACY W/ HCPCS (ALT 636 FOR OP/ED)

## 2025-03-12 PROCEDURE — A28820 PR AMPUTATION TOE,MT-P JT: Performed by: ANESTHESIOLOGY

## 2025-03-12 PROCEDURE — 3700000001 HC GENERAL ANESTHESIA TIME - INITIAL BASE CHARGE: Performed by: PODIATRIST

## 2025-03-12 PROCEDURE — 0JBQ0ZZ EXCISION OF RIGHT FOOT SUBCUTANEOUS TISSUE AND FASCIA, OPEN APPROACH: ICD-10-PCS | Performed by: PODIATRIST

## 2025-03-12 PROCEDURE — 0752T DGTZ GLS MCRSCP SLD LVL III: CPT | Mod: TC,PARLAB | Performed by: PODIATRIST

## 2025-03-12 PROCEDURE — 7100000010 HC PHASE TWO TIME - EACH INCREMENTAL 1 MINUTE: Performed by: PODIATRIST

## 2025-03-12 PROCEDURE — 0Y6T0Z3 DETACHMENT AT RIGHT 3RD TOE, LOW, OPEN APPROACH: ICD-10-PCS | Performed by: PODIATRIST

## 2025-03-12 PROCEDURE — 7100000009 HC PHASE TWO TIME - INITIAL BASE CHARGE: Performed by: PODIATRIST

## 2025-03-12 PROCEDURE — 0Y6R0Z3 DETACHMENT AT RIGHT 2ND TOE, LOW, OPEN APPROACH: ICD-10-PCS | Performed by: PODIATRIST

## 2025-03-12 PROCEDURE — A28820 PR AMPUTATION TOE,MT-P JT: Performed by: NURSE ANESTHETIST, CERTIFIED REGISTERED

## 2025-03-12 PROCEDURE — 0Y6V0Z1 DETACHMENT AT RIGHT 4TH TOE, HIGH, OPEN APPROACH: ICD-10-PCS | Performed by: PODIATRIST

## 2025-03-12 DEVICE — ALLOGRAFT, EPIFIX, 4 X 4.5CM: Type: IMPLANTABLE DEVICE | Site: FOOT | Status: FUNCTIONAL

## 2025-03-12 RX ORDER — OXYCODONE AND ACETAMINOPHEN 5; 325 MG/1; MG/1
1 TABLET ORAL EVERY 6 HOURS PRN
Qty: 20 TABLET | Refills: 0 | Status: ON HOLD | OUTPATIENT
Start: 2025-03-12 | End: 2025-03-28 | Stop reason: WASHOUT

## 2025-03-12 RX ORDER — FAMOTIDINE 10 MG/ML
20 INJECTION, SOLUTION INTRAVENOUS ONCE AS NEEDED
OUTPATIENT
Start: 2025-03-14

## 2025-03-12 RX ORDER — ACETAMINOPHEN 325 MG/1
650 TABLET ORAL EVERY 4 HOURS PRN
Status: DISCONTINUED | OUTPATIENT
Start: 2025-03-12 | End: 2025-03-12 | Stop reason: HOSPADM

## 2025-03-12 RX ORDER — LIDOCAINE HCL/PF 100 MG/5ML
SYRINGE (ML) INTRAVENOUS AS NEEDED
Status: DISCONTINUED | OUTPATIENT
Start: 2025-03-12 | End: 2025-03-12

## 2025-03-12 RX ORDER — PROCHLORPERAZINE MALEATE 10 MG
10 TABLET ORAL EVERY 6 HOURS PRN
OUTPATIENT
Start: 2025-03-14

## 2025-03-12 RX ORDER — PROCHLORPERAZINE EDISYLATE 5 MG/ML
10 INJECTION INTRAMUSCULAR; INTRAVENOUS EVERY 6 HOURS PRN
OUTPATIENT
Start: 2025-03-14

## 2025-03-12 RX ORDER — FENTANYL CITRATE 50 UG/ML
INJECTION, SOLUTION INTRAMUSCULAR; INTRAVENOUS AS NEEDED
Status: DISCONTINUED | OUTPATIENT
Start: 2025-03-12 | End: 2025-03-12

## 2025-03-12 RX ORDER — LIDOCAINE HYDROCHLORIDE 10 MG/ML
INJECTION, SOLUTION INFILTRATION; PERINEURAL AS NEEDED
Status: DISCONTINUED | OUTPATIENT
Start: 2025-03-12 | End: 2025-03-12 | Stop reason: HOSPADM

## 2025-03-12 RX ORDER — CEFAZOLIN SODIUM 2 G/100ML
2 INJECTION, SOLUTION INTRAVENOUS ONCE
Status: COMPLETED | OUTPATIENT
Start: 2025-03-12 | End: 2025-03-12

## 2025-03-12 RX ORDER — EPINEPHRINE 0.3 MG/.3ML
0.3 INJECTION SUBCUTANEOUS EVERY 5 MIN PRN
OUTPATIENT
Start: 2025-03-14

## 2025-03-12 RX ORDER — ONDANSETRON HYDROCHLORIDE 2 MG/ML
4 INJECTION, SOLUTION INTRAVENOUS ONCE AS NEEDED
Status: DISCONTINUED | OUTPATIENT
Start: 2025-03-12 | End: 2025-03-12 | Stop reason: HOSPADM

## 2025-03-12 RX ORDER — ALBUTEROL SULFATE 0.83 MG/ML
2.5 SOLUTION RESPIRATORY (INHALATION) ONCE AS NEEDED
Status: DISCONTINUED | OUTPATIENT
Start: 2025-03-12 | End: 2025-03-12 | Stop reason: HOSPADM

## 2025-03-12 RX ORDER — KETAMINE HCL IN NACL, ISO-OSM 100MG/10ML
SYRINGE (ML) INJECTION AS NEEDED
Status: DISCONTINUED | OUTPATIENT
Start: 2025-03-12 | End: 2025-03-12

## 2025-03-12 RX ORDER — DIPHENHYDRAMINE HYDROCHLORIDE 50 MG/ML
50 INJECTION INTRAMUSCULAR; INTRAVENOUS AS NEEDED
OUTPATIENT
Start: 2025-03-14

## 2025-03-12 RX ORDER — PROPOFOL 10 MG/ML
INJECTION, EMULSION INTRAVENOUS AS NEEDED
Status: DISCONTINUED | OUTPATIENT
Start: 2025-03-12 | End: 2025-03-12

## 2025-03-12 RX ORDER — BUPIVACAINE HYDROCHLORIDE 5 MG/ML
INJECTION, SOLUTION PERINEURAL AS NEEDED
Status: DISCONTINUED | OUTPATIENT
Start: 2025-03-12 | End: 2025-03-12 | Stop reason: HOSPADM

## 2025-03-12 RX ORDER — ALBUTEROL SULFATE 0.83 MG/ML
3 SOLUTION RESPIRATORY (INHALATION) AS NEEDED
OUTPATIENT
Start: 2025-03-14

## 2025-03-12 RX ADMIN — Medication 10 MG: at 10:20

## 2025-03-12 RX ADMIN — POVIDONE-IODINE 1 APPLICATION: 5 SOLUTION TOPICAL at 09:09

## 2025-03-12 RX ADMIN — PROPOFOL 50 MCG/KG/MIN: 10 INJECTION, EMULSION INTRAVENOUS at 09:47

## 2025-03-12 RX ADMIN — FENTANYL CITRATE 25 MCG: 50 INJECTION, SOLUTION INTRAMUSCULAR; INTRAVENOUS at 09:54

## 2025-03-12 RX ADMIN — PROPOFOL 20 MG: 10 INJECTION, EMULSION INTRAVENOUS at 10:00

## 2025-03-12 RX ADMIN — SODIUM CHLORIDE, SODIUM LACTATE, POTASSIUM CHLORIDE, AND CALCIUM CHLORIDE: .6; .31; .03; .02 INJECTION, SOLUTION INTRAVENOUS at 09:37

## 2025-03-12 RX ADMIN — FENTANYL CITRATE 25 MCG: 50 INJECTION, SOLUTION INTRAMUSCULAR; INTRAVENOUS at 10:00

## 2025-03-12 RX ADMIN — Medication 10 MG: at 09:54

## 2025-03-12 RX ADMIN — FENTANYL CITRATE 25 MCG: 50 INJECTION, SOLUTION INTRAMUSCULAR; INTRAVENOUS at 10:14

## 2025-03-12 RX ADMIN — PROPOFOL 20 MG: 10 INJECTION, EMULSION INTRAVENOUS at 09:45

## 2025-03-12 RX ADMIN — LIDOCAINE HYDROCHLORIDE 60 MG: 20 INJECTION INTRAVENOUS at 09:45

## 2025-03-12 RX ADMIN — PROPOFOL 20 MG: 10 INJECTION, EMULSION INTRAVENOUS at 09:53

## 2025-03-12 RX ADMIN — PROPOFOL 80 MCG/KG/MIN: 10 INJECTION, EMULSION INTRAVENOUS at 10:12

## 2025-03-12 RX ADMIN — Medication 10 MG: at 09:45

## 2025-03-12 RX ADMIN — CEFAZOLIN SODIUM 2 G: 2 INJECTION, SOLUTION INTRAVENOUS at 09:47

## 2025-03-12 RX ADMIN — PROPOFOL 20 MG: 10 INJECTION, EMULSION INTRAVENOUS at 10:15

## 2025-03-12 SDOH — HEALTH STABILITY: MENTAL HEALTH: CURRENT SMOKER: 1

## 2025-03-12 ASSESSMENT — PAIN SCALES - GENERAL
PAINLEVEL_OUTOF10: 0 - NO PAIN
PAIN_LEVEL: 0
PAINLEVEL_OUTOF10: 0 - NO PAIN

## 2025-03-12 ASSESSMENT — PAIN - FUNCTIONAL ASSESSMENT
PAIN_FUNCTIONAL_ASSESSMENT: 0-10

## 2025-03-12 NOTE — OP NOTE
LEFT 2nd,  3rd, 4th, AMPUTATION (L), OPEN WOUND EXCISION PREPARATION & RECIPIENT SITE (L), OF ALLOGRAFT SKIN APPLICATION (L) Operative Note     Date: 3/12/2025  OR Location: [facility]    Name: Radha Toussaint, : 1955, Age: 69 y.o., MRN: [id number], Sex: female    Diagnosis  Pre-op Diagnosis      * Gangrene (Multi) [I96]     * Ulcer of toe of left foot, with fat layer exposed (Multi) [L97.522]     * Ulcer of toe of left foot, with necrosis of bone (Multi) [L97.524] Post-op Diagnosis     * Gangrene (Multi) [I96]     * Ulcer of toe of left foot, with fat layer exposed (Multi) [L97.522]     * Ulcer of toe of left foot, with necrosis of bone (Multi) [L97.524]     Procedures  LEFT 2nd,  3rd, 4th, AMPUTATION  63283 - AR AMPUTATION TOE METATARSOPHALANGEAL JOINT    OPEN WOUND EXCISION PREPARATION & RECIPIENT SITE  75410 - AR PREP SITE F/S/N/H/F/G/M/D GT 1ST 100 SQ CM/1PCT    OF ALLOGRAFT SKIN APPLICATION  16671 - AR SUB GRFT F/S/N/H/F/G/M/D <100SQ CM 1ST 25 SQ CM      Surgeons      * Margoth Doss - Primary    Resident/Fellow/Other Assistant:  Surgeons and Role:     * Gemma Henderson DPM - Resident - Assisting    Staff:   Circulator: Erin Salgueroub Person: Rafia Parsons Scrub: Soumya    Anesthesia Staff: Anesthesiologist: Martha Levin MD  CRNA: KERA Terrazas-CRNA  SRNA: Kishan Meyers    Procedure Summary  Anesthesia: Monitor Anesthesia Care  ASA: III  Estimated Blood Loss: Minimal  Intra-op Medications:   Administrations occurring from 1000 to 1130 on 25:   Medication Name Total Dose   lidocaine (Xylocaine) 10 mg/mL (1 %) injection 8 mL   ceFAZolin (Ancef) 2 g in dextrose (iso)  mL Cannot be calculated   fentaNYL (Sublimaze) injection 50 mcg/mL 50 mcg   ketamine injection 10 mg/mL (30 mg/3 mL) prefilled syringe 10 mg   LR bolus Cannot be calculated   propofol (Diprivan) injection 10 mg/mL 144.48 mg              Anesthesia Record               Intraprocedure I/O Totals          Intake    LR  bolus 50.00 mL    ceFAZolin (Ancef) 2 g in dextrose (iso)  mL 100.00 mL    Total Intake 150 mL       Output    Est. Blood Loss 50 mL    Total Output 50 mL       Net    Net Volume 100 mL          Specimen:   ID Type Source Tests Collected by Time   1 : 2nd, 3rd, and 4th left toe tips Tissue BONE RESECTION SURGICAL PATHOLOGY EXAM Margoth Doss, MAGDA 3/12/2025 1022                 Drains and/or Catheters: * None in log *    Tourniquet Times:   * Missing tourniquet times found for documented tourniquets in lo *     Implants:  Implants       Type Name Action Serial No.      Graft ALLOGRAFT, EPIFIX, 4 X 4.5CM - [id number] - [id number] Implanted [id number]                Indications: Radha Toussaint is an 69 y.o. female who is having surgery for Gangrene (Multi) [I96]  Ulcer of toe of left foot, with fat layer exposed (Multi) [L97.522]  Ulcer of toe of left foot, with necrosis of bone (Multi) [L97.524].     The patient was seen in the preoperative area. The risks, benefits, complications, treatment options, non-operative alternatives, expected recovery and outcomes were discussed with the patient. The possibilities of reaction to medication, pulmonary aspiration, injury to surrounding structures, bleeding, recurrent infection, the need for additional procedures, failure to diagnose a condition, and creating a complication requiring transfusion or operation were discussed with the patient. The patient concurred with the proposed plan, giving informed consent.  The site of surgery was properly noted/marked if necessary per policy. The patient has been actively warmed in preoperative area. Preoperative antibiotics have been ordered and given within 1 hours of incision. Venous thrombosis prophylaxis are not indicated.    Procedure Details: Under mild sedation, the patient was brought to operating room, placed on operating table in supine position.    No tourniquet was utilized.  The left lower  extremity was then scrubbed, prepped, and draped in usual aseptic manner.      Procedure #1:  Amputation of 2nd, 3rd, and 4th digit  Attention was then directed to the right distal 2nd digit.  A fishmouth incision was made with a blade down to bone.  The distal toe was then disarticulated at DIPJ, passed to the back table and sent for pathology. Incision was flushed with copious amount of normal saline.  The incision was then closed with 3-0 Prolene.    The same procedure was done to 3rd digit.  The same procedure was done to 4th digit except the digit was disarticulated at PIPJ.      Procedure #2: Preparation for allograft application  Attention was then directed to the ulcers of the medial hallux, dorsal medial 1st metatarsal head, lateral 5th metatarsal head, and interdigital space of the 3rd.  Using a currette, excisional debridement was then performed down to subcutaneous tissue.  Ulcers were flushed with copious amount of normal saline.     Procedure #3: Allograft Epifix Application  Epifix was then applied to all ulcerations according to 's technique and secured with steri strips.       The incisions were dressed with Betadine-soaked Adaptic.  Left foot was then dressed with gauze, tiffany, and Kerlix.    The patient tolerated procedure and anesthesia well in apparent satisfactory condition.      The patient was then transferred to PACU, vital signs stable and vascular status intact to digits for monitoring prior to discharge.   Complications:  None; patient tolerated the procedure well.    Disposition: PACU - hemodynamically stable.  Condition: stable           Margoth Doss  Phone Number: [phone]

## 2025-03-12 NOTE — ANESTHESIA POSTPROCEDURE EVALUATION
Patient: Radha Toussaint    Procedure Summary       Date: 03/12/25 Room / Location: [facility] 02 / [facility]    Anesthesia Start: 0938 Anesthesia Stop:     Procedures:       LEFT 2nd,  3rd, 4th, AMPUTATION (Left)      OPEN WOUND EXCISION PREPARATION & RECIPIENT SITE (Left)      OF ALLOGRAFT SKIN APPLICATION (Left) Diagnosis:       Gangrene (Multi)      Ulcer of toe of left foot, with fat layer exposed (Multi)      Ulcer of toe of left foot, with necrosis of bone (Multi)      (Gangrene (Multi) [I96])      (Ulcer of toe of left foot, with fat layer exposed (Multi) [L97.522])      (Ulcer of toe of left foot, with necrosis of bone (Multi) [L97.524])    Surgeons: Margoth Doss DPM Responsible Provider: Martha Levin MD    Anesthesia Type: MAC ASA Status: 3            Anesthesia Type: MAC    Vitals Value Taken Time   /60 03/12/25 1044   Temp 36.7 °C (98.1 °F) 03/12/25 1043   Pulse 62 03/12/25 1045   Resp 16 03/12/25 1043   SpO2 99 % 03/12/25 1045   Vitals shown include unfiled device data.    Anesthesia Post Evaluation    Patient location during evaluation: PACU  Patient participation: complete - patient participated  Level of consciousness: sleepy but conscious  Pain score: 0  Pain management: adequate  Airway patency: patent  Cardiovascular status: acceptable, blood pressure returned to baseline and hemodynamically stable  Respiratory status: acceptable and face mask  Hydration status: acceptable  Postoperative Nausea and Vomiting: none        There were no known notable events for this encounter.

## 2025-03-12 NOTE — ANESTHESIA PREPROCEDURE EVALUATION
Patient: Radha Toussaint    Procedure Information       Date/Time: 03/12/25 1000    Procedures:       LEFT 2nd,  3rd, 4th, AMPUTATION (Left)      OPEN WOUND EXCISION PREPARATION & RECIPIENT SITE (Left)      OF ALLOGRAFT SKIN APPLICATION (Left)    Location: [facility] / [facility]    Surgeons: Margoth Doss DPM            Relevant Problems   Anesthesia (within normal limits)      Cardiac   (+) Abdominal aortic aneurysm (CMS-HCC)   (+) Atherosclerosis of native arteries of extremities with rest pain, left leg (Multi)   (+) CAD (coronary artery disease)   (+) Hypertension   (+) Mixed hyperlipidemia   (+) Myocardial infarction (Multi)   (+) Occlusion of left popliteal artery (CMS-HCC)   (+) Peripheral vascular disease (CMS-HCC)   (+) STEMI (ST elevation myocardial infarction) (Multi)      Pulmonary   (+) Dyspnea on exertion   (+) Non-small cell lung cancer (NSCLC) (Multi)   (+) Pneumonia due to gram-negative bacteria (Multi)   (+) Pneumonia of left lung due to infectious organism, unspecified part of lung   (+) 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 (Multi)      Neuro   (+) Anxiety   (+) Mixed anxiety depressive disorder   (+) Nonruptured cerebral aneurysm (HHS-HCC)      /Renal   (+) Dilation of renal shen   (+) Nephrolithiasis      Liver   (+) Liver lesion      Endocrine   (+) Secondary malignant neoplasm of left adrenal gland (Multi)      Hematology   (+) Anemia in other chronic diseases classified elsewhere   (+) Beta thalassemia (Multi)      ID   (+) COVID-19   (+) Pneumonia due to gram-negative bacteria (Multi)   (+) Pneumonia of left lung due to infectious organism, unspecified part of lung       Clinical information reviewed:                   NPO Detail:  No data recorded     Physical Exam    Airway  Mallampati: II  TM distance: >3 FB  Neck ROM: full     Cardiovascular - normal exam     Dental   (+) upper dentures     Pulmonary   (+) decreased breath sounds     Abdominal - normal exam              Anesthesia Plan    History of general anesthesia?: yes  History of complications of general anesthesia?: no    ASA 3     MAC     The patient is a current smoker.  Patient was previously instructed to abstain from smoking on day of procedure.  Patient did not smoke on day of procedure.    intravenous induction   Anesthetic plan and risks discussed with patient.    Plan discussed with CRNA.

## 2025-03-12 NOTE — DISCHARGE INSTRUCTIONS
PODIATRIC SURGERY POST-OP INSTRUCTIONS    YOU HAD ANESTHESIA:  You must have a responsible adult drive you home and stay with you for the first 24 hours.    Do not drive a car or drink any alcohol for 24 hours after surgery.  Do not do any strenuous work or activity for the next 24 hours.  You may have mild nausea, a sore throat or raspy voice for a few days.    You received a local numbing block to the foot after your surgery. You should expect the surgical extremity to remain numb for the next 6-12 hours.    POST-OP INSTRUCTIONS:  Keep dressing clean, dry and intact until your first post-operative appointment.  Do not remove surgical dressing. You may need to loosen if necessary.   Do not shower unless using a cast protector to protect dressing.  If dressing gets wet, please call office immediately as this can lead to increased risk of infection or wound dehiscence.   Elevate surgical extremity as much as possible to help with pain and swelling.  Place ice pack behind knee of surgical extremity (20 minutes on/20 minutes off while awake). After 24 hours use ice behind the knee as needed for comfort.  Weight Bearing Status: Bear weight as tolerated in surgical shoe.  Please resume all home medications.   Post-Operative Medications: You were prescribed percocet for pain; please take as directed on the label.  Post-operative appointment with Dr. Doss for 1 week. Please call to schedule if not already scheduled.  Should any problems, questions, or concerns arise, please call the clinic office.    WHEN TO CALL YOUR DOCTOR:  Fever (>100.4°F or 38.0°C) or chills.  Incision problems such as redness, warmth, swelling, or foul smelling drainage.  Severe nausea or persistent vomiting.  Pain and swelling in your legs, especially if it is only on one side and not the other.  Pain with urination, cloudy urine, or foul smelling urine.  Or if you have any other problems or questions.  CALL 911 or go to the ED if you have any  shortness of breath, difficulty breathing, or chest pain.

## 2025-03-13 ASSESSMENT — PAIN SCALES - GENERAL: PAINLEVEL_OUTOF10: 0 - NO PAIN

## 2025-03-14 ENCOUNTER — TELEPHONE (OUTPATIENT)
Dept: HEMATOLOGY/ONCOLOGY | Facility: CLINIC | Age: 70
End: 2025-03-14
Payer: MEDICARE

## 2025-03-14 ENCOUNTER — APPOINTMENT (OUTPATIENT)
Dept: HEMATOLOGY/ONCOLOGY | Facility: CLINIC | Age: 70
End: 2025-03-14
Payer: MEDICARE

## 2025-03-14 ENCOUNTER — APPOINTMENT (OUTPATIENT)
Dept: RADIOLOGY | Facility: HOSPITAL | Age: 70
DRG: 853 | End: 2025-03-14
Payer: MEDICARE

## 2025-03-14 ENCOUNTER — HOSPITAL ENCOUNTER (INPATIENT)
Facility: HOSPITAL | Age: 70
Discharge: HOME | DRG: 853 | End: 2025-03-14
Attending: STUDENT IN AN ORGANIZED HEALTH CARE EDUCATION/TRAINING PROGRAM | Admitting: STUDENT IN AN ORGANIZED HEALTH CARE EDUCATION/TRAINING PROGRAM
Payer: MEDICARE

## 2025-03-14 ENCOUNTER — APPOINTMENT (OUTPATIENT)
Dept: CARDIOLOGY | Facility: HOSPITAL | Age: 70
DRG: 853 | End: 2025-03-14
Payer: MEDICARE

## 2025-03-14 DIAGNOSIS — N39.0 BACTERIAL URINARY INFECTION: ICD-10-CM

## 2025-03-14 DIAGNOSIS — R06.02 SHORTNESS OF BREATH: Primary | ICD-10-CM

## 2025-03-14 DIAGNOSIS — R41.82 ALTERED MENTAL STATUS, UNSPECIFIED ALTERED MENTAL STATUS TYPE: ICD-10-CM

## 2025-03-14 DIAGNOSIS — R53.81 PHYSICAL DECONDITIONING: ICD-10-CM

## 2025-03-14 DIAGNOSIS — C78.01 SMALL CELL CARCINOMA METASTATIC TO BOTH LUNGS: ICD-10-CM

## 2025-03-14 DIAGNOSIS — C34.90 NON-SMALL CELL LUNG CANCER, UNSPECIFIED LATERALITY (MULTI): Primary | ICD-10-CM

## 2025-03-14 DIAGNOSIS — C78.02 SMALL CELL CARCINOMA METASTATIC TO BOTH LUNGS: ICD-10-CM

## 2025-03-14 DIAGNOSIS — A49.9 BACTERIAL URINARY INFECTION: ICD-10-CM

## 2025-03-14 DIAGNOSIS — R65.10 SIRS (SYSTEMIC INFLAMMATORY RESPONSE SYNDROME) (MULTI): ICD-10-CM

## 2025-03-14 LAB
ALBUMIN SERPL BCP-MCNC: 2.9 G/DL (ref 3.4–5)
ALP SERPL-CCNC: 55 U/L (ref 33–136)
ALT SERPL W P-5'-P-CCNC: 9 U/L (ref 7–45)
ANION GAP SERPL CALC-SCNC: 12 MMOL/L (ref 10–20)
APPEARANCE UR: ABNORMAL
APTT PPP: 31 SECONDS (ref 26–36)
AST SERPL W P-5'-P-CCNC: 17 U/L (ref 9–39)
BACTERIA #/AREA URNS AUTO: ABNORMAL /HPF
BASOPHILS # BLD AUTO: 0.02 X10*3/UL (ref 0–0.1)
BASOPHILS NFR BLD AUTO: 0.2 %
BILIRUB SERPL-MCNC: 0.5 MG/DL (ref 0–1.2)
BILIRUB UR STRIP.AUTO-MCNC: NEGATIVE MG/DL
BNP SERPL-MCNC: 55 PG/ML (ref 0–99)
BUN SERPL-MCNC: 15 MG/DL (ref 6–23)
CALCIUM SERPL-MCNC: 8.3 MG/DL (ref 8.6–10.3)
CARDIAC TROPONIN I PNL SERPL HS: 14 NG/L (ref 0–13)
CARDIAC TROPONIN I PNL SERPL HS: 14 NG/L (ref 0–13)
CHLORIDE SERPL-SCNC: 108 MMOL/L (ref 98–107)
CO2 SERPL-SCNC: 23 MMOL/L (ref 21–32)
COLOR UR: YELLOW
CREAT SERPL-MCNC: 0.66 MG/DL (ref 0.5–1.05)
EGFRCR SERPLBLD CKD-EPI 2021: >90 ML/MIN/1.73M*2
EOSINOPHIL # BLD AUTO: 0.06 X10*3/UL (ref 0–0.7)
EOSINOPHIL NFR BLD AUTO: 0.5 %
ERYTHROCYTE [DISTWIDTH] IN BLOOD BY AUTOMATED COUNT: 20 % (ref 11.5–14.5)
FLUAV RNA RESP QL NAA+PROBE: NOT DETECTED
FLUBV RNA RESP QL NAA+PROBE: NOT DETECTED
GLUCOSE BLD MANUAL STRIP-MCNC: 76 MG/DL (ref 74–99)
GLUCOSE SERPL-MCNC: 79 MG/DL (ref 74–99)
GLUCOSE UR STRIP.AUTO-MCNC: NORMAL MG/DL
HCT VFR BLD AUTO: 24.3 % (ref 36–46)
HGB BLD-MCNC: 6.7 G/DL (ref 12–16)
IMM GRANULOCYTES # BLD AUTO: 0.1 X10*3/UL (ref 0–0.7)
IMM GRANULOCYTES NFR BLD AUTO: 0.8 % (ref 0–0.9)
INR PPP: 1.5 (ref 0.9–1.1)
KETONES UR STRIP.AUTO-MCNC: NEGATIVE MG/DL
LACTATE SERPL-SCNC: 0.6 MMOL/L (ref 0.4–2)
LEUKOCYTE ESTERASE UR QL STRIP.AUTO: ABNORMAL
LYMPHOCYTES # BLD AUTO: 1.76 X10*3/UL (ref 1.2–4.8)
LYMPHOCYTES NFR BLD AUTO: 13.4 %
MAGNESIUM SERPL-MCNC: 1.61 MG/DL (ref 1.6–2.4)
MCH RBC QN AUTO: 20.4 PG (ref 26–34)
MCHC RBC AUTO-ENTMCNC: 27.6 G/DL (ref 32–36)
MCV RBC AUTO: 74 FL (ref 80–100)
MONOCYTES # BLD AUTO: 0.98 X10*3/UL (ref 0.1–1)
MONOCYTES NFR BLD AUTO: 7.5 %
MUCOUS THREADS #/AREA URNS AUTO: ABNORMAL /LPF
NEUTROPHILS # BLD AUTO: 10.21 X10*3/UL (ref 1.2–7.7)
NEUTROPHILS NFR BLD AUTO: 77.6 %
NITRITE UR QL STRIP.AUTO: NEGATIVE
NRBC BLD-RTO: 0 /100 WBCS (ref 0–0)
PH UR STRIP.AUTO: 5.5 [PH]
PLATELET # BLD AUTO: 184 X10*3/UL (ref 150–450)
POTASSIUM SERPL-SCNC: 4.2 MMOL/L (ref 3.5–5.3)
PROT SERPL-MCNC: 5.5 G/DL (ref 6.4–8.2)
PROT UR STRIP.AUTO-MCNC: ABNORMAL MG/DL
PROTHROMBIN TIME: 16.2 SECONDS (ref 9.8–12.4)
RBC # BLD AUTO: 3.28 X10*6/UL (ref 4–5.2)
RBC # UR STRIP.AUTO: ABNORMAL MG/DL
RBC #/AREA URNS AUTO: ABNORMAL /HPF
SARS-COV-2 RNA RESP QL NAA+PROBE: NOT DETECTED
SODIUM SERPL-SCNC: 139 MMOL/L (ref 136–145)
SP GR UR STRIP.AUTO: 1.01
SQUAMOUS #/AREA URNS AUTO: ABNORMAL /HPF
TRANS CELLS #/AREA UR COMP ASSIST: ABNORMAL /HPF
UROBILINOGEN UR STRIP.AUTO-MCNC: NORMAL MG/DL
WBC # BLD AUTO: 13.1 X10*3/UL (ref 4.4–11.3)
WBC #/AREA URNS AUTO: ABNORMAL /HPF

## 2025-03-14 PROCEDURE — 80053 COMPREHEN METABOLIC PANEL: CPT | Performed by: STUDENT IN AN ORGANIZED HEALTH CARE EDUCATION/TRAINING PROGRAM

## 2025-03-14 PROCEDURE — 85730 THROMBOPLASTIN TIME PARTIAL: CPT | Performed by: STUDENT IN AN ORGANIZED HEALTH CARE EDUCATION/TRAINING PROGRAM

## 2025-03-14 PROCEDURE — 36415 COLL VENOUS BLD VENIPUNCTURE: CPT | Performed by: STUDENT IN AN ORGANIZED HEALTH CARE EDUCATION/TRAINING PROGRAM

## 2025-03-14 PROCEDURE — 82947 ASSAY GLUCOSE BLOOD QUANT: CPT

## 2025-03-14 PROCEDURE — 70450 CT HEAD/BRAIN W/O DYE: CPT

## 2025-03-14 PROCEDURE — P9045 ALBUMIN (HUMAN), 5%, 250 ML: HCPCS | Mod: JZ

## 2025-03-14 PROCEDURE — 2500000005 HC RX 250 GENERAL PHARMACY W/O HCPCS: Performed by: STUDENT IN AN ORGANIZED HEALTH CARE EDUCATION/TRAINING PROGRAM

## 2025-03-14 PROCEDURE — 83605 ASSAY OF LACTIC ACID: CPT | Performed by: STUDENT IN AN ORGANIZED HEALTH CARE EDUCATION/TRAINING PROGRAM

## 2025-03-14 PROCEDURE — 83880 ASSAY OF NATRIURETIC PEPTIDE: CPT | Performed by: STUDENT IN AN ORGANIZED HEALTH CARE EDUCATION/TRAINING PROGRAM

## 2025-03-14 PROCEDURE — 2020000001 HC ICU ROOM DAILY

## 2025-03-14 PROCEDURE — 70450 CT HEAD/BRAIN W/O DYE: CPT | Performed by: RADIOLOGY

## 2025-03-14 PROCEDURE — 99285 EMERGENCY DEPT VISIT HI MDM: CPT | Mod: 25 | Performed by: STUDENT IN AN ORGANIZED HEALTH CARE EDUCATION/TRAINING PROGRAM

## 2025-03-14 PROCEDURE — 96361 HYDRATE IV INFUSION ADD-ON: CPT

## 2025-03-14 PROCEDURE — 96367 TX/PROPH/DG ADDL SEQ IV INF: CPT

## 2025-03-14 PROCEDURE — 81001 URINALYSIS AUTO W/SCOPE: CPT | Performed by: STUDENT IN AN ORGANIZED HEALTH CARE EDUCATION/TRAINING PROGRAM

## 2025-03-14 PROCEDURE — 96375 TX/PRO/DX INJ NEW DRUG ADDON: CPT

## 2025-03-14 PROCEDURE — 99291 CRITICAL CARE FIRST HOUR: CPT

## 2025-03-14 PROCEDURE — 87636 SARSCOV2 & INF A&B AMP PRB: CPT | Performed by: STUDENT IN AN ORGANIZED HEALTH CARE EDUCATION/TRAINING PROGRAM

## 2025-03-14 PROCEDURE — 71045 X-RAY EXAM CHEST 1 VIEW: CPT

## 2025-03-14 PROCEDURE — 71045 X-RAY EXAM CHEST 1 VIEW: CPT | Performed by: RADIOLOGY

## 2025-03-14 PROCEDURE — 87040 BLOOD CULTURE FOR BACTERIA: CPT | Mod: PARLAB | Performed by: STUDENT IN AN ORGANIZED HEALTH CARE EDUCATION/TRAINING PROGRAM

## 2025-03-14 PROCEDURE — 83735 ASSAY OF MAGNESIUM: CPT | Performed by: STUDENT IN AN ORGANIZED HEALTH CARE EDUCATION/TRAINING PROGRAM

## 2025-03-14 PROCEDURE — 93005 ELECTROCARDIOGRAM TRACING: CPT

## 2025-03-14 PROCEDURE — 84484 ASSAY OF TROPONIN QUANT: CPT | Performed by: STUDENT IN AN ORGANIZED HEALTH CARE EDUCATION/TRAINING PROGRAM

## 2025-03-14 PROCEDURE — 85025 COMPLETE CBC W/AUTO DIFF WBC: CPT | Performed by: STUDENT IN AN ORGANIZED HEALTH CARE EDUCATION/TRAINING PROGRAM

## 2025-03-14 PROCEDURE — 2500000004 HC RX 250 GENERAL PHARMACY W/ HCPCS (ALT 636 FOR OP/ED)

## 2025-03-14 PROCEDURE — 85610 PROTHROMBIN TIME: CPT | Performed by: STUDENT IN AN ORGANIZED HEALTH CARE EDUCATION/TRAINING PROGRAM

## 2025-03-14 PROCEDURE — 87086 URINE CULTURE/COLONY COUNT: CPT | Mod: PARLAB | Performed by: STUDENT IN AN ORGANIZED HEALTH CARE EDUCATION/TRAINING PROGRAM

## 2025-03-14 PROCEDURE — 96365 THER/PROPH/DIAG IV INF INIT: CPT

## 2025-03-14 PROCEDURE — 3E033XZ INTRODUCTION OF VASOPRESSOR INTO PERIPHERAL VEIN, PERCUTANEOUS APPROACH: ICD-10-PCS

## 2025-03-14 PROCEDURE — 2500000004 HC RX 250 GENERAL PHARMACY W/ HCPCS (ALT 636 FOR OP/ED): Performed by: STUDENT IN AN ORGANIZED HEALTH CARE EDUCATION/TRAINING PROGRAM

## 2025-03-14 RX ORDER — VANCOMYCIN/0.9 % SOD CHLORIDE 1.5G/250ML
1500 PLASTIC BAG, INJECTION (ML) INTRAVENOUS ONCE
Status: DISCONTINUED | OUTPATIENT
Start: 2025-03-14 | End: 2025-03-14

## 2025-03-14 RX ORDER — ACETAMINOPHEN 325 MG/1
650 TABLET ORAL EVERY 6 HOURS PRN
Status: DISCONTINUED | OUTPATIENT
Start: 2025-03-14 | End: 2025-03-19 | Stop reason: HOSPADM

## 2025-03-14 RX ORDER — INSULIN LISPRO 100 [IU]/ML
0-5 INJECTION, SOLUTION INTRAVENOUS; SUBCUTANEOUS
Status: DISCONTINUED | OUTPATIENT
Start: 2025-03-15 | End: 2025-03-19 | Stop reason: HOSPADM

## 2025-03-14 RX ORDER — CEFTRIAXONE 2 G/50ML
2 INJECTION, SOLUTION INTRAVENOUS ONCE
Status: COMPLETED | OUTPATIENT
Start: 2025-03-14 | End: 2025-03-14

## 2025-03-14 RX ORDER — ONDANSETRON HYDROCHLORIDE 2 MG/ML
4 INJECTION, SOLUTION INTRAVENOUS ONCE
Status: COMPLETED | OUTPATIENT
Start: 2025-03-14 | End: 2025-03-14

## 2025-03-14 RX ORDER — DEXAMETHASONE 4 MG/1
2 TABLET ORAL DAILY
Status: DISCONTINUED | OUTPATIENT
Start: 2025-03-15 | End: 2025-03-19 | Stop reason: HOSPADM

## 2025-03-14 RX ORDER — ONDANSETRON 4 MG/1
4 TABLET, FILM COATED ORAL EVERY 8 HOURS PRN
Status: DISCONTINUED | OUTPATIENT
Start: 2025-03-14 | End: 2025-03-19 | Stop reason: HOSPADM

## 2025-03-14 RX ORDER — GABAPENTIN 300 MG/1
300 CAPSULE ORAL 3 TIMES DAILY
Status: DISCONTINUED | OUTPATIENT
Start: 2025-03-14 | End: 2025-03-19 | Stop reason: HOSPADM

## 2025-03-14 RX ORDER — ACETAMINOPHEN 10 MG/ML
1000 INJECTION, SOLUTION INTRAVENOUS ONCE
Status: COMPLETED | OUTPATIENT
Start: 2025-03-14 | End: 2025-03-14

## 2025-03-14 RX ORDER — DEXTROSE 50 % IN WATER (D50W) INTRAVENOUS SYRINGE
25
Status: DISCONTINUED | OUTPATIENT
Start: 2025-03-14 | End: 2025-03-19 | Stop reason: HOSPADM

## 2025-03-14 RX ORDER — LORAZEPAM 0.5 MG/1
0.5 TABLET ORAL EVERY 6 HOURS PRN
Status: DISCONTINUED | OUTPATIENT
Start: 2025-03-14 | End: 2025-03-19 | Stop reason: HOSPADM

## 2025-03-14 RX ORDER — HYDROCODONE BITARTRATE AND ACETAMINOPHEN 10; 325 MG/1; MG/1
1 TABLET ORAL EVERY 6 HOURS PRN
Status: DISCONTINUED | OUTPATIENT
Start: 2025-03-14 | End: 2025-03-19 | Stop reason: HOSPADM

## 2025-03-14 RX ORDER — LUBIPROSTONE 24 UG/1
24 CAPSULE ORAL
Status: DISCONTINUED | OUTPATIENT
Start: 2025-03-15 | End: 2025-03-19 | Stop reason: HOSPADM

## 2025-03-14 RX ORDER — NOREPINEPHRINE BITARTRATE 0.03 MG/ML
0-.2 INJECTION, SOLUTION INTRAVENOUS CONTINUOUS
Status: DISCONTINUED | OUTPATIENT
Start: 2025-03-14 | End: 2025-03-17

## 2025-03-14 RX ORDER — DEXTROSE 50 % IN WATER (D50W) INTRAVENOUS SYRINGE
12.5
Status: DISCONTINUED | OUTPATIENT
Start: 2025-03-14 | End: 2025-03-19 | Stop reason: HOSPADM

## 2025-03-14 RX ORDER — VANCOMYCIN HYDROCHLORIDE 1 G/200ML
1000 INJECTION, SOLUTION INTRAVENOUS EVERY 12 HOURS
Status: DISCONTINUED | OUTPATIENT
Start: 2025-03-15 | End: 2025-03-17

## 2025-03-14 RX ORDER — ALBUMIN HUMAN 50 G/1000ML
25 SOLUTION INTRAVENOUS ONCE
Status: COMPLETED | OUTPATIENT
Start: 2025-03-14 | End: 2025-03-14

## 2025-03-14 RX ORDER — VANCOMYCIN HYDROCHLORIDE 1 G/20ML
INJECTION, POWDER, LYOPHILIZED, FOR SOLUTION INTRAVENOUS DAILY PRN
Status: DISCONTINUED | OUTPATIENT
Start: 2025-03-14 | End: 2025-03-19 | Stop reason: HOSPADM

## 2025-03-14 RX ORDER — ROSUVASTATIN CALCIUM 10 MG/1
40 TABLET, COATED ORAL DAILY
Status: DISCONTINUED | OUTPATIENT
Start: 2025-03-15 | End: 2025-03-19 | Stop reason: HOSPADM

## 2025-03-14 RX ORDER — CLOPIDOGREL BISULFATE 75 MG/1
75 TABLET ORAL EVERY MORNING
Status: DISCONTINUED | OUTPATIENT
Start: 2025-03-15 | End: 2025-03-19 | Stop reason: HOSPADM

## 2025-03-14 RX ORDER — ONDANSETRON HYDROCHLORIDE 2 MG/ML
4 INJECTION, SOLUTION INTRAVENOUS EVERY 8 HOURS PRN
Status: DISCONTINUED | OUTPATIENT
Start: 2025-03-14 | End: 2025-03-19 | Stop reason: HOSPADM

## 2025-03-14 RX ADMIN — CEFTRIAXONE SODIUM 2 G: 2 INJECTION, SOLUTION INTRAVENOUS at 17:43

## 2025-03-14 RX ADMIN — SODIUM CHLORIDE 2013 ML: 9 INJECTION, SOLUTION INTRAVENOUS at 18:19

## 2025-03-14 RX ADMIN — ACETAMINOPHEN 1000 MG: 10 INJECTION, SOLUTION INTRAVENOUS at 20:18

## 2025-03-14 RX ADMIN — PIPERACILLIN SODIUM AND TAZOBACTAM SODIUM 3.38 G: 3; .375 INJECTION, SOLUTION INTRAVENOUS at 21:40

## 2025-03-14 RX ADMIN — ALBUMIN (HUMAN) 25 G: 12.5 INJECTION, SOLUTION INTRAVENOUS at 22:08

## 2025-03-14 RX ADMIN — ONDANSETRON 4 MG: 2 INJECTION INTRAMUSCULAR; INTRAVENOUS at 18:19

## 2025-03-14 RX ADMIN — VANCOMYCIN HYDROCHLORIDE 1500 MG: 1.5 INJECTION, POWDER, LYOPHILIZED, FOR SOLUTION INTRAVENOUS at 23:11

## 2025-03-14 RX ADMIN — NOREPINEPHRINE BITARTRATE 0.01 MCG/KG/MIN: 32 SOLUTION INTRAVENOUS at 21:12

## 2025-03-14 ASSESSMENT — PAIN DESCRIPTION - LOCATION: LOCATION: FOOT

## 2025-03-14 ASSESSMENT — COLUMBIA-SUICIDE SEVERITY RATING SCALE - C-SSRS
2. HAVE YOU ACTUALLY HAD ANY THOUGHTS OF KILLING YOURSELF?: NO
1. IN THE PAST MONTH, HAVE YOU WISHED YOU WERE DEAD OR WISHED YOU COULD GO TO SLEEP AND NOT WAKE UP?: NO
6. HAVE YOU EVER DONE ANYTHING, STARTED TO DO ANYTHING, OR PREPARED TO DO ANYTHING TO END YOUR LIFE?: NO

## 2025-03-14 ASSESSMENT — LIFESTYLE VARIABLES
TOTAL SCORE: 0
EVER FELT BAD OR GUILTY ABOUT YOUR DRINKING: NO
EVER HAD A DRINK FIRST THING IN THE MORNING TO STEADY YOUR NERVES TO GET RID OF A HANGOVER: NO
HAVE PEOPLE ANNOYED YOU BY CRITICIZING YOUR DRINKING: NO
HAVE YOU EVER FELT YOU SHOULD CUT DOWN ON YOUR DRINKING: NO

## 2025-03-14 ASSESSMENT — PAIN SCALES - GENERAL
PAINLEVEL_OUTOF10: 0 - NO PAIN
PAINLEVEL_OUTOF10: 8

## 2025-03-14 ASSESSMENT — PAIN - FUNCTIONAL ASSESSMENT: PAIN_FUNCTIONAL_ASSESSMENT: 0-10

## 2025-03-14 NOTE — ED TRIAGE NOTES
Pt BIBA from home c/o SOB since yesterday. Pt states she had 3 toes amputated yesterday, denies pain, denies bleeding. Pt also states she has had increased urinary frequency since yesterday. Pt wears 2l o2 baseline

## 2025-03-14 NOTE — ED PROVIDER NOTES
HPI   Chief Complaint   Patient presents with    Shortness of Breath       69-year-old female was brought in by family member due to multiple complaints.  This for her at bedside primary complaint is altered mental status since 3 PM yesterday.  Family member described altered mental status as confusion and changes in her speech that are not at baseline.  2 days ago patient had left toe amputation on outpatient surgery and she went home.  Regarding shortness of breath the patient states that  is chronic due to her medical conditions, but since yesterday she has been using continuous oxygen supplementation at home when she normally just uses it as needed.  Patient has been using 2 L.via nasal canula.  Family member would like her urine to be checked because in the past she has had altered mentation due to urinary infections.  Patient denies any changes in her chronic cough, fever, chest pain, abdominal pain, bloody stool, dysuria, hematuria, leg swelling or worsening pain.  Patient is on clopidogrel              Patient History   Past Medical History:   Diagnosis Date    Adrenal cancer (Multi)     Aneurysm of carotid artery (CMS-[facility])     Neck aneurysm    Anxiety     COPD (chronic obstructive pulmonary disease) (Multi)     Depression     DVT (deep venous thrombosis) (Multi)     2022    DVT (deep venous thrombosis) (Multi)     RLE    History of blood transfusion     History of tubal ligation     Hyperlipidemia     Old myocardial infarction     History of heart attack    Other specified disorders of kidney and ureter     Obstruction of kidney    Personal history of other diseases of the circulatory system     History of intracranial aneurysm    Personal history of other diseases of the circulatory system     History of abdominal aortic aneurysm (AAA)    Personal history of other diseases of the respiratory system     History of chronic obstructive lung disease    Personal history of urinary calculi     History of kidney  stones    Right upper quadrant pain 09/25/2020    Abdominal pain, RUQ (right upper quadrant)    Vision loss      Past Surgical History:   Procedure Laterality Date    COLONOSCOPY      CT ABDOMEN PELVIS ANGIOGRAM W AND/OR WO IV CONTRAST  11/21/2019    CT ABDOMEN PELVIS ANGIOGRAM W AND/OR WO IV CONTRAST 11/21/2019 [facility]    CT ANGIO AORTA AND BILATERAL ILIOFEMORAL RUN OFF INCLUDING WITHOUT CONTRAST IF PERFORMED  08/09/2022    CT AORTA AND BILATERAL ILIOFEMORAL RUNOFF ANGIOGRAM W AND/OR WO IV CONTRAST 8/9/2022 [facility]GAC[facility]    CT HEAD ANGIO W AND WO IV CONTRAST  05/22/2020    CT HEAD ANGIO W AND WO IV CONTRAST 5/22/2020 [facility]    INVASIVE VASCULAR PROCEDURE N/A 01/23/2024    Procedure: Lower Extremity Angiogram;  Surgeon: Willam Mike MD;  Location: [place] [address] Cardiac Cath Lab;  Service: Cardiovascular;  Laterality: N/A;  23157;LLE CLTI    INVASIVE VASCULAR PROCEDURE N/A 01/23/2024    Procedure: Lower Extremity Intervention;  Surgeon: Willam Mike MD;  Location: [place] [address] Cardiac Cath Lab;  Service: Cardiovascular;  Laterality: N/A;    INVASIVE VASCULAR PROCEDURE N/A 06/25/2024    Procedure: Lower Extremity Angiogram;  Surgeon: Willam Mike MD;  Location: [facility] Cardiac Cath Lab;  Service: Cardiovascular;  Laterality: N/A;  6/25/24 with Cee    INVASIVE VASCULAR PROCEDURE Left 06/25/2024    Procedure: Lower Extremity Intervention;  Surgeon: Willam Mike MD;  Location: [place] [address] Cardiac Cath Lab;  Service: Cardiovascular;  Laterality: Left;    INVASIVE VASCULAR PROCEDURE N/A 06/25/2024    Procedure: Thrombectomy - Lower Extremity;  Surgeon: Willam Mike MD;  Location: [place] [address] Cardiac Cath Lab;  Service: Cardiovascular;  Laterality: N/A;    INVASIVE VASCULAR PROCEDURE N/A 06/25/2024    Procedure: Atherectomy - Lower Extremity;  Surgeon: Willam Mike MD;  Location: [facility] Cardiac Cath Lab;  Service: Cardiovascular;  Laterality: N/A;    INVASIVE VASCULAR PROCEDURE Left 06/25/2024    Procedure:  FCO Stent - Lower Extremity;  Surgeon: Willam Mike MD;  Location: [facility] Cardiac Cath Lab;  Service: Cardiovascular;  Laterality: Left;    INVASIVE VASCULAR PROCEDURE N/A 06/26/2024    Procedure: Lower Extremity Intervention;  Surgeon: Bucky Lake DO;  Location: [facility] Cardiac Cath Lab;  Service: Cardiovascular;  Laterality: N/A;    INVASIVE VASCULAR PROCEDURE N/A 06/26/2024    Procedure: Lower Extremity Angiogram;  Surgeon: Bucky Lake DO;  Location: [address] Cardiac Cath Lab;  Service: Cardiovascular;  Laterality: N/A;    OTHER SURGICAL HISTORY  09/30/2020    Tubal ligation    OTHER SURGICAL HISTORY  09/30/2020    Cardiac catheterization    TUBAL LIGATION       Family History   Problem Relation Name Age of Onset    Aneurysm Mother      Hypertension Mother      Heart attack Father      Cancer Father's Sister       Social History     Tobacco Use    Smoking status: Every Day     Current packs/day: 1.00     Average packs/day: 1.8 packs/day for 50.2 years (90.2 ttl pk-yrs)     Types: Cigarettes     Start date: 1975     Passive exposure: Past    Smokeless tobacco: Never   Substance Use Topics    Alcohol use: Never    Drug use: Never       Physical Exam   ED Triage Vitals [03/14/25 1530]   Temperature Heart Rate Respirations BP   36.5 °C (97.7 °F) 91 20 111/66      Pulse Ox Temp src Heart Rate Source Patient Position   99 % -- Monitor --      BP Location FiO2 (%)     -- --       Physical Exam  Vitals and nursing note reviewed.   Constitutional:       General: She is not in acute distress.     Appearance: Normal appearance. She is not toxic-appearing.   HENT:      Head: Atraumatic.      Nose: Nose normal.      Mouth/Throat:      Mouth: Mucous membranes are moist.   Eyes:      Extraocular Movements: Extraocular movements intact.      Conjunctiva/sclera: Conjunctivae normal.   Cardiovascular:      Rate and Rhythm: Normal rate and regular rhythm.      Pulses: Normal pulses.   Pulmonary:       Effort: Pulmonary effort is normal.      Breath sounds: Decreased breath sounds (Bilateral) present.      Comments: Hoarseness at baseline.  On 2 L nasal cannula oxygenation.  Chronic cough  Abdominal:      General: Abdomen is flat. Bowel sounds are normal.      Palpations: Abdomen is soft.   Musculoskeletal:         General: No swelling.      Cervical back: Normal range of motion and neck supple. No rigidity.      Right lower leg: No edema.      Left lower leg: No edema.      Comments: Left toes amputation.  Dried blood noted.  No purulent drainage.  No abnormal swelling.  DP pulses bilaterally.  Soft compartments in all extremities.  Normal capillary refill in all extremities.   Skin:     General: Skin is warm.      Capillary Refill: Capillary refill takes less than 2 seconds.   Neurological:      General: No focal deficit present.      Mental Status: She is alert and oriented to person, place, and time. Mental status is at baseline.      GCS: GCS eye subscore is 4. GCS verbal subscore is 5. GCS motor subscore is 6.      Cranial Nerves: Cranial nerves 2-12 are intact. No cranial nerve deficit.      Sensory: Sensation is intact.      Motor: Motor function is intact. No weakness.      Coordination: Coordination is intact.   Psychiatric:         Mood and Affect: Mood normal.           ED Course & MDM   ED Course as of 03/14/25 2326   Fri Mar 14, 2025   1618 To family member complaining of altered mental status we will proceed with stroke evaluation on this patient.  Cardiorespiratory evaluation will also be completed. [AM]   1725 EKG completed at 1554.  Ventricular rate of 86.  Sinus rhythm.  Atrial premature complex.  No QRS widening.  No STEMI [AM]   1726 Troponin I, High Sensitivity(!): 14  Elevated but similar to prior. Will continue to trend. Pt denied any CP. [AM]   1726 Pt with UTI. Will provide abx., [AM]   1727 WBC, Urine(!): 11-20 [AM]   1727 WBC(!): 13.1  Leukocytosis noted. Pt meets sepsis criteria with  HR above 90 as well. Will continue with orders. CT head is pending [AM]   1813 Patient is now hypotensive.  30 cc/kg of IV fluids have been added.  Will reevaluate  [AM]   1813 CT head is negative for acute injuries or bleeding. [AM]   1832 Patient was updated regarding results so far.  She is aware of the need for admission and she agreed. [AM]   1930 Patient remains hypotensive but her fluids are still running have not been completed.  Will continue to reevaluate.  Patient requested something for pain but due to the hypotension will not provide any opioids will provide her with an alternative medication like Ofirmev [AM]   1951 94/51 map 68. Improving. Fluids are still running.  [AM]   1951 Lactic acid is normal [AM]   2018 Patient has completed 30 cc/kg.  MAP is currently 63.  Will start norepinephrine.  Will call ICU for admission of this patient [AM]   2024 Patient daughter in the room. Patient and daughter aware of plan of care and the need of vasopressors and she agreed.  [AM]   2030 Repeated troponin is same as prior.  Patient any chest pain. [AM]      ED Course User Index  [AM] Lona Villegas MD         Diagnoses as of 03/14/25 2326   Shortness of breath   Bacterial urinary infection   SIRS (systemic inflammatory response syndrome) (Multi)   Altered mental status, unspecified altered mental status type                 No data recorded     Thomas Coma Scale Score: 15 (03/14/25 1610 : Ashley Payton RN)       NIH Stroke Scale: 0 (03/14/25 1614 : Lona Vlilegas MD)                   Medical Decision Making      Procedure  Procedures     Lona Villegas MD  03/14/25 2327

## 2025-03-14 NOTE — TELEPHONE ENCOUNTER
Patient with severe diarrhea, not feeling well 2 days s/p amputation of toes.  Daughter cancelling appointments for today and going to get her to ED for evaluation. Treatment and Dr. Burnham made aware.

## 2025-03-15 ENCOUNTER — APPOINTMENT (OUTPATIENT)
Dept: RADIOLOGY | Facility: HOSPITAL | Age: 70
DRG: 853 | End: 2025-03-15
Payer: MEDICARE

## 2025-03-15 LAB
ABO GROUP (TYPE) IN BLOOD: NORMAL
ALBUMIN SERPL BCP-MCNC: 2.9 G/DL (ref 3.4–5)
ALP SERPL-CCNC: 49 U/L (ref 33–136)
ALT SERPL W P-5'-P-CCNC: 9 U/L (ref 7–45)
ANION GAP SERPL CALC-SCNC: 10 MMOL/L (ref 10–20)
ANTIBODY SCREEN: NORMAL
AST SERPL W P-5'-P-CCNC: 15 U/L (ref 9–39)
BACTERIA UR CULT: NORMAL
BASOPHILS # BLD AUTO: 0.02 X10*3/UL (ref 0–0.1)
BASOPHILS NFR BLD AUTO: 0.2 %
BILIRUB SERPL-MCNC: 0.7 MG/DL (ref 0–1.2)
BLOOD EXPIRATION DATE: NORMAL
BUN SERPL-MCNC: 10 MG/DL (ref 6–23)
CALCIUM SERPL-MCNC: 7.8 MG/DL (ref 8.6–10.3)
CHLORIDE SERPL-SCNC: 110 MMOL/L (ref 98–107)
CO2 SERPL-SCNC: 22 MMOL/L (ref 21–32)
CREAT SERPL-MCNC: 0.59 MG/DL (ref 0.5–1.05)
DISPENSE STATUS: NORMAL
EGFRCR SERPLBLD CKD-EPI 2021: >90 ML/MIN/1.73M*2
EOSINOPHIL # BLD AUTO: 0.12 X10*3/UL (ref 0–0.7)
EOSINOPHIL NFR BLD AUTO: 1 %
ERYTHROCYTE [DISTWIDTH] IN BLOOD BY AUTOMATED COUNT: 20.5 % (ref 11.5–14.5)
GLUCOSE BLD MANUAL STRIP-MCNC: 109 MG/DL (ref 74–99)
GLUCOSE BLD MANUAL STRIP-MCNC: 126 MG/DL (ref 74–99)
GLUCOSE BLD MANUAL STRIP-MCNC: 127 MG/DL (ref 74–99)
GLUCOSE BLD MANUAL STRIP-MCNC: 66 MG/DL (ref 74–99)
GLUCOSE BLD MANUAL STRIP-MCNC: 69 MG/DL (ref 74–99)
GLUCOSE SERPL-MCNC: 84 MG/DL (ref 74–99)
HCT VFR BLD AUTO: 26.9 % (ref 36–46)
HGB BLD-MCNC: 7.4 G/DL (ref 12–16)
HOLD SPECIMEN: NORMAL
IMM GRANULOCYTES # BLD AUTO: 0.09 X10*3/UL (ref 0–0.7)
IMM GRANULOCYTES NFR BLD AUTO: 0.8 % (ref 0–0.9)
LYMPHOCYTES # BLD AUTO: 1.35 X10*3/UL (ref 1.2–4.8)
LYMPHOCYTES NFR BLD AUTO: 11.5 %
MCH RBC QN AUTO: 22 PG (ref 26–34)
MCHC RBC AUTO-ENTMCNC: 27.5 G/DL (ref 32–36)
MCV RBC AUTO: 80 FL (ref 80–100)
MONOCYTES # BLD AUTO: 0.87 X10*3/UL (ref 0.1–1)
MONOCYTES NFR BLD AUTO: 7.4 %
NEUTROPHILS # BLD AUTO: 9.34 X10*3/UL (ref 1.2–7.7)
NEUTROPHILS NFR BLD AUTO: 79.1 %
NRBC BLD-RTO: 0 /100 WBCS (ref 0–0)
PLATELET # BLD AUTO: 149 X10*3/UL (ref 150–450)
POTASSIUM SERPL-SCNC: 3.6 MMOL/L (ref 3.5–5.3)
PRODUCT BLOOD TYPE: 6200
PRODUCT CODE: NORMAL
PROT SERPL-MCNC: 5.1 G/DL (ref 6.4–8.2)
RBC # BLD AUTO: 3.36 X10*6/UL (ref 4–5.2)
RH FACTOR (ANTIGEN D): NORMAL
SODIUM SERPL-SCNC: 138 MMOL/L (ref 136–145)
UNIT ABO: NORMAL
UNIT NUMBER: NORMAL
UNIT RH: NORMAL
UNIT VOLUME: 350
VANCOMYCIN SERPL-MCNC: 14.9 UG/ML (ref 5–20)
WBC # BLD AUTO: 11.8 X10*3/UL (ref 4.4–11.3)
XM INTEP: NORMAL

## 2025-03-15 PROCEDURE — 2500000001 HC RX 250 WO HCPCS SELF ADMINISTERED DRUGS (ALT 637 FOR MEDICARE OP)

## 2025-03-15 PROCEDURE — 82947 ASSAY GLUCOSE BLOOD QUANT: CPT

## 2025-03-15 PROCEDURE — 1200000002 HC GENERAL ROOM WITH TELEMETRY DAILY

## 2025-03-15 PROCEDURE — 36415 COLL VENOUS BLD VENIPUNCTURE: CPT

## 2025-03-15 PROCEDURE — 85025 COMPLETE CBC W/AUTO DIFF WBC: CPT

## 2025-03-15 PROCEDURE — 73620 X-RAY EXAM OF FOOT: CPT | Mod: LEFT SIDE | Performed by: RADIOLOGY

## 2025-03-15 PROCEDURE — P9016 RBC LEUKOCYTES REDUCED: HCPCS

## 2025-03-15 PROCEDURE — 80053 COMPREHEN METABOLIC PANEL: CPT

## 2025-03-15 PROCEDURE — 73620 X-RAY EXAM OF FOOT: CPT | Mod: LT

## 2025-03-15 PROCEDURE — 86901 BLOOD TYPING SEROLOGIC RH(D): CPT

## 2025-03-15 PROCEDURE — 2500000002 HC RX 250 W HCPCS SELF ADMINISTERED DRUGS (ALT 637 FOR MEDICARE OP, ALT 636 FOR OP/ED)

## 2025-03-15 PROCEDURE — 36430 TRANSFUSION BLD/BLD COMPNT: CPT

## 2025-03-15 PROCEDURE — 2500000004 HC RX 250 GENERAL PHARMACY W/ HCPCS (ALT 636 FOR OP/ED)

## 2025-03-15 PROCEDURE — S4991 NICOTINE PATCH NONLEGEND: HCPCS

## 2025-03-15 PROCEDURE — 99232 SBSQ HOSP IP/OBS MODERATE 35: CPT

## 2025-03-15 PROCEDURE — 86923 COMPATIBILITY TEST ELECTRIC: CPT

## 2025-03-15 PROCEDURE — 80202 ASSAY OF VANCOMYCIN: CPT

## 2025-03-15 RX ORDER — FLUTICASONE FUROATE AND VILANTEROL 200; 25 UG/1; UG/1
1 POWDER RESPIRATORY (INHALATION)
Status: DISCONTINUED | OUTPATIENT
Start: 2025-03-15 | End: 2025-03-19 | Stop reason: HOSPADM

## 2025-03-15 RX ORDER — OLANZAPINE 5 MG/1
15 TABLET ORAL NIGHTLY
Status: DISCONTINUED | OUTPATIENT
Start: 2025-03-15 | End: 2025-03-19 | Stop reason: HOSPADM

## 2025-03-15 RX ORDER — MONTELUKAST SODIUM 10 MG/1
10 TABLET ORAL NIGHTLY
Status: DISCONTINUED | OUTPATIENT
Start: 2025-03-15 | End: 2025-03-19 | Stop reason: HOSPADM

## 2025-03-15 RX ORDER — IBUPROFEN 200 MG
1 TABLET ORAL DAILY
Status: DISCONTINUED | OUTPATIENT
Start: 2025-03-15 | End: 2025-03-19 | Stop reason: HOSPADM

## 2025-03-15 RX ADMIN — PIPERACILLIN SODIUM AND TAZOBACTAM SODIUM 3.38 G: 3; .375 INJECTION, SOLUTION INTRAVENOUS at 11:31

## 2025-03-15 RX ADMIN — NICOTINE 1 PATCH: 21 PATCH, EXTENDED RELEASE TRANSDERMAL at 10:55

## 2025-03-15 RX ADMIN — APIXABAN 5 MG: 5 TABLET, FILM COATED ORAL at 20:52

## 2025-03-15 RX ADMIN — VANCOMYCIN HYDROCHLORIDE 1000 MG: 1 INJECTION, SOLUTION INTRAVENOUS at 10:12

## 2025-03-15 RX ADMIN — APIXABAN 5 MG: 5 TABLET, FILM COATED ORAL at 09:21

## 2025-03-15 RX ADMIN — GABAPENTIN 300 MG: 300 CAPSULE ORAL at 20:52

## 2025-03-15 RX ADMIN — GABAPENTIN 300 MG: 300 CAPSULE ORAL at 09:21

## 2025-03-15 RX ADMIN — ROSUVASTATIN CALCIUM 40 MG: 10 TABLET, FILM COATED ORAL at 09:21

## 2025-03-15 RX ADMIN — PIPERACILLIN SODIUM AND TAZOBACTAM SODIUM 3.38 G: 3; .375 INJECTION, SOLUTION INTRAVENOUS at 04:51

## 2025-03-15 RX ADMIN — VANCOMYCIN HYDROCHLORIDE 1000 MG: 1 INJECTION, SOLUTION INTRAVENOUS at 23:51

## 2025-03-15 RX ADMIN — PIPERACILLIN SODIUM AND TAZOBACTAM SODIUM 3.38 G: 3; .375 INJECTION, SOLUTION INTRAVENOUS at 18:26

## 2025-03-15 RX ADMIN — GABAPENTIN 300 MG: 300 CAPSULE ORAL at 15:03

## 2025-03-15 RX ADMIN — MONTELUKAST 10 MG: 10 TABLET, FILM COATED ORAL at 20:52

## 2025-03-15 RX ADMIN — DEXAMETHASONE 2 MG: 4 TABLET ORAL at 09:21

## 2025-03-15 RX ADMIN — CLOPIDOGREL BISULFATE 75 MG: 75 TABLET ORAL at 09:22

## 2025-03-15 RX ADMIN — LORAZEPAM 0.5 MG: 0.5 TABLET ORAL at 10:55

## 2025-03-15 SDOH — SOCIAL STABILITY: SOCIAL INSECURITY: ARE YOU OR HAVE YOU BEEN THREATENED OR ABUSED PHYSICALLY, EMOTIONALLY, OR SEXUALLY BY ANYONE?: NO

## 2025-03-15 SDOH — SOCIAL STABILITY: SOCIAL INSECURITY: ABUSE: ADULT

## 2025-03-15 SDOH — SOCIAL STABILITY: SOCIAL INSECURITY: DO YOU FEEL UNSAFE GOING BACK TO THE PLACE WHERE YOU ARE LIVING?: NO

## 2025-03-15 SDOH — SOCIAL STABILITY: SOCIAL INSECURITY: DO YOU FEEL ANYONE HAS EXPLOITED OR TAKEN ADVANTAGE OF YOU FINANCIALLY OR OF YOUR PERSONAL PROPERTY?: NO

## 2025-03-15 SDOH — ECONOMIC STABILITY: FOOD INSECURITY
HOW HARD IS IT FOR YOU TO PAY FOR THE VERY BASICS LIKE FOOD, HOUSING, MEDICAL CARE, AND HEATING?: PATIENT UNABLE TO ANSWER

## 2025-03-15 SDOH — SOCIAL STABILITY: SOCIAL INSECURITY: HAVE YOU HAD ANY THOUGHTS OF HARMING ANYONE ELSE?: NO

## 2025-03-15 SDOH — SOCIAL STABILITY: SOCIAL INSECURITY: WERE YOU ABLE TO COMPLETE ALL THE BEHAVIORAL HEALTH SCREENINGS?: YES

## 2025-03-15 SDOH — SOCIAL STABILITY: SOCIAL INSECURITY: WITHIN THE LAST YEAR, HAVE YOU BEEN AFRAID OF YOUR PARTNER OR EX-PARTNER?: NO

## 2025-03-15 SDOH — ECONOMIC STABILITY: HOUSING INSECURITY: AT ANY TIME IN THE PAST 12 MONTHS, WERE YOU HOMELESS OR LIVING IN A SHELTER (INCLUDING NOW)?: PATIENT UNABLE TO ANSWER

## 2025-03-15 SDOH — ECONOMIC STABILITY: HOUSING INSECURITY
IN THE LAST 12 MONTHS, WAS THERE A TIME WHEN YOU WERE NOT ABLE TO PAY THE MORTGAGE OR RENT ON TIME?: PATIENT UNABLE TO ANSWER

## 2025-03-15 SDOH — SOCIAL STABILITY: SOCIAL INSECURITY: DOES ANYONE TRY TO KEEP YOU FROM HAVING/CONTACTING OTHER FRIENDS OR DOING THINGS OUTSIDE YOUR HOME?: NO

## 2025-03-15 SDOH — ECONOMIC STABILITY: INCOME INSECURITY
IN THE PAST 12 MONTHS HAS THE ELECTRIC, GAS, OIL, OR WATER COMPANY THREATENED TO SHUT OFF SERVICES IN YOUR HOME?: PATIENT UNABLE TO ANSWER

## 2025-03-15 SDOH — ECONOMIC STABILITY: TRANSPORTATION INSECURITY
IN THE PAST 12 MONTHS, HAS LACK OF TRANSPORTATION KEPT YOU FROM MEDICAL APPOINTMENTS OR FROM GETTING MEDICATIONS?: PATIENT UNABLE TO ANSWER

## 2025-03-15 SDOH — SOCIAL STABILITY: SOCIAL INSECURITY: WITHIN THE LAST YEAR, HAVE YOU BEEN HUMILIATED OR EMOTIONALLY ABUSED IN OTHER WAYS BY YOUR PARTNER OR EX-PARTNER?: NO

## 2025-03-15 SDOH — SOCIAL STABILITY: SOCIAL INSECURITY: HAS ANYONE EVER THREATENED TO HURT YOUR FAMILY OR YOUR PETS?: NO

## 2025-03-15 SDOH — ECONOMIC STABILITY: FOOD INSECURITY
WITHIN THE PAST 12 MONTHS, THE FOOD YOU BOUGHT JUST DIDN'T LAST AND YOU DIDN'T HAVE MONEY TO GET MORE.: PATIENT UNABLE TO ANSWER

## 2025-03-15 SDOH — SOCIAL STABILITY: SOCIAL INSECURITY: HAVE YOU HAD THOUGHTS OF HARMING ANYONE ELSE?: NO

## 2025-03-15 SDOH — SOCIAL STABILITY: SOCIAL INSECURITY: ARE THERE ANY APPARENT SIGNS OF INJURIES/BEHAVIORS THAT COULD BE RELATED TO ABUSE/NEGLECT?: NO

## 2025-03-15 SDOH — ECONOMIC STABILITY: HOUSING INSECURITY: IN THE PAST 12 MONTHS, HOW MANY TIMES HAVE YOU MOVED WHERE YOU WERE LIVING?: 1

## 2025-03-15 SDOH — ECONOMIC STABILITY: FOOD INSECURITY
WITHIN THE PAST 12 MONTHS, YOU WORRIED THAT YOUR FOOD WOULD RUN OUT BEFORE YOU GOT THE MONEY TO BUY MORE.: PATIENT UNABLE TO ANSWER

## 2025-03-15 ASSESSMENT — ACTIVITIES OF DAILY LIVING (ADL)
PATIENT'S MEMORY ADEQUATE TO SAFELY COMPLETE DAILY ACTIVITIES?: YES
ASSISTIVE_DEVICE: WALKER
HEARING - RIGHT EAR: UNABLE TO ASSESS
TOILETING: NEEDS ASSISTANCE
PATIENT'S MEMORY ADEQUATE TO SAFELY COMPLETE DAILY ACTIVITIES?: YES
GROOMING: NEEDS ASSISTANCE
WALKS IN HOME: NEEDS ASSISTANCE
HEARING - LEFT EAR: FUNCTIONAL
FEEDING YOURSELF: INDEPENDENT
BATHING: NEEDS ASSISTANCE
ADEQUATE_TO_COMPLETE_ADL: YES
WALKS IN HOME: NEEDS ASSISTANCE
HEARING - RIGHT EAR: FUNCTIONAL
LACK_OF_TRANSPORTATION: PATIENT UNABLE TO ANSWER
HEARING - LEFT EAR: UNABLE TO ASSESS
BATHING: NEEDS ASSISTANCE
DRESSING YOURSELF: NEEDS ASSISTANCE
GROOMING: NEEDS ASSISTANCE
DRESSING YOURSELF: NEEDS ASSISTANCE
FEEDING YOURSELF: INDEPENDENT
JUDGMENT_ADEQUATE_SAFELY_COMPLETE_DAILY_ACTIVITIES: YES
ADEQUATE_TO_COMPLETE_ADL: UNABLE TO ASSESS
JUDGMENT_ADEQUATE_SAFELY_COMPLETE_DAILY_ACTIVITIES: YES
TOILETING: NEEDS ASSISTANCE

## 2025-03-15 ASSESSMENT — COGNITIVE AND FUNCTIONAL STATUS - GENERAL
TURNING FROM BACK TO SIDE WHILE IN FLAT BAD: A LITTLE
DRESSING REGULAR UPPER BODY CLOTHING: A LITTLE
PERSONAL GROOMING: A LITTLE
MOVING FROM LYING ON BACK TO SITTING ON SIDE OF FLAT BED WITH BEDRAILS: A LITTLE
PATIENT BASELINE BEDBOUND: NO
DRESSING REGULAR LOWER BODY CLOTHING: A LITTLE
DAILY ACTIVITIY SCORE: 19
TOILETING: A LITTLE
HELP NEEDED FOR BATHING: A LITTLE
CLIMB 3 TO 5 STEPS WITH RAILING: A LOT
WALKING IN HOSPITAL ROOM: A LOT
MOVING TO AND FROM BED TO CHAIR: A LITTLE
STANDING UP FROM CHAIR USING ARMS: A LOT
MOBILITY SCORE: 15

## 2025-03-15 ASSESSMENT — PAIN SCALES - GENERAL
PAINLEVEL_OUTOF10: 0 - NO PAIN

## 2025-03-15 ASSESSMENT — LIFESTYLE VARIABLES
AUDIT-C TOTAL SCORE: 0
HOW OFTEN DO YOU HAVE 6 OR MORE DRINKS ON ONE OCCASION: NEVER
SKIP TO QUESTIONS 9-10: 1
AUDIT-C TOTAL SCORE: 0
HOW MANY STANDARD DRINKS CONTAINING ALCOHOL DO YOU HAVE ON A TYPICAL DAY: PATIENT DOES NOT DRINK
HOW OFTEN DO YOU HAVE A DRINK CONTAINING ALCOHOL: NEVER

## 2025-03-15 ASSESSMENT — PATIENT HEALTH QUESTIONNAIRE - PHQ9
2. FEELING DOWN, DEPRESSED OR HOPELESS: NOT AT ALL
SUM OF ALL RESPONSES TO PHQ9 QUESTIONS 1 & 2: 0
1. LITTLE INTEREST OR PLEASURE IN DOING THINGS: NOT AT ALL

## 2025-03-15 ASSESSMENT — PAIN - FUNCTIONAL ASSESSMENT: PAIN_FUNCTIONAL_ASSESSMENT: 0-10

## 2025-03-15 NOTE — H&P
[facility] Pulmonary and Critical Care Medicine   History and Physical        Subjective   Patient is a 69 y.o. female admitted on 3/14/2025  3:23 PM with chief complaint of altered mental status and confusion.     HPI:  Radha Toussaint is a 89 y.o. female with a PMHx of HTN, HLD, CAD with FCO x2, severe PAD s/p multiple interventions (aortic aneurysm repair, intracranial aneurysm coiling, iliac grafts, fem-fem graft, multiple lower extremity stents and angioplasties - [place] [facility] [place] DVT, Afib (plavix and eliquis), COPD with home oxygen, current smoker, lung cancer w/ mets to adrenal glad, beta thalassemia who presents today 2 days postop of a left 2nd, 3rd and 4th toe amputation with left allograft skin graft on 3/12/2025 with Dr. Doss. Wound cultures taken 2/20/2025 positive for heavy Pseudomonas and MRSA organisms. Chart review revealed patient hospitalized on 2/12/2025 for planned left femoral angioplasty with stent and balloon angioplasty of the left anterior tibial artery. Postoperatively required Reji-Synephrine infusion for hypotension and blood transfusion for anemia.       ED Course:  Vitals: 36.5 °C, HR 85, HR 20, BP 83/49, SpO2 99% on 4 L NC  Labs: WBC 13.1, Hgb 6.7, lactate 0.6  Imaging: CT head no acute changes from baseline left sided craniotomy aneurysm repair; CXR unremarkable      Interval ICU events:   3/14: Patient examined in the ED with daughter, Opal, at bedside.  Patient appears lethargic but answering questions appropriately and oriented to self and place.  Patient and daughter report onset of nausea and copious diarrhea x 1 day, fevers, nausea, aches, and increased postoperative pain of left foot.  Additional associated symptoms include increased lethargy, change in mentation from baseline per daughter, increased continuous home oxygen needs, and decreased oral intake.  Patient denies any vomiting, headache, chest pain, abdominal pain, dizziness, numbness  or shortness of breath. COVID, flu A/B and RSV negative. 1 liter LR IVB, 2gm Ancef IV, 2g Ceftriaxone, 1 g Ofirmev, and Albumin given in ED. Levophed imitated for hypotension and patient admitted to ICU for further management.      Past Medical History:  Past Medical History:   Diagnosis Date    Adrenal cancer (Multi)     Aneurysm of carotid artery (CMS-HCC)     Neck aneurysm    Anxiety     COPD (chronic obstructive pulmonary disease) (Multi)     Depression     DVT (deep venous thrombosis) (Multi)     2022    DVT (deep venous thrombosis) (Multi)     RLE    History of blood transfusion     History of tubal ligation     Hyperlipidemia     Old myocardial infarction     History of heart attack    Other specified disorders of kidney and ureter     Obstruction of kidney    Personal history of other diseases of the circulatory system     History of intracranial aneurysm    Personal history of other diseases of the circulatory system     History of abdominal aortic aneurysm (AAA)    Personal history of other diseases of the respiratory system     History of chronic obstructive lung disease    Personal history of urinary calculi     History of kidney stones    Right upper quadrant pain 09/25/2020    Abdominal pain, RUQ (right upper quadrant)    Vision loss        Past Surgical History:  Past Surgical History:   Procedure Laterality Date    COLONOSCOPY      CT ABDOMEN PELVIS ANGIOGRAM W AND/OR WO IV CONTRAST  11/21/2019    CT ABDOMEN PELVIS ANGIOGRAM W AND/OR WO IV CONTRAST 11/21/2019 [facility]    CT ANGIO AORTA AND BILATERAL ILIOFEMORAL RUN OFF INCLUDING WITHOUT CONTRAST IF PERFORMED  08/09/2022    CT AORTA AND BILATERAL ILIOFEMORAL RUNOFF ANGIOGRAM W AND/OR WO IV CONTRAST 8/9/2022 [facility]    CT HEAD ANGIO W AND WO IV CONTRAST  05/22/2020    CT HEAD ANGIO W AND WO IV CONTRAST 5/22/2020 [facility]    INVASIVE VASCULAR PROCEDURE N/A 01/23/2024    Procedure: Lower Extremity Angiogram;  Surgeon:  Willam Mike MD;  Location: [place] [address] Cardiac Cath Lab;  Service: Cardiovascular;  Laterality: N/A;  [id number];LLE CLTI    INVASIVE VASCULAR PROCEDURE N/A 01/23/2024    Procedure: Lower Extremity Intervention;  Surgeon: Willam Mike MD;  Location: [place] [address] Cardiac Cath Lab;  Service: Cardiovascular;  Laterality: N/A;    INVASIVE VASCULAR PROCEDURE N/A 06/25/2024    Procedure: Lower Extremity Angiogram;  Surgeon: Willam Mike MD;  Location: [place] [address] Cardiac Cath Lab;  Service: Cardiovascular;  Laterality: N/A;  6/25/24 with Cee    INVASIVE VASCULAR PROCEDURE Left 06/25/2024    Procedure: Lower Extremity Intervention;  Surgeon: Willam Mike MD;  Location: [facility] Cardiac Cath Lab;  Service: Cardiovascular;  Laterality: Left;    INVASIVE VASCULAR PROCEDURE N/A 06/25/2024    Procedure: Thrombectomy - Lower Extremity;  Surgeon: Willam Mike MD;  Location: [place] [address] Cardiac Cath Lab;  Service: Cardiovascular;  Laterality: N/A;    INVASIVE VASCULAR PROCEDURE N/A 06/25/2024    Procedure: Atherectomy - Lower Extremity;  Surgeon: Willam Mike MD;  Location: [place] [address] Cardiac Cath Lab;  Service: Cardiovascular;  Laterality: N/A;    INVASIVE VASCULAR PROCEDURE Left 06/25/2024    Procedure: FCO Stent - Lower Extremity;  Surgeon: Willam Mike MD;  Location: [place] [address] Cardiac Cath Lab;  Service: Cardiovascular;  Laterality: Left;    INVASIVE VASCULAR PROCEDURE N/A 06/26/2024    Procedure: Lower Extremity Intervention;  Surgeon: Bucky Lake DO;  Location: [facility] Cardiac Cath Lab;  Service: Cardiovascular;  Laterality: N/A;    INVASIVE VASCULAR PROCEDURE N/A 06/26/2024    Procedure: Lower Extremity Angiogram;  Surgeon: Bucky Lake DO;  Location: [facility] Cardiac Cath Lab;  Service: Cardiovascular;  Laterality: N/A;    OTHER SURGICAL HISTORY  09/30/2020    Tubal ligation    OTHER SURGICAL HISTORY  09/30/2020    Cardiac catheterization    TUBAL LIGATION          Family History:  Family History   Problem Relation Name Age of  Onset    Aneurysm Mother      Hypertension Mother      Heart attack Father      Cancer Father's Sister          Social History:   reports that she has been smoking cigarettes. She started smoking about 50 years ago. She has a 90.2 pack-year smoking history. She has been exposed to tobacco smoke. She has never used smokeless tobacco. She reports that she does not drink alcohol and does not use drugs.    Occupational, Home, & Environmental History   Lives at home with daughter.    Scheduled Medications:   albumin human, 25 g, intravenous, Once  piperacillin-tazobactam, 3.375 g, intravenous, q6h  [START ON 3/15/2025] vancomycin, 1,000 mg, intravenous, q12h  vancomycin, 1,500 mg, intravenous, Once         Continuous Medications:   norepinephrine, 0-0.2 mcg/kg/min, Last Rate: 0.03 mcg/kg/min (03/14/25 2121)         PRN Medications:   PRN medications: vancomycin    Review of systems:   Review of Systems   A 10+ point ROS was completed and otherwise negative except as noted above and per HPI.      Objective   Vitals:  Most Recent:  Vitals:    03/14/25 2220   BP: 88/54   Pulse: 75   Resp: 20   Temp:    SpO2: 100%       24hr Min/Max:  Temp  Min: 36.5 °C (97.7 °F)  Max: 36.5 °C (97.7 °F)  Pulse  Min: 74  Max: 91  BP  Min: 80/43  Max: 111/66  Resp  Min: 16  Max: 20  SpO2  Min: 95 %  Max: 100 %    LDA:   External Urinary Catheter Female (Active)   Placement Date/Time: 03/14/25 1604   Placed by: mine pca  Hand Hygiene Completed: Yes  External Catheter Type: Female   Number of days: 0         Vent settings:       Hemodynamic parameters for last 24 hours:       No intake or output data in the 24 hours ending 03/14/25 2227      Physical exam:    Physical Exam  Constitutional:       General: She is not in acute distress.     Appearance: She is underweight. She is ill-appearing.   HENT:      Head: Normocephalic and atraumatic.      Mouth/Throat:      Mouth: Mucous membranes are dry.      Pharynx: Oropharynx is clear.   Eyes:       Extraocular Movements: Extraocular movements intact.      Pupils: Pupils are equal, round, and reactive to light.   Cardiovascular:      Rate and Rhythm: Normal rate and regular rhythm.      Pulses:           Dorsalis pedis pulses are 1+ on the right side and 1+ on the left side.        Posterior tibial pulses are 1+ on the right side and 1+ on the left side.      Heart sounds: Normal heart sounds.   Pulmonary:      Effort: Pulmonary effort is normal.      Breath sounds: Decreased breath sounds present.   Abdominal:      General: Bowel sounds are normal.      Palpations: Abdomen is soft.   Musculoskeletal:      Cervical back: Normal range of motion and neck supple.      Comments: Scattered bilateral skin skin tears & abrasions      Left Lower Extremity: (2nd, 3rd and 4th toe amputation)  Skin:     General: Skin is warm and dry.      Capillary Refill: Capillary refill takes 2 to 3 seconds.      Coloration: Skin is mottled and pale.      Findings: Abrasion, bruising, signs of injury and laceration present.   Neurological:      General: No focal deficit present.      Mental Status: She is easily aroused. She is lethargic.      Cranial Nerves: Cranial nerves 2-12 are intact.      Sensory: Sensation is intact.      Motor: Motor function is intact.   Psychiatric:         Attention and Perception: Attention and perception normal.         Speech: Speech normal.         Behavior: Behavior is cooperative.        Lab/Radiology/Diagnostic Review:  Results for orders placed or performed during the hospital encounter of 03/14/25 (from the past 24 hours)   Urinalysis with Reflex Culture and Microscopic   Result Value Ref Range    Color, Urine Yellow Light-Yellow, Yellow, Dark-Yellow    Appearance, Urine Turbid (N) Clear    Specific Gravity, Urine 1.015 1.005 - 1.035    pH, Urine 5.5 5.0, 5.5, 6.0, 6.5, 7.0, 7.5, 8.0    Protein, Urine 30 (1+) (A) NEGATIVE, 10 (TRACE), 20 (TRACE) mg/dL    Glucose, Urine Normal Normal mg/dL    Blood,  Urine OVER (3+) (A) NEGATIVE mg/dL    Ketones, Urine NEGATIVE NEGATIVE mg/dL    Bilirubin, Urine NEGATIVE NEGATIVE mg/dL    Urobilinogen, Urine Normal Normal mg/dL    Nitrite, Urine NEGATIVE NEGATIVE    Leukocyte Esterase, Urine 250 Srinath/uL (A) NEGATIVE   Microscopic Only, Urine   Result Value Ref Range    WBC, Urine 11-20 (A) 1-5, NONE /HPF    RBC, Urine 3-5 NONE, 1-2, 3-5 /HPF    Squamous Epithelial Cells, Urine 1-9 (SPARSE) Reference range not established. /HPF    Transitional Epithelial Cells, Urine 1-2 (FEW) Reference range not established. /HPF    Bacteria, Urine 2+ (A) NONE SEEN /HPF    Mucus, Urine FEW Reference range not established. /LPF   CBC and Auto Differential   Result Value Ref Range    WBC 13.1 (H) 4.4 - 11.3 x10*3/uL    nRBC 0.0 0.0 - 0.0 /100 WBCs    RBC 3.28 (L) 4.00 - 5.20 x10*6/uL    Hemoglobin 6.7 (L) 12.0 - 16.0 g/dL    Hematocrit 24.3 (L) 36.0 - 46.0 %    MCV 74 (L) 80 - 100 fL    MCH 20.4 (L) 26.0 - 34.0 pg    MCHC 27.6 (L) 32.0 - 36.0 g/dL    RDW 20.0 (H) 11.5 - 14.5 %    Platelets 184 150 - 450 x10*3/uL    Neutrophils % 77.6 40.0 - 80.0 %    Immature Granulocytes %, Automated 0.8 0.0 - 0.9 %    Lymphocytes % 13.4 13.0 - 44.0 %    Monocytes % 7.5 2.0 - 10.0 %    Eosinophils % 0.5 0.0 - 6.0 %    Basophils % 0.2 0.0 - 2.0 %    Neutrophils Absolute 10.21 (H) 1.20 - 7.70 x10*3/uL    Immature Granulocytes Absolute, Automated 0.10 0.00 - 0.70 x10*3/uL    Lymphocytes Absolute 1.76 1.20 - 4.80 x10*3/uL    Monocytes Absolute 0.98 0.10 - 1.00 x10*3/uL    Eosinophils Absolute 0.06 0.00 - 0.70 x10*3/uL    Basophils Absolute 0.02 0.00 - 0.10 x10*3/uL   Comprehensive metabolic panel   Result Value Ref Range    Glucose 79 74 - 99 mg/dL    Sodium 139 136 - 145 mmol/L    Potassium 4.2 3.5 - 5.3 mmol/L    Chloride 108 (H) 98 - 107 mmol/L    Bicarbonate 23 21 - 32 mmol/L    Anion Gap 12 10 - 20 mmol/L    Urea Nitrogen 15 6 - 23 mg/dL    Creatinine 0.66 0.50 - 1.05 mg/dL    eGFR >90 >60 mL/min/1.73m*2     Calcium 8.3 (L) 8.6 - 10.3 mg/dL    Albumin 2.9 (L) 3.4 - 5.0 g/dL    Alkaline Phosphatase 55 33 - 136 U/L    Total Protein 5.5 (L) 6.4 - 8.2 g/dL    AST 17 9 - 39 U/L    Bilirubin, Total 0.5 0.0 - 1.2 mg/dL    ALT 9 7 - 45 U/L   Magnesium   Result Value Ref Range    Magnesium 1.61 1.60 - 2.40 mg/dL   B-Type Natriuretic Peptide   Result Value Ref Range    BNP 55 0 - 99 pg/mL   Troponin I, High Sensitivity, Initial   Result Value Ref Range    Troponin I, High Sensitivity 14 (H) 0 - 13 ng/L   POCT GLUCOSE   Result Value Ref Range    POCT Glucose 76 74 - 99 mg/dL   Protime-INR   Result Value Ref Range    Protime 16.2 (H) 9.8 - 12.4 seconds    INR 1.5 (H) 0.9 - 1.1   APTT   Result Value Ref Range    aPTT 31 26 - 36 seconds   Troponin, High Sensitivity, 1 Hour   Result Value Ref Range    Troponin I, High Sensitivity 14 (H) 0 - 13 ng/L   Lactate   Result Value Ref Range    Lactate 0.6 0.4 - 2.0 mmol/L   Blood Culture    Specimen: Peripheral Venipuncture; Blood culture   Result Value Ref Range    Blood Culture Loaded on Instrument - Culture in progress    Blood Culture    Specimen: Peripheral Venipuncture; Blood culture   Result Value Ref Range    Blood Culture Loaded on Instrument - Culture in progress    Sars-CoV-2 and Influenza A/B PCR   Result Value Ref Range    Flu A Result Not Detected Not Detected    Flu B Result Not Detected Not Detected    Coronavirus 2019, PCR Not Detected Not Detected     *Note: Due to a large number of results and/or encounters for the requested time period, some results have not been displayed. A complete set of results can be found in Results Review.       All other labs and Imaging have been personally reviewed.         Assessment/Plan         Neuro:  #Acute encephalopathy (multiple-sepsis, anemic hypoxia)  #Mixed anxiety depressive disorder  #Acute post operative pain  #Chronic pain related to malignancy  --Supplemental oxygen, keep Sp02 >92%  --Identify and treat underlying infection  source  --Abx per ID below  --Baseline A&Ox4  --Hold home meds: Lorazepam  --Resume sertraline (hx of taking Wellbutrin, hydroxyzine and olanzapine hs as needed)  --Analgesia: Scheduled gabapentin, prn Norco and tylenol   --Monitor for ICU delirium, maintain sleep hygiene.    Cardiovascular  #Septic shock  #Hx HTN, HLD  #CAD s/p FCO x 2  #Severe PAD s/p multiple interventions  #DVT of RLE, Hx  #Hx Paroxysmal A-fib  --TTE (8/31/23) LVEF 60-65%  --Not on antihypertensives  --Resume rosuvastatin  --Anticoagulated with Eliquis and Plavix  --500 mL Albumin x 1  --Levophed gtt, Titrate to keep MAP >65    Pulmonary:  #Acute on chronic hypoxic respiratory failure  #COPD emphysema  --CXR without any infectious etiology  --Current smoker (90 pack/year history)  --Baseline home O2 2L HS and prn  --Supplemental O2, keep Sp02 >92%  --Maintenance meds: Dexamethasone and Trelegy  --Smoking cessation education  --Continuous Sp02 monitoring    GI:  #IBS with acute diarrhea   --Hold home med Amitiza  --Cdiff PCR pending    Renal:   No acute issues  --Baseline Scr 0.66  --Monitor & replete electrolytes PRN  --Accurate I/Os  --Trend BMP    Endocrine:  #Adrenal cancer  --Dexamethasone  --Follows Dr. 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 Burnham for oncology last seen 1/31/2025  --Currently maintained on Keytruda every 6 weeks next dose coming on 3/14/2025 (missed dose)  --Hbg 6.7 - 1 unit PRBC ordered  --Minimize blood draws  --Monitor CBC x 3 days      ID:  #Sepsis due to unidentified infectious organism (working dx: UTI, soft tissue infection, cdiff colitis?)  --History of Pseudomonas and MRSA of left foot wound (2/20/25)  --Culture Data: BC, urine, and stool c-diff PCR pending  --Lactate 0.6  --Monitor CBC x 3days    Skin/MSK:  #Left foot amputation incisions  #Multiple skin tears and abrasions  --Skin and wound care as  ordered  --PT/OT  --Podiatry consulted    ICU CHECK LIST:   Antimicrobials: Zosyn and vanc  Oxygen: Nasal cannula  Feeding: Clear liquids  Fluids:   Analgesia: Norco and Tylenol  Sedation: None  Thromboprophylaxis: SCDs and Eliquis and Plavix  Ulcer prophylaxis:   Glycemic control: BGM with SSI lispro #1  Bowel care: PRN   Indwelling catheters: External  Lines: PIVs   Dispo: MICU   Code Status:     I have personally spent 55 minutes of critical care time, exclusive of time spent on any procedures, in evaluation and management of this critically ill patient’s condition mentioned above in the assessment and plan.     Sarah Alaniz, APRN-CNP  Pulmonary and Critical Care Medicine    Please excuse any typographical or unwanted errors within this documentation as voice recognition software was used to dictate this note.

## 2025-03-15 NOTE — PROGRESS NOTES
Vancomycin Dosing by Pharmacy- FOLLOW UP    Radha Toussaint is a 69 y.o. year old female who Pharmacy has been consulted for vancomycin dosing for other unknown  . Based on the patient's indication and renal status this patient is being dosed based on a goal AUC of 400-600.     Renal function is currently stable.    Current vancomycin dose: 1000 mg given every 12 hours    Estimated vancomycin AUC on current dose: 509-537 mg/L.hr     Visit Vitals  BP 89/52   Pulse 65   Temp 36.4 °C (97.5 °F) (Temporal)   Resp 20        Lab Results   Component Value Date    CREATININE 0.59 03/15/2025    CREATININE 0.66 2025    CREATININE 0.79 2025    CREATININE 0.66 2024        Patient weight is as follows:   Vitals:    03/15/25 1142   Weight: 67 kg (147 lb 11.3 oz)       Cultures:  No results found for the encounter in last 14 days.       I/O last 3 completed shifts:  In: 1073.5 (16 mL/kg) [P.O.:439; I.V.:34.5 (0.5 mL/kg); Blood:350; IV Piggyback:250]  Out: 400 (6 mL/kg) [Urine:400 (0.2 mL/kg/hr)]  Weight: 67 kg   I/O during current shift:  No intake/output data recorded.    Temp (24hrs), Av.9 °C (96.7 °F), Min:35.6 °C (96.1 °F), Max:36.4 °C (97.5 °F)      Assessment/Plan    Within goal AUC range. Continue current vancomycin regimen.    This dosing regimen is predicted by [employer] to result in the following pharmacokinetic parameters:    Loading dose: N/A  Regimen: 1000 mg IV every 12 hours.  Start time: 22:12 on 03/15/2025  Exposure target: AUC24 (range)400-600 mg/L.hr   BPS06-13: 509 mg/L.hr  AUC24,ss: 537 mg/L.hr  Probability of AUC24 > 400: 94 %  Ctrough,ss: 16.5 mg/L  Probability of Ctrough,ss > 20: 25 %    The next level will be obtained on 3/17 at 0500. May be obtained sooner if clinically indicated.   Will continue to monitor renal function daily while on vancomycin and order serum creatinine at least every 48 hours if not already ordered.  Follow for continued vancomycin needs, clinical response, and  signs/symptoms of toxicity.       Nestor Cannon, PharmD

## 2025-03-15 NOTE — CONSULTS
Vancomycin Dosing by Pharmacy- INITIAL    Radha Toussaint is a 69 y.o. year old female who Pharmacy has been consulted for vancomycin dosing for other/unknown. Based on the patient's indication and renal status this patient will be dosed based on a goal AUC of 400-600.     Renal function is currently stable.    Visit Vitals  BP (!) 86/49   Pulse 84   Temp 36.5 °C (97.7 °F)   Resp 20        Lab Results   Component Value Date    CREATININE 0.66 2025    CREATININE 0.79 2025    CREATININE 0.66 2024    CREATININE 0.63 2024        Patient weight is as follows:   Vitals:    25 1530   Weight: 67.1 kg (148 lb)       Cultures:  No results found for the encounter in last 14 days.        No intake/output data recorded.  I/O during current shift:  No intake/output data recorded.    Temp (24hrs), Av.5 °C (97.7 °F), Min:36.5 °C (97.7 °F), Max:36.5 °C (97.7 °F)         Assessment/Plan     Patient will be given a loading dose of 1500 mg.  Will initiate vancomycin maintenance,  1000 mg every 12 hours.    This dosing regimen is predicted by [employer] to result in the following pharmacokinetic parameters:  Loading dose: 1500 mg at 22:00 2025.  Regimen: 1000 mg IV every 12 hours.  Start time: 10:00 on 03/15/2025  Exposure target: AUC24 (range)400-600 mg/L.hr   EXH21-34: 515 mg/L.hr  AUC24,ss: 572 mg/L.hr  Probability of AUC24 > 400: 84 %  Ctrough,ss: 18 mg/L    Follow-up level will be ordered on 3/15/25 at 1800 unless clinically indicated sooner.  Will continue to monitor renal function daily while on vancomycin and order serum creatinine at least every 48 hours if not already ordered.  Follow for continued vancomycin needs, clinical response, and signs/symptoms of toxicity.       DOT DUMONT, LianetD

## 2025-03-15 NOTE — CARE PLAN
The patient's goals for the shift include pt will be hemodynamically stable    The clinical goals for the shift include pt will be hemodynamically stable

## 2025-03-15 NOTE — CONSULTS
PODIATRY CONSULT NOTE    SERVICE DATE: 3/15/2025   SERVICE TIME:  1:38 PM    REASON FOR CONSULT: Recent Surgery on Left Foot, Sepsis  REQUESTING PHYSICIAN: Vanessa Nguyen DO  PRIMARY CARE PHYSICIAN: Francheska Rogel DO    Subjective   HPI:  Ms. Toussaint is a 69 y.o. female with a PMHx of HTN, HLD, CAD with FCO x2, severe PAD s/p multiple interventions (aortic aneurysm repair, intracranial aneurysm coiling, iliac grafts, fem-fem graft, multiple lower extremity stents and angioplasties - most recently on 2/12/25 [place] [facility] [place] DVT, Afib (plavix and eliquis), COPD with home oxygen, current smoker, lung cancer w/ mets to adrenal glad, beta thalassemia who presents today 3 days postop of a left 2nd, 3rd and 4th toe amputation with left allograft skin graft on 3/12/2025 with Dr. Doss. Wound cultures taken 2/20/2025 positive for heavy Pseudomonas and MRSA organisms. Chart review revealed patient hospitalized on 2/12/2025 for planned left femoral angioplasty with stent and balloon angioplasty of the left anterior tibial artery. Postoperatively required Reji-Synephrine infusion for hypotension and blood transfusion for anemia. She presented yesterday with altered mental status and confusion.    Podiatry was consulted for further management of the surgical site to the left foot. Patient states she hasn't had any issues to the left foot since the procedure and that with the exception of mild discomfort and pain, she is happy with the post-op course. Patient denies any other pedal complaints. Relates fatigue.    ROS: 10-point review of systems was performed and is otherwise negative except as noted in HPI.  PMH: Reviewed/documented below.  PSH:  Noncontributory except per HPI   FH: Reviewed and noncontributory   SOCIAL:  Reviewed/documented below.  ALLERGIES: Reviewed/documented below.  MEDS: Reviewed/documented below.  VS: Reviewed/documented below.    Past Medical History:   Diagnosis Date    Adrenal cancer (Multi)     Aneurysm of  carotid artery (CMS-[facility])     Neck aneurysm    Anxiety     COPD (chronic obstructive pulmonary disease) (Multi)     Depression     DVT (deep venous thrombosis) (Multi)     2022    DVT (deep venous thrombosis) (Multi)     RLE    History of blood transfusion     History of tubal ligation     Hyperlipidemia     Old myocardial infarction     History of heart attack    Other specified disorders of kidney and ureter     Obstruction of kidney    Personal history of other diseases of the circulatory system     History of intracranial aneurysm    Personal history of other diseases of the circulatory system     History of abdominal aortic aneurysm (AAA)    Personal history of other diseases of the respiratory system     History of chronic obstructive lung disease    Personal history of urinary calculi     History of kidney stones    Right upper quadrant pain 09/25/2020    Abdominal pain, RUQ (right upper quadrant)    Vision loss      Past Surgical History:   Procedure Laterality Date    COLONOSCOPY      CT ABDOMEN PELVIS ANGIOGRAM W AND/OR WO IV CONTRAST  11/21/2019    CT ABDOMEN PELVIS ANGIOGRAM W AND/OR WO IV CONTRAST 11/21/2019 [facility]    CT ANGIO AORTA AND BILATERAL ILIOFEMORAL RUN OFF INCLUDING WITHOUT CONTRAST IF PERFORMED  08/09/2022    CT AORTA AND BILATERAL ILIOFEMORAL RUNOFF ANGIOGRAM W AND/OR WO IV CONTRAST 8/9/2022 [facility]GAC[facility]    CT HEAD ANGIO W AND WO IV CONTRAST  05/22/2020    CT HEAD ANGIO W AND WO IV CONTRAST 5/22/2020 [facility]    INVASIVE VASCULAR PROCEDURE N/A 01/23/2024    Procedure: Lower Extremity Angiogram;  Surgeon: Willam Mike MD;  Location: [place] [address] Cardiac Cath Lab;  Service: Cardiovascular;  Laterality: N/A;  [id number];LLE CLTI    INVASIVE VASCULAR PROCEDURE N/A 01/23/2024    Procedure: Lower Extremity Intervention;  Surgeon: Willam Mike MD;  Location: [place] [address] Cardiac Cath Lab;  Service: Cardiovascular;  Laterality: N/A;    INVASIVE VASCULAR PROCEDURE N/A 06/25/2024     Procedure: Lower Extremity Angiogram;  Surgeon: Willam Mike MD;  Location: [place] [address] Cardiac Cath Lab;  Service: Cardiovascular;  Laterality: N/A;  6/25/24 with Cee    INVASIVE VASCULAR PROCEDURE Left 06/25/2024    Procedure: Lower Extremity Intervention;  Surgeon: Willam Mike MD;  Location: [place] [address] Cardiac Cath Lab;  Service: Cardiovascular;  Laterality: Left;    INVASIVE VASCULAR PROCEDURE N/A 06/25/2024    Procedure: Thrombectomy - Lower Extremity;  Surgeon: Willam Mike MD;  Location: [place] [address] Cardiac Cath Lab;  Service: Cardiovascular;  Laterality: N/A;    INVASIVE VASCULAR PROCEDURE N/A 06/25/2024    Procedure: Atherectomy - Lower Extremity;  Surgeon: Willam Mike MD;  Location: [place] [address] Cardiac Cath Lab;  Service: Cardiovascular;  Laterality: N/A;    INVASIVE VASCULAR PROCEDURE Left 06/25/2024    Procedure: FCO Stent - Lower Extremity;  Surgeon: Willam Mike MD;  Location: [place] [address] Cardiac Cath Lab;  Service: Cardiovascular;  Laterality: Left;    INVASIVE VASCULAR PROCEDURE N/A 06/26/2024    Procedure: Lower Extremity Intervention;  Surgeon: Bucky Lake DO;  Location: [facility] Cardiac Cath Lab;  Service: Cardiovascular;  Laterality: N/A;    INVASIVE VASCULAR PROCEDURE N/A 06/26/2024    Procedure: Lower Extremity Angiogram;  Surgeon: Bucky Lake DO;  Location: [facility] Cardiac Cath Lab;  Service: Cardiovascular;  Laterality: N/A;    OTHER SURGICAL HISTORY  09/30/2020    Tubal ligation    OTHER SURGICAL HISTORY  09/30/2020    Cardiac catheterization    TUBAL LIGATION       Family History   Problem Relation Name Age of Onset    Aneurysm Mother      Hypertension Mother      Heart attack Father      Cancer Father's Sister       Social History     Tobacco Use    Smoking status: Every Day     Current packs/day: 1.00     Average packs/day: 1.8 packs/day for 50.2 years (90.2 ttl pk-yrs)     Types: Cigarettes     Start date: 1975     Passive exposure: Past    Smokeless tobacco: Never   Substance Use  Topics    Alcohol use: Never    Drug use: Never      Medications Prior to Admission   Medication Sig Dispense Refill Last Dose/Taking    acetaminophen (Tylenol) 325 mg tablet Take 3 tablets (975 mg) by mouth every 6 hours if needed for mild pain (1 - 3) or moderate pain (4 - 6). (Patient not taking: Reported on 3/12/2025)       apixaban (Eliquis) 5 mg tablet Take 1 tablet (5 mg) by mouth 2 times a day. 180 tablet 3     buPROPion (Wellbutrin) 75 mg tablet Take 1 tablet (75 mg) by mouth once daily. (Patient not taking: Reported on 3/12/2025) 30 tablet 1     [] chlorhexidine (Hibiclens) 4 % external liquid Apply topically early in the morning. for 5 days. Use wash as directed daily for 5 days prior to surgery with the 5th day being the morning of surgery. 118 mL 0     [] chlorhexidine (Peridex) 0.12 % solution Use 15 mL in the mouth or throat early in the morning. for 2 days. Rinse mouth by swishing medication and spitting. Use daily for 2 days prior to surgery with the 2nd day being the morning of surgery. 30 mL 0     clopidogrel (Plavix) 75 mg tablet Take 1 tablet (75 mg) by mouth once daily in the morning. 90 tablet 3     dexAMETHasone (Decadron) 4 mg tablet Take 0.5 tablets (2 mg) by mouth once daily. 30 tablet 1     fluticasone-umeclidin-vilanter (Trelegy Ellipta) 200-62.5-25 mcg blister with device Inhale 1 puff once daily. 60 each 3     gabapentin (Neurontin) 300 mg capsule Take 1 capsule (300 mg) by mouth 3 times a day. 270 capsule 3     HYDROcodone-acetaminophen (Norco)  mg tablet Take 1 tablet by mouth every 6 hours if needed for severe pain (7 - 10). 60 tablet 0     hydrOXYzine pamoate (VistariL) 25 mg capsule Take 1 capsule (25 mg) by mouth 3 times a day as needed for itching. (Patient not taking: Reported on 3/12/2025) 270 capsule 3     LORazepam (Ativan) 0.5 mg tablet Take 1 tablet (0.5 mg) by mouth every 6 hours if needed for anxiety. 120 tablet 3     lubiprostone (Amitiza) 24 mcg  capsule Take 1 capsule (24 mcg) by mouth 2 times daily (morning and late afternoon). 60 capsule 0     magic mouthwash (lidocaine, diphenhydrAMINE, Maalox 1:1:1) Swish and swallow 10 mL every 6 hours if needed for mucositis. (Patient not taking: Reported on 3/12/2025) 560 mL 3     montelukast (Singulair) 10 mg tablet Take 1 tablet (10 mg) by mouth once daily at bedtime. (Patient not taking: Reported on 3/12/2025) 90 tablet 1     naloxone (Narcan) 4 mg/0.1 mL nasal spray Administer 1 spray (4 mg) into affected nostril(s) if needed for opioid reversal. May repeat every 2-3 minutes if needed, alternating nostrils, until medical assistance becomes available. (Patient not taking: Reported on 3/12/2025) 2 each 0     OLANZapine (ZyPREXA) 15 mg tablet Take 1 tablet (15 mg) by mouth once daily at bedtime. 90 tablet 1     ondansetron ODT (Zofran-ODT) 8 mg disintegrating tablet Dissolve 1 tablet (8 mg) in the mouth every 8 hours if needed for nausea or vomiting. 90 tablet 0     oxyCODONE-acetaminophen (Percocet) 5-325 mg tablet Take 1 tablet by mouth every 6 hours if needed for severe pain (7 - 10). 20 tablet 0     oxygen (O2) gas therapy Inhale 1 each continuously. 2 L at bedtime and naps       prochlorperazine (Compazine) 5 mg tablet Take 1 tablet (5 mg) by mouth every 6 hours if needed for nausea or vomiting. 30 tablet 3     rosuvastatin (Crestor) 40 mg tablet Take 1 tablet (40 mg) by mouth once daily. 90 tablet 3     sertraline (Zoloft) 100 mg tablet TAKE 1 TABLET(100 MG) BY MOUTH TWICE DAILY 180 tablet 3         Medications:  Scheduled Meds: apixaban, 5 mg, oral, BID  clopidogrel, 75 mg, oral, q AM  dexAMETHasone, 2 mg, oral, Daily  tiotropium, 2 puff, inhalation, Daily   And  fluticasone furoate-vilanteroL, 1 puff, inhalation, Daily  gabapentin, 300 mg, oral, TID  insulin lispro, 0-5 Units, subcutaneous, TID AC  [Held by provider] lubiprostone, 24 mcg, oral, BID  montelukast, 10 mg, oral, Nightly  nicotine, 1 patch,  transdermal, Daily  [Held by provider] OLANZapine, 15 mg, oral, Nightly  piperacillin-tazobactam, 3.375 g, intravenous, q6h  rosuvastatin, 40 mg, oral, Daily  vancomycin, 1,000 mg, intravenous, q12h      Continuous Infusions: norepinephrine, 0-0.2 mcg/kg/min, Last Rate: Stopped (03/15/25 0630)      PRN Meds: PRN medications: acetaminophen, dextrose, dextrose, glucagon, glucagon, HYDROcodone-acetaminophen, LORazepam, ondansetron **OR** ondansetron, oxygen, vancomycin    Allergies as of 03/14/2025 - Reviewed 03/14/2025   Allergen Reaction Noted    Methylprednisolone Agitation, Confusion, and Other 04/27/2024    Ace inhibitors Angioedema 03/24/2023    Prednisone Anxiety and Agitation 09/21/2023    Betamethasone dipropionate Confusion 07/17/2024    Iodinated contrast media Nausea/vomiting and Rash 08/01/2023    Meperidine Nausea/vomiting 09/21/2023            Objective   PHYSICAL EXAM:  Physical Exam Performed:  Vitals:    03/15/25 1000   BP: 108/57   Pulse: 62   Resp: 21   Temp:    SpO2: 100%     Body mass index is 25.35 kg/m².    Patient is AOx3 and in no acute distress. Patient is alert and cooperative. Sitting comfortably in bed with dressing clean, dry and intact.     LLE Focused Examination:  Vascular: Faintly palpable DP/PT pulses . Mild non-pitting edema noted. Hair growth absent. CFT<5 to the hallux. Temperature is warm to warm from tibial tuberosity to distal digits. No lymphatic streaking noted.    Musculoskeletal: Gross active and passive ROM intact to age and activity level. Moves all extremities spontaneously. Moderate to severe pain to palpation at left foot surgical site.    Neurological: Intact light touch sensation. Pain stimuli intact.     Dermatologic: Skin appears diffusely xerotic. Web spaces 1-4 are clean, dry and intact. No rashes or nodules noted  No hyperkeratotic tissue noted. Post operative sites are well coapted with sutures intact. Mild serosangiuinous drainage noted. No malodor. No signs  of dehiscence. Patient has intact Epifix graft in place to wound sites. No SOI.    LABS:   Results for orders placed or performed during the hospital encounter of 03/14/25 (from the past 24 hours)   Urinalysis with Reflex Culture and Microscopic   Result Value Ref Range    Color, Urine Yellow Light-Yellow, Yellow, Dark-Yellow    Appearance, Urine Turbid (N) Clear    Specific Gravity, Urine 1.015 1.005 - 1.035    pH, Urine 5.5 5.0, 5.5, 6.0, 6.5, 7.0, 7.5, 8.0    Protein, Urine 30 (1+) (A) NEGATIVE, 10 (TRACE), 20 (TRACE) mg/dL    Glucose, Urine Normal Normal mg/dL    Blood, Urine OVER (3+) (A) NEGATIVE mg/dL    Ketones, Urine NEGATIVE NEGATIVE mg/dL    Bilirubin, Urine NEGATIVE NEGATIVE mg/dL    Urobilinogen, Urine Normal Normal mg/dL    Nitrite, Urine NEGATIVE NEGATIVE    Leukocyte Esterase, Urine 250 Srinath/uL (A) NEGATIVE   Extra Urine Gray Tube   Result Value Ref Range    Extra Tube Hold for add-ons.    Microscopic Only, Urine   Result Value Ref Range    WBC, Urine 11-20 (A) 1-5, NONE /HPF    RBC, Urine 3-5 NONE, 1-2, 3-5 /HPF    Squamous Epithelial Cells, Urine 1-9 (SPARSE) Reference range not established. /HPF    Transitional Epithelial Cells, Urine 1-2 (FEW) Reference range not established. /HPF    Bacteria, Urine 2+ (A) NONE SEEN /HPF    Mucus, Urine FEW Reference range not established. /LPF   CBC and Auto Differential   Result Value Ref Range    WBC 13.1 (H) 4.4 - 11.3 x10*3/uL    nRBC 0.0 0.0 - 0.0 /100 WBCs    RBC 3.28 (L) 4.00 - 5.20 x10*6/uL    Hemoglobin 6.7 (L) 12.0 - 16.0 g/dL    Hematocrit 24.3 (L) 36.0 - 46.0 %    MCV 74 (L) 80 - 100 fL    MCH 20.4 (L) 26.0 - 34.0 pg    MCHC 27.6 (L) 32.0 - 36.0 g/dL    RDW 20.0 (H) 11.5 - 14.5 %    Platelets 184 150 - 450 x10*3/uL    Neutrophils % 77.6 40.0 - 80.0 %    Immature Granulocytes %, Automated 0.8 0.0 - 0.9 %    Lymphocytes % 13.4 13.0 - 44.0 %    Monocytes % 7.5 2.0 - 10.0 %    Eosinophils % 0.5 0.0 - 6.0 %    Basophils % 0.2 0.0 - 2.0 %    Neutrophils  Absolute 10.21 (H) 1.20 - 7.70 x10*3/uL    Immature Granulocytes Absolute, Automated 0.10 0.00 - 0.70 x10*3/uL    Lymphocytes Absolute 1.76 1.20 - 4.80 x10*3/uL    Monocytes Absolute 0.98 0.10 - 1.00 x10*3/uL    Eosinophils Absolute 0.06 0.00 - 0.70 x10*3/uL    Basophils Absolute 0.02 0.00 - 0.10 x10*3/uL   Comprehensive metabolic panel   Result Value Ref Range    Glucose 79 74 - 99 mg/dL    Sodium 139 136 - 145 mmol/L    Potassium 4.2 3.5 - 5.3 mmol/L    Chloride 108 (H) 98 - 107 mmol/L    Bicarbonate 23 21 - 32 mmol/L    Anion Gap 12 10 - 20 mmol/L    Urea Nitrogen 15 6 - 23 mg/dL    Creatinine 0.66 0.50 - 1.05 mg/dL    eGFR >90 >60 mL/min/1.73m*2    Calcium 8.3 (L) 8.6 - 10.3 mg/dL    Albumin 2.9 (L) 3.4 - 5.0 g/dL    Alkaline Phosphatase 55 33 - 136 U/L    Total Protein 5.5 (L) 6.4 - 8.2 g/dL    AST 17 9 - 39 U/L    Bilirubin, Total 0.5 0.0 - 1.2 mg/dL    ALT 9 7 - 45 U/L   Magnesium   Result Value Ref Range    Magnesium 1.61 1.60 - 2.40 mg/dL   B-Type Natriuretic Peptide   Result Value Ref Range    BNP 55 0 - 99 pg/mL   Troponin I, High Sensitivity, Initial   Result Value Ref Range    Troponin I, High Sensitivity 14 (H) 0 - 13 ng/L   POCT GLUCOSE   Result Value Ref Range    POCT Glucose 76 74 - 99 mg/dL   Protime-INR   Result Value Ref Range    Protime 16.2 (H) 9.8 - 12.4 seconds    INR 1.5 (H) 0.9 - 1.1   APTT   Result Value Ref Range    aPTT 31 26 - 36 seconds   Troponin, High Sensitivity, 1 Hour   Result Value Ref Range    Troponin I, High Sensitivity 14 (H) 0 - 13 ng/L   Lactate   Result Value Ref Range    Lactate 0.6 0.4 - 2.0 mmol/L   Blood Culture    Specimen: Peripheral Venipuncture; Blood culture   Result Value Ref Range    Blood Culture Loaded on Instrument - Culture in progress    Blood Culture    Specimen: Peripheral Venipuncture; Blood culture   Result Value Ref Range    Blood Culture Loaded on Instrument - Culture in progress    Sars-CoV-2 and Influenza A/B PCR   Result Value Ref Range    Flu A  Result Not Detected Not Detected    Flu B Result Not Detected Not Detected    Coronavirus 2019, PCR Not Detected Not Detected   Prepare RBC: 1 Units   Result Value Ref Range    PRODUCT CODE H6977A94     Unit Number [id number]     Unit ABO A     Unit RH POS     XM INTEP COMP     Dispense Status TR     Blood Expiration Date 4/8/2025 11:59:00 PM EDT     PRODUCT BLOOD TYPE 6200     UNIT VOLUME 350    Type and screen   Result Value Ref Range    ABO TYPE A     Rh TYPE POS     ANTIBODY SCREEN NEG    CBC and Auto Differential   Result Value Ref Range    WBC 11.8 (H) 4.4 - 11.3 x10*3/uL    nRBC 0.0 0.0 - 0.0 /100 WBCs    RBC 3.36 (L) 4.00 - 5.20 x10*6/uL    Hemoglobin 7.4 (L) 12.0 - 16.0 g/dL    Hematocrit 26.9 (L) 36.0 - 46.0 %    MCV 80 80 - 100 fL    MCH 22.0 (L) 26.0 - 34.0 pg    MCHC 27.5 (L) 32.0 - 36.0 g/dL    RDW 20.5 (H) 11.5 - 14.5 %    Platelets 149 (L) 150 - 450 x10*3/uL    Neutrophils % 79.1 40.0 - 80.0 %    Immature Granulocytes %, Automated 0.8 0.0 - 0.9 %    Lymphocytes % 11.5 13.0 - 44.0 %    Monocytes % 7.4 2.0 - 10.0 %    Eosinophils % 1.0 0.0 - 6.0 %    Basophils % 0.2 0.0 - 2.0 %    Neutrophils Absolute 9.34 (H) 1.20 - 7.70 x10*3/uL    Immature Granulocytes Absolute, Automated 0.09 0.00 - 0.70 x10*3/uL    Lymphocytes Absolute 1.35 1.20 - 4.80 x10*3/uL    Monocytes Absolute 0.87 0.10 - 1.00 x10*3/uL    Eosinophils Absolute 0.12 0.00 - 0.70 x10*3/uL    Basophils Absolute 0.02 0.00 - 0.10 x10*3/uL   Comprehensive Metabolic Panel   Result Value Ref Range    Glucose 84 74 - 99 mg/dL    Sodium 138 136 - 145 mmol/L    Potassium 3.6 3.5 - 5.3 mmol/L    Chloride 110 (H) 98 - 107 mmol/L    Bicarbonate 22 21 - 32 mmol/L    Anion Gap 10 10 - 20 mmol/L    Urea Nitrogen 10 6 - 23 mg/dL    Creatinine 0.59 0.50 - 1.05 mg/dL    eGFR >90 >60 mL/min/1.73m*2    Calcium 7.8 (L) 8.6 - 10.3 mg/dL    Albumin 2.9 (L) 3.4 - 5.0 g/dL    Alkaline Phosphatase 49 33 - 136 U/L    Total Protein 5.1 (L) 6.4 - 8.2 g/dL    AST 15 9 -  39 U/L    Bilirubin, Total 0.7 0.0 - 1.2 mg/dL    ALT 9 7 - 45 U/L   POCT GLUCOSE   Result Value Ref Range    POCT Glucose 69 (L) 74 - 99 mg/dL   POCT GLUCOSE   Result Value Ref Range    POCT Glucose 126 (H) 74 - 99 mg/dL     *Note: Due to a large number of results and/or encounters for the requested time period, some results have not been displayed. A complete set of results can be found in Results Review.      Lab Results   Component Value Date    HGBA1C 5.8 (H) 06/25/2024      Lab Results   Component Value Date    CRP 3.31 (H) 01/05/2024      Lab Results   Component Value Date    SEDRATE 46 (H) 03/03/2025        Results from last 7 days   Lab Units 03/15/25  0624   WBC AUTO x10*3/uL 11.8*   RBC AUTO x10*6/uL 3.36*   HEMOGLOBIN g/dL 7.4*   HEMATOCRIT % 26.9*     Results from last 7 days   Lab Units 03/15/25  0624 03/14/25  1607   SODIUM mmol/L 138 139   POTASSIUM mmol/L 3.6 4.2   CHLORIDE mmol/L 110* 108*   CO2 mmol/L 22 23   BUN mg/dL 10 15   CREATININE mg/dL 0.59 0.66   CALCIUM mg/dL 7.8* 8.3*   MAGNESIUM mg/dL  --  1.61   BILIRUBIN TOTAL mg/dL 0.7 0.5   ALT U/L 9 9   AST U/L 15 17     Results from last 7 days   Lab Units 03/14/25  1603   COLOR U  Yellow   APPEARANCE U  Turbid*   PH U  5.5   SPEC GRAV UR  1.015   PROTEIN U mg/dL 30 (1+)*   BLOOD UR mg/dL OVER (3+)*   NITRITE U  NEGATIVE   WBC UR /HPF 11-20*   BACTERIA UR /HPF 2+*       IMAGING REVIEW:  XR chest 1 view    Result Date: 3/14/2025  Interpreted By:  Go Cespedes, STUDY: XR CHEST 1 VIEW;  3/14/2025 5:00 pm   INDICATION: Signs/Symptoms:sob.   COMPARISON: Chest radiograph 12/02/2024   ACCESSION NUMBER(S): [id number]   ORDERING CLINICIAN: QUENTIN ALVAREZ   FINDINGS:     CARDIOMEDIASTINAL SILHOUETTE: Cardiomediastinal silhouette is unremarkable in size and configuration.   LUNGS: No consolidation, pneumothorax, or significant effusion.   ABDOMEN: No remarkable upper abdominal findings.   BONES: No acute osseous changes.       1.  No evidence of  acute cardiopulmonary process.     Signed by: Go Cespedes 3/14/2025 5:41 PM Dictation workstation:   [id number]    CT head wo IV contrast    Result Date: 3/14/2025  Interpreted By:  Go Cespedes, STUDY: CT HEAD WO IV CONTRAST;  3/14/2025 4:52 pm   INDICATION: Signs/Symptoms:cva/ams.   COMPARISON: None.   ACCESSION NUMBER(S): [id number]   ORDERING CLINICIAN: QUENTIN ALVAREZ   TECHNIQUE: Noncontrast axial CT scan of head was performed. Angled reformats in brain and bone windows were generated. The images were reviewed in bone, brain, blood and soft tissue windows.   FINDINGS: CSF Spaces: The ventricles, sulci and basal cisterns are within normal limits. There is no extraaxial fluid collection.   Parenchyma: Parenchyma appear stable. Small focus of encephalomalacia of the inferior right medial cerebellum. The grey-white differentiation is intact. There is no mass effect or midline shift. There is no intracranial hemorrhage.   Calvarium: No acute displaced scapular fracture. Stable postsurgical changes from prior left-sided craniotomy with similar appearing presumed aneurysm occlusion or correlating devices.   Paranasal sinuses and mastoids: Visualized paranasal sinuses and mastoids are clear.       No evidence of acute cortical infarct or intracranial hemorrhage.       MACRO: None     Signed by: Go Cespedes 3/14/2025 5:36 PM Dictation workstation:   [id number]    FL less than 1 hour    Result Date: 3/12/2025  These images are not reportable by radiology and will not be interpreted by  Radiologists.    NM PET CT lung CA staging    Result Date: 2/24/2025  Interpreted By:  Conner Edouard and Kelly Rory STUDY: NM PET CT LUNG CA STAGING; 2/24/2025 2:17 pm   INDICATION: Signs/Symptoms:Metastatic primary bronchogenic carcinoma.  On Keytruda.. ,C34.90 Malignant neoplasm of unspecified part of unspecified bronchus or lung (Multi),G89.3 Neoplasm related pain (acute) (chronic)   COMPARISON: PET-CT dated  09/01/2023. MRI of the thoracolumbar spine dated 12/02/2024. CT of the chest abdomen pelvis dated 10/08/2024.   ACCESSION NUMBER(S): [id number]   ORDERING CLINICIAN: ADARSH LARA   TECHNIQUE: DIVISION OF NUCLEAR MEDICINE POSITRON EMISSION TOMOGRAPHY (PET-CT)   The patient received an intravenous dose of 13.8 mCi of Fluorine-18 fluorodeoxyglucose (FDG). Positron emission tomographic (PET) images from mid-thigh to skull base were then acquired after a one hour delay. Also acquired was a contemporaneous low dose non-contrast CT scan performed for attenuation correction of PET images and anatomic localization.  The PET and CT images were digitally fused for display.  All images were acquired on a combined PET-CT scanner unit. Some areas of FDG accumulation may be described in standardized uptake value (SUV) units.   CODING: Subsequent Treatment Strategy (PS)   CALIBRATION: Dose Injection-to-Scan Interval (mins): 51 min Mediastinal bloodpool SUV (normal 1.5-2.5): 2.6 Blood glucose: 100 mg/dL   FINDINGS: NECK: No focal hypermetabolic soft tissue lesion is seen in the neck. No hypermetabolic cervical lymphadenopathy is present.   CHEST: There is a moderately hypermetabolic solid pulmonary nodule within the anterior right upper lobe with maximum SUV of 2.5 which is new compared to prior CT of the chest. Additional scattered subcentimeter pulmonary nodules throughout the bilateral upper lobes appears stable in size compared to prior CT and do not demonstrate hypermetabolic activity although are below PET resolution. No evidence of hypermetabolic mediastinal, hilar or axillary lymphadenopathy.   ABDOMEN AND PELVIS: There is intense hypermetabolic activity associated with segmental bowel wall thickening of the sigmoid colon with maximum SUV of 11.3; this region of segmental thickening appears similar in distribution compared to prior CT of the chest, abdomen, and pelvis. No hypermetabolic hepatic or pancreatic lesions. No  hypermetabolic adrenal lesions. No evidence of hypermetabolic lymphadenopathy. Similar appearance infrarenal abdominal aortic aneurysm with aortoiliac graft repair.   MUSCULOSKELETAL: There is no focal hypermetabolic lesion to suggest osseous metastasis.       1.  Interval development of a moderately hypermetabolic pulmonary nodule within the right upper lobe which is indeterminate. Follow-up chest CT is recommended to evaluate for resolution. Stable appearance of additional subcentimeter pulmonary nodules without hypermetabolic activity although below PET resolution. 2. No evidence of hypermetabolic bean or distant metastatic disease. 3. Segmental hypermetabolic thickening of the sigmoid colon is nonspecific although correlation with direct visualization is recommended given a similar distribution of thickening on prior CT.       I personally reviewed the image(s) / study and agree with the findings and interpretation as stated. This study was interpreted at [facility].     Signed by: Conner Edouard 2/24/2025 3:25 PM Dictation workstation:   [id number]    [place] less than 1 hour    Result Date: 2/14/2025  Fluoroscopic guidance 2/14/2025 6:59 AM CST History: C-ARM GUIDANCE IN SURGERY Tech notes: WHAT SYMPTOMS ARE YOU EXPERIENCING?; gangrene left foot, l arteriogram, balloon angioplasty and stent placement; FLUOROSCOPY TIME (MINUTES); 48.3; NUMBER OF FLUORO SPOT IMAGES; 38.00; mGy; 127.40 Findings: Fluoroscopic guidance was provided in the operating room to vascular surgery for arteriography..  48.3 minutes of fluoroscopy time was provided, 127.4 mGy (cumulative air kerma). 38 images are submitted. Please see the procedure report for additional information. Impression: Fluoroscopic guidance was provided to vascular surgery as discussed above [] Electronically signed by:  Alberto Foster MD  02/14/2025 12:24 PM [facility] [place] Workstation: [id number] Technologist:  TF Dictated By:   SHWETA MERRITT,  OWEN Signed By:     OWEN ROCK MD Signed Out:    02/14/25 12:24:49            Assessment/Plan   ASSESSMENT & PLAN:    #S/P Day 3 Left 2nd, 3rd and 4th toe amputation with left allograft skin graft  #PAD s/p Multiple Interventions  #COPD w/ Home Oxygen  #CAD    - Patient was seen and evaluated; all findings were discussed and all questions were answered to patient's satisfaction.  - Charts, labs, vitals and imaging all reviewed.   - Imaging: XR left Foot negative for acute changes/OM/pathology.  - Labs: WBC 11.8, Lactate WNL  - Blood culture: In progress    Plan:  - Abx: IV Vanc/Zosyn  - Pain Regimen: Per Medicine  - Bowel Regimen: Per Medicine  - Due to clinical presentation, patient's Sepsis is likely stemming from other un-identified organism. Awaiting urine and stool analysis. C Diff is still pending.  - Patient continuing proper post-operative course. Mild to moderate pain with dressing change.  - Dressings: Adaptic to surgical sites. Betadine wet to dry dressing in place. Kerlix and overlying Light ACE. Orders placed.  - Nursing staff is able to change/reinforce dressing if & as necessary until next day’s dressing change. Thank you.  - Podiatry will continue to follow while in house.    Discharge: Pt to follow up 1 week after discharge with Dr. Margoth Doss    Case to be discussed with attending, A&P above reflects a tentative plan. Please await for the final signature from the attending physician on service.    Drake Mireles DPM PGY-3  Podiatric Medicine & Surgery  Please Maicolku message me with any questions or concerns.      SIGNATURE: Drake Mireles DPM PATIENT NAME: Radha Toussaint   DATE: March 15, 2025 MRN: [id number]   TIME: 10:49 AM CONTACT: Haiku message

## 2025-03-15 NOTE — PROGRESS NOTES
Radha Toussaint is a 69 y.o. female on day 1 of admission presenting with Shortness of breath.    SUBJECTIVE     Patient evaluated this morning and found to be resting comfortably in bed.  Eating breakfast, awake and alert, very well-appearing.  Her left lower extremity is appropriately bandaged with clean dressing.  She is requesting her pain and Ativan medication for anxiety which she takes chronically.  States she otherwise feels well and has no acute complaints.  If she continues to do well she may be appropriate to discharge from the ICU into the floor later today    OBJECTIVE     Vitals:    03/15/25 0700 03/15/25 0800 03/15/25 0900 03/15/25 1000   BP: 111/59 126/76 102/57 108/57   BP Location:       Patient Position:       Pulse: 63 73 78 62   Resp: 20 (!) 28 25 21   Temp:  36.4 °C (97.5 °F)     TempSrc:  Temporal     SpO2: 100% 90% 94% 100%   Weight:       Height:          Results from last 7 days   Lab Units 03/15/25  0624   WBC AUTO x10*3/uL 11.8*   HEMOGLOBIN g/dL 7.4*   HEMATOCRIT % 26.9*   PLATELETS AUTO x10*3/uL 149*   NEUTROS PCT AUTO % 79.1   LYMPHS PCT AUTO % 11.5   MONOS PCT AUTO % 7.4   EOS PCT AUTO % 1.0     Results from last 7 days   Lab Units 03/15/25  0624   SODIUM mmol/L 138   POTASSIUM mmol/L 3.6   CHLORIDE mmol/L 110*   CO2 mmol/L 22   BUN mg/dL 10   CREATININE mg/dL 0.59   CALCIUM mg/dL 7.8*   PROTEIN TOTAL g/dL 5.1*   BILIRUBIN TOTAL mg/dL 0.7   ALK PHOS U/L 49   ALT U/L 9   AST U/L 15   GLUCOSE mg/dL 84     24hr Min/Max:  Temp  Min: 35.6 °C (96.1 °F)  Max: 36.5 °C (97.7 °F)  Pulse  Min: 54  Max: 91  BP  Min: 77/40  Max: 131/62  Resp  Min: 16  Max: 28  SpO2  Min: 90 %  Max: 100 %  LDA:   External Urinary Catheter Female (Active)   Placement Date/Time: 03/14/25 1604   Placed by: mine pca  Hand Hygiene Completed: Yes  External Catheter Type: Female   Number of days: 0       Intake/Output Summary (Last 24 hours) at 3/15/2025 1044  Last data filed at 3/15/2025 1000  Gross per 24 hour    Intake 502.54 ml   Output 300 ml   Net 202.54 ml     All other labs and Imaging have been personally reviewed.     Scheduled Medications  apixaban, 5 mg, oral, BID  clopidogrel, 75 mg, oral, q AM  dexAMETHasone, 2 mg, oral, Daily  tiotropium, 2 puff, inhalation, Daily   And  fluticasone furoate-vilanteroL, 1 puff, inhalation, Daily  gabapentin, 300 mg, oral, TID  insulin lispro, 0-5 Units, subcutaneous, TID AC  [Held by provider] lubiprostone, 24 mcg, oral, BID  montelukast, 10 mg, oral, Nightly  nicotine, 1 patch, transdermal, Daily  [Held by provider] OLANZapine, 15 mg, oral, Nightly  piperacillin-tazobactam, 3.375 g, intravenous, q6h  rosuvastatin, 40 mg, oral, Daily  vancomycin, 1,000 mg, intravenous, q12h       Continuous Medications:   norepinephrine, 0-0.2 mcg/kg/min, Last Rate: Stopped (03/15/25 0630)         Physical Exam:  General:  Pleasant and cooperative. No apparent distress.  HEENT:  Normocephalic, atraumatic  Chest:  Clear to auscultation bilaterally. No wheezes, rales, or rhonchi.  CV:  Regular rate and rhythm. No murmurs    Abdomen: Abdomen is soft, non-tender, non-distended. BS +   Extremities:  No lower extremity edema or cyanosis.  Lower extremity is bandaged without evidence of purulent drainage or erythema, no edema.  Bandages are clean and dry  Neurological:  AAOx3. No focal deficits.  Skin:  Warm and dry.    ASSESSMENT & PLAN     Daily Progress:    ASSESSMENT & PLAN    Neuro/Constitutional  #Acute encephalopathy (multiple-sepsis, anemic hypoxia)  #Mixed anxiety depressive disorder  #Acute post operative pain  #Chronic pain related to malignancy  -No acute issues, relatively alert and oriented  PLAN:  -Monitor for ICU delirium, maintain sleep hygiene  -Avoid anticholinergics, benzodiazepines, sedatives. Avoid excessive tubes/lines  - Resume sertraline (hx of taking Wellbutrin, hydroxyzine and olanzapine hs as needed)  -Analgesia: Scheduled gabapentin, prn Norco and tylenol, Ativan as  needed    Cardiovascular  #Septic shock  #Hx HTN, HLD  #CAD s/p FCO x 2  #Severe PAD s/p multiple interventions  #DVT of RLE, Hx  #Hx Paroxysmal A-fib  - TTE (8/31/23) EF 60-65%  - received 500mL albumin overnight  PLAN:  -Maintain MAPs>65  - Wean off pressors   - Resume home medications     Pulmonary  #Acute on chronic hypoxic respiratory failure  #COPD  # NSCLC  - Current smoker with 90 pack/year hx  PLAN:  -Baseline home O2 2L HS and prn  -Supplemental O2, keep Sp02 >92%  -Maintenance meds: Dexamethasone and Trelegy  -CXR as needed if acute change in resp status  -Continuous Sp02 monitoring      GI  #IBS with acute diarrhea  PLAN:  -hold home dhruv  - Cdiff PCR pending    Renal  - No active issues  PLAN:  -Continue to trend renal function with daily labs, monitor UOP  -Replete electrolytes as needed    Endocrine  #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 Burnham, Oncology  PLAN:  -Monitor sugars with daily labs and POCT  - SSI  - Decadron     Heme/Onc  #Acute anemia  #Hx of beta thalassemia anemia  #NSCL cancer with mets to adrenal gland  - Received 1 unit PRBC overnight  PLAN:  -Trend with daily labs  -Monitor for signs of overt bleeding  - transfuse if Hgb <7    ID  #Sepsis due to unidentified infectious orgamism  -Culture Data: BC, urine, and stool c-diff PCR pending  -Hx of Pseudomonas  and MRSA of left food wound  PLAN:  -  follow cultures  - Maintain abx and adjust to culture results    Skin/MSK  #Left foot amputation incisions    PLAN:  - Skin and wound care  - PT/OT  - Podiatry  - Foot x-ray to rule out osteomyelitis    ICU CHECK LIST  Antimicrobials: Vanc/Zosyn  Oxygen: 2L NC  Feeding: Clear liquids  Drips:  -   Fluids: -  Analgesia: Norco and Tylenol   Sedation: None  VTE ppx: SCDs, Eliquis, Plavix  GI ppx: n/a  Glycemic control: SSI #1  Bowel care: prn  Indwelling catheters: External  Lines: PIvs  Code Status: Full Code    Vanessa Nguyen  PGY-3, Emergency Medicine  Please SecureChat for any  further questions  This is a preliminary note, please await attending attestation for final A/P

## 2025-03-15 NOTE — CARE PLAN
Problem: Pain - Adult  Goal: Verbalizes/displays adequate comfort level or baseline comfort level  Outcome: Progressing     Problem: Safety - Adult  Goal: Free from fall injury  Outcome: Progressing     Problem: Discharge Planning  Goal: Discharge to home or other facility with appropriate resources  Outcome: Progressing     Problem: Chronic Conditions and Co-morbidities  Goal: Patient's chronic conditions and co-morbidity symptoms are monitored and maintained or improved  Outcome: Progressing     Problem: Nutrition  Goal: Nutrient intake appropriate for maintaining nutritional needs  Outcome: Progressing     Problem: Skin  Goal: Decreased wound size/increased tissue granulation at next dressing change  Outcome: Progressing  Goal: Participates in plan/prevention/treatment measures  Outcome: Progressing  Goal: Prevent/manage excess moisture  Outcome: Progressing  Goal: Prevent/minimize sheer/friction injuries  Outcome: Progressing  Goal: Promote/optimize nutrition  Outcome: Progressing  Goal: Promote skin healing  Outcome: Progressing     Problem: Diabetes  Goal: Achieve decreasing blood glucose levels by end of shift  Outcome: Progressing  Goal: Increase stability of blood glucose readings by end of shift  Outcome: Progressing  Goal: Decrease in ketones present in urine by end of shift  Outcome: Progressing  Goal: Maintain electrolyte levels within acceptable range throughout shift  Outcome: Progressing  Goal: Maintain glucose levels >70mg/dl to <250mg/dl throughout shift  Outcome: Progressing  Goal: No changes in neurological exam by end of shift  Outcome: Progressing  Goal: Learn about and adhere to nutrition recommendations by end of shift  Outcome: Progressing  Goal: Vital signs within normal range for age by end of shift  Outcome: Progressing  Goal: Increase self care and/or family involovement by end of shift  Outcome: Progressing  Goal: Receive DSME education by end of shift  Outcome: Progressing      Problem: Pain  Goal: Takes deep breaths with improved pain control throughout the shift  Outcome: Progressing  Goal: Turns in bed with improved pain control throughout the shift  Outcome: Progressing  Goal: Walks with improved pain control throughout the shift  Outcome: Progressing  Goal: Performs ADL's with improved pain control throughout shift  Outcome: Progressing  Goal: Participates in PT with improved pain control throughout the shift  Outcome: Progressing  Goal: Free from opioid side effects throughout the shift  Outcome: Progressing  Goal: Free from acute confusion related to pain meds throughout the shift  Outcome: Progressing     Problem: Respiratory  Goal: Clear secretions with interventions this shift  Outcome: Progressing  Goal: Minimize anxiety/maximize coping throughout shift  Outcome: Progressing  Goal: Minimal/no exertional discomfort or dyspnea this shift  Outcome: Progressing  Goal: No signs of respiratory distress (eg. Use of accessory muscles. Peds grunting)  Outcome: Progressing  Goal: Patent airway maintained this shift  Outcome: Progressing  Goal: Tolerate pulmonary toileting this shift  Outcome: Progressing  Goal: Verbalize decreased shortness of breath this shift  Outcome: Progressing  Goal: Wean oxygen to maintain O2 saturation per order/standard this shift  Outcome: Progressing  Goal: Increase self care and/or family involvement in next 24 hours  Outcome: Progressing     Problem: Fall/Injury  Goal: Not fall by end of shift  Outcome: Progressing  Goal: Be free from injury by end of the shift  Outcome: Progressing  Goal: Verbalize understanding of personal risk factors for fall in the hospital  Outcome: Progressing  Goal: Verbalize understanding of risk factor reduction measures to prevent injury from fall in the home  Outcome: Progressing  Goal: Use assistive devices by end of the shift  Outcome: Progressing  Goal: Pace activities to prevent fatigue by end of the shift  Outcome:  Progressing     The clinical goals for the shift include pt will be hemodynamically stable

## 2025-03-16 LAB
ALBUMIN SERPL BCP-MCNC: 2.8 G/DL (ref 3.4–5)
ALBUMIN SERPL BCP-MCNC: 2.9 G/DL (ref 3.4–5)
ALP SERPL-CCNC: 49 U/L (ref 33–136)
ALP SERPL-CCNC: 52 U/L (ref 33–136)
ALT SERPL W P-5'-P-CCNC: 11 U/L (ref 7–45)
ALT SERPL W P-5'-P-CCNC: 15 U/L (ref 7–45)
ANION GAP SERPL CALC-SCNC: 11 MMOL/L (ref 10–20)
ANION GAP SERPL CALC-SCNC: 8 MMOL/L (ref 10–20)
AST SERPL W P-5'-P-CCNC: 20 U/L (ref 9–39)
AST SERPL W P-5'-P-CCNC: 25 U/L (ref 9–39)
BACTERIA BLD CULT: NORMAL
BACTERIA BLD CULT: NORMAL
BASOPHILS # BLD AUTO: 0.01 X10*3/UL (ref 0–0.1)
BASOPHILS NFR BLD AUTO: 0.1 %
BILIRUB SERPL-MCNC: 0.3 MG/DL (ref 0–1.2)
BILIRUB SERPL-MCNC: 0.4 MG/DL (ref 0–1.2)
BUN SERPL-MCNC: 9 MG/DL (ref 6–23)
BUN SERPL-MCNC: 9 MG/DL (ref 6–23)
CALCIUM SERPL-MCNC: 7.6 MG/DL (ref 8.6–10.3)
CALCIUM SERPL-MCNC: 7.9 MG/DL (ref 8.6–10.3)
CARDIAC TROPONIN I PNL SERPL HS: 9 NG/L (ref 0–13)
CHLORIDE SERPL-SCNC: 109 MMOL/L (ref 98–107)
CHLORIDE SERPL-SCNC: 110 MMOL/L (ref 98–107)
CO2 SERPL-SCNC: 22 MMOL/L (ref 21–32)
CO2 SERPL-SCNC: 23 MMOL/L (ref 21–32)
CREAT SERPL-MCNC: 0.61 MG/DL (ref 0.5–1.05)
CREAT SERPL-MCNC: 0.61 MG/DL (ref 0.5–1.05)
EGFRCR SERPLBLD CKD-EPI 2021: >90 ML/MIN/1.73M*2
EGFRCR SERPLBLD CKD-EPI 2021: >90 ML/MIN/1.73M*2
EOSINOPHIL # BLD AUTO: 0.11 X10*3/UL (ref 0–0.7)
EOSINOPHIL NFR BLD AUTO: 1 %
ERYTHROCYTE [DISTWIDTH] IN BLOOD BY AUTOMATED COUNT: 20 % (ref 11.5–14.5)
ERYTHROCYTE [DISTWIDTH] IN BLOOD BY AUTOMATED COUNT: 20.1 % (ref 11.5–14.5)
GLUCOSE BLD MANUAL STRIP-MCNC: 105 MG/DL (ref 74–99)
GLUCOSE BLD MANUAL STRIP-MCNC: 126 MG/DL (ref 74–99)
GLUCOSE BLD MANUAL STRIP-MCNC: 135 MG/DL (ref 74–99)
GLUCOSE BLD MANUAL STRIP-MCNC: 151 MG/DL (ref 74–99)
GLUCOSE SERPL-MCNC: 128 MG/DL (ref 74–99)
GLUCOSE SERPL-MCNC: 165 MG/DL (ref 74–99)
HCT VFR BLD AUTO: 26 % (ref 36–46)
HCT VFR BLD AUTO: 26.1 % (ref 36–46)
HGB BLD-MCNC: 7.2 G/DL (ref 12–16)
HGB BLD-MCNC: 7.3 G/DL (ref 12–16)
IMM GRANULOCYTES # BLD AUTO: 0.09 X10*3/UL (ref 0–0.7)
IMM GRANULOCYTES NFR BLD AUTO: 0.8 % (ref 0–0.9)
LACTATE SERPL-SCNC: 1.5 MMOL/L (ref 0.4–2)
LYMPHOCYTES # BLD AUTO: 1.23 X10*3/UL (ref 1.2–4.8)
LYMPHOCYTES NFR BLD AUTO: 10.9 %
MCH RBC QN AUTO: 21.4 PG (ref 26–34)
MCH RBC QN AUTO: 21.6 PG (ref 26–34)
MCHC RBC AUTO-ENTMCNC: 27.6 G/DL (ref 32–36)
MCHC RBC AUTO-ENTMCNC: 28.1 G/DL (ref 32–36)
MCV RBC AUTO: 76 FL (ref 80–100)
MCV RBC AUTO: 78 FL (ref 80–100)
MONOCYTES # BLD AUTO: 0.86 X10*3/UL (ref 0.1–1)
MONOCYTES NFR BLD AUTO: 7.6 %
NEUTROPHILS # BLD AUTO: 9.02 X10*3/UL (ref 1.2–7.7)
NEUTROPHILS NFR BLD AUTO: 79.6 %
NRBC BLD-RTO: 0 /100 WBCS (ref 0–0)
NRBC BLD-RTO: 0 /100 WBCS (ref 0–0)
PLATELET # BLD AUTO: 161 X10*3/UL (ref 150–450)
PLATELET # BLD AUTO: 175 X10*3/UL (ref 150–450)
POTASSIUM SERPL-SCNC: 3.4 MMOL/L (ref 3.5–5.3)
POTASSIUM SERPL-SCNC: 3.7 MMOL/L (ref 3.5–5.3)
PROT SERPL-MCNC: 5.1 G/DL (ref 6.4–8.2)
PROT SERPL-MCNC: 5.2 G/DL (ref 6.4–8.2)
RBC # BLD AUTO: 3.34 X10*6/UL (ref 4–5.2)
RBC # BLD AUTO: 3.41 X10*6/UL (ref 4–5.2)
SODIUM SERPL-SCNC: 138 MMOL/L (ref 136–145)
SODIUM SERPL-SCNC: 138 MMOL/L (ref 136–145)
WBC # BLD AUTO: 11.2 X10*3/UL (ref 4.4–11.3)
WBC # BLD AUTO: 11.3 X10*3/UL (ref 4.4–11.3)

## 2025-03-16 PROCEDURE — 36415 COLL VENOUS BLD VENIPUNCTURE: CPT

## 2025-03-16 PROCEDURE — 94640 AIRWAY INHALATION TREATMENT: CPT

## 2025-03-16 PROCEDURE — 2500000004 HC RX 250 GENERAL PHARMACY W/ HCPCS (ALT 636 FOR OP/ED)

## 2025-03-16 PROCEDURE — 2500000002 HC RX 250 W HCPCS SELF ADMINISTERED DRUGS (ALT 637 FOR MEDICARE OP, ALT 636 FOR OP/ED)

## 2025-03-16 PROCEDURE — 2500000001 HC RX 250 WO HCPCS SELF ADMINISTERED DRUGS (ALT 637 FOR MEDICARE OP)

## 2025-03-16 PROCEDURE — 99232 SBSQ HOSP IP/OBS MODERATE 35: CPT | Performed by: STUDENT IN AN ORGANIZED HEALTH CARE EDUCATION/TRAINING PROGRAM

## 2025-03-16 PROCEDURE — 82947 ASSAY GLUCOSE BLOOD QUANT: CPT

## 2025-03-16 PROCEDURE — 85027 COMPLETE CBC AUTOMATED: CPT

## 2025-03-16 PROCEDURE — S4991 NICOTINE PATCH NONLEGEND: HCPCS

## 2025-03-16 PROCEDURE — 99233 SBSQ HOSP IP/OBS HIGH 50: CPT | Performed by: STUDENT IN AN ORGANIZED HEALTH CARE EDUCATION/TRAINING PROGRAM

## 2025-03-16 PROCEDURE — 84075 ASSAY ALKALINE PHOSPHATASE: CPT

## 2025-03-16 PROCEDURE — 87493 C DIFF AMPLIFIED PROBE: CPT | Mod: PARLAB | Performed by: STUDENT IN AN ORGANIZED HEALTH CARE EDUCATION/TRAINING PROGRAM

## 2025-03-16 PROCEDURE — 80053 COMPREHEN METABOLIC PANEL: CPT

## 2025-03-16 PROCEDURE — 84484 ASSAY OF TROPONIN QUANT: CPT

## 2025-03-16 PROCEDURE — 83605 ASSAY OF LACTIC ACID: CPT

## 2025-03-16 PROCEDURE — 1200000002 HC GENERAL ROOM WITH TELEMETRY DAILY

## 2025-03-16 PROCEDURE — 2500000002 HC RX 250 W HCPCS SELF ADMINISTERED DRUGS (ALT 637 FOR MEDICARE OP, ALT 636 FOR OP/ED): Performed by: NURSE PRACTITIONER

## 2025-03-16 PROCEDURE — 85025 COMPLETE CBC W/AUTO DIFF WBC: CPT

## 2025-03-16 RX ORDER — DEXTROSE, SODIUM CHLORIDE, SODIUM LACTATE, POTASSIUM CHLORIDE, AND CALCIUM CHLORIDE 5; .6; .31; .03; .02 G/100ML; G/100ML; G/100ML; G/100ML; G/100ML
100 INJECTION, SOLUTION INTRAVENOUS CONTINUOUS
Status: ACTIVE | OUTPATIENT
Start: 2025-03-16 | End: 2025-03-16

## 2025-03-16 RX ORDER — ACETAMINOPHEN 10 MG/ML
1000 INJECTION, SOLUTION INTRAVENOUS ONCE
Status: DISCONTINUED | OUTPATIENT
Start: 2025-03-16 | End: 2025-03-16

## 2025-03-16 RX ORDER — POTASSIUM CHLORIDE 20 MEQ/1
20 TABLET, EXTENDED RELEASE ORAL ONCE
Status: COMPLETED | OUTPATIENT
Start: 2025-03-16 | End: 2025-03-16

## 2025-03-16 RX ORDER — SERTRALINE HYDROCHLORIDE 100 MG/1
100 TABLET, FILM COATED ORAL 2 TIMES DAILY
Status: DISCONTINUED | OUTPATIENT
Start: 2025-03-16 | End: 2025-03-19 | Stop reason: HOSPADM

## 2025-03-16 RX ADMIN — GABAPENTIN 300 MG: 300 CAPSULE ORAL at 21:19

## 2025-03-16 RX ADMIN — TIOTROPIUM BROMIDE INHALATION SPRAY 2 PUFF: 3.12 SPRAY, METERED RESPIRATORY (INHALATION) at 07:47

## 2025-03-16 RX ADMIN — PIPERACILLIN SODIUM AND TAZOBACTAM SODIUM 3.38 G: 3; .375 INJECTION, SOLUTION INTRAVENOUS at 11:45

## 2025-03-16 RX ADMIN — GABAPENTIN 300 MG: 300 CAPSULE ORAL at 08:56

## 2025-03-16 RX ADMIN — PIPERACILLIN SODIUM AND TAZOBACTAM SODIUM 3.38 G: 3; .375 INJECTION, SOLUTION INTRAVENOUS at 00:52

## 2025-03-16 RX ADMIN — SERTRALINE HYDROCHLORIDE 100 MG: 100 TABLET ORAL at 21:19

## 2025-03-16 RX ADMIN — VANCOMYCIN HYDROCHLORIDE 1000 MG: 1 INJECTION, SOLUTION INTRAVENOUS at 21:19

## 2025-03-16 RX ADMIN — MONTELUKAST 10 MG: 10 TABLET, FILM COATED ORAL at 21:19

## 2025-03-16 RX ADMIN — FLUTICASONE FUROATE AND VILANTEROL TRIFENATATE 1 PUFF: 200; 25 POWDER RESPIRATORY (INHALATION) at 07:43

## 2025-03-16 RX ADMIN — VANCOMYCIN HYDROCHLORIDE 1000 MG: 1 INJECTION, SOLUTION INTRAVENOUS at 09:56

## 2025-03-16 RX ADMIN — ACETAMINOPHEN 650 MG: 325 TABLET, FILM COATED ORAL at 21:19

## 2025-03-16 RX ADMIN — PIPERACILLIN SODIUM AND TAZOBACTAM SODIUM 3.38 G: 3; .375 INJECTION, SOLUTION INTRAVENOUS at 17:29

## 2025-03-16 RX ADMIN — PIPERACILLIN SODIUM AND TAZOBACTAM SODIUM 3.38 G: 3; .375 INJECTION, SOLUTION INTRAVENOUS at 05:34

## 2025-03-16 RX ADMIN — ROSUVASTATIN CALCIUM 40 MG: 10 TABLET, FILM COATED ORAL at 08:59

## 2025-03-16 RX ADMIN — NICOTINE 1 PATCH: 21 PATCH, EXTENDED RELEASE TRANSDERMAL at 09:00

## 2025-03-16 RX ADMIN — SODIUM CHLORIDE, SODIUM LACTATE, POTASSIUM CHLORIDE, CALCIUM CHLORIDE AND DEXTROSE MONOHYDRATE 100 ML/HR: 5; 600; 310; 30; 20 INJECTION, SOLUTION INTRAVENOUS at 03:10

## 2025-03-16 RX ADMIN — APIXABAN 5 MG: 5 TABLET, FILM COATED ORAL at 08:56

## 2025-03-16 RX ADMIN — APIXABAN 5 MG: 5 TABLET, FILM COATED ORAL at 21:19

## 2025-03-16 RX ADMIN — CLOPIDOGREL BISULFATE 75 MG: 75 TABLET ORAL at 08:56

## 2025-03-16 RX ADMIN — GABAPENTIN 300 MG: 300 CAPSULE ORAL at 14:30

## 2025-03-16 RX ADMIN — SODIUM CHLORIDE, POTASSIUM CHLORIDE, SODIUM LACTATE AND CALCIUM CHLORIDE 1000 ML: 600; 310; 30; 20 INJECTION, SOLUTION INTRAVENOUS at 03:11

## 2025-03-16 RX ADMIN — POTASSIUM CHLORIDE 20 MEQ: 1500 TABLET, EXTENDED RELEASE ORAL at 08:56

## 2025-03-16 RX ADMIN — DEXAMETHASONE 2 MG: 4 TABLET ORAL at 08:56

## 2025-03-16 ASSESSMENT — COGNITIVE AND FUNCTIONAL STATUS - GENERAL
TURNING FROM BACK TO SIDE WHILE IN FLAT BAD: A LITTLE
CLIMB 3 TO 5 STEPS WITH RAILING: A LOT
DRESSING REGULAR LOWER BODY CLOTHING: A LITTLE
HELP NEEDED FOR BATHING: A LITTLE
MOVING TO AND FROM BED TO CHAIR: A LITTLE
STANDING UP FROM CHAIR USING ARMS: A LITTLE
WALKING IN HOSPITAL ROOM: A LOT
MOBILITY SCORE: 17
TOILETING: A LITTLE
DAILY ACTIVITIY SCORE: 21

## 2025-03-16 ASSESSMENT — PAIN SCALES - GENERAL
PAINLEVEL_OUTOF10: 3
PAINLEVEL_OUTOF10: 0 - NO PAIN

## 2025-03-16 ASSESSMENT — PAIN - FUNCTIONAL ASSESSMENT: PAIN_FUNCTIONAL_ASSESSMENT: 0-10

## 2025-03-16 NOTE — SIGNIFICANT EVENT
"Hospital Medicine Rapid Response Note    Name: Radha Toussaint  Age: 69 y.o.  [facility]  Code Status: Full Code    Narrative Summary:    Radha Toussaitn is a 69 year old female with a history of HTN, HLD, CAD s/p PCI with FCO, PAD s/p aortic aneurysm repair x2, intracranial aneurysm coiling, iliac grafts, fem-fem graft, multiple lower extremity stents and angioplasties, DVT, Afib on Eliquis, COPD on 2L at baseline, lung cancer with mets to adrenal gland, beta thalassemia presented to [facility] by [facility] with AMS. Patient had left 2nd, 3rd and 4th toe amputation with left allograft skin graft on 3/12/2025.     Rapid response was called at 2:13 am. Pressure was reported to be 67/42. Patient did not appear to be in any acute distress. Patient denied dizziness, vision changes, SOB, chest pain, abdominal pain. She was given 1L LR bolus and pressures responded appropriately. BP was 100/56 on telemetry at 3:09 am. Patient remains stable upon re-evaluation.     Physical Exam:    BP (!) 67/42   Pulse 59   Temp 36.4 °C (97.5 °F) (Temporal)   Resp 20   Ht 1.626 m (5' 4\")   Wt 67 kg (147 lb 11.3 oz)   SpO2 100%   BMI 25.35 kg/m²     Physical Exam:  GENERAL: alert & cooperative, chronically ill appearing  HEENT: normocephalic, atraumatic, sclera clear  CARDIAC: regular rate & rhythm, S1/S2  PULMONARY: no respiratory distress, + rhonchi  ABDOMEN: soft, non-tender, non-distended  MSK: no peripheral edema, Left lower extremity bandaged s/p 2nd, 3rd, and 4th toe amputation.   A&O X 3  SKIN: Warm and dry  PSYCH: appropriate mood & affect    Assessment/Plan:    # Hypotension  - Patient given 1L LR bolus. Pressures responded appropriately to IVF.   - Patient was started on maintenance fluids.   - Held Norco and Ativan.  - CBC, CMP, lactate, troponin ordered.    Marcus Perez, DO  Hospital Medicine   "

## 2025-03-16 NOTE — CARE PLAN
The patient's goals for the shift include pt will be hemodynamically stable    The clinical goals for the shift include Patient will remian hemodynamically stable throughout this shift.      Problem: Pain - Adult  Goal: Verbalizes/displays adequate comfort level or baseline comfort level  Outcome: Progressing     Problem: Safety - Adult  Goal: Free from fall injury  Outcome: Progressing     Problem: Skin  Goal: Promote/optimize nutrition  Outcome: Progressing     Problem: Diabetes  Goal: Vital signs within normal range for age by end of shift  Outcome: Progressing

## 2025-03-16 NOTE — PROGRESS NOTES
Radha Toussaint is a 69 y.o. female on day 2 of admission presenting with Shortness of breath.      Subjective   Doing well on 2l NC   Denies any pain at this time no loose stools in 2 days   Hemoglobin  stable BP stable        Objective     Last Recorded Vitals  /74   Pulse 66   Temp 36 °C (96.8 °F) (Temporal)   Resp 20   Wt 64 kg (141 lb 3.2 oz)   SpO2 100%   Intake/Output last 3 Shifts:    Intake/Output Summary (Last 24 hours) at 3/16/2025 1213  Last data filed at 3/16/2025 1145  Gross per 24 hour   Intake 2377.66 ml   Output 100 ml   Net 2277.66 ml       Admission Weight  Weight: 67.1 kg (148 lb) (03/14/25 1530)    Daily Weight  03/16/25 : 64 kg (141 lb 3.2 oz)    Image Results  XR foot left 1-2 views  Narrative: Interpreted By:  Kimani Alston,   STUDY:  XR FOOT LEFT 1-2 VIEWS; ;  3/15/2025 10:33 am      INDICATION:  Signs/Symptoms:concern for osteomyelitis.          COMPARISON:  01/05/2024      ACCESSION NUMBER(S):  [id number]      ORDERING CLINICIAN:  DAVID JARQUIN      FINDINGS:  Status post surgery of the 2nd 3rd and 4th digits. There is subtle  soft tissue irregularity to the 3rd and 4th digits. The possibility  of air within the soft tissues can not be excluded albeit part of  this may be due to air interposed within bandages.      There is no radiographic evidence of acute or chronic osteomyelitis.      Minimal soft tissue swelling of the forefoot.      5 mm plantar spur.      Impression: No stigmata of acute or chronic osteomyelitis. Soft tissue air may be  accounted for by bandages although clinical inspection recommended to  exclude gas-forming process.          MACRO:  None      Signed by: Kimani Alston 3/15/2025 11:33 AM  Dictation workstation:   [id number]      Physical Exam  Vitals and nursing note reviewed.   Constitutional:       Appearance: Normal appearance. She is normal weight.   HENT:      Head: Normocephalic.      Nose: Nose normal.      Mouth/Throat:      Mouth: Mucous  membranes are moist.   Eyes:      Extraocular Movements: Extraocular movements intact.      Conjunctiva/sclera: Conjunctivae normal.      Pupils: Pupils are equal, round, and reactive to light.   Cardiovascular:      Rate and Rhythm: Normal rate and regular rhythm.      Heart sounds: Normal heart sounds.   Pulmonary:      Effort: No respiratory distress.      Breath sounds: No stridor. No wheezing or rhonchi.      Comments: Diminshed all bases stable on 2l NC   Chest:      Chest wall: No tenderness.   Abdominal:      General: Abdomen is flat. Bowel sounds are normal.      Palpations: Abdomen is soft.   Musculoskeletal:         General: Tenderness present.      Cervical back: Normal range of motion.   Skin:     General: Skin is warm and dry.      Capillary Refill: Capillary refill takes 2 to 3 seconds.      Findings: Wound present.      Comments: Dressing intact to  surgical site to the left foot.   Neurological:      General: No focal deficit present.      Mental Status: She is alert and oriented to person, place, and time.      Motor: Weakness present.   Psychiatric:         Mood and Affect: Mood normal.         Behavior: Behavior normal.         Relevant Results  Scheduled medications  apixaban, 5 mg, oral, BID  clopidogrel, 75 mg, oral, q AM  dexAMETHasone, 2 mg, oral, Daily  tiotropium, 2 puff, inhalation, Daily   And  fluticasone furoate-vilanteroL, 1 puff, inhalation, Daily  gabapentin, 300 mg, oral, TID  insulin lispro, 0-5 Units, subcutaneous, TID AC  [Held by provider] lubiprostone, 24 mcg, oral, BID  montelukast, 10 mg, oral, Nightly  nicotine, 1 patch, transdermal, Daily  [Held by provider] OLANZapine, 15 mg, oral, Nightly  piperacillin-tazobactam, 3.375 g, intravenous, q6h  rosuvastatin, 40 mg, oral, Daily  vancomycin, 1,000 mg, intravenous, q12h      Continuous medications  dextrose 5 % and lactated Ringer's, 100 mL/hr, Last Rate: 100 mL/hr (03/16/25 1129)  norepinephrine, 0-0.2 mcg/kg/min, Last Rate:  Stopped (03/15/25 0630)      PRN medications  PRN medications: acetaminophen, dextrose, dextrose, glucagon, glucagon, [Held by provider] HYDROcodone-acetaminophen, [Held by provider] LORazepam, ondansetron **OR** ondansetron, oxygen, vancomycin      XR foot left 1-2 views    Result Date: 3/15/2025  Interpreted By:  Kimani Alston, STUDY: XR FOOT LEFT 1-2 VIEWS; ;  3/15/2025 10:33 am   INDICATION: Signs/Symptoms:concern for osteomyelitis.     COMPARISON: 01/05/2024   ACCESSION NUMBER(S): [id number]   ORDERING CLINICIAN: DAVID JARQUIN   FINDINGS: Status post surgery of the 2nd 3rd and 4th digits. There is subtle soft tissue irregularity to the 3rd and 4th digits. The possibility of air within the soft tissues can not be excluded albeit part of this may be due to air interposed within bandages.   There is no radiographic evidence of acute or chronic osteomyelitis.   Minimal soft tissue swelling of the forefoot.   5 mm plantar spur.       No stigmata of acute or chronic osteomyelitis. Soft tissue air may be accounted for by bandages although clinical inspection recommended to exclude gas-forming process.     MACRO: None   Signed by: Kimani Alston 3/15/2025 11:33 AM Dictation workstation:   [id number]    XR chest 1 view    Result Date: 3/14/2025  Interpreted By:  Go Cespedes, STUDY: XR CHEST 1 VIEW;  3/14/2025 5:00 pm   INDICATION: Signs/Symptoms:sob.   COMPARISON: Chest radiograph 12/02/2024   ACCESSION NUMBER(S): [id number]   ORDERING CLINICIAN: QUENTIN ALVAREZ   FINDINGS:     CARDIOMEDIASTINAL SILHOUETTE: Cardiomediastinal silhouette is unremarkable in size and configuration.   LUNGS: No consolidation, pneumothorax, or significant effusion.   ABDOMEN: No remarkable upper abdominal findings.   BONES: No acute osseous changes.       1.  No evidence of acute cardiopulmonary process.     Signed by: Go Cespedes 3/14/2025 5:41 PM Dictation workstation:   [id number]    CT head wo IV contrast    Result  Date: 3/14/2025  Interpreted By:  Go Cespedes, STUDY: CT HEAD WO IV CONTRAST;  3/14/2025 4:52 pm   INDICATION: Signs/Symptoms:cva/ams.   COMPARISON: None.   ACCESSION NUMBER(S): [id number]   ORDERING CLINICIAN: QUENTIN ALVAREZ   TECHNIQUE: Noncontrast axial CT scan of head was performed. Angled reformats in brain and bone windows were generated. The images were reviewed in bone, brain, blood and soft tissue windows.   FINDINGS: CSF Spaces: The ventricles, sulci and basal cisterns are within normal limits. There is no extraaxial fluid collection.   Parenchyma: Parenchyma appear stable. Small focus of encephalomalacia of the inferior right medial cerebellum. The grey-white differentiation is intact. There is no mass effect or midline shift. There is no intracranial hemorrhage.   Calvarium: No acute displaced scapular fracture. Stable postsurgical changes from prior left-sided craniotomy with similar appearing presumed aneurysm occlusion or correlating devices.   Paranasal sinuses and mastoids: Visualized paranasal sinuses and mastoids are clear.       No evidence of acute cortical infarct or intracranial hemorrhage.       MACRO: None     Signed by: Go Cespedes 3/14/2025 5:36 PM Dictation workstation:   [id number]      No current facility-administered medications on file prior to encounter.     Current Outpatient Medications on File Prior to Encounter   Medication Sig Dispense Refill    apixaban (Eliquis) 5 mg tablet Take 1 tablet (5 mg) by mouth 2 times a day. 180 tablet 3    clopidogrel (Plavix) 75 mg tablet Take 1 tablet (75 mg) by mouth once daily in the morning. 90 tablet 3    dexAMETHasone (Decadron) 4 mg tablet Take 0.5 tablets (2 mg) by mouth once daily. 30 tablet 1    HYDROcodone-acetaminophen (Norco)  mg tablet Take 1 tablet by mouth every 6 hours if needed for severe pain (7 - 10). 60 tablet 0    LORazepam (Ativan) 0.5 mg tablet Take 1 tablet (0.5 mg) by mouth every 6 hours if needed for  anxiety. 120 tablet 3    acetaminophen (Tylenol) 325 mg tablet Take 3 tablets (975 mg) by mouth every 6 hours if needed for mild pain (1 - 3) or moderate pain (4 - 6). (Patient not taking: Reported on 3/12/2025)      buPROPion (Wellbutrin) 75 mg tablet Take 1 tablet (75 mg) by mouth once daily. (Patient not taking: Reported on 3/12/2025) 30 tablet 1    [] chlorhexidine (Hibiclens) 4 % external liquid Apply topically early in the morning. for 5 days. Use wash as directed daily for 5 days prior to surgery with the 5th day being the morning of surgery. 118 mL 0    [] chlorhexidine (Peridex) 0.12 % solution Use 15 mL in the mouth or throat early in the morning. for 2 days. Rinse mouth by swishing medication and spitting. Use daily for 2 days prior to surgery with the 2nd day being the morning of surgery. 30 mL 0    fluticasone-umeclidin-vilanter (Trelegy Ellipta) 200-62.5-25 mcg blister with device Inhale 1 puff once daily. 60 each 3    gabapentin (Neurontin) 300 mg capsule Take 1 capsule (300 mg) by mouth 3 times a day. 270 capsule 3    hydrOXYzine pamoate (VistariL) 25 mg capsule Take 1 capsule (25 mg) by mouth 3 times a day as needed for itching. (Patient not taking: Reported on 3/12/2025) 270 capsule 3    lubiprostone (Amitiza) 24 mcg capsule Take 1 capsule (24 mcg) by mouth 2 times daily (morning and late afternoon). 60 capsule 0    magic mouthwash (lidocaine, diphenhydrAMINE, Maalox 1:1:1) Swish and swallow 10 mL every 6 hours if needed for mucositis. (Patient not taking: Reported on 3/12/2025) 560 mL 3    montelukast (Singulair) 10 mg tablet Take 1 tablet (10 mg) by mouth once daily at bedtime. (Patient not taking: Reported on 3/12/2025) 90 tablet 1    naloxone (Narcan) 4 mg/0.1 mL nasal spray Administer 1 spray (4 mg) into affected nostril(s) if needed for opioid reversal. May repeat every 2-3 minutes if needed, alternating nostrils, until medical assistance becomes available. (Patient not taking:  Reported on 3/12/2025) 2 each 0    OLANZapine (ZyPREXA) 15 mg tablet Take 1 tablet (15 mg) by mouth once daily at bedtime. 90 tablet 1    ondansetron ODT (Zofran-ODT) 8 mg disintegrating tablet Dissolve 1 tablet (8 mg) in the mouth every 8 hours if needed for nausea or vomiting. 90 tablet 0    oxyCODONE-acetaminophen (Percocet) 5-325 mg tablet Take 1 tablet by mouth every 6 hours if needed for severe pain (7 - 10). 20 tablet 0    oxygen (O2) gas therapy Inhale 1 each continuously. 2 L at bedtime and naps      prochlorperazine (Compazine) 5 mg tablet Take 1 tablet (5 mg) by mouth every 6 hours if needed for nausea or vomiting. 30 tablet 3    rosuvastatin (Crestor) 40 mg tablet Take 1 tablet (40 mg) by mouth once daily. 90 tablet 3    sertraline (Zoloft) 100 mg tablet TAKE 1 TABLET(100 MG) BY MOUTH TWICE DAILY 180 tablet 3         Assessment/Plan                  Assessment & Plan  Shortness of breath    Radha Toussaint is a 89 y.o. female with a PMHx of HTN, HLD, CAD with FCO x2, severe PAD s/p multiple interventions (aortic aneurysm repair, intracranial aneurysm coiling, iliac grafts, fem-fem graft, multiple lower extremity stents and [place]oplasties [place] [facility] [place], Afib (plavix and eliquis), COPD with home oxygen, current smoker, lung cancer w/ mets to adrenal glad, beta thalassemia who presents today 2 days postop of a left 2nd, 3rd and 4th toe amputation with left allograft skin graft on 3/12/2025 with Dr. Doss. Wound cultures taken 2/20/2025 positive for heavy Pseudomonas and MRSA organisms. Chart review revealed patient hospitalized on 2/12/2025 for planned left femoral angioplasty with stent and balloon angioplasty of the left anterior tibial artery. Postoperatively required Reji-Synephrine infusion for hypotension and blood transfusion for anemia.    3/15: medically stable transferred to medicine services:        #Acute encephalopathy (multiple-sepsis, anemic hypoxia)  #Mixed anxiety  depressive disorder  #Acute post operative pain  #Chronic pain related to malignancy  --Supplemental oxygen, keep Sp02 >92%  --Abx per ID below  --Baseline A&Ox4  --Hold home meds: Lorazepam  --Resume sertraline (hx of taking Wellbutrin, hydroxyzine and olanzapine hs as needed)  --Analgesia: Scheduled gabapentin, prn Norco and tylenol     #Septic shock  #Hx HTN, HLD  #CAD s/p FCO x 2  #Severe PAD s/p multiple interventions  #DVT of RLE, Hx  #Hx Paroxysmal A-fib  --TTE (8/31/23) LVEF 60-65%  --Not on antihypertensives  --Resume rosuvastatin  --Anticoagulated with Eliquis and Plavix  --500 mL Albumin x 1  --Levophed gtt, Titrate to keep MAP >65; dcd monitor BP        #Acute on chronic hypoxic respiratory failure  #COPD emphysema  --CXR without any infectious etiology  --Current smoker (90 pack/year history)  --Baseline home O2 2L HS and prn  --Supplemental O2, keep Sp02 >92%  --Maintenance meds: Dexamethasone and Trelegy  --Smoking cessation education  --Continuous Sp02 monitoring     GI:  #IBS with acute diarrhea   --Hold home med Amitiza  --Cdiff PCR negative          #Adrenal cancer  --Dexamethasone  --Follows Dr. 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 Burnham for oncology last seen 1/31/2025  --Currently maintained on Keytruda every 6 weeks next dose coming on 3/14/2025 (missed dose)  --Hbg 6.7 - 1 unit PRBC ordered  --Minimize blood draws  --Monitor CBC x 3 days         #Sepsis due to unidentified infectious organism (working dx: UTI, soft tissue infection, cdiff colitis?  --History of Pseudomonas and MRSA of left foot wound (2/20/25)  --Culture Data: BC, urine, and stool c-diff PCR pending  --Lactate 0.6  --Monitor CBC x 3days       #Left foot amputation incisions  #Multiple skin tears and abrasions  --Skin and wound care as ordered  --PT/OT  --Podiatry consulted      3/16: trend labs, monitor BP and respiratory   status Continue antibiotics with vancomycin and Zosyn.  Podiatry following.   Will need PT/OT for dc planning     Plan of care discussed with attending Dr Nico Davies,     I spent over 30 min of professional care and time with this patient    Aline Rodriguez, APRN-CNP

## 2025-03-16 NOTE — CARE PLAN
The patient's goals for the shift include pt will be hemodynamically stable    The clinical goals for the shift include Patient will remian hemodynamically stable throughout this shift.      Problem: Skin  Goal: Promote/optimize nutrition  3/16/2025 0202 by Steven Shannon RN  Flowsheets (Taken 3/16/2025 0202)  Promote/optimize nutrition: Consume > 50% meals/supplements  3/16/2025 0201 by Steven Shannon RN  Outcome: Progressing

## 2025-03-17 VITALS
SYSTOLIC BLOOD PRESSURE: 110 MMHG | DIASTOLIC BLOOD PRESSURE: 58 MMHG | OXYGEN SATURATION: 100 % | HEIGHT: 64 IN | RESPIRATION RATE: 15 BRPM | WEIGHT: 141.2 LBS | TEMPERATURE: 96.8 F | HEART RATE: 60 BPM | BODY MASS INDEX: 24.11 KG/M2

## 2025-03-17 LAB
ACANTHOCYTES BLD QL SMEAR: NORMAL
ALBUMIN SERPL BCP-MCNC: 2.8 G/DL (ref 3.4–5)
ALP SERPL-CCNC: 48 U/L (ref 33–136)
ALT SERPL W P-5'-P-CCNC: 27 U/L (ref 7–45)
ANION GAP SERPL CALC-SCNC: 10 MMOL/L (ref 10–20)
AST SERPL W P-5'-P-CCNC: 45 U/L (ref 9–39)
BASOPHILS # BLD AUTO: 0.02 X10*3/UL (ref 0–0.1)
BASOPHILS NFR BLD AUTO: 0.2 %
BILIRUB SERPL-MCNC: 0.4 MG/DL (ref 0–1.2)
BUN SERPL-MCNC: 8 MG/DL (ref 6–23)
BURR CELLS BLD QL SMEAR: NORMAL
C DIF TOX TCDA+TCDB STL QL NAA+PROBE: NOT DETECTED
CALCIUM SERPL-MCNC: 7.6 MG/DL (ref 8.6–10.3)
CHLORIDE SERPL-SCNC: 110 MMOL/L (ref 98–107)
CO2 SERPL-SCNC: 23 MMOL/L (ref 21–32)
CREAT SERPL-MCNC: 0.6 MG/DL (ref 0.5–1.05)
EGFRCR SERPLBLD CKD-EPI 2021: >90 ML/MIN/1.73M*2
EOSINOPHIL # BLD AUTO: 0.13 X10*3/UL (ref 0–0.7)
EOSINOPHIL NFR BLD AUTO: 1.2 %
ERYTHROCYTE [DISTWIDTH] IN BLOOD BY AUTOMATED COUNT: 20.6 % (ref 11.5–14.5)
GLUCOSE BLD MANUAL STRIP-MCNC: 115 MG/DL (ref 74–99)
GLUCOSE BLD MANUAL STRIP-MCNC: 132 MG/DL (ref 74–99)
GLUCOSE BLD MANUAL STRIP-MCNC: 179 MG/DL (ref 74–99)
GLUCOSE BLD MANUAL STRIP-MCNC: 204 MG/DL (ref 74–99)
GLUCOSE BLD MANUAL STRIP-MCNC: 85 MG/DL (ref 74–99)
GLUCOSE SERPL-MCNC: 85 MG/DL (ref 74–99)
HCT VFR BLD AUTO: 26.4 % (ref 36–46)
HGB BLD-MCNC: 7.2 G/DL (ref 12–16)
IMM GRANULOCYTES # BLD AUTO: 0.12 X10*3/UL (ref 0–0.7)
IMM GRANULOCYTES NFR BLD AUTO: 1.1 % (ref 0–0.9)
LYMPHOCYTES # BLD AUTO: 1.6 X10*3/UL (ref 1.2–4.8)
LYMPHOCYTES NFR BLD AUTO: 14.5 %
MCH RBC QN AUTO: 21.1 PG (ref 26–34)
MCHC RBC AUTO-ENTMCNC: 27.3 G/DL (ref 32–36)
MCV RBC AUTO: 77 FL (ref 80–100)
MONOCYTES # BLD AUTO: 0.83 X10*3/UL (ref 0.1–1)
MONOCYTES NFR BLD AUTO: 7.5 %
NEUTROPHILS # BLD AUTO: 8.35 X10*3/UL (ref 1.2–7.7)
NEUTROPHILS NFR BLD AUTO: 75.5 %
NRBC BLD-RTO: 0 /100 WBCS (ref 0–0)
OVALOCYTES BLD QL SMEAR: NORMAL
PLATELET # BLD AUTO: 181 X10*3/UL (ref 150–450)
POLYCHROMASIA BLD QL SMEAR: NORMAL
POTASSIUM SERPL-SCNC: 3.4 MMOL/L (ref 3.5–5.3)
PROT SERPL-MCNC: 5.1 G/DL (ref 6.4–8.2)
RBC # BLD AUTO: 3.42 X10*6/UL (ref 4–5.2)
RBC MORPH BLD: NORMAL
SCHISTOCYTES BLD QL SMEAR: NORMAL
SODIUM SERPL-SCNC: 140 MMOL/L (ref 136–145)
VANCOMYCIN SERPL-MCNC: 23.9 UG/ML (ref 5–20)
WBC # BLD AUTO: 10.7 X10*3/UL (ref 4.4–11.3)

## 2025-03-17 PROCEDURE — 97161 PT EVAL LOW COMPLEX 20 MIN: CPT | Mod: GP

## 2025-03-17 PROCEDURE — 2500000002 HC RX 250 W HCPCS SELF ADMINISTERED DRUGS (ALT 637 FOR MEDICARE OP, ALT 636 FOR OP/ED)

## 2025-03-17 PROCEDURE — 2500000001 HC RX 250 WO HCPCS SELF ADMINISTERED DRUGS (ALT 637 FOR MEDICARE OP)

## 2025-03-17 PROCEDURE — 36415 COLL VENOUS BLD VENIPUNCTURE: CPT

## 2025-03-17 PROCEDURE — 85025 COMPLETE CBC W/AUTO DIFF WBC: CPT

## 2025-03-17 PROCEDURE — 94640 AIRWAY INHALATION TREATMENT: CPT

## 2025-03-17 PROCEDURE — 99232 SBSQ HOSP IP/OBS MODERATE 35: CPT | Performed by: STUDENT IN AN ORGANIZED HEALTH CARE EDUCATION/TRAINING PROGRAM

## 2025-03-17 PROCEDURE — S4991 NICOTINE PATCH NONLEGEND: HCPCS

## 2025-03-17 PROCEDURE — 1100000001 HC PRIVATE ROOM DAILY

## 2025-03-17 PROCEDURE — 82947 ASSAY GLUCOSE BLOOD QUANT: CPT

## 2025-03-17 PROCEDURE — 2500000004 HC RX 250 GENERAL PHARMACY W/ HCPCS (ALT 636 FOR OP/ED)

## 2025-03-17 PROCEDURE — 80053 COMPREHEN METABOLIC PANEL: CPT

## 2025-03-17 PROCEDURE — 80202 ASSAY OF VANCOMYCIN: CPT

## 2025-03-17 PROCEDURE — 2500000001 HC RX 250 WO HCPCS SELF ADMINISTERED DRUGS (ALT 637 FOR MEDICARE OP): Performed by: STUDENT IN AN ORGANIZED HEALTH CARE EDUCATION/TRAINING PROGRAM

## 2025-03-17 PROCEDURE — 97165 OT EVAL LOW COMPLEX 30 MIN: CPT | Mod: GO

## 2025-03-17 RX ORDER — LOPERAMIDE HYDROCHLORIDE 2 MG/1
2 CAPSULE ORAL 4 TIMES DAILY PRN
Status: DISCONTINUED | OUTPATIENT
Start: 2025-03-17 | End: 2025-03-18

## 2025-03-17 RX ADMIN — ACETAMINOPHEN 650 MG: 325 TABLET, FILM COATED ORAL at 09:48

## 2025-03-17 RX ADMIN — SERTRALINE HYDROCHLORIDE 100 MG: 100 TABLET ORAL at 09:43

## 2025-03-17 RX ADMIN — ROSUVASTATIN CALCIUM 40 MG: 10 TABLET, FILM COATED ORAL at 09:43

## 2025-03-17 RX ADMIN — PIPERACILLIN SODIUM AND TAZOBACTAM SODIUM 3.38 G: 3; .375 INJECTION, SOLUTION INTRAVENOUS at 02:45

## 2025-03-17 RX ADMIN — PIPERACILLIN SODIUM AND TAZOBACTAM SODIUM 3.38 G: 3; .375 INJECTION, SOLUTION INTRAVENOUS at 14:54

## 2025-03-17 RX ADMIN — NICOTINE 1 PATCH: 21 PATCH, EXTENDED RELEASE TRANSDERMAL at 09:43

## 2025-03-17 RX ADMIN — ONDANSETRON 4 MG: 2 INJECTION INTRAMUSCULAR; INTRAVENOUS at 21:12

## 2025-03-17 RX ADMIN — LOPERAMIDE HYDROCHLORIDE 2 MG: 2 CAPSULE ORAL at 14:54

## 2025-03-17 RX ADMIN — MONTELUKAST 10 MG: 10 TABLET, FILM COATED ORAL at 21:07

## 2025-03-17 RX ADMIN — ONDANSETRON 4 MG: 2 INJECTION INTRAMUSCULAR; INTRAVENOUS at 16:27

## 2025-03-17 RX ADMIN — APIXABAN 5 MG: 5 TABLET, FILM COATED ORAL at 21:07

## 2025-03-17 RX ADMIN — ACETAMINOPHEN 650 MG: 325 TABLET, FILM COATED ORAL at 21:12

## 2025-03-17 RX ADMIN — INSULIN LISPRO 2 UNITS: 100 INJECTION, SOLUTION INTRAVENOUS; SUBCUTANEOUS at 16:27

## 2025-03-17 RX ADMIN — GABAPENTIN 300 MG: 300 CAPSULE ORAL at 14:54

## 2025-03-17 RX ADMIN — TIOTROPIUM BROMIDE INHALATION SPRAY 2 PUFF: 3.12 SPRAY, METERED RESPIRATORY (INHALATION) at 09:45

## 2025-03-17 RX ADMIN — PIPERACILLIN SODIUM AND TAZOBACTAM SODIUM 3.38 G: 3; .375 INJECTION, SOLUTION INTRAVENOUS at 09:43

## 2025-03-17 RX ADMIN — VANCOMYCIN HYDROCHLORIDE 1000 MG: 1 INJECTION, SOLUTION INTRAVENOUS at 10:45

## 2025-03-17 RX ADMIN — SERTRALINE HYDROCHLORIDE 100 MG: 100 TABLET ORAL at 21:07

## 2025-03-17 RX ADMIN — GABAPENTIN 300 MG: 300 CAPSULE ORAL at 09:43

## 2025-03-17 RX ADMIN — PIPERACILLIN SODIUM AND TAZOBACTAM SODIUM 3.38 G: 3; .375 INJECTION, SOLUTION INTRAVENOUS at 22:57

## 2025-03-17 RX ADMIN — APIXABAN 5 MG: 5 TABLET, FILM COATED ORAL at 09:43

## 2025-03-17 RX ADMIN — CLOPIDOGREL BISULFATE 75 MG: 75 TABLET ORAL at 09:43

## 2025-03-17 RX ADMIN — GABAPENTIN 300 MG: 300 CAPSULE ORAL at 21:07

## 2025-03-17 RX ADMIN — DEXAMETHASONE 2 MG: 4 TABLET ORAL at 09:43

## 2025-03-17 RX ADMIN — FLUTICASONE FUROATE AND VILANTEROL TRIFENATATE 1 PUFF: 200; 25 POWDER RESPIRATORY (INHALATION) at 09:49

## 2025-03-17 ASSESSMENT — PAIN - FUNCTIONAL ASSESSMENT
PAIN_FUNCTIONAL_ASSESSMENT: CPOT (CRITICAL CARE PAIN OBSERVATION TOOL)
PAIN_FUNCTIONAL_ASSESSMENT: 0-10
PAIN_FUNCTIONAL_ASSESSMENT: 0-10

## 2025-03-17 ASSESSMENT — PAIN SCALES - GENERAL
PAINLEVEL_OUTOF10: 0 - NO PAIN
PAINLEVEL_OUTOF10: 3
PAINLEVEL_OUTOF10: 0 - NO PAIN
PAINLEVEL_OUTOF10: 0 - NO PAIN

## 2025-03-17 ASSESSMENT — COGNITIVE AND FUNCTIONAL STATUS - GENERAL
TOILETING: A LITTLE
MOVING FROM LYING ON BACK TO SITTING ON SIDE OF FLAT BED WITH BEDRAILS: A LITTLE
HELP NEEDED FOR BATHING: A LITTLE
WALKING IN HOSPITAL ROOM: A LOT
DAILY ACTIVITIY SCORE: 19
PERSONAL GROOMING: A LITTLE
TURNING FROM BACK TO SIDE WHILE IN FLAT BAD: A LITTLE
MOVING TO AND FROM BED TO CHAIR: A LITTLE
CLIMB 3 TO 5 STEPS WITH RAILING: TOTAL
DRESSING REGULAR UPPER BODY CLOTHING: A LITTLE
STANDING UP FROM CHAIR USING ARMS: A LITTLE
MOBILITY SCORE: 15
DRESSING REGULAR LOWER BODY CLOTHING: A LITTLE

## 2025-03-17 ASSESSMENT — PAIN DESCRIPTION - LOCATION: LOCATION: HEAD

## 2025-03-17 ASSESSMENT — ACTIVITIES OF DAILY LIVING (ADL): ADL_ASSISTANCE: INDEPENDENT

## 2025-03-17 NOTE — CARE PLAN
The patient's goals for the shift include pt will be hemodynamically stable    The clinical goals for the shift include Patient will remian hemodynamically stable throughout this shift.      Problem: Pain - Adult  Goal: Verbalizes/displays adequate comfort level or baseline comfort level  Outcome: Progressing     Problem: Safety - Adult  Goal: Free from fall injury  Outcome: Progressing     Problem: Discharge Planning  Goal: Discharge to home or other facility with appropriate resources  Outcome: Progressing     Problem: Chronic Conditions and Co-morbidities  Goal: Patient's chronic conditions and co-morbidity symptoms are monitored and maintained or improved  Outcome: Progressing     Problem: Nutrition  Goal: Nutrient intake appropriate for maintaining nutritional needs  Outcome: Progressing     Problem: Skin  Goal: Decreased wound size/increased tissue granulation at next dressing change  Outcome: Progressing  Goal: Participates in plan/prevention/treatment measures  Outcome: Progressing  Goal: Prevent/manage excess moisture  Outcome: Progressing  Flowsheets (Taken 3/16/2025 2342)  Prevent/manage excess moisture: Cleanse incontinence/protect with barrier cream  Goal: Prevent/minimize sheer/friction injuries  Outcome: Progressing  Flowsheets (Taken 3/16/2025 2342)  Prevent/minimize sheer/friction injuries:   HOB 30 degrees or less   Use pull sheet  Goal: Promote/optimize nutrition  Outcome: Progressing  Goal: Promote skin healing  Outcome: Progressing     Problem: Diabetes  Goal: Achieve decreasing blood glucose levels by end of shift  Outcome: Progressing  Goal: Increase stability of blood glucose readings by end of shift  Outcome: Progressing  Goal: Decrease in ketones present in urine by end of shift  Outcome: Progressing  Goal: Maintain electrolyte levels within acceptable range throughout shift  Outcome: Progressing  Goal: Maintain glucose levels >70mg/dl to <250mg/dl throughout shift  Outcome:  Progressing  Goal: No changes in neurological exam by end of shift  Outcome: Progressing  Goal: Learn about and adhere to nutrition recommendations by end of shift  Outcome: Progressing  Goal: Vital signs within normal range for age by end of shift  Outcome: Progressing  Goal: Increase self care and/or family involovement by end of shift  Outcome: Progressing  Goal: Receive DSME education by end of shift  Outcome: Progressing     Problem: Pain  Goal: Takes deep breaths with improved pain control throughout the shift  Outcome: Progressing  Goal: Turns in bed with improved pain control throughout the shift  Outcome: Progressing  Goal: Walks with improved pain control throughout the shift  Outcome: Progressing  Goal: Performs ADL's with improved pain control throughout shift  Outcome: Progressing  Goal: Participates in PT with improved pain control throughout the shift  Outcome: Progressing  Goal: Free from opioid side effects throughout the shift  Outcome: Progressing  Goal: Free from acute confusion related to pain meds throughout the shift  Outcome: Progressing     Problem: Respiratory  Goal: Clear secretions with interventions this shift  Outcome: Progressing  Goal: Minimize anxiety/maximize coping throughout shift  Outcome: Progressing  Goal: Minimal/no exertional discomfort or dyspnea this shift  Outcome: Progressing  Goal: No signs of respiratory distress (eg. Use of accessory muscles. Peds grunting)  Outcome: Progressing  Goal: Patent airway maintained this shift  Outcome: Progressing  Goal: Tolerate pulmonary toileting this shift  Outcome: Progressing  Goal: Verbalize decreased shortness of breath this shift  Outcome: Progressing  Goal: Wean oxygen to maintain O2 saturation per order/standard this shift  Outcome: Progressing  Goal: Increase self care and/or family involvement in next 24 hours  Outcome: Progressing     Problem: Fall/Injury  Goal: Not fall by end of shift  Outcome: Progressing  Goal: Be free  from injury by end of the shift  Outcome: Progressing  Goal: Verbalize understanding of personal risk factors for fall in the hospital  Outcome: Progressing  Goal: Verbalize understanding of risk factor reduction measures to prevent injury from fall in the home  Outcome: Progressing  Goal: Use assistive devices by end of the shift  Outcome: Progressing  Goal: Pace activities to prevent fatigue by end of the shift  Outcome: Progressing

## 2025-03-17 NOTE — PROGRESS NOTES
PROGRESS NOTE - PODIATRIC MEDICINE & SURGERY    SERVICE DATE: 3/17/2025  SERVICE TIME: 13:00    HPI    Patient is a 69 y.o. female who is day #5 s/p of left 2nd, 3rd and 4th toe amputation with left allograft skin graft on 3/12/2025 with Dr. Doss. Patient denies any pain to the area.     Seen and evaluated at bedside.    ROS: Negative except as above.      Past Medical History  Past Medical History:   Diagnosis Date    Adrenal cancer (Multi)     Aneurysm of carotid artery (CMS-HCC)     Neck aneurysm    Anxiety     COPD (chronic obstructive pulmonary disease) (Multi)     Depression     DVT (deep venous thrombosis) (Multi)     2022    DVT (deep venous thrombosis) (Multi)     RLE    History of blood transfusion     History of tubal ligation     Hyperlipidemia     Old myocardial infarction     History of heart attack    Other specified disorders of kidney and ureter     Obstruction of kidney    Personal history of other diseases of the circulatory system     History of intracranial aneurysm    Personal history of other diseases of the circulatory system     History of abdominal aortic aneurysm (AAA)    Personal history of other diseases of the respiratory system     History of chronic obstructive lung disease    Personal history of urinary calculi     History of kidney stones    Right upper quadrant pain 09/25/2020    Abdominal pain, RUQ (right upper quadrant)    Vision loss          Problem List  Problem List Items Addressed This Visit       * (Principal) Shortness of breath - Primary     Other Visit Diagnoses       Bacterial urinary infection        SIRS (systemic inflammatory response syndrome) (Multi)        Altered mental status, unspecified altered mental status type                  Medications and Allergies reviewed.      PHYSICAL EXAM    General: Patient is AOx3 and in no acute distress. Patient is alert and cooperative. Sitting comfortably in bed with dressing clean, dry and intact.      LLE Focused  Examination:  Vascular: Faintly palpable DP/PT pulses . Mild non-pitting edema noted. Hair growth absent. CFT<5 to the hallux. Temperature is warm to warm from tibial tuberosity to distal digits. No lymphatic streaking noted.     Musculoskeletal: Gross active and passive ROM intact to age and activity level. Moves all extremities spontaneously. Moderate to severe pain to palpation at left foot surgical site.     Neurological: Intact light touch sensation. Pain stimuli intact.      Dermatologic: Skin appears diffusely xerotic. Web spaces 1-4 are clean, dry and intact. No rashes or nodules noted  No hyperkeratotic tissue noted. Post operative sites are well coapted with sutures intact. Mild serosangiuinous drainage noted. No malodor. No signs of dehiscence. Patient has intact Epifix graft in place to wound sites. No SOI.      RESULTS    CBC  Lab Results   Component Value Date    WBC 10.7 03/17/2025    HGB 7.2 (L) 03/17/2025    HCT 26.4 (L) 03/17/2025    MCV 77 (L) 03/17/2025     03/17/2025       ESR  Lab Results   Component Value Date    SEDRATE 46 (H) 03/03/2025       CRP  Lab Results   Component Value Date    CRP 3.31 (H) 01/05/2024       HgbA1c  Lab Results   Component Value Date    HGBA1C 5.8 (H) 06/25/2024             IMAGING REVIEW:    XRAY L foot, 3 views: 3/15/2025  FINDINGS:  Status post surgery of the 2nd 3rd and 4th digits. There is subtle  soft tissue irregularity to the 3rd and 4th digits. The possibility  of air within the soft tissues can not be excluded albeit part of  this may be due to air interposed within bandages.      There is no radiographic evidence of acute or chronic osteomyelitis.      Minimal soft tissue swelling of the forefoot.      5 mm plantar spur.      IMPRESSION:  No stigmata of acute or chronic osteomyelitis. Soft tissue air may be  accounted for by bandages although clinical inspection recommended to  exclude gas-forming process.        ASSESSMENT  Ulceration toe left foot  down to subcutaneous tissue.  Ulceration toe left foot down to bone  Gangrene toe left  Peripheral arterial disease        PLAN  - Charts and results reviewed. A comprehensive history was taken and physical exam was performed.   - Educated on findings, diagnosis, treatment plans.  - WBC: 10.7  - Blood culture: NGTD  - Xray: No evidence of OM, soft tissue gas or abscess  - Dressing: leave adaptic to wound site in place, can change outer dressing consisting of betadine WTD, 4x4, ABD, Kerlix, ACE with minimal compression. Change every other day.  - Nursing staff may change or reinforce dressing as needed.   - WB status: WB as tolerated  - No surgical intervention, benefit from local wound care. Due to clinical presentation, patient's Sepsis is likely stemming from other un-identified organism. Awaiting urine and stool analysis. C Diff is still pending.   - Podiatry will continue following while in house.      This patient was discussed and evaluated with Dr. Ryan Verma DPM.    This is a preliminary note with attending attestation to follow.     Rachel Jackson DPM PGY1  Podiatric Medicine & Surgery    [employer] Secure Chat preferred

## 2025-03-17 NOTE — PROGRESS NOTES
Occupational Therapy    Evaluation    Patient Name: Radha Toussaint  MRN: [id number]  Department: [facility] ICU  Room: 128/128-A  Today's Date: 3/17/2025  Time Calculation  Start Time: 1045  Stop Time: 1100  Time Calculation (min): 15 min    Assessment  IP OT Assessment  OT Assessment: Pt. presents with a decline in self-care, mobility and safety and would benefit from skilled OT services to maximize independence and promote return to prior level of function.  Prognosis: Good  Barriers to Discharge Home: No anticipated barriers  End of Session Communication: Bedside nurse  End of Session Patient Position: Bed, 3 rail up, Alarm off, not on at start of session  Plan:  Treatment Interventions: ADL retraining, Functional transfer training, Endurance training, Compensatory technique education  OT Frequency: 3 times per week  OT Discharge Recommendations: Low intensity level of continued care  OT - OK to Discharge: Yes (to next level of care when cleared by medical)    Subjective   Current Problem:  1. Shortness of breath        2. Bacterial urinary infection        3. SIRS (systemic inflammatory response syndrome) (Multi)        4. Altered mental status, unspecified altered mental status type          General:  General  Reason for Referral: impaired adl; pt. admitted with sob, recently had 3 toes amputated L foot, having increased urinary frequency, AMS, SIRS, bacterial uti, pt. weaned off pressors, transfused hgb 7.2, 2/12/25 L femoral angioplasty with stent, xray L foot:  (-) om, cxr:  (-), ct head:  (-)  Referred By: Jennifer  Past Medical History Relevant to Rehab: ca with mets to adrenals, poorly differentiated ca probable primary bronchogenic origin wiht bilateral adrenal mets s/p chemo, htn, hld, mi, cad s/p pci/stent, cerebral aneurysm x 2 s/p craniotomy and clipping, AAA s/p repair x 2, pad s/p revascularization L popliteal/tpt/pt reconstruction with pta/dcb, and s/p limb rescue x 2, stent occlusion s/p repeat  "thrombectomy, jeti/sfa, pop pt and tpt, dana to bk pop and tpt pta at, L foot drop, copd, chronic hypoxic resp failure on home supplemental oxygen (2 L/min nc at night and as needed, nicotine dependence  Family/Caregiver Present: Yes  Caregiver Feedback: spouse  Co-Treatment: PT  Co-Treatment Reason: to maximize patient safety/abilities  Prior to Session Communication: Bedside nurse  Patient Position Received: Bed, 3 rail up, Alarm off, not on at start of session  General Comment: pt. agreeable to therapy intervention  Precautions:  Precautions Comment: VSS, tele, isolation c diff pending, o2 2 L/min, piv, tele, L foot wrapped/dressing/foot drop, per spouse, pt. is able to weight bear \"very little\" and has a boot, spouse agreed to bring boot in to the hospital     Date/Time Vitals Session Patient Position Pulse Resp SpO2 BP MAP (mmHg)    03/17/25 1300 --  --  66  22  100 %  --  --     03/17/25 1400 --  --  71  19  100 %  --  --                Pain:  Pain Assessment  Pain Assessment: 0-10  0-10 (Numeric) Pain Score:  (no pain complaints)    Objective   Cognition:  Overall Cognitive Status: Within Functional Limits  Orientation Level: Oriented X4           Home Living:  Home Living Comments: pt. lives with spouse and daughter/MADHURI, grandchildren, manual wheelchair, st. cane, 1 floor home, stall shower, st. toilet, shower chair with back, st. cane use all of the time, wheelchair for longer distances,   Prior Function:  Prior Function Comments: pt. is independent with adl/transfers, prn assist from family for iadl's, st. cane for functional mobility, no falls, does not drive  IADL History:     ADL:  ADL Comments: would anticipate min assist overall for adl due to weakness  Activity Tolerance:  Endurance: Decreased tolerance for upright activites  Early Mobility/Exercise Safety Screen: Proceed with mobilization - No exclusion criteria met  Bed Mobility/Transfers: Bed Mobility  Bed Mobility:  (sba supine <> " sit)    Transfers  Transfer:  (deferred, pt. does not have boot available for L foot)      Sensation:  Sensation Comment: sensation intact  Strength:  Strength Comments: bue's at least 3/5 per observation  Coordination:  Movements are Fluid and Coordinated: Yes   Outcome Measures: [facility] Daily Activity  Putting on and taking off regular lower body clothing: A little  Bathing (including washing, rinsing, drying): A little  Putting on and taking off regular upper body clothing: A little  Toileting, which includes using toilet, bedpan or urinal: A little  Taking care of personal grooming such as brushing teeth: A little  Eating Meals: None  Daily Activity - Total Score: 19         and Early Mobility/Exercise Safety Screen: Proceed with mobilization - No exclusion criteria met  ICU Mobility Scale: Sitting over edge of bed [3]  Education Documentation    Precautions, taught by Maci Wilhelm OT at 3/17/2025  2:32 PM.  Learner: Patient  Readiness: Acceptance  Method: Explanation  Response: Verbalizes Understanding, Needs Reinforcement  Comment: transfer safety        Goals:   Encounter Problems       Encounter Problems (Active)       OT Goals       increase bue ther ex/activity x 7-10 minutes and increase standing tolerance x 3-5 minutes with supervision to promote greater activity tolerance for assist with adl.   (Progressing)       Start:  03/17/25    Expected End:  03/24/25            Increase functional mobility and  functional transfers to supervision for bed/chair/toilet with dme prn   (Progressing)       Start:  03/17/25    Expected End:  03/24/25            Increase ub/lb dressing to supervision with dme prn  (Progressing)       Start:  03/17/25    Expected End:  03/24/25            Increase toileting to supervision with dme prn  (Progressing)       Start:  03/17/25    Expected End:  03/24/25            pt. to apply ec/ws techniques with minimal cues to all mobility/transfer/adl to decrease fatigue/promote efficient  use of energy toward completion of functional tasks.  (Progressing)       Start:  03/17/25    Expected End:  03/24/25

## 2025-03-17 NOTE — PROGRESS NOTES
Radha Toussaint is a 69 y.o. female on day 3 of admission presenting with Shortness of breath.      Subjective   Lots of loose mari       Objective     Last Recorded Vitals  /60   Pulse 61   Temp 36 °C (96.8 °F) (Temporal)   Resp (!) 33   Wt 63.9 kg (140 lb 14 oz)   SpO2 (!) 85%   Intake/Output last 3 Shifts:    Intake/Output Summary (Last 24 hours) at 3/17/2025 1202  Last data filed at 3/17/2025 0600  Gross per 24 hour   Intake 508.34 ml   Output 800 ml   Net -291.66 ml       Admission Weight  Weight: 67.1 kg (148 lb) (03/14/25 1530)    Daily Weight  03/17/25 : 63.9 kg (140 lb 14 oz)    Image Results  XR foot left 1-2 views  Narrative: Interpreted By:  Kimani Alston,   STUDY:  XR FOOT LEFT 1-2 VIEWS; ;  3/15/2025 10:33 am      INDICATION:  Signs/Symptoms:concern for osteomyelitis.          COMPARISON:  01/05/2024      ACCESSION NUMBER(S):  [id number]      ORDERING CLINICIAN:  DAVID JARQUIN      FINDINGS:  Status post surgery of the 2nd 3rd and 4th digits. There is subtle  soft tissue irregularity to the 3rd and 4th digits. The possibility  of air within the soft tissues can not be excluded albeit part of  this may be due to air interposed within bandages.      There is no radiographic evidence of acute or chronic osteomyelitis.      Minimal soft tissue swelling of the forefoot.      5 mm plantar spur.      Impression: No stigmata of acute or chronic osteomyelitis. Soft tissue air may be  accounted for by bandages although clinical inspection recommended to  exclude gas-forming process.          MACRO:  None      Signed by: Kimani Alston 3/15/2025 11:33 AM  Dictation workstation:   [id number]      Physical Exam  Vitals and nursing note reviewed.   Constitutional:       Appearance: Normal appearance. She is normal weight.   HENT:      Head: Normocephalic.      Nose: Nose normal.      Mouth/Throat:      Mouth: Mucous membranes are moist.   Eyes:      Extraocular Movements: Extraocular movements  intact.      Conjunctiva/sclera: Conjunctivae normal.      Pupils: Pupils are equal, round, and reactive to light.   Cardiovascular:      Rate and Rhythm: Normal rate and regular rhythm.      Heart sounds: Normal heart sounds.   Pulmonary:      Effort: No respiratory distress.      Breath sounds: No stridor. No wheezing or rhonchi.      Comments: Diminshed all bases stable on 2l NC   Chest:      Chest wall: No tenderness.   Abdominal:      General: Abdomen is flat. Bowel sounds are normal.      Palpations: Abdomen is soft.   Musculoskeletal:         General: Tenderness present.      Cervical back: Normal range of motion.   Skin:     General: Skin is warm and dry.      Capillary Refill: Capillary refill takes 2 to 3 seconds.      Findings: Wound present.      Comments: Dressing intact to  surgical site to the left foot.   Neurological:      General: No focal deficit present.      Mental Status: She is alert and oriented to person, place, and time.      Motor: Weakness present.   Psychiatric:         Mood and Affect: Mood normal.         Behavior: Behavior normal.         Relevant Results  Scheduled medications  apixaban, 5 mg, oral, BID  clopidogrel, 75 mg, oral, q AM  dexAMETHasone, 2 mg, oral, Daily  tiotropium, 2 puff, inhalation, Daily   And  fluticasone furoate-vilanteroL, 1 puff, inhalation, Daily  gabapentin, 300 mg, oral, TID  insulin lispro, 0-5 Units, subcutaneous, TID AC  [Held by provider] lubiprostone, 24 mcg, oral, BID  montelukast, 10 mg, oral, Nightly  nicotine, 1 patch, transdermal, Daily  [Held by provider] OLANZapine, 15 mg, oral, Nightly  piperacillin-tazobactam, 3.375 g, intravenous, q6h  rosuvastatin, 40 mg, oral, Daily  sertraline, 100 mg, oral, BID  [START ON 3/18/2025] vancomycin, 1,500 mg, intravenous, q24h      Continuous medications  norepinephrine, 0-0.2 mcg/kg/min, Last Rate: Stopped (03/15/25 0630)      PRN medications  PRN medications: acetaminophen, dextrose, dextrose, glucagon,  glucagon, [Held by provider] HYDROcodone-acetaminophen, [Held by provider] LORazepam, ondansetron **OR** ondansetron, oxygen, vancomycin      No results found.     No current facility-administered medications on file prior to encounter.     Current Outpatient Medications on File Prior to Encounter   Medication Sig Dispense Refill    apixaban (Eliquis) 5 mg tablet Take 1 tablet (5 mg) by mouth 2 times a day. 180 tablet 3    clopidogrel (Plavix) 75 mg tablet Take 1 tablet (75 mg) by mouth once daily in the morning. 90 tablet 3    dexAMETHasone (Decadron) 4 mg tablet Take 0.5 tablets (2 mg) by mouth once daily. 30 tablet 1    HYDROcodone-acetaminophen (Norco)  mg tablet Take 1 tablet by mouth every 6 hours if needed for severe pain (7 - 10). 60 tablet 0    LORazepam (Ativan) 0.5 mg tablet Take 1 tablet (0.5 mg) by mouth every 6 hours if needed for anxiety. 120 tablet 3    acetaminophen (Tylenol) 325 mg tablet Take 3 tablets (975 mg) by mouth every 6 hours if needed for mild pain (1 - 3) or moderate pain (4 - 6). (Patient not taking: Reported on 3/12/2025)      buPROPion (Wellbutrin) 75 mg tablet Take 1 tablet (75 mg) by mouth once daily. (Patient not taking: Reported on 3/12/2025) 30 tablet 1    [] chlorhexidine (Hibiclens) 4 % external liquid Apply topically early in the morning. for 5 days. Use wash as directed daily for 5 days prior to surgery with the 5th day being the morning of surgery. 118 mL 0    [] chlorhexidine (Peridex) 0.12 % solution Use 15 mL in the mouth or throat early in the morning. for 2 days. Rinse mouth by swishing medication and spitting. Use daily for 2 days prior to surgery with the 2nd day being the morning of surgery. 30 mL 0    fluticasone-umeclidin-vilanter (Trelegy Ellipta) 200-62.5-25 mcg blister with device Inhale 1 puff once daily. 60 each 3    gabapentin (Neurontin) 300 mg capsule Take 1 capsule (300 mg) by mouth 3 times a day. 270 capsule 3    hydrOXYzine pamoate  (VistariL) 25 mg capsule Take 1 capsule (25 mg) by mouth 3 times a day as needed for itching. (Patient not taking: Reported on 3/12/2025) 270 capsule 3    lubiprostone (Amitiza) 24 mcg capsule Take 1 capsule (24 mcg) by mouth 2 times daily (morning and late afternoon). 60 capsule 0    magic mouthwash (lidocaine, diphenhydrAMINE, Maalox 1:1:1) Swish and swallow 10 mL every 6 hours if needed for mucositis. (Patient not taking: Reported on 3/12/2025) 560 mL 3    montelukast (Singulair) 10 mg tablet Take 1 tablet (10 mg) by mouth once daily at bedtime. (Patient not taking: Reported on 3/12/2025) 90 tablet 1    naloxone (Narcan) 4 mg/0.1 mL nasal spray Administer 1 spray (4 mg) into affected nostril(s) if needed for opioid reversal. May repeat every 2-3 minutes if needed, alternating nostrils, until medical assistance becomes available. (Patient not taking: Reported on 3/12/2025) 2 each 0    OLANZapine (ZyPREXA) 15 mg tablet Take 1 tablet (15 mg) by mouth once daily at bedtime. 90 tablet 1    ondansetron ODT (Zofran-ODT) 8 mg disintegrating tablet Dissolve 1 tablet (8 mg) in the mouth every 8 hours if needed for nausea or vomiting. 90 tablet 0    oxyCODONE-acetaminophen (Percocet) 5-325 mg tablet Take 1 tablet by mouth every 6 hours if needed for severe pain (7 - 10). 20 tablet 0    oxygen (O2) gas therapy Inhale 1 each continuously. 2 L at bedtime and naps      prochlorperazine (Compazine) 5 mg tablet Take 1 tablet (5 mg) by mouth every 6 hours if needed for nausea or vomiting. 30 tablet 3    rosuvastatin (Crestor) 40 mg tablet Take 1 tablet (40 mg) by mouth once daily. 90 tablet 3    sertraline (Zoloft) 100 mg tablet TAKE 1 TABLET(100 MG) BY MOUTH TWICE DAILY 180 tablet 3         Assessment/Plan                  Assessment & Plan  Shortness of breath    Radha Toussaint is a 89 y.o. female with a PMHx of HTN, HLD, CAD with FCO x2, severe PAD s/p multiple interventions (aortic aneurysm repair, intracranial aneurysm  coiling, iliac grafts, fem-fem graft, multiple lower extremity stents and angioplasties - most recently on 2/12/25 [place] [facility] [place] DVT, Afib (plavix and eliquis), COPD with home oxygen, current smoker, lung cancer w/ mets to adrenal glad, beta thalassemia who presents today 2 days postop of a left 2nd, 3rd and 4th toe amputation with left allograft skin graft on 3/12/2025 with Dr. Doss. Wound cultures taken 2/20/2025 positive for heavy Pseudomonas and MRSA organisms. Chart review revealed patient hospitalized on 2/12/2025 for planned left femoral angioplasty with stent and balloon angioplasty of the left anterior tibial artery. Postoperatively required Reji-Synephrine infusion for hypotension and blood transfusion for anemia.    3/15: medically stable transferred to medicine services:        #Acute encephalopathy (multiple-sepsis, anemic hypoxia)  #Mixed anxiety depressive disorder  #Acute post operative pain  #Chronic pain related to malignancy  --Supplemental oxygen, keep Sp02 >92%  --Abx per ID below  --Baseline A&Ox4  --Hold home meds: Lorazepam  --Resume sertraline (hx of taking Wellbutrin, hydroxyzine and olanzapine hs as needed)  --Analgesia: Scheduled gabapentin, prn Norco and tylenol     #Septic shock  #Hx HTN, HLD  #CAD s/p FCO x 2  #Severe PAD s/p multiple interventions  #DVT of RLE, Hx  #Hx Paroxysmal A-fib  --TTE (8/31/23) LVEF 60-65%  --Not on antihypertensives  --Resume rosuvastatin  --Anticoagulated with Eliquis and Plavix  --500 mL Albumin x 1  --Levophed gtt, Titrate to keep MAP >65; dcd monitor BP        #Acute on chronic hypoxic respiratory failure  #COPD emphysema  --CXR without any infectious etiology  --Current smoker (90 pack/year history)  --Baseline home O2 2L HS and prn  --Supplemental O2, keep Sp02 >92%  --Maintenance meds: Dexamethasone and Trelegy  --Smoking cessation education  --Continuous Sp02 monitoring     GI:  #IBS with acute diarrhea   --Hold home med Amitiza  --Cdiff PCR negative           #Adrenal cancer  --Dexamethasone  --Follows Dr. 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 Burnham for oncology last seen 1/31/2025  --Currently maintained on Keytruda every 6 weeks next dose coming on 3/14/2025 (missed dose)  --Hbg 6.7 - 1 unit PRBC ordered  --Minimize blood draws  --Monitor CBC x 3 days         #Sepsis due to unidentified infectious organism (working dx: UTI, soft tissue infection, cdiff colitis?  --History of Pseudomonas and MRSA of left foot wound (2/20/25)  --Culture Data: BC, urine, and stool c-diff PCR pending  --Lactate 0.6  --Monitor CBC x 3days       #Left foot amputation incisions  #Multiple skin tears and abrasions  --Skin and wound care as ordered  --PT/OT  --Podiatry consulted      3/16: trend labs, monitor BP and respiratory  status Continue antibiotics with vancomycin and Zosyn.  Podiatry following.   Will need PT/OT for dc planning     3.17: high suspicion for c. Diff, will need to start oral vanc if postive    Plan of care discussed with attending Dr Nico Davies,     I spent over 30 min of professional care and time with this patient    Nico Davies, DO

## 2025-03-17 NOTE — PROGRESS NOTES
Physical Therapy    Physical Therapy Evaluation    Patient Name: Radha Toussaint  MRN: [id number]  128/128-A  Today's Date: 3/17/2025   Time Calculation  Start Time: 1045  Stop Time: 1059  Time Calculation (min): 14 min    Assessment/Plan   PT Assessment  PT Assessment Results: Decreased strength, Decreased endurance, Impaired balance, Decreased mobility  Rehab Prognosis: Good  Barriers to Discharge Home: No anticipated barriers  End of Session Communication: Bedside nurse  Assessment Comment: Pt is presenting with a decline in functional mobility from baseline. Pt would benefit from further PT services to address these deficits to return to prior level of function.  End of Session Patient Position: Bed, 3 rail up, Alarm off, not on at start of session  IP OR SWING BED PT PLAN  Inpatient or Swing Bed: Inpatient  PT Plan  Treatment/Interventions: Bed mobility, Transfer training, Gait training, Balance training, Strengthening, Endurance training, Therapeutic exercise, Therapeutic activity  PT Plan: Ongoing PT  PT Frequency: 3 times per week  PT Discharge Recommendations: Low intensity level of continued care  PT - OK to Discharge: Yes    Subjective     Current Problem:  Patient Active Problem List   Diagnosis    Angioedema    Anxiety    CAD (coronary artery disease)    Cellulitis of left lower extremity    COVID-19    Peripheral vascular disease (CMS-HCC)    Insomnia    Intermittent claudication (CMS-HCC)    Leg pain    Liver lesion    Paresthesias    Vitamin D deficiency    S/P arteriogram of extremity    Non-small cell lung cancer (NSCLC) (Multi)    Pneumonia due to gram-negative bacteria (Multi)    Anemia in other chronic diseases classified elsewhere    Abdominal aortic aneurysm (CMS-HCC)    Abdominal pain    Adrenal insufficiency (Multi)    Aneurysm of iliac artery (CMS-HCC)    Arthritis    Colitis    Constipation    Loss of appetite    Dehydration    Dyspnea on exertion    Generalized weakness    Mixed  hyperlipidemia    Fibroids    Leiomyoma    Mixed anxiety depressive disorder    Myocardial infarction (Multi)    STEMI (ST elevation myocardial infarction) (Multi)    Pain due to neoplasm    Nephrolithiasis    Nonruptured cerebral aneurysm (HHS-HCC)    Tobacco dependence syndrome    Hypertension    Hypotension    Claudication (CMS-HCC)    Secondary malignant neoplasm of left adrenal gland (Multi)    Moderate protein-calorie malnutrition (Multi)    Contact with and (suspected) exposure to covid-19    Cough    Current smoker    Dilation of renal shen    Hyperglycemia    Physical deconditioning    Thyroid nodule    Occlusion of left popliteal artery (CMS-HCC)    Hyperkalemia    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 (Multi)    Foot drop, left    Atherosclerosis of native arteries of extremities with rest pain, left leg (Multi)    Pneumonia of left lung due to infectious organism, unspecified part of lung    Non-pressure chronic ulcer of other part of left foot with unspecified severity (Multi)    Beta thalassemia (Multi)    Shortness of breath       General Visit Information:  General  Reason for Referral: PT eval and treat; SOB, recently had 3 toes amputated L foot, having increased urinary frequency, AMS, SIRS, bacterial UTI  Referred By: Aline Rodriguez  Past Medical History Relevant to Rehab: NSCLC, mets to adrenals, COPD, HTN, HLD, CAD s/p FCO x 2, Severe PAD s/p multiple interventions, DVT of RLE, Paroxysmal A-fib  Family/Caregiver Present: Yes ()  Co-Treatment: OT  Co-Treatment Reason: For patient safety and to maximize mobility  Prior to Session Communication: Bedside nurse  Patient Position Received: Bed, 3 rail up, Alarm off, not on at start of session  General Comment: Pt resting in bed upon entering, agreeable to PT    Home Living:  Home Living  Type of Home: House  Lives With:  ( and adult children)  Home Adaptive Equipment: Cane, Wheelchair-manual (wheeled walker)  Home Layout: One  "level  Home Access: No concerns  Bathroom Shower/Tub: Walk-in shower  Bathroom Toilet: Standard  Bathroom Equipment: Shower chair with back    Prior Level of Function:  Prior Function Per Pt/Caregiver Report  ADL Assistance: Independent  Homemaking Assistance:  (shares IADLs, family assist)  Ambulatory Assistance:  (Pt reports ambulating with wheeled walker most recently, WC for distance.)    Precautions:  Precautions  Precautions Comment: contact + precautions for c-diff, telemetry, 2L O2, PIV, LLE foot wrapped, per pt and  pt is allowed to put \"very little weight\" through her LLE with surgical boot. Requested bring in pt's boot to be able to attempted further functional mobility    Vital Signs:  Vital Signs  Vital Signs Comment: VSS throughout session  Objective     Pain:  Pain Assessment  Pain Assessment: 0-10  0-10 (Numeric) Pain Score: 0 - No pain    Cognition:  Cognition  Overall Cognitive Status: Within Functional Limits    General Assessments:      Activity Tolerance  Endurance: Tolerates less than 10 min exercise, no significant change in vital signs  Early Mobility/Exercise Safety Screen: Proceed with mobilization - No exclusion criteria met  Sensation  Light Touch: Not tested     Perception  Inattention/Neglect: Appears intact  Coordination  Movements are Fluid and Coordinated: Yes  Postural Control  Postural Control: Within Functional Limits  Static Sitting Balance  Static Sitting-Level of Assistance: Close supervision  Dynamic Sitting Balance  Dynamic Sitting-Level of Assistance: Contact guard  Static Standing Balance  Static Standing-Level of Assistance:  (does not attempt today)  Dynamic Standing Balance  Dynamic Standing-Level of Assistance:  (does not attempt today)    Functional Assessments:     Bed Mobility  Bed Mobility: Yes  Bed Mobility 1  Bed Mobility Comments 1: supine<>sit SBA with HOB raised  Transfers  Transfer: No (Pt declined to attempt this date. Pt scoots toward EOB with " SBA)  Ambulation/Gait Training  Ambulation/Gait Training Performed:  (Pt declined to attempt today)          Extremity/Trunk Assessments:        RLE   RLE :  (WFL)  LLE   LLE :  (LLE ankle 0/5, pt reports she has AFO at home, knee and hip WFL)    Outcome Measures:  [facility] Basic Mobility  Turning from your back to your side while in a flat bed without using bedrails: A little  Moving from lying on your back to sitting on the side of a flat bed without using bedrails: A little  Moving to and from bed to chair (including a wheelchair): A little  Standing up from a chair using your arms (e.g. wheelchair or bedside chair): A little  To walk in hospital room: A lot  Climbing 3-5 steps with railing: Total  Basic Mobility - Total Score: 15           FSS-ICU  Ambulation: Unable to attempt due to weakness  Rolling: Supervision or set-up only  Sitting: Supervision or set-up only  Transfer Sit-to-Stand: Unable to perform  Transfer Supine-to-Sit: Supervision or set-up only  Total Score: 15  ICU Mobility Screen  Early Mobility/Exercise Safety Screen: Proceed with mobilization - No exclusion criteria met  ICU Mobility Scale: Sitting over edge of bed  E = Exercise and Early Mobility  Early Mobility/Exercise Safety Screen: Proceed with mobilization - No exclusion criteria met  ICU Mobility Scale: Sitting over edge of bed          Goals:  Encounter Problems       Encounter Problems (Active)       PT Problem       PT Goal 1 STG - Pt will amb 10' using LRD with CGA  (Progressing)       Start:  03/17/25    Expected End:  03/31/25            PT Goal 2 STG - Pt will transition supine <> sitting with MOD IND  (Progressing)       Start:  03/17/25    Expected End:  03/31/25            PT Goal 3 STG - Pt will SPT bed <> chair with CGA (Progressing)       Start:  03/17/25    Expected End:  03/31/25            PT Goal 4 STG - Pt will transfer STS with CGA (Progressing)       Start:  03/17/25    Expected End:  03/31/25               Pain - Adult             Education Documentation  Handouts, taught by Radha Irvin PT at 3/17/2025  3:30 PM.  Learner: Patient  Readiness: Acceptance  Method: Explanation  Response: Verbalizes Understanding    Home Exercise Program, taught by Radha Irvin PT at 3/17/2025  3:30 PM.  Learner: Patient  Readiness: Acceptance  Method: Explanation  Response: Verbalizes Understanding    Precautions, taught by Radha Irvin PT at 3/17/2025  3:30 PM.  Learner: Patient  Readiness: Acceptance  Method: Explanation  Response: Verbalizes Understanding    Body Mechanics, taught by Radha Irvin PT at 3/17/2025  3:30 PM.  Learner: Patient  Readiness: Acceptance  Method: Explanation  Response: Verbalizes Understanding    Mobility Training, taught by Radha Irvin PT at 3/17/2025  3:30 PM.  Learner: Patient  Readiness: Acceptance  Method: Explanation  Response: Verbalizes Understanding    Education Comments  No comments found.

## 2025-03-17 NOTE — PROGRESS NOTES
"   25 1222   Discharge Planning   Living Arrangements Spouse/significant other;Family members  (Pt resides in the home with her spouse, Alexis, dtr, son-in-law, and 3 grandchildren.)   Support Systems Spouse/significant other;Family members   Type of Residence Private residence   Number of Stairs to Enter Residence 1   Number of Stairs Within Residence 0   Do you have animals or pets at home? Yes   Type of Animals or Pets 5 dogs   Who is requesting discharge planning? Provider     MILAGROS met with the pt and her spouse, Alexis at bedside. MSW made introduction and explained role. Pt is a&ox3. Pt confirms , address, and insurance. Pt reporting she ambulates with walker or wheelchair. Pt denies recent falls. Pt endorses the use of a shower chair and grab bar. Pt uses 2L of O2 @baseline. Pt reports needing some assistance in terms of ADL's/IADL's. Pt no longer drives. Pt requires assistance with meal prep. No reported SW needs. Dispo TBD. Pt reporting she has floor orders in for the \"4th Floor\".  Pharmacy: [facility]   PCP: Francheska Rogel Last Visit \"3 weeks ago\"  "

## 2025-03-17 NOTE — PROGRESS NOTES
Vancomycin Dosing by Pharmacy- FOLLOW UP    Radha Toussaint is a 69 y.o. year old female who Pharmacy has been consulted for vancomycin dosing for unclear source. Based on the patient's indication and renal status this patient is being dosed based on a goal AUC of 400-600.     Renal function is currently stable.    Current vancomycin dose: 1000 mg given every 12 hours    Estimated vancomycin AUC on current dose: 643 mg/L.hr     Visit Vitals  /60   Pulse 61   Temp 36 °C (96.8 °F) (Temporal)   Resp (!) 33        Lab Results   Component Value Date    CREATININE 0.60 2025    CREATININE 0.61 2025    CREATININE 0.61 2025    CREATININE 0.59 03/15/2025        Patient weight is as follows:   Vitals:    25 0600   Weight: 63.9 kg (140 lb 14 oz)       Cultures:  No results found for the encounter in last 14 days.       I/O last 3 completed shifts:  In: 2615 (40.9 mL/kg) [P.O.:240; I.V.:1000 (15.6 mL/kg); IV Piggyback:1375]  Out: 800 (12.5 mL/kg) [Urine:800 (0.3 mL/kg/hr)]  Weight: 63.9 kg   I/O during current shift:  No intake/output data recorded.    Temp (24hrs), Av.1 °C (96.9 °F), Min:35.9 °C (96.6 °F), Max:36.2 °C (97.2 °F)      Assessment/Plan    Above goal AUC. Orders placed for new vancomcyin regimen of 1500 every 24 hours to begin at 0900 3/18.    This dosing regimen is predicted by [employer] to result in the following pharmacokinetic parameters:  [id number]: 534 mg/L.hr  AUC24,ss: 497 mg/L.hr  Probability of AUC24 > 400: 92 %  Ctrough,ss: 13.4 mg/L  Probability of Ctrough,ss > 20: 7 %    The next level will be obtained on 3/19 at AM labs. May be obtained sooner if clinically indicated.   Will continue to monitor renal function daily while on vancomycin and order serum creatinine at least every 48 hours if not already ordered.  Follow for continued vancomycin needs, clinical response, and signs/symptoms of toxicity.       Daniel J Weiland, PharmD

## 2025-03-18 LAB
ALBUMIN SERPL BCP-MCNC: 2.7 G/DL (ref 3.4–5)
ALP SERPL-CCNC: 43 U/L (ref 33–136)
ALT SERPL W P-5'-P-CCNC: 29 U/L (ref 7–45)
ANION GAP SERPL CALC-SCNC: 8 MMOL/L (ref 10–20)
AST SERPL W P-5'-P-CCNC: 35 U/L (ref 9–39)
BACTERIA BLD CULT: NORMAL
BACTERIA BLD CULT: NORMAL
BILIRUB SERPL-MCNC: 0.3 MG/DL (ref 0–1.2)
BUN SERPL-MCNC: 11 MG/DL (ref 6–23)
CALCIUM SERPL-MCNC: 7.6 MG/DL (ref 8.6–10.3)
CHLORIDE SERPL-SCNC: 110 MMOL/L (ref 98–107)
CO2 SERPL-SCNC: 26 MMOL/L (ref 21–32)
CREAT SERPL-MCNC: 0.55 MG/DL (ref 0.5–1.05)
EGFRCR SERPLBLD CKD-EPI 2021: >90 ML/MIN/1.73M*2
ERYTHROCYTE [DISTWIDTH] IN BLOOD BY AUTOMATED COUNT: 20.8 % (ref 11.5–14.5)
GLUCOSE BLD MANUAL STRIP-MCNC: 137 MG/DL (ref 74–99)
GLUCOSE BLD MANUAL STRIP-MCNC: 139 MG/DL (ref 74–99)
GLUCOSE BLD MANUAL STRIP-MCNC: 150 MG/DL (ref 74–99)
GLUCOSE BLD MANUAL STRIP-MCNC: 163 MG/DL (ref 74–99)
GLUCOSE SERPL-MCNC: 87 MG/DL (ref 74–99)
HCT VFR BLD AUTO: 25.8 % (ref 36–46)
HGB BLD-MCNC: 7.1 G/DL (ref 12–16)
MCH RBC QN AUTO: 20.9 PG (ref 26–34)
MCHC RBC AUTO-ENTMCNC: 27.5 G/DL (ref 32–36)
MCV RBC AUTO: 76 FL (ref 80–100)
NRBC BLD-RTO: 0 /100 WBCS (ref 0–0)
PLATELET # BLD AUTO: 175 X10*3/UL (ref 150–450)
POTASSIUM SERPL-SCNC: 3.1 MMOL/L (ref 3.5–5.3)
PROT SERPL-MCNC: 5 G/DL (ref 6.4–8.2)
RBC # BLD AUTO: 3.4 X10*6/UL (ref 4–5.2)
SODIUM SERPL-SCNC: 141 MMOL/L (ref 136–145)
WBC # BLD AUTO: 9.1 X10*3/UL (ref 4.4–11.3)

## 2025-03-18 PROCEDURE — 94640 AIRWAY INHALATION TREATMENT: CPT

## 2025-03-18 PROCEDURE — 2500000001 HC RX 250 WO HCPCS SELF ADMINISTERED DRUGS (ALT 637 FOR MEDICARE OP)

## 2025-03-18 PROCEDURE — 99232 SBSQ HOSP IP/OBS MODERATE 35: CPT | Performed by: STUDENT IN AN ORGANIZED HEALTH CARE EDUCATION/TRAINING PROGRAM

## 2025-03-18 PROCEDURE — 82947 ASSAY GLUCOSE BLOOD QUANT: CPT

## 2025-03-18 PROCEDURE — 2500000002 HC RX 250 W HCPCS SELF ADMINISTERED DRUGS (ALT 637 FOR MEDICARE OP, ALT 636 FOR OP/ED): Performed by: STUDENT IN AN ORGANIZED HEALTH CARE EDUCATION/TRAINING PROGRAM

## 2025-03-18 PROCEDURE — 2500000002 HC RX 250 W HCPCS SELF ADMINISTERED DRUGS (ALT 637 FOR MEDICARE OP, ALT 636 FOR OP/ED)

## 2025-03-18 PROCEDURE — 1100000001 HC PRIVATE ROOM DAILY

## 2025-03-18 PROCEDURE — 36415 COLL VENOUS BLD VENIPUNCTURE: CPT | Performed by: NURSE PRACTITIONER

## 2025-03-18 PROCEDURE — 80053 COMPREHEN METABOLIC PANEL: CPT | Performed by: NURSE PRACTITIONER

## 2025-03-18 PROCEDURE — S4991 NICOTINE PATCH NONLEGEND: HCPCS

## 2025-03-18 PROCEDURE — 2500000004 HC RX 250 GENERAL PHARMACY W/ HCPCS (ALT 636 FOR OP/ED)

## 2025-03-18 PROCEDURE — 2500000005 HC RX 250 GENERAL PHARMACY W/O HCPCS

## 2025-03-18 PROCEDURE — 2500000004 HC RX 250 GENERAL PHARMACY W/ HCPCS (ALT 636 FOR OP/ED): Performed by: STUDENT IN AN ORGANIZED HEALTH CARE EDUCATION/TRAINING PROGRAM

## 2025-03-18 PROCEDURE — 2500000001 HC RX 250 WO HCPCS SELF ADMINISTERED DRUGS (ALT 637 FOR MEDICARE OP): Performed by: STUDENT IN AN ORGANIZED HEALTH CARE EDUCATION/TRAINING PROGRAM

## 2025-03-18 PROCEDURE — 85027 COMPLETE CBC AUTOMATED: CPT | Performed by: NURSE PRACTITIONER

## 2025-03-18 RX ORDER — LOPERAMIDE HYDROCHLORIDE 2 MG/1
4 CAPSULE ORAL 4 TIMES DAILY PRN
Status: DISCONTINUED | OUTPATIENT
Start: 2025-03-18 | End: 2025-03-19 | Stop reason: HOSPADM

## 2025-03-18 RX ORDER — POTASSIUM CHLORIDE 20 MEQ/1
40 TABLET, EXTENDED RELEASE ORAL ONCE
Status: COMPLETED | OUTPATIENT
Start: 2025-03-18 | End: 2025-03-18

## 2025-03-18 RX ORDER — L. ACIDOPHILUS/L.BULGARICUS 1MM CELL
1 TABLET ORAL DAILY
Status: DISCONTINUED | OUTPATIENT
Start: 2025-03-18 | End: 2025-03-19 | Stop reason: HOSPADM

## 2025-03-18 RX ADMIN — APIXABAN 5 MG: 5 TABLET, FILM COATED ORAL at 20:30

## 2025-03-18 RX ADMIN — Medication 1 TABLET: at 09:45

## 2025-03-18 RX ADMIN — PIPERACILLIN SODIUM AND TAZOBACTAM SODIUM 3.38 G: 3; .375 INJECTION, SOLUTION INTRAVENOUS at 06:09

## 2025-03-18 RX ADMIN — POTASSIUM CHLORIDE 40 MEQ: 1500 TABLET, EXTENDED RELEASE ORAL at 16:10

## 2025-03-18 RX ADMIN — HYDROCODONE BITARTRATE AND ACETAMINOPHEN 1 TABLET: 10; 325 TABLET ORAL at 20:33

## 2025-03-18 RX ADMIN — MONTELUKAST 10 MG: 10 TABLET, FILM COATED ORAL at 20:30

## 2025-03-18 RX ADMIN — GABAPENTIN 300 MG: 300 CAPSULE ORAL at 09:44

## 2025-03-18 RX ADMIN — ACETAMINOPHEN 650 MG: 325 TABLET, FILM COATED ORAL at 06:42

## 2025-03-18 RX ADMIN — GABAPENTIN 300 MG: 300 CAPSULE ORAL at 16:09

## 2025-03-18 RX ADMIN — GABAPENTIN 300 MG: 300 CAPSULE ORAL at 20:30

## 2025-03-18 RX ADMIN — ONDANSETRON 4 MG: 2 INJECTION INTRAMUSCULAR; INTRAVENOUS at 20:33

## 2025-03-18 RX ADMIN — FLUTICASONE FUROATE AND VILANTEROL TRIFENATATE 1 PUFF: 200; 25 POWDER RESPIRATORY (INHALATION) at 07:46

## 2025-03-18 RX ADMIN — Medication 2.5 L/MIN: at 07:47

## 2025-03-18 RX ADMIN — HYDROCODONE BITARTRATE AND ACETAMINOPHEN 1 TABLET: 10; 325 TABLET ORAL at 09:58

## 2025-03-18 RX ADMIN — TIOTROPIUM BROMIDE INHALATION SPRAY 2 PUFF: 3.12 SPRAY, METERED RESPIRATORY (INHALATION) at 07:45

## 2025-03-18 RX ADMIN — LOPERAMIDE HYDROCHLORIDE 4 MG: 2 CAPSULE ORAL at 09:44

## 2025-03-18 RX ADMIN — DEXAMETHASONE 2 MG: 4 TABLET ORAL at 09:44

## 2025-03-18 RX ADMIN — NICOTINE 1 PATCH: 21 PATCH, EXTENDED RELEASE TRANSDERMAL at 09:49

## 2025-03-18 RX ADMIN — PIPERACILLIN SODIUM AND TAZOBACTAM SODIUM 3.38 G: 3; .375 INJECTION, SOLUTION INTRAVENOUS at 17:14

## 2025-03-18 RX ADMIN — SERTRALINE HYDROCHLORIDE 100 MG: 100 TABLET ORAL at 20:30

## 2025-03-18 RX ADMIN — ROSUVASTATIN CALCIUM 40 MG: 10 TABLET, FILM COATED ORAL at 09:44

## 2025-03-18 RX ADMIN — SERTRALINE HYDROCHLORIDE 100 MG: 100 TABLET ORAL at 09:44

## 2025-03-18 RX ADMIN — PIPERACILLIN SODIUM AND TAZOBACTAM SODIUM 3.38 G: 3; .375 INJECTION, SOLUTION INTRAVENOUS at 12:53

## 2025-03-18 RX ADMIN — VANCOMYCIN HYDROCHLORIDE 1500 MG: 1.5 INJECTION, POWDER, LYOPHILIZED, FOR SOLUTION INTRAVENOUS at 11:12

## 2025-03-18 RX ADMIN — CLOPIDOGREL BISULFATE 75 MG: 75 TABLET ORAL at 09:44

## 2025-03-18 RX ADMIN — APIXABAN 5 MG: 5 TABLET, FILM COATED ORAL at 09:44

## 2025-03-18 ASSESSMENT — COGNITIVE AND FUNCTIONAL STATUS - GENERAL
CLIMB 3 TO 5 STEPS WITH RAILING: A LOT
HELP NEEDED FOR BATHING: A LITTLE
MOBILITY SCORE: 17
DRESSING REGULAR LOWER BODY CLOTHING: A LITTLE
TURNING FROM BACK TO SIDE WHILE IN FLAT BAD: A LITTLE
DAILY ACTIVITIY SCORE: 21
TOILETING: A LITTLE
MOVING TO AND FROM BED TO CHAIR: A LITTLE
STANDING UP FROM CHAIR USING ARMS: A LITTLE
WALKING IN HOSPITAL ROOM: A LOT

## 2025-03-18 ASSESSMENT — PAIN SCALES - GENERAL
PAINLEVEL_OUTOF10: 0 - NO PAIN
PAINLEVEL_OUTOF10: 5 - MODERATE PAIN
PAINLEVEL_OUTOF10: 7
PAINLEVEL_OUTOF10: 3

## 2025-03-18 ASSESSMENT — PAIN DESCRIPTION - ORIENTATION: ORIENTATION: LEFT;RIGHT

## 2025-03-18 ASSESSMENT — PAIN - FUNCTIONAL ASSESSMENT: PAIN_FUNCTIONAL_ASSESSMENT: 0-10

## 2025-03-18 NOTE — PROGRESS NOTES
Radha Toussiant is a 69 y.o. female on day 4 of admission presenting with Shortness of breath.      Subjective   Lots of loose mari c d.iff negative, ampac 15       Objective     Last Recorded Vitals  /58   Pulse 57   Temp 36.3 °C (97.3 °F)   Resp 19   Wt 63.9 kg (140 lb 14 oz)   SpO2 95%   Intake/Output last 3 Shifts:    Intake/Output Summary (Last 24 hours) at 3/18/2025 0859  Last data filed at 3/18/2025 0642  Gross per 24 hour   Intake 250 ml   Output --   Net 250 ml       Admission Weight  Weight: 67.1 kg (148 lb) (03/14/25 1530)    Daily Weight  03/17/25 : 63.9 kg (140 lb 14 oz)    Image Results  XR foot left 1-2 views  Narrative: Interpreted By:  Kimani Alston,   STUDY:  XR FOOT LEFT 1-2 VIEWS; ;  3/15/2025 10:33 am      INDICATION:  Signs/Symptoms:concern for osteomyelitis.          COMPARISON:  01/05/2024      ACCESSION NUMBER(S):  [id number]      ORDERING CLINICIAN:  DAVID JARQUIN      FINDINGS:  Status post surgery of the 2nd 3rd and 4th digits. There is subtle  soft tissue irregularity to the 3rd and 4th digits. The possibility  of air within the soft tissues can not be excluded albeit part of  this may be due to air interposed within bandages.      There is no radiographic evidence of acute or chronic osteomyelitis.      Minimal soft tissue swelling of the forefoot.      5 mm plantar spur.      Impression: No stigmata of acute or chronic osteomyelitis. Soft tissue air may be  accounted for by bandages although clinical inspection recommended to  exclude gas-forming process.          MACRO:  None      Signed by: Kimani Alston 3/15/2025 11:33 AM  Dictation workstation:   [id number]      Physical Exam  Vitals and nursing note reviewed.   Constitutional:       Appearance: Normal appearance. She is normal weight.   HENT:      Head: Normocephalic.      Nose: Nose normal.      Mouth/Throat:      Mouth: Mucous membranes are moist.   Eyes:      Extraocular Movements: Extraocular movements  intact.      Conjunctiva/sclera: Conjunctivae normal.      Pupils: Pupils are equal, round, and reactive to light.   Cardiovascular:      Rate and Rhythm: Normal rate and regular rhythm.      Heart sounds: Normal heart sounds.   Pulmonary:      Effort: No respiratory distress.      Breath sounds: No stridor. No wheezing or rhonchi.      Comments: Diminshed all bases stable on 2l NC   Chest:      Chest wall: No tenderness.   Abdominal:      General: Abdomen is flat. Bowel sounds are normal.      Palpations: Abdomen is soft.   Musculoskeletal:         General: Tenderness present.      Cervical back: Normal range of motion.   Skin:     General: Skin is warm and dry.      Capillary Refill: Capillary refill takes 2 to 3 seconds.      Findings: Wound present.      Comments: Dressing intact to  surgical site to the left foot.   Neurological:      General: No focal deficit present.      Mental Status: She is alert and oriented to person, place, and time.      Motor: Weakness present.   Psychiatric:         Mood and Affect: Mood normal.         Behavior: Behavior normal.         Relevant Results  Scheduled medications  apixaban, 5 mg, oral, BID  clopidogrel, 75 mg, oral, q AM  dexAMETHasone, 2 mg, oral, Daily  tiotropium, 2 puff, inhalation, Daily   And  fluticasone furoate-vilanteroL, 1 puff, inhalation, Daily  gabapentin, 300 mg, oral, TID  insulin lispro, 0-5 Units, subcutaneous, TID AC  lactobacillus acidophilus, 1 tablet, oral, Daily  [Held by provider] lubiprostone, 24 mcg, oral, BID  montelukast, 10 mg, oral, Nightly  nicotine, 1 patch, transdermal, Daily  [Held by provider] OLANZapine, 15 mg, oral, Nightly  piperacillin-tazobactam, 3.375 g, intravenous, q6h  rosuvastatin, 40 mg, oral, Daily  sertraline, 100 mg, oral, BID  vancomycin, 1,500 mg, intravenous, q24h      Continuous medications       PRN medications  PRN medications: acetaminophen, dextrose, dextrose, glucagon, glucagon, [Held by provider]  HYDROcodone-acetaminophen, loperamide, [Held by provider] LORazepam, ondansetron **OR** ondansetron, oxygen, vancomycin      No results found.     No current facility-administered medications on file prior to encounter.     Current Outpatient Medications on File Prior to Encounter   Medication Sig Dispense Refill    apixaban (Eliquis) 5 mg tablet Take 1 tablet (5 mg) by mouth 2 times a day. 180 tablet 3    clopidogrel (Plavix) 75 mg tablet Take 1 tablet (75 mg) by mouth once daily in the morning. 90 tablet 3    dexAMETHasone (Decadron) 4 mg tablet Take 0.5 tablets (2 mg) by mouth once daily. 30 tablet 1    HYDROcodone-acetaminophen (Norco)  mg tablet Take 1 tablet by mouth every 6 hours if needed for severe pain (7 - 10). 60 tablet 0    LORazepam (Ativan) 0.5 mg tablet Take 1 tablet (0.5 mg) by mouth every 6 hours if needed for anxiety. 120 tablet 3    acetaminophen (Tylenol) 325 mg tablet Take 3 tablets (975 mg) by mouth every 6 hours if needed for mild pain (1 - 3) or moderate pain (4 - 6). (Patient not taking: Reported on 3/12/2025)      buPROPion (Wellbutrin) 75 mg tablet Take 1 tablet (75 mg) by mouth once daily. (Patient not taking: Reported on 3/12/2025) 30 tablet 1    [] chlorhexidine (Hibiclens) 4 % external liquid Apply topically early in the morning. for 5 days. Use wash as directed daily for 5 days prior to surgery with the 5th day being the morning of surgery. 118 mL 0    [] chlorhexidine (Peridex) 0.12 % solution Use 15 mL in the mouth or throat early in the morning. for 2 days. Rinse mouth by swishing medication and spitting. Use daily for 2 days prior to surgery with the 2nd day being the morning of surgery. 30 mL 0    fluticasone-umeclidin-vilanter (Trelegy Ellipta) 200-62.5-25 mcg blister with device Inhale 1 puff once daily. 60 each 3    gabapentin (Neurontin) 300 mg capsule Take 1 capsule (300 mg) by mouth 3 times a day. 270 capsule 3    hydrOXYzine pamoate (VistariL) 25 mg  capsule Take 1 capsule (25 mg) by mouth 3 times a day as needed for itching. (Patient not taking: Reported on 3/12/2025) 270 capsule 3    lubiprostone (Amitiza) 24 mcg capsule Take 1 capsule (24 mcg) by mouth 2 times daily (morning and late afternoon). 60 capsule 0    magic mouthwash (lidocaine, diphenhydrAMINE, Maalox 1:1:1) Swish and swallow 10 mL every 6 hours if needed for mucositis. (Patient not taking: Reported on 3/12/2025) 560 mL 3    montelukast (Singulair) 10 mg tablet Take 1 tablet (10 mg) by mouth once daily at bedtime. (Patient not taking: Reported on 3/12/2025) 90 tablet 1    naloxone (Narcan) 4 mg/0.1 mL nasal spray Administer 1 spray (4 mg) into affected nostril(s) if needed for opioid reversal. May repeat every 2-3 minutes if needed, alternating nostrils, until medical assistance becomes available. (Patient not taking: Reported on 3/12/2025) 2 each 0    OLANZapine (ZyPREXA) 15 mg tablet Take 1 tablet (15 mg) by mouth once daily at bedtime. 90 tablet 1    ondansetron ODT (Zofran-ODT) 8 mg disintegrating tablet Dissolve 1 tablet (8 mg) in the mouth every 8 hours if needed for nausea or vomiting. 90 tablet 0    oxyCODONE-acetaminophen (Percocet) 5-325 mg tablet Take 1 tablet by mouth every 6 hours if needed for severe pain (7 - 10). 20 tablet 0    oxygen (O2) gas therapy Inhale 1 each continuously. 2 L at bedtime and naps      prochlorperazine (Compazine) 5 mg tablet Take 1 tablet (5 mg) by mouth every 6 hours if needed for nausea or vomiting. 30 tablet 3    rosuvastatin (Crestor) 40 mg tablet Take 1 tablet (40 mg) by mouth once daily. 90 tablet 3    sertraline (Zoloft) 100 mg tablet TAKE 1 TABLET(100 MG) BY MOUTH TWICE DAILY 180 tablet 3         Assessment/Plan                  Assessment & Plan  Shortness of breath    Radha Toussaint is a 89 y.o. female with a PMHx of HTN, HLD, CAD with FCO x2, severe PAD s/p multiple interventions (aortic aneurysm repair, intracranial aneurysm coiling, iliac  grafts, fem-fem graft, multiple lower extremity stents and [place]oplasties [place] [facility] [place], Afib (plavix and eliquis), COPD with home oxygen, current smoker, lung cancer w/ mets to adrenal glad, beta thalassemia who presents today 2 days postop of a left 2nd, 3rd and 4th toe amputation with left allograft skin graft on 3/12/2025 with Dr. Doss. Wound cultures taken 2/20/2025 positive for heavy Pseudomonas and MRSA organisms. Chart review revealed patient hospitalized on 2/12/2025 for planned left femoral angioplasty with stent and balloon angioplasty of the left anterior tibial artery. Postoperatively required Reji-Synephrine infusion for hypotension and blood transfusion for anemia.    3/15: medically stable transferred to medicine services:        #Acute encephalopathy (multiple-sepsis, anemic hypoxia)  #Mixed anxiety depressive disorder  #Acute post operative pain  #Chronic pain related to malignancy  --Supplemental oxygen, keep Sp02 >92%  --Abx per ID below  --Baseline A&Ox4  --Hold home meds: Lorazepam  --Resume sertraline (hx of taking Wellbutrin, hydroxyzine and olanzapine hs as needed)  --Analgesia: Scheduled gabapentin, prn Norco and tylenol     #Septic shock  #Hx HTN, HLD  #CAD s/p FCO x 2  #Severe PAD s/p multiple interventions  #DVT of RLE, Hx  #Hx Paroxysmal A-fib  --TTE (8/31/23) LVEF 60-65%  --Not on antihypertensives  --Resume rosuvastatin  --Anticoagulated with Eliquis and Plavix  --500 mL Albumin x 1  --Levophed gtt, Titrate to keep MAP >65; dcd monitor BP        #Acute on chronic hypoxic respiratory failure  #COPD emphysema  --CXR without any infectious etiology  --Current smoker (90 pack/year history)  --Baseline home O2 2L HS and prn  --Supplemental O2, keep Sp02 >92%  --Maintenance meds: Dexamethasone and Trelegy  --Smoking cessation education  --Continuous Sp02 monitoring     GI:  #IBS with acute diarrhea   --Hold home med Amitiza  --Cdiff PCR negative          #Adrenal  cancer  --Dexamethasone  --Follows Dr. 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 Burnham for oncology last seen 1/31/2025  --Currently maintained on Keytruda every 6 weeks next dose coming on 3/14/2025 (missed dose)  --Hbg 6.7 - 1 unit PRBC ordered  --Minimize blood draws  --Monitor CBC x 3 days         #Sepsis due to unidentified infectious organism (working dx: UTI, soft tissue infection, cdiff colitis?  --History of Pseudomonas and MRSA of left foot wound (2/20/25)  --Culture Data: BC, urine, and stool c-diff PCR pending  --Lactate 0.6  --Monitor CBC x 3days       #Left foot amputation incisions  #Multiple skin tears and abrasions  --Skin and wound care as ordered  --PT/OT  --Podiatry consulted      3/16: trend labs, monitor BP and respiratory  status Continue antibiotics with vancomycin and Zosyn.  Podiatry following.   Will need PT/OT for dc planning     3.17: high suspicion for c. Diff, will need to start oral vanc if postive    Plan of care discussed with attending Dr Nico Davies,     3/18: doing well, c. Diff negative, immodium started, pt/ot    I spent over 30 min of professional care and time with this patient    Nico Davies, DO

## 2025-03-18 NOTE — CARE PLAN
The patient's goals for the shift include pt will be hemodynamically stable    The clinical goals for the shift include no pain throught shift    Over the shift, the patient did  make progress toward the following goals.

## 2025-03-18 NOTE — CONSULTS
"Nutrition Initial Assessment:   Nutrition Assessment    Reason for Assessment: Admission nursing screening    Patient is a 69 y.o. female presenting with AMS; confusion      Nutrition History:  Food and Nutrient History: Pt is feeling much better and taking 75% of her meals.  She was looking forward to her lunch       Anthropometrics:  Height: 162.6 cm (5' 4\")   Weight: 63.9 kg (140 lb 14 oz)   BMI (Calculated): 24.17  IBW/kg (Dietitian Calculated): 54 kg  Percent of IBW: 117 %       Weight History:   Wt Readings from Last 10 Encounters:   03/17/25 63.9 kg (140 lb 14 oz)   03/11/25 65.3 kg (143 lb 15.4 oz)   02/04/25 65.3 kg (144 lb)   01/31/25 64.1 kg (141 lb 5 oz)   12/20/24 65 kg (143 lb 4.8 oz)   12/05/24 63.5 kg (139 lb 15.9 oz)   10/21/24 66.2 kg (145 lb 15.1 oz)   10/17/24 66.3 kg (146 lb 3.2 oz)   09/09/24 65.9 kg (145 lb 4.5 oz)   08/01/24 64 kg (141 lb)         Weight Change %:  Weight History / % Weight Change: pt is the same weight she was in January 2025  Significant Weight Loss: No    Nutrition Focused Physical Exam Findings:    Subcutaneous Fat Loss:   Defer Subcutaneous Fat Loss Assessment: Defer all  Defer All Reason: not a good time  Muscle Wasting:  Defer Muscle Wasting Assessment: Defer all  Defer All Reason: not a good time  Edema:  Edema: none  Physical Findings:  Skin: Positive (3 toe wounds from recenty amputations)  Digestive System Findings: Diarrhea, Nausea    Nutrition Significant Labs:  BMP Trend:   Results from last 7 days   Lab Units 03/17/25  0532 03/16/25  0522 03/16/25  0230 03/15/25  0624   GLUCOSE mg/dL 85 128* 165* 84   CALCIUM mg/dL 7.6* 7.6* 7.9* 7.8*   SODIUM mmol/L 140 138 138 138   POTASSIUM mmol/L 3.4* 3.4* 3.7 3.6   CO2 mmol/L 23 23 22 22   CHLORIDE mmol/L 110* 110* 109* 110*   BUN mg/dL 8 9 9 10   CREATININE mg/dL 0.60 0.61 0.61 0.59        Nutrition Specific Medications:  Eliquis; plavix; decadron; crestor; immodium zofran    I/O:   Last BM Date: 03/17/25; Stool " Appearance: Loose (03/17/25 1400)    Dietary Orders (From admission, onward)       Start     Ordered    03/15/25 1106  Adult diet Regular  Diet effective now        Question:  Diet type  Answer:  Regular    03/15/25 1105    03/15/25 0348  May Participate in Room Service With Assistance  ( ROOM SERVICE MAY PARTICIPATE WITH ASSISTANCE)  Once        Question:  .  Answer:  Yes    03/15/25 0347                     Estimated Needs:      Method for Estimating Needs: 5912-7631  26-30 bimal kg of IBW     Method for Estimating 24 Hour Protein Needs: 60-70  1.1-1.3 gm kg of IBW     Method for Estimating 24 Hour Fluid Needs: 5904-8792  20-30 ml kg of IBW as medically indicated        Nutrition Diagnosis        Nutrition Diagnosis  Patient has Nutrition Diagnosis: Yes  Diagnosis Status (1): New  Nutrition Diagnosis 1: Increased nutrient needs  Related to (1): physiological causes  As Evidenced by (1): wound healing needs       Nutrition Interventions/Recommendations   Nutrition prescription for oral nutrition    Nutrition Recommendations:  Individualized Nutrition Prescription Provided for : Continue regular diet to encourage intake.  sending iain X 2 to help with wound healing needs    Nutrition Interventions/Goals:   Interventions: Meals and snacks, Medical food supplement  Goal: >75% of meals  Medical Food Supplement: Commercial beverage medical food supplement therapy  Goal: 100% of supplement  Coordination of Care with Providers: Nursing, Provider      Education Documentation  No documentation found.     Pt knows she needs to eat       Nutrition Monitoring and Evaluation   Food/Nutrient Related History Monitoring  Monitoring and Evaluation Plan: Estimated Energy Intake, Fluid intake, Intake / amount of food  Estimated Energy Intake: Energy intake greater or equal to 75% of estimated energy needs  Fluid Intake:  (adequate fluid intake without fluid overload)  Intake / Amount of food: Consumes at least 75% or more of  meals/snacks/supplements    Anthropometric Measurements  Monitoring and Evaluation Plan: Body weight    Biochemical Data, Medical Tests and Procedures  Monitoring and Evaluation Plan: Electrolyte/renal panel, Glucose/endocrine profile  Criteria: stable BMP  Criteria: glucose within desired range              Time Spent (min): 45 minutes

## 2025-03-18 NOTE — CARE PLAN
The patient's goals for the shift include pt will be hemodynamically stable    The clinical goals for the shift include pt to remain hemodynamically stable throughout the shift.    Over the shift, the patient did not make progress toward the following goals. Barriers to progression include low BP. Recommendations to address these barriers include medicate per order..

## 2025-03-19 VITALS
RESPIRATION RATE: 16 BRPM | TEMPERATURE: 97 F | BODY MASS INDEX: 24.92 KG/M2 | OXYGEN SATURATION: 98 % | WEIGHT: 145.94 LBS | DIASTOLIC BLOOD PRESSURE: 52 MMHG | SYSTOLIC BLOOD PRESSURE: 98 MMHG | HEART RATE: 55 BPM | HEIGHT: 64 IN

## 2025-03-19 LAB
ALBUMIN SERPL BCP-MCNC: 2.7 G/DL (ref 3.4–5)
ALP SERPL-CCNC: 44 U/L (ref 33–136)
ALT SERPL W P-5'-P-CCNC: 26 U/L (ref 7–45)
ANION GAP SERPL CALC-SCNC: 7 MMOL/L (ref 10–20)
AST SERPL W P-5'-P-CCNC: 25 U/L (ref 9–39)
BILIRUB SERPL-MCNC: 0.3 MG/DL (ref 0–1.2)
BUN SERPL-MCNC: 13 MG/DL (ref 6–23)
CALCIUM SERPL-MCNC: 7.9 MG/DL (ref 8.6–10.3)
CHLORIDE SERPL-SCNC: 111 MMOL/L (ref 98–107)
CO2 SERPL-SCNC: 28 MMOL/L (ref 21–32)
CREAT SERPL-MCNC: 0.6 MG/DL (ref 0.5–1.05)
EGFRCR SERPLBLD CKD-EPI 2021: >90 ML/MIN/1.73M*2
ERYTHROCYTE [DISTWIDTH] IN BLOOD BY AUTOMATED COUNT: 20.7 % (ref 11.5–14.5)
GLUCOSE BLD MANUAL STRIP-MCNC: 80 MG/DL (ref 74–99)
GLUCOSE SERPL-MCNC: 89 MG/DL (ref 74–99)
HCT VFR BLD AUTO: 25.5 % (ref 36–46)
HGB BLD-MCNC: 7 G/DL (ref 12–16)
MCH RBC QN AUTO: 21.1 PG (ref 26–34)
MCHC RBC AUTO-ENTMCNC: 27.5 G/DL (ref 32–36)
MCV RBC AUTO: 77 FL (ref 80–100)
NRBC BLD-RTO: 0 /100 WBCS (ref 0–0)
PLATELET # BLD AUTO: 185 X10*3/UL (ref 150–450)
POTASSIUM SERPL-SCNC: 4.1 MMOL/L (ref 3.5–5.3)
PROT SERPL-MCNC: 5.2 G/DL (ref 6.4–8.2)
RBC # BLD AUTO: 3.32 X10*6/UL (ref 4–5.2)
SODIUM SERPL-SCNC: 142 MMOL/L (ref 136–145)
VANCOMYCIN SERPL-MCNC: 11.8 UG/ML (ref 5–20)
WBC # BLD AUTO: 9 X10*3/UL (ref 4.4–11.3)

## 2025-03-19 PROCEDURE — 80053 COMPREHEN METABOLIC PANEL: CPT | Performed by: NURSE PRACTITIONER

## 2025-03-19 PROCEDURE — 2500000002 HC RX 250 W HCPCS SELF ADMINISTERED DRUGS (ALT 637 FOR MEDICARE OP, ALT 636 FOR OP/ED)

## 2025-03-19 PROCEDURE — 2500000005 HC RX 250 GENERAL PHARMACY W/O HCPCS

## 2025-03-19 PROCEDURE — 2500000001 HC RX 250 WO HCPCS SELF ADMINISTERED DRUGS (ALT 637 FOR MEDICARE OP): Performed by: STUDENT IN AN ORGANIZED HEALTH CARE EDUCATION/TRAINING PROGRAM

## 2025-03-19 PROCEDURE — 99238 HOSP IP/OBS DSCHRG MGMT 30/<: CPT | Performed by: STUDENT IN AN ORGANIZED HEALTH CARE EDUCATION/TRAINING PROGRAM

## 2025-03-19 PROCEDURE — 80202 ASSAY OF VANCOMYCIN: CPT | Performed by: STUDENT IN AN ORGANIZED HEALTH CARE EDUCATION/TRAINING PROGRAM

## 2025-03-19 PROCEDURE — S4991 NICOTINE PATCH NONLEGEND: HCPCS

## 2025-03-19 PROCEDURE — 2500000004 HC RX 250 GENERAL PHARMACY W/ HCPCS (ALT 636 FOR OP/ED): Performed by: STUDENT IN AN ORGANIZED HEALTH CARE EDUCATION/TRAINING PROGRAM

## 2025-03-19 PROCEDURE — 82947 ASSAY GLUCOSE BLOOD QUANT: CPT

## 2025-03-19 PROCEDURE — 94640 AIRWAY INHALATION TREATMENT: CPT

## 2025-03-19 PROCEDURE — 2500000001 HC RX 250 WO HCPCS SELF ADMINISTERED DRUGS (ALT 637 FOR MEDICARE OP)

## 2025-03-19 PROCEDURE — 2500000004 HC RX 250 GENERAL PHARMACY W/ HCPCS (ALT 636 FOR OP/ED)

## 2025-03-19 PROCEDURE — 85027 COMPLETE CBC AUTOMATED: CPT | Performed by: NURSE PRACTITIONER

## 2025-03-19 PROCEDURE — 36415 COLL VENOUS BLD VENIPUNCTURE: CPT | Performed by: NURSE PRACTITIONER

## 2025-03-19 RX ORDER — AMOXICILLIN AND CLAVULANATE POTASSIUM 875; 125 MG/1; MG/1
875 TABLET, FILM COATED ORAL 2 TIMES DAILY
Qty: 10 TABLET | Refills: 0 | Status: SHIPPED | OUTPATIENT
Start: 2025-03-19 | End: 2025-04-01 | Stop reason: HOSPADM

## 2025-03-19 RX ADMIN — CLOPIDOGREL BISULFATE 75 MG: 75 TABLET ORAL at 08:44

## 2025-03-19 RX ADMIN — DEXAMETHASONE 2 MG: 4 TABLET ORAL at 08:45

## 2025-03-19 RX ADMIN — Medication 1 TABLET: at 08:44

## 2025-03-19 RX ADMIN — GABAPENTIN 300 MG: 300 CAPSULE ORAL at 08:45

## 2025-03-19 RX ADMIN — ROSUVASTATIN CALCIUM 40 MG: 10 TABLET, FILM COATED ORAL at 08:44

## 2025-03-19 RX ADMIN — Medication 2.5 L/MIN: at 07:24

## 2025-03-19 RX ADMIN — FLUTICASONE FUROATE AND VILANTEROL TRIFENATATE 1 PUFF: 200; 25 POWDER RESPIRATORY (INHALATION) at 07:21

## 2025-03-19 RX ADMIN — TIOTROPIUM BROMIDE INHALATION SPRAY 2 PUFF: 3.12 SPRAY, METERED RESPIRATORY (INHALATION) at 07:20

## 2025-03-19 RX ADMIN — HYDROCODONE BITARTRATE AND ACETAMINOPHEN 1 TABLET: 10; 325 TABLET ORAL at 08:50

## 2025-03-19 RX ADMIN — VANCOMYCIN HYDROCHLORIDE 1500 MG: 1.5 INJECTION, POWDER, LYOPHILIZED, FOR SOLUTION INTRAVENOUS at 08:45

## 2025-03-19 RX ADMIN — APIXABAN 5 MG: 5 TABLET, FILM COATED ORAL at 08:45

## 2025-03-19 RX ADMIN — PIPERACILLIN SODIUM AND TAZOBACTAM SODIUM 3.38 G: 3; .375 INJECTION, SOLUTION INTRAVENOUS at 06:36

## 2025-03-19 RX ADMIN — PIPERACILLIN SODIUM AND TAZOBACTAM SODIUM 3.38 G: 3; .375 INJECTION, SOLUTION INTRAVENOUS at 00:25

## 2025-03-19 RX ADMIN — NICOTINE 1 PATCH: 21 PATCH, EXTENDED RELEASE TRANSDERMAL at 08:46

## 2025-03-19 RX ADMIN — SERTRALINE HYDROCHLORIDE 100 MG: 100 TABLET ORAL at 08:44

## 2025-03-19 ASSESSMENT — COGNITIVE AND FUNCTIONAL STATUS - GENERAL
DRESSING REGULAR LOWER BODY CLOTHING: A LITTLE
WALKING IN HOSPITAL ROOM: A LOT
CLIMB 3 TO 5 STEPS WITH RAILING: A LOT
MOVING TO AND FROM BED TO CHAIR: A LITTLE
DRESSING REGULAR UPPER BODY CLOTHING: A LITTLE
DAILY ACTIVITIY SCORE: 20
STANDING UP FROM CHAIR USING ARMS: A LITTLE
HELP NEEDED FOR BATHING: A LITTLE
TOILETING: A LITTLE
MOBILITY SCORE: 17
TURNING FROM BACK TO SIDE WHILE IN FLAT BAD: A LITTLE

## 2025-03-19 ASSESSMENT — PAIN SCALES - GENERAL: PAINLEVEL_OUTOF10: 7

## 2025-03-19 ASSESSMENT — PAIN DESCRIPTION - ORIENTATION: ORIENTATION: LEFT

## 2025-03-19 ASSESSMENT — PAIN DESCRIPTION - LOCATION: LOCATION: FOOT

## 2025-03-19 NOTE — NURSING NOTE
Discharge planning reviewed with patient and  at bedside.  Medications new and existing discussed with patient.  Medications to be picked up at patient's pharmacy, [facility] in [place].  Follow up appointments, new and existing reviewed with patient.  [facility] Referral in place, explained to patient and  and information page attached to AVS.   IV and Tele removed.  AVS printed and highlighted, given to patient to take home.  Nurse updated on all information.  Patient transported to main entrance with  waiting to take home.

## 2025-03-19 NOTE — CARE PLAN
The patient's goals for the shift include to be free of pain.     The clinical goals for the shift include safety and comfort    Over the shift, the patient did make progress toward the following goals.

## 2025-03-20 ENCOUNTER — PATIENT OUTREACH (OUTPATIENT)
Dept: PRIMARY CARE | Facility: CLINIC | Age: 70
End: 2025-03-20
Payer: MEDICARE

## 2025-03-20 ENCOUNTER — APPOINTMENT (OUTPATIENT)
Dept: WOUND CARE | Facility: CLINIC | Age: 70
End: 2025-03-20
Payer: MEDICARE

## 2025-03-20 NOTE — PROGRESS NOTES
Discharge Facility:[facility]  Discharge Diagnosis:Shortness of breath  Admission Date:3/15/25  Discharge Date:3/19/25    PCP Appointment Date:4/1/25  Specialist Appointment Date: TBD  Hospital Encounter and Summary Linked: Yes  See discharge assessment below for further details    ED to Hosp-Admission (Discharged) with Nico Davies DO; Lona Villegas MD (03/14/2025)   ED Provider Notes by Lona Shah MD (03/14/2025 16:12)     Wrap Up  Wrap Up Additional Comments: Patient is doing well today. She has her discharge medication. Pcp follow up scheduled 4/1/25. No questions or concerns at this time. (3/20/2025 10:48 AM)    Medications  Medications reviewed with patient/caregiver?: Yes (3/20/2025 10:48 AM)  Is the patient having any side effects they believe may be caused by any medication additions or changes?: No (3/20/2025 10:48 AM)  Does the patient have all medications ordered at discharge?: Yes (3/20/2025 10:48 AM)  Care Management Interventions: Provided patient education (3/20/2025 10:48 AM)  Prescription Comments: Augementin (3/20/2025 10:48 AM)  Is the patient taking all medications as directed (includes completed medication regime)?: Yes (3/20/2025 10:48 AM)  Care Management Interventions: Provided patient education (3/20/2025 10:48 AM)  Medication Comments: Patient has her discharge medication (3/20/2025 10:48 AM)    Appointments  Does the patient have a primary care provider?: Yes (3/20/2025 10:48 AM)  Care Management Interventions: Verified appointment date/time/provider (3/20/2025 10:48 AM)  Has the patient kept scheduled appointments due by today?: Yes (3/20/2025 10:48 AM)  Care Management Interventions: Advised patient to keep appointment; Educated on importance of keeping appointment (3/20/2025 10:48 AM)    Self Management  Has home health visited the patient within 72 hours of discharge?: Not applicable (3/20/2025 10:48 AM)  What Durable Medical Equipment (DME) was ordered?: N/A (3/20/2025  10:48 AM)    Patient Teaching  Does the patient have access to their discharge instructions?: Yes (3/20/2025 10:48 AM)  Care Management Interventions: Reviewed instructions with patient (3/20/2025 10:48 AM)  What is the patient's perception of their health status since discharge?: Improving (3/20/2025 10:48 AM)  Is the patient/caregiver able to teach back the hierarchy of who to call/visit for symptoms/problems? PCP, Specialist, Home Health nurse, Urgent Care, ED, 911: Yes (3/20/2025 10:48 AM)

## 2025-03-20 NOTE — DISCHARGE SUMMARY
Discharge Diagnosis  Shortness of breath    Issues Requiring Follow-Up  sob    Test Results Pending At Discharge  Pending Labs       No current pending labs.            Hospital Course     Expand All Collapse All    Radha Toussaint is a 69 y.o. female on day 4 of admission presenting with Shortness of breath.           Subjective  Lots of loose mari c d.iff negative, ampa 15              Objective  Last Recorded Vitals  /58   Pulse 57   Temp 36.3 °C (97.3 °F)   Resp 19   Wt 63.9 kg (140 lb 14 oz)   SpO2 95%   Intake/Output last 3 Shifts:     Intake/Output Summary (Last 24 hours) at 3/18/2025 0859  Last data filed at 3/18/2025 0642      Gross per 24 hour   Intake 250 ml   Output --   Net 250 ml         Admission Weight  Weight: 67.1 kg (148 lb) (03/14/25 1530)     Daily Weight  03/17/25 : 63.9 kg (140 lb 14 oz)     Image Results  XR foot left 1-2 views  Narrative: Interpreted By:  Kimani Alston,   STUDY:  XR FOOT LEFT 1-2 VIEWS; ;  3/15/2025 10:33 am      INDICATION:  Signs/Symptoms:concern for osteomyelitis.          COMPARISON:  01/05/2024      ACCESSION NUMBER(S):  [id number]      ORDERING CLINICIAN:  DAVID JARQUIN      FINDINGS:  Status post surgery of the 2nd 3rd and 4th digits. There is subtle  soft tissue irregularity to the 3rd and 4th digits. The possibility  of air within the soft tissues can not be excluded albeit part of  this may be due to air interposed within bandages.      There is no radiographic evidence of acute or chronic osteomyelitis.      Minimal soft tissue swelling of the forefoot.      5 mm plantar spur.      Impression: No stigmata of acute or chronic osteomyelitis. Soft tissue air may be  accounted for by bandages although clinical inspection recommended to  exclude gas-forming process.          MACRO:  None      Signed by: Kimani Alston 3/15/2025 11:33 AM  Dictation workstation:   [id number]        Physical Exam  Vitals and nursing note reviewed.   Constitutional:        Appearance: Normal appearance. She is normal weight.   HENT:      Head: Normocephalic.      Nose: Nose normal.      Mouth/Throat:      Mouth: Mucous membranes are moist.   Eyes:      Extraocular Movements: Extraocular movements intact.      Conjunctiva/sclera: Conjunctivae normal.      Pupils: Pupils are equal, round, and reactive to light.   Cardiovascular:      Rate and Rhythm: Normal rate and regular rhythm.      Heart sounds: Normal heart sounds.   Pulmonary:      Effort: No respiratory distress.      Breath sounds: No stridor. No wheezing or rhonchi.      Comments: Diminshed all bases stable on 2l NC   Chest:      Chest wall: No tenderness.   Abdominal:      General: Abdomen is flat. Bowel sounds are normal.      Palpations: Abdomen is soft.   Musculoskeletal:         General: Tenderness present.      Cervical back: Normal range of motion.   Skin:     General: Skin is warm and dry.      Capillary Refill: Capillary refill takes 2 to 3 seconds.      Findings: Wound present.      Comments: Dressing intact to  surgical site to the left foot.   Neurological:      General: No focal deficit present.      Mental Status: She is alert and oriented to person, place, and time.      Motor: Weakness present.   Psychiatric:         Mood and Affect: Mood normal.         Behavior: Behavior normal.            Relevant Results  Scheduled medications    Scheduled Medications   apixaban, 5 mg, oral, BID  clopidogrel, 75 mg, oral, q AM  dexAMETHasone, 2 mg, oral, Daily  tiotropium, 2 puff, inhalation, Daily   And  fluticasone furoate-vilanteroL, 1 puff, inhalation, Daily  gabapentin, 300 mg, oral, TID  insulin lispro, 0-5 Units, subcutaneous, TID AC  lactobacillus acidophilus, 1 tablet, oral, Daily  [Held by provider] lubiprostone, 24 mcg, oral, BID  montelukast, 10 mg, oral, Nightly  nicotine, 1 patch, transdermal, Daily  [Held by provider] OLANZapine, 15 mg, oral, Nightly  piperacillin-tazobactam, 3.375 g, intravenous,  q6h  rosuvastatin, 40 mg, oral, Daily  sertraline, 100 mg, oral, BID  vancomycin, 1,500 mg, intravenous, q24h         Continuous medications  Continuous Medications            PRN medications  PRN Medications   PRN medications: acetaminophen, dextrose, dextrose, glucagon, glucagon, [Held by provider] HYDROcodone-acetaminophen, loperamide, [Held by provider] LORazepam, ondansetron **OR** ondansetron, oxygen, vancomycin           No results found.      Medications Ordered Prior to Encounter   No current facility-administered medications on file prior to encounter.             Current Outpatient Medications on File Prior to Encounter   Medication Sig Dispense Refill    apixaban (Eliquis) 5 mg tablet Take 1 tablet (5 mg) by mouth 2 times a day. 180 tablet 3    clopidogrel (Plavix) 75 mg tablet Take 1 tablet (75 mg) by mouth once daily in the morning. 90 tablet 3    dexAMETHasone (Decadron) 4 mg tablet Take 0.5 tablets (2 mg) by mouth once daily. 30 tablet 1    HYDROcodone-acetaminophen (Norco)  mg tablet Take 1 tablet by mouth every 6 hours if needed for severe pain (7 - 10). 60 tablet 0    LORazepam (Ativan) 0.5 mg tablet Take 1 tablet (0.5 mg) by mouth every 6 hours if needed for anxiety. 120 tablet 3    acetaminophen (Tylenol) 325 mg tablet Take 3 tablets (975 mg) by mouth every 6 hours if needed for mild pain (1 - 3) or moderate pain (4 - 6). (Patient not taking: Reported on 3/12/2025)        buPROPion (Wellbutrin) 75 mg tablet Take 1 tablet (75 mg) by mouth once daily. (Patient not taking: Reported on 3/12/2025) 30 tablet 1    [] chlorhexidine (Hibiclens) 4 % external liquid Apply topically early in the morning. for 5 days. Use wash as directed daily for 5 days prior to surgery with the 5th day being the morning of surgery. 118 mL 0    [] chlorhexidine (Peridex) 0.12 % solution Use 15 mL in the mouth or throat early in the morning. for 2 days. Rinse mouth by swishing medication and spitting. Use  daily for 2 days prior to surgery with the 2nd day being the morning of surgery. 30 mL 0    fluticasone-umeclidin-vilanter (Trelegy Ellipta) 200-62.5-25 mcg blister with device Inhale 1 puff once daily. 60 each 3    gabapentin (Neurontin) 300 mg capsule Take 1 capsule (300 mg) by mouth 3 times a day. 270 capsule 3    hydrOXYzine pamoate (VistariL) 25 mg capsule Take 1 capsule (25 mg) by mouth 3 times a day as needed for itching. (Patient not taking: Reported on 3/12/2025) 270 capsule 3    lubiprostone (Amitiza) 24 mcg capsule Take 1 capsule (24 mcg) by mouth 2 times daily (morning and late afternoon). 60 capsule 0    magic mouthwash (lidocaine, diphenhydrAMINE, Maalox 1:1:1) Swish and swallow 10 mL every 6 hours if needed for mucositis. (Patient not taking: Reported on 3/12/2025) 560 mL 3    montelukast (Singulair) 10 mg tablet Take 1 tablet (10 mg) by mouth once daily at bedtime. (Patient not taking: Reported on 3/12/2025) 90 tablet 1    naloxone (Narcan) 4 mg/0.1 mL nasal spray Administer 1 spray (4 mg) into affected nostril(s) if needed for opioid reversal. May repeat every 2-3 minutes if needed, alternating nostrils, until medical assistance becomes available. (Patient not taking: Reported on 3/12/2025) 2 each 0    OLANZapine (ZyPREXA) 15 mg tablet Take 1 tablet (15 mg) by mouth once daily at bedtime. 90 tablet 1    ondansetron ODT (Zofran-ODT) 8 mg disintegrating tablet Dissolve 1 tablet (8 mg) in the mouth every 8 hours if needed for nausea or vomiting. 90 tablet 0    oxyCODONE-acetaminophen (Percocet) 5-325 mg tablet Take 1 tablet by mouth every 6 hours if needed for severe pain (7 - 10). 20 tablet 0    oxygen (O2) gas therapy Inhale 1 each continuously. 2 L at bedtime and naps        prochlorperazine (Compazine) 5 mg tablet Take 1 tablet (5 mg) by mouth every 6 hours if needed for nausea or vomiting. 30 tablet 3    rosuvastatin (Crestor) 40 mg tablet Take 1 tablet (40 mg) by mouth once daily. 90 tablet 3     sertraline (Zoloft) 100 mg tablet TAKE 1 TABLET(100 MG) BY MOUTH TWICE DAILY 180 tablet 3                  Assessment/Plan                       Assessment & Plan  Shortness of breath     Radha Toussaint is a 89 y.o. female with a PMHx of HTN, HLD, CAD with FCO x2, severe PAD s/p multiple interventions (aortic aneurysm repair, intracranial aneurysm coiling, iliac grafts, fem-fem graft, multiple lower extremity stents and angioplasties - most recently on 2/12/25 at [facility] [place] DVT, Afib (plavix and eliquis), COPD with home oxygen, current smoker, lung cancer w/ mets to adrenal glad, beta thalassemia who presents today 2 days postop of a left 2nd, 3rd and 4th toe amputation with left allograft skin graft on 3/12/2025 with Dr. Doss. Wound cultures taken 2/20/2025 positive for heavy Pseudomonas and MRSA organisms. Chart review revealed patient hospitalized on 2/12/2025 for planned left femoral angioplasty with stent and balloon angioplasty of the left anterior tibial artery. Postoperatively required Reji-Synephrine infusion for hypotension and blood transfusion for anemia.    3/15: medically stable transferred to medicine services:      #Acute encephalopathy (multiple-sepsis, anemic hypoxia)  #Mixed anxiety depressive disorder  #Acute post operative pain  #Chronic pain related to malignancy  --Supplemental oxygen, keep Sp02 >92%  --Abx per ID below  --Baseline A&Ox4  --Hold home meds: Lorazepam  --Resume sertraline (hx of taking Wellbutrin, hydroxyzine and olanzapine hs as needed)  --Analgesia: Scheduled gabapentin, prn Norco and tylenol      #Septic shock  #Hx HTN, HLD  #CAD s/p FCO x 2  #Severe PAD s/p multiple interventions  #DVT of RLE, Hx  #Hx Paroxysmal A-fib  --TTE (8/31/23) LVEF 60-65%  --Not on antihypertensives  --Resume rosuvastatin  --Anticoagulated with Eliquis and Plavix  --500 mL Albumin x 1  --Levophed gtt, Titrate to keep MAP >65; dcd monitor BP         #Acute on chronic hypoxic respiratory failure  #COPD  emphysema  --CXR without any infectious etiology  --Current smoker (90 pack/year history)  --Baseline home O2 2L HS and prn  --Supplemental O2, keep Sp02 >92%  --Maintenance meds: Dexamethasone and Trelegy  --Smoking cessation education  --Continuous Sp02 monitoring     GI:  #IBS with acute diarrhea   --Hold home med Amitiza  --Cdiff PCR negative            #Adrenal cancer  --Dexamethasone  --Follows Dr. 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 Burnham for oncology last seen 1/31/2025  --Currently maintained on Keytruda every 6 weeks next dose coming on 3/14/2025 (missed dose)  --Hbg 6.7 - 1 unit PRBC ordered  --Minimize blood draws  --Monitor CBC x 3 days           #Sepsis due to unidentified infectious organism (working dx: UTI, soft tissue infection, cdiff colitis?  --History of Pseudomonas and MRSA of left foot wound (2/20/25)  --Culture Data: BC, urine, and stool c-diff PCR pending  --Lactate 0.6  --Monitor CBC x 3days        #Left foot amputation incisions  #Multiple skin tears and abrasions  --Skin and wound care as ordered  --PT/OT  --Podiatry consulted        3/16: trend labs, monitor BP and respiratory  status Continue antibiotics with vancomycin and Zosyn.  Podiatry following.   Will need PT/OT for dc planning      3.17: high suspicion for c. Diff, will need to start oral vanc if postive     Plan of care discussed with attending Dr Nico Davies,      3/18: doing well, c. Diff negative, immodium started, pt/ot       Pertinent Physical Exam At Time of Discharge  Physical Exam  Constitutional:       Appearance: Normal appearance.   HENT:      Head: Normocephalic and atraumatic.      Right Ear: Tympanic membrane and ear canal normal.      Left Ear: Tympanic membrane and ear canal normal.      Mouth/Throat:      Mouth: Mucous membranes are moist.      Pharynx: Oropharynx is clear.   Eyes:      Extraocular Movements:  Extraocular movements intact.      Conjunctiva/sclera: Conjunctivae normal.      Pupils: Pupils are equal, round, and reactive to light.   Cardiovascular:      Rate and Rhythm: Normal rate and regular rhythm.      Pulses: Normal pulses.      Heart sounds: Normal heart sounds.   Pulmonary:      Effort: Pulmonary effort is normal.      Breath sounds: Normal breath sounds.   Abdominal:      General: Abdomen is flat. Bowel sounds are normal.      Palpations: Abdomen is soft.   Musculoskeletal:         General: Normal range of motion.      Cervical back: Normal range of motion and neck supple.   Skin:     General: Skin is warm and dry.      Capillary Refill: Capillary refill takes 2 to 3 seconds.   Neurological:      General: No focal deficit present.      Mental Status: She is alert and oriented to person, place, and time. Mental status is at baseline.   Psychiatric:         Mood and Affect: Mood normal.         Behavior: Behavior normal.         Thought Content: Thought content normal.         Judgment: Judgment normal.         Home Medications     Medication List      START taking these medications     acetaminophen 325 mg tablet; Commonly known as: Tylenol; Take 3 tablets   (975 mg) by mouth every 6 hours if needed for mild pain (1 - 3) or   moderate pain (4 - 6).   amoxicillin-pot clavulanate 875-125 mg tablet; Commonly known as:   Augmentin; Take 1 tablet (875 mg) by mouth 2 times a day for 5 days.   hydrOXYzine pamoate 25 mg capsule; Commonly known as: VistariL; Take 1   capsule (25 mg) by mouth 3 times a day as needed for itching.     CONTINUE taking these medications     apixaban 5 mg tablet; Commonly known as: Eliquis; Take 1 tablet (5 mg)   by mouth 2 times a day.   clopidogrel 75 mg tablet; Commonly known as: Plavix; Take 1 tablet (75   mg) by mouth once daily in the morning.   dexAMETHasone 4 mg tablet; Commonly known as: Decadron; Take 0.5 tablets   (2 mg) by mouth once daily.   gabapentin 300 mg capsule;  Commonly known as: Neurontin; Take 1 capsule   (300 mg) by mouth 3 times a day.   HYDROcodone-acetaminophen  mg tablet; Commonly known as: Norco;   Take 1 tablet by mouth every 6 hours if needed for severe pain (7 - 10).   LORazepam 0.5 mg tablet; Commonly known as: Ativan; Take 1 tablet (0.5   mg) by mouth every 6 hours if needed for anxiety.   lubiprostone 24 mcg capsule; Commonly known as: Amitiza; Take 1 capsule   (24 mcg) by mouth 2 times daily (morning and late afternoon).   OLANZapine 15 mg tablet; Commonly known as: ZyPREXA; Take 1 tablet (15   mg) by mouth once daily at bedtime.   ondansetron ODT 8 mg disintegrating tablet; Commonly known as:   Zofran-ODT; Dissolve 1 tablet (8 mg) in the mouth every 8 hours if needed   for nausea or vomiting.   oxyCODONE-acetaminophen 5-325 mg tablet; Commonly known as: Percocet;   Take 1 tablet by mouth every 6 hours if needed for severe pain (7 - 10).   oxygen gas therapy; Commonly known as: O2   prochlorperazine 5 mg tablet; Commonly known as: Compazine; Take 1   tablet (5 mg) by mouth every 6 hours if needed for nausea or vomiting.   rosuvastatin 40 mg tablet; Commonly known as: Crestor; Take 1 tablet (40   mg) by mouth once daily.   sertraline 100 mg tablet; Commonly known as: Zoloft; TAKE 1 TABLET(100   MG) BY MOUTH TWICE DAILY   Trelegy Ellipta 200-62.5-25 mcg blister with device; Generic drug:   fluticasone-umeclidin-vilanter; Inhale 1 puff once daily.     STOP taking these medications     chlorhexidine 0.12 % solution; Commonly known as: Peridex   chlorhexidine 4 % external liquid; Commonly known as: Hibiclens     ASK your doctor about these medications     buPROPion 75 mg tablet; Commonly known as: Wellbutrin; Take 1 tablet (75   mg) by mouth once daily.   magic mouthwash (lidocaine, diphenhydrAMINE, Maalox 1:1:1); Swish and   swallow 10 mL every 6 hours if needed for mucositis.   montelukast 10 mg tablet; Commonly known as: Singulair; Take 1 tablet   (10  mg) by mouth once daily at bedtime.   naloxone 4 mg/0.1 mL nasal spray; Commonly known as: Narcan; Administer   1 spray (4 mg) into affected nostril(s) if needed for opioid reversal. May   repeat every 2-3 minutes if needed, alternating nostrils, until medical   assistance becomes available.       Outpatient Follow-Up  Future Appointments   Date Time Provider Department Center   4/1/2025  9:30 AM DO Josselin [id number] [facility]   5/3/2025 12:30 PM KERA Romero-CNP [id number]   8/12/2025  8:30 AM DO Josselin [id number] [facility]       Nico Davies DO

## 2025-03-22 ENCOUNTER — PATIENT OUTREACH (OUTPATIENT)
Dept: CARE COORDINATION | Age: 70
End: 2025-03-22
Payer: MEDICARE

## 2025-03-27 ENCOUNTER — OFFICE VISIT (OUTPATIENT)
Dept: WOUND CARE | Facility: CLINIC | Age: 70
DRG: 280 | End: 2025-03-27
Payer: MEDICARE

## 2025-03-27 PROCEDURE — 0JBR0ZZ EXCISION OF LEFT FOOT SUBCUTANEOUS TISSUE AND FASCIA, OPEN APPROACH: ICD-10-PCS | Performed by: PODIATRIST

## 2025-03-27 PROCEDURE — 11042 DBRDMT SUBQ TIS 1ST 20SQCM/<: CPT

## 2025-03-28 ENCOUNTER — APPOINTMENT (OUTPATIENT)
Dept: RADIOLOGY | Facility: HOSPITAL | Age: 70
DRG: 280 | End: 2025-03-28
Payer: MEDICARE

## 2025-03-28 ENCOUNTER — HOSPITAL ENCOUNTER (INPATIENT)
Facility: HOSPITAL | Age: 70
End: 2025-03-28
Attending: EMERGENCY MEDICINE | Admitting: STUDENT IN AN ORGANIZED HEALTH CARE EDUCATION/TRAINING PROGRAM
Payer: MEDICARE

## 2025-03-28 ENCOUNTER — APPOINTMENT (OUTPATIENT)
Dept: CARDIOLOGY | Facility: HOSPITAL | Age: 70
DRG: 280 | End: 2025-03-28
Payer: MEDICARE

## 2025-03-28 DIAGNOSIS — R06.02 SHORTNESS OF BREATH: Primary | ICD-10-CM

## 2025-03-28 DIAGNOSIS — R79.89 TROPONIN LEVEL ELEVATED: ICD-10-CM

## 2025-03-28 DIAGNOSIS — D64.9 ANEMIA, UNSPECIFIED TYPE: ICD-10-CM

## 2025-03-28 DIAGNOSIS — J44.9 CHRONIC OBSTRUCTIVE PULMONARY DISEASE, UNSPECIFIED COPD TYPE (MULTI): ICD-10-CM

## 2025-03-28 DIAGNOSIS — I21.4 NSTEMI (NON-ST ELEVATED MYOCARDIAL INFARCTION) (MULTI): ICD-10-CM

## 2025-03-28 PROBLEM — G93.41 METABOLIC ENCEPHALOPATHY: Status: ACTIVE | Noted: 2025-03-28

## 2025-03-28 PROBLEM — D64.89 OTHER SPECIFIED ANEMIAS: Status: ACTIVE | Noted: 2023-10-25

## 2025-03-28 PROBLEM — R60.0 LOWER EXTREMITY EDEMA: Status: ACTIVE | Noted: 2025-03-28

## 2025-03-28 PROBLEM — C79.70: Status: ACTIVE | Noted: 2024-01-05

## 2025-03-28 LAB
ABO GROUP (TYPE) IN BLOOD: NORMAL
ALBUMIN SERPL BCP-MCNC: 3.3 G/DL (ref 3.4–5)
ALP SERPL-CCNC: 56 U/L (ref 33–136)
ALT SERPL W P-5'-P-CCNC: 16 U/L (ref 7–45)
AMORPH CRY #/AREA UR COMP ASSIST: ABNORMAL /HPF
ANION GAP BLDV CALCULATED.4IONS-SCNC: 7 MMOL/L (ref 10–25)
ANION GAP SERPL CALC-SCNC: 10 MMOL/L (ref 10–20)
ANTIBODY SCREEN: NORMAL
APPEARANCE UR: CLEAR
AST SERPL W P-5'-P-CCNC: 14 U/L (ref 9–39)
BASE EXCESS BLDV CALC-SCNC: 4.1 MMOL/L (ref -2–3)
BASOPHILS # BLD AUTO: 0.06 X10*3/UL (ref 0–0.1)
BASOPHILS NFR BLD AUTO: 0.5 %
BILIRUB SERPL-MCNC: 0.4 MG/DL (ref 0–1.2)
BILIRUB UR STRIP.AUTO-MCNC: NEGATIVE MG/DL
BNP SERPL-MCNC: 201 PG/ML (ref 0–99)
BODY TEMPERATURE: 37 DEGREES CELSIUS
BUN SERPL-MCNC: 12 MG/DL (ref 6–23)
CA-I BLDV-SCNC: 1.23 MMOL/L (ref 1.1–1.33)
CALCIUM SERPL-MCNC: 8.4 MG/DL (ref 8.6–10.3)
CARDIAC TROPONIN I PNL SERPL HS: 119 NG/L (ref 0–13)
CARDIAC TROPONIN I PNL SERPL HS: 139 NG/L (ref 0–13)
CARDIAC TROPONIN I PNL SERPL HS: 209 NG/L (ref 0–13)
CARDIAC TROPONIN I PNL SERPL HS: 86 NG/L (ref 0–13)
CHLORIDE BLDV-SCNC: 108 MMOL/L (ref 98–107)
CHLORIDE SERPL-SCNC: 107 MMOL/L (ref 98–107)
CO2 SERPL-SCNC: 29 MMOL/L (ref 21–32)
COLOR UR: ABNORMAL
CREAT SERPL-MCNC: 0.81 MG/DL (ref 0.5–1.05)
EGFRCR SERPLBLD CKD-EPI 2021: 79 ML/MIN/1.73M*2
EOSINOPHIL # BLD AUTO: 0.09 X10*3/UL (ref 0–0.7)
EOSINOPHIL NFR BLD AUTO: 0.7 %
ERYTHROCYTE [DISTWIDTH] IN BLOOD BY AUTOMATED COUNT: 20.7 % (ref 11.5–14.5)
FLUAV RNA RESP QL NAA+PROBE: NOT DETECTED
FLUBV RNA RESP QL NAA+PROBE: NOT DETECTED
GLUCOSE BLDV-MCNC: 82 MG/DL (ref 74–99)
GLUCOSE SERPL-MCNC: 79 MG/DL (ref 74–99)
GLUCOSE UR STRIP.AUTO-MCNC: NORMAL MG/DL
HCO3 BLDV-SCNC: 30.4 MMOL/L (ref 22–26)
HCT VFR BLD AUTO: 23.6 % (ref 36–46)
HCT VFR BLD AUTO: 26.4 % (ref 36–46)
HCT VFR BLD AUTO: 26.6 % (ref 36–46)
HCT VFR BLD EST: 25 % (ref 36–46)
HGB BLD-MCNC: 6.6 G/DL (ref 12–16)
HGB BLD-MCNC: 6.9 G/DL (ref 12–16)
HGB BLD-MCNC: 7.2 G/DL (ref 12–16)
HGB BLDV-MCNC: 8.2 G/DL (ref 12–16)
HOLD SPECIMEN: NORMAL
IMM GRANULOCYTES # BLD AUTO: 0.16 X10*3/UL (ref 0–0.7)
IMM GRANULOCYTES NFR BLD AUTO: 1.2 % (ref 0–0.9)
INHALED O2 CONCENTRATION: 28 %
INR PPP: 1.4 (ref 0.9–1.1)
KETONES UR STRIP.AUTO-MCNC: NEGATIVE MG/DL
LACTATE BLDV-SCNC: 1.2 MMOL/L (ref 0.4–2)
LACTATE SERPL-SCNC: 0.9 MMOL/L (ref 0.4–2)
LEUKOCYTE ESTERASE UR QL STRIP.AUTO: ABNORMAL
LYMPHOCYTES # BLD AUTO: 1.56 X10*3/UL (ref 1.2–4.8)
LYMPHOCYTES NFR BLD AUTO: 11.8 %
MAGNESIUM SERPL-MCNC: 1.68 MG/DL (ref 1.6–2.4)
MCH RBC QN AUTO: 20.2 PG (ref 26–34)
MCHC RBC AUTO-ENTMCNC: 26.1 G/DL (ref 32–36)
MCV RBC AUTO: 77 FL (ref 80–100)
MONOCYTES # BLD AUTO: 0.8 X10*3/UL (ref 0.1–1)
MONOCYTES NFR BLD AUTO: 6.1 %
MUCOUS THREADS #/AREA URNS AUTO: ABNORMAL /LPF
NEUTROPHILS # BLD AUTO: 10.55 X10*3/UL (ref 1.2–7.7)
NEUTROPHILS NFR BLD AUTO: 79.7 %
NITRITE UR QL STRIP.AUTO: NEGATIVE
NRBC BLD-RTO: 0 /100 WBCS (ref 0–0)
OXYHGB MFR BLDV: 50.2 % (ref 45–75)
PCO2 BLDV: 55 MM HG (ref 41–51)
PH BLDV: 7.35 PH (ref 7.33–7.43)
PH UR STRIP.AUTO: 5 [PH]
PLATELET # BLD AUTO: 359 X10*3/UL (ref 150–450)
PO2 BLDV: 33 MM HG (ref 35–45)
POTASSIUM BLDV-SCNC: 4.2 MMOL/L (ref 3.5–5.3)
POTASSIUM SERPL-SCNC: 4 MMOL/L (ref 3.5–5.3)
PROT SERPL-MCNC: 6.3 G/DL (ref 6.4–8.2)
PROT UR STRIP.AUTO-MCNC: NEGATIVE MG/DL
PROTHROMBIN TIME: 16 SECONDS (ref 9.8–12.4)
RBC # BLD AUTO: 3.42 X10*6/UL (ref 4–5.2)
RBC # UR STRIP.AUTO: ABNORMAL MG/DL
RBC #/AREA URNS AUTO: >20 /HPF
RH FACTOR (ANTIGEN D): NORMAL
RSV RNA RESP QL NAA+PROBE: NOT DETECTED
SAO2 % BLDV: 53 % (ref 45–75)
SARS-COV-2 RNA RESP QL NAA+PROBE: NOT DETECTED
SODIUM BLDV-SCNC: 141 MMOL/L (ref 136–145)
SODIUM SERPL-SCNC: 142 MMOL/L (ref 136–145)
SP GR UR STRIP.AUTO: 1.01
UROBILINOGEN UR STRIP.AUTO-MCNC: NORMAL MG/DL
WBC # BLD AUTO: 13.2 X10*3/UL (ref 4.4–11.3)
WBC #/AREA URNS AUTO: ABNORMAL /HPF
WBC CLUMPS #/AREA URNS AUTO: ABNORMAL /HPF

## 2025-03-28 PROCEDURE — 94640 AIRWAY INHALATION TREATMENT: CPT

## 2025-03-28 PROCEDURE — 93010 ELECTROCARDIOGRAM REPORT: CPT | Performed by: INTERNAL MEDICINE

## 2025-03-28 PROCEDURE — 96360 HYDRATION IV INFUSION INIT: CPT

## 2025-03-28 PROCEDURE — 71275 CT ANGIOGRAPHY CHEST: CPT

## 2025-03-28 PROCEDURE — 82435 ASSAY OF BLOOD CHLORIDE: CPT | Performed by: NURSE PRACTITIONER

## 2025-03-28 PROCEDURE — 70450 CT HEAD/BRAIN W/O DYE: CPT | Performed by: STUDENT IN AN ORGANIZED HEALTH CARE EDUCATION/TRAINING PROGRAM

## 2025-03-28 PROCEDURE — 99285 EMERGENCY DEPT VISIT HI MDM: CPT | Mod: 25 | Performed by: EMERGENCY MEDICINE

## 2025-03-28 PROCEDURE — 87086 URINE CULTURE/COLONY COUNT: CPT | Mod: PARLAB | Performed by: NURSE PRACTITIONER

## 2025-03-28 PROCEDURE — 2500000005 HC RX 250 GENERAL PHARMACY W/O HCPCS: Performed by: NURSE PRACTITIONER

## 2025-03-28 PROCEDURE — 86923 COMPATIBILITY TEST ELECTRIC: CPT

## 2025-03-28 PROCEDURE — 2500000002 HC RX 250 W HCPCS SELF ADMINISTERED DRUGS (ALT 637 FOR MEDICARE OP, ALT 636 FOR OP/ED): Performed by: NURSE PRACTITIONER

## 2025-03-28 PROCEDURE — 93005 ELECTROCARDIOGRAM TRACING: CPT

## 2025-03-28 PROCEDURE — 85018 HEMOGLOBIN: CPT | Performed by: NURSE PRACTITIONER

## 2025-03-28 PROCEDURE — 2500000001 HC RX 250 WO HCPCS SELF ADMINISTERED DRUGS (ALT 637 FOR MEDICARE OP): Performed by: NURSE PRACTITIONER

## 2025-03-28 PROCEDURE — 84132 ASSAY OF SERUM POTASSIUM: CPT | Performed by: NURSE PRACTITIONER

## 2025-03-28 PROCEDURE — 2500000004 HC RX 250 GENERAL PHARMACY W/ HCPCS (ALT 636 FOR OP/ED): Performed by: NURSE PRACTITIONER

## 2025-03-28 PROCEDURE — 86901 BLOOD TYPING SEROLOGIC RH(D): CPT | Performed by: NURSE PRACTITIONER

## 2025-03-28 PROCEDURE — 85025 COMPLETE CBC W/AUTO DIFF WBC: CPT | Performed by: NURSE PRACTITIONER

## 2025-03-28 PROCEDURE — 1200000002 HC GENERAL ROOM WITH TELEMETRY DAILY

## 2025-03-28 PROCEDURE — 87040 BLOOD CULTURE FOR BACTERIA: CPT | Mod: PARLAB | Performed by: NURSE PRACTITIONER

## 2025-03-28 PROCEDURE — 86900 BLOOD TYPING SEROLOGIC ABO: CPT | Performed by: NURSE PRACTITIONER

## 2025-03-28 PROCEDURE — 71045 X-RAY EXAM CHEST 1 VIEW: CPT

## 2025-03-28 PROCEDURE — 36415 COLL VENOUS BLD VENIPUNCTURE: CPT | Performed by: NURSE PRACTITIONER

## 2025-03-28 PROCEDURE — 96361 HYDRATE IV INFUSION ADD-ON: CPT

## 2025-03-28 PROCEDURE — 2550000001 HC RX 255 CONTRASTS: Performed by: EMERGENCY MEDICINE

## 2025-03-28 PROCEDURE — 84484 ASSAY OF TROPONIN QUANT: CPT

## 2025-03-28 PROCEDURE — 99233 SBSQ HOSP IP/OBS HIGH 50: CPT | Performed by: NURSE PRACTITIONER

## 2025-03-28 PROCEDURE — 85610 PROTHROMBIN TIME: CPT | Performed by: NURSE PRACTITIONER

## 2025-03-28 PROCEDURE — 83880 ASSAY OF NATRIURETIC PEPTIDE: CPT | Performed by: NURSE PRACTITIONER

## 2025-03-28 PROCEDURE — 70450 CT HEAD/BRAIN W/O DYE: CPT

## 2025-03-28 PROCEDURE — 84484 ASSAY OF TROPONIN QUANT: CPT | Performed by: NURSE PRACTITIONER

## 2025-03-28 PROCEDURE — 87075 CULTR BACTERIA EXCEPT BLOOD: CPT | Mod: PARLAB | Performed by: NURSE PRACTITIONER

## 2025-03-28 PROCEDURE — 83605 ASSAY OF LACTIC ACID: CPT | Performed by: NURSE PRACTITIONER

## 2025-03-28 PROCEDURE — 87637 SARSCOV2&INF A&B&RSV AMP PRB: CPT | Performed by: NURSE PRACTITIONER

## 2025-03-28 PROCEDURE — 36430 TRANSFUSION BLD/BLD COMPNT: CPT

## 2025-03-28 PROCEDURE — 93306 TTE W/DOPPLER COMPLETE: CPT

## 2025-03-28 PROCEDURE — 93306 TTE W/DOPPLER COMPLETE: CPT | Performed by: INTERNAL MEDICINE

## 2025-03-28 PROCEDURE — 2500000001 HC RX 250 WO HCPCS SELF ADMINISTERED DRUGS (ALT 637 FOR MEDICARE OP): Performed by: EMERGENCY MEDICINE

## 2025-03-28 PROCEDURE — 71275 CT ANGIOGRAPHY CHEST: CPT | Performed by: RADIOLOGY

## 2025-03-28 PROCEDURE — 81001 URINALYSIS AUTO W/SCOPE: CPT | Performed by: NURSE PRACTITIONER

## 2025-03-28 PROCEDURE — P9016 RBC LEUKOCYTES REDUCED: HCPCS

## 2025-03-28 PROCEDURE — 83735 ASSAY OF MAGNESIUM: CPT | Performed by: NURSE PRACTITIONER

## 2025-03-28 PROCEDURE — 82728 ASSAY OF FERRITIN: CPT | Mod: PARLAB | Performed by: PHYSICIAN ASSISTANT

## 2025-03-28 RX ORDER — LORAZEPAM 0.5 MG/1
0.5 TABLET ORAL EVERY 6 HOURS PRN
Status: ACTIVE | OUTPATIENT
Start: 2025-03-28

## 2025-03-28 RX ORDER — SERTRALINE HYDROCHLORIDE 50 MG/1
100 TABLET, FILM COATED ORAL 2 TIMES DAILY
Status: DISPENSED | OUTPATIENT
Start: 2025-03-28

## 2025-03-28 RX ORDER — OLANZAPINE 5 MG/1
15 TABLET ORAL NIGHTLY
Status: DISPENSED | OUTPATIENT
Start: 2025-03-28

## 2025-03-28 RX ORDER — HYDROXYZINE PAMOATE 25 MG/1
25 CAPSULE ORAL 3 TIMES DAILY PRN
Status: ACTIVE | OUTPATIENT
Start: 2025-03-28

## 2025-03-28 RX ORDER — DEXAMETHASONE 4 MG/1
2 TABLET ORAL DAILY
Status: DISPENSED | OUTPATIENT
Start: 2025-03-28

## 2025-03-28 RX ORDER — SODIUM CHLORIDE 9 MG/ML
75 INJECTION, SOLUTION INTRAVENOUS CONTINUOUS
Status: DISCONTINUED | OUTPATIENT
Start: 2025-03-28 | End: 2025-03-28

## 2025-03-28 RX ORDER — GUAIFENESIN 600 MG/1
600 TABLET, EXTENDED RELEASE ORAL 2 TIMES DAILY PRN
Status: ACTIVE | OUTPATIENT
Start: 2025-03-28

## 2025-03-28 RX ORDER — IPRATROPIUM BROMIDE AND ALBUTEROL SULFATE 2.5; .5 MG/3ML; MG/3ML
3 SOLUTION RESPIRATORY (INHALATION)
Status: DISCONTINUED | OUTPATIENT
Start: 2025-03-28 | End: 2025-03-29

## 2025-03-28 RX ORDER — ACETAMINOPHEN 325 MG/1
975 TABLET ORAL EVERY 6 HOURS PRN
Status: DISPENSED | OUTPATIENT
Start: 2025-03-28

## 2025-03-28 RX ORDER — IBUPROFEN 200 MG
1 TABLET ORAL DAILY
Status: DISPENSED | OUTPATIENT
Start: 2025-03-28

## 2025-03-28 RX ORDER — LUBIPROSTONE 24 UG/1
24 CAPSULE ORAL
Status: DISPENSED | OUTPATIENT
Start: 2025-03-28

## 2025-03-28 RX ORDER — ONDANSETRON HYDROCHLORIDE 2 MG/ML
4 INJECTION, SOLUTION INTRAVENOUS EVERY 6 HOURS PRN
Status: DISPENSED | OUTPATIENT
Start: 2025-03-28

## 2025-03-28 RX ORDER — HYDROCODONE BITARTRATE AND ACETAMINOPHEN 10; 325 MG/1; MG/1
1 TABLET ORAL EVERY 6 HOURS PRN
Status: DISPENSED | OUTPATIENT
Start: 2025-03-28

## 2025-03-28 RX ORDER — FLUTICASONE FUROATE AND VILANTEROL 200; 25 UG/1; UG/1
1 POWDER RESPIRATORY (INHALATION)
Status: DISPENSED | OUTPATIENT
Start: 2025-03-28

## 2025-03-28 RX ORDER — IPRATROPIUM BROMIDE AND ALBUTEROL SULFATE 2.5; .5 MG/3ML; MG/3ML
3 SOLUTION RESPIRATORY (INHALATION) ONCE
Status: COMPLETED | OUTPATIENT
Start: 2025-03-28 | End: 2025-03-28

## 2025-03-28 RX ORDER — ALBUTEROL SULFATE 0.83 MG/ML
2.5 SOLUTION RESPIRATORY (INHALATION) EVERY 2 HOUR PRN
Status: ACTIVE | OUTPATIENT
Start: 2025-03-28

## 2025-03-28 RX ORDER — ASPIRIN 325 MG
325 TABLET ORAL ONCE
Status: COMPLETED | OUTPATIENT
Start: 2025-03-28 | End: 2025-03-28

## 2025-03-28 RX ORDER — ROSUVASTATIN CALCIUM 10 MG/1
40 TABLET, COATED ORAL DAILY
Status: DISPENSED | OUTPATIENT
Start: 2025-03-28

## 2025-03-28 RX ORDER — GABAPENTIN 300 MG/1
300 CAPSULE ORAL 3 TIMES DAILY
Status: DISPENSED | OUTPATIENT
Start: 2025-03-28

## 2025-03-28 RX ORDER — CLOPIDOGREL BISULFATE 75 MG/1
75 TABLET ORAL EVERY MORNING
Status: ACTIVE | OUTPATIENT
Start: 2025-03-29

## 2025-03-28 RX ORDER — DEXAMETHASONE 4 MG/1
2 TABLET ORAL DAILY
Status: CANCELLED | OUTPATIENT
Start: 2025-03-28

## 2025-03-28 RX ADMIN — ASPIRIN 325 MG ORAL TABLET 325 MG: 325 PILL ORAL at 13:23

## 2025-03-28 RX ADMIN — OLANZAPINE 15 MG: 5 TABLET, FILM COATED ORAL at 20:33

## 2025-03-28 RX ADMIN — SODIUM CHLORIDE 75 ML/HR: 9 INJECTION, SOLUTION INTRAVENOUS at 08:50

## 2025-03-28 RX ADMIN — ROSUVASTATIN CALCIUM 40 MG: 10 TABLET, FILM COATED ORAL at 20:33

## 2025-03-28 RX ADMIN — IPRATROPIUM BROMIDE AND ALBUTEROL SULFATE 3 ML: .5; 3 SOLUTION RESPIRATORY (INHALATION) at 09:04

## 2025-03-28 RX ADMIN — SODIUM CHLORIDE 1000 ML: 9 INJECTION, SOLUTION INTRAVENOUS at 08:50

## 2025-03-28 RX ADMIN — APIXABAN 5 MG: 5 TABLET, FILM COATED ORAL at 20:33

## 2025-03-28 RX ADMIN — IOHEXOL 75 ML: 350 INJECTION, SOLUTION INTRAVENOUS at 10:19

## 2025-03-28 RX ADMIN — Medication 4 L/MIN: at 20:35

## 2025-03-28 RX ADMIN — GABAPENTIN 300 MG: 300 CAPSULE ORAL at 20:33

## 2025-03-28 RX ADMIN — SERTRALINE HYDROCHLORIDE 100 MG: 50 TABLET ORAL at 20:33

## 2025-03-28 SDOH — ECONOMIC STABILITY: FOOD INSECURITY: WITHIN THE PAST 12 MONTHS, THE FOOD YOU BOUGHT JUST DIDN'T LAST AND YOU DIDN'T HAVE MONEY TO GET MORE.: NEVER TRUE

## 2025-03-28 SDOH — SOCIAL STABILITY: SOCIAL INSECURITY: HAVE YOU HAD ANY THOUGHTS OF HARMING ANYONE ELSE?: NO

## 2025-03-28 SDOH — ECONOMIC STABILITY: INCOME INSECURITY: IN THE PAST 12 MONTHS HAS THE ELECTRIC, GAS, OIL, OR WATER COMPANY THREATENED TO SHUT OFF SERVICES IN YOUR HOME?: NO

## 2025-03-28 SDOH — SOCIAL STABILITY: SOCIAL INSECURITY: DO YOU FEEL ANYONE HAS EXPLOITED OR TAKEN ADVANTAGE OF YOU FINANCIALLY OR OF YOUR PERSONAL PROPERTY?: NO

## 2025-03-28 SDOH — SOCIAL STABILITY: SOCIAL INSECURITY: WITHIN THE LAST YEAR, HAVE YOU BEEN AFRAID OF YOUR PARTNER OR EX-PARTNER?: NO

## 2025-03-28 SDOH — SOCIAL STABILITY: SOCIAL INSECURITY: DOES ANYONE TRY TO KEEP YOU FROM HAVING/CONTACTING OTHER FRIENDS OR DOING THINGS OUTSIDE YOUR HOME?: NO

## 2025-03-28 SDOH — SOCIAL STABILITY: SOCIAL INSECURITY: WITHIN THE LAST YEAR, HAVE YOU BEEN HUMILIATED OR EMOTIONALLY ABUSED IN OTHER WAYS BY YOUR PARTNER OR EX-PARTNER?: NO

## 2025-03-28 SDOH — SOCIAL STABILITY: SOCIAL INSECURITY: ARE YOU OR HAVE YOU BEEN THREATENED OR ABUSED PHYSICALLY, EMOTIONALLY, OR SEXUALLY BY ANYONE?: NO

## 2025-03-28 SDOH — SOCIAL STABILITY: SOCIAL INSECURITY: DO YOU FEEL UNSAFE GOING BACK TO THE PLACE WHERE YOU ARE LIVING?: NO

## 2025-03-28 SDOH — SOCIAL STABILITY: SOCIAL INSECURITY: ABUSE: ADULT

## 2025-03-28 SDOH — SOCIAL STABILITY: SOCIAL INSECURITY: HAS ANYONE EVER THREATENED TO HURT YOUR FAMILY OR YOUR PETS?: NO

## 2025-03-28 SDOH — ECONOMIC STABILITY: FOOD INSECURITY: WITHIN THE PAST 12 MONTHS, YOU WORRIED THAT YOUR FOOD WOULD RUN OUT BEFORE YOU GOT THE MONEY TO BUY MORE.: NEVER TRUE

## 2025-03-28 SDOH — SOCIAL STABILITY: SOCIAL INSECURITY: ARE THERE ANY APPARENT SIGNS OF INJURIES/BEHAVIORS THAT COULD BE RELATED TO ABUSE/NEGLECT?: NO

## 2025-03-28 SDOH — SOCIAL STABILITY: SOCIAL INSECURITY: HAVE YOU HAD THOUGHTS OF HARMING ANYONE ELSE?: NO

## 2025-03-28 SDOH — SOCIAL STABILITY: SOCIAL INSECURITY: WERE YOU ABLE TO COMPLETE ALL THE BEHAVIORAL HEALTH SCREENINGS?: YES

## 2025-03-28 ASSESSMENT — COGNITIVE AND FUNCTIONAL STATUS - GENERAL
MOVING FROM LYING ON BACK TO SITTING ON SIDE OF FLAT BED WITH BEDRAILS: A LITTLE
STANDING UP FROM CHAIR USING ARMS: A LOT
TOILETING: A LITTLE
STANDING UP FROM CHAIR USING ARMS: A LOT
MOVING TO AND FROM BED TO CHAIR: A LITTLE
CLIMB 3 TO 5 STEPS WITH RAILING: TOTAL
TOILETING: A LOT
HELP NEEDED FOR BATHING: A LITTLE
DAILY ACTIVITIY SCORE: 20
DRESSING REGULAR UPPER BODY CLOTHING: A LOT
HELP NEEDED FOR BATHING: A LOT
DRESSING REGULAR LOWER BODY CLOTHING: A LITTLE
TURNING FROM BACK TO SIDE WHILE IN FLAT BAD: A LITTLE
WALKING IN HOSPITAL ROOM: TOTAL
MOVING TO AND FROM BED TO CHAIR: A LOT
TURNING FROM BACK TO SIDE WHILE IN FLAT BAD: A LITTLE
DAILY ACTIVITIY SCORE: 17
CLIMB 3 TO 5 STEPS WITH RAILING: TOTAL
WALKING IN HOSPITAL ROOM: A LOT
MOBILITY SCORE: 13
MOBILITY SCORE: 14
DRESSING REGULAR LOWER BODY CLOTHING: A LITTLE
DRESSING REGULAR UPPER BODY CLOTHING: A LITTLE
PATIENT BASELINE BEDBOUND: NO

## 2025-03-28 ASSESSMENT — LIFESTYLE VARIABLES
PRESCIPTION_ABUSE_PAST_12_MONTHS: NO
HOW MANY STANDARD DRINKS CONTAINING ALCOHOL DO YOU HAVE ON A TYPICAL DAY: PATIENT DOES NOT DRINK
HOW OFTEN DO YOU HAVE 6 OR MORE DRINKS ON ONE OCCASION: NEVER
SUBSTANCE_ABUSE_PAST_12_MONTHS: NO
HOW OFTEN DO YOU HAVE A DRINK CONTAINING ALCOHOL: NEVER
AUDIT-C TOTAL SCORE: 0
SKIP TO QUESTIONS 9-10: 1
AUDIT-C TOTAL SCORE: 0

## 2025-03-28 ASSESSMENT — ACTIVITIES OF DAILY LIVING (ADL)
JUDGMENT_ADEQUATE_SAFELY_COMPLETE_DAILY_ACTIVITIES: YES
BATHING: NEEDS ASSISTANCE
FEEDING YOURSELF: INDEPENDENT
WALKS IN HOME: NEEDS ASSISTANCE
GROOMING: INDEPENDENT
HEARING - RIGHT EAR: FUNCTIONAL
ASSISTIVE_DEVICE: WALKER;WHEELCHAIR
HEARING - LEFT EAR: FUNCTIONAL
ADEQUATE_TO_COMPLETE_ADL: YES
TOILETING: NEEDS ASSISTANCE
LACK_OF_TRANSPORTATION: NO
PATIENT'S MEMORY ADEQUATE TO SAFELY COMPLETE DAILY ACTIVITIES?: YES
DRESSING YOURSELF: NEEDS ASSISTANCE

## 2025-03-28 ASSESSMENT — PATIENT HEALTH QUESTIONNAIRE - PHQ9
1. LITTLE INTEREST OR PLEASURE IN DOING THINGS: NOT AT ALL
2. FEELING DOWN, DEPRESSED OR HOPELESS: NOT AT ALL
SUM OF ALL RESPONSES TO PHQ9 QUESTIONS 1 & 2: 0

## 2025-03-28 ASSESSMENT — PAIN SCALES - GENERAL: PAINLEVEL_OUTOF10: 0 - NO PAIN

## 2025-03-28 ASSESSMENT — COLUMBIA-SUICIDE SEVERITY RATING SCALE - C-SSRS
6. HAVE YOU EVER DONE ANYTHING, STARTED TO DO ANYTHING, OR PREPARED TO DO ANYTHING TO END YOUR LIFE?: NO
2. HAVE YOU ACTUALLY HAD ANY THOUGHTS OF KILLING YOURSELF?: NO
6. HAVE YOU EVER DONE ANYTHING, STARTED TO DO ANYTHING, OR PREPARED TO DO ANYTHING TO END YOUR LIFE?: NO
1. IN THE PAST MONTH, HAVE YOU WISHED YOU WERE DEAD OR WISHED YOU COULD GO TO SLEEP AND NOT WAKE UP?: NO
1. IN THE PAST MONTH, HAVE YOU WISHED YOU WERE DEAD OR WISHED YOU COULD GO TO SLEEP AND NOT WAKE UP?: NO
2. HAVE YOU ACTUALLY HAD ANY THOUGHTS OF KILLING YOURSELF?: NO

## 2025-03-28 ASSESSMENT — PAIN - FUNCTIONAL ASSESSMENT: PAIN_FUNCTIONAL_ASSESSMENT: 0-10

## 2025-03-28 NOTE — H&P
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 s/p chemo (last Tx 12/2023, current Tx Keytruda q6wk), HTN, HLD, MI, CAD s/p PCI/stent, cerebral aneurysm x 2 s/p craniotomy and clipping, AAA s/p repair x 2, PAD s/p s/p revascularization  left popliteal/TPT/PT reconstruction with PTA/DCB, Supera/SES to popliteal occlusion) and s/p limb rescue x 2 (Laser atherectomy/JETI/PTA fem/pop/TPT/PT, FCO to mid SFA and JETI/PTA AT on (6/25/24 ), c/b stent occlusion s/p repeat thrombectomy on 6/26/24 (- JETI /SFA, POP PT, and TPT, FCO to BK Pop and TPT PTA AT) s/p Left 2,3rd,4th toe amputation 3/12/25, Left foot drop, COPD, chronic hypoxic respiratory failure on home supplemental oxygen (2L NC at night and as needed), nicotine dependence, beta thalassemia anemia. Recently admitted with sepsis with source suspected to be UTI vs soft tissue. Discharged 3/19/24 to home. Presents today with shortness of breath, confusion, and generalized weakness.  Patient alert and oriented x 3, ill-appearing, mildly tachypneic.  Daughter at bedside and assists with history.  She reports that following her recent discharge she was doing well, was up walking yesterday with a walker, then around 4 AM patient woke up very short of breath, weak, confused, and reports that her speech was garbled.  Patient symptoms continued and they called EMS.  Patient reports the patient has had no other strokelike symptoms.  Was positive for occasional lightheadedness, mostly with position changes. Denies, chills, acute vision or hearing changes, sore throat, problems swallowing, chest pains, palpitations, nausea, vomiting, abdominal pain, change in bowel pattern, black or bloody stools, and urinary complaints.  Reports pulmonary status is near baseline, she is on 2 L nasal cannula supplemental oxygen, patient has auditory wheezes at bedside.  Has a cough that is nonproductive.  Daughter  reports patient is having 3-4 bowel movements a day soft.  Here alcohol about 1    ER Course: Blood pressure soft in presentation, mildly tachypneic.  Afebrile. WBC's 13.2, hgb 6.9/Hct 26.4, plt 359. INR 1.4. Calcium 8.4, albumin 3.3, CMP otherwise normal. Lactate 0.9, . High sensitivity troponin 86-repeat 119. UA Nitrite negative, 25 Leuk esterase, 3+ blood.  CTA of the chest negative for acute pulmonary embolism, hyperinflation and chronic lung changes, large and small nodules suspicious for worsening neoplasm. VBG ->PH 7.35, pCO2 55, pO2 33. Received aspirin 325 mg, DuoNebs, and 1 L fluids.    Past Medical History: as above  Past Surgical History: As above: [facility] with PCI, fem-fem bypass, L MCA aneurysm s/p craniotomy and clipping, AAA graft and stent for endoleak repair, tubal ligation, bladder lift, lithotripsy, Left 2,3rd,4th toe amputation  Social History: 1 1/2 -2 ppd x 47 years, denies EtOH, and illicit drug use. Retired teacher.  Lives with her , son and daughter-in-law and their children.  Family History: HTN, HLD, CAD     Past Medical History  Past Medical History:   Diagnosis Date    Adrenal cancer (Multi)     Aneurysm of carotid artery (CMS-HCC)     Neck aneurysm    Anxiety     COPD (chronic obstructive pulmonary disease) (Multi)     Depression     DVT (deep venous thrombosis) (Multi)     2022    DVT (deep venous thrombosis) (Multi)     RLE    History of blood transfusion     History of tubal ligation     Hyperlipidemia     Old myocardial infarction     History of heart attack    Other specified disorders of kidney and ureter     Obstruction of kidney    Personal history of other diseases of the circulatory system     History of intracranial aneurysm    Personal history of other diseases of the circulatory system     History of abdominal aortic aneurysm (AAA)    Personal history of other diseases of the respiratory system     History of chronic obstructive lung disease    Personal history  of urinary calculi     History of kidney stones    Right upper quadrant pain 09/25/2020    Abdominal pain, RUQ (right upper quadrant)    Vision loss        Surgical History  Past Surgical History:   Procedure Laterality Date    COLONOSCOPY      CT ABDOMEN PELVIS ANGIOGRAM W AND/OR WO IV CONTRAST  11/21/2019    CT ABDOMEN PELVIS ANGIOGRAM W AND/OR WO IV CONTRAST 11/21/2019 [facility] EMERGENCY LEGACY    CT ANGIO AORTA AND BILATERAL ILIOFEMORAL RUN OFF INCLUDING WITHOUT CONTRAST IF PERFORMED  08/09/2022    CT AORTA AND BILATERAL ILIOFEMORAL RUNOFF ANGIOGRAM W AND/OR WO IV CONTRAST 8/9/2022 [facility] CLINICAL LEGACY    CT HEAD ANGIO W AND WO IV CONTRAST  05/22/2020    CT HEAD ANGIO W AND WO IV CONTRAST 5/22/2020 [facility] EMERGENCY LEGACY    INVASIVE VASCULAR PROCEDURE N/A 01/23/2024    Procedure: Lower Extremity Angiogram;  Surgeon: Willam Mike MD;  Location: [place] [address] Cardiac Cath Lab;  Service: Cardiovascular;  Laterality: N/A;  [id number];LLE CLTI    INVASIVE VASCULAR PROCEDURE N/A 01/23/2024    Procedure: Lower Extremity Intervention;  Surgeon: Willam Mike MD;  Location: [place] [address] Cardiac Cath Lab;  Service: Cardiovascular;  Laterality: N/A;    INVASIVE VASCULAR PROCEDURE N/A 06/25/2024    Procedure: Lower Extremity Angiogram;  Surgeon: Willam Mike MD;  Location: [place] [address] Cardiac Cath Lab;  Service: Cardiovascular;  Laterality: N/A;  6/25/24 with Cee    INVASIVE VASCULAR PROCEDURE Left 06/25/2024    Procedure: Lower Extremity Intervention;  Surgeon: Willam iMke MD;  Location: [place] [address] Cardiac Cath Lab;  Service: Cardiovascular;  Laterality: Left;    INVASIVE VASCULAR PROCEDURE N/A 06/25/2024    Procedure: Thrombectomy - Lower Extremity;  Surgeon: Willam Mike MD;  Location: [place] [address] Cardiac Cath Lab;  Service: Cardiovascular;  Laterality: N/A;    INVASIVE VASCULAR PROCEDURE N/A 06/25/2024    Procedure: Atherectomy - Lower Extremity;  Surgeon: Willam Mike MD;  Location: [place] [address] Cardiac Cath Lab;  Service: Cardiovascular;  Laterality: N/A;     INVASIVE VASCULAR PROCEDURE Left 06/25/2024    Procedure: FCO Stent - Lower Extremity;  Surgeon: Willam Mike MD;  Location: [place] [id number] Cardiac Cath Lab;  Service: Cardiovascular;  Laterality: Left;    INVASIVE VASCULAR PROCEDURE N/A 06/26/2024    Procedure: Lower Extremity Intervention;  Surgeon: Bucky Lake DO;  Location: [facility] Cardiac Cath Lab;  Service: Cardiovascular;  Laterality: N/A;    INVASIVE VASCULAR PROCEDURE N/A 06/26/2024    Procedure: Lower Extremity Angiogram;  Surgeon: Bucky Lake DO;  Location: [address] Cardiac Cath Lab;  Service: Cardiovascular;  Laterality: N/A;    OTHER SURGICAL HISTORY  09/30/2020    Tubal ligation    OTHER SURGICAL HISTORY  09/30/2020    Cardiac catheterization    TUBAL LIGATION          Social History  She reports that she has been smoking cigarettes. She started smoking about 50 years ago. She has a 90.2 pack-year smoking history. She has been exposed to tobacco smoke. She has never used smokeless tobacco. She reports that she does not drink alcohol and does not use drugs.    Family History  Family History   Problem Relation Name Age of Onset    Aneurysm Mother      Hypertension Mother      Heart attack Father      Cancer Father's Sister          Allergies  Methylprednisolone, Ace inhibitors, Prednisone, Betamethasone dipropionate, and Meperidine    Review of Systems    10 point ROS Negative except as noted above.  He has a CDU right they are     Physical Exam  Constitutional:       General: She is not in acute distress.     Appearance: She is ill-appearing.   HENT:      Head: Atraumatic.      Nose: Nose normal.      Mouth/Throat:      Mouth: Mucous membranes are dry.   Eyes:      Conjunctiva/sclera: Conjunctivae normal.   Cardiovascular:      Rate and Rhythm: Normal rate and regular rhythm.      Pulses: Normal pulses.      Heart sounds: No murmur heard.  Pulmonary:      Effort: Accessory muscle usage present.      Breath sounds: Examination of the  "right-upper field reveals wheezing. Examination of the left-upper field reveals wheezing. Examination of the right-middle field reveals wheezing. Examination of the left-middle field reveals wheezing. Examination of the right-lower field reveals wheezing. Examination of the left-lower field reveals wheezing. Wheezing present.   Abdominal:      General: Bowel sounds are normal. There is no distension.      Palpations: Abdomen is soft.      Tenderness: There is no abdominal tenderness. There is no guarding.   Musculoskeletal:         General: No signs of injury. Normal range of motion.      Cervical back: Neck supple.      Right lower leg: Edema present.      Left lower leg: Edema present.      Comments: Bilateral lower extremity edema left greater than right.  Postoperative incisional wounds well-approximated and healing on the left foot.  No evidence of infection.  No significant ecchymosis or warmth.   Skin:     General: Skin is warm and dry.      Capillary Refill: Capillary refill takes less than 2 seconds.      Findings: No ecchymosis, erythema or wound.      Comments: Ecchymosis scattered over all 4 extremities.  Several border dressings covering skin tears on upper extremities.  Area of patchy ecchymosis on her mid chest and what appears to be of erythemic rash around her neck.  Cheeks appear flushed.    Neurological:      General: No focal deficit present.      Mental Status: She is oriented to person, place, and time. Mental status is at baseline. She is lethargic.      Motor: Weakness (generalized) present.   Psychiatric:         Mood and Affect: Mood normal.         Behavior: Behavior is cooperative.          Last Recorded Vitals  Blood pressure (!) 80/46, pulse 73, temperature 37.2 °C (99 °F), resp. rate (!) 21, height 1.626 m (5' 4\"), weight 66.2 kg (145 lb 15.1 oz), SpO2 94%.    Relevant Results        CT angio chest for pulmonary embolism    Result Date: 3/28/2025  Interpreted By:  Jessi Benítez, " STUDY: CT ANGIO CHEST FOR PULMONARY EMBOLISM;  3/28/2025 10:19 am   INDICATION: Signs/Symptoms:Shortness of breath, elevated troponin, rule out PE.     COMPARISON: 10/08/2024   ACCESSION NUMBER(S): [id number]   ORDERING CLINICIAN: MARISABEL LUNSFORD   TECHNIQUE: Helical data acquisition of the chest was obtained after intravenous administration of 75 ML Omnipaque 350, as per PE protocol. Images were reformatted in coronal and sagittal planes. Axial and coronal maximum intensity projection (MIP) images were created and reviewed.   FINDINGS: POTENTIAL LIMITATIONS OF THE STUDY: None   HEART AND VESSELS: There are no discrete filling defects within main pulmonary artery and its branches to suggest acute pulmonary embolism. Main pulmonary artery is prominent suggesting pulmonary hypertension..   The thoracic aorta normal in course and caliber. Aortic calcification. Coronary artery calcification.. Please note, the study is not optimized for evaluation of coronary arteries.   Cardiomegaly.   There is no pericardial effusion seen.   MEDIASTINUM AND TRINA, LOWER NECK AND AXILLA: The visualized thyroid gland is within normal limits. No evidence of thoracic lymphadenopathy by CT criteria. Esophagus appears within normal limits as seen.   LUNGS AND AIRWAYS: The trachea and central airways are patent. Posterior filling defect within the trachea and right mainstem bronchus likely layering debris/mucous. Follow-up as clinically warranted.   Lungs are hyperinflated. Cystic changes and air trapping. Likely bibasilar atelectasis and/or scarring. Mild bibasilar posterior pleural thickening and likely dependent atelectasis and/or minimal infiltrates. Multiple enlarging bilateral lung nodules. The largest in the right apex anteriorly is approximately 12 mm. This is more conspicuous and larger since the prior exam.   UPPER ABDOMEN: The visualized subdiaphragmatic structures demonstrate abnormalities of the pancreatic head which is suboptimally  visualized in this exam. Areas of hypodensity within the pancreatic head along with pancreatic duct dilatation and calcified pancreatic head mass. Also aneurysmal dilatation of the abdominal aorta with peripheral thrombus and aortic graft. This is incompletely included and evaluated in this exam.. Large nonobstructing left renal stone.   CHEST WALL AND OSSEOUS STRUCTURES: Chest wall is within normal limits. No acute osseous pathology.There are no suspicious osseous lesions.       1. No evidence of acute pulmonary embolism. 2. Hyperinflation and chronic lung changes with apparently enlarging small nodules suspicious for worsening neoplasm. 3. Multiple additional nonacute findings as described above.   MACRO: None   Signed by: Jessi Benítez 3/28/2025 10:54 AM Dictation workstation:   [id number]    XR chest 1 view    Result Date: 3/28/2025  Interpreted By:  Margoth Fishman, STUDY: XR CHEST 1 VIEW;  3/28/2025 8:31 am   INDICATION: Signs/Symptoms:sob.   COMPARISON: 03/14/2025   ACCESSION NUMBER(S): [id number]   ORDERING CLINICIAN: MARISABEL LUNSFORD   FINDINGS: AP radiograph of the chest was provided.       CARDIOMEDIASTINAL SILHOUETTE: Cardiomediastinal silhouette is normal in size and configuration.   LUNGS: No consolidation, pulmonary edema, pleural effusion, or pneumothorax.   ABDOMEN: No remarkable upper abdominal findings.   BONES: No acute osseous changes.       No evidence of acute cardiopulmonary process.   Signed by: Margoth Fishman 3/28/2025 8:45 AM Dictation workstation:   [id number]    XR foot left 1-2 views    Result Date: 3/15/2025  Interpreted By:  Kimani Alston, STUDY: XR FOOT LEFT 1-2 VIEWS; ;  3/15/2025 10:33 am   INDICATION: Signs/Symptoms:concern for osteomyelitis.     COMPARISON: 01/05/2024   ACCESSION NUMBER(S): [id number]   ORDERING CLINICIAN: DAVID JARQUIN   FINDINGS: Status post surgery of the 2nd 3rd and 4th digits. There is subtle soft tissue irregularity to the 3rd and 4th digits. The possibility  of air within the soft tissues can not be excluded albeit part of this may be due to air interposed within bandages.   There is no radiographic evidence of acute or chronic osteomyelitis.   Minimal soft tissue swelling of the forefoot.   5 mm plantar spur.       No stigmata of acute or chronic osteomyelitis. Soft tissue air may be accounted for by bandages although clinical inspection recommended to exclude gas-forming process.     MACRO: None   Signed by: Kimani Alston 3/15/2025 11:33 AM Dictation workstation:   [id number]    XR chest 1 view    Result Date: 3/14/2025  Interpreted By:  Go Cespedes, STUDY: XR CHEST 1 VIEW;  3/14/2025 5:00 pm   INDICATION: Signs/Symptoms:sob.   COMPARISON: Chest radiograph 12/02/2024   ACCESSION NUMBER(S): [id number]   ORDERING CLINICIAN: QUENTIN ALVAREZ   FINDINGS:     CARDIOMEDIASTINAL SILHOUETTE: Cardiomediastinal silhouette is unremarkable in size and configuration.   LUNGS: No consolidation, pneumothorax, or significant effusion.   ABDOMEN: No remarkable upper abdominal findings.   BONES: No acute osseous changes.       1.  No evidence of acute cardiopulmonary process.     Signed by: Go Cespedes 3/14/2025 5:41 PM Dictation workstation:   [id number]    CT head wo IV contrast    Result Date: 3/14/2025  Interpreted By:  Go Cespedes, STUDY: CT HEAD WO IV CONTRAST;  3/14/2025 4:52 pm   INDICATION: Signs/Symptoms:cva/ams.   COMPARISON: None.   ACCESSION NUMBER(S): [id number]   ORDERING CLINICIAN: QUENTIN ALVAREZ   TECHNIQUE: Noncontrast axial CT scan of head was performed. Angled reformats in brain and bone windows were generated. The images were reviewed in bone, brain, blood and soft tissue windows.   FINDINGS: CSF Spaces: The ventricles, sulci and basal cisterns are within normal limits. There is no extraaxial fluid collection.   Parenchyma: Parenchyma appear stable. Small focus of encephalomalacia of the inferior right medial cerebellum. The grey-white  differentiation is intact. There is no mass effect or midline shift. There is no intracranial hemorrhage.   Calvarium: No acute displaced scapular fracture. Stable postsurgical changes from prior left-sided craniotomy with similar appearing presumed aneurysm occlusion or correlating devices.   Paranasal sinuses and mastoids: Visualized paranasal sinuses and mastoids are clear.       No evidence of acute cortical infarct or intracranial hemorrhage.       MACRO: None     Signed by: Go Cespedes 3/14/2025 5:36 PM Dictation workstation:   [id number]    FL less than 1 hour    Result Date: 3/12/2025  These images are not reportable by radiology and will not be interpreted by  Radiologists.     Results for orders placed or performed during the hospital encounter of 03/28/25 (from the past 24 hours)   CBC and Auto Differential   Result Value Ref Range    WBC 13.2 (H) 4.4 - 11.3 x10*3/uL    nRBC 0.0 0.0 - 0.0 /100 WBCs    RBC 3.42 (L) 4.00 - 5.20 x10*6/uL    Hemoglobin 6.9 (L) 12.0 - 16.0 g/dL    Hematocrit 26.4 (L) 36.0 - 46.0 %    MCV 77 (L) 80 - 100 fL    MCH 20.2 (L) 26.0 - 34.0 pg    MCHC 26.1 (L) 32.0 - 36.0 g/dL    RDW 20.7 (H) 11.5 - 14.5 %    Platelets 359 150 - 450 x10*3/uL    Neutrophils % 79.7 40.0 - 80.0 %    Immature Granulocytes %, Automated 1.2 (H) 0.0 - 0.9 %    Lymphocytes % 11.8 13.0 - 44.0 %    Monocytes % 6.1 2.0 - 10.0 %    Eosinophils % 0.7 0.0 - 6.0 %    Basophils % 0.5 0.0 - 2.0 %    Neutrophils Absolute 10.55 (H) 1.20 - 7.70 x10*3/uL    Immature Granulocytes Absolute, Automated 0.16 0.00 - 0.70 x10*3/uL    Lymphocytes Absolute 1.56 1.20 - 4.80 x10*3/uL    Monocytes Absolute 0.80 0.10 - 1.00 x10*3/uL    Eosinophils Absolute 0.09 0.00 - 0.70 x10*3/uL    Basophils Absolute 0.06 0.00 - 0.10 x10*3/uL   Magnesium   Result Value Ref Range    Magnesium 1.68 1.60 - 2.40 mg/dL   Comprehensive metabolic panel   Result Value Ref Range    Glucose 79 74 - 99 mg/dL    Sodium 142 136 - 145 mmol/L    Potassium  4.0 3.5 - 5.3 mmol/L    Chloride 107 98 - 107 mmol/L    Bicarbonate 29 21 - 32 mmol/L    Anion Gap 10 10 - 20 mmol/L    Urea Nitrogen 12 6 - 23 mg/dL    Creatinine 0.81 0.50 - 1.05 mg/dL    eGFR 79 >60 mL/min/1.73m*2    Calcium 8.4 (L) 8.6 - 10.3 mg/dL    Albumin 3.3 (L) 3.4 - 5.0 g/dL    Alkaline Phosphatase 56 33 - 136 U/L    Total Protein 6.3 (L) 6.4 - 8.2 g/dL    AST 14 9 - 39 U/L    Bilirubin, Total 0.4 0.0 - 1.2 mg/dL    ALT 16 7 - 45 U/L   Lactate   Result Value Ref Range    Lactate 0.9 0.4 - 2.0 mmol/L   Troponin I, High Sensitivity   Result Value Ref Range    Troponin I, High Sensitivity 86 (HH) 0 - 13 ng/L   B-Type Natriuretic Peptide   Result Value Ref Range     (H) 0 - 99 pg/mL   Blood Gas Venous Full Panel   Result Value Ref Range    POCT pH, Venous 7.35 7.33 - 7.43 pH    POCT pCO2, Venous 55 (H) 41 - 51 mm Hg    POCT pO2, Venous 33 (L) 35 - 45 mm Hg    POCT SO2, Venous 53 45 - 75 %    POCT Oxy Hemoglobin, Venous 50.2 45.0 - 75.0 %    POCT Hematocrit Calculated, Venous 25.0 (L) 36.0 - 46.0 %    POCT Sodium, Venous 141 136 - 145 mmol/L    POCT Potassium, Venous 4.2 3.5 - 5.3 mmol/L    POCT Chloride, Venous 108 (H) 98 - 107 mmol/L    POCT Ionized Calicum, Venous 1.23 1.10 - 1.33 mmol/L    POCT Glucose, Venous 82 74 - 99 mg/dL    POCT Lactate, Venous 1.2 0.4 - 2.0 mmol/L    POCT Base Excess, Venous 4.1 (H) -2.0 - 3.0 mmol/L    POCT HCO3 Calculated, Venous 30.4 (H) 22.0 - 26.0 mmol/L    POCT Hemoglobin, Venous 8.2 (L) 12.0 - 16.0 g/dL    POCT Anion Gap, Venous 7.0 (L) 10.0 - 25.0 mmol/L    Patient Temperature 37.0 degrees Celsius    FiO2 28 %   Blood Culture    Specimen: Peripheral Venipuncture; Blood culture   Result Value Ref Range    Blood Culture Loaded on Instrument - Culture in progress    Blood Culture    Specimen: Peripheral Venipuncture; Blood culture   Result Value Ref Range    Blood Culture Loaded on Instrument - Culture in progress    Protime-INR   Result Value Ref Range    Protime 16.0  (H) 9.8 - 12.4 seconds    INR 1.4 (H) 0.9 - 1.1   Sars-CoV-2, Influenza A/B and RSV PCR   Result Value Ref Range    Coronavirus 2019, PCR Not Detected Not Detected    Flu A Result Not Detected Not Detected    Flu B Result Not Detected Not Detected    RSV PCR Not Detected Not Detected   Urinalysis with Reflex Culture and Microscopic   Result Value Ref Range    Color, Urine Light-Yellow Light-Yellow, Yellow, Dark-Yellow    Appearance, Urine Clear Clear    Specific Gravity, Urine 1.009 1.005 - 1.035    pH, Urine 5.0 5.0, 5.5, 6.0, 6.5, 7.0, 7.5, 8.0    Protein, Urine NEGATIVE NEGATIVE, 10 (TRACE), 20 (TRACE) mg/dL    Glucose, Urine Normal Normal mg/dL    Blood, Urine OVER (3+) (A) NEGATIVE mg/dL    Ketones, Urine NEGATIVE NEGATIVE mg/dL    Bilirubin, Urine NEGATIVE NEGATIVE mg/dL    Urobilinogen, Urine Normal Normal mg/dL    Nitrite, Urine NEGATIVE NEGATIVE    Leukocyte Esterase, Urine 25 Srinath/uL (A) NEGATIVE   Microscopic Only, Urine   Result Value Ref Range    WBC, Urine 1-5 1-5, NONE /HPF    WBC Clumps, Urine RARE Reference range not established. /HPF    RBC, Urine >20 (A) NONE, 1-2, 3-5 /HPF    Mucus, Urine FEW Reference range not established. /LPF    Amorphous Crystals, Urine 1+ NONE, 1+, 2+ /HPF   Troponin I, High Sensitivity   Result Value Ref Range    Troponin I, High Sensitivity 119 (HH) 0 - 13 ng/L   Prepare RBC: 1 Units   Result Value Ref Range    PRODUCT CODE O1166O68     Unit Number [id number]     Unit ABO A     Unit RH POS     XM INTEP COMP     Dispense Status IS     Blood Expiration Date 4/25/2025 11:59:00 PM EDT     PRODUCT BLOOD TYPE 6200     UNIT VOLUME 350    Type and screen   Result Value Ref Range    ABO TYPE A     Rh TYPE POS     ANTIBODY SCREEN NEG      *Note: Due to a large number of results and/or encounters for the requested time period, some results have not been displayed. A complete set of results can be found in Results Review.          Assessment/Plan     69 year old female with NSCL  cancer with metastasis to bilateral adrenal glands s/p chemo (last Tx 12/2023, current Tx Keytruda q6wk), HTN, HLD, MI, CAD s/p PCI/stent, cerebral aneurysm x 2 s/p craniotomy and clipping, AAA s/p repair x 2, PAD s/p s/p revascularization  left popliteal/TPT/PT reconstruction with PTA/DCB, Supera/SES to popliteal occlusion) and s/p limb rescue x 2 (Laser atherectomy/JETI/PTA fem/pop/TPT/PT, FCO to mid SFA and JETI/PTA AT on (6/25/24 ), c/b stent occlusion s/p repeat thrombectomy on 6/26/24 (- JETI /SFA, POP PT, and TPT, FCO to BK Pop and TPT PTA AT) s/p Left 2,3rd,4th toe amputation 3/12/25, Left foot drop, COPD, chronic hypoxic respiratory failure on home supplemental oxygen (2L NC at night and as needed), nicotine dependence, beta thalassemia anemia. Recently admitted with sepsis with source suspected to be UTI vs soft tissue. Discharged 3/19/24 to home. Presents today with shortness of breath, confusion, and generalized weakness.    Assessment & Plan  Shortness of breath    COPD exacerbation (Multi)    Acute on chronic hypoxic respiratory failure    Other specified anemias    Elevated troponin    Lower extremity edema    Metabolic encephalopathy    Lung cancer (Multi)    Metastasis to adrenal gland (Multi)      #Acute on chronic hypoxic respiratory failure  #COPD exacerbation  #NSCL cancer     Telemetry monitoring  Solu-Medrol 40 mg every 8 hours  Azithromycin  supplemental oxygen  Nebulizers as needed and scheduled  Consult pulmonology  Mucinex 600 mg every 12 hours as needed  Supportive Care    #Elevated troponin, - Suspect demand ischemia 2/2 anemia and hypoxia  #CAD s/p PCI/Stents    Echocardiogram  Cardiology consult, appreciate input  Continue home plavix and eliqius  Lipid panel in AM       #Generalized Weakness  #Confusion, suspect metabolic encephalopathy.     No infectious source found on initial workup.  CT head without contrast  UA negative-> follow up cultures  Follow up cultures  PT/OT  Hold off on  antibiotics for now    #anemia  #Hx beta thalassemia anemia  No reported e/o gastrointestinal bleeding  Check Fecal occult  1 unit PRBC Given by the ED.   Check H&H this evening, continue to monitor  No obvious acute bleeding, will continue with plavix and eliquis     #nicotine use disorder  Encouraged smoking cessation  Nicotine patch ordered     #PAD s/p multiple revascularization  #HTN  #HLD  #anxiety     Continue home meds as appropriate     #DVT ppx  SCD  Continue eliquis       Rich Spivey, APRN-CNP

## 2025-03-28 NOTE — ED PROVIDER NOTES
Limitations to History: None     HPI:      Radha Toussaint is a 69 y.o. with PMHx of HTN, HLD, CAD with FCO x2, severe PAD s/p multiple interventions (aortic aneurysm repair, intracranial aneurysm coiling, iliac grafts, fem-fem graft, multiple lower extremity stents and angioplasties - most recently on 2/12/25 [place] [facility] [place] DVT, Afib (plavix and eliquis), COPD with home oxygen, current smoker, lung cancer w/ mets to adrenal glad, beta thalassemia who underwent left second, third and fourth toe amputation with left allograft skin graft on 3/12 per Dr. Doss presenting to ED today from home via EMS with family for evaluation of shortness of breath and weakness/fatigue.  Recent admission to the ICU for sepsis due to unidentified organism, likely UTI, soft tissue infection versus C. difficile colitis.  DC home 3/19, progressively weak and fatigued with increasing shortness of breath since that time.  Family states the patient can barely stand up under her own power.  Denies fever/chills, cough/cold symptoms, chest pain, nausea/vomiting, abdominal pain, urinary symptoms, change in bowel habits or any other complaints.  Smoker, no EtOH or IV drugs.  PCP is Dr. Rogel.     Additional History Obtained from: Triage/nursing/EMS run report reviewed.  Family at bedside discharge note 3/19/2025 Dr. Davies.     ------------------------------------------------------------------------------------------------------------------------------------------    VS: As documented in the triage note and EMR flowsheet from this visit were reviewed.    Physical Exam:  Gen: 69-year-old chronically ill appearing female, extremely weak and fatigued.  Lips dry.  Sleepy but easily arousable and answers questions.  Lungs sounds decreased in the bases, mild expiratory wheeze on expiration.  No rales or rhonchi.  No accessory muscle use or stridor.  Head/Neck: NCAT, neck w/ FROM  Eyes: EOMI, PERRL, anicteric sclerae, noninjected conjunctivae  Ears: TMs clear  b/l without sign of infection  Nose: Nares patent w/o rhinorrhea  Mouth:  MMM, no OP lesions noted  Heart: Regular rate and rhythm   Lungs: CTA b/l no RRW, no increased work of breathing  Abdomen: Round and soft with bowel sounds, nontender.  No CVA tenderness.  Musculoskeletal: BLANCHE x 4.  MSPs intact.  Neurologic: Alert, symmetrical facies, phonates clearly, moves all extremities equally, responsive to touch, ambulates normally   Skin: Pale pink, warm and dry. Multiple abrasions upper and lower extremities.  Dressing right tib-fib and left foot.  Generalized edema bilateral lower extremities.  No rashes noted  Psychological: calm, no SI/HI      ------------------------------------------------------------------------------------------------------------------------------------------    Medical Decision Makin y.o. with PMHx of HTN, HLD, CAD with FCO x2, severe PAD s/p multiple interventions (aortic aneurysm repair, intracranial aneurysm coiling, iliac grafts, fem-fem graft, multiple lower extremity stents and angioplasties - [place] 25 [place] [facility] [place] DVT, Afib (plavix and eliquis), COPD with home oxygen, current smoker, lung cancer w/ mets to adrenal glad, beta thalassemia who underwent left second, third and fourth toe amputation with left allograft skin graft on 3/12 per Dr. Doss is evaluated at the bedside for increasing weakness/fatigue and shortness of breath since being discharged home Monday from an ICU stay for septic shock with likely source UTI versus soft tissue.  Completed Augmentin.  On arrival, slightly sleepy but does arouse and answers questions appropriately.  Appears extremely weak and chronically ill.  Heart rate 92, blood pressure 106/58, afebrile, O2 sat 92% on 2 L.  Lungs are decreased,  mild expiratory wheeze in the bases  bilaterally.      Includes but not limited to sepsis, UTI, cellulitis, electrolyte derangement, pneumonia, viral illness.    IV established, continuous cardiac/O2  sat monitoring.  Will check basic labs including lactate/cultures, chest x-ray, UA/urine culture, EKG and viral panel.  Normal saline 1 L IV wide open with maintenance rate to follow up anticipate admission.     ED Course as of 03/28/25 1141   Fri Mar 28, 2025   0952 Laboratory studies were reviewed, mild leukocytosis at 13.2 with a left shift.  Hemoglobin 6.9, has been hovering between 7.0 and 7.2 recently.  Will transfuse with a unit of bloo consented for blood.  Normal electrolytes including magnesium, patient is not acidotic, pH 7.35, mild elevation of the pCO2 55.  Bicarb 30.4.  Normal kidney function and LFTs.  Coags unremarkable.  Mild elevation of the BNP at 201.  Trop is bumped at 86, delta pending.  Chest x-ray was reviewed and shows no acute cardiopulmonary process. With  patient's increasing shortness of breath and troponin bumped, there is a concern for PE despite the fact that she states she has been compliant with her Eliquis.  Will proceed with CT angio of the chest to rule out PE.  UA and viral swabs pending. [SB]   1119 Troponin is rising, currently 119.  Repeat EKG pending.  Given chewable aspirin.  CT angio of the chest shows no evidence of PE.  Will require admission for anemia, shortness of breath and NSTEMI [SB]   1126 I spoke with Dr. Davies, he agrees to admit the patient to telemetry.  House NP aware.  Diagnosis, treatment plan for admission discussed with patient and family, they verbalized understanding and are in agreement.  Condition fair. [SB]   1141 Repeat EKG unchanged from prior. [SB]      ED Course User Index  [SB] Maria Guadalupe Ferraro, APRN-CNP         Diagnoses as of 03/28/25 1141   Shortness of breath   Anemia, unspecified type   Troponin level elevated   Chronic obstructive pulmonary disease, unspecified COPD type (Multi)   NSTEMI (non-ST elevated myocardial infarction) (Multi)       EKG #1 interpreted by Dr. Castano at 8:19 AM, sinus arrhythmia at a rate of 88, no ST segment depression or  elevation consistent with ischemia or infarction    EKG #2 interpreted by Dr. Castano 1:39 AM, sinus rhythm at a rate of 88, borderline prolonged QT interval, no ST segment depression or elevation consistent with ischemia or infarction.    Chronic Medical Conditions Significantly Affecting Care: None    External Records Reviewed: I reviewed recent and relevant outside records including: None    Discussion of Management with Other Providers: Seen and evaluated with ED attending physician, Dr. Castano, she agrees with the treatment plan of final disposition of the patient.    I discussed the patient/results with: Dr. Davies/mick Ferraro, APRN-CNP  03/28/25 [id number]

## 2025-03-28 NOTE — ED TRIAGE NOTES
Patient arrives from home via squad with complaints of increasing weakness with lethargy and SOB that has been ongoing since Monday.  Patient was recently discharged from ICU here for Sepsis on Monday.

## 2025-03-29 VITALS
HEART RATE: 59 BPM | SYSTOLIC BLOOD PRESSURE: 123 MMHG | OXYGEN SATURATION: 96 % | DIASTOLIC BLOOD PRESSURE: 62 MMHG | RESPIRATION RATE: 18 BRPM | TEMPERATURE: 96.6 F

## 2025-03-29 LAB
ALBUMIN SERPL BCP-MCNC: 3.3 G/DL (ref 3.4–5)
ALP SERPL-CCNC: 47 U/L (ref 33–136)
ALT SERPL W P-5'-P-CCNC: 18 U/L (ref 7–45)
ANION GAP SERPL CALC-SCNC: 12 MMOL/L (ref 10–20)
AORTIC VALVE MEAN GRADIENT: 9 MMHG
AORTIC VALVE PEAK VELOCITY: 2.04 M/S
AST SERPL W P-5'-P-CCNC: 24 U/L (ref 9–39)
AV PEAK GRADIENT: 17 MMHG
AVA (PEAK VEL): 1.5 CM2
AVA (VTI): 1.46 CM2
BACTERIA UR CULT: NO GROWTH
BILIRUB SERPL-MCNC: 0.4 MG/DL (ref 0–1.2)
BUN SERPL-MCNC: 12 MG/DL (ref 6–23)
CALCIUM SERPL-MCNC: 8.3 MG/DL (ref 8.6–10.3)
CARDIAC TROPONIN I PNL SERPL HS: 51 NG/L (ref 0–13)
CHLORIDE SERPL-SCNC: 107 MMOL/L (ref 98–107)
CHOLEST SERPL-MCNC: 114 MG/DL (ref 0–199)
CHOLESTEROL/HDL RATIO: 3.1
CO2 SERPL-SCNC: 27 MMOL/L (ref 21–32)
CREAT SERPL-MCNC: 0.83 MG/DL (ref 0.5–1.05)
EGFRCR SERPLBLD CKD-EPI 2021: 77 ML/MIN/1.73M*2
EJECTION FRACTION APICAL 4 CHAMBER: 64.1
EJECTION FRACTION: 64 %
ERYTHROCYTE [DISTWIDTH] IN BLOOD BY AUTOMATED COUNT: 21.2 % (ref 11.5–14.5)
FERRITIN SERPL-MCNC: 26 NG/ML (ref 8–150)
GLUCOSE BLD MANUAL STRIP-MCNC: 305 MG/DL (ref 74–99)
GLUCOSE SERPL-MCNC: 77 MG/DL (ref 74–99)
HCT VFR BLD AUTO: 31.8 % (ref 36–46)
HCT VFR BLD AUTO: 34.2 % (ref 36–46)
HDLC SERPL-MCNC: 36.3 MG/DL
HGB BLD-MCNC: 9.4 G/DL (ref 12–16)
HGB BLD-MCNC: 9.7 G/DL (ref 12–16)
IRON SATN MFR SERPL: 5 % (ref 25–45)
IRON SERPL-MCNC: 15 UG/DL (ref 35–150)
LDLC SERPL CALC-MCNC: 51 MG/DL
LEFT VENTRICULAR OUTFLOW TRACT DIAMETER: 1.7 CM
MCH RBC QN AUTO: 23.1 PG (ref 26–34)
MCHC RBC AUTO-ENTMCNC: 28.4 G/DL (ref 32–36)
MCV RBC AUTO: 81 FL (ref 80–100)
MITRAL VALVE E/A RATIO: 1.03
NON HDL CHOLESTEROL: 78 MG/DL (ref 0–149)
NRBC BLD-RTO: 0 /100 WBCS (ref 0–0)
PLATELET # BLD AUTO: 299 X10*3/UL (ref 150–450)
POTASSIUM SERPL-SCNC: 4.2 MMOL/L (ref 3.5–5.3)
PROT SERPL-MCNC: 6.2 G/DL (ref 6.4–8.2)
RBC # BLD AUTO: 4.2 X10*6/UL (ref 4–5.2)
RIGHT VENTRICLE FREE WALL PEAK S': 17.3 CM/S
RIGHT VENTRICLE PEAK SYSTOLIC PRESSURE: 37.6 MMHG
SODIUM SERPL-SCNC: 142 MMOL/L (ref 136–145)
TIBC SERPL-MCNC: 297 UG/DL (ref 240–445)
TRICUSPID ANNULAR PLANE SYSTOLIC EXCURSION: 2.2 CM
TRIGL SERPL-MCNC: 133 MG/DL (ref 0–149)
UIBC SERPL-MCNC: 282 UG/DL (ref 110–370)
VLDL: 27 MG/DL (ref 0–40)
WBC # BLD AUTO: 10.8 X10*3/UL (ref 4.4–11.3)

## 2025-03-29 PROCEDURE — 2500000001 HC RX 250 WO HCPCS SELF ADMINISTERED DRUGS (ALT 637 FOR MEDICARE OP): Performed by: NURSE PRACTITIONER

## 2025-03-29 PROCEDURE — 2500000005 HC RX 250 GENERAL PHARMACY W/O HCPCS: Performed by: NURSE PRACTITIONER

## 2025-03-29 PROCEDURE — 2500000004 HC RX 250 GENERAL PHARMACY W/ HCPCS (ALT 636 FOR OP/ED): Performed by: NURSE PRACTITIONER

## 2025-03-29 PROCEDURE — 94640 AIRWAY INHALATION TREATMENT: CPT

## 2025-03-29 PROCEDURE — 97161 PT EVAL LOW COMPLEX 20 MIN: CPT | Mod: GP

## 2025-03-29 PROCEDURE — 80053 COMPREHEN METABOLIC PANEL: CPT | Performed by: NURSE PRACTITIONER

## 2025-03-29 PROCEDURE — 85014 HEMATOCRIT: CPT

## 2025-03-29 PROCEDURE — 2500000002 HC RX 250 W HCPCS SELF ADMINISTERED DRUGS (ALT 637 FOR MEDICARE OP, ALT 636 FOR OP/ED): Performed by: NURSE PRACTITIONER

## 2025-03-29 PROCEDURE — 1200000002 HC GENERAL ROOM WITH TELEMETRY DAILY

## 2025-03-29 PROCEDURE — 36415 COLL VENOUS BLD VENIPUNCTURE: CPT

## 2025-03-29 PROCEDURE — 99223 1ST HOSP IP/OBS HIGH 75: CPT | Performed by: STUDENT IN AN ORGANIZED HEALTH CARE EDUCATION/TRAINING PROGRAM

## 2025-03-29 PROCEDURE — 97165 OT EVAL LOW COMPLEX 30 MIN: CPT | Mod: GO

## 2025-03-29 PROCEDURE — 82947 ASSAY GLUCOSE BLOOD QUANT: CPT

## 2025-03-29 PROCEDURE — 99223 1ST HOSP IP/OBS HIGH 75: CPT | Performed by: PHYSICIAN ASSISTANT

## 2025-03-29 PROCEDURE — 85027 COMPLETE CBC AUTOMATED: CPT

## 2025-03-29 PROCEDURE — 99223 1ST HOSP IP/OBS HIGH 75: CPT | Performed by: INTERNAL MEDICINE

## 2025-03-29 PROCEDURE — 83540 ASSAY OF IRON: CPT | Performed by: PHYSICIAN ASSISTANT

## 2025-03-29 PROCEDURE — S4991 NICOTINE PATCH NONLEGEND: HCPCS | Performed by: NURSE PRACTITIONER

## 2025-03-29 PROCEDURE — P9016 RBC LEUKOCYTES REDUCED: HCPCS

## 2025-03-29 PROCEDURE — 80061 LIPID PANEL: CPT | Performed by: NURSE PRACTITIONER

## 2025-03-29 PROCEDURE — 36430 TRANSFUSION BLD/BLD COMPNT: CPT

## 2025-03-29 PROCEDURE — 83550 IRON BINDING TEST: CPT | Performed by: PHYSICIAN ASSISTANT

## 2025-03-29 PROCEDURE — 84484 ASSAY OF TROPONIN QUANT: CPT | Performed by: NURSE PRACTITIONER

## 2025-03-29 PROCEDURE — 2500000002 HC RX 250 W HCPCS SELF ADMINISTERED DRUGS (ALT 637 FOR MEDICARE OP, ALT 636 FOR OP/ED): Performed by: STUDENT IN AN ORGANIZED HEALTH CARE EDUCATION/TRAINING PROGRAM

## 2025-03-29 PROCEDURE — 85018 HEMOGLOBIN: CPT

## 2025-03-29 RX ORDER — PANTOPRAZOLE SODIUM 40 MG/10ML
40 INJECTION, POWDER, LYOPHILIZED, FOR SOLUTION INTRAVENOUS
Status: DISPENSED | OUTPATIENT
Start: 2025-03-30

## 2025-03-29 RX ORDER — IPRATROPIUM BROMIDE AND ALBUTEROL SULFATE 2.5; .5 MG/3ML; MG/3ML
3 SOLUTION RESPIRATORY (INHALATION)
Status: DISPENSED | OUTPATIENT
Start: 2025-03-29

## 2025-03-29 RX ORDER — PANTOPRAZOLE SODIUM 40 MG/1
40 TABLET, DELAYED RELEASE ORAL
Status: DISPENSED | OUTPATIENT
Start: 2025-03-30

## 2025-03-29 RX ADMIN — AZITHROMYCIN MONOHYDRATE 500 MG: 500 INJECTION, POWDER, LYOPHILIZED, FOR SOLUTION INTRAVENOUS at 14:35

## 2025-03-29 RX ADMIN — IPRATROPIUM BROMIDE AND ALBUTEROL SULFATE 3 ML: .5; 3 SOLUTION RESPIRATORY (INHALATION) at 11:18

## 2025-03-29 RX ADMIN — ROSUVASTATIN CALCIUM 40 MG: 10 TABLET, FILM COATED ORAL at 09:16

## 2025-03-29 RX ADMIN — Medication 36 PERCENT: at 07:08

## 2025-03-29 RX ADMIN — FLUTICASONE FUROATE AND VILANTEROL TRIFENATATE 1 PUFF: 200; 25 POWDER RESPIRATORY (INHALATION) at 06:56

## 2025-03-29 RX ADMIN — GABAPENTIN 300 MG: 300 CAPSULE ORAL at 20:54

## 2025-03-29 RX ADMIN — OLANZAPINE 15 MG: 5 TABLET, FILM COATED ORAL at 20:54

## 2025-03-29 RX ADMIN — METHYLPREDNISOLONE SODIUM SUCCINATE 40 MG: 40 INJECTION, POWDER, FOR SOLUTION INTRAMUSCULAR; INTRAVENOUS at 09:15

## 2025-03-29 RX ADMIN — ACETAMINOPHEN 975 MG: 325 TABLET, FILM COATED ORAL at 09:18

## 2025-03-29 RX ADMIN — GABAPENTIN 300 MG: 300 CAPSULE ORAL at 14:15

## 2025-03-29 RX ADMIN — IRON SUCROSE 200 MG: 20 INJECTION, SOLUTION INTRAVENOUS at 10:39

## 2025-03-29 RX ADMIN — TIOTROPIUM BROMIDE INHALATION SPRAY 2 PUFF: 3.12 SPRAY, METERED RESPIRATORY (INHALATION) at 06:57

## 2025-03-29 RX ADMIN — GABAPENTIN 300 MG: 300 CAPSULE ORAL at 09:16

## 2025-03-29 RX ADMIN — ONDANSETRON 4 MG: 2 INJECTION INTRAMUSCULAR; INTRAVENOUS at 05:13

## 2025-03-29 RX ADMIN — SERTRALINE HYDROCHLORIDE 100 MG: 50 TABLET ORAL at 09:16

## 2025-03-29 RX ADMIN — IPRATROPIUM BROMIDE AND ALBUTEROL SULFATE 3 ML: .5; 3 SOLUTION RESPIRATORY (INHALATION) at 07:04

## 2025-03-29 RX ADMIN — Medication 4 L/MIN: at 20:55

## 2025-03-29 RX ADMIN — METHYLPREDNISOLONE SODIUM SUCCINATE 40 MG: 40 INJECTION, POWDER, FOR SOLUTION INTRAMUSCULAR; INTRAVENOUS at 01:08

## 2025-03-29 RX ADMIN — SERTRALINE HYDROCHLORIDE 100 MG: 50 TABLET ORAL at 20:54

## 2025-03-29 RX ADMIN — NICOTINE 1 PATCH: 21 PATCH, EXTENDED RELEASE TRANSDERMAL at 09:16

## 2025-03-29 ASSESSMENT — COGNITIVE AND FUNCTIONAL STATUS - GENERAL
DAILY ACTIVITIY SCORE: 17
STANDING UP FROM CHAIR USING ARMS: A LITTLE
TOILETING: A LOT
DRESSING REGULAR LOWER BODY CLOTHING: A LOT
CLIMB 3 TO 5 STEPS WITH RAILING: TOTAL
MOVING TO AND FROM BED TO CHAIR: A LITTLE
CLIMB 3 TO 5 STEPS WITH RAILING: A LITTLE
PERSONAL GROOMING: A LITTLE
STANDING UP FROM CHAIR USING ARMS: A LOT
DAILY ACTIVITIY SCORE: 19
WALKING IN HOSPITAL ROOM: A LITTLE
WALKING IN HOSPITAL ROOM: TOTAL
TOILETING: A LOT
MOBILITY SCORE: 15
HELP NEEDED FOR BATHING: A LITTLE
MOVING TO AND FROM BED TO CHAIR: A LITTLE
DRESSING REGULAR UPPER BODY CLOTHING: A LITTLE
HELP NEEDED FOR BATHING: A LOT
MOBILITY SCORE: 20
DRESSING REGULAR UPPER BODY CLOTHING: A LITTLE

## 2025-03-29 ASSESSMENT — ENCOUNTER SYMPTOMS
ARTHRALGIAS: 0
SLEEP DISTURBANCE: 0
WOUND: 0
WEAKNESS: 1
EYE PAIN: 0
SHORTNESS OF BREATH: 1
NEUROLOGICAL NEGATIVE: 1
COUGH: 0
FEVER: 0
GASTROINTESTINAL NEGATIVE: 1
HEADACHES: 0
HEMATOLOGIC/LYMPHATIC NEGATIVE: 1
COLOR CHANGE: 1
POLYPHAGIA: 0
NUMBNESS: 0
ENDOCRINE NEGATIVE: 1
UNEXPECTED WEIGHT CHANGE: 1
CHILLS: 0
BLOOD IN STOOL: 0
MYALGIAS: 0
DYSURIA: 0
CARDIOVASCULAR NEGATIVE: 1
MUSCULOSKELETAL NEGATIVE: 1
ALLERGIC/IMMUNOLOGIC NEGATIVE: 1
LIGHT-HEADEDNESS: 0
EYES NEGATIVE: 1
TROUBLE SWALLOWING: 0
DIZZINESS: 0
SORE THROAT: 0
NERVOUS/ANXIOUS: 0
FATIGUE: 1
WHEEZING: 0
JOINT SWELLING: 0
HEMATURIA: 0
CONFUSION: 0
ABDOMINAL DISTENTION: 0
FACIAL ASYMMETRY: 0
WEAKNESS: 0
APPETITE CHANGE: 1
PSYCHIATRIC NEGATIVE: 1

## 2025-03-29 ASSESSMENT — ACTIVITIES OF DAILY LIVING (ADL)
ADL_ASSISTANCE: INDEPENDENT
ADLS_ADDRESSED: YES

## 2025-03-29 ASSESSMENT — PAIN - FUNCTIONAL ASSESSMENT
PAIN_FUNCTIONAL_ASSESSMENT: 0-10

## 2025-03-29 ASSESSMENT — PAIN DESCRIPTION - LOCATION: LOCATION: ABDOMEN

## 2025-03-29 ASSESSMENT — PAIN SCALES - GENERAL
PAINLEVEL_OUTOF10: 0 - NO PAIN
PAINLEVEL_OUTOF10: 3
PAINLEVEL_OUTOF10: 6
PAINLEVEL_OUTOF10: 0 - NO PAIN

## 2025-03-29 NOTE — CONSULTS
Department of Internal Medicine  Gastroenterology  Consult note      Reason for Consult: Anemia needing transfusion, stool occult pending    Chief Complaint: Shortness of breath, weakness    History Obtained from: chart review and patient reports    History of Present Illness      The patient is a 69 y.o. female with signficant past medical history of hypertension, hyperlipidemia, CAD with FCO 2x, severe PAD (aortic aneurysm repair, intracranial aneurysm coiling, iliac grafts, fem-fem graft, multiple lower extremity stents and angioplasties - [place] [facility] [place] D[place], A-fib, COPD with home O2, lung cancer with mets, and beta thalassemia who presents for shortness of breath and weakness. Of note she was recently admitted with sepsis with source suspected to be UTI vs soft tissue (3/14-19/25).    She endorses dysphagia for the past few days mostly with solids.  She is able to wash down with water.    Patient denies abdominal pain, nausea, vomiting, odynophagia, GERD, constipation, diarrhea, melena, or hematochezia. They normally have 1 bowel movements a day of normal caliber and color    She endorses use of tobacco half pack a day.  She denies use of alcohol or recreational drugs.  She takes Plavix and Eliquis with her last dose of Plavix being 3/20/2020 5 AM.      She denies any history of abdominal surgeries and states her last colonoscopy was greater than 10 years ago.    Allergies  Methylprednisolone, Ace inhibitors, Prednisone, Betamethasone dipropionate, and Meperidine    Current Medication    Current Facility-Administered Medications:     acetaminophen (Tylenol) tablet 975 mg, 975 mg, oral, q6h PRN, SHRUTHI Brennan    albuterol 2.5 mg /3 mL (0.083 %) nebulizer solution 2.5 mg, 2.5 mg, nebulization, q2h PRN, SHRUTHI Brennan    [Held by provider] apixaban (Eliquis) tablet 5 mg, 5 mg, oral, BID, SHRUTHI Brennan, 5 mg at 03/28/25 2033    azithromycin (Zithromax) 500 mg in  dextrose 5%  mL, 500 mg, intravenous, q24h, SHRUTHI Brennan    [Held by provider] clopidogrel (Plavix) tablet 75 mg, 75 mg, oral, q AM, SHRUTHI Brennan    [Held by provider] dexAMETHasone (Decadron) tablet 2 mg, 2 mg, oral, Daily, SHRUTHI Brennan    tiotropium (Spiriva Respimat) 2.5 mcg/actuation inhaler 2 puff, 2 puff, inhalation, Daily, 2 puff at 03/29/25 0657 **AND** fluticasone furoate-vilanteroL (Breo Ellipta) 200-25 mcg/dose inhaler 1 puff, 1 puff, inhalation, Daily, SHRUTHI Brennan, 1 puff at 03/29/25 0656    gabapentin (Neurontin) capsule 300 mg, 300 mg, oral, TID, SHRUTHI Brennan, 300 mg at 03/28/25 2033    guaiFENesin (Mucinex) 12 hr tablet 600 mg, 600 mg, oral, BID PRN, SHRUTHI Brennan    [Held by provider] HYDROcodone-acetaminophen (Norco)  mg per tablet 1 tablet, 1 tablet, oral, q6h PRN, SHRUTHI Brennan    hydrOXYzine pamoate (Vistaril) capsule 25 mg, 25 mg, oral, TID PRN, SHRUTHI Brennan    ipratropium-albuteroL (Duo-Neb) 0.5-2.5 mg/3 mL nebulizer solution 3 mL, 3 mL, nebulization, TID, Nico Ice, DO, 3 mL at 03/29/25 0704    [Held by provider] LORazepam (Ativan) tablet 0.5 mg, 0.5 mg, oral, q6h PRN, SHRUTHI Brennan    [Held by provider] lubiprostone (Amitiza) capsule 24 mcg, 24 mcg, oral, BID, SHRUTHI Brennan    methylPREDNISolone sod succinate (SOLU-Medrol) 40 mg/mL injection 40 mg, 40 mg, intravenous, q8h, SHRUTHI Brennan, 40 mg at 03/29/25 0108    nicotine (Nicoderm CQ) 21 mg/24 hr patch 1 patch, 1 patch, transdermal, Daily, SHRUTHI Brennan    OLANZapine (ZyPREXA) tablet 15 mg, 15 mg, oral, Nightly, SHRUTHI Brennan, 15 mg at 03/28/25 2033    ondansetron (Zofran) injection 4 mg, 4 mg, intravenous, q6h PRN, SHRUTHI Brennan, 4 mg at 03/29/25 0513    oxygen (O2) therapy, , inhalation, Continuous, SHRUTHI Brennan    oxygen (O2) therapy, , inhalation, Continuous -  Inhalation, SHRUTHI Brennan, Last Rate: 240,000 mL/hr at 03/28/25 2035, 36 percent at 03/29/25 0708    pneumococcal conjugate 20-valent (PREVNAR 20) vaccine, 0.5 mL, intramuscular, During hospitalization, SHRUTHI Brennan    rosuvastatin (Crestor) tablet 40 mg, 40 mg, oral, Daily, SHRUTHI Brennan, 40 mg at 03/28/25 2033    sertraline (Zoloft) tablet 100 mg, 100 mg, oral, BID, SHRUTHI Brennan, 100 mg at 03/28/25 2033    Past Medical History  Active Ambulatory Problems     Diagnosis Date Noted    Angioedema 03/24/2023    Anxiety 03/24/2023    CAD (coronary artery disease) 03/24/2023    Cellulitis of left lower extremity 03/24/2023    COVID-19 03/24/2023    Peripheral vascular disease (CMS-HCC) 03/24/2023    Insomnia 03/24/2023    Intermittent claudication (CMS-HCC) 03/24/2023    Leg pain 03/24/2023    Liver lesion 03/24/2023    Paresthesias 03/24/2023    Vitamin D deficiency 03/24/2023    S/P arteriogram of extremity 03/24/2023    Lung cancer (Multi) 09/21/2023    Pneumonia due to gram-negative bacteria (Multi) 10/22/2023    Other specified anemias 10/25/2023    Abdominal aortic aneurysm 10/25/2023    Abdominal pain 10/25/2023    Adrenal insufficiency (Multi) 10/25/2023    Aneurysm of iliac artery (CMS-HCC) 10/25/2023    Arthritis 10/25/2023    Colitis 10/25/2023    Constipation 10/25/2023    Loss of appetite 10/04/2023    Dehydration 10/25/2023    Dyspnea on exertion 10/25/2023    Generalized weakness 10/25/2023    Mixed hyperlipidemia 10/10/2014    Fibroids 10/25/2023    Leiomyoma 10/25/2023    Mixed anxiety depressive disorder 10/25/2023    Myocardial infarction (Multi) 09/01/2016    STEMI (ST elevation myocardial infarction) (Multi) 08/08/2016    Pain due to neoplasm 09/27/2023    Nephrolithiasis 10/25/2023    Nonruptured cerebral aneurysm ([facility]-HCC) 10/30/2009    Tobacco dependence syndrome 10/25/2023    Hypertension 10/25/2023    Hypotension 10/25/2023    Claudication 10/25/2023     Metastasis to adrenal gland (Multi) 01/05/2024    Moderate protein-calorie malnutrition (Multi) 01/05/2024    Contact with and (suspected) exposure to covid-19 09/02/2023    Cough 07/28/2023    Current smoker 07/28/2023    Dilation of renal shen 01/11/2024    Hyperglycemia 01/11/2024    Physical deconditioning 07/28/2023    Thyroid nodule 01/11/2024    Occlusion of left popliteal artery (CMS-HCC) 01/11/2024    COPD exacerbation (Multi) 03/11/2024    Acute on chronic hypoxic respiratory failure (Multi) 04/25/2024    Hyperkalemia 04/29/2024    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 (Multi) 04/25/2024    Foot drop, left 06/29/2024    Atherosclerosis of native arteries of extremities with rest pain, left leg (Multi) 10/17/2024    Pneumonia of left lung due to infectious organism, unspecified part of lung 12/02/2024    Non-pressure chronic ulcer of other part of left foot with unspecified severity (Multi) 02/04/2025    Beta thalassemia (Multi) 02/04/2025    Shortness of breath 03/14/2025     Resolved Ambulatory Problems     Diagnosis Date Noted    Abnormal ultrasound of abdomen 03/24/2023    COPD (chronic obstructive pulmonary disease) (Multi) 03/24/2023    Aortic aneurysm 10/25/2023    Malignant neoplasm of unknown origin (Multi) 10/25/2023    Nausea and vomiting 10/25/2023    Arteriosclerosis of coronary artery 10/25/2023    Aneurysm 10/10/2014    History of aortic aneurysm 01/11/2024    History of myocardial infarction 07/28/2023    History of peripheral vascular disease 07/28/2023    Noncompliance with medication regimen 09/02/2023    Pneumonia due to infectious organism 10/22/2023    Aneurysm of artery of neck 10/10/2014    Critical limb ischemia of left lower extremity (Multi) 01/18/2024    PAD (peripheral artery disease) (CMS-HCC) 01/23/2024    Adrenal carcinoma (Multi) 04/25/2024    Acute lower limb ischemia 06/11/2024    Chronic hypoxemic respiratory failure (Multi) 06/29/2024    Type 2 diabetes mellitus  with diabetic peripheral angiopathy without gangrene, without long-term current use of insulin (Multi) 02/04/2025     Past Medical History:   Diagnosis Date    Adrenal cancer (Multi)     Aneurysm of carotid artery (CMS-HCC)     Depression     DVT (deep venous thrombosis) (Multi)     DVT (deep venous thrombosis) (Multi)     History of blood transfusion     History of tubal ligation     Hyperlipidemia     Old myocardial infarction     Other specified disorders of kidney and ureter     Personal history of other diseases of the circulatory system     Personal history of other diseases of the circulatory system     Personal history of other diseases of the respiratory system     Personal history of urinary calculi     Right upper quadrant pain 09/25/2020    Vision loss        Past Surgical History  Past Surgical History:   Procedure Laterality Date    COLONOSCOPY      CT ABDOMEN PELVIS ANGIOGRAM W AND/OR WO IV CONTRAST  11/21/2019    CT ABDOMEN PELVIS ANGIOGRAM W AND/OR WO IV CONTRAST 11/21/2019 [facility] EMERGENCY LEGACY    CT ANGIO AORTA AND BILATERAL ILIOFEMORAL RUN OFF INCLUDING WITHOUT CONTRAST IF PERFORMED  08/09/2022    CT AORTA AND BILATERAL ILIOFEMORAL RUNOFF ANGIOGRAM W AND/OR WO IV CONTRAST 8/9/2022 [facility] CLINICAL LEGACY    CT HEAD ANGIO W AND WO IV CONTRAST  05/22/2020    CT HEAD ANGIO W AND WO IV CONTRAST 5/22/2020 [facility] EMERGENCY [facility]GAC[facility]    INVASIVE VASCULAR PROCEDURE N/A 01/23/2024    Procedure: Lower Extremity Angiogram;  Surgeon: Willam Mike MD;  Location: [place] [address] Cardiac Cath Lab;  Service: Cardiovascular;  Laterality: N/A;  [id number];LLE CLTI    INVASIVE VASCULAR PROCEDURE N/A 01/23/2024    Procedure: Lower Extremity Intervention;  Surgeon: Willam Mike MD;  Location: [place] [address] Cardiac Cath Lab;  Service: Cardiovascular;  Laterality: N/A;    INVASIVE VASCULAR PROCEDURE N/A 06/25/2024    Procedure: Lower Extremity Angiogram;  Surgeon: Willam Mike MD;  Location: [place] [address] Cardiac Cath Lab;  Service: Cardiovascular;   Laterality: N/A;  6/25/24 with Cee    INVASIVE VASCULAR PROCEDURE Left 06/25/2024    Procedure: Lower Extremity Intervention;  Surgeon: Willam Mike MD;  Location: [facility] Cardiac Cath Lab;  Service: Cardiovascular;  Laterality: Left;    INVASIVE VASCULAR PROCEDURE N/A 06/25/2024    Procedure: Thrombectomy - Lower Extremity;  Surgeon: Willam Mike MD;  Location: [facility] Cardiac Cath Lab;  Service: Cardiovascular;  Laterality: N/A;    INVASIVE VASCULAR PROCEDURE N/A 06/25/2024    Procedure: Atherectomy - Lower Extremity;  Surgeon: Willam Mike MD;  Location: [place] [address] Cardiac Cath Lab;  Service: Cardiovascular;  Laterality: N/A;    INVASIVE VASCULAR PROCEDURE Left 06/25/2024    Procedure: FCO Stent - Lower Extremity;  Surgeon: Willam Mike MD;  Location: [place] [address] Cardiac Cath Lab;  Service: Cardiovascular;  Laterality: Left;    INVASIVE VASCULAR PROCEDURE N/A 06/26/2024    Procedure: Lower Extremity Intervention;  Surgeon: Bucky Lake DO;  Location: [facility] Cardiac Cath Lab;  Service: Cardiovascular;  Laterality: N/A;    INVASIVE VASCULAR PROCEDURE N/A 06/26/2024    Procedure: Lower Extremity Angiogram;  Surgeon: Bucky Lake DO;  Location: [facility] Cardiac Cath Lab;  Service: Cardiovascular;  Laterality: N/A;    OTHER SURGICAL HISTORY  09/30/2020    Tubal ligation    OTHER SURGICAL HISTORY  09/30/2020    Cardiac catheterization    TUBAL LIGATION         Family History  Family History   Problem Relation Name Age of Onset    Aneurysm Mother      Hypertension Mother      Heart attack Father      Cancer Father's Sister       No family history of stomach or colon cancer    Social History  TOBACCO:  reports that she has been smoking cigarettes. She started smoking about 50 years ago. She has a 90.2 pack-year smoking history. She has been exposed to tobacco smoke. She has never used smokeless tobacco.  ETOH:  reports no history of alcohol use.  DRUGS:  reports no history of drug use.  MARITAL STATUS:  "  OCCUPATION:    Review of Systems  Review of Systems   Constitutional:  Positive for appetite change and unexpected weight change (Unknown amount). Negative for chills and fever.   HENT:  Negative for mouth sores, sore throat and trouble swallowing.    Eyes:  Negative for pain and visual disturbance.   Respiratory:  Positive for shortness of breath. Negative for cough and wheezing.    Cardiovascular:  Negative for chest pain.   Genitourinary:  Negative for decreased urine volume, dysuria and hematuria.   Musculoskeletal:  Negative for arthralgias, joint swelling and myalgias.   Skin:  Negative for pallor and rash.   Neurological:  Positive for weakness. Negative for dizziness, numbness and headaches.   Psychiatric/Behavioral:  Negative for confusion.        PHYSICAL EXAM  VS: /55   Pulse 72   Temp 36.2 °C (97.2 °F) (Temporal)   Resp 18   Ht 1.626 m (5' 4\")   Wt 66.2 kg (145 lb 15.1 oz)   SpO2 98%   BMI 25.05 kg/m²  Body mass index is 25.05 kg/m².  Physical Exam  Constitutional:       General: She is not in acute distress.     Appearance: Normal appearance.   HENT:      Head: Normocephalic and atraumatic.      Nose:      Comments: Nasal cannula 4 L     Mouth/Throat:      Mouth: Mucous membranes are moist.      Pharynx: Oropharynx is clear.   Eyes:      General: No scleral icterus.     Extraocular Movements: Extraocular movements intact.      Conjunctiva/sclera: Conjunctivae normal.      Pupils: Pupils are equal, round, and reactive to light.   Cardiovascular:      Rate and Rhythm: Normal rate and regular rhythm.      Heart sounds: No murmur heard.  Pulmonary:      Effort: Pulmonary effort is normal.      Breath sounds: No wheezing or rhonchi.   Abdominal:      General: Bowel sounds are normal. There is no distension.      Palpations: Abdomen is soft. There is no mass.      Tenderness: There is no abdominal tenderness.      Hernia: No hernia is present.   Musculoskeletal:         General: No swelling or " deformity.   Skin:     General: Skin is warm.      Coloration: Skin is not jaundiced.      Comments: Ecchymosis on upper extremities, left lower extremity wrapped   Neurological:      General: No focal deficit present.      Mental Status: She is alert and oriented to person, place, and time.   Psychiatric:         Mood and Affect: Mood normal.          DATA  Recent blood work and relevant radiology recommend EGD and colonoscopy this admission, tentatively studies were reviewed and discussed with the patient   Results from last 7 days   Lab Units 03/28/25  2137 03/28/25  1950 03/28/25  0847   WBC AUTO x10*3/uL  --   --  13.2*   RBC AUTO x10*6/uL  --   --  3.42*   HEMOGLOBIN g/dL 6.6*   < > 6.9*   HEMATOCRIT % 23.6*   < > 26.4*   MCV fL  --   --  77*   MCHC g/dL  --   --  26.1*   RDW %  --   --  20.7*   PLATELETS AUTO x10*3/uL  --   --  359    < > = values in this interval not displayed.       Results from last 72 hours   Lab Units 03/28/25  0847   SODIUM mmol/L 142   POTASSIUM mmol/L 4.0   CHLORIDE mmol/L 107   CO2 mmol/L 29   BUN mg/dL 12   CREATININE mg/dL 0.81   CALCIUM mg/dL 8.4*   PROTEIN TOTAL g/dL 6.3*   BILIRUBIN TOTAL mg/dL 0.4   ALK PHOS U/L 56   AST U/L 14   ALT U/L 16       Results from last 72 hours   Lab Units 03/28/25  0848   INR  1.4*             RADIOLOGY REVIEW  CT angio chest for pulmonary edema 3/28/2025  IMPRESSION:  1. No evidence of acute pulmonary embolism.  2. Hyperinflation and chronic lung changes with apparently enlarging small nodules suspicious for worsening neoplasm.  3. Multiple additional nonacute findings as described above.    ENDOSCOPIC REVIEW  NA    IMPRESSION/RECOMMENDATIONS  The patient is a 69 y.o. female with signficant past medical history of hypertension, hyperlipidemia, CAD with FCO 2x, severe PAD (aortic aneurysm repair, intracranial aneurysm coiling, iliac grafts, fem-fem graft, multiple lower extremity stents and angioplasties - most recently on 2/12/25 at [facility] [place]), DVT,  A-fib, COPD with home O2, lung cancer with mets, and beta thalassemia who presents for shortness of breath and weakness. Of note she was recently admitted with sepsis with source suspected to be UTI vs soft tissue (3/14-19/25).    Acute on chronic microcytic anemia- hgb (3/28) 6.6, baseline 10-11, s/p 3u PRBC   Chronic anticoagulation and antiplatelet Eliquis and Plavix, last dose 3/28 AM  COPD exacerbation currently on 4 L nasal cannula    PLAN  -Recommend EGD and colonoscopy this admission possibly 4/1 or 4/2 pending respiratory status and allowance for adequate time with holding OAC's,  -Will check iron and ferritin  -Will start pantoprazole 40 daily  -Currently on azithromycin  -Currently on cardiac diet  -Continue supportive care per primary team    Discussed with Dr. Ashley, GI to follow    (Electronically signed byDeidre Rolon PA-C on 3/29/2025 at 8:38 AM)    Inpatient consult to Gastroenterology  Consult performed by: Deidre Rolon PA-C  Consult ordered by: Kimani Perkins PA-C

## 2025-03-29 NOTE — CONSULTS
Inpatient consult to Podiatry  Consult performed by: Renata Delvalle DPM  Consult ordered by: KERA Lynn-MARYBEL          Reason For Consult  S/p left 2nd, 3rd, 4th amputation DOS 3/12/25 by Dr. Doss    History Of Present Illness  Radha Toussaint is a 69 y.o. female came to ED for SOB/generalized weakness. Admitted for acute on chronic hypoxic respiratory failure. We are consulted for left digital amputation by Dr. Doss 3/12/2025. Patient was recently discharged from the hospital 3/19/25 for UTI vs soft tissue infection.     Patient states she just saw Dr. Doss in her outpatient clinic and states she has been compliant with her left foot post-surgical care. States her  helps with the dressing changes. States she does not have any specific concerns or discomfort to her lower extremity today. Denies further complaints at this time.     Past Medical History  She has a past medical history of Adrenal cancer (Multi), Aneurysm of carotid artery (CMS-[facility]), Anxiety, COPD (chronic obstructive pulmonary disease) (Multi), Depression, DVT (deep venous thrombosis) (Multi), DVT (deep venous thrombosis) (Multi), History of blood transfusion, History of tubal ligation, Hyperlipidemia, Old myocardial infarction, Other specified disorders of kidney and ureter, Personal history of other diseases of the circulatory system, Personal history of other diseases of the circulatory system, Personal history of other diseases of the respiratory system, Personal history of urinary calculi, Right upper quadrant pain (09/25/2020), and Vision loss.    Surgical History  She has a past surgical history that includes Other surgical history (09/30/2020); Other surgical history (09/30/2020); CT angio abdomen pelvis w and or wo IV IV contrast (11/21/2019); CT angio head w and wo IV contrast (05/22/2020); CT angio aorta and bilateral iliofemoral runoff including without contrast if performed (08/09/2022); Invasive Vascular Procedure  "(N/A, 01/23/2024); Invasive Vascular Procedure (N/A, 01/23/2024); Invasive Vascular Procedure (N/A, 06/25/2024); Invasive Vascular Procedure (Left, 06/25/2024); Invasive Vascular Procedure (N/A, 06/25/2024); Invasive Vascular Procedure (N/A, 06/25/2024); Invasive Vascular Procedure (Left, 06/25/2024); Invasive Vascular Procedure (N/A, 06/26/2024); Invasive Vascular Procedure (N/A, 06/26/2024); Colonoscopy; and Tubal ligation.     Social History  She reports that she has been smoking cigarettes. She started smoking about 50 years ago. She has a 90.2 pack-year smoking history. She has been exposed to tobacco smoke. She has never used smokeless tobacco. She reports that she does not drink alcohol and does not use drugs.    Family History  Family History   Problem Relation Name Age of Onset    Aneurysm Mother      Hypertension Mother      Heart attack Father      Cancer Father's Sister          Allergies  Methylprednisolone, Ace inhibitors, Prednisone, Betamethasone dipropionate, and Meperidine    Review of Systems   Respiratory:  Positive for shortness of breath.    Cardiovascular:  Positive for leg swelling.   Skin:  Positive for color change.   Neurological:  Positive for weakness.   All other systems reviewed and are negative.           Physical Exam     Pt is Aox4, NAD, Cooperative, dressing noted to left foot CDI    Vascular: Pedal pulses faintly palpable. Mild edema noted b/l LE. Negative for erythema b/l LE    Neurological: Light touch intact b/l LE    Dermatologic: macerated left 3rd webspace noted. S/p partial digital amputations noted to left foot. Stable scab and dry blood noted around, no open lesions noted.     Last Recorded Vitals  Blood pressure 106/55, pulse 72, temperature 36.2 °C (97.2 °F), temperature source Temporal, resp. rate 18, height 1.626 m (5' 4\"), weight 66.2 kg (145 lb 15.1 oz), SpO2 98%.    Relevant Results  Results for orders placed or performed during the hospital encounter of 03/28/25 " (from the past 24 hours)   Troponin I, High Sensitivity   Result Value Ref Range    Troponin I, High Sensitivity 209 (HH) 0 - 13 ng/L   Transthoracic Echo (TTE) Complete   Result Value Ref Range    AV pk kerry 2.04 m/s    AV mn grad 9 mmHg    LVOT diam 1.70 cm    MV E/A ratio 1.03     Tricuspid annular plane systolic excursion 2.2 cm    LV EF 64 %    RV free wall pk S' 17.30 cm/s    RVSP 37.6 mmHg    Aortic Valve Area by Continuity of VTI 1.46 cm2    Aortic Valve Area by Continuity of Peak Velocity 1.50 cm2    AV pk grad 17 mmHg    LV A4C EF 64.1    Hemoglobin and hematocrit, blood   Result Value Ref Range    Hemoglobin 7.2 (L) 12.0 - 16.0 g/dL    Hematocrit 26.6 (L) 36.0 - 46.0 %   SST TOP   Result Value Ref Range    Extra Tube Hold for add-ons.    Troponin I, High Sensitivity   Result Value Ref Range    Troponin I, High Sensitivity 139 (HH) 0 - 13 ng/L   Hemoglobin and hematocrit, blood   Result Value Ref Range    Hemoglobin 6.6 (L) 12.0 - 16.0 g/dL    Hematocrit 23.6 (L) 36.0 - 46.0 %   PST Top   Result Value Ref Range    Extra Tube Hold for add-ons.    Prepare RBC: 2 Units   Result Value Ref Range    PRODUCT CODE K8954G31     Unit Number [id number]     Unit ABO A     Unit RH POS     XM INTEP COMP     Dispense Status TR     Blood Expiration Date 4/28/2025 11:59:00 PM EDT     PRODUCT BLOOD TYPE 6200     UNIT VOLUME 350     PRODUCT CODE T4245C40     Unit Number [id number]     Unit ABO A     Unit RH POS     XM INTEP COMP     Dispense Status IS     Blood Expiration Date 4/28/2025 11:59:00 PM EDT     PRODUCT BLOOD TYPE 6200     UNIT VOLUME 350    Lipid Panel   Result Value Ref Range    Cholesterol 114 0 - 199 mg/dL    HDL-Cholesterol 36.3 mg/dL    Cholesterol/HDL Ratio 3.1     LDL Calculated 51 <=99 mg/dL    VLDL 27 0 - 40 mg/dL    Triglycerides 133 0 - 149 mg/dL    Non HDL Cholesterol 78 0 - 149 mg/dL   Troponin I, High Sensitivity   Result Value Ref Range    Troponin I, High Sensitivity 51 (HH) 0 - 13 ng/L    CBC   Result Value Ref Range    WBC 10.8 4.4 - 11.3 x10*3/uL    nRBC 0.0 0.0 - 0.0 /100 WBCs    RBC 4.20 4.00 - 5.20 x10*6/uL    Hemoglobin 9.7 (L) 12.0 - 16.0 g/dL    Hematocrit 34.2 (L) 36.0 - 46.0 %    MCV 81 80 - 100 fL    MCH 23.1 (L) 26.0 - 34.0 pg    MCHC 28.4 (L) 32.0 - 36.0 g/dL    RDW 21.2 (H) 11.5 - 14.5 %    Platelets 299 150 - 450 x10*3/uL   Comprehensive Metabolic Panel   Result Value Ref Range    Glucose 77 74 - 99 mg/dL    Sodium 142 136 - 145 mmol/L    Potassium 4.2 3.5 - 5.3 mmol/L    Chloride 107 98 - 107 mmol/L    Bicarbonate 27 21 - 32 mmol/L    Anion Gap 12 10 - 20 mmol/L    Urea Nitrogen 12 6 - 23 mg/dL    Creatinine 0.83 0.50 - 1.05 mg/dL    eGFR 77 >60 mL/min/1.73m*2    Calcium 8.3 (L) 8.6 - 10.3 mg/dL    Albumin 3.3 (L) 3.4 - 5.0 g/dL    Alkaline Phosphatase 47 33 - 136 U/L    Total Protein 6.2 (L) 6.4 - 8.2 g/dL    AST 24 9 - 39 U/L    Bilirubin, Total 0.4 0.0 - 1.2 mg/dL    ALT 18 7 - 45 U/L   Iron and TIBC   Result Value Ref Range    Iron 15 (L) 35 - 150 ug/dL    UIBC 282 110 - 370 ug/dL    TIBC 297 240 - 445 ug/dL    % Saturation 5 (L) 25 - 45 %     *Note: Due to a large number of results and/or encounters for the requested time period, some results have not been displayed. A complete set of results can be found in Results Review.     Scheduled medications  [Held by provider] apixaban, 5 mg, oral, BID  azithromycin, 500 mg, intravenous, q24h  [Held by provider] clopidogrel, 75 mg, oral, q AM  [Held by provider] dexAMETHasone, 2 mg, oral, Daily  tiotropium, 2 puff, inhalation, Daily   And  fluticasone furoate-vilanteroL, 1 puff, inhalation, Daily  gabapentin, 300 mg, oral, TID  ipratropium-albuteroL, 3 mL, nebulization, TID  iron sucrose, 200 mg, intravenous, Daily  [Held by provider] lubiprostone, 24 mcg, oral, BID  methylPREDNISolone sodium succinate (PF), 40 mg, intravenous, q8h  nicotine, 1 patch, transdermal, Daily  OLANZapine, 15 mg, oral, Nightly  oxygen, , inhalation,  Continuous - Inhalation  pneumoc 20-william conj-dip cr(PF), 0.5 mL, intramuscular, During hospitalization  rosuvastatin, 40 mg, oral, Daily  sertraline, 100 mg, oral, BID      Continuous medications  oxygen,       PRN medications  PRN medications: acetaminophen, albuterol, guaiFENesin, HYDROcodone-acetaminophen, hydrOXYzine pamoate, LORazepam, ondansetron  Transthoracic Echo (TTE) Complete    Result Date: 3/29/2025   [facility], [address] [place] [address]           Tel [phone] and Fax [phone] TRANSTHORACIC ECHOCARDIOGRAM REPORT  Patient Name:       AALIYAH SAWANT     Mei Physician:    [id number] Shahid Aly DO Study Date:         3/28/2025           Ordering Provider:    [id number] KOHLER MRN/PID:            [id number]            Fellow: Accession#:         [id number]        Nurse: Date of Birth/Age:  1955 / 69 years Sonographer:          Hola Pastor RDCS Gender assigned at  F                   Additional Staff: Birth: Height:             162.00 cm           Admit Date:           3/27/2025 Weight:             66.00 kg            Admission Status:     Inpatient -                                                               Priority                                                               discharge BSA / BMI:          1.70 m2 / 25.15     Encounter#:           [id number]                     kg/m2 Blood Pressure:     80/46 mmHg          Department Location:  [facility] Study Type:    TRANSTHORACIC ECHO (TTE) COMPLETE Diagnosis/ICD: Shortness of breath-R06.02 Indication:    hypoxia, elevated troponin and elevated BNP CPT Code:      Echo Complete w Full Doppler-[id number] Patient History: Pertinent History: Hyperlipidemia, CAD and STEMI. Study Detail: The following Echo studies were performed:  2D, M-Mode, Doppler and               color flow. Technically challenging study due to poor acoustic               windows, patient lying in supine position and prominent lung               artifact.  PHYSICIAN INTERPRETATION: Left Ventricle: The left ventricular systolic function is normal, with a Hannah's biplane calculated ejection fraction of 64%. There are no regional left ventricular wall motion abnormalities. The left ventricular cavity size is normal. There is a false tendon visualized in the left ventricle. Spectral Doppler shows a Grade I (impaired relaxation pattern) of left ventricular diastolic filling with normal left atrial filling pressure. Left Atrium: The left atrial size is normal. Right Ventricle: The right ventricle is normal in size. There is normal right ventricular global systolic function. Right Atrium: The right atrium is normal in size. Aortic Valve: The aortic valve is trileaflet. There is evidence of mild aortic valve stenosis. The aortic valve dimensionless index is 0.64. There is no evidence of aortic valve regurgitation. The peak instantaneous gradient of the aortic valve is 17 mmHg. The mean gradient of the aortic valve is 9 mmHg. Mitral Valve: The mitral valve is moderately thickened. There is moderate mitral annular calcification. There is trace mitral valve regurgitation. Tricuspid Valve: The tricuspid valve is structurally normal. There is mild to moderate tricuspid regurgitation. The Doppler estimated RVSP is mildly elevated at 37.6 mmHg. Pulmonic Valve: The pulmonic valve is structurally normal. There is trace pulmonic valve regurgitation. Pericardium: There is no pericardial effusion noted. Aorta: The aortic root is normal. Systemic Veins: The inferior vena cava appears normal in size.  CONCLUSIONS:  1. The left ventricular systolic function is normal, with a Hannah's biplane calculated ejection fraction of 64%.  2. Spectral Doppler shows a Grade I (impaired relaxation  pattern) of left ventricular diastolic filling with normal left atrial filling pressure.  3. The mitral valve is moderately thickened.  4. There is moderate mitral annular calcification.  5. Mild to moderate tricuspid regurgitation visualized.  6. Mildly elevated right ventricular systolic pressure.  7. Mild aortic valve stenosis. QUANTITATIVE DATA SUMMARY:  2D MEASUREMENTS:          Normal Ranges: LVEDV Index:     77 ml/m2  AORTA MEASUREMENTS:         Normal Ranges: Asc Ao, d:          2.90 cm (2.1-3.4cm)  LV SYSTOLIC FUNCTION:                      Normal Ranges: EF-A4C View:    64 % (>=55%) EF-A2C View:    62 % EF-Biplane:     64 % LV EF Reported: 64 %  LV DIASTOLIC FUNCTION:           Normal Ranges: MV Peak E:             1.27 m/s  (0.7-1.2 m/s) MV Peak A:             1.23 m/s  (0.42-0.7 m/s) E/A Ratio:             1.03      (1.0-2.2) MV e'                  0.133 m/s (>8.0) MV lateral e'          0.12 m/s MV medial e'           0.14 m/s E/e' Ratio:            9.55      (<8.0)  MITRAL VALVE:          Normal Ranges: MV DT:        222 msec (150-240msec)  AORTIC VALVE:                      Normal Ranges: AoV Vmax:                2.04 m/s  (<=1.7m/s) AoV Peak P.6 mmHg (<20mmHg) AoV Mean P.0 mmHg  (1.7-11.5mmHg) LVOT Max [place]:            1.35 m/s  (<=1.1m/s) AoV VTI:                 40.80 cm  (18-25cm) LVOT VTI:                26.20 cm LVOT Diameter:           1.70 cm   (1.8-2.4cm) AoV Area, VTI:           1.46 cm2  (2.5-5.5cm2) AoV Area,Vmax:           1.50 cm2  (2.5-4.5cm2) AoV Dimensionless Index: 0.64  RIGHT VENTRICLE: RV Basal 3.25 cm RV Mid   2.61 cm RV Major 6.9 cm TAPSE:   21.8 mm RV s'    0.17 m/s  TRICUSPID VALVE/RVSP:          Normal Ranges: Peak TR Velocity:     2.94 m/s RV Syst Pressure:     38 mmHg  (< 30mmHg)  PULMONIC VALVE:          Normal Ranges: PV Max [place]:     1.2 m/s  (0.6-0.9m/s) PV Max P.8 mmHg PV Mean PG:     3.0 mmHg PV VTI:         22.75 cm  [id number]  Shahid Aly DO Electronically signed on 3/29/2025 at 8:23:00 AM  ** Final **     CT head wo IV contrast    Result Date: 3/28/2025  Interpreted By:  Paulo Simpson, STUDY: CT HEAD WO IV CONTRAST;  3/28/2025 5:09 pm   INDICATION: Signs/Symptoms:altered mental status.     COMPARISON: 03/14/2025   ACCESSION NUMBER(S): [id number]   ORDERING CLINICIAN: DOMINGO KOHLER   TECHNIQUE: Noncontrast axial CT images of head were obtained with coronal and sagittal reconstructed images.   FINDINGS: BRAIN PARENCHYMA: Chronic ischemic infarct in the inferior medial right cerebellar hemisphere in PICA territory. No acute intraparenchymal hemorrhage or parenchymal evidence of acute large territory ischemic infarct. Gray-white matter distinction is preserved. No mass-effect.   VENTRICLES and EXTRA-AXIAL SPACES: Redemonstration of aneurysm clip in the left sylvian fissure and coils in the left ambient cistern. No acute extra-axial or intraventricular hemorrhage. No effacement of cerebral sulci. The ventricles and sulci are age-concordant.   PARANASAL SINUSES/MASTOIDS:  No hemorrhage or air-fluid levels within the visualized paranasal sinuses. The mastoids are well aerated.   CALVARIUM/ORBITS: Redemonstrated left frontotemporal craniotomy with heterotopic ossification along the inner table noted. No skull fracture.  The orbits and globes are intact to the extent visualized.   EXTRACRANIAL SOFT TISSUES: No discernible acute abnormality.       No acute intracranial abnormality or significant interval change.   Redemonstration of left frontotemporal craniotomy, aneurysm clipping and coiling as described above.   Chronic right PICA territory infarct redemonstrated.   MACRO: None.   Signed by: Paulo Simpson 3/28/2025 6:21 PM Dictation workstation:   [id number]    CT angio chest for pulmonary embolism    Result Date: 3/28/2025  Interpreted By:  Jessi Benítez, STUDY: CT ANGIO CHEST FOR PULMONARY EMBOLISM;  3/28/2025 10:19 am    INDICATION: Signs/Symptoms:Shortness of breath, elevated troponin, rule out PE.     COMPARISON: 10/08/2024   ACCESSION NUMBER(S): [id number]   ORDERING CLINICIAN: MARISABEL LUNSFORD   TECHNIQUE: Helical data acquisition of the chest was obtained after intravenous administration of 75 ML Omnipaque 350, as per PE protocol. Images were reformatted in coronal and sagittal planes. Axial and coronal maximum intensity projection (MIP) images were created and reviewed.   FINDINGS: POTENTIAL LIMITATIONS OF THE STUDY: None   HEART AND VESSELS: There are no discrete filling defects within main pulmonary artery and its branches to suggest acute pulmonary embolism. Main pulmonary artery is prominent suggesting pulmonary hypertension..   The thoracic aorta normal in course and caliber. Aortic calcification. Coronary artery calcification.. Please note, the study is not optimized for evaluation of coronary arteries.   Cardiomegaly.   There is no pericardial effusion seen.   MEDIASTINUM AND TRINA, LOWER NECK AND AXILLA: The visualized thyroid gland is within normal limits. No evidence of thoracic lymphadenopathy by CT criteria. Esophagus appears within normal limits as seen.   LUNGS AND AIRWAYS: The trachea and central airways are patent. Posterior filling defect within the trachea and right mainstem bronchus likely layering debris/mucous. Follow-up as clinically warranted.   Lungs are hyperinflated. Cystic changes and air trapping. Likely bibasilar atelectasis and/or scarring. Mild bibasilar posterior pleural thickening and likely dependent atelectasis and/or minimal infiltrates. Multiple enlarging bilateral lung nodules. The largest in the right apex anteriorly is approximately 12 mm. This is more conspicuous and larger since the prior exam.   UPPER ABDOMEN: The visualized subdiaphragmatic structures demonstrate abnormalities of the pancreatic head which is suboptimally visualized in this exam. Areas of hypodensity within the pancreatic  head along with pancreatic duct dilatation and calcified pancreatic head mass. Also aneurysmal dilatation of the abdominal aorta with peripheral thrombus and aortic graft. This is incompletely included and evaluated in this exam.. Large nonobstructing left renal stone.   CHEST WALL AND OSSEOUS STRUCTURES: Chest wall is within normal limits. No acute osseous pathology.There are no suspicious osseous lesions.       1. No evidence of acute pulmonary embolism. 2. Hyperinflation and chronic lung changes with apparently enlarging small nodules suspicious for worsening neoplasm. 3. Multiple additional nonacute findings as described above.   MACRO: None   Signed by: Jessi Benítez 3/28/2025 10:54 AM Dictation workstation:   [id number]    XR chest 1 view    Result Date: 3/28/2025  Interpreted By:  Margoth Fishman, STUDY: XR CHEST 1 VIEW;  3/28/2025 8:31 am   INDICATION: Signs/Symptoms:sob.   COMPARISON: 03/14/2025   ACCESSION NUMBER(S): [id number]   ORDERING CLINICIAN: MARISABEL LUNSFORD   FINDINGS: AP radiograph of the chest was provided.       CARDIOMEDIASTINAL SILHOUETTE: Cardiomediastinal silhouette is normal in size and configuration.   LUNGS: No consolidation, pulmonary edema, pleural effusion, or pneumothorax.   ABDOMEN: No remarkable upper abdominal findings.   BONES: No acute osseous changes.       No evidence of acute cardiopulmonary process.   Signed by: Margoth Fishman 3/28/2025 8:45 AM Dictation workstation:   [id number]    XR foot left 1-2 views    Result Date: 3/15/2025  Interpreted By:  Kimani Alston, STUDY: XR FOOT LEFT 1-2 VIEWS; ;  3/15/2025 10:33 am   INDICATION: Signs/Symptoms:concern for osteomyelitis.     COMPARISON: 01/05/2024   ACCESSION NUMBER(S): [id number]   ORDERING CLINICIAN: DAVID JARQUIN   FINDINGS: Status post surgery of the 2nd 3rd and 4th digits. There is subtle soft tissue irregularity to the 3rd and 4th digits. The possibility of air within the soft tissues can not be excluded albeit part of  this may be due to air interposed within bandages.   There is no radiographic evidence of acute or chronic osteomyelitis.   Minimal soft tissue swelling of the forefoot.   5 mm plantar spur.       No stigmata of acute or chronic osteomyelitis. Soft tissue air may be accounted for by bandages although clinical inspection recommended to exclude gas-forming process.     MACRO: None   Signed by: Kimani Alston 3/15/2025 11:33 AM Dictation workstation:   [id number]    XR chest 1 view    Result Date: 3/14/2025  Interpreted By:  Go Cespedes, STUDY: XR CHEST 1 VIEW;  3/14/2025 5:00 pm   INDICATION: Signs/Symptoms:sob.   COMPARISON: Chest radiograph 12/02/2024   ACCESSION NUMBER(S): [id number]   ORDERING CLINICIAN: QUENTIN ALVAREZ   FINDINGS:     CARDIOMEDIASTINAL SILHOUETTE: Cardiomediastinal silhouette is unremarkable in size and configuration.   LUNGS: No consolidation, pneumothorax, or significant effusion.   ABDOMEN: No remarkable upper abdominal findings.   BONES: No acute osseous changes.       1.  No evidence of acute cardiopulmonary process.     Signed by: oG Cespedes 3/14/2025 5:41 PM Dictation workstation:   [id number]    CT head wo IV contrast    Result Date: 3/14/2025  Interpreted By:  Go Cespedes, STUDY: CT HEAD WO IV CONTRAST;  3/14/2025 4:52 pm   INDICATION: Signs/Symptoms:cva/ams.   COMPARISON: None.   ACCESSION NUMBER(S): [id number]   ORDERING CLINICIAN: QUENTIN ALVAREZ   TECHNIQUE: Noncontrast axial CT scan of head was performed. Angled reformats in brain and bone windows were generated. The images were reviewed in bone, brain, blood and soft tissue windows.   FINDINGS: CSF Spaces: The ventricles, sulci and basal cisterns are within normal limits. There is no extraaxial fluid collection.   Parenchyma: Parenchyma appear stable. Small focus of encephalomalacia of the inferior right medial cerebellum. The grey-white differentiation is intact. There is no mass effect or midline shift.  There is no intracranial hemorrhage.   Calvarium: No acute displaced scapular fracture. Stable postsurgical changes from prior left-sided craniotomy with similar appearing presumed aneurysm occlusion or correlating devices.   Paranasal sinuses and mastoids: Visualized paranasal sinuses and mastoids are clear.       No evidence of acute cortical infarct or intracranial hemorrhage.       MACRO: None     Signed by: Go Cespeeds 3/14/2025 5:36 PM Dictation workstation:   [id number]    FL less than 1 hour    Result Date: 3/12/2025  These images are not reportable by radiology and will not be interpreted by  Radiologists.        Assessment/Plan     S/p left 2nd, 3rd, 4th amputation DOS 3/12/25 by Dr. Doss  - PAD  - Drop foot left  - History of ulceration of left foot  - History of gangrene left foot    Patient was seen at bedside.     No leukocytosis  Mild microcytic anemia, H/H 9.7/34.2    Elevated tropnin, downtrending 209->139->51    Patient has mild maceration to left 3rd webspace, otherwise the surgical sites are stable and healing well. No daron signs of infection.    Dressing recommendation: Daily betadine wet to moist to left webspaces 1, 2, 3 and 4, light kerlix and tape. Continue this upon discharge from the hospital until seen by Dr. Doss at outpatient clinic    Weight bearing recommendation: as tolerated    No other intervention planned at this time.     Upon discharge from the hospital, patient to follow-up with Dr. Doss as scheduled or 1 week after discharge.    Case to be discussed with attending, A&P above reflects a tentative plan. Please await for the final signature from the attending physician on service.    Renata Delvalle DPM PGY-3  Podiatric Medicine & Surgery  Please contact for any questions or concerns.      SIGNATURE: Renata Delvalle DPM PGY-3 PATIENT NAME: Radha Toussaint   DATE: March 29, 2025 MRN: [id number]   TIME: 11:07 AM

## 2025-03-29 NOTE — NURSING NOTE
Pt noted with soft bp's through afternoon. Last bp at 2003 Bp 90/46 hr 75 pox 96. Manual BP 92/58. NP Leanne notified. EKG + HG + Troponin STAT ordered. Troponin trending down to 139. Pt with no c/o pain. Call light in place.

## 2025-03-29 NOTE — PROGRESS NOTES
Physical Therapy      Physical Therapy Evaluation    Patient Name: Radha Tousasint  MRN: [id number]  Today's Date: 3/29/2025   Time Calculation  Start Time: 1454  Stop Time: 1512  Time Calculation (min): 18 min  612/612-A    Assessment/Plan   PT Assessment  PT Assessment Results: Decreased strength, Decreased endurance, Decreased mobility  Rehab Prognosis: Good  Barriers to Discharge Home: Physical needs  Physical Needs: Intermittent mobility assistance needed, Intermittent ADL assistance needed  Evaluation/Treatment Tolerance: Patient limited by fatigue  Medical Staff Made Aware: Yes  Strengths: Housing layout, Support and attitude of living partners  Barriers to Participation: Comorbidities  End of Session Communication: Bedside nurse  End of Session Patient Position: Up in chair, Alarm on  IP OR SWING BED PT PLAN  Inpatient or Swing Bed: Inpatient  PT Plan  Treatment/Interventions: Bed mobility, Transfer training, Gait training, Therapeutic exercise, Therapeutic activity  PT Plan: Ongoing PT  PT Frequency: One time follow up visit  PT Discharge Recommendations: Low intensity level of continued care  PT Recommended Transfer Status: Contact guard  PT - OK to Discharge: Yes (When cleared by medical team.)    Subjective     Current Problem:  1. Shortness of breath  Transthoracic Echo (TTE) Complete    Transthoracic Echo (TTE) Complete      2. Anemia, unspecified type        3. Troponin level elevated  Transthoracic Echo (TTE) Complete    Transthoracic Echo (TTE) Complete      4. Chronic obstructive pulmonary disease, unspecified COPD type (Multi)        5. NSTEMI (non-ST elevated myocardial infarction) (Multi)  Transthoracic Echo (TTE) Complete    Transthoracic Echo (TTE) Complete        Patient Active Problem List   Diagnosis    Angioedema    Anxiety    CAD (coronary artery disease)    Cellulitis of left lower extremity    COVID-19    Peripheral vascular disease (CMS-HCC)    Insomnia    Intermittent claudication  (CMS-HCC)    Leg pain    Liver lesion    Paresthesias    Vitamin D deficiency    S/P arteriogram of extremity    Lung cancer (Multi)    Pneumonia due to gram-negative bacteria (Multi)    Other specified anemias    Abdominal aortic aneurysm    Abdominal pain    Adrenal insufficiency (Multi)    Aneurysm of iliac artery (CMS-HCC)    Arthritis    Colitis    Constipation    Loss of appetite    Dehydration    Dyspnea on exertion    Generalized weakness    Mixed hyperlipidemia    Fibroids    Leiomyoma    Mixed anxiety depressive disorder    Myocardial infarction (Multi)    STEMI (ST elevation myocardial infarction) (Multi)    Pain due to neoplasm    Nephrolithiasis    Nonruptured cerebral aneurysm (HHS-HCC)    Tobacco dependence syndrome    Hypertension    Hypotension    Claudication    Metastasis to adrenal gland (Multi)    Moderate protein-calorie malnutrition (Multi)    Contact with and (suspected) exposure to covid-19    Cough    Current smoker    Dilation of renal shen    Hyperglycemia    Physical deconditioning    Thyroid nodule    Occlusion of left popliteal artery (CMS-HCC)    COPD exacerbation (Multi)    Acute on chronic hypoxic respiratory failure (Multi)    Hyperkalemia    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 (Multi)    Foot drop, left    Atherosclerosis of native arteries of extremities with rest pain, left leg (Multi)    Pneumonia of left lung due to infectious organism, unspecified part of lung    Non-pressure chronic ulcer of other part of left foot with unspecified severity (Multi)    Beta thalassemia (Multi)    Shortness of breath    Elevated troponin    Lower extremity edema    Metabolic encephalopathy       General Visit Information:  General  Reason for Referral: PT Evala and treat: SOB, weakness, lethargy  Referred By: SHRUTHI Brennan  Co-Treatment: OT  Co-Treatment Reason: Maximize patient safety and mobility  Prior to Session Communication: Bedside nurse  Patient Position Received:  Alarm on, Bed, 3 rail up  General Comment: 69 year old female admit withc/o increased SOB, weakness and lethargy in setting of NSCLC.  Recently discharged from ICU for sepsis.    Home Living:  Home Living  Type of Home: House  Lives With: Spouse, Adult children  Home Adaptive Equipment: Hospital bed, Walker rolling or standard  Home Layout: One level  Home Access: Stairs to enter without rails  Bathroom Equipment: Bedside commode  Home Living Comments: Patient lives in house with  and daughter. 1st floor bed and bath, sleeps in hospital bed.    Prior Level of Function:  Prior Function Per Pt/Caregiver Report  Level of Commerce: Independent with ADLs and functional transfers, Needs assistance with homemaking  Receives Help From: Family  ADL Assistance: Independent  Hand Dominance: Right  Prior Function Comments: Patient reports independent with dressing, sponge bathes.  and daughter manage all household tasks, drive, shop, cook, launder.    Precautions:  Precautions  LE Weight Bearing Status: Weight Bearing as Tolerated  Medical Precautions: Fall precautions  Precautions Comment: WBAT LLE per recent partial toe amputation. Dressing with kurlex wrap in place Left foot       Objective     Pain:  Pain Assessment  Pain Assessment: 0-10  0-10 (Numeric) Pain Score: 0 - No pain    Cognition:  Cognition  Overall Cognitive Status: Within Functional Limits  Orientation Level: Oriented X4    General Assessments:  General Observation  General Observation: Patient pleasant and cooperative with treatment. No pain but continues somewaht SOB with exertion.   Activity Tolerance  Endurance: Tolerates less than 10 min exercise, no significant change in vital signs  Activity Tolerance Comments: Patient with increased SOB with increased activity  Sensation  Light Touch: No apparent deficits  Strength  Strength Comments: Overall grossly 4/5     Coordination  Movements are Fluid and Coordinated: Yes  Finger to Target:  Intact  Postural Control  Postural Control: Within Functional Limits  Posture Comment: Rounded shoulders, slight forward head.  Static Sitting Balance  Static Sitting-Level of Assistance: Distant supervision  Dynamic Sitting Balance  Dynamic Sitting-Level of Assistance: Close supervision  Static Standing Balance  Static Standing-Level of Assistance: Contact guard  Dynamic Standing Balance  Dynamic Standing-Level of Assistance: Contact guard    Functional Assessments:  ADL  ADL's Addressed: Yes  LE Dressing Assistance: Total  LE Dressing Deficit: Don/doff R sock  Bed Mobility  Bed Mobility: Yes (SBA for bed mobility)  Transfers  Transfer: Yes (CGA for sit to stand from EOB and to recliner chair)  Ambulation/Gait Training  Ambulation/Gait Training Performed: Yes (Amb 5 ft w/ ww with CGA, slow pace)          Extremity/Trunk Assessments:  RUE   RUE : Within Functional Limits  LUE   LUE: Within Functional Limits  RLE   RLE : Within Functional Limits  LLE   LLE : Within Functional Limits    Outcome Measures:     [facility] Basic Mobility  Turning from your back to your side while in a flat bed without using bedrails: None  Moving from lying on your back to sitting on the side of a flat bed without using bedrails: None  Moving to and from bed to chair (including a wheelchair): A little  Standing up from a chair using your arms (e.g. wheelchair or bedside chair): A little  To walk in hospital room: A little  Climbing 3-5 steps with railing: A little  Basic Mobility - Total Score: 20                                                             Goals:  Encounter Problems       Encounter Problems (Active)       PT Problem       PT Goal 1 (Progressing)       Start:  03/29/25    Expected End:  04/05/25       STG - Pt will transfer STS with modified independent          PT Goal 2 (Progressing)       Start:  03/29/25    Expected End:  04/05/25       STG - Pt will amb 100 ft' using ww with SBA          PT Goal 3 (Progressing)        Start:  03/29/25    Expected End:  04/05/25       STG -  Pt will navigate 4 stairs using rail with CGA             Pain - Adult            Education Documentation  Mobility Training, taught by Ileana Saldana, PT at 3/29/2025  4:50 PM.  Learner: Patient  Readiness: Acceptance  Method: Explanation  Response: Verbalizes Understanding, Demonstrated Understanding

## 2025-03-29 NOTE — PROGRESS NOTES
Occupational Therapy    Evaluation    Patient Name: Radha Toussaint  MRN: [id number]  Today's Date: 3/29/2025  Time Calculation  Start Time: 1453  Stop Time: 1515  Time Calculation (min): 22 min  612/612-A    Assessment  IP OT Assessment  OT Assessment: This hospitalization 3/28/2025:  shortness of breath, confusion, and generalized weakness. Patient alert and oriented x 3, ill-appearing, mildly tachypneic   HPI:  Recently admitted with sepsis with source suspected to be UTI vs soft tissue. Discharged 3/19/24 to home; s/p Left 2,3rd,4th toe amputation done on 3/12/25. Patient would benefit from further OT to address ADL's and functional transfers/mobility due to high risk for further falls.  Prognosis: Good  End of Session Communication: Bedside nurse  End of Session Patient Position: Up in chair, Alarm on; call-light within reach    Plan:  Treatment Interventions: ADL retraining, Functional transfer training, Patient/family training, Equipment evaluation/education, Compensatory technique education, Endurance training  OT Frequency: 3 times per week  OT Discharge Recommendations: Low intensity level of continued care  OT - OK to Discharge: Yes to next level of care when medically cleared by physician/medical team    Subjective   Current Problem:  1. Shortness of breath  Transthoracic Echo (TTE) Complete    Transthoracic Echo (TTE) Complete      2. Anemia, unspecified type        3. Troponin level elevated  Transthoracic Echo (TTE) Complete    Transthoracic Echo (TTE) Complete      4. Chronic obstructive pulmonary disease, unspecified COPD type (Multi)        5. 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 s/p chemo (last Tx 12/2023,  current Tx Keytruda q6wk), HTN, HLD, MI, CAD s/p PCI/stent, cerebral aneurysm x 2 s/p craniotomy and clipping, AAA s/p repair x 2, PAD s/p s/p revascularization  left popliteal/TPT/PT reconstruction with PTA/DCB, Supera/SES to popliteal occlusion) and s/p limb rescue x 2 (Laser atherectomy/JETI/PTA fem/pop/TPT/PT, FCO to mid SFA and JETI/PTA AT on (6/25/24 ), c/b stent occlusion s/p repeat thrombectomy on 6/26/24 (- JETI /SFA, POP PT, and TPT, FCO to BK Pop and TPT PTA AT) s/p Left 2,3rd,4th toe amputation 3/12/25, Left foot drop, COPD, chronic hypoxic respiratory failure on home supplemental oxygen (2L NC at night and as needed), nicotine dependence, beta thalassemia anemia  Co-Treatment: PT Co-eval to maximize safety during mobility tasks  Prior to Session Communication: Bedside nurse who confirmed that patient is medically stable to participate in this OT session  Patient Position Received: Alarm on  General Comment: Patient seen bedside; cooperative, motivated    Precautions:  LE Weight Bearing Status: Weight Bearing as Tolerated (per podiatry)  Medical Precautions: Fall precautions  Precautions Comment: left foot dressing-has surgical shoe at home; PIV left elbow crease    Pain:  Pain Assessment  Pain Assessment: 0-10  0-10 (Numeric) Pain Score: 0 - No pain    Objective   Cognition:  Overall Cognitive Status: Within Functional Limits  Orientation Level: Oriented X4     Home Living:  Type of Home: House  Lives With: Spouse (daughter)  Home Adaptive Equipment: Hospital bed, Walker rolling or standard  Home Layout: One level  Home Access: Stairs to enter without rails (2)  Bathroom Shower/Tub:  (sponge bathing since surgery)  Bathroom Equipment: Bedside commode  Home Living Comments: sleeps in hospital bed     Prior Function:  Level of [place]: Independent with ADLs and functional transfers ( does homemaking)  Hand Dominance: Right  Prior Function Comments:  drives, shops; history of 3 falls  within 6 months    ADL:  LE Dressing Assistance: Total  LE Dressing Deficit: Don/doff R sock  ADL Comments: To further address in OT sessions using assistive techniques/adaptive equipment to promote indep. and ease in performance    Bed Mobility/Transfers:   Bed Mobility  Bed Mobility: Yes  Bed Mobility 1  Bed Mobility 1: Supine to sitting  Level of Assistance 1:  (SBA)  Bed Mobility Comments 1: HOB elevated  Transfers  Transfer: Yes  Transfer 1  Transfer From 1: Sit to, Stand to  Transfer to 1: Bed, Chair with arms  Transfer Device 1: Walker  Transfer Level of Assistance 1: Contact guard, Minimal verbal cues    Ambulation/Gait Training:  Functional Mobility  Functional Mobility Performed: Yes  Functional Mobility 1  Device 1: Rolling walker  Assistance 1: Contact guard, Moderate verbal cues  Comments 1: few steps from hospital bed to chair    Sitting Balance:  Static Sitting Balance  Static Sitting-Balance Support: Feet supported  Static Sitting-Level of Assistance:  (SBA)    Standing Balance:  Static Standing Balance  Static Standing-Level of Assistance: Contact guard  Static Standing-Comment/Number of Minutes: fair (+)/good (-)    Sensation:  Light Touch: No apparent deficits    Extremities: RUE   RUE : Within Functional Limits and LUE   LUE: Within Functional Limits    Outcome Measures: [facility] Daily Activity  Putting on and taking off regular lower body clothing: A lot  Bathing (including washing, rinsing, drying): A lot  Putting on and taking off regular upper body clothing: A little  Toileting, which includes using toilet, bedpan or urinal: A lot  Taking care of personal grooming such as brushing teeth: None  Eating Meals: None  Daily Activity - Total Score: 17    EDUCATION:  Education Documentation  Mobility Training, taught by Mateo Simon OT at 3/29/2025  3:42 PM.  Learner: Patient  Readiness: Acceptance  Method: Explanation  Response: Verbalizes Understanding, Demonstrated Understanding, Needs  Reinforcement    Goals:   Encounter Problems       Encounter Problems (Active)       OT Goals       Patient will complete lower body bathing/dressing; toileting with minimal assist using assistive techniques/adaptive equipment as needed  (Progressing)       Start:  03/29/25    Expected End:  04/05/25            Patient will perform bed mobility and functional transfers safely with supervision: bed, chair, commode using DME as needed  (Progressing)       Start:  03/29/25    Expected End:  04/05/25            Patient will tolerate standing for 3 mins. and show overall good (-) standing balance during ADL's and functional transfers/mobility  (Progressing)       Start:  03/29/25    Expected End:  04/05/25

## 2025-03-29 NOTE — H&P
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 s/p chemo (last Tx 12/2023, current Tx Keytruda q6wk), HTN, HLD, MI, CAD s/p PCI/stent, cerebral aneurysm x 2 s/p craniotomy and clipping, AAA s/p repair x 2, PAD s/p s/p revascularization  left popliteal/TPT/PT reconstruction with PTA/DCB, Supera/SES to popliteal occlusion) and s/p limb rescue x 2 (Laser atherectomy/JETI/PTA fem/pop/TPT/PT, FCO to mid SFA and JETI/PTA AT on (6/25/24 ), c/b stent occlusion s/p repeat thrombectomy on 6/26/24 (- JETI /SFA, POP PT, and TPT, FCO to BK Pop and TPT PTA AT) s/p Left 2,3rd,4th toe amputation 3/12/25, Left foot drop, COPD, chronic hypoxic respiratory failure on home supplemental oxygen (2L NC at night and as needed), nicotine dependence, beta thalassemia anemia. Recently admitted with sepsis with source suspected to be UTI vs soft tissue. Discharged 3/19/24 to home. Presents today with shortness of breath, confusion, and generalized weakness.  Patient alert and oriented x 3, ill-appearing, mildly tachypneic.  Daughter at bedside and assists with history.  She reports that following her recent discharge she was doing well, was up walking yesterday with a walker, then around 4 AM patient woke up very short of breath, weak, confused, and reports that her speech was garbled.  Patient symptoms continued and they called EMS.  Patient reports the patient has had no other strokelike symptoms.  Was positive for occasional lightheadedness, mostly with position changes. Denies, chills, acute vision or hearing changes, sore throat, problems swallowing, chest pains, palpitations, nausea, vomiting, abdominal pain, change in bowel pattern, black or bloody stools, and urinary complaints.  Reports pulmonary status is near baseline, she is on 2 L nasal cannula supplemental oxygen, patient has auditory wheezes at bedside.  Has a cough that is nonproductive.  Daughter  reports patient is having 3-4 bowel movements a day soft.  Here alcohol about 1    ER Course: Blood pressure soft in presentation, mildly tachypneic.  Afebrile. WBC's 13.2, hgb 6.9/Hct 26.4, plt 359. INR 1.4. Calcium 8.4, albumin 3.3, CMP otherwise normal. Lactate 0.9, . High sensitivity troponin 86-repeat 119. UA Nitrite negative, 25 Leuk esterase, 3+ blood.  CTA of the chest negative for acute pulmonary embolism, hyperinflation and chronic lung changes, large and small nodules suspicious for worsening neoplasm. VBG ->PH 7.35, pCO2 55, pO2 33. Received aspirin 325 mg, DuoNebs, and 1 L fluids.    Past Medical History: as above  Past Surgical History: As above: [facility] with PCI, fem-fem bypass, L MCA aneurysm s/p craniotomy and clipping, AAA graft and stent for endoleak repair, tubal ligation, bladder lift, lithotripsy, Left 2,3rd,4th toe amputation  Social History: 1 1/2 -2 ppd x 47 years, denies EtOH, and illicit drug use. Retired teacher.  Lives with her , son and daughter-in-law and their children.  Family History: HTN, HLD, CAD     Past Medical History  Past Medical History:   Diagnosis Date    Adrenal cancer (Multi)     Aneurysm of carotid artery (CMS-HCC)     Neck aneurysm    Anxiety     COPD (chronic obstructive pulmonary disease) (Multi)     Depression     DVT (deep venous thrombosis) (Multi)     2022    DVT (deep venous thrombosis) (Multi)     RLE    History of blood transfusion     History of tubal ligation     Hyperlipidemia     Old myocardial infarction     History of heart attack    Other specified disorders of kidney and ureter     Obstruction of kidney    Personal history of other diseases of the circulatory system     History of intracranial aneurysm    Personal history of other diseases of the circulatory system     History of abdominal aortic aneurysm (AAA)    Personal history of other diseases of the respiratory system     History of chronic obstructive lung disease    Personal history  of urinary calculi     History of kidney stones    Right upper quadrant pain 09/25/2020    Abdominal pain, RUQ (right upper quadrant)    Vision loss        Surgical History  Past Surgical History:   Procedure Laterality Date    COLONOSCOPY      CT ABDOMEN PELVIS ANGIOGRAM W AND/OR WO IV CONTRAST  11/21/2019    CT ABDOMEN PELVIS ANGIOGRAM W AND/OR WO IV CONTRAST 11/21/2019 [facility] EMERGENCY LEGACY    CT ANGIO AORTA AND BILATERAL ILIOFEMORAL RUN OFF INCLUDING WITHOUT CONTRAST IF PERFORMED  08/09/2022    CT AORTA AND BILATERAL ILIOFEMORAL RUNOFF ANGIOGRAM W AND/OR WO IV CONTRAST 8/9/2022 [facility] CLINICAL LEGACY    CT HEAD ANGIO W AND WO IV CONTRAST  05/22/2020    CT HEAD ANGIO W AND WO IV CONTRAST 5/22/2020 [facility] EMERGENCY LEGAC[facility]    INVASIVE VASCULAR PROCEDURE N/A 01/23/2024    Procedure: Lower Extremity Angiogram;  Surgeon: Willam Mike MD;  Location: [place] [address] Cardiac Cath Lab;  Service: Cardiovascular;  Laterality: N/A;  [id number];LLE CLTI    INVASIVE VASCULAR PROCEDURE N/A 01/23/2024    Procedure: Lower Extremity Intervention;  Surgeon: Willam Mike MD;  Location: [place] [address] Cardiac Cath Lab;  Service: Cardiovascular;  Laterality: N/A;    INVASIVE VASCULAR PROCEDURE N/A 06/25/2024    Procedure: Lower Extremity Angiogram;  Surgeon: Willam Mike MD;  Location: [place] [address] Cardiac Cath Lab;  Service: Cardiovascular;  Laterality: N/A;  6/25/24 with Cee    INVASIVE VASCULAR PROCEDURE Left 06/25/2024    Procedure: Lower Extremity Intervention;  Surgeon: Willam Mike MD;  Location: [place] [address] Cardiac Cath Lab;  Service: Cardiovascular;  Laterality: Left;    INVASIVE VASCULAR PROCEDURE N/A 06/25/2024    Procedure: Thrombectomy - Lower Extremity;  Surgeon: Willam Mike MD;  Location: [place] [address] Cardiac Cath Lab;  Service: Cardiovascular;  Laterality: N/A;    INVASIVE VASCULAR PROCEDURE N/A 06/25/2024    Procedure: Atherectomy - Lower Extremity;  Surgeon: Willam Mike MD;  Location: [place] [address] Cardiac Cath Lab;  Service: Cardiovascular;  Laterality: N/A;     INVASIVE VASCULAR PROCEDURE Left 06/25/2024    Procedure: FCO Stent - Lower Extremity;  Surgeon: Willam Mike MD;  Location: [place] [address] Cardiac Cath Lab;  Service: Cardiovascular;  Laterality: Left;    INVASIVE VASCULAR PROCEDURE N/A 06/26/2024    Procedure: Lower Extremity Intervention;  Surgeon: Bucky Lake DO;  Location: [address] Cardiac Cath Lab;  Service: Cardiovascular;  Laterality: N/A;    INVASIVE VASCULAR PROCEDURE N/A 06/26/2024    Procedure: Lower Extremity Angiogram;  Surgeon: Bucky Lake DO;  Location: [address] Cardiac Cath Lab;  Service: Cardiovascular;  Laterality: N/A;    OTHER SURGICAL HISTORY  09/30/2020    Tubal ligation    OTHER SURGICAL HISTORY  09/30/2020    Cardiac catheterization    TUBAL LIGATION          Social History  She reports that she has been smoking cigarettes. She started smoking about 50 years ago. She has a 90.2 pack-year smoking history. She has been exposed to tobacco smoke. She has never used smokeless tobacco. She reports that she does not drink alcohol and does not use drugs.    Family History  Family History   Problem Relation Name Age of Onset    Aneurysm Mother      Hypertension Mother      Heart attack Father      Cancer Father's Sister          Allergies  Methylprednisolone, Ace inhibitors, Prednisone, Betamethasone dipropionate, and Meperidine    Review of Systems   Constitutional:  Positive for fatigue.   HENT: Negative.     Eyes: Negative.    Respiratory:  Positive for shortness of breath.    Cardiovascular: Negative.    Gastrointestinal: Negative.  Negative for abdominal distention and blood in stool.   Endocrine: Negative.  Negative for polyphagia.   Genitourinary: Negative.    Musculoskeletal: Negative.    Skin: Negative.  Negative for pallor, rash and wound.   Allergic/Immunologic: Negative.    Neurological: Negative.  Negative for dizziness, facial asymmetry, weakness and light-headedness.   Hematological: Negative.     Psychiatric/Behavioral: Negative.  Negative for confusion, sleep disturbance and suicidal ideas. The patient is not nervous/anxious.    All other systems reviewed and are negative.      10 point ROS Negative except as noted above.  He has a CDU right they are     Physical Exam  Constitutional:       Appearance: She is ill-appearing.   HENT:      Head: Atraumatic.      Nose: Nose normal.      Mouth/Throat:      Mouth: Mucous membranes are dry.   Eyes:      Conjunctiva/sclera: Conjunctivae normal.   Cardiovascular:      Rate and Rhythm: Normal rate and regular rhythm.      Pulses: Normal pulses.   Pulmonary:      Effort: No accessory muscle usage or respiratory distress.      Breath sounds: No rhonchi or rales.      Comments: Posterior wheezes notes   Chest:      Chest wall: No tenderness.   Abdominal:      General: Bowel sounds are normal. There is no distension.      Palpations: Abdomen is soft.      Tenderness: There is no abdominal tenderness. There is no guarding.   Musculoskeletal:         General: No signs of injury. Normal range of motion.      Right lower leg: Edema present.      Left lower leg: Edema present.      Comments: Bilateral lower extremity edema left greater than right.  Postoperative incisional wounds well-approximated and healing on the left foot.  No evidence of infection.  No significant ecchymosis or warmth.   Skin:     General: Skin is warm and dry.      Capillary Refill: Capillary refill takes less than 2 seconds.      Findings: No ecchymosis, erythema or wound.      Comments: Ecchymosis scattered over all 4 extremities.  Several border dressings covering skin tears on upper extremities.  Area of patchy ecchymosis on her mid chest and what appears to be of erythemic rash around her neck.  Cheeks appear flushed.    Neurological:      Mental Status: She is alert and oriented to person, place, and time. Mental status is at baseline.      Motor: Weakness: generalized.   Psychiatric:          "Behavior: Behavior is cooperative.          Last Recorded Vitals  Blood pressure 106/55, pulse 72, temperature 36.2 °C (97.2 °F), temperature source Temporal, resp. rate 18, height 1.626 m (5' 4\"), weight 66.2 kg (145 lb 15.1 oz), SpO2 95%.    Relevant Results        CT angio chest for pulmonary embolism    Result Date: 3/28/2025  Interpreted By:  Jessi Benítez, STUDY: CT ANGIO CHEST FOR PULMONARY EMBOLISM;  3/28/2025 10:19 am   INDICATION: Signs/Symptoms:Shortness of breath, elevated troponin, rule out PE.     COMPARISON: 10/08/2024   ACCESSION NUMBER(S): [id number]   ORDERING CLINICIAN: MARISABEL LUNSFORD   TECHNIQUE: Helical data acquisition of the chest was obtained after intravenous administration of 75 ML Omnipaque 350, as per PE protocol. Images were reformatted in coronal and sagittal planes. Axial and coronal maximum intensity projection (MIP) images were created and reviewed.   FINDINGS: POTENTIAL LIMITATIONS OF THE STUDY: None   HEART AND VESSELS: There are no discrete filling defects within main pulmonary artery and its branches to suggest acute pulmonary embolism. Main pulmonary artery is prominent suggesting pulmonary hypertension..   The thoracic aorta normal in course and caliber. Aortic calcification. Coronary artery calcification.. Please note, the study is not optimized for evaluation of coronary arteries.   Cardiomegaly.   There is no pericardial effusion seen.   MEDIASTINUM AND TRINA, LOWER NECK AND AXILLA: The visualized thyroid gland is within normal limits. No evidence of thoracic lymphadenopathy by CT criteria. Esophagus appears within normal limits as seen.   LUNGS AND AIRWAYS: The trachea and central airways are patent. Posterior filling defect within the trachea and right mainstem bronchus likely layering debris/mucous. Follow-up as clinically warranted.   Lungs are hyperinflated. Cystic changes and air trapping. Likely bibasilar atelectasis and/or scarring. Mild bibasilar posterior pleural " thickening and likely dependent atelectasis and/or minimal infiltrates. Multiple enlarging bilateral lung nodules. The largest in the right apex anteriorly is approximately 12 mm. This is more conspicuous and larger since the prior exam.   UPPER ABDOMEN: The visualized subdiaphragmatic structures demonstrate abnormalities of the pancreatic head which is suboptimally visualized in this exam. Areas of hypodensity within the pancreatic head along with pancreatic duct dilatation and calcified pancreatic head mass. Also aneurysmal dilatation of the abdominal aorta with peripheral thrombus and aortic graft. This is incompletely included and evaluated in this exam.. Large nonobstructing left renal stone.   CHEST WALL AND OSSEOUS STRUCTURES: Chest wall is within normal limits. No acute osseous pathology.There are no suspicious osseous lesions.       1. No evidence of acute pulmonary embolism. 2. Hyperinflation and chronic lung changes with apparently enlarging small nodules suspicious for worsening neoplasm. 3. Multiple additional nonacute findings as described above.   MACRO: None   Signed by: Jessi Benítez 3/28/2025 10:54 AM Dictation workstation:   [id number]    XR chest 1 view    Result Date: 3/28/2025  Interpreted By:  Margoth Fishman, STUDY: XR CHEST 1 VIEW;  3/28/2025 8:31 am   INDICATION: Signs/Symptoms:sob.   COMPARISON: 03/14/2025   ACCESSION NUMBER(S): [id number]   ORDERING CLINICIAN: MARISABEL LUNSFORD   FINDINGS: AP radiograph of the chest was provided.       CARDIOMEDIASTINAL SILHOUETTE: Cardiomediastinal silhouette is normal in size and configuration.   LUNGS: No consolidation, pulmonary edema, pleural effusion, or pneumothorax.   ABDOMEN: No remarkable upper abdominal findings.   BONES: No acute osseous changes.       No evidence of acute cardiopulmonary process.   Signed by: Margoth Fishman 3/28/2025 8:45 AM Dictation workstation:   [id number]    XR foot left 1-2 views    Result Date: 3/15/2025  Interpreted By:   Kanistros, Peter, STUDY: XR FOOT LEFT 1-2 VIEWS; ;  3/15/2025 10:33 am   INDICATION: Signs/Symptoms:concern for osteomyelitis.     COMPARISON: 01/05/2024   ACCESSION NUMBER(S): [id number]   ORDERING CLINICIAN: DAVID JARQUIN   FINDINGS: Status post surgery of the 2nd 3rd and 4th digits. There is subtle soft tissue irregularity to the 3rd and 4th digits. The possibility of air within the soft tissues can not be excluded albeit part of this may be due to air interposed within bandages.   There is no radiographic evidence of acute or chronic osteomyelitis.   Minimal soft tissue swelling of the forefoot.   5 mm plantar spur.       No stigmata of acute or chronic osteomyelitis. Soft tissue air may be accounted for by bandages although clinical inspection recommended to exclude gas-forming process.     MACRO: None   Signed by: Kimani Alston 3/15/2025 11:33 AM Dictation workstation:   [id number]    XR chest 1 view    Result Date: 3/14/2025  Interpreted By:  Go Cespedes, STUDY: XR CHEST 1 VIEW;  3/14/2025 5:00 pm   INDICATION: Signs/Symptoms:sob.   COMPARISON: Chest radiograph 12/02/2024   ACCESSION NUMBER(S): [id number]   ORDERING CLINICIAN: QUENTIN ALVAREZ   FINDINGS:     CARDIOMEDIASTINAL SILHOUETTE: Cardiomediastinal silhouette is unremarkable in size and configuration.   LUNGS: No consolidation, pneumothorax, or significant effusion.   ABDOMEN: No remarkable upper abdominal findings.   BONES: No acute osseous changes.       1.  No evidence of acute cardiopulmonary process.     Signed by: Go Cespedes 3/14/2025 5:41 PM Dictation workstation:   [id number]    CT head wo IV contrast    Result Date: 3/14/2025  Interpreted By:  Go Cespedes, STUDY: CT HEAD WO IV CONTRAST;  3/14/2025 4:52 pm   INDICATION: Signs/Symptoms:cva/ams.   COMPARISON: None.   ACCESSION NUMBER(S): [id number]   ORDERING CLINICIAN: QUENTIN ALVAREZ   TECHNIQUE: Noncontrast axial CT scan of head was performed. Angled reformats in brain  and bone windows were generated. The images were reviewed in bone, brain, blood and soft tissue windows.   FINDINGS: CSF Spaces: The ventricles, sulci and basal cisterns are within normal limits. There is no extraaxial fluid collection.   Parenchyma: Parenchyma appear stable. Small focus of encephalomalacia of the inferior right medial cerebellum. The grey-white differentiation is intact. There is no mass effect or midline shift. There is no intracranial hemorrhage.   Calvarium: No acute displaced scapular fracture. Stable postsurgical changes from prior left-sided craniotomy with similar appearing presumed aneurysm occlusion or correlating devices.   Paranasal sinuses and mastoids: Visualized paranasal sinuses and mastoids are clear.       No evidence of acute cortical infarct or intracranial hemorrhage.       MACRO: None     Signed by: Go Cespedes 3/14/2025 5:36 PM Dictation workstation:   [id number]    FL less than 1 hour    Result Date: 3/12/2025  These images are not reportable by radiology and will not be interpreted by  Radiologists.     Results for orders placed or performed during the hospital encounter of 03/28/25 (from the past 24 hours)   Troponin I, High Sensitivity   Result Value Ref Range    Troponin I, High Sensitivity 209 (HH) 0 - 13 ng/L   Transthoracic Echo (TTE) Complete   Result Value Ref Range    AV pk kerry 2.04 m/s    AV mn grad 9 mmHg    LVOT diam 1.70 cm    MV E/A ratio 1.03     Tricuspid annular plane systolic excursion 2.2 cm    LV EF 64 %    RV free wall pk S' 17.30 cm/s    RVSP 37.6 mmHg    Aortic Valve Area by Continuity of VTI 1.46 cm2    Aortic Valve Area by Continuity of Peak Velocity 1.50 cm2    AV pk grad 17 mmHg    LV A4C EF 64.1    Hemoglobin and hematocrit, blood   Result Value Ref Range    Hemoglobin 7.2 (L) 12.0 - 16.0 g/dL    Hematocrit 26.6 (L) 36.0 - 46.0 %   SST TOP   Result Value Ref Range    Extra Tube Hold for add-ons.    Troponin I, High Sensitivity   Result Value  Ref Range    Troponin I, High Sensitivity 139 (HH) 0 - 13 ng/L   Hemoglobin and hematocrit, blood   Result Value Ref Range    Hemoglobin 6.6 (L) 12.0 - 16.0 g/dL    Hematocrit 23.6 (L) 36.0 - 46.0 %   PST Top   Result Value Ref Range    Extra Tube Hold for add-ons.    Prepare RBC: 2 Units   Result Value Ref Range    PRODUCT CODE F0617O96     Unit Number [id number]     Unit ABO A     Unit RH POS     XM INTEP COMP     Dispense Status TR     Blood Expiration Date 4/28/2025 11:59:00 PM EDT     PRODUCT BLOOD TYPE 6200     UNIT VOLUME 350     PRODUCT CODE W3571K51     Unit Number [id number]     Unit ABO A     Unit RH POS     XM INTEP COMP     Dispense Status IS     Blood Expiration Date 4/28/2025 11:59:00 PM EDT     PRODUCT BLOOD TYPE 6200     UNIT VOLUME 350    Lipid Panel   Result Value Ref Range    Cholesterol 114 0 - 199 mg/dL    HDL-Cholesterol 36.3 mg/dL    Cholesterol/HDL Ratio 3.1     LDL Calculated 51 <=99 mg/dL    VLDL 27 0 - 40 mg/dL    Triglycerides 133 0 - 149 mg/dL    Non HDL Cholesterol 78 0 - 149 mg/dL   Troponin I, High Sensitivity   Result Value Ref Range    Troponin I, High Sensitivity 51 (HH) 0 - 13 ng/L   CBC   Result Value Ref Range    WBC 10.8 4.4 - 11.3 x10*3/uL    nRBC 0.0 0.0 - 0.0 /100 WBCs    RBC 4.20 4.00 - 5.20 x10*6/uL    Hemoglobin 9.7 (L) 12.0 - 16.0 g/dL    Hematocrit 34.2 (L) 36.0 - 46.0 %    MCV 81 80 - 100 fL    MCH 23.1 (L) 26.0 - 34.0 pg    MCHC 28.4 (L) 32.0 - 36.0 g/dL    RDW 21.2 (H) 11.5 - 14.5 %    Platelets 299 150 - 450 x10*3/uL   Comprehensive Metabolic Panel   Result Value Ref Range    Glucose 77 74 - 99 mg/dL    Sodium 142 136 - 145 mmol/L    Potassium 4.2 3.5 - 5.3 mmol/L    Chloride 107 98 - 107 mmol/L    Bicarbonate 27 21 - 32 mmol/L    Anion Gap 12 10 - 20 mmol/L    Urea Nitrogen 12 6 - 23 mg/dL    Creatinine 0.83 0.50 - 1.05 mg/dL    eGFR 77 >60 mL/min/1.73m*2    Calcium 8.3 (L) 8.6 - 10.3 mg/dL    Albumin 3.3 (L) 3.4 - 5.0 g/dL    Alkaline Phosphatase 47 33  - 136 U/L    Total Protein 6.2 (L) 6.4 - 8.2 g/dL    AST 24 9 - 39 U/L    Bilirubin, Total 0.4 0.0 - 1.2 mg/dL    ALT 18 7 - 45 U/L   Iron and TIBC   Result Value Ref Range    Iron 15 (L) 35 - 150 ug/dL    UIBC 282 110 - 370 ug/dL    TIBC 297 240 - 445 ug/dL    % Saturation 5 (L) 25 - 45 %     *Note: Due to a large number of results and/or encounters for the requested time period, some results have not been displayed. A complete set of results can be found in Results Review.          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 s/p chemo (last Tx 12/2023, current Tx Keytruda q6wk), HTN, HLD, MI, CAD s/p PCI/stent, cerebral aneurysm x 2 s/p craniotomy and clipping, AAA s/p repair x 2, PAD s/p s/p revascularization  left popliteal/TPT/PT reconstruction with PTA/DCB, Supera/SES to popliteal occlusion) and s/p limb rescue x 2 (Laser atherectomy/JETI/PTA fem/pop/TPT/PT, FCO to mid SFA and JETI/PTA AT on (6/25/24 ), c/b stent occlusion s/p repeat thrombectomy on 6/26/24 (- JETI /SFA, POP PT, and TPT, FCO to BK Pop and TPT PTA AT) s/p Left 2,3rd,4th toe amputation 3/12/25, Left foot drop, COPD, chronic hypoxic respiratory failure on home supplemental oxygen (2L NC at night and as needed), nicotine dependence, beta thalassemia anemia. Recently admitted with sepsis with source suspected to be UTI vs soft tissue. Discharged 3/19/24 to home. Presents today with shortness of breath, confusion, and generalized weakness.    Assessment & Plan  Shortness of breath    COPD exacerbation (Multi)    Acute on chronic hypoxic respiratory failure    Other specified anemias    Elevated troponin    Lower extremity edema    Metabolic encephalopathy    Lung cancer (Multi)    Metastasis to adrenal gland (Multi)      #Acute on chronic hypoxic respiratory failure  #COPD exacerbation  #NSCL cancer     Telemetry monitoring  Solu-Medrol 40 mg every 8 hours  Azithromycin  supplemental  oxygen  Nebulizers as needed and scheduled  Consult pulmonology  Mucinex 600 mg every 12 hours as needed  Supportive Care    #Elevated troponin, - Suspect demand ischemia 2/2 anemia and hypoxia  #CAD s/p PCI/Stents    Echocardiogram  Cardiology consult, appreciate input  On hold anticoagulation        #Generalized Weakness  #Confusion, suspect metabolic encephalopathy.   -> resolved   CT head without contrast  UA negative-> follow up cultures  Follow up cultures  PT/OT  - on IV azithromycin for copd   -      #anemia  #Hx beta thalassemia anemia  No reported e/o gastrointestinal bleeding  Check Fecal occult  1 unit PRBC Given by the ED.   Check H&H this evening,   IRON STUDIES;  GI consult  HOLD  plavix and eliquis: on pending possible scope with GI      #nicotine use disorder  Encouraged smoking cessation  Nicotine patch ordered     #PAD s/p multiple revascularization: podiatry on for wound recs  #HTN  #HLD  #anxiety     Continue home meds as appropriate     #DVT ppx  SCD    3/29:   Hemoglobin stable after blood transfusion, patient denies any CP, troponin elevated 2/2 anemia?   Hold DOAC/ Plavix  on hold for possible scope with GI services.   Trend labs, Wean oxygen to baseline 2l.     Plan of care discussed with Attending Dr. Nico Davies    I spent over 45min of professional care and time with this patient.      Aline Rodriguez, APRN-CNP

## 2025-03-29 NOTE — CONSULTS
Inpatient consult to Cardiology  Consult performed by: Shahid Aly DO  Consult ordered by: Rich Spivey APRN-CNP  Reason for consult: elevated troponin/ BNP        History Of Present Illness:     69-year-old female with history of coronary disease (2016 STEMI treated with stent)/peripheral vascular disease (prior femoropopliteal bypass and PCI)/lung cancer/COPD on 2 L/tobacco abuse/hypertension/ cerebral aneurysm which was clipped/abdominal aortic aneurysm repair presenting with feeling weak all over and inability to stand up.  She has persistent shortness of breath and some swelling of her ankles denies fevers or chills.  Denies any angina     Last Recorded Vitals:  Vitals:    03/29/25 0522 03/29/25 0656 03/29/25 0708 03/29/25 1119   BP: 106/55      BP Location:       Patient Position:       Pulse: 72      Resp: 18      Temp: 36.2 °C (97.2 °F)      TempSrc: Temporal      SpO2: 99% 98% 98% 95%   Weight:       Height:           Last Labs:  CBC - 3/29/2025:  8:24 AM  10.8 9.7 299    34.2      CMP - 3/29/2025:  8:24 AM  8.3 6.2 24 --- 0.4   3.2 3.3 18 47      PTT - 3/14/2025:  4:25 PM  1.4   16.0 31     Troponin I, High Sensitivity   Date/Time Value Ref Range Status   03/29/2025 08:24 AM 51 (HH) 0 - 13 ng/L Final   03/28/2025 08:32  (HH) 0 - 13 ng/L Final     Comment:     Previous result verified on 3/28/2025 0937 on specimen/case [id number] called with component TRPHS for procedure Troponin I, High Sensitivity with value 86 ng/L.   03/28/2025 04:17  (HH) 0 - 13 ng/L Final     Comment:     Previous result verified on 3/28/2025 0937 on specimen/case [id number] called with component TRPHS for procedure Troponin I, High Sensitivity with value 86 ng/L.     BNP   Date/Time Value Ref Range Status   03/28/2025 08:47  (H) 0 - 99 pg/mL Final   03/14/2025 04:07 PM 55 0 - 99 pg/mL Final     Hemoglobin A1C   Date/Time Value Ref Range Status   06/25/2024 03:44 PM 5.8 (H) see below % Final    03/12/2024 06:45 AM 5.9 (H) see below % Final     LDL Calculated   Date/Time Value Ref Range Status   03/29/2025 08:24 AM 51 <=99 mg/dL Final     Comment:                                 Near   Borderline      AGE      Desirable  Optimal    High     High     Very High     0-19 Y     0 - 109     ---    110-129   >/= 130     ----    20-24 Y     0 - 119     ---    120-159   >/= 160     ----      >24 Y     0 -  99   100-129  130-159   160-189     >/=190     06/27/2024 04:27 AM 69 <=99 mg/dL Final     Comment:                                 Near   Borderline      AGE      Desirable  Optimal    High     High     Very High     0-19 Y     0 - 109     ---    110-129   >/= 130     ----    20-24 Y     0 - 119     ---    120-159   >/= 160     ----      >24 Y     0 -  99   100-129  130-159   160-189     >/=190       VLDL   Date/Time Value Ref Range Status   03/29/2025 08:24 AM 27 0 - 40 mg/dL Final   06/27/2024 04:27 AM 30 0 - 40 mg/dL Final   05/02/2023 09:07 AM 18 0 - 40 mg/dL Final   09/29/2020 07:46 AM 23 0 - 40 mg/dL Final      Last I/O:  I/O last 3 completed shifts:  In: 350 (5.3 mL/kg) [Blood:350]  Out: 200 (3 mL/kg) [Urine:200 (0.1 mL/kg/hr)]  Weight: 66.2 kg     Past Cardiology Tests (Last 3 Years):  EKG:   normal sinus rhythm  Right axis deviation  Nonspecific T wave changes  No acute changes versus 12/2/2024  Echo:  Transthoracic Echo (TTE) Complete 03/28/2025    Ejection Fractions:  EF   Date/Time Value Ref Range Status   03/28/2025 05:59 PM 64 %      Cath:  No results found for this or any previous visit from the past 1095 days.    Stress Test:  No results found for this or any previous visit from the past 1095 days.    Cardiac Imaging:  No results found for this or any previous visit from the past 1095 days.      Past Medical History:  She has a past medical history of Adrenal cancer (Multi), Aneurysm of carotid artery (CMS-HCC), Anxiety, COPD (chronic obstructive pulmonary disease) (Multi), Depression, DVT  (deep venous thrombosis) (Multi), DVT (deep venous thrombosis) (Multi), History of blood transfusion, History of tubal ligation, Hyperlipidemia, Old myocardial infarction, Other specified disorders of kidney and ureter, Personal history of other diseases of the circulatory system, Personal history of other diseases of the circulatory system, Personal history of other diseases of the respiratory system, Personal history of urinary calculi, Right upper quadrant pain (09/25/2020), and Vision loss.    Past Surgical History:  She has a past surgical history that includes Other surgical history (09/30/2020); Other surgical history (09/30/2020); CT angio abdomen pelvis w and or wo IV IV contrast (11/21/2019); CT angio head w and wo IV contrast (05/22/2020); CT angio aorta and bilateral iliofemoral runoff including without contrast if performed (08/09/2022); Invasive Vascular Procedure (N/A, 01/23/2024); Invasive Vascular Procedure (N/A, 01/23/2024); Invasive Vascular Procedure (N/A, 06/25/2024); Invasive Vascular Procedure (Left, 06/25/2024); Invasive Vascular Procedure (N/A, 06/25/2024); Invasive Vascular Procedure (N/A, 06/25/2024); Invasive Vascular Procedure (Left, 06/25/2024); Invasive Vascular Procedure (N/A, 06/26/2024); Invasive Vascular Procedure (N/A, 06/26/2024); Colonoscopy; and Tubal ligation.      Social History:  She reports that she has been smoking cigarettes. She started smoking about 50 years ago. She has a 90.2 pack-year smoking history. She has been exposed to tobacco smoke. She has never used smokeless tobacco. She reports that she does not drink alcohol and does not use drugs.    Family History:  Family History   Problem Relation Name Age of Onset    Aneurysm Mother      Hypertension Mother      Heart attack Father      Cancer Father's Sister          Allergies:  Methylprednisolone, Ace inhibitors, Prednisone, Betamethasone dipropionate, and Meperidine    Inpatient Medications:  Scheduled medications    Medication Dose Route Frequency    [Held by provider] apixaban  5 mg oral BID    azithromycin  500 mg intravenous q24h    [Held by provider] clopidogrel  75 mg oral q AM    [Held by provider] dexAMETHasone  2 mg oral Daily    tiotropium  2 puff inhalation Daily    And    fluticasone furoate-vilanteroL  1 puff inhalation Daily    gabapentin  300 mg oral TID    ipratropium-albuteroL  3 mL nebulization TID    iron sucrose  200 mg intravenous Daily    [Held by provider] lubiprostone  24 mcg oral BID    [START ON 3/30/2025] methylPREDNISolone sodium succinate (PF)  40 mg intravenous q24h    nicotine  1 patch transdermal Daily    OLANZapine  15 mg oral Nightly    oxygen   inhalation Continuous - Inhalation    pneumoc 20-william conj-dip cr(PF)  0.5 mL intramuscular During hospitalization    rosuvastatin  40 mg oral Daily    sertraline  100 mg oral BID     PRN medications   Medication    acetaminophen    albuterol    guaiFENesin    HYDROcodone-acetaminophen    hydrOXYzine pamoate    LORazepam    ondansetron     Continuous Medications   Medication Dose Last Rate    oxygen         Outpatient Medications:  Current Outpatient Medications   Medication Instructions    acetaminophen (TYLENOL) 975 mg, oral, Every 6 hours PRN    apixaban (ELIQUIS) 5 mg, oral, 2 times daily    clopidogrel (PLAVIX) 75 mg, oral, Every morning    dexAMETHasone (DECADRON) 2 mg, oral, Daily    fluticasone-umeclidin-vilanter (Trelegy Ellipta) 200-62.5-25 mcg blister with device 1 puff, inhalation, Daily RT    gabapentin (NEURONTIN) 300 mg, oral, 3 times daily    HYDROcodone-acetaminophen (Norco)  mg tablet 1 tablet, oral, Every 6 hours PRN    hydrOXYzine pamoate (VISTARIL) 25 mg, oral, 3 times daily PRN    LORazepam (ATIVAN) 0.5 mg, oral, Every 6 hours PRN    lubiprostone (AMITIZA) 24 mcg, oral, 2 times daily (morning and late afternoon)    magic mouthwash (lidocaine, diphenhydrAMINE, Maalox 1:1:1) 10 mL, Swish & Swallow, Every 6 hours PRN    naloxone  (NARCAN) 4 mg, nasal, As needed, May repeat every 2-3 minutes if needed, alternating nostrils, until medical assistance becomes available.    OLANZapine (ZYPREXA) 15 mg, oral, Nightly    ondansetron ODT (ZOFRAN-ODT) 8 mg, oral, Every 8 hours PRN    oxygen (O2) gas therapy 1 each, Continuous    rosuvastatin (CRESTOR) 40 mg, oral, Daily    sertraline (ZOLOFT) 100 mg, oral, 2 times daily       Physical Exam:   PHYSICAL EXAM:    Constitutional/General:  Frail, middle-age female appears much older than her stated age.  She is in no acute distress  Neck:  No increased JVP,no carotid bruits.  Respiratory:  Lungs   diminished bilaterally, no wheezes, rales, or rhonchi, not in respiratory distress  Cardiovascular:  Regular rate, regular rhythm, no murmurs, gallops, or rubs, PMI nondisplaced, 2+ distal pulses  Chest:  No chest wall tenderness  GI:  Abdomen soft, nontender, nondistended, + BS, no organomegaly, no palpable masses, no rebound, guarding, or rigidity  Musculoskeletal:  Moves all extremities x4  Extremities:  mild edema  Integument: Skin warm and dry, no rashes  Neurologic:  No focal deficits  Psychiatric:   flat affect    Vital Signs, Nursing Notes, Allergies, and Medications reviewed by me.     Assessment/Plan    69-year-old female with history of coronary disease (2016 STEMI treated with stent)/peripheral vascular disease (prior femoropopliteal bypass and PCI)/lung cancer/COPD on 2 L/tobacco abuse/hypertension/ cerebral aneurysm which was clipped/abdominal aortic aneurysm repair presenting with feeling weak all over and inability to stand up    1.  Elevated troponin- suspect type II MI in the setting of respiratory failure and profound anemia.  Patient with no angina or acute ischemic EKG changes  and no wall motion maladies on echo with preserved LVEF.    2.  Elevated BNP- mildly elevated but no CHF per chest CT therefore no indication for aggressive diuresis    3.  Anemia- suspect this is the primary cause of  her weakness.  Evaluation by the primary team  4.  Shortness of breath- suspect this is primarily to her COPD, continue respiratory treatments  Peripheral IV 03/28/25 20 G Left Antecubital (Active)   Site Assessment Clean;Dry;Intact 03/29/25 0920   Dressing Type Transparent 03/29/25 0920   Line Status Capped;Flushed;Saline locked 03/29/25 0920   Dressing Status Clean;Dry 03/29/25 0920   Number of days: 1       Code Status:  Full Code          Shahid Aly DO

## 2025-03-29 NOTE — CONSULTS
Reason For Consult  Chief complaint-dyspnea and hypoxia    History Of Present Illness  Radha Toussaint is a 69 y.o. female presenting with complaints of shortness of breath, generalized weakness and confusion.  Patient was recently hospitalized and treated for sepsis due to combination of urinary tract infection and soft tissue infection.  Patient was discharged on March 19.  Patient has poor functional status and she ambulates with walker.  Last few days prior to admission she experienced worsening dyspnea.  Also she had episodes of confusion.  At home she uses oxygen at 2 L/min nasal cannula.  Currently patient is in her bed.  Denies dyspnea at rest.  She has mild nonproductive cough.  No fever or chills.  No nausea or vomiting.     Past Medical History  She has a past medical history of Adrenal cancer (Multi), Aneurysm of carotid artery (CMS-[facility]), Anxiety, COPD (chronic obstructive pulmonary disease) (Multi), Depression, DVT (deep venous thrombosis) (Multi), DVT (deep venous thrombosis) (Multi), History of blood transfusion, History of tubal ligation, Hyperlipidemia, Old myocardial infarction, Other specified disorders of kidney and ureter, Personal history of other diseases of the circulatory system, Personal history of other diseases of the circulatory system, Personal history of other diseases of the respiratory system, Personal history of urinary calculi, Right upper quadrant pain (09/25/2020), and Vision loss.    Surgical History  She has a past surgical history that includes Other surgical history (09/30/2020); Other surgical history (09/30/2020); CT angio abdomen pelvis w and or wo IV IV contrast (11/21/2019); CT angio head w and wo IV contrast (05/22/2020); CT angio aorta and bilateral iliofemoral runoff including without contrast if performed (08/09/2022); Invasive Vascular Procedure (N/A, 01/23/2024); Invasive Vascular Procedure (N/A, 01/23/2024); Invasive Vascular Procedure (N/A, 06/25/2024); Invasive  "Vascular Procedure (Left, 06/25/2024); Invasive Vascular Procedure (N/A, 06/25/2024); Invasive Vascular Procedure (N/A, 06/25/2024); Invasive Vascular Procedure (Left, 06/25/2024); Invasive Vascular Procedure (N/A, 06/26/2024); Invasive Vascular Procedure (N/A, 06/26/2024); Colonoscopy; and Tubal ligation.     Social History  She reports that she has been smoking cigarettes. She started smoking about 50 years ago. She has a 90.2 pack-year smoking history. She has been exposed to tobacco smoke. She has never used smokeless tobacco. She reports that she does not drink alcohol and does not use drugs.    Family History  Family History   Problem Relation Name Age of Onset    Aneurysm Mother      Hypertension Mother      Heart attack Father      Cancer Father's Sister          Allergies  Methylprednisolone, Ace inhibitors, Prednisone, Betamethasone dipropionate, and Meperidine    Review of Systems  10 system review of systems performed and negative for any complaints outside of the ones mentioned in history of present illness  Physical Exam  Head and face no deformities  Oropharynx normal mucosa  Neck is supple no thyromegaly  Chest is symmetric no crackles  Heart is regular no murmurs  Abdomen is soft and nontender  Skin is intact  Joints are normal  Neurologically patient is moving all 4 limbs.     Last Recorded Vitals  Blood pressure 106/55, pulse 72, temperature 36.2 °C (97.2 °F), temperature source Temporal, resp. rate 18, height 1.626 m (5' 4\"), weight 66.2 kg (145 lb 15.1 oz), SpO2 95%.          Assessment/Plan     Acute on chronic hypoxia patient at baseline uses 2 L/min currently she is on 4 L/min.  COPD.  Encephalopathy and confusion.  Today patient is awake and alert.  History of non-small cell lung cancer.  Anemia.  Hypertension.    Plan:  Wean off oxygen for saturation above 89%.  Bronchodilators.  Taper off steroids.  Avoid sedatives.  DVT prophylaxis.  Ambulate as tolerated.        Jamey Phillips MD    "

## 2025-03-30 VITALS
DIASTOLIC BLOOD PRESSURE: 53 MMHG | RESPIRATION RATE: 18 BRPM | SYSTOLIC BLOOD PRESSURE: 94 MMHG | HEART RATE: 68 BPM | HEIGHT: 64 IN | BODY MASS INDEX: 23.15 KG/M2 | WEIGHT: 135.58 LBS | TEMPERATURE: 96.8 F | OXYGEN SATURATION: 92 %

## 2025-03-30 DIAGNOSIS — D50.8 OTHER IRON DEFICIENCY ANEMIA: Primary | ICD-10-CM

## 2025-03-30 LAB
ALBUMIN SERPL BCP-MCNC: 3.1 G/DL (ref 3.4–5)
ALP SERPL-CCNC: 49 U/L (ref 33–136)
ALT SERPL W P-5'-P-CCNC: 19 U/L (ref 7–45)
ANION GAP SERPL CALC-SCNC: 9 MMOL/L (ref 10–20)
AST SERPL W P-5'-P-CCNC: 27 U/L (ref 9–39)
BACTERIA BLD CULT: NORMAL
BACTERIA BLD CULT: NORMAL
BILIRUB SERPL-MCNC: 0.3 MG/DL (ref 0–1.2)
BLOOD EXPIRATION DATE: NORMAL
BUN SERPL-MCNC: 13 MG/DL (ref 6–23)
CALCIUM SERPL-MCNC: 8.7 MG/DL (ref 8.6–10.3)
CHLORIDE SERPL-SCNC: 106 MMOL/L (ref 98–107)
CO2 SERPL-SCNC: 30 MMOL/L (ref 21–32)
CREAT SERPL-MCNC: 0.73 MG/DL (ref 0.5–1.05)
DISPENSE STATUS: NORMAL
EGFRCR SERPLBLD CKD-EPI 2021: 90 ML/MIN/1.73M*2
ERYTHROCYTE [DISTWIDTH] IN BLOOD BY AUTOMATED COUNT: 21.9 % (ref 11.5–14.5)
GLUCOSE BLD MANUAL STRIP-MCNC: 119 MG/DL (ref 74–99)
GLUCOSE BLD MANUAL STRIP-MCNC: 148 MG/DL (ref 74–99)
GLUCOSE BLD MANUAL STRIP-MCNC: 172 MG/DL (ref 74–99)
GLUCOSE BLD MANUAL STRIP-MCNC: 89 MG/DL (ref 74–99)
GLUCOSE SERPL-MCNC: 83 MG/DL (ref 74–99)
HCT VFR BLD AUTO: 31.7 % (ref 36–46)
HEMOCCULT SP1 STL QL: NEGATIVE
HGB BLD-MCNC: 9.4 G/DL (ref 12–16)
MCH RBC QN AUTO: 23.9 PG (ref 26–34)
MCHC RBC AUTO-ENTMCNC: 29.7 G/DL (ref 32–36)
MCV RBC AUTO: 81 FL (ref 80–100)
NRBC BLD-RTO: 0 /100 WBCS (ref 0–0)
PLATELET # BLD AUTO: 285 X10*3/UL (ref 150–450)
POTASSIUM SERPL-SCNC: 3.9 MMOL/L (ref 3.5–5.3)
PRODUCT BLOOD TYPE: 6200
PRODUCT CODE: NORMAL
PROT SERPL-MCNC: 5.7 G/DL (ref 6.4–8.2)
RBC # BLD AUTO: 3.94 X10*6/UL (ref 4–5.2)
SODIUM SERPL-SCNC: 141 MMOL/L (ref 136–145)
UNIT ABO: NORMAL
UNIT NUMBER: NORMAL
UNIT RH: NORMAL
UNIT VOLUME: 350
WBC # BLD AUTO: 12.9 X10*3/UL (ref 4.4–11.3)
XM INTEP: NORMAL

## 2025-03-30 PROCEDURE — 82270 OCCULT BLOOD FECES: CPT | Performed by: NURSE PRACTITIONER

## 2025-03-30 PROCEDURE — 2500000002 HC RX 250 W HCPCS SELF ADMINISTERED DRUGS (ALT 637 FOR MEDICARE OP, ALT 636 FOR OP/ED): Performed by: STUDENT IN AN ORGANIZED HEALTH CARE EDUCATION/TRAINING PROGRAM

## 2025-03-30 PROCEDURE — 99232 SBSQ HOSP IP/OBS MODERATE 35: CPT | Performed by: STUDENT IN AN ORGANIZED HEALTH CARE EDUCATION/TRAINING PROGRAM

## 2025-03-30 PROCEDURE — S4991 NICOTINE PATCH NONLEGEND: HCPCS | Performed by: NURSE PRACTITIONER

## 2025-03-30 PROCEDURE — 99221 1ST HOSP IP/OBS SF/LOW 40: CPT | Performed by: INTERNAL MEDICINE

## 2025-03-30 PROCEDURE — 2500000005 HC RX 250 GENERAL PHARMACY W/O HCPCS: Performed by: NURSE PRACTITIONER

## 2025-03-30 PROCEDURE — 84075 ASSAY ALKALINE PHOSPHATASE: CPT | Performed by: NURSE PRACTITIONER

## 2025-03-30 PROCEDURE — 82947 ASSAY GLUCOSE BLOOD QUANT: CPT

## 2025-03-30 PROCEDURE — 1200000002 HC GENERAL ROOM WITH TELEMETRY DAILY

## 2025-03-30 PROCEDURE — 2500000002 HC RX 250 W HCPCS SELF ADMINISTERED DRUGS (ALT 637 FOR MEDICARE OP, ALT 636 FOR OP/ED): Performed by: NURSE PRACTITIONER

## 2025-03-30 PROCEDURE — 94640 AIRWAY INHALATION TREATMENT: CPT

## 2025-03-30 PROCEDURE — 85027 COMPLETE CBC AUTOMATED: CPT | Performed by: NURSE PRACTITIONER

## 2025-03-30 PROCEDURE — 2500000001 HC RX 250 WO HCPCS SELF ADMINISTERED DRUGS (ALT 637 FOR MEDICARE OP): Performed by: NURSE PRACTITIONER

## 2025-03-30 PROCEDURE — 80053 COMPREHEN METABOLIC PANEL: CPT | Performed by: NURSE PRACTITIONER

## 2025-03-30 PROCEDURE — 2500000004 HC RX 250 GENERAL PHARMACY W/ HCPCS (ALT 636 FOR OP/ED): Performed by: NURSE PRACTITIONER

## 2025-03-30 PROCEDURE — 2500000001 HC RX 250 WO HCPCS SELF ADMINISTERED DRUGS (ALT 637 FOR MEDICARE OP): Performed by: PHYSICIAN ASSISTANT

## 2025-03-30 PROCEDURE — 36415 COLL VENOUS BLD VENIPUNCTURE: CPT | Performed by: NURSE PRACTITIONER

## 2025-03-30 PROCEDURE — 99232 SBSQ HOSP IP/OBS MODERATE 35: CPT | Performed by: PHYSICIAN ASSISTANT

## 2025-03-30 RX ORDER — INSULIN LISPRO 100 [IU]/ML
0-10 INJECTION, SOLUTION INTRAVENOUS; SUBCUTANEOUS
Status: DISPENSED | OUTPATIENT
Start: 2025-03-30

## 2025-03-30 RX ADMIN — LUBIPROSTONE 24 MCG: 24 CAPSULE, GELATIN COATED ORAL at 17:08

## 2025-03-30 RX ADMIN — IPRATROPIUM BROMIDE AND ALBUTEROL SULFATE 3 ML: .5; 3 SOLUTION RESPIRATORY (INHALATION) at 18:21

## 2025-03-30 RX ADMIN — HYDROCODONE BITARTRATE AND ACETAMINOPHEN 1 TABLET: 10; 325 TABLET ORAL at 20:45

## 2025-03-30 RX ADMIN — DEXAMETHASONE 2 MG: 4 TABLET ORAL at 09:18

## 2025-03-30 RX ADMIN — NICOTINE 1 PATCH: 21 PATCH, EXTENDED RELEASE TRANSDERMAL at 09:18

## 2025-03-30 RX ADMIN — GABAPENTIN 300 MG: 300 CAPSULE ORAL at 14:17

## 2025-03-30 RX ADMIN — ROSUVASTATIN CALCIUM 40 MG: 10 TABLET, FILM COATED ORAL at 09:18

## 2025-03-30 RX ADMIN — Medication 2 L/MIN: at 18:21

## 2025-03-30 RX ADMIN — AZITHROMYCIN MONOHYDRATE 500 MG: 500 INJECTION, POWDER, LYOPHILIZED, FOR SOLUTION INTRAVENOUS at 14:35

## 2025-03-30 RX ADMIN — TIOTROPIUM BROMIDE INHALATION SPRAY 2 PUFF: 3.12 SPRAY, METERED RESPIRATORY (INHALATION) at 06:27

## 2025-03-30 RX ADMIN — HYDROCODONE BITARTRATE AND ACETAMINOPHEN 1 TABLET: 10; 325 TABLET ORAL at 14:21

## 2025-03-30 RX ADMIN — IPRATROPIUM BROMIDE AND ALBUTEROL SULFATE 3 ML: .5; 3 SOLUTION RESPIRATORY (INHALATION) at 06:25

## 2025-03-30 RX ADMIN — PANTOPRAZOLE SODIUM 40 MG: 40 TABLET, DELAYED RELEASE ORAL at 06:19

## 2025-03-30 RX ADMIN — APIXABAN 5 MG: 5 TABLET, FILM COATED ORAL at 20:41

## 2025-03-30 RX ADMIN — GABAPENTIN 300 MG: 300 CAPSULE ORAL at 09:18

## 2025-03-30 RX ADMIN — OLANZAPINE 15 MG: 5 TABLET, FILM COATED ORAL at 20:41

## 2025-03-30 RX ADMIN — IRON SUCROSE 200 MG: 20 INJECTION, SOLUTION INTRAVENOUS at 09:18

## 2025-03-30 RX ADMIN — SERTRALINE HYDROCHLORIDE 100 MG: 50 TABLET ORAL at 09:18

## 2025-03-30 RX ADMIN — INSULIN LISPRO 2 UNITS: 100 INJECTION, SOLUTION INTRAVENOUS; SUBCUTANEOUS at 17:09

## 2025-03-30 RX ADMIN — SERTRALINE HYDROCHLORIDE 100 MG: 50 TABLET ORAL at 20:41

## 2025-03-30 RX ADMIN — GABAPENTIN 300 MG: 300 CAPSULE ORAL at 20:41

## 2025-03-30 RX ADMIN — Medication 2 L/MIN: at 09:20

## 2025-03-30 RX ADMIN — Medication 4 L/MIN: at 06:29

## 2025-03-30 RX ADMIN — FLUTICASONE FUROATE AND VILANTEROL TRIFENATATE 1 PUFF: 200; 25 POWDER RESPIRATORY (INHALATION) at 06:28

## 2025-03-30 ASSESSMENT — PAIN - FUNCTIONAL ASSESSMENT
PAIN_FUNCTIONAL_ASSESSMENT: 0-10

## 2025-03-30 ASSESSMENT — COGNITIVE AND FUNCTIONAL STATUS - GENERAL
MOBILITY SCORE: 14
TURNING FROM BACK TO SIDE WHILE IN FLAT BAD: A LITTLE
WALKING IN HOSPITAL ROOM: A LOT
HELP NEEDED FOR BATHING: A LITTLE
DRESSING REGULAR LOWER BODY CLOTHING: A LITTLE
DRESSING REGULAR UPPER BODY CLOTHING: A LOT
TOILETING: A LOT
MOVING TO AND FROM BED TO CHAIR: A LITTLE
TURNING FROM BACK TO SIDE WHILE IN FLAT BAD: A LITTLE
MOBILITY SCORE: 15
STANDING UP FROM CHAIR USING ARMS: A LOT
DAILY ACTIVITIY SCORE: 17
CLIMB 3 TO 5 STEPS WITH RAILING: TOTAL
STANDING UP FROM CHAIR USING ARMS: A LOT
DRESSING REGULAR LOWER BODY CLOTHING: A LITTLE
WALKING IN HOSPITAL ROOM: A LOT
MOVING FROM LYING ON BACK TO SITTING ON SIDE OF FLAT BED WITH BEDRAILS: A LITTLE
HELP NEEDED FOR BATHING: A LOT
CLIMB 3 TO 5 STEPS WITH RAILING: TOTAL
TOILETING: A LOT
DRESSING REGULAR UPPER BODY CLOTHING: A LITTLE
MOVING TO AND FROM BED TO CHAIR: A LITTLE
DAILY ACTIVITIY SCORE: 19

## 2025-03-30 ASSESSMENT — PAIN SCALES - GENERAL
PAINLEVEL_OUTOF10: 0 - NO PAIN
PAINLEVEL_OUTOF10: 7
PAINLEVEL_OUTOF10: 3
PAINLEVEL_OUTOF10: 7
PAINLEVEL_OUTOF10: 0 - NO PAIN

## 2025-03-30 ASSESSMENT — PAIN DESCRIPTION - LOCATION
LOCATION: ABDOMEN
LOCATION: ABDOMEN

## 2025-03-30 ASSESSMENT — PAIN DESCRIPTION - ORIENTATION: ORIENTATION: RIGHT;LEFT

## 2025-03-30 NOTE — PROGRESS NOTES
Aaliyah Sawant is a 69 y.o. female on day 2 of admission presenting with Shortness of breath.      Subjective   Doing better   H&H stable   Back to home 02 needs       Objective     Last Recorded Vitals  /55   Pulse 61   Temp 35.7 °C (96.3 °F)   Resp 19   Wt 61.5 kg (135 lb 9.3 oz)   SpO2 96%   Intake/Output last 3 Shifts:    Intake/Output Summary (Last 24 hours) at 3/30/2025 1020  Last data filed at 3/30/2025 0501  Gross per 24 hour   Intake 250 ml   Output 1150 ml   Net -900 ml       Admission Weight  Weight: 66.2 kg (145 lb 15.1 oz) (03/28/25 0821)    Daily Weight  03/30/25 : 61.5 kg (135 lb 9.3 oz)    Image Results  Transthoracic Echo (TTE) [facility], [address] [place] [address]            Tel [phone] and Fax [phone]    TRANSTHORACIC ECHOCARDIOGRAM REPORT       Patient Name:       AALIYAH SAWANT     Reading Physician:    [id number] Shahid Aly DO  Study Date:         3/28/2025           Ordering Provider:    [id number] KOHLER  MRN/PID:            [id number]            Fellow:  Accession#:         [id number]        Nurse:  Date of Birth/Age:  1955 / 69 years Sonographer:          Hola Pastor RDCS  Gender assigned at  F                   Additional Staff:  Birth:  Height:             162.00 cm           Admit Date:           3/27/2025  Weight:             66.00 kg            Admission Status:     Inpatient -                                                                Priority                                                                discharge  BSA / BMI:          1.70 m2 / 25.15     Encounter#:           [id number]                      kg/m2  Blood Pressure:     80/46 mmHg          Department Location:  [facility]    Study Type:    TRANSTHORACIC ECHO  (TTE) COMPLETE  Diagnosis/ICD: Shortness of breath-R06.02  Indication:    hypoxia, elevated troponin and elevated BNP  CPT Code:      Echo Complete w Full Doppler-[id number]    Patient History:  Pertinent History: Hyperlipidemia, CAD and STEMI.    Study Detail: The following Echo studies were performed: 2D, M-Mode, Doppler and                color flow. Technically challenging study due to poor acoustic                windows, patient lying in supine position and prominent lung                artifact.       PHYSICIAN INTERPRETATION:  Left Ventricle: The left ventricular systolic function is normal, with a Hannah's biplane calculated ejection fraction of 64%. There are no regional left ventricular wall motion abnormalities. The left ventricular cavity size is normal. There is a false tendon visualized in the left ventricle. Spectral Doppler shows a Grade I (impaired relaxation pattern) of left ventricular diastolic filling with normal left atrial filling pressure.  Left Atrium: The left atrial size is normal.  Right Ventricle: The right ventricle is normal in size. There is normal right ventricular global systolic function.  Right Atrium: The right atrium is normal in size.  Aortic Valve: The aortic valve is trileaflet. There is evidence of mild aortic valve stenosis. The aortic valve dimensionless index is 0.64. There is no evidence of aortic valve regurgitation. The peak instantaneous gradient of the aortic valve is 17 mmHg. The mean gradient of the aortic valve is 9 mmHg.  Mitral Valve: The mitral valve is moderately thickened. There is moderate mitral annular calcification. There is trace mitral valve regurgitation.  Tricuspid Valve: The tricuspid valve is structurally normal. There is mild to moderate tricuspid regurgitation. The Doppler estimated RVSP is mildly elevated at 37.6 mmHg.  Pulmonic Valve: The pulmonic valve is structurally normal. There is trace pulmonic valve regurgitation.  Pericardium: There is no  pericardial effusion noted.  Aorta: The aortic root is normal.  Systemic Veins: The inferior vena cava appears normal in size.       CONCLUSIONS:   1. The left ventricular systolic function is normal, with a Hannah's biplane calculated ejection fraction of 64%.   2. Spectral Doppler shows a Grade I (impaired relaxation pattern) of left ventricular diastolic filling with normal left atrial filling pressure.   3. The mitral valve is moderately thickened.   4. There is moderate mitral annular calcification.   5. Mild to moderate tricuspid regurgitation visualized.   6. Mildly elevated right ventricular systolic pressure.   7. Mild aortic valve stenosis.    QUANTITATIVE DATA SUMMARY:     2D MEASUREMENTS:          Normal Ranges:  LVEDV Index:     77 ml/m2       AORTA MEASUREMENTS:         Normal Ranges:  Asc Ao, d:          2.90 cm (2.1-3.4cm)       LV SYSTOLIC FUNCTION:                       Normal Ranges:  EF-A4C View:    64 % (>=55%)  EF-A2C View:    62 %  EF-Biplane:     64 %  LV EF Reported: 64 %       LV DIASTOLIC FUNCTION:           Normal Ranges:  MV Peak E:             1.27 m/s  (0.7-1.2 m/s)  MV Peak A:             1.23 m/s  (0.42-0.7 m/s)  E/A Ratio:             1.03      (1.0-2.2)  MV e'                  0.133 m/s (>8.0)  MV lateral e'          0.12 m/s  MV medial e'           0.14 m/s  E/e' Ratio:            9.55      (<8.0)       MITRAL VALVE:          Normal Ranges:  MV DT:        222 msec (150-240msec)       AORTIC VALVE:                      Normal Ranges:  AoV Vmax:                2.04 m/s  (<=1.7m/s)  AoV Peak P.6 mmHg (<20mmHg)  AoV Mean P.0 mmHg  (1.7-11.5mmHg)  LVOT Max Washington:            1.35 m/s  (<=1.1m/s)  AoV VTI:                 40.80 cm  (18-25cm)  LVOT VTI:                26.20 cm  LVOT Diameter:           1.70 cm   (1.8-2.4cm)  AoV Area, VTI:           1.46 cm2  (2.5-5.5cm2)  AoV Area,Vmax:           1.50 cm2  (2.5-4.5cm2)  AoV Dimensionless Index: 0.64        RIGHT VENTRICLE:  RV Basal 3.25 cm  RV Mid   2.61 cm  RV Major 6.9 cm  TAPSE:   21.8 mm  RV s'    0.17 m/s       TRICUSPID VALVE/RVSP:          Normal Ranges:  Peak TR Velocity:     2.94 m/s  RV Syst Pressure:     38 mmHg  (< 30mmHg)       PULMONIC VALVE:          Normal Ranges:  PV Max Washington:     1.2 m/s  (0.6-0.9m/s)  PV Max P.8 mmHg  PV Mean PG:     3.0 mmHg  PV VTI:         22.75 cm       [id number] Shahid Aly DO  Electronically signed on 3/29/2025 at 8:23:00 AM       ** Final **      Physical Exam  Vitals and nursing note reviewed.   Constitutional:       General: She is not in acute distress.     Appearance: Normal appearance. She is ill-appearing. She is not toxic-appearing or diaphoretic.   HENT:      Head: Normocephalic.      Nose: Nose normal.      Mouth/Throat:      Mouth: Mucous membranes are moist.   Eyes:      Extraocular Movements: Extraocular movements intact.      Conjunctiva/sclera: Conjunctivae normal.      Pupils: Pupils are equal, round, and reactive to light.   Cardiovascular:      Rate and Rhythm: Normal rate and regular rhythm.      Pulses: Normal pulses.      Heart sounds: Normal heart sounds.   Pulmonary:      Comments: Diminished on 2l no cough   Abdominal:      General: Abdomen is flat. Bowel sounds are normal.      Palpations: Abdomen is soft.   Musculoskeletal:         General: Normal range of motion.      Cervical back: Normal range of motion.   Skin:     General: Skin is warm and dry.      Findings: Bruising present. No erythema.   Neurological:      General: No focal deficit present.      Mental Status: She is alert and oriented to person, place, and time.      Motor: Weakness present.   Psychiatric:         Mood and Affect: Mood normal.         Behavior: Behavior normal.         Relevant Results  Transthoracic Echo (TTE) Complete    Result Date: 3/29/2025   [facility], [address] [place] [address]., [place] [id number]           Tel [phone] and Fax [phone]  TRANSTHORACIC ECHOCARDIOGRAM REPORT  Patient Name:       AALIYAH SAWANT     Reading Physician:    [id number] Shahid Perezmónica YOUNG Study Date:         3/28/2025           Ordering Provider:    [id number] DOMINGO KOHLER MRN/PID:            [id number]            Fellow: Accession#:         [id number]        Nurse: Date of Birth/Age:  1955 / 69 years Sonographer:          Hola Pastor RDCS Gender assigned at  F                   Additional Staff: Birth: Height:             162.00 cm           Admit Date:           3/27/2025 Weight:             66.00 kg            Admission Status:     Inpatient -                                                               Priority                                                               discharge BSA / BMI:          1.70 m2 / 25.15     Encounter#:           [id number]                     kg/m2 Blood Pressure:     80/46 mmHg          Department Location:  [facility] Study Type:    TRANSTHORACIC ECHO (TTE) COMPLETE Diagnosis/ICD: Shortness of breath-R06.02 Indication:    hypoxia, elevated troponin and elevated BNP CPT Code:      Echo Complete w Full Doppler-[id number] Patient History: Pertinent History: Hyperlipidemia, CAD and STEMI. Study Detail: The following Echo studies were performed: 2D, M-Mode, Doppler and               color flow. Technically challenging study due to poor acoustic               windows, patient lying in supine position and prominent lung               artifact.  PHYSICIAN INTERPRETATION: Left Ventricle: The left ventricular systolic function is normal, with a Hannah's biplane calculated ejection fraction of 64%. There are no regional left ventricular wall motion abnormalities. The left ventricular cavity size is normal. There is a false tendon visualized in the left ventricle.  Spectral Doppler shows a Grade I (impaired relaxation pattern) of left ventricular diastolic filling with normal left atrial filling pressure. Left Atrium: The left atrial size is normal. Right Ventricle: The right ventricle is normal in size. There is normal right ventricular global systolic function. Right Atrium: The right atrium is normal in size. Aortic Valve: The aortic valve is trileaflet. There is evidence of mild aortic valve stenosis. The aortic valve dimensionless index is 0.64. There is no evidence of aortic valve regurgitation. The peak instantaneous gradient of the aortic valve is 17 mmHg. The mean gradient of the aortic valve is 9 mmHg. Mitral Valve: The mitral valve is moderately thickened. There is moderate mitral annular calcification. There is trace mitral valve regurgitation. Tricuspid Valve: The tricuspid valve is structurally normal. There is mild to moderate tricuspid regurgitation. The Doppler estimated RVSP is mildly elevated at 37.6 mmHg. Pulmonic Valve: The pulmonic valve is structurally normal. There is trace pulmonic valve regurgitation. Pericardium: There is no pericardial effusion noted. Aorta: The aortic root is normal. Systemic Veins: The inferior vena cava appears normal in size.  CONCLUSIONS:  1. The left ventricular systolic function is normal, with a Hannah's biplane calculated ejection fraction of 64%.  2. Spectral Doppler shows a Grade I (impaired relaxation pattern) of left ventricular diastolic filling with normal left atrial filling pressure.  3. The mitral valve is moderately thickened.  4. There is moderate mitral annular calcification.  5. Mild to moderate tricuspid regurgitation visualized.  6. Mildly elevated right ventricular systolic pressure.  7. Mild aortic valve stenosis. QUANTITATIVE DATA SUMMARY:  2D MEASUREMENTS:          Normal Ranges: LVEDV Index:     77 ml/m2  AORTA MEASUREMENTS:         Normal Ranges: Asc Ao, d:          2.90 cm (2.1-3.4cm)  LV SYSTOLIC  FUNCTION:                      Normal Ranges: EF-A4C View:    64 % (>=55%) EF-A2C View:    62 % EF-Biplane:     64 % LV EF Reported: 64 %  LV DIASTOLIC FUNCTION:           Normal Ranges: MV Peak E:             1.27 m/s  (0.7-1.2 m/s) MV Peak A:             1.23 m/s  (0.42-0.7 m/s) E/A Ratio:             1.03      (1.0-2.2) MV e'                  0.133 m/s (>8.0) MV lateral e'          0.12 m/s MV medial e'           0.14 m/s E/e' Ratio:            9.55      (<8.0)  MITRAL VALVE:          Normal Ranges: MV DT:        222 msec (150-240msec)  AORTIC VALVE:                      Normal Ranges: AoV Vmax:                2.04 m/s  (<=1.7m/s) AoV Peak P.6 mmHg (<20mmHg) AoV Mean P.0 mmHg  (1.7-11.5mmHg) LVOT Max Washington:            1.35 m/s  (<=1.1m/s) AoV VTI:                 40.80 cm  (18-25cm) LVOT VTI:                26.20 cm LVOT Diameter:           1.70 cm   (1.8-2.4cm) AoV Area, VTI:           1.46 cm2  (2.5-5.5cm2) AoV Area,Vmax:           1.50 cm2  (2.5-4.5cm2) AoV Dimensionless Index: 0.64  RIGHT VENTRICLE: RV Basal 3.25 cm RV Mid   2.61 cm RV Major 6.9 cm TAPSE:   21.8 mm RV s'    0.17 m/s  TRICUSPID VALVE/RVSP:          Normal Ranges: Peak TR Velocity:     2.94 m/s RV Syst Pressure:     38 mmHg  (< 30mmHg)  PULMONIC VALVE:          Normal Ranges: PV Max Washington:     1.2 m/s  (0.6-0.9m/s) PV Max P.8 mmHg PV Mean PG:     3.0 mmHg PV VTI:         22.75 cm  [id number] Shahid Aly DO Electronically signed on 3/29/2025 at 8:23:00 AM  ** Final **     CT head wo IV contrast    Result Date: 3/28/2025  Interpreted By:  Paulo Simpson, STUDY: CT HEAD WO IV CONTRAST;  3/28/2025 5:09 pm   INDICATION: Signs/Symptoms:altered mental status.     COMPARISON: 2025   ACCESSION NUMBER(S): [id number]   ORDERING CLINICIAN: DOMINGO KOHLER   TECHNIQUE: Noncontrast axial CT images of head were obtained with coronal and sagittal reconstructed images.   FINDINGS: BRAIN PARENCHYMA: Chronic ischemic  infarct in the inferior medial right cerebellar hemisphere in PICA territory. No acute intraparenchymal hemorrhage or parenchymal evidence of acute large territory ischemic infarct. Gray-white matter distinction is preserved. No mass-effect.   VENTRICLES and EXTRA-AXIAL SPACES: Redemonstration of aneurysm clip in the left sylvian fissure and coils in the left ambient cistern. No acute extra-axial or intraventricular hemorrhage. No effacement of cerebral sulci. The ventricles and sulci are age-concordant.   PARANASAL SINUSES/MASTOIDS:  No hemorrhage or air-fluid levels within the visualized paranasal sinuses. The mastoids are well aerated.   CALVARIUM/ORBITS: Redemonstrated left frontotemporal craniotomy with heterotopic ossification along the inner table noted. No skull fracture.  The orbits and globes are intact to the extent visualized.   EXTRACRANIAL SOFT TISSUES: No discernible acute abnormality.       No acute intracranial abnormality or significant interval change.   Redemonstration of left frontotemporal craniotomy, aneurysm clipping and coiling as described above.   Chronic right PICA territory infarct redemonstrated.   MACRO: None.   Signed by: Paulo Simpson 3/28/2025 6:21 PM Dictation workstation:   [id number]     No current facility-administered medications on file prior to encounter.     Current Outpatient Medications on File Prior to Encounter   Medication Sig Dispense Refill    acetaminophen (Tylenol) 325 mg tablet Take 3 tablets (975 mg) by mouth every 6 hours if needed for mild pain (1 - 3) or moderate pain (4 - 6).      [] amoxicillin-pot clavulanate (Augmentin) 875-125 mg tablet Take 1 tablet (875 mg) by mouth 2 times a day for 5 days. 10 tablet 0    apixaban (Eliquis) 5 mg tablet Take 1 tablet (5 mg) by mouth 2 times a day. 180 tablet 3    clopidogrel (Plavix) 75 mg tablet Take 1 tablet (75 mg) by mouth once daily in the morning. 90 tablet 3    dexAMETHasone (Decadron) 4 mg tablet Take  0.5 tablets (2 mg) by mouth once daily. 30 tablet 1    fluticasone-umeclidin-vilanter (Trelegy Ellipta) 200-62.5-25 mcg blister with device Inhale 1 puff once daily. 60 each 3    gabapentin (Neurontin) 300 mg capsule Take 1 capsule (300 mg) by mouth 3 times a day. 270 capsule 3    HYDROcodone-acetaminophen (Norco)  mg tablet Take 1 tablet by mouth every 6 hours if needed for severe pain (7 - 10). 60 tablet 0    hydrOXYzine pamoate (VistariL) 25 mg capsule Take 1 capsule (25 mg) by mouth 3 times a day as needed for itching. 270 capsule 3    LORazepam (Ativan) 0.5 mg tablet Take 1 tablet (0.5 mg) by mouth every 6 hours if needed for anxiety. 120 tablet 3    lubiprostone (Amitiza) 24 mcg capsule Take 1 capsule (24 mcg) by mouth 2 times daily (morning and late afternoon). 60 capsule 0    magic mouthwash (lidocaine, diphenhydrAMINE, Maalox 1:1:1) Swish and swallow 10 mL every 6 hours if needed for mucositis. (Patient not taking: Reported on 3/12/2025) 560 mL 3    naloxone (Narcan) 4 mg/0.1 mL nasal spray Administer 1 spray (4 mg) into affected nostril(s) if needed for opioid reversal. May repeat every 2-3 minutes if needed, alternating nostrils, until medical assistance becomes available. (Patient not taking: Reported on 3/12/2025) 2 each 0    OLANZapine (ZyPREXA) 15 mg tablet Take 1 tablet (15 mg) by mouth once daily at bedtime. 90 tablet 1    ondansetron ODT (Zofran-ODT) 8 mg disintegrating tablet Dissolve 1 tablet (8 mg) in the mouth every 8 hours if needed for nausea or vomiting. 90 tablet 0    oxygen (O2) gas therapy Inhale 1 each continuously. 2 L at bedtime and naps      rosuvastatin (Crestor) 40 mg tablet Take 1 tablet (40 mg) by mouth once daily. 90 tablet 3    sertraline (Zoloft) 100 mg tablet TAKE 1 TABLET(100 MG) BY MOUTH TWICE DAILY 180 tablet 3    [DISCONTINUED] buPROPion (Wellbutrin) 75 mg tablet Take 1 tablet (75 mg) by mouth once daily. (Patient not taking: Reported on 3/12/2025) 30 tablet 1     [DISCONTINUED] montelukast (Singulair) 10 mg tablet Take 1 tablet (10 mg) by mouth once daily at bedtime. (Patient not taking: Reported on 3/12/2025) 90 tablet 1    [DISCONTINUED] oxyCODONE-acetaminophen (Percocet) 5-325 mg tablet Take 1 tablet by mouth every 6 hours if needed for severe pain (7 - 10). 20 tablet 0    [DISCONTINUED] prochlorperazine (Compazine) 5 mg tablet Take 1 tablet (5 mg) by mouth every 6 hours if needed for nausea or vomiting. 30 tablet 3           Assessment/Plan                  Assessment & Plan  Shortness of breath    COPD exacerbation (Multi)    Acute on chronic hypoxic respiratory failure    Other specified anemias    Elevated troponin    Lower extremity edema    Metabolic encephalopathy    Lung cancer (Multi)    Metastasis to adrenal gland (Multi)    #Acute on chronic hypoxic respiratory failure  #COPD exacerbation  #NSCL cancer     Telemetry monitoring  Solu-Medrol 40 mg every 8 hours  Azithromycin  supplemental oxygen  Nebulizers as needed and scheduled  Consult pulmonology  Mucinex 600 mg every 12 hours as needed  Supportive Care     #Elevated troponin, - Suspect demand ischemia 2/2 anemia and hypoxia  #CAD s/p PCI/Stents     Echocardiogram  Cardiology consult, appreciate input  On hold anticoagulation         #Generalized Weakness  #Confusion, suspect metabolic encephalopathy.   -> resolved   CT head without contrast  UA negative-> follow up cultures  Follow up cultures  PT/OT  - on IV azithromycin for copd   -       #anemia  #Hx beta thalassemia anemia  No reported e/o gastrointestinal bleeding  Check Fecal occult  1 unit PRBC Given by the ED.   Check H&H this evening,   IRON STUDIES;  GI consult  HOLD  plavix and eliquis: on pending possible scope with GI      #nicotine use disorder  Encouraged smoking cessation  Nicotine patch ordered     #PAD s/p multiple revascularization: podiatry on for wound recs  #HTN  #HLD  #anxiety     Continue home meds as appropriate     #DVT ppx  SCD      3/29:   Hemoglobin stable after blood transfusion, patient denies any CP, troponin elevated 2/2 anemia?   Hold DOAC/ Plavix  on hold for possible scope with GI services.   Trend labs, Wean oxygen to baseline 2l.     3/30:   Encouraged patient to ambulate   Monitor Pulse ox, trend labs   Patient is unsure that she wants inpatient Colonoscopy/ EGD, discussed with family at bedside, . Iron infusion today.   DC planning next 24 hrs      --> 1104 am   This provider spoke with GI team: patient does not want in house scopes at this time; we will restart her anticoagulation monitor her blood counts for next 24 hrs.      Plan of care discussed with Attending Dr. Nico Davies     I spent over 30 min of professional care and time with this patient.     Aline Rodriguez, APRN-CNP

## 2025-03-30 NOTE — PROGRESS NOTES
Department of Internal Medicine  Gastroenterology  Progress note      Subjective  GI is following for anemia    Today, she denies abdominal pain, nausea, or vomiting.  States she had a bowel movement yesterday but is unsure of the color.  She is tolerating her diet.    She states that the doctor came in this morning and told her that it was unlikely she was bleeding from her GI tract and did not believe that she needed a colonoscopy or upper endoscopy.  Patient is now refusing endoscopy.  Patient was explained that she could definitely be bleeding from her GI tract given the iron deficiency anemia and that without scope and we cannot confirm that there is not a source of blood loss or cancer causing her anemia.  Patient states that she will follow-up with her cancer doctor doctor but is currently refusing scopes      Current Medication    Current Facility-Administered Medications:     acetaminophen (Tylenol) tablet 975 mg, 975 mg, oral, q6h PRN, SHRUTHI Brennan, 975 mg at 03/29/25 0918    albuterol 2.5 mg /3 mL (0.083 %) nebulizer solution 2.5 mg, 2.5 mg, nebulization, q2h PRN, SHRUTHI Brennan    [Held by provider] apixaban (Eliquis) tablet 5 mg, 5 mg, oral, BID, SHRUTHI Brennan, 5 mg at 03/28/25 2033    azithromycin (Zithromax) 500 mg in dextrose 5%  mL, 500 mg, intravenous, q24h, SHRUTHI Brennan, Stopped at 03/29/25 1535    [Held by provider] clopidogrel (Plavix) tablet 75 mg, 75 mg, oral, q AM, SHRUTHI Brennan    dexAMETHasone (Decadron) tablet 2 mg, 2 mg, oral, Daily, SHRUTHI Lynn    tiotropium (Spiriva Respimat) 2.5 mcg/actuation inhaler 2 puff, 2 puff, inhalation, Daily, 2 puff at 03/30/25 0627 **AND** fluticasone furoate-vilanteroL (Breo Ellipta) 200-25 mcg/dose inhaler 1 puff, 1 puff, inhalation, Daily, SHRUTHI Brennan, 1 puff at 03/30/25 0628    gabapentin (Neurontin) capsule 300 mg, 300 mg, oral, TID, Rich Spivey, APRN-CNP, 300 mg at  03/29/25 2054    guaiFENesin (Mucinex) 12 hr tablet 600 mg, 600 mg, oral, BID PRN, SHRUTHI Brennan    HYDROcodone-acetaminophen (Norco)  mg per tablet 1 tablet, 1 tablet, oral, q6h PRN, SHRUTHI Lynn    hydrOXYzine pamoate (Vistaril) capsule 25 mg, 25 mg, oral, TID PRN, SHRUTHI Brennan    insulin lispro injection 0-10 Units, 0-10 Units, subcutaneous, Before meals & nightly, SHRUTHI Lynn    ipratropium-albuteroL (Duo-Neb) 0.5-2.5 mg/3 mL nebulizer solution 3 mL, 3 mL, nebulization, TID, Nico Ice, DO, 3 mL at 03/30/25 0625    iron sucrose (Venofer) injection 200 mg, 200 mg, intravenous, Daily, SHRUTHI Lynn, 200 mg at 03/29/25 1039    LORazepam (Ativan) tablet 0.5 mg, 0.5 mg, oral, q6h PRN, SHRUTHI Lynn    [Held by provider] lubiprostone (Amitiza) capsule 24 mcg, 24 mcg, oral, BID, SHRUTHI Brennan    nicotine (Nicoderm CQ) 21 mg/24 hr patch 1 patch, 1 patch, transdermal, Daily, SHRUTHI Brennan, 1 patch at 03/29/25 0916    OLANZapine (ZyPREXA) tablet 15 mg, 15 mg, oral, Nightly, SHRUTHI Brennan, 15 mg at 03/29/25 2054    ondansetron (Zofran) injection 4 mg, 4 mg, intravenous, q6h PRN, SHRUTHI Brennan, 4 mg at 03/29/25 0513    oxygen (O2) therapy, , inhalation, Continuous - Inhalation, SHRUTHI Lynn    pantoprazole (ProtoNix) EC tablet 40 mg, 40 mg, oral, Daily before breakfast, 40 mg at 03/30/25 0619 **OR** pantoprazole (Protonix) injection 40 mg, 40 mg, intravenous, Daily before breakfast, Deidre Rolon PA-C    pneumococcal conjugate 20-valent (PREVNAR 20) vaccine, 0.5 mL, intramuscular, During hospitalization, SHRUTHI Brennan    rosuvastatin (Crestor) tablet 40 mg, 40 mg, oral, Daily, SHRUTHI Brennan, 40 mg at 03/29/25 0916    sertraline (Zoloft) tablet 100 mg, 100 mg, oral, BID, SHRUTHI Brennan, 100 mg at 03/29/25 2054    Past Medical History  Active Ambulatory  Problems     Diagnosis Date Noted    Angioedema 03/24/2023    Anxiety 03/24/2023    CAD (coronary artery disease) 03/24/2023    Cellulitis of left lower extremity 03/24/2023    COVID-19 03/24/2023    Peripheral vascular disease (CMS-HCC) 03/24/2023    Insomnia 03/24/2023    Intermittent claudication (CMS-HCC) 03/24/2023    Leg pain 03/24/2023    Liver lesion 03/24/2023    Paresthesias 03/24/2023    Vitamin D deficiency 03/24/2023    S/P arteriogram of extremity 03/24/2023    Lung cancer (Multi) 09/21/2023    Pneumonia due to gram-negative bacteria (Multi) 10/22/2023    Other specified anemias 10/25/2023    Abdominal aortic aneurysm 10/25/2023    Abdominal pain 10/25/2023    Adrenal insufficiency (Multi) 10/25/2023    Aneurysm of iliac artery (CMS-HCC) 10/25/2023    Arthritis 10/25/2023    Colitis 10/25/2023    Constipation 10/25/2023    Loss of appetite 10/04/2023    Dehydration 10/25/2023    Dyspnea on exertion 10/25/2023    Generalized weakness 10/25/2023    Mixed hyperlipidemia 10/10/2014    Fibroids 10/25/2023    Leiomyoma 10/25/2023    Mixed anxiety depressive disorder 10/25/2023    Myocardial infarction (Multi) 09/01/2016    STEMI (ST elevation myocardial infarction) (Multi) 08/08/2016    Pain due to neoplasm 09/27/2023    Nephrolithiasis 10/25/2023    Nonruptured cerebral aneurysm ([facility]-HCC) 10/30/2009    Tobacco dependence syndrome 10/25/2023    Hypertension 10/25/2023    Hypotension 10/25/2023    Claudication 10/25/2023    Metastasis to adrenal gland (Multi) 01/05/2024    Moderate protein-calorie malnutrition (Multi) 01/05/2024    Contact with and (suspected) exposure to covid-19 09/02/2023    Cough 07/28/2023    Current smoker 07/28/2023    Dilation of renal shen 01/11/2024    Hyperglycemia 01/11/2024    Physical deconditioning 07/28/2023    Thyroid nodule 01/11/2024    Occlusion of left popliteal artery (CMS-HCC) 01/11/2024    COPD exacerbation (Multi) 03/11/2024    Acute on chronic hypoxic respiratory  failure (Multi) 04/25/2024    Hyperkalemia 04/29/2024    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 (Multi) 04/25/2024    Foot drop, left 06/29/2024    Atherosclerosis of native arteries of extremities with rest pain, left leg (Multi) 10/17/2024    Pneumonia of left lung due to infectious organism, unspecified part of lung 12/02/2024    Non-pressure chronic ulcer of other part of left foot with unspecified severity (Multi) 02/04/2025    Beta thalassemia (Multi) 02/04/2025    Shortness of breath 03/14/2025     Resolved Ambulatory Problems     Diagnosis Date Noted    Abnormal ultrasound of abdomen 03/24/2023    COPD (chronic obstructive pulmonary disease) (Multi) 03/24/2023    Aortic aneurysm 10/25/2023    Malignant neoplasm of unknown origin (Multi) 10/25/2023    Nausea and vomiting 10/25/2023    Arteriosclerosis of coronary artery 10/25/2023    Aneurysm 10/10/2014    History of aortic aneurysm 01/11/2024    History of myocardial infarction 07/28/2023    History of peripheral vascular disease 07/28/2023    Noncompliance with medication regimen 09/02/2023    Pneumonia due to infectious organism 10/22/2023    Aneurysm of artery of neck 10/10/2014    Critical limb ischemia of left lower extremity (Multi) 01/18/2024    PAD (peripheral artery disease) (CMS-HCC) 01/23/2024    Adrenal carcinoma (Multi) 04/25/2024    Acute lower limb ischemia 06/11/2024    Chronic hypoxemic respiratory failure (Multi) 06/29/2024    Type 2 diabetes mellitus with diabetic peripheral angiopathy without gangrene, without long-term current use of insulin (Multi) 02/04/2025     Past Medical History:   Diagnosis Date    Adrenal cancer (Multi)     Aneurysm of carotid artery (CMS-HCC)     Depression     DVT (deep venous thrombosis) (Multi)     DVT (deep venous thrombosis) (Multi)     History of blood transfusion     History of tubal ligation     Hyperlipidemia     Old myocardial infarction     Other specified disorders of kidney and ureter      "Personal history of other diseases of the circulatory system     Personal history of other diseases of the circulatory system     Personal history of other diseases of the respiratory system     Personal history of urinary calculi     Right upper quadrant pain 09/25/2020    Vision loss        PHYSICAL EXAM  VS: /55   Pulse 61   Temp 35.7 °C (96.3 °F)   Resp 19   Ht 1.626 m (5' 4\")   Wt 61.5 kg (135 lb 9.3 oz)   SpO2 96%   BMI 23.27 kg/m²  Body mass index is 23.27 kg/m².  Physical Exam  Constitutional:       General: She is not in acute distress.     Appearance: Normal appearance.   HENT:      Head: Normocephalic and atraumatic.      Nose:      Comments: Nasal cannula in place     Mouth/Throat:      Mouth: Mucous membranes are moist.      Pharynx: Oropharynx is clear.   Eyes:      General: No scleral icterus.     Extraocular Movements: Extraocular movements intact.      Conjunctiva/sclera: Conjunctivae normal.      Pupils: Pupils are equal, round, and reactive to light.   Cardiovascular:      Rate and Rhythm: Normal rate and regular rhythm.      Heart sounds: No murmur heard.  Pulmonary:      Effort: Pulmonary effort is normal.      Breath sounds: No wheezing or rhonchi.   Abdominal:      General: Bowel sounds are normal. There is no distension.      Palpations: Abdomen is soft. There is no mass.      Tenderness: There is no abdominal tenderness.      Hernia: No hernia is present.   Musculoskeletal:         General: No swelling or deformity.   Skin:     General: Skin is warm.      Coloration: Skin is not jaundiced.      Comments: Ecchymosis noted   Neurological:      General: No focal deficit present.      Mental Status: She is alert and oriented to person, place, and time.   Psychiatric:         Mood and Affect: Mood normal.          DATA  Recent blood work and relevant radiology and endoscopic studies were reviewed and discussed with the patient   Results from last 7 days   Lab Units 03/29/25  1703 " 03/29/25  0824   WBC AUTO x10*3/uL  --  10.8   RBC AUTO x10*6/uL  --  4.20   HEMOGLOBIN g/dL 9.4* 9.7*   HEMATOCRIT % 31.8* 34.2*   MCV fL  --  81   MCHC g/dL  --  28.4*   RDW %  --  21.2*   PLATELETS AUTO x10*3/uL  --  299       Results from last 72 hours   Lab Units 03/29/25  0824   SODIUM mmol/L 142   POTASSIUM mmol/L 4.2   CHLORIDE mmol/L 107   CO2 mmol/L 27   BUN mg/dL 12   CREATININE mg/dL 0.83   CALCIUM mg/dL 8.3*   PROTEIN TOTAL g/dL 6.2*   BILIRUBIN TOTAL mg/dL 0.4   ALK PHOS U/L 47   AST U/L 24   ALT U/L 18       Results from last 72 hours   Lab Units 03/28/25  0848   INR  1.4*      Latest Reference Range & Units 03/28/25 08:47 03/29/25 08:24   FERRITIN 8 - 150 ng/mL 26    IRON 35 - 150 ug/dL  15 (L)   TIBC 240 - 445 ug/dL  297   % Saturation 25 - 45 %  5 (L)   (L): Data is abnormally low    RADIOLOGY REVIEW  NA    ENDOSCOPIC REVIEW  NA    IMPRESSION/RECOMMENDATIONS  The patient is a 69 y.o. female with signficant past medical history of hypertension, hyperlipidemia, CAD with FCO 2x, severe PAD (aortic aneurysm repair, intracranial aneurysm coiling, iliac grafts, fem-fem graft, multiple lower extremity stents and angioplasties - [place] [facility] [place] D[place], A-fib, COPD with home O2, lung cancer with mets, and beta thalassemia who presents for shortness of breath and weakness. Of note she was recently admitted with sepsis with source suspected to be UTI vs soft tissue (3/14-19/25).     Acute on chronic microcytic anemia- hgb (3/28) 9.4, admitted with hgb 6.9, baseline 10-11, s/p 3u PRBC, no overt signs of GIB, low iron and normal ferritin    Chronic anticoagulation and antiplatelet Eliquis and Plavix, last dose 3/28 AM  COPD exacerbation currently on 3 L nasal cannula     PLAN  -Recommend EGD and colonoscopy this admission possibly 4/1 or 4/2 pending respiratory status and allowance for adequate time with holding OAC's.  Patient currently refusing endoscopic evaluation, she understands that we  could be missing a bleed that requires intervention or cancer.  -Currently on pantoprazole 40 daily  -Currently on azithromycin  -Currently on cardiac diet  -Continue supportive care per primary team     Discussed with SLADE Ness to sign off please call questions or concerns      (Electronically signed byDeidre Rolon PA-C on 3/30/2025 at 8:13 AM)

## 2025-03-30 NOTE — PROGRESS NOTES
"Radha Toussaint is a 69 y.o. female on day 2 of admission presenting with Shortness of breath.    Subjective   S/p left 2nd, 3rd, 4th amputation DOS 3/12/25 by Dr. Doss    Patient is doing well. No complaints.         Physical Exam    Objective     Pt is Aox4, NAD, Cooperative, dressing noted to left foot CDI     Vascular: Pedal pulses faintly palpable. Mild edema noted b/l LE. Negative for erythema b/l LE     Neurological: Light touch intact b/l LE     Dermatologic: Improved macerated left 3rd webspace noted. S/p partial digital amputations noted to left foot. Stable scab and dry blood noted around, no open lesions noted.     Last Recorded Vitals  Blood pressure 105/55, pulse 61, temperature 35.7 °C (96.3 °F), resp. rate 19, height 1.626 m (5' 4\"), weight 61.5 kg (135 lb 9.3 oz), SpO2 96%.    Intake/Output last 3 Shifts:  I/O last 3 completed shifts:  In: 820 (13.3 mL/kg) [P.O.:220; Blood:350; IV Piggyback:250]  Out: 1350 (22 mL/kg) [Urine:1350 (0.6 mL/kg/hr)]  Weight: 61.5 kg     Relevant Results           Assessment/Plan   Assessment & Plan  Shortness of breath    Lung cancer (Multi)    Other specified anemias    Metastasis to adrenal gland (Multi)    COPD exacerbation (Multi)    Acute on chronic hypoxic respiratory failure    Elevated troponin    Lower extremity edema    Metabolic encephalopathy    S/p left 2nd, 3rd, 4th amputation DOS 3/12/25 by Dr. Doss  - PAD  - Drop foot left  - History of ulceration of left foot  - History of gangrene left foot     Patient was seen at bedside.      No leukocytosis     Maceration to left 3rd webspace is improving, otherwise the surgical sites are stable and healing well. No daron signs of infection.     Dressing recommendation: Daily betadine wet to moist to left webspaces 1, 2, 3 and 4, light kerlix and tape. Continue this upon discharge from the hospital until seen by Dr. Doss at outpatient clinic.   Dressing order placed for nursing staff.      Weight bearing " recommendation: as tolerated     No other intervention planned at this time.      Upon discharge from the hospital, patient to follow-up with Dr. Doss as scheduled or 1 week after discharge.    Podiatry is signing off.         Case to be discussed with attending, A&P above reflects a tentative plan. Please await for the final signature from the attending physician on service.    Renata Delvalle DPM PGY-3  Podiatric Medicine & Surgery  Please contact for any questions or concerns.      SIGNATURE: Renata Delvalle DPM PGY-3 PATIENT NAME: Radha Toussaint   DATE: March 30, 2025 MRN: [id number]   TIME: 12:42 PM

## 2025-03-30 NOTE — CONSULTS
Reason For Consult  The patient was referred to me for further evaluation and management of severe anemia.    History Of Present Illness  Radha Toussaint is a 69 y.o. female presenting with found weakness and tiredness and severe anemia.  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  The patient was placed on first-line chemotherapy using carbo Taxol Keytruda x 3 cycles from September to November 2023 and then subsequently Keytruda maintenance beginning December 2023 received every 6 weeks CBC on March 29, 2025 revealed WBC 10.8 hemoglobin 9.7 g/dL, platelets 299,000/mm³.  Serum iron 15 mcg/dL TIBC 297 mcg/dL saturation 5%.  GI consult obtained upper and lower endoscopies recommended but the patient flatly declined.     Past Medical History  She has a past medical history of Adrenal cancer (Multi), Aneurysm of carotid artery (CMS-HCC), Anxiety, COPD (chronic obstructive pulmonary disease) (Multi), Depression, DVT (deep venous thrombosis) (Multi), DVT (deep venous thrombosis) (Multi), History of blood transfusion, History of tubal ligation, Hyperlipidemia, Old myocardial infarction, Other specified disorders of kidney and ureter, Personal history of other diseases of the circulatory system, Personal history of other diseases of the circulatory system, Personal history of other diseases of the respiratory system, Personal history of urinary calculi, Right upper quadrant pain (09/25/2020), and Vision loss.    Surgical History  She has a past surgical history that includes Other surgical history (09/30/2020); Other surgical history (09/30/2020); CT angio abdomen pelvis w and or wo IV IV contrast (11/21/2019); CT angio head w and wo IV contrast (05/22/2020); CT angio aorta and bilateral iliofemoral runoff including without contrast if performed (08/09/2022); Invasive Vascular Procedure (N/A, 01/23/2024); Invasive Vascular Procedure (N/A,  "01/23/2024); Invasive Vascular Procedure (N/A, 06/25/2024); Invasive Vascular Procedure (Left, 06/25/2024); Invasive Vascular Procedure (N/A, 06/25/2024); Invasive Vascular Procedure (N/A, 06/25/2024); Invasive Vascular Procedure (Left, 06/25/2024); Invasive Vascular Procedure (N/A, 06/26/2024); Invasive Vascular Procedure (N/A, 06/26/2024); Colonoscopy; and Tubal ligation.     Social History  She reports that she has been smoking cigarettes. She started smoking about 50 years ago. She has a 90.2 pack-year smoking history. She has been exposed to tobacco smoke. She has never used smokeless tobacco. She reports that she does not drink alcohol and does not use drugs.    Family History  Family History   Problem Relation Name Age of Onset    Aneurysm Mother      Hypertension Mother      Heart attack Father      Cancer Father's Sister          Allergies  Methylprednisolone, Ace inhibitors, Prednisone, Betamethasone dipropionate, and Meperidine    Review of Systems  As above     Physical Exam  As noted     Last Recorded Vitals  Blood pressure 98/52, pulse 68, temperature 36.3 °C (97.3 °F), temperature source Temporal, resp. rate 20, height 1.626 m (5' 4\"), weight 61.5 kg (135 lb 9.3 oz), SpO2 92%.    Relevant Results  Reviewed     Assessment/Plan   Radha Toussaint is a 69 y.o. female presenting with found weakness and tiredness and severe anemia.  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  The patient was placed on first-line chemotherapy using carbo Taxol Keytruda x 3 cycles from September to November 2023 and then subsequently Keytruda maintenance beginning December 2023 received every 6 weeks CBC on March 29, 2025 revealed WBC 10.8 hemoglobin 9.7 g/dL, platelets 299,000/mm³.  Serum iron 15 mcg/dL TIBC 297 mcg/dL saturation 5%.  GI consult obtained upper and lower endoscopies recommended but the patient flatly declined.    Physical " examination unrevealing.    Suggest:    Vitamin B12, folate, TSH, haptoglobin if already not done.    Recommend Venofer 300 mg intravenously daily for 3 days.    The patient declined upper and lower endoscopies.    Agree with current management    Thank you for allowing me to participate in care of your patient if you have any questions please feel free to call me.              Martínez Doran MD

## 2025-03-31 ENCOUNTER — PATIENT MESSAGE (OUTPATIENT)
Dept: PRIMARY CARE | Facility: CLINIC | Age: 70
End: 2025-03-31
Payer: MEDICARE

## 2025-03-31 ENCOUNTER — TELEPHONE (OUTPATIENT)
Dept: GASTROENTEROLOGY | Facility: CLINIC | Age: 70
End: 2025-03-31
Payer: MEDICARE

## 2025-03-31 DIAGNOSIS — I73.9 PERIPHERAL VASCULAR DISEASE (CMS-HCC): ICD-10-CM

## 2025-03-31 DIAGNOSIS — Z12.11 COLON CANCER SCREENING: ICD-10-CM

## 2025-03-31 LAB
ALBUMIN SERPL BCP-MCNC: 2.8 G/DL (ref 3.4–5)
ALP SERPL-CCNC: 47 U/L (ref 33–136)
ALT SERPL W P-5'-P-CCNC: 16 U/L (ref 7–45)
ANION GAP SERPL CALC-SCNC: 10 MMOL/L (ref 10–20)
AST SERPL W P-5'-P-CCNC: 21 U/L (ref 9–39)
BILIRUB SERPL-MCNC: 0.3 MG/DL (ref 0–1.2)
BUN SERPL-MCNC: 11 MG/DL (ref 6–23)
CALCIUM SERPL-MCNC: 8.4 MG/DL (ref 8.6–10.3)
CHLORIDE SERPL-SCNC: 107 MMOL/L (ref 98–107)
CO2 SERPL-SCNC: 29 MMOL/L (ref 21–32)
CREAT SERPL-MCNC: 0.65 MG/DL (ref 0.5–1.05)
EGFRCR SERPLBLD CKD-EPI 2021: >90 ML/MIN/1.73M*2
ERYTHROCYTE [DISTWIDTH] IN BLOOD BY AUTOMATED COUNT: 22.5 % (ref 11.5–14.5)
GLUCOSE BLD MANUAL STRIP-MCNC: 109 MG/DL (ref 74–99)
GLUCOSE BLD MANUAL STRIP-MCNC: 168 MG/DL (ref 74–99)
GLUCOSE BLD MANUAL STRIP-MCNC: 186 MG/DL (ref 74–99)
GLUCOSE BLD MANUAL STRIP-MCNC: 76 MG/DL (ref 74–99)
GLUCOSE SERPL-MCNC: 78 MG/DL (ref 74–99)
HCT VFR BLD AUTO: 32.5 % (ref 36–46)
HGB BLD-MCNC: 8.8 G/DL (ref 12–16)
LABORATORY COMMENT REPORT: NORMAL
MCH RBC QN AUTO: 22.3 PG (ref 26–34)
MCHC RBC AUTO-ENTMCNC: 27.1 G/DL (ref 32–36)
MCV RBC AUTO: 82 FL (ref 80–100)
NRBC BLD-RTO: 0 /100 WBCS (ref 0–0)
PATH REPORT.FINAL DX SPEC: NORMAL
PATH REPORT.GROSS SPEC: NORMAL
PATH REPORT.RELEVANT HX SPEC: NORMAL
PATH REPORT.TOTAL CANCER: NORMAL
PLATELET # BLD AUTO: 288 X10*3/UL (ref 150–450)
POTASSIUM SERPL-SCNC: 3.7 MMOL/L (ref 3.5–5.3)
PROT SERPL-MCNC: 5.4 G/DL (ref 6.4–8.2)
RBC # BLD AUTO: 3.95 X10*6/UL (ref 4–5.2)
SODIUM SERPL-SCNC: 142 MMOL/L (ref 136–145)
WBC # BLD AUTO: 11.9 X10*3/UL (ref 4.4–11.3)

## 2025-03-31 PROCEDURE — 94640 AIRWAY INHALATION TREATMENT: CPT

## 2025-03-31 PROCEDURE — 2500000001 HC RX 250 WO HCPCS SELF ADMINISTERED DRUGS (ALT 637 FOR MEDICARE OP): Performed by: NURSE PRACTITIONER

## 2025-03-31 PROCEDURE — 2500000005 HC RX 250 GENERAL PHARMACY W/O HCPCS: Performed by: NURSE PRACTITIONER

## 2025-03-31 PROCEDURE — S4991 NICOTINE PATCH NONLEGEND: HCPCS | Performed by: NURSE PRACTITIONER

## 2025-03-31 PROCEDURE — 99232 SBSQ HOSP IP/OBS MODERATE 35: CPT | Performed by: STUDENT IN AN ORGANIZED HEALTH CARE EDUCATION/TRAINING PROGRAM

## 2025-03-31 PROCEDURE — 2500000004 HC RX 250 GENERAL PHARMACY W/ HCPCS (ALT 636 FOR OP/ED): Performed by: NURSE PRACTITIONER

## 2025-03-31 PROCEDURE — 84075 ASSAY ALKALINE PHOSPHATASE: CPT | Performed by: NURSE PRACTITIONER

## 2025-03-31 PROCEDURE — 2500000001 HC RX 250 WO HCPCS SELF ADMINISTERED DRUGS (ALT 637 FOR MEDICARE OP): Performed by: PHYSICIAN ASSISTANT

## 2025-03-31 PROCEDURE — 2500000002 HC RX 250 W HCPCS SELF ADMINISTERED DRUGS (ALT 637 FOR MEDICARE OP, ALT 636 FOR OP/ED): Performed by: NURSE PRACTITIONER

## 2025-03-31 PROCEDURE — 2500000002 HC RX 250 W HCPCS SELF ADMINISTERED DRUGS (ALT 637 FOR MEDICARE OP, ALT 636 FOR OP/ED): Performed by: STUDENT IN AN ORGANIZED HEALTH CARE EDUCATION/TRAINING PROGRAM

## 2025-03-31 PROCEDURE — 36415 COLL VENOUS BLD VENIPUNCTURE: CPT | Performed by: NURSE PRACTITIONER

## 2025-03-31 PROCEDURE — 85027 COMPLETE CBC AUTOMATED: CPT | Performed by: NURSE PRACTITIONER

## 2025-03-31 PROCEDURE — 1200000002 HC GENERAL ROOM WITH TELEMETRY DAILY

## 2025-03-31 PROCEDURE — 82947 ASSAY GLUCOSE BLOOD QUANT: CPT

## 2025-03-31 RX ORDER — CLOPIDOGREL BISULFATE 75 MG/1
75 TABLET ORAL EVERY MORNING
Qty: 90 TABLET | Refills: 3 | Status: SHIPPED | OUTPATIENT
Start: 2025-03-31 | End: 2026-03-31

## 2025-03-31 RX ADMIN — IPRATROPIUM BROMIDE AND ALBUTEROL SULFATE 3 ML: .5; 3 SOLUTION RESPIRATORY (INHALATION) at 12:59

## 2025-03-31 RX ADMIN — SERTRALINE HYDROCHLORIDE 100 MG: 50 TABLET ORAL at 21:08

## 2025-03-31 RX ADMIN — HYDROCODONE BITARTRATE AND ACETAMINOPHEN 1 TABLET: 10; 325 TABLET ORAL at 10:08

## 2025-03-31 RX ADMIN — DEXAMETHASONE 2 MG: 4 TABLET ORAL at 09:04

## 2025-03-31 RX ADMIN — PANTOPRAZOLE SODIUM 40 MG: 40 TABLET, DELAYED RELEASE ORAL at 05:44

## 2025-03-31 RX ADMIN — GABAPENTIN 300 MG: 300 CAPSULE ORAL at 21:08

## 2025-03-31 RX ADMIN — LUBIPROSTONE 24 MCG: 24 CAPSULE, GELATIN COATED ORAL at 16:57

## 2025-03-31 RX ADMIN — IPRATROPIUM BROMIDE AND ALBUTEROL SULFATE 3 ML: .5; 3 SOLUTION RESPIRATORY (INHALATION) at 18:40

## 2025-03-31 RX ADMIN — FLUTICASONE FUROATE AND VILANTEROL TRIFENATATE 1 PUFF: 200; 25 POWDER RESPIRATORY (INHALATION) at 07:08

## 2025-03-31 RX ADMIN — GABAPENTIN 300 MG: 300 CAPSULE ORAL at 09:04

## 2025-03-31 RX ADMIN — AZITHROMYCIN MONOHYDRATE 500 MG: 500 INJECTION, POWDER, LYOPHILIZED, FOR SOLUTION INTRAVENOUS at 14:52

## 2025-03-31 RX ADMIN — Medication 3 L/MIN: at 21:05

## 2025-03-31 RX ADMIN — ROSUVASTATIN CALCIUM 40 MG: 10 TABLET, FILM COATED ORAL at 09:04

## 2025-03-31 RX ADMIN — SERTRALINE HYDROCHLORIDE 100 MG: 50 TABLET ORAL at 09:04

## 2025-03-31 RX ADMIN — TIOTROPIUM BROMIDE INHALATION SPRAY 2 PUFF: 3.12 SPRAY, METERED RESPIRATORY (INHALATION) at 07:08

## 2025-03-31 RX ADMIN — GABAPENTIN 300 MG: 300 CAPSULE ORAL at 14:52

## 2025-03-31 RX ADMIN — LUBIPROSTONE 24 MCG: 24 CAPSULE, GELATIN COATED ORAL at 09:06

## 2025-03-31 RX ADMIN — OLANZAPINE 15 MG: 5 TABLET, FILM COATED ORAL at 21:08

## 2025-03-31 RX ADMIN — ONDANSETRON 4 MG: 2 INJECTION INTRAMUSCULAR; INTRAVENOUS at 10:08

## 2025-03-31 RX ADMIN — NICOTINE 1 PATCH: 21 PATCH, EXTENDED RELEASE TRANSDERMAL at 09:03

## 2025-03-31 RX ADMIN — Medication 2 L/MIN: at 07:08

## 2025-03-31 RX ADMIN — CLOPIDOGREL 75 MG: 75 TABLET ORAL at 09:04

## 2025-03-31 RX ADMIN — APIXABAN 5 MG: 5 TABLET, FILM COATED ORAL at 09:04

## 2025-03-31 RX ADMIN — INSULIN LISPRO 2 UNITS: 100 INJECTION, SOLUTION INTRAVENOUS; SUBCUTANEOUS at 21:09

## 2025-03-31 RX ADMIN — IPRATROPIUM BROMIDE AND ALBUTEROL SULFATE 3 ML: .5; 3 SOLUTION RESPIRATORY (INHALATION) at 07:04

## 2025-03-31 RX ADMIN — HYDROCODONE BITARTRATE AND ACETAMINOPHEN 1 TABLET: 10; 325 TABLET ORAL at 21:22

## 2025-03-31 RX ADMIN — APIXABAN 5 MG: 5 TABLET, FILM COATED ORAL at 21:08

## 2025-03-31 RX ADMIN — INSULIN LISPRO 2 UNITS: 100 INJECTION, SOLUTION INTRAVENOUS; SUBCUTANEOUS at 16:54

## 2025-03-31 RX ADMIN — IRON SUCROSE 200 MG: 20 INJECTION, SOLUTION INTRAVENOUS at 09:04

## 2025-03-31 ASSESSMENT — PAIN - FUNCTIONAL ASSESSMENT
PAIN_FUNCTIONAL_ASSESSMENT: 0-10

## 2025-03-31 ASSESSMENT — PAIN SCALES - GENERAL
PAINLEVEL_OUTOF10: 7
PAINLEVEL_OUTOF10: 5 - MODERATE PAIN
PAINLEVEL_OUTOF10: 3
PAINLEVEL_OUTOF10: 7

## 2025-03-31 ASSESSMENT — COGNITIVE AND FUNCTIONAL STATUS - GENERAL
MOVING TO AND FROM BED TO CHAIR: A LITTLE
DAILY ACTIVITIY SCORE: 19
DRESSING REGULAR UPPER BODY CLOTHING: A LITTLE
TURNING FROM BACK TO SIDE WHILE IN FLAT BAD: A LITTLE
DRESSING REGULAR LOWER BODY CLOTHING: A LITTLE
TOILETING: A LOT
STANDING UP FROM CHAIR USING ARMS: A LOT
CLIMB 3 TO 5 STEPS WITH RAILING: TOTAL
MOBILITY SCORE: 15
WALKING IN HOSPITAL ROOM: A LOT
MOBILITY SCORE: 15
DRESSING REGULAR UPPER BODY CLOTHING: A LITTLE
TURNING FROM BACK TO SIDE WHILE IN FLAT BAD: A LITTLE
DAILY ACTIVITIY SCORE: 19
HELP NEEDED FOR BATHING: A LITTLE
DRESSING REGULAR LOWER BODY CLOTHING: A LITTLE
TOILETING: A LOT
WALKING IN HOSPITAL ROOM: A LOT
STANDING UP FROM CHAIR USING ARMS: A LOT
HELP NEEDED FOR BATHING: A LITTLE
CLIMB 3 TO 5 STEPS WITH RAILING: TOTAL
MOVING TO AND FROM BED TO CHAIR: A LITTLE

## 2025-03-31 ASSESSMENT — PAIN DESCRIPTION - LOCATION
LOCATION: ABDOMEN
LOCATION: FOOT

## 2025-03-31 ASSESSMENT — PAIN SCALES - WONG BAKER: WONGBAKER_NUMERICALRESPONSE: HURTS EVEN MORE

## 2025-03-31 NOTE — CONSULTS
Nutrition Note:   Reason for Assessment: Admission nursing screening (MST-0 with wound)    Patient is a 69 y.o. female presenting with shortness of breath. Nutrition consult generated from admission screening for MST-0 with wounds. Per review of chart, wounds include: S/p left 2nd, 3rd, 4th amputation DOS 3/12/25 per podiatry note. Nutrition consult and full assessment not warranted for this type of wound. Please reconsult for changes in plan of care or need for RD.      Time Spent (min): 5 minutes

## 2025-03-31 NOTE — PROGRESS NOTES
03/31/25 1201   Discharge Planning   Living Arrangements Spouse/significant other;Children   Support Systems Spouse/significant other;Children   Assistance Needed walker   Type of Residence Private residence   Number of Stairs to Enter Residence 1   Expected Discharge Disposition Home H   Intensity of Service   Intensity of Service 0-30 min     3/31/2025  Met with pt in room, introduced self and explained role. Verified insurance, address,  phone.   PCP- Dr Francheska Rogel  Pharmacy- [facility]       Able to obtain and afford medications.    Pt is from home, lives with spouse, dtr, s-I-l and grand children.  Stated she uses a walker/. Performs own ADL's. Has grab bars and shower seat in bathroom.    does dressing changes.   does the driving.    Wears 2.5L O2 at home.  Therapies ordered.  Ct Team will follow for any needs.   Opal Whitaker Rn TCC

## 2025-03-31 NOTE — CARE PLAN
The patient's goals for the shift include comfort    The clinical goals for the shift include remain hemodynamically stable    Over the shift, the patient did not make progress toward the following goals. Barriers to progression include assist with care as needed.

## 2025-03-31 NOTE — PROGRESS NOTES
"Radha Toussaint is a 69 y.o. female on day 3 of admission presenting with Shortness of breath.    Subjective   Patient is in her bed.  She is on oxygen.  Patient is very weak.  She has dyspnea on minimal exertion.       Objective     Physical Exam  Head and face no deformities  Oropharynx normal mucosa  Neck is supple no thyromegaly  Chest is symmetric no crackles  Heart is regular no murmurs  Abdomen is soft and nontender  Skin is intact  Joints are normal  Neurologically patient is moving all 4 limbs.  Last Recorded Vitals  Blood pressure 106/58, pulse 70, temperature 36.3 °C (97.3 °F), resp. rate 18, height 1.626 m (5' 4\"), weight 61.5 kg (135 lb 9.3 oz), SpO2 95%.  Intake/Output last 3 Shifts:  I/O last 3 completed shifts:  In: 250 (4.1 mL/kg) [IV Piggyback:250]  Out: 2400 (39 mL/kg) [Urine:2400 (1.1 mL/kg/hr)]  Weight: 61.5 kg                       Assessment/Plan      Hypoxia currently patient is on oxygen nasal cannula.  History of non-small cell lung cancer.  COPD.  Muscle weakness and deconditioning.  Hypertension.    Plan: Titrate oxygen for saturation above 90%.  Bronchodilators.  DVT prophylaxis.  Avoid sedatives.    Jamey Phillips MD      "

## 2025-03-31 NOTE — CARE PLAN
The patient's goals for the shift include comfort    The clinical goals for the shift include remain hemodynamically stable    Over the shift, the patient did not make progress toward the following goals. Barriers to progression include pt will be hemodynamically stable. .

## 2025-03-31 NOTE — TELEPHONE ENCOUNTER
Faxed request to Dr. Mike for Plavix hold [phone]    Faxed request to Dr. Burnham for Eliquis hold [phone]    Scanned to      Patient scheduled.   Prep sent to pharmacy. Instructions mailed to patient

## 2025-03-31 NOTE — TELEPHONE ENCOUNTER
----- Message from Deidre Rolon sent at 3/30/2025 11:29 AM EDT -----  Regarding: outpatient EGD Colon  Patient refused inpatient scopes  Please reach out to obtain EGD and colon as outpatient at [place]    Will need to hold plavix and eliquis    Thanks  Deidre Rolon PA-C

## 2025-03-31 NOTE — PROGRESS NOTES
Radha Toussaint is a 69 y.o. female on day 3 of admission presenting with Shortness of breath.      Subjective   Doing better   H&H stable   Back to home 02 needs       Objective     Last Recorded Vitals  /58   Pulse 70   Temp 36.3 °C (97.3 °F)   Resp 18   Wt 61.5 kg (135 lb 9.3 oz)   SpO2 95%   Intake/Output last 3 Shifts:    Intake/Output Summary (Last 24 hours) at 3/31/2025 1220  Last data filed at 3/31/2025 0507  Gross per 24 hour   Intake 250 ml   Output 1250 ml   Net -1000 ml       Admission Weight  Weight: 66.2 kg (145 lb 15.1 oz) (03/28/25 0821)    Daily Weight  03/30/25 : 61.5 kg (135 lb 9.3 oz)    Image Results      Physical Exam  Vitals and nursing note reviewed.   Constitutional:       General: She is not in acute distress.     Appearance: Normal appearance. She is ill-appearing. She is not toxic-appearing or diaphoretic.   HENT:      Head: Normocephalic.      Nose: Nose normal.      Mouth/Throat:      Mouth: Mucous membranes are moist.   Eyes:      Extraocular Movements: Extraocular movements intact.      Conjunctiva/sclera: Conjunctivae normal.      Pupils: Pupils are equal, round, and reactive to light.   Cardiovascular:      Rate and Rhythm: Normal rate and regular rhythm.      Pulses: Normal pulses.      Heart sounds: Normal heart sounds.   Pulmonary:      Comments: Diminished on 2l no cough   Abdominal:      General: Abdomen is flat. Bowel sounds are normal.      Palpations: Abdomen is soft.   Musculoskeletal:         General: Normal range of motion.      Cervical back: Normal range of motion.   Skin:     General: Skin is warm and dry.      Findings: Bruising present. No erythema.   Neurological:      General: No focal deficit present.      Mental Status: She is alert and oriented to person, place, and time.      Motor: Weakness present.   Psychiatric:         Mood and Affect: Mood normal.         Behavior: Behavior normal.         Relevant Results  No results found.    No current  facility-administered medications on file prior to encounter.     Current Outpatient Medications on File Prior to Encounter   Medication Sig Dispense Refill    acetaminophen (Tylenol) 325 mg tablet Take 3 tablets (975 mg) by mouth every 6 hours if needed for mild pain (1 - 3) or moderate pain (4 - 6).      [] amoxicillin-pot clavulanate (Augmentin) 875-125 mg tablet Take 1 tablet (875 mg) by mouth 2 times a day for 5 days. 10 tablet 0    apixaban (Eliquis) 5 mg tablet Take 1 tablet (5 mg) by mouth 2 times a day. 180 tablet 3    clopidogrel (Plavix) 75 mg tablet Take 1 tablet (75 mg) by mouth once daily in the morning. 90 tablet 3    dexAMETHasone (Decadron) 4 mg tablet Take 0.5 tablets (2 mg) by mouth once daily. 30 tablet 1    fluticasone-umeclidin-vilanter (Trelegy Ellipta) 200-62.5-25 mcg blister with device Inhale 1 puff once daily. 60 each 3    gabapentin (Neurontin) 300 mg capsule Take 1 capsule (300 mg) by mouth 3 times a day. 270 capsule 3    HYDROcodone-acetaminophen (Norco)  mg tablet Take 1 tablet by mouth every 6 hours if needed for severe pain (7 - 10). 60 tablet 0    hydrOXYzine pamoate (VistariL) 25 mg capsule Take 1 capsule (25 mg) by mouth 3 times a day as needed for itching. 270 capsule 3    LORazepam (Ativan) 0.5 mg tablet Take 1 tablet (0.5 mg) by mouth every 6 hours if needed for anxiety. 120 tablet 3    lubiprostone (Amitiza) 24 mcg capsule Take 1 capsule (24 mcg) by mouth 2 times daily (morning and late afternoon). 60 capsule 0    magic mouthwash (lidocaine, diphenhydrAMINE, Maalox 1:1:1) Swish and swallow 10 mL every 6 hours if needed for mucositis. (Patient not taking: Reported on 3/12/2025) 560 mL 3    naloxone (Narcan) 4 mg/0.1 mL nasal spray Administer 1 spray (4 mg) into affected nostril(s) if needed for opioid reversal. May repeat every 2-3 minutes if needed, alternating nostrils, until medical assistance becomes available. (Patient not taking: Reported on 3/12/2025) 2 each  0    OLANZapine (ZyPREXA) 15 mg tablet Take 1 tablet (15 mg) by mouth once daily at bedtime. 90 tablet 1    ondansetron ODT (Zofran-ODT) 8 mg disintegrating tablet Dissolve 1 tablet (8 mg) in the mouth every 8 hours if needed for nausea or vomiting. 90 tablet 0    oxygen (O2) gas therapy Inhale 1 each continuously. 2 L at bedtime and naps      rosuvastatin (Crestor) 40 mg tablet Take 1 tablet (40 mg) by mouth once daily. 90 tablet 3    sertraline (Zoloft) 100 mg tablet TAKE 1 TABLET(100 MG) BY MOUTH TWICE DAILY 180 tablet 3    [DISCONTINUED] buPROPion (Wellbutrin) 75 mg tablet Take 1 tablet (75 mg) by mouth once daily. (Patient not taking: Reported on 3/12/2025) 30 tablet 1    [DISCONTINUED] montelukast (Singulair) 10 mg tablet Take 1 tablet (10 mg) by mouth once daily at bedtime. (Patient not taking: Reported on 3/12/2025) 90 tablet 1    [DISCONTINUED] oxyCODONE-acetaminophen (Percocet) 5-325 mg tablet Take 1 tablet by mouth every 6 hours if needed for severe pain (7 - 10). 20 tablet 0    [DISCONTINUED] prochlorperazine (Compazine) 5 mg tablet Take 1 tablet (5 mg) by mouth every 6 hours if needed for nausea or vomiting. 30 tablet 3           Assessment/Plan                  Assessment & Plan  Shortness of breath    Lung cancer (Multi)    Other specified anemias    Metastasis to adrenal gland (Multi)    COPD exacerbation (Multi)    Acute on chronic hypoxic respiratory failure    Elevated troponin    Lower extremity edema    Metabolic encephalopathy    #Acute on chronic hypoxic respiratory failure  #COPD exacerbation  #NSCL cancer     Telemetry monitoring  Solu-Medrol 40 mg every 8 hours  Azithromycin  supplemental oxygen  Nebulizers as needed and scheduled  Consult pulmonology  Mucinex 600 mg every 12 hours as needed  Supportive Care     #Elevated troponin, - Suspect demand ischemia 2/2 anemia and hypoxia  #CAD s/p PCI/Stents     Echocardiogram  Cardiology consult, appreciate input  On hold anticoagulation          #Generalized Weakness  #Confusion, suspect metabolic encephalopathy.   -> resolved   CT head without contrast  UA negative-> follow up cultures  Follow up cultures  PT/OT  - on IV azithromycin for copd   -       #anemia  #Hx beta thalassemia anemia  No reported e/o gastrointestinal bleeding  Check Fecal occult  1 unit PRBC Given by the ED.   Check H&H this evening,   IRON STUDIES;  GI consult  HOLD  plavix and eliquis: on pending possible scope with GI      #nicotine use disorder  Encouraged smoking cessation  Nicotine patch ordered     #PAD s/p multiple revascularization: podiatry on for wound recs  #HTN  #HLD  #anxiety     Continue home meds as appropriate     #DVT ppx  SCD     3/29:   Hemoglobin stable after blood transfusion, patient denies any CP, troponin elevated 2/2 anemia?   Hold DOAC/ Plavix  on hold for possible scope with GI services.   Trend labs, Wean oxygen to baseline 2l.     3/30:   Encouraged patient to ambulate   Monitor Pulse ox, trend labs   Patient is unsure that she wants inpatient Colonoscopy/ EGD, discussed with family at bedside, . Iron infusion today.   DC planning next 24 hrs      --> 1104 am   This provider spoke with GI team: patient does not want in house scopes at this time; we will restart her anticoagulation monitor her blood counts for next 24 hrs.     3/31: doing well. Pt/ot refuses snf, potential dc tomorrow     I spent over 30 min of professional care and time with this patient.     Nico Davies, DO

## 2025-04-01 ENCOUNTER — PHARMACY VISIT (OUTPATIENT)
Dept: PHARMACY | Facility: CLINIC | Age: 70
End: 2025-04-01
Payer: COMMERCIAL

## 2025-04-01 ENCOUNTER — APPOINTMENT (OUTPATIENT)
Dept: PRIMARY CARE | Facility: CLINIC | Age: 70
End: 2025-04-01
Payer: MEDICARE

## 2025-04-01 VITALS
WEIGHT: 135.58 LBS | HEART RATE: 70 BPM | HEIGHT: 64 IN | TEMPERATURE: 97.5 F | DIASTOLIC BLOOD PRESSURE: 55 MMHG | OXYGEN SATURATION: 93 % | RESPIRATION RATE: 18 BRPM | SYSTOLIC BLOOD PRESSURE: 103 MMHG | BODY MASS INDEX: 23.15 KG/M2

## 2025-04-01 LAB
ALBUMIN SERPL BCP-MCNC: 2.9 G/DL (ref 3.4–5)
ALP SERPL-CCNC: 47 U/L (ref 33–136)
ALT SERPL W P-5'-P-CCNC: 14 U/L (ref 7–45)
ANION GAP SERPL CALC-SCNC: 8 MMOL/L (ref 10–20)
AST SERPL W P-5'-P-CCNC: 15 U/L (ref 9–39)
BACTERIA BLD CULT: NORMAL
BACTERIA BLD CULT: NORMAL
BILIRUB SERPL-MCNC: 0.4 MG/DL (ref 0–1.2)
BUN SERPL-MCNC: 10 MG/DL (ref 6–23)
CALCIUM SERPL-MCNC: 8.5 MG/DL (ref 8.6–10.3)
CHLORIDE SERPL-SCNC: 106 MMOL/L (ref 98–107)
CO2 SERPL-SCNC: 32 MMOL/L (ref 21–32)
CREAT SERPL-MCNC: 0.71 MG/DL (ref 0.5–1.05)
EGFRCR SERPLBLD CKD-EPI 2021: >90 ML/MIN/1.73M*2
ERYTHROCYTE [DISTWIDTH] IN BLOOD BY AUTOMATED COUNT: 23.4 % (ref 11.5–14.5)
GLUCOSE BLD MANUAL STRIP-MCNC: 167 MG/DL (ref 74–99)
GLUCOSE BLD MANUAL STRIP-MCNC: 80 MG/DL (ref 74–99)
GLUCOSE SERPL-MCNC: 76 MG/DL (ref 74–99)
HCT VFR BLD AUTO: 32.9 % (ref 36–46)
HGB BLD-MCNC: 9.5 G/DL (ref 12–16)
MCH RBC QN AUTO: 23.6 PG (ref 26–34)
MCHC RBC AUTO-ENTMCNC: 28.9 G/DL (ref 32–36)
MCV RBC AUTO: 82 FL (ref 80–100)
NRBC BLD-RTO: 0 /100 WBCS (ref 0–0)
PLATELET # BLD AUTO: 291 X10*3/UL (ref 150–450)
POTASSIUM SERPL-SCNC: 3.9 MMOL/L (ref 3.5–5.3)
PROT SERPL-MCNC: 5.4 G/DL (ref 6.4–8.2)
RBC # BLD AUTO: 4.02 X10*6/UL (ref 4–5.2)
SODIUM SERPL-SCNC: 142 MMOL/L (ref 136–145)
WBC # BLD AUTO: 11.2 X10*3/UL (ref 4.4–11.3)

## 2025-04-01 PROCEDURE — 2500000002 HC RX 250 W HCPCS SELF ADMINISTERED DRUGS (ALT 637 FOR MEDICARE OP, ALT 636 FOR OP/ED): Performed by: NURSE PRACTITIONER

## 2025-04-01 PROCEDURE — 2500000001 HC RX 250 WO HCPCS SELF ADMINISTERED DRUGS (ALT 637 FOR MEDICARE OP): Performed by: NURSE PRACTITIONER

## 2025-04-01 PROCEDURE — 82947 ASSAY GLUCOSE BLOOD QUANT: CPT

## 2025-04-01 PROCEDURE — 2500000004 HC RX 250 GENERAL PHARMACY W/ HCPCS (ALT 636 FOR OP/ED): Performed by: NURSE PRACTITIONER

## 2025-04-01 PROCEDURE — 2500000005 HC RX 250 GENERAL PHARMACY W/O HCPCS: Performed by: NURSE PRACTITIONER

## 2025-04-01 PROCEDURE — 85027 COMPLETE CBC AUTOMATED: CPT | Performed by: NURSE PRACTITIONER

## 2025-04-01 PROCEDURE — 2500000002 HC RX 250 W HCPCS SELF ADMINISTERED DRUGS (ALT 637 FOR MEDICARE OP, ALT 636 FOR OP/ED): Performed by: STUDENT IN AN ORGANIZED HEALTH CARE EDUCATION/TRAINING PROGRAM

## 2025-04-01 PROCEDURE — S4991 NICOTINE PATCH NONLEGEND: HCPCS | Performed by: NURSE PRACTITIONER

## 2025-04-01 PROCEDURE — RXMED WILLOW AMBULATORY MEDICATION CHARGE

## 2025-04-01 PROCEDURE — 36415 COLL VENOUS BLD VENIPUNCTURE: CPT | Performed by: NURSE PRACTITIONER

## 2025-04-01 PROCEDURE — 99238 HOSP IP/OBS DSCHRG MGMT 30/<: CPT | Performed by: STUDENT IN AN ORGANIZED HEALTH CARE EDUCATION/TRAINING PROGRAM

## 2025-04-01 PROCEDURE — 2500000001 HC RX 250 WO HCPCS SELF ADMINISTERED DRUGS (ALT 637 FOR MEDICARE OP): Performed by: PHYSICIAN ASSISTANT

## 2025-04-01 PROCEDURE — 94640 AIRWAY INHALATION TREATMENT: CPT

## 2025-04-01 PROCEDURE — 80053 COMPREHEN METABOLIC PANEL: CPT | Performed by: NURSE PRACTITIONER

## 2025-04-01 RX ORDER — PANTOPRAZOLE SODIUM 40 MG/1
40 TABLET, DELAYED RELEASE ORAL
Qty: 30 TABLET | Refills: 0 | Status: SHIPPED | OUTPATIENT
Start: 2025-04-02

## 2025-04-01 RX ADMIN — Medication 3 L/MIN: at 08:34

## 2025-04-01 RX ADMIN — SERTRALINE HYDROCHLORIDE 100 MG: 50 TABLET ORAL at 08:31

## 2025-04-01 RX ADMIN — IPRATROPIUM BROMIDE AND ALBUTEROL SULFATE 3 ML: .5; 3 SOLUTION RESPIRATORY (INHALATION) at 07:48

## 2025-04-01 RX ADMIN — TIOTROPIUM BROMIDE INHALATION SPRAY 2 PUFF: 3.12 SPRAY, METERED RESPIRATORY (INHALATION) at 07:50

## 2025-04-01 RX ADMIN — LUBIPROSTONE 24 MCG: 24 CAPSULE, GELATIN COATED ORAL at 08:31

## 2025-04-01 RX ADMIN — APIXABAN 5 MG: 5 TABLET, FILM COATED ORAL at 08:31

## 2025-04-01 RX ADMIN — FLUTICASONE FUROATE AND VILANTEROL TRIFENATATE 1 PUFF: 200; 25 POWDER RESPIRATORY (INHALATION) at 07:49

## 2025-04-01 RX ADMIN — GABAPENTIN 300 MG: 300 CAPSULE ORAL at 08:33

## 2025-04-01 RX ADMIN — ROSUVASTATIN CALCIUM 40 MG: 10 TABLET, FILM COATED ORAL at 08:31

## 2025-04-01 RX ADMIN — DEXAMETHASONE 2 MG: 4 TABLET ORAL at 08:33

## 2025-04-01 RX ADMIN — INSULIN LISPRO 2 UNITS: 100 INJECTION, SOLUTION INTRAVENOUS; SUBCUTANEOUS at 12:08

## 2025-04-01 RX ADMIN — CLOPIDOGREL 75 MG: 75 TABLET ORAL at 08:31

## 2025-04-01 RX ADMIN — NICOTINE 1 PATCH: 21 PATCH, EXTENDED RELEASE TRANSDERMAL at 08:33

## 2025-04-01 RX ADMIN — PANTOPRAZOLE SODIUM 40 MG: 40 TABLET, DELAYED RELEASE ORAL at 06:16

## 2025-04-01 ASSESSMENT — PAIN SCALES - GENERAL: PAINLEVEL_OUTOF10: 0 - NO PAIN

## 2025-04-01 NOTE — CARE PLAN
The clinical goals for the shift include remain hemodynamically stable    Problem: Pain - Adult  Goal: Verbalizes/displays adequate comfort level or baseline comfort level  Outcome: Progressing  Flowsheets (Taken 4/1/2025 0407)  Verbalizes/displays adequate comfort level or baseline comfort level: Encourage patient to monitor pain and request assistance     Problem: Safety - Adult  Goal: Free from fall injury  Outcome: Progressing     Problem: Discharge Planning  Goal: Discharge to home or other facility with appropriate resources  Outcome: Progressing  Flowsheets (Taken 4/1/2025 0407)  Discharge to home or other facility with appropriate resources: Identify barriers to discharge with patient and caregiver     Problem: Chronic Conditions and Co-morbidities  Goal: Patient's chronic conditions and co-morbidity symptoms are monitored and maintained or improved  Outcome: Progressing  Flowsheets (Taken 4/1/2025 0407)  Care Plan - Patient's Chronic Conditions and Co-Morbidity Symptoms are Monitored and Maintained or Improved: Monitor and assess patient's chronic conditions and comorbid symptoms for stability, deterioration, or improvement     Problem: Nutrition  Goal: Nutrient intake appropriate for maintaining nutritional needs  Outcome: Progressing     Problem: Skin  Goal: Decreased wound size/increased tissue granulation at next dressing change  Outcome: Progressing  Flowsheets (Taken 4/1/2025 0407)  Decreased wound size/increased tissue granulation at next dressing change: Promote sleep for wound healing

## 2025-04-01 NOTE — NURSING NOTE
Met with patient and  at the bedside. Discharge Planning discussed. AVS updated with follow-ups, education, and medication information. Verified/ entered a PCP and pharmacy. New medications and side effects discussed. Discussed follow up appointments. All questions answered.  Updated nurse on this info. AVS printed and hi-lighted. IV and tele removed. Patient very anxious to not be able to breathe again. We discussed breathing exercises and what to do at home to help.

## 2025-04-01 NOTE — CARE PLAN
The patient's goals for the shift include comfort    The clinical goals for the shift include remain hemodynamically stable    Over the shift, the patient did not make progress toward the following goals. Barriers to progression include to be discharged today

## 2025-04-01 NOTE — DISCHARGE SUMMARY
Discharge Diagnosis  Shortness of breath    Issues Requiring Follow-Up  sob    Test Results Pending At Discharge  Pending Labs       Order Current Status    Blood Culture Preliminary result    Blood Culture Preliminary result            Hospital Course   #Acute on chronic hypoxic respiratory failure  #COPD exacerbation  #NSCL cancer     Telemetry monitoring  Solu-Medrol 40 mg every 8 hours  Azithromycin  supplemental oxygen  Nebulizers as needed and scheduled  Consult pulmonology  Mucinex 600 mg every 12 hours as needed  Supportive Care     #Elevated troponin, - Suspect demand ischemia 2/2 anemia and hypoxia  #CAD s/p PCI/Stents     Echocardiogram  Cardiology consult, appreciate input  On hold anticoagulation         #Generalized Weakness  #Confusion, suspect metabolic encephalopathy.   -> resolved   CT head without contrast  UA negative-> follow up cultures  Follow up cultures  PT/OT  - on IV azithromycin for copd   -       #anemia  #Hx beta thalassemia anemia  No reported e/o gastrointestinal bleeding  Check Fecal occult  1 unit PRBC Given by the ED.   Check H&H this evening,   IRON STUDIES;  GI consult  HOLD  plavix and eliquis: on pending possible scope with GI      #nicotine use disorder  Encouraged smoking cessation  Nicotine patch ordered     #PAD s/p multiple revascularization: podiatry on for wound recs  #HTN  #HLD  #anxiety     Continue home meds as appropriate     #DVT ppx  SCD     3/29:   Hemoglobin stable after blood transfusion, patient denies any CP, troponin elevated 2/2 anemia?   Hold DOAC/ Plavix  on hold for possible scope with GI services.   Trend labs, Wean oxygen to baseline 2l.     3/30:   Encouraged patient to ambulate   Monitor Pulse ox, trend labs   Patient is unsure that she wants inpatient Colonoscopy/ EGD, discussed with family at bedside, . Iron infusion today.   DC planning next 24 hrs       --> 1104 am   This provider spoke with GI team: patient does not want in house scopes at this  time; we will restart her anticoagulation monitor her blood counts for next 24 hrs.      3/31: doing well. Pt/ot refuses snf, potential dc tomorrow       Pertinent Physical Exam At Time of Discharge  Physical Exam  Constitutional:       Appearance: Normal appearance.   HENT:      Head: Normocephalic and atraumatic.      Right Ear: Tympanic membrane and ear canal normal.      Left Ear: Tympanic membrane and ear canal normal.      Mouth/Throat:      Mouth: Mucous membranes are moist.      Pharynx: Oropharynx is clear.   Eyes:      Extraocular Movements: Extraocular movements intact.      Conjunctiva/sclera: Conjunctivae normal.      Pupils: Pupils are equal, round, and reactive to light.   Cardiovascular:      Rate and Rhythm: Normal rate and regular rhythm.      Pulses: Normal pulses.      Heart sounds: Normal heart sounds.   Pulmonary:      Effort: Pulmonary effort is normal.      Breath sounds: Normal breath sounds.   Abdominal:      General: Abdomen is flat. Bowel sounds are normal.      Palpations: Abdomen is soft.   Musculoskeletal:         General: Normal range of motion.      Cervical back: Normal range of motion and neck supple.   Skin:     General: Skin is warm and dry.      Capillary Refill: Capillary refill takes 2 to 3 seconds.   Neurological:      General: No focal deficit present.      Mental Status: She is alert and oriented to person, place, and time. Mental status is at baseline.   Psychiatric:         Mood and Affect: Mood normal.         Behavior: Behavior normal.         Thought Content: Thought content normal.         Judgment: Judgment normal.         Home Medications     Medication List      START taking these medications     pantoprazole 40 mg EC tablet; Commonly known as: ProtoNix; Take 1 tablet   (40 mg) by mouth once daily in the morning. Take before meals. Do not   crush, chew, or split.; Start taking on: April 2, 2025     CONTINUE taking these medications     acetaminophen 325 mg tablet;  Commonly known as: Tylenol; Take 3 tablets   (975 mg) by mouth every 6 hours if needed for mild pain (1 - 3) or   moderate pain (4 - 6).   apixaban 5 mg tablet; Commonly known as: Eliquis; Take 1 tablet (5 mg)   by mouth 2 times a day.   clopidogrel 75 mg tablet; Commonly known as: Plavix; Take 1 tablet (75   mg) by mouth once daily in the morning.   dexAMETHasone 4 mg tablet; Commonly known as: Decadron; Take 0.5 tablets   (2 mg) by mouth once daily.   gabapentin 300 mg capsule; Commonly known as: Neurontin; Take 1 capsule   (300 mg) by mouth 3 times a day.   HYDROcodone-acetaminophen  mg tablet; Commonly known as: Norco;   Take 1 tablet by mouth every 6 hours if needed for severe pain (7 - 10).   hydrOXYzine pamoate 25 mg capsule; Commonly known as: VistariL; Take 1   capsule (25 mg) by mouth 3 times a day as needed for itching.   LORazepam 0.5 mg tablet; Commonly known as: Ativan; Take 1 tablet (0.5   mg) by mouth every 6 hours if needed for anxiety.   lubiprostone 24 mcg capsule; Commonly known as: Amitiza; Take 1 capsule   (24 mcg) by mouth 2 times daily (morning and late afternoon).   OLANZapine 15 mg tablet; Commonly known as: ZyPREXA; Take 1 tablet (15   mg) by mouth once daily at bedtime.   ondansetron ODT 8 mg disintegrating tablet; Commonly known as:   Zofran-ODT; Dissolve 1 tablet (8 mg) in the mouth every 8 hours if needed   for nausea or vomiting.   oxygen gas therapy; Commonly known as: O2   rosuvastatin 40 mg tablet; Commonly known as: Crestor; Take 1 tablet (40   mg) by mouth once daily.   sertraline 100 mg tablet; Commonly known as: Zoloft; TAKE 1 TABLET(100   MG) BY MOUTH TWICE DAILY   Trelegy Ellipta 200-62.5-25 mcg blister with device; Generic drug:   fluticasone-umeclidin-vilanter; Inhale 1 puff once daily.     STOP taking these medications     amoxicillin-pot clavulanate 875-125 mg tablet; Commonly known as:   Augmentin   magic mouthwash (lidocaine, diphenhydrAMINE, Maalox 1:1:1)    naloxone 4 mg/0.1 mL nasal spray; Commonly known as: Narcan       Outpatient Follow-Up  Future Appointments   Date Time Provider Department [facility]   4/10/2025  8:45 AM Margoth Doss DPM [id number] [place]   5/7/2025 10:30 AM Paolo Nava MD [id number] [place]   5/10/2025 10:00 AM Nam Mason APRN-CNP [id number] [place]   8/12/2025  8:30 AM DO KRISSY [id number] [place]       Nico Davies DO

## 2025-04-01 NOTE — PROGRESS NOTES
4/1/2025  AMPA- 20, low recommended.  Followed up with pt in room.  Discussed ampac score and recommendation for HHC.  Pt does not feel she needs it, she is declining HHC.  Support provided.  Stated family will pick her up upon discharge.   Opal Whitaker RN [facility]

## 2025-04-02 ENCOUNTER — PATIENT OUTREACH (OUTPATIENT)
Dept: PRIMARY CARE | Facility: CLINIC | Age: 70
End: 2025-04-02
Payer: MEDICARE

## 2025-04-02 DIAGNOSIS — G89.3 CANCER RELATED PAIN: ICD-10-CM

## 2025-04-02 DIAGNOSIS — C34.90 NON-SMALL CELL LUNG CANCER, UNSPECIFIED LATERALITY (MULTI): ICD-10-CM

## 2025-04-02 RX ORDER — POLYETHYLENE GLYCOL 3350, SODIUM CHLORIDE, SODIUM BICARBONATE, POTASSIUM CHLORIDE 420; 11.2; 5.72; 1.48 G/4L; G/4L; G/4L; G/4L
4000 POWDER, FOR SOLUTION ORAL ONCE
Qty: 4000 ML | Refills: 0 | Status: SHIPPED | OUTPATIENT
Start: 2025-04-02 | End: 2025-04-02

## 2025-04-02 RX ORDER — HYDROCODONE BITARTRATE AND ACETAMINOPHEN 10; 325 MG/1; MG/1
1 TABLET ORAL EVERY 6 HOURS PRN
Qty: 60 TABLET | Refills: 0 | Status: SHIPPED | OUTPATIENT
Start: 2025-04-02

## 2025-04-02 NOTE — PROGRESS NOTES
Discharge Facility:[facility]  Discharge Diagnosis:SOB  Admission Date:3/28/25  Discharge Date:4/1/25    PCP Appointment Date:4/10/25  Specialist Appointment Date:Woundcare 4/10/25   Hospital Encounter and Summary Linked: Yes  See discharge assessment below for further details    ED to Hosp-Admission (Discharged) with Nico Davies DO; Frances Castano MD (03/28/2025)   Discharge Summary by Nico Davies DO (04/01/2025 10:17)     Wrap Up  Wrap Up Additional Comments: Patient is doing well today. She has her discharge medication. Patient has a pcp follow up and woundcare appt scheduled on 4/10/25. No questions or concerns at this time. (4/2/2025 10:57 AM)    Medications  Medications reviewed with patient/caregiver?: Yes (4/2/2025 10:57 AM)  Is the patient having any side effects they believe may be caused by any medication additions or changes?: No (4/2/2025 10:57 AM)  Does the patient have all medications ordered at discharge?: Yes (4/2/2025 10:57 AM)  Care Management Interventions: Provided patient education (4/2/2025 10:57 AM)  Prescription Comments: Protonix (4/2/2025 10:57 AM)  Is the patient taking all medications as directed (includes completed medication regime)?: Yes (4/2/2025 10:57 AM)  Care Management Interventions: Provided patient education (4/2/2025 10:57 AM)  Medication Comments: Stop Augmentin, Narcan, Magic Mouthwash (4/2/2025 10:57 AM)    Appointments  Does the patient have a primary care provider?: Yes (4/2/2025 10:57 AM)  Care Management Interventions: Verified appointment date/time/provider (4/2/2025 10:57 AM)  Has the patient kept scheduled appointments due by today?: Yes (4/2/2025 10:57 AM)  Care Management Interventions: Advised patient to keep appointment; Educated on importance of keeping appointment (4/2/2025 10:57 AM)    Self Management  Has home health visited the patient within 72 hours of discharge?: Not applicable (4/2/2025 10:57 AM)  What Durable Medical Equipment (DME) was ordered?: N/A  (4/2/2025 10:57 AM)    Patient Teaching  Does the patient have access to their discharge instructions?: Yes (4/2/2025 10:57 AM)  Care Management Interventions: Reviewed instructions with patient (4/2/2025 10:57 AM)  What is the patient's perception of their health status since discharge?: Improving (4/2/2025 10:57 AM)  Is the patient/caregiver able to teach back the hierarchy of who to call/visit for symptoms/problems? PCP, Specialist, Home Health nurse, Urgent Care, ED, 911: Yes (4/2/2025 10:57 AM)

## 2025-04-03 LAB
ATRIAL RATE: 90 BPM
P AXIS: 76 DEGREES
PR INTERVAL: 143 MS
Q ONSET: 249 MS
QRS COUNT: 14 BEATS
QRS DURATION: 103 MS
QT INTERVAL: 403 MS
QTC CALCULATION(BAZETT): 488 MS
QTC FREDERICIA: 458 MS
R AXIS: 48 DEGREES
T AXIS: 57 DEGREES
T OFFSET: 451 MS
VENTRICULAR RATE: 88 BPM

## 2025-04-04 ENCOUNTER — APPOINTMENT (OUTPATIENT)
Dept: HEMATOLOGY/ONCOLOGY | Facility: CLINIC | Age: 70
End: 2025-04-04
Payer: MEDICARE

## 2025-04-04 ENCOUNTER — INFUSION (OUTPATIENT)
Dept: HEMATOLOGY/ONCOLOGY | Facility: CLINIC | Age: 70
End: 2025-04-04
Payer: MEDICARE

## 2025-04-04 VITALS
SYSTOLIC BLOOD PRESSURE: 131 MMHG | DIASTOLIC BLOOD PRESSURE: 60 MMHG | BODY MASS INDEX: 25.92 KG/M2 | WEIGHT: 140.87 LBS | OXYGEN SATURATION: 96 % | RESPIRATION RATE: 18 BRPM | TEMPERATURE: 97.7 F | HEIGHT: 62 IN | HEART RATE: 71 BPM

## 2025-04-04 DIAGNOSIS — C34.90 MALIGNANT NEOPLASM OF LUNG, UNSPECIFIED LATERALITY, UNSPECIFIED PART OF LUNG (MULTI): ICD-10-CM

## 2025-04-04 PROCEDURE — 96413 CHEMO IV INFUSION 1 HR: CPT

## 2025-04-04 PROCEDURE — 2500000004 HC RX 250 GENERAL PHARMACY W/ HCPCS (ALT 636 FOR OP/ED): Performed by: INTERNAL MEDICINE

## 2025-04-04 RX ADMIN — SODIUM CHLORIDE 400 MG: 9 INJECTION, SOLUTION INTRAVENOUS at 10:38

## 2025-04-04 ASSESSMENT — PAIN SCALES - GENERAL: PAINLEVEL_OUTOF10: 0-NO PAIN

## 2025-04-04 NOTE — SIGNIFICANT EVENT
04/04/25 1004   Prechemo Checklist   Has the patient been in the hospital, ED, or urgent care since last date of service No   Chemo/Immuno Consent Completed and Signed Yes   Protocol/Indications Verified Yes   Confirmed to previous date/time of medication Yes   Compared to previous dose Yes   All medications are dated accurately Yes   Pregnancy Test Negative Yes   Parameters Met Yes   BSA/Weight-Height Verified Yes   Dose Calculations Verified (current, total, cumulative) Yes

## 2025-04-04 NOTE — DOCUMENTATION CLARIFICATION NOTE
"    PATIENT:               AALIYAH SAWANT  ACCT #:                  [id number]  MRN:                       [id number]  :                       1955  ADMIT DATE:       3/28/2025 7:39 AM  DISCH DATE:        2025 3:06 PM  RESPONDING PROVIDER #:        [id number]          PROVIDER RESPONSE TEXT:    NSTEMI    CDI QUERY TEXT:    Clarification      Instruction:    Based on your assessment of the patient and the clinical information, please provide the requested documentation by clicking on the appropriate radio button and enter any additional information if prompted.    Question: Based on your medical judgment, can you please clarify which of these conditions is the most clinically supported    When answering this query, please exercise your independent professional judgment. The fact that a question is being asked, does not imply that any particular answer is desired or expected.    The patient's clinical indicators include:  Clinical Information:  68 yo female with anemia, COPD exac and acute hypoxic respiratory failure  There is conflicting documentation in the medical record which requires clarification.    -The diagnosis of NSTEMI was documented on 3/28 by Maria Guadalupe HAWKINS    -The diagnosis of Demand Ischemia was documented on 3/28 by Rich HAWKINS      Clinical Indicators:  3/28 Vital signs: T36.5 HR92 R24 /51 92% 4L  Troponin 3/28 88,119,209, 139 3/29 51  ED note:\" NSTEMI (non-ST elevated myocardial infarction) (Multi)  EKG #1 interpreted by Dr. Castano at 8:19 AM, sinus arrhythmia at a rate of 88, no ST segment depression or elevation consistent with ischemia or infarction  EKG #2 interpreted by Dr. Castano 1:39 AM, sinus rhythm at a rate of 88, borderline prolonged QT interval, no ST segment depression or elevation consistent with ischemia or infarction.\"    Treatment:  Aspirin    Risk Factors: Anemia, Metabolic encephalopathy, acute hypoxic respiratory failure, COPD  Options provided:  -- " NSTEMI  -- Acute Myocardial injury  -- Other - I will add my own diagnosis  -- Refer to Clinical Documentation Reviewer    Query created by: Karen Dyson on 4/3/2025 3:07 PM      Electronically signed by:  VASU BLAKE DO 4/4/2025 9:18 AM

## 2025-04-10 ENCOUNTER — OFFICE VISIT (OUTPATIENT)
Dept: WOUND CARE | Facility: CLINIC | Age: 70
End: 2025-04-10
Payer: MEDICARE

## 2025-04-10 ENCOUNTER — APPOINTMENT (OUTPATIENT)
Dept: PRIMARY CARE | Facility: CLINIC | Age: 70
End: 2025-04-10
Payer: MEDICARE

## 2025-04-10 VITALS
HEART RATE: 68 BPM | OXYGEN SATURATION: 97 % | SYSTOLIC BLOOD PRESSURE: 120 MMHG | DIASTOLIC BLOOD PRESSURE: 60 MMHG | HEIGHT: 62 IN | BODY MASS INDEX: 25.59 KG/M2

## 2025-04-10 DIAGNOSIS — F51.01 PRIMARY INSOMNIA: Chronic | ICD-10-CM

## 2025-04-10 DIAGNOSIS — J43.8 OTHER EMPHYSEMA (MULTI): ICD-10-CM

## 2025-04-10 PROCEDURE — 1111F DSCHRG MED/CURRENT MED MERGE: CPT | Performed by: STUDENT IN AN ORGANIZED HEALTH CARE EDUCATION/TRAINING PROGRAM

## 2025-04-10 PROCEDURE — 3074F SYST BP LT 130 MM HG: CPT | Performed by: STUDENT IN AN ORGANIZED HEALTH CARE EDUCATION/TRAINING PROGRAM

## 2025-04-10 PROCEDURE — 11042 DBRDMT SUBQ TIS 1ST 20SQCM/<: CPT

## 2025-04-10 PROCEDURE — 99495 TRANSJ CARE MGMT MOD F2F 14D: CPT | Performed by: STUDENT IN AN ORGANIZED HEALTH CARE EDUCATION/TRAINING PROGRAM

## 2025-04-10 PROCEDURE — 1160F RVW MEDS BY RX/DR IN RCRD: CPT | Performed by: STUDENT IN AN ORGANIZED HEALTH CARE EDUCATION/TRAINING PROGRAM

## 2025-04-10 PROCEDURE — 1159F MED LIST DOCD IN RCRD: CPT | Performed by: STUDENT IN AN ORGANIZED HEALTH CARE EDUCATION/TRAINING PROGRAM

## 2025-04-10 PROCEDURE — 3078F DIAST BP <80 MM HG: CPT | Performed by: STUDENT IN AN ORGANIZED HEALTH CARE EDUCATION/TRAINING PROGRAM

## 2025-04-10 RX ORDER — FLUTICASONE FUROATE, UMECLIDINIUM BROMIDE AND VILANTEROL TRIFENATATE 200; 62.5; 25 UG/1; UG/1; UG/1
1 POWDER RESPIRATORY (INHALATION)
Qty: 60 EACH | Refills: 3 | Status: SHIPPED | OUTPATIENT
Start: 2025-04-10

## 2025-04-10 NOTE — PROGRESS NOTES
"Subjective   Patient ID: Radha Toussaint is a 69 y.o. female who presents for Hospital Follow-up (Hospital follow up - feeling a little out of breath today, foot is feeling a little better today. Had to have 3 partial toe amputations . Has been having panic attacks but does not want to take the medications ( ativan) it makes her feel worse as in out of it. /The mixture of medications she takes it too much on her and she feels terrible. She is bleeding a lot from very light scratches or bumps- skin is being wrapped up constantly by  ).  Patient: Radha Toussaint  : 1955  PCP: Francheska Rogel DO  MRN: [id number]  Program: Blood Modifiers Non-Core  Status: Enrolled  Effective Dates: 2/3/2025 - present  Responsible Staff: [facility]  Social Drivers to be Addressed: No information to display    Transitional Care Management  Status: Enrolled  Effective Dates: 2025 - present  Responsible Staff: Roxy Ortega LPN  Social Drivers to be Addressed: Physical Activity, Stress, Tobacco Use         Radha Toussaint is a 69 y.o. female presenting today for follow-up after being discharged from the hospital 9 days ago. The main problem requiring admission was dyspnea. The discharge summary and/or Transitional Care Management documentation was reviewed. Medication reconciliation was performed as indicated via the \"Nam as Reviewed\" timestamp.     Radha Toussaint was contacted by Transitional Care Management services two days after her discharge. This encounter and supporting documentation was reviewed.    History of Present Illness  Radha Toussaint is a 69 year old female who presents after hospitalization for dyspnea, anemia, and GI bleed.     She experiences significant bruising and bleeding with minimal trauma, describing it as severe and occurring even with minor actions like sneezing. She is on blood thinners, including Eliquis and Plavix, which she believes contribute to the bleeding issues. She has " "a history of hospitalization for two weeks due to bleeding, during which she received three bags of blood transfusions. Her iron levels were low during the hospital stay.    She has difficulty swallowing pills and certain foods, such as rice. She maintains adequate hydration by keeping her water intake consistent.    She uses oxygen throughout the day and has significantly reduced her smoking from two packs a day to half a pack. She experiences panic attacks but is hesitant to take Ativan due to concerns about previous hospitalizations. She is not currently taking hydroxyzine for anxiety.    Her pain is well-managed with Vicodin, although she experiences persistent pain in her toe. Her foot is healing well with daily care, and she anticipates being able to get it wet soon.    She experiences urinary incontinence, wearing diapers throughout the day and night, and has occasional accidents. She is hesitant about therapy due to anxiety about leaving the house, but there is consideration for home-based therapy once her foot heals further.    Objective     /60 (BP Location: Right arm, Patient Position: Sitting, BP Cuff Size: Adult)   Pulse 68   Ht 1.58 m (5' 2.21\")   SpO2 97%   BMI 25.59 kg/m²      Current Outpatient Medications   Medication Instructions    acetaminophen (TYLENOL) 975 mg, oral, Every 6 hours PRN    apixaban (ELIQUIS) 5 mg, oral, 2 times daily    clopidogrel (PLAVIX) 75 mg, oral, Every morning    dexAMETHasone (DECADRON) 2 mg, oral, Daily    fluticasone-umeclidin-vilanter (Trelegy Ellipta) 200-62.5-25 mcg blister with device 1 puff, inhalation, Daily RT    gabapentin (NEURONTIN) 300 mg, oral, 3 times daily    HYDROcodone-acetaminophen (Norco)  mg tablet 1 tablet, oral, Every 6 hours PRN    hydrOXYzine pamoate (VISTARIL) 25 mg, oral, 3 times daily PRN    LORazepam (ATIVAN) 0.5 mg, oral, Every 6 hours PRN    lubiprostone (AMITIZA) 24 mcg, oral, 2 times daily (morning and late afternoon)    " OLANZapine (ZYPREXA) 15 mg, oral, Nightly    ondansetron ODT (ZOFRAN-ODT) 8 mg, oral, Every 8 hours PRN    oxygen (O2) gas therapy 1 each, Continuous    pantoprazole (PROTONIX) 40 mg, oral, Daily before breakfast, Do not crush, chew, or split.    rosuvastatin (CRESTOR) 40 mg, oral, Daily    sertraline (ZOLOFT) 100 mg, oral, 2 times daily       Physical Exam  GENERAL: Alert, cooperative, well developed, no acute distress.  HEENT: Normocephalic, normal oropharynx, moist mucous membranes.  NECK: Thyroid normal to palpation.  CHEST: Slight congestion on right, otherwise clear to auscultation bilaterally, no wheezes, rhonchi, or crackles.  CV: Normal heart rate and rhythm, S1 and S2 normal without murmurs.  ABDOMEN: Soft, non-tender, non-distended.  SKIN: No rashes or lesions.  NEURO: Cranial nerves grossly intact, moves all extremities without gross motor impairment, normal ROM.    Results  Procedure: Arm Wrapping  Description: Coban was applied to the arm to avoid removing the Band-Aid and tearing the skin further.    LABS  Iron: very low  Potassium: low  Blood transfusion: 3 units    The complexity of medical decision making for this patient's transitional care is high.    Assessment & Plan  Easy bruising and bleeding  Significant bruising and bleeding with minimal trauma likely due to anticoagulant and antiplatelet therapy (Apixaban and Clopidogrel). Discontinuing these medications poses a high risk for thrombotic events, especially given her peripheral artery disease and embolism. The need for Clopidogrel is emphasized to prevent clots in the legs, but the risk of bleeding is acknowledged.  - Continue Apixaban and Clopidogrel as prescribed to prevent thrombotic events.  - Monitor for signs of severe bleeding or hematoma formation.  - Educate on minimizing trauma to the skin and using protective measures.    Peripheral artery disease (PAD)  On dual antithrombotic therapy with Apixaban and Clopidogrel to prevent  further clots, particularly in the lower extremities. The risk of bleeding is acknowledged, but the risk of thrombosis is deemed higher. Discontinuing blood thinners significantly increases the risk of clots.  - Continue Apixaban 5 mg twice daily and Clopidogrel 75 mg once daily.  - Monitor for signs of claudication or worsening circulation in the legs.    Chronic ulcer of the left foot  The ulcer on the left foot is healing well with daily care. Optimism about complete healing in two weeks. Approval to allow the ulcer to get wet.  - Continue daily cleaning and dressing of the ulcer.  - Allow the ulcer to get wet as per recent approval.  - Use Jennifer for skin care around the ulcer.    Iron deficiency anemia  Low iron levels during a recent hospital stay, likely due to blood loss. Iron supplementation is recommended to improve iron levels and prevent anemia. She received three bags of blood.  - Start iron supplementation every other day for one to two weeks, then increase to daily as tolerated.  - Monitor for constipation and adjust the regimen if necessary.    Chronic pain management  Pain is well-managed with Hydrocodone-acetaminophen. Reports pain is under control, although toe pain persists. The pain management plan is effective with no indication of overuse or adverse effects.  - Continue Hydrocodone-acetaminophen  mg as needed for severe pain.  - Monitor for any signs of increased pain or need for adjustment in pain management.    Chronic obstructive pulmonary disease (COPD)  Reports poor breathing despite using Trelegy and has significantly reduced smoking. Uses oxygen throughout the day and has cut down smoking from two packs to half a pack per day.  - Continue Trelegy Ellipta as prescribed.  - Encourage continued smoking cessation efforts.  - Ensure oxygen therapy is used as needed.    Anxiety and panic attacks  Experiences panic attacks and is hesitant to use Lorazepam due to concerns about respiratory  depression. Hydroxyzine is suggested as an alternative for anxiety management without the risk of respiratory depression. Not currently taking Hydroxyzine but has a prescription for it.  - Consider using Hydroxyzine 25 mg as needed for anxiety.  - Educate on the use of Hydroxyzine and monitor its effectiveness in managing anxiety.    Urinary incontinence  Experiences urinary incontinence, managed with timed voiding and the use of diapers. Pharmacological treatment is not recommended due to potential side effects of dryness and difficulty swallowing. Manages with diapers and timed voiding.  - Continue using diapers and timed voiding strategies.  - Avoid medications that may exacerbate dryness and swallowing difficulties.    Follow-up  Follow-up appointment scheduled for August 12th for Medicare review.  - Attend follow-up appointment on August 12th.    Francheska Rogel, DO     This medical note was created with the assistance of artificial intelligence (AI) for documentation purposes. The content has been reviewed and confirmed by the healthcare provider for accuracy and completeness. Patient consented to the use of audio recording and use of AI during their visit.

## 2025-04-15 ENCOUNTER — PATIENT OUTREACH (OUTPATIENT)
Dept: PRIMARY CARE | Facility: CLINIC | Age: 70
End: 2025-04-15
Payer: MEDICARE

## 2025-04-15 ENCOUNTER — APPOINTMENT (OUTPATIENT)
Dept: VASCULAR MEDICINE | Facility: CLINIC | Age: 70
End: 2025-04-15
Payer: MEDICARE

## 2025-04-15 ENCOUNTER — APPOINTMENT (OUTPATIENT)
Dept: CARDIOLOGY | Facility: CLINIC | Age: 70
End: 2025-04-15
Payer: MEDICARE

## 2025-04-16 DIAGNOSIS — F41.9 ANXIETY: ICD-10-CM

## 2025-04-16 DIAGNOSIS — C34.90 NON-SMALL CELL LUNG CANCER, UNSPECIFIED LATERALITY (MULTI): ICD-10-CM

## 2025-04-16 DIAGNOSIS — G89.3 CANCER RELATED PAIN: ICD-10-CM

## 2025-04-16 RX ORDER — HYDROXYZINE PAMOATE 50 MG/1
50 CAPSULE ORAL 3 TIMES DAILY PRN
Qty: 30 CAPSULE | Refills: 3 | Status: SHIPPED | OUTPATIENT
Start: 2025-04-16 | End: 2025-04-26

## 2025-04-16 RX ORDER — HYDROCODONE BITARTRATE AND ACETAMINOPHEN 10; 325 MG/1; MG/1
1 TABLET ORAL EVERY 6 HOURS PRN
Qty: 60 TABLET | Refills: 0 | Status: SHIPPED | OUTPATIENT
Start: 2025-04-16

## 2025-04-16 NOTE — PROGRESS NOTES
LOCATION:   [facility], at [facility].     HEMATOLOGY ONCOLOGY PROBLEMS:  1.  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         PD-L1 70%.  No actionable alterations.       a.  Ist -line therapy with carbo/Taxol/Keytruda x 3 cycles from Sep to Nov 2023.       b.  On maintenance Keytruda beginning Dec 2023.     CHIEF COMPLAINT:    The patient is in the clinic today for followup of poorly differentiated carcinoma and for continuation of treatment and management of therapy related effects.          HISTORY:  Ms. Toussaint is a 69-year-old female with poorly differentiated carcinoma of most probable primary bronchogenic origin.  She has multiple medical problems and was admitted at [facility] in Aug 2023 with complaint worsening shortness of breath, cough and weakness.  There was also history of progressive weight loss and recent falls.  Work-up revealed stable pulmonary nodules but there was concern regarding new bilateral adrenal metastatic lesions along with questionable liver lesions and right hilar lymphadenopathy. She is a lifelong heavy smoker. CT-guided biopsy of the adrenal lesion was suggestive of poorly differentiated carcinoma. 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  She was also evaluated by Dr. Francheska Parada at [facility] and case was reviewed by  pathology and as per tumor board evaluation, overall clinical and radiological finding were considered mainly of probable primary bronchogenic origin.  She was treated with carbo/Taxol/Keytruda combination for 3 cycles.  4th cycle was discontinued due to tolerability issues.  Follow-up CT scan showed interval decrease in bilateral adrenal lesions and she was transitioned to maintenance Keytruda beginning Dec 2023.    INTERVAL HISTORY:  Persistent issues with shortness of breath, weakness and fatigue.  She has also been noted to have left lower extremity vascular  blockade and is being followed in the vascular surgery clinic and there is a possibility that she may need some procedures.    PAST MEDICAL HISTORY:   1.  COPD on home O2.   2.  Coronary artery disease   3.  Hypertension   4.  Hyperlipidemia   5.  Multiple vascular aneurysms.  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  6.  Peripheral vascular disease.  S/p femoral-femoral bypass   7.  AAA graft repair procedure.  8.  History of tubal ligation, bladder lift, lithotripsy surgeries     SOCIAL HISTORY:    for 46 years and lives in [place].  1 and half to 2 pack a day for 47 years smoking history.  Nonalcoholic.  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  Born and raised  in [place]     FAMILY HISTORY:    Father  at age 52 from myocardial infarction.  Mother  at age 75 or from aortic aneurysm related complications.  1 sister  at age 68 from depression and related complications.  3 children and 5 grandkids.  Her son and daughter has history of Crohn's disease.  No other specific history of bleeding, clotting or malignant disorder in the family.     REVIEW OF SYSTEMS:  Pertinent finding as per the history above.  All other systems have been reviewed and generally negative and noncontributory.     PHYSICAL EXAMINATION:    Detailed physical examination not done     ALLERGY & MEDICATIONS:  Allergies and latest outpatient medications list were reviewed in the EMR.    LAB RESULTS:  CBC from today shows a white cell count of 14.7 with hemoglobin of 9.2 and platelets of 285.   Metabolic profile is unremarkable other than glucose of 172.  TSH is pending.  Last 3 sets of blood work were reviewed and the trend was noted.    RADIOLOGY RESULTS:  CT chest 10/09/2024  Impression:  Overall, there is minimally decreased tumor burden when compared to the most recent exam as evidenced by:   1. Stable to slightly decreased size of pulmonary nodules. No new or enlarging pulmonary nodules or  masses.  2. Interval slightly decreased size of bilateral adrenal metastases.   3. No new metastatic disease in the chest, abdomen or pelvis.   4. Redemonstration of the dilatation of the pancreatic duct most prominently in the pancreatic head. Findings are likely due to coarse calcification of the pancreatic head and uncinate process with  possible intraductal stones. Recommend attention on follow-up.  5. Left nephrolithiasis without evidence of hydronephrosis. There is a stable hyperdense lesion in the left midpole, which might represent a hemorrhagic or proteinaceous cysts. There are additional stable  hypodense lesions throughout bilateral kidneys.  6. Diverticulosis without evidence of acute diverticulitis.  7. Redemonstration of abdominal aortic aneurysm with stable postsurgical changes. Evaluation of the vasculature is limited in the absence of intravenous contrast.  8. Additional chronic and incidental findings as above.     PATHOLOGY RESULTS:  Surgical Pathology [Aug 23 2023 1:48PM] ([id number])  Specimens: RIGHT ADRENAL LESION CORE   Name AALIYAH SAWANT   Accession #: [id number]   FINAL DIAGNOSIS   A. RIGHT ADRENAL LESION CORE:   METASTATIC POORLY DIFFERENTIATED  CARCINOMA: SOFT TISSUE (IDENTIFIED AS RIGHT ADRENAL) COMMENT : The biopsyconsists entirely of a poorly differentiated malignancy in soft  tissue; no adrenal is seen. The tumor stains for keratins (CAM5.2 and  CK7 but not CK20) and is negative for TTF1 and Napsin as well as p40. Staining for SF1 is completely negative.   Electronically Signed Out By BOLIVAR BARRETO ASA, MD, PhD/SLA     ASSESSMENT AND PLAN:   1.  Stage IV metastatic poorly differentiated carcinoma of unknown primary origin.  Please refer the details of initial presentation and management as outlined above.  In summary, patient with history of lifelong heavy smoking.  She was admitted at [facility]  with complaint of worsening shortness of breath in Aug 2023.  Work-up revealed  stable pulmonary nodules but there was concern regarding new bilateral adrenal metastatic lesions along with questionable liver lesions and right hilar lymphadenopathy.  CT-guided biopsy of the adrenal lesion was suggestive of poorly differentiated carcinoma.  Cancer type ID molecular test results were suggestive of questionable sarcoma.  She was also evaluated by Dr. Francheska Parada at [facility] and her case was reviewed by  pathology and as per tumor board discussions, overall clinical and radiological finding were considered mainly of probable primary bronchogenic origin.  She was treated with carbo/Taxol/Keytruda combination for 3 cycles.  4th cycle was discontinued due to tolerability issues.  Follow-up CT scan showed interval decrease in bilateral adrenal lesions and she was transitioned to maintenance Keytruda beginning Dec 2023.     For now we will continue with  Keytruda at the current dose and schedule.  Latest follow-up CT scan from 10/9/2024 showed stable to improved findings.  As stated above there are issues with frequent COPD exacerbation and lower extremity vascular issues leading to treatment breaks and hospitalizations.  Probable side effects of Keytruda mainly weakness, fatigue, pneumonitis, colitis, endocrinopathies, pleuritis, skin rash etc. were explained to them in detail.  Her overall long-term prognosis remains guarded.    2.  COPD exacerbation/anxiety.  I strongly advised her to continue to follow-up with pulmonary clinic closely.  There is also a significant component of anxiety and panic attacks and she should continue to follow-up with psychiatry clinic.     3.  Left lower leg wound/vascular issues.  Really appreciate wound and vascular surgery teams from help.  She will continue to follow-up with them.    4.  Follow-up.  She will return to the clinic in 6 weeks.  She will be scheduled for follow-up PET scan in the next few weeks.    This note has been transcribed using Dragon voice  recognition system and there is a possibility of unintentional typing misprints.

## 2025-04-17 LAB
ATRIAL RATE: 67 BPM
P AXIS: 102 DEGREES
P OFFSET: 192 MS
P ONSET: 139 MS
PR INTERVAL: 146 MS
Q ONSET: 212 MS
QRS COUNT: 11 BEATS
QRS DURATION: 98 MS
QT INTERVAL: 414 MS
QTC CALCULATION(BAZETT): 437 MS
QTC FREDERICIA: 429 MS
R AXIS: 120 DEGREES
T AXIS: 180 DEGREES
T OFFSET: 419 MS
VENTRICULAR RATE: 67 BPM

## 2025-04-18 ENCOUNTER — APPOINTMENT (OUTPATIENT)
Dept: GASTROENTEROLOGY | Facility: HOSPITAL | Age: 70
End: 2025-04-18
Payer: MEDICARE

## 2025-04-24 ENCOUNTER — OFFICE VISIT (OUTPATIENT)
Dept: WOUND CARE | Facility: CLINIC | Age: 70
End: 2025-04-24
Payer: MEDICARE

## 2025-04-24 PROCEDURE — 11042 DBRDMT SUBQ TIS 1ST 20SQCM/<: CPT

## 2025-05-01 DIAGNOSIS — G89.3 CANCER RELATED PAIN: ICD-10-CM

## 2025-05-01 DIAGNOSIS — C34.90 NON-SMALL CELL LUNG CANCER, UNSPECIFIED LATERALITY (MULTI): ICD-10-CM

## 2025-05-01 RX ORDER — HYDROCODONE BITARTRATE AND ACETAMINOPHEN 10; 325 MG/1; MG/1
1 TABLET ORAL EVERY 6 HOURS PRN
Qty: 60 TABLET | Refills: 0 | Status: SHIPPED | OUTPATIENT
Start: 2025-05-01

## 2025-05-02 ENCOUNTER — PATIENT MESSAGE (OUTPATIENT)
Dept: PRIMARY CARE | Facility: CLINIC | Age: 70
End: 2025-05-02
Payer: MEDICARE

## 2025-05-02 DIAGNOSIS — F33.0 MILD EPISODE OF RECURRENT MAJOR DEPRESSIVE DISORDER (CMS-HCC): ICD-10-CM

## 2025-05-02 DIAGNOSIS — J43.8 OTHER EMPHYSEMA (MULTI): Primary | ICD-10-CM

## 2025-05-02 RX ORDER — BUPROPION HYDROCHLORIDE 150 MG/1
150 TABLET ORAL DAILY
Qty: 90 TABLET | Refills: 3 | Status: SHIPPED | OUTPATIENT
Start: 2025-05-02 | End: 2026-05-02

## 2025-05-02 RX ORDER — MONTELUKAST SODIUM 10 MG/1
10 TABLET ORAL NIGHTLY
Qty: 90 TABLET | Refills: 3 | Status: SHIPPED | OUTPATIENT
Start: 2025-05-02 | End: 2025-10-29

## 2025-05-03 ENCOUNTER — APPOINTMENT (OUTPATIENT)
Dept: BEHAVIORAL HEALTH | Facility: CLINIC | Age: 70
End: 2025-05-03
Payer: MEDICARE

## 2025-05-07 ENCOUNTER — APPOINTMENT (OUTPATIENT)
Dept: GASTROENTEROLOGY | Facility: HOSPITAL | Age: 70
End: 2025-05-07
Payer: MEDICARE

## 2025-05-07 ENCOUNTER — PATIENT MESSAGE (OUTPATIENT)
Dept: PRIMARY CARE | Facility: CLINIC | Age: 70
End: 2025-05-07
Payer: MEDICARE

## 2025-05-07 DIAGNOSIS — K59.03 DRUG INDUCED CONSTIPATION: ICD-10-CM

## 2025-05-08 ENCOUNTER — OFFICE VISIT (OUTPATIENT)
Dept: WOUND CARE | Facility: CLINIC | Age: 70
End: 2025-05-08
Payer: MEDICARE

## 2025-05-08 PROCEDURE — 99212 OFFICE O/P EST SF 10 MIN: CPT

## 2025-05-08 RX ORDER — LUBIPROSTONE 24 UG/1
24 CAPSULE ORAL
Qty: 60 CAPSULE | Refills: 3 | Status: SHIPPED | OUTPATIENT
Start: 2025-05-08 | End: 2025-06-07

## 2025-05-10 ENCOUNTER — APPOINTMENT (OUTPATIENT)
Dept: BEHAVIORAL HEALTH | Facility: CLINIC | Age: 70
End: 2025-05-10
Payer: MEDICARE

## 2025-05-10 DIAGNOSIS — F33.0 MILD EPISODE OF RECURRENT MAJOR DEPRESSIVE DISORDER (CMS-HCC): ICD-10-CM

## 2025-05-10 DIAGNOSIS — F41.9 ANXIETY: ICD-10-CM

## 2025-05-10 PROCEDURE — 1160F RVW MEDS BY RX/DR IN RCRD: CPT

## 2025-05-10 PROCEDURE — 1159F MED LIST DOCD IN RCRD: CPT

## 2025-05-10 PROCEDURE — 99214 OFFICE O/P EST MOD 30 MIN: CPT

## 2025-05-10 RX ORDER — CLONAZEPAM 0.5 MG/1
0.5 TABLET ORAL 2 TIMES DAILY
Qty: 60 TABLET | Refills: 3 | Status: SHIPPED | OUTPATIENT
Start: 2025-05-10 | End: 2025-06-09

## 2025-05-10 NOTE — PATIENT INSTRUCTIONS
1. Reviewed diagnostic impression including subjective and objective data and provided education about depression and anxiety disorder, treatment recommendations including medication, therapy, course of treatment and prognosis. Patient amendable to treatment plan.     2. Plan - The patient is doing well but the Ativan wears off too soon. Will switch to Klonopin since it has a longer duration of action.     CONTINUE Bupropion 75 mg every day in the am for depression and fatigue  CONTINUE  Sertraline 100 mg - take 2  table by mouth daily for anxiety and depression   CONTINUE  Gabapentin 300 mg - take 1 tablet, 3 times daily for anxiety   DC    Lorazepam 0.5 mg - take 1 tablet, every 6 hours as needed for anxiety   START Clonazepam 0.5 mg  - take 1 tablet, twice daily as needed for anxiety          3. Reviewed r/b/a, possible side effects of the medication(s). Client is aware benefit/risks      4. Labs reviewed and - placed order for lab work. Please provide at any  facility Please provide Urine Drug Screen to abide by  Substance Use policy.      5. Plan for supportive psychotherapy     6. Follow up with physical health providers as scheduled     7. Follow up in 4 weeks      May follow up sooner if experiences worsening symptoms by calling  Psychiatry at [phone]      Patient verbalized an understanding to call Mobile Crisis at [phone] ([facility]), [phone], or [phone]/go to the nearest emergency room if experiences thoughts of harm to self or others.

## 2025-05-10 NOTE — PROGRESS NOTES
"  Mrs Toussaint is a 69 year old female with PMH of a stemi, CAD, hypertension, hyperlipidemia, PAD, nicotine abuse, non small lung cancer, referred to  Psychoncology for a follow up evaluation for  treatment of Anxiety.      Patient provided 2 personal identifiers prior to virtual appointment     Chief Complaint - Anxiety     She is keeping busy with chores.     Sleeping well at night. Appetite is good.     Mood is \"still anxious.\" The medicines sometimes make her feel more anxious when they wear off.     Not having panic attacks. She is able to get through them.     Mood is \"not bad,\" not feeling depressed on a daily basis. No SI or HI    Had sepsis early in the year and she experienced delusions. No longer having problems.     Wearing oxygen and night and while doing chores.     No other complaints today.       Depression  Visit Type: Follow up   Onset of symptoms: 1 year ago.  Progression since onset: improving   Patient presents with the following symptoms: decreased concentration, mildly depressed mood, occasional worry, fatigue, muscle tension, restlessness and shortness of breath.  Frequency of symptoms: most days   Severity: moderate   Sleep per night: 7 hours  Sleep quality: fair  Nighttime awakenings: occasional  Risk factors: recent illness and major life event  Patient has a history of: anxiety/panic attacks, CAD and chronic lung disease  Treatment tried: non-benzodiazephine anxiolytics and benzodiazepine  Compliance with treatment: good  Improvement on treatment: mild     Anxiety  Presents for follow up visit. Onset was > a year ago.  The problem has improved.  Symptoms include decreased concentration, mild depression, dizziness, occasional  worry, muscle tension,  and shortness of breath. Symptoms occur intermittently. The severity of symptoms is mild. The symptoms are aggravated by social activities. The patient sleeps 7 hours per night. The quality of sleep is fair. Nighttime awakenings: occasional. " "     Risk factors include a major life event and recent illness. Her past medical history is significant for anxiety/panic attacks, CAD and chronic lung disease. Past treatments include benzodiazephines and non-benzodiazephine anxiolytics. The treatment has provided marginal relief. Compliance with prior treatments has been good.   Mental Status Examination  General Appearance: Well groomed, appropriate eye contact.  Gait/Station:  gait not assessed, virtual appointment   Speech: Normal rate, volume, prosody  Mood: \"pretty good\"  Affect: approrpirate    Thought Process: Linear, goal directed  Thought Associations: No loosening of associations  Thought Content: normal  Perception: No perceptual abnormalities noted  Level of Consciousness: Alert  Orientation: Alert and oriented to person, place, time and situation  Attention and Concentration: Intact  Recent Memory: Intact as evidenced by ability to recall details from the past 24 hours   Remote Memory: Intact as evidenced by ability to recall previous medical issues   Executive function: Intact  Language: Naming intact  Fund of knowledge: Good  Insight:  Intact  Judgment: Intact, as evidenced by help-seeking behavior, ability to reason through medical decision making, and compliance with treatment recommendations  Review of Systems   Constitutional:  Positive for activity change and fatigue.   Respiratory:  Positive for shortness of breath.    Cardiovascular:  Positive for palpitations.   Neurological:  Positive for dizziness.   Psychiatric/Behavioral:  Positive for decreased concentration, mild depression. The patient is mildly anxious     Lab Review:             Lab on 03/25/2024   Component Date Value    WBC 03/25/2024 15.2 (H)     nRBC 03/25/2024 0.0     RBC 03/25/2024 5.41 (H)     Hemoglobin 03/25/2024 15.0     Hematocrit 03/25/2024 47.0 (H)     MCV 03/25/2024 87     MCH 03/25/2024 27.7     MCHC 03/25/2024 31.9 (L)     RDW 03/25/2024 17.6 (H)     Platelets " 03/25/2024 259     Neutrophils % 03/25/2024 80.6     Immature Granulocytes %,* 03/25/2024 1.4 (H)     Lymphocytes % 03/25/2024 13.5     Monocytes % 03/25/2024 2.6     Eosinophils % 03/25/2024 1.3     Basophils % 03/25/2024 0.6     Neutrophils Absolute 03/25/2024 12.25 (H)     Immature Granulocytes Ab* 03/25/2024 0.21     Lymphocytes Absolute 03/25/2024 2.05     Monocytes Absolute 03/25/2024 0.39     Eosinophils Absolute 03/25/2024 0.20     Basophils Absolute 03/25/2024 0.09     Glucose 03/25/2024 129 (H)     Sodium 03/25/2024 138     Potassium 03/25/2024 4.3     Chloride 03/25/2024 102     Bicarbonate 03/25/2024 30     Anion Gap 03/25/2024 10     Urea Nitrogen 03/25/2024 20     Creatinine 03/25/2024 0.69     eGFR 03/25/2024 >90     Calcium 03/25/2024 9.6     Albumin 03/25/2024 3.6     Alkaline Phosphatase 03/25/2024 62     Total Protein 03/25/2024 7.1     AST 03/25/2024 15     Bilirubin, Total 03/25/2024 0.4     ALT 03/25/2024 23     Thyroid Stimulating Horm* 03/25/2024 2.67    Admission on 03/11/2024, Discharged on 03/15/2024   Component Date Value    POCT pH, Venous 03/11/2024 7.37     POCT pCO2, Venous 03/11/2024 59 (H)     POCT pO2, Venous 03/11/2024 30 (L)     POCT SO2, Venous 03/11/2024 44 (L)     POCT Oxy Hemoglobin, Oniel* 03/11/2024 40.9 (L)     POCT Hematocrit Calculat* 03/11/2024 47.0 (H)     POCT Sodium, Venous 03/11/2024 135 (L)     POCT Potassium, Venous 03/11/2024 4.5     POCT Chloride, Venous 03/11/2024 101     POCT Ionized Calicum, Ve* 03/11/2024 1.22     POCT Glucose, Venous 03/11/2024 106 (H)     POCT Lactate, Venous 03/11/2024 0.8     POCT Base Excess, Venous 03/11/2024 6.6 (H)     POCT HCO3 Calculated, Ve* 03/11/2024 34.1 (H)     POCT Hemoglobin, Venous 03/11/2024 15.7     POCT Anion Gap, Venous 03/11/2024 4.0 (L)     Patient Temperature 03/11/2024 37.0     FiO2 03/11/2024 32     Ventricular Rate 03/11/2024 82     Atrial Rate 03/11/2024 82     WV Interval 03/11/2024 150     QRS Duration 03/11/2024  90     QT Interval 03/11/2024 356     QTC Calculation(Bazett) 03/11/2024 415     P Axis 03/11/2024 71     R [place] 03/11/2024 64     T [place] 03/11/2024 87     QRS Count 03/11/2024 14     Q Onset 03/11/2024 221     P Onset 03/11/2024 146     P Offset 03/11/2024 194     T Offset 03/11/2024 399     QTC Fredericia 03/11/2024 395     Flu A Result 03/11/2024 Not Detected     Flu B Result 03/11/2024 Not Detected     Coronavirus 2019, PCR 03/11/2024 Not Detected     WBC 03/11/2024 12.5 (H)     nRBC 03/11/2024 0.0     RBC 03/11/2024 5.64 (H)     Hemoglobin 03/11/2024 15.3     Hematocrit 03/11/2024 48.7 (H)     MCV 03/11/2024 86     MCH 03/11/2024 27.1     MCHC 03/11/2024 31.4 (L)     RDW 03/11/2024 19.2 (H)     Platelets 03/11/2024 207     Neutrophils % 03/11/2024 73.4     Immature Granulocytes %,* 03/11/2024 0.4     Lymphocytes % 03/11/2024 17.5     Monocytes % 03/11/2024 6.5     Eosinophils % 03/11/2024 1.6     Basophils % 03/11/2024 0.6     Neutrophils Absolute 03/11/2024 9.21 (H)     Immature Granulocytes Ab* 03/11/2024 0.05     Lymphocytes Absolute 03/11/2024 2.19     Monocytes Absolute 03/11/2024 0.82     Eosinophils Absolute 03/11/2024 0.20     Basophils Absolute 03/11/2024 0.07     Glucose 03/11/2024 91     Sodium 03/11/2024 135 (L)     Potassium 03/11/2024 5.4 (H)     Chloride 03/11/2024 101     Bicarbonate 03/11/2024 30     Anion Gap 03/11/2024 9 (L)     Urea Nitrogen 03/11/2024 14     Creatinine 03/11/2024 0.54     eGFR 03/11/2024 >90     Calcium 03/11/2024 8.9     Albumin 03/11/2024 3.9     Alkaline Phosphatase 03/11/2024 66     Total Protein 03/11/2024 6.9     AST 03/11/2024 26     Bilirubin, Total 03/11/2024 0.5     ALT 03/11/2024 13     Troponin I, High Sensiti* 03/11/2024 12     Troponin I, High Sensiti* 03/11/2024 12     Potassium 03/11/2024 4.3     WBC 03/12/2024 8.6     nRBC 03/12/2024 0.0     RBC 03/12/2024 5.01     Hemoglobin 03/12/2024 13.5     Hematocrit 03/12/2024 44.9     MCV 03/12/2024 90     MCH  03/12/2024 26.9     MCHC 03/12/2024 30.1 (L)     RDW 03/12/2024 18.6 (H)     Platelets 03/12/2024 167     Glucose 03/12/2024 83     Sodium 03/12/2024 140     Potassium 03/12/2024 4.8     Chloride 03/12/2024 106     Bicarbonate 03/12/2024 25     Anion Gap 03/12/2024 14     Urea Nitrogen 03/12/2024 20     Creatinine 03/12/2024 0.75     eGFR 03/12/2024 87     Calcium 03/12/2024 8.7     Thyroid Stimulating Horm* 03/12/2024 4.41 (H)     Hemoglobin A1C 03/12/2024 5.9 (H)     Estimated Average Glucose 03/12/2024 123     Vitamin B12 03/12/2024 417     Thyroxine, Free 03/12/2024 0.83     WBC 03/13/2024 13.1 (H)     nRBC 03/13/2024 0.0     RBC 03/13/2024 5.32 (H)     Hemoglobin 03/13/2024 14.3     Hematocrit 03/13/2024 46.8 (H)     MCV 03/13/2024 88     MCH 03/13/2024 26.9     MCHC 03/13/2024 30.6 (L)     RDW 03/13/2024 18.7 (H)     Platelets 03/13/2024 198     Glucose 03/13/2024 119 (H)     Sodium 03/13/2024 140     Potassium 03/13/2024 4.3     Chloride 03/13/2024 103     Bicarbonate 03/13/2024 29     Anion Gap 03/13/2024 12     Urea Nitrogen 03/13/2024 17     Creatinine 03/13/2024 0.63     eGFR 03/13/2024 >90     Calcium 03/13/2024 9.3       Assessment/Plan   Safety Assessment - no remote or recent SI, HI, or SA. Protective Factors - connected family , no trauma history, limited substance use history . Risk factors - cancer diagnosis and treatment superimposed upon chronic illnesses. Relative Risk is low.      The patient is doing well but she has ongoing anxiety and she feels that Ativan causes rebound anxiety. Will switch to Klonopin since it has a longer duration of action and follow up in 1 month to reassess for effectiveness.     Diagnoses and all orders for this visit:  OSITO (generalized a Recurrent      CONTINUE  Sertraline 100 mg - take 2 tablets  by mouth daily for anxiety and depression   CONTINUE   Gabapentin 300 mg - take 1 tablet, 3 times daily for anxiety   DC  Ativan 0.5 mg - take 1 tablet, every 6 hours as  needed for anxiety  START Clonazepam 0.5 mg PO BID as needed for anxiety   CONTINUE Wellbutrin 75 mg PO every day in the am for depression     Follow up in 4 weeks        Chart Review - 2 minutes   Assessment with patient contact - 25 minutes   Charting 5 minutes     Total time 32 minutes

## 2025-05-15 ENCOUNTER — PATIENT OUTREACH (OUTPATIENT)
Dept: PRIMARY CARE | Facility: CLINIC | Age: 70
End: 2025-05-15
Payer: MEDICARE

## 2025-05-16 DIAGNOSIS — C34.90 NON-SMALL CELL LUNG CANCER, UNSPECIFIED LATERALITY (MULTI): ICD-10-CM

## 2025-05-16 DIAGNOSIS — G89.3 CANCER RELATED PAIN: ICD-10-CM

## 2025-05-16 RX ORDER — PROCHLORPERAZINE EDISYLATE 5 MG/ML
10 INJECTION INTRAMUSCULAR; INTRAVENOUS EVERY 6 HOURS PRN
Status: CANCELLED | OUTPATIENT
Start: 2025-05-19

## 2025-05-16 RX ORDER — HYDROCODONE BITARTRATE AND ACETAMINOPHEN 10; 325 MG/1; MG/1
1 TABLET ORAL EVERY 6 HOURS PRN
Qty: 60 TABLET | Refills: 0 | Status: SHIPPED | OUTPATIENT
Start: 2025-05-16

## 2025-05-16 RX ORDER — PROCHLORPERAZINE MALEATE 10 MG
10 TABLET ORAL EVERY 6 HOURS PRN
Status: CANCELLED | OUTPATIENT
Start: 2025-05-19

## 2025-05-19 ENCOUNTER — APPOINTMENT (OUTPATIENT)
Dept: LAB | Facility: CLINIC | Age: 70
End: 2025-05-19
Payer: MEDICARE

## 2025-05-19 ENCOUNTER — INFUSION (OUTPATIENT)
Dept: HEMATOLOGY/ONCOLOGY | Facility: CLINIC | Age: 70
End: 2025-05-19
Payer: MEDICARE

## 2025-05-19 VITALS
RESPIRATION RATE: 18 BRPM | DIASTOLIC BLOOD PRESSURE: 54 MMHG | HEIGHT: 62 IN | TEMPERATURE: 98.4 F | HEART RATE: 80 BPM | WEIGHT: 136.24 LBS | OXYGEN SATURATION: 95 % | SYSTOLIC BLOOD PRESSURE: 92 MMHG | BODY MASS INDEX: 25.07 KG/M2

## 2025-05-19 DIAGNOSIS — C34.90 MALIGNANT NEOPLASM OF LUNG, UNSPECIFIED LATERALITY, UNSPECIFIED PART OF LUNG (MULTI): Primary | ICD-10-CM

## 2025-05-19 LAB
ALBUMIN SERPL BCP-MCNC: 3.8 G/DL (ref 3.4–5)
ALP SERPL-CCNC: 52 U/L (ref 33–136)
ALT SERPL W P-5'-P-CCNC: 16 U/L (ref 7–45)
ANION GAP SERPL CALC-SCNC: 13 MMOL/L (ref 10–20)
AST SERPL W P-5'-P-CCNC: 14 U/L (ref 9–39)
BASOPHILS # BLD AUTO: 0.06 X10*3/UL (ref 0–0.1)
BASOPHILS NFR BLD AUTO: 0.5 %
BILIRUB SERPL-MCNC: 0.4 MG/DL (ref 0–1.2)
BUN SERPL-MCNC: 21 MG/DL (ref 6–23)
CALCIUM SERPL-MCNC: 8.8 MG/DL (ref 8.6–10.3)
CHLORIDE SERPL-SCNC: 107 MMOL/L (ref 98–107)
CO2 SERPL-SCNC: 25 MMOL/L (ref 21–32)
CREAT SERPL-MCNC: 1.06 MG/DL (ref 0.5–1.05)
EGFRCR SERPLBLD CKD-EPI 2021: 57 ML/MIN/1.73M*2
EOSINOPHIL # BLD AUTO: 0.09 X10*3/UL (ref 0–0.7)
EOSINOPHIL NFR BLD AUTO: 0.7 %
ERYTHROCYTE [DISTWIDTH] IN BLOOD BY AUTOMATED COUNT: 21.3 % (ref 11.5–14.5)
GLUCOSE SERPL-MCNC: 83 MG/DL (ref 74–99)
HCT VFR BLD AUTO: 37.9 % (ref 36–46)
HGB BLD-MCNC: 11.1 G/DL (ref 12–16)
IMM GRANULOCYTES # BLD AUTO: 0.12 X10*3/UL (ref 0–0.7)
IMM GRANULOCYTES NFR BLD AUTO: 0.9 % (ref 0–0.9)
LYMPHOCYTES # BLD AUTO: 2.1 X10*3/UL (ref 1.2–4.8)
LYMPHOCYTES NFR BLD AUTO: 16.4 %
MCH RBC QN AUTO: 25.6 PG (ref 26–34)
MCHC RBC AUTO-ENTMCNC: 29.3 G/DL (ref 32–36)
MCV RBC AUTO: 88 FL (ref 80–100)
MONOCYTES # BLD AUTO: 0.81 X10*3/UL (ref 0.1–1)
MONOCYTES NFR BLD AUTO: 6.3 %
NEUTROPHILS # BLD AUTO: 9.62 X10*3/UL (ref 1.2–7.7)
NEUTROPHILS NFR BLD AUTO: 75.2 %
NRBC BLD-RTO: 0 /100 WBCS (ref 0–0)
OVALOCYTES BLD QL SMEAR: NORMAL
PLATELET # BLD AUTO: 228 X10*3/UL (ref 150–450)
POTASSIUM SERPL-SCNC: 4.5 MMOL/L (ref 3.5–5.3)
PROT SERPL-MCNC: 6.7 G/DL (ref 6.4–8.2)
RBC # BLD AUTO: 4.33 X10*6/UL (ref 4–5.2)
RBC MORPH BLD: NORMAL
SODIUM SERPL-SCNC: 140 MMOL/L (ref 136–145)
TSH SERPL-ACNC: 3.86 MIU/L (ref 0.44–3.98)
WBC # BLD AUTO: 12.8 X10*3/UL (ref 4.4–11.3)

## 2025-05-19 PROCEDURE — 96413 CHEMO IV INFUSION 1 HR: CPT

## 2025-05-19 PROCEDURE — 2500000004 HC RX 250 GENERAL PHARMACY W/ HCPCS (ALT 636 FOR OP/ED): Mod: JZ | Performed by: INTERNAL MEDICINE

## 2025-05-19 PROCEDURE — 84443 ASSAY THYROID STIM HORMONE: CPT

## 2025-05-19 PROCEDURE — 80053 COMPREHEN METABOLIC PANEL: CPT

## 2025-05-19 PROCEDURE — 96374 THER/PROPH/DIAG INJ IV PUSH: CPT | Mod: INF

## 2025-05-19 PROCEDURE — 85025 COMPLETE CBC W/AUTO DIFF WBC: CPT

## 2025-05-19 RX ORDER — DIPHENHYDRAMINE HYDROCHLORIDE 50 MG/ML
50 INJECTION, SOLUTION INTRAMUSCULAR; INTRAVENOUS AS NEEDED
Status: DISCONTINUED | OUTPATIENT
Start: 2025-05-19 | End: 2025-05-19 | Stop reason: HOSPADM

## 2025-05-19 RX ORDER — FAMOTIDINE 10 MG/ML
20 INJECTION, SOLUTION INTRAVENOUS ONCE AS NEEDED
Status: DISCONTINUED | OUTPATIENT
Start: 2025-05-19 | End: 2025-05-19 | Stop reason: HOSPADM

## 2025-05-19 RX ORDER — ALBUTEROL SULFATE 0.83 MG/ML
3 SOLUTION RESPIRATORY (INHALATION) AS NEEDED
Status: DISCONTINUED | OUTPATIENT
Start: 2025-05-19 | End: 2025-05-19 | Stop reason: HOSPADM

## 2025-05-19 RX ORDER — EPINEPHRINE 0.3 MG/.3ML
0.3 INJECTION SUBCUTANEOUS EVERY 5 MIN PRN
Status: DISCONTINUED | OUTPATIENT
Start: 2025-05-19 | End: 2025-05-19 | Stop reason: HOSPADM

## 2025-05-19 RX ADMIN — SODIUM CHLORIDE 400 MG: 9 INJECTION, SOLUTION INTRAVENOUS at 11:42

## 2025-05-19 ASSESSMENT — PAIN SCALES - GENERAL: PAINLEVEL_OUTOF10: 0-NO PAIN

## 2025-06-03 DIAGNOSIS — G89.3 CANCER RELATED PAIN: ICD-10-CM

## 2025-06-03 DIAGNOSIS — C34.90 NON-SMALL CELL LUNG CANCER, UNSPECIFIED LATERALITY (MULTI): ICD-10-CM

## 2025-06-03 RX ORDER — HYDROCODONE BITARTRATE AND ACETAMINOPHEN 10; 325 MG/1; MG/1
1 TABLET ORAL EVERY 6 HOURS PRN
Qty: 60 TABLET | Refills: 0 | Status: SHIPPED | OUTPATIENT
Start: 2025-06-03

## 2025-06-12 ENCOUNTER — PATIENT MESSAGE (OUTPATIENT)
Dept: PRIMARY CARE | Facility: CLINIC | Age: 70
End: 2025-06-12
Payer: MEDICARE

## 2025-06-12 DIAGNOSIS — R11.2 NAUSEA AND VOMITING, UNSPECIFIED VOMITING TYPE: Primary | ICD-10-CM

## 2025-06-12 RX ORDER — PROCHLORPERAZINE MALEATE 5 MG
5 TABLET ORAL EVERY 6 HOURS PRN
Qty: 30 TABLET | Refills: 1 | Status: SHIPPED | OUTPATIENT
Start: 2025-06-12 | End: 2025-06-19

## 2025-06-13 ENCOUNTER — PATIENT OUTREACH (OUTPATIENT)
Dept: PRIMARY CARE | Facility: CLINIC | Age: 70
End: 2025-06-13
Payer: MEDICARE

## 2025-06-13 DIAGNOSIS — E27.40 ADRENAL INSUFFICIENCY (MULTI): ICD-10-CM

## 2025-06-13 RX ORDER — DEXAMETHASONE 4 MG/1
2 TABLET ORAL DAILY
Qty: 30 TABLET | Refills: 3 | Status: SHIPPED | OUTPATIENT
Start: 2025-06-13

## 2025-06-17 DIAGNOSIS — C34.90 NON-SMALL CELL LUNG CANCER, UNSPECIFIED LATERALITY (MULTI): ICD-10-CM

## 2025-06-17 DIAGNOSIS — G89.3 CANCER RELATED PAIN: ICD-10-CM

## 2025-06-17 RX ORDER — HYDROCODONE BITARTRATE AND ACETAMINOPHEN 10; 325 MG/1; MG/1
1 TABLET ORAL EVERY 6 HOURS PRN
Qty: 60 TABLET | Refills: 0 | Status: SHIPPED | OUTPATIENT
Start: 2025-06-17

## 2025-06-27 RX ORDER — PROCHLORPERAZINE MALEATE 10 MG
10 TABLET ORAL EVERY 6 HOURS PRN
Status: CANCELLED | OUTPATIENT
Start: 2025-06-30

## 2025-06-27 RX ORDER — PROCHLORPERAZINE EDISYLATE 5 MG/ML
10 INJECTION INTRAMUSCULAR; INTRAVENOUS EVERY 6 HOURS PRN
Status: CANCELLED | OUTPATIENT
Start: 2025-06-30

## 2025-06-30 ENCOUNTER — LAB (OUTPATIENT)
Dept: LAB | Facility: CLINIC | Age: 70
End: 2025-06-30
Payer: MEDICARE

## 2025-06-30 ENCOUNTER — OFFICE VISIT (OUTPATIENT)
Dept: HEMATOLOGY/ONCOLOGY | Facility: CLINIC | Age: 70
End: 2025-06-30
Payer: MEDICARE

## 2025-06-30 ENCOUNTER — INFUSION (OUTPATIENT)
Dept: HEMATOLOGY/ONCOLOGY | Facility: CLINIC | Age: 70
End: 2025-06-30
Payer: MEDICARE

## 2025-06-30 ENCOUNTER — SOCIAL WORK (OUTPATIENT)
Dept: HEMATOLOGY/ONCOLOGY | Facility: CLINIC | Age: 70
End: 2025-06-30

## 2025-06-30 VITALS
DIASTOLIC BLOOD PRESSURE: 60 MMHG | HEIGHT: 62 IN | TEMPERATURE: 96.1 F | RESPIRATION RATE: 20 BRPM | HEART RATE: 60 BPM | BODY MASS INDEX: 23.94 KG/M2 | SYSTOLIC BLOOD PRESSURE: 102 MMHG | WEIGHT: 130.07 LBS

## 2025-06-30 DIAGNOSIS — E03.2 HYPOTHYROIDISM DUE TO DRUGS: ICD-10-CM

## 2025-06-30 DIAGNOSIS — C34.90 NON-SMALL CELL LUNG CANCER, UNSPECIFIED LATERALITY (MULTI): ICD-10-CM

## 2025-06-30 DIAGNOSIS — C34.90 MALIGNANT NEOPLASM OF LUNG, UNSPECIFIED LATERALITY, UNSPECIFIED PART OF LUNG (MULTI): Primary | ICD-10-CM

## 2025-06-30 DIAGNOSIS — C34.90 MALIGNANT NEOPLASM OF LUNG, UNSPECIFIED LATERALITY, UNSPECIFIED PART OF LUNG (MULTI): ICD-10-CM

## 2025-06-30 DIAGNOSIS — G89.3 CANCER RELATED PAIN: ICD-10-CM

## 2025-06-30 LAB
ALBUMIN SERPL BCP-MCNC: 3.7 G/DL (ref 3.4–5)
ALP SERPL-CCNC: 51 U/L (ref 33–136)
ALT SERPL W P-5'-P-CCNC: 20 U/L (ref 7–45)
ANION GAP SERPL CALC-SCNC: 9 MMOL/L (ref 10–20)
AST SERPL W P-5'-P-CCNC: 19 U/L (ref 9–39)
BASOPHILS # BLD AUTO: 0.05 X10*3/UL (ref 0–0.1)
BASOPHILS NFR BLD AUTO: 0.4 %
BILIRUB SERPL-MCNC: 0.3 MG/DL (ref 0–1.2)
BUN SERPL-MCNC: 21 MG/DL (ref 6–23)
CALCIUM SERPL-MCNC: 9 MG/DL (ref 8.6–10.3)
CHLORIDE SERPL-SCNC: 106 MMOL/L (ref 98–107)
CO2 SERPL-SCNC: 29 MMOL/L (ref 21–32)
CREAT SERPL-MCNC: 0.78 MG/DL (ref 0.5–1.05)
EGFRCR SERPLBLD CKD-EPI 2021: 82 ML/MIN/1.73M*2
EOSINOPHIL # BLD AUTO: 0.16 X10*3/UL (ref 0–0.7)
EOSINOPHIL NFR BLD AUTO: 1.2 %
ERYTHROCYTE [DISTWIDTH] IN BLOOD BY AUTOMATED COUNT: 17.2 % (ref 11.5–14.5)
GLUCOSE SERPL-MCNC: 84 MG/DL (ref 74–99)
HCT VFR BLD AUTO: 36.7 % (ref 36–46)
HGB BLD-MCNC: 10.4 G/DL (ref 12–16)
IMM GRANULOCYTES # BLD AUTO: 0.14 X10*3/UL (ref 0–0.7)
IMM GRANULOCYTES NFR BLD AUTO: 1 % (ref 0–0.9)
LYMPHOCYTES # BLD AUTO: 1.7 X10*3/UL (ref 1.2–4.8)
LYMPHOCYTES NFR BLD AUTO: 12.5 %
MCH RBC QN AUTO: 24.9 PG (ref 26–34)
MCHC RBC AUTO-ENTMCNC: 28.3 G/DL (ref 32–36)
MCV RBC AUTO: 88 FL (ref 80–100)
MONOCYTES # BLD AUTO: 0.93 X10*3/UL (ref 0.1–1)
MONOCYTES NFR BLD AUTO: 6.8 %
NEUTROPHILS # BLD AUTO: 10.6 X10*3/UL (ref 1.2–7.7)
NEUTROPHILS NFR BLD AUTO: 78.1 %
NRBC BLD-RTO: 0 /100 WBCS (ref 0–0)
PLATELET # BLD AUTO: 281 X10*3/UL (ref 150–450)
POTASSIUM SERPL-SCNC: 4.2 MMOL/L (ref 3.5–5.3)
PROT SERPL-MCNC: 6.9 G/DL (ref 6.4–8.2)
RBC # BLD AUTO: 4.17 X10*6/UL (ref 4–5.2)
SODIUM SERPL-SCNC: 140 MMOL/L (ref 136–145)
TSH SERPL-ACNC: 3.67 MIU/L (ref 0.44–3.98)
WBC # BLD AUTO: 13.6 X10*3/UL (ref 4.4–11.3)

## 2025-06-30 PROCEDURE — 36415 COLL VENOUS BLD VENIPUNCTURE: CPT

## 2025-06-30 PROCEDURE — 80053 COMPREHEN METABOLIC PANEL: CPT

## 2025-06-30 PROCEDURE — 96413 CHEMO IV INFUSION 1 HR: CPT

## 2025-06-30 PROCEDURE — 99215 OFFICE O/P EST HI 40 MIN: CPT | Mod: 25 | Performed by: INTERNAL MEDICINE

## 2025-06-30 PROCEDURE — 1126F AMNT PAIN NOTED NONE PRSNT: CPT | Performed by: INTERNAL MEDICINE

## 2025-06-30 PROCEDURE — 84443 ASSAY THYROID STIM HORMONE: CPT

## 2025-06-30 PROCEDURE — 3008F BODY MASS INDEX DOCD: CPT | Performed by: INTERNAL MEDICINE

## 2025-06-30 PROCEDURE — 1159F MED LIST DOCD IN RCRD: CPT | Performed by: INTERNAL MEDICINE

## 2025-06-30 PROCEDURE — 99215 OFFICE O/P EST HI 40 MIN: CPT | Performed by: INTERNAL MEDICINE

## 2025-06-30 PROCEDURE — 2500000004 HC RX 250 GENERAL PHARMACY W/ HCPCS (ALT 636 FOR OP/ED): Performed by: INTERNAL MEDICINE

## 2025-06-30 PROCEDURE — 3074F SYST BP LT 130 MM HG: CPT | Performed by: INTERNAL MEDICINE

## 2025-06-30 PROCEDURE — 85025 COMPLETE CBC W/AUTO DIFF WBC: CPT

## 2025-06-30 PROCEDURE — 3078F DIAST BP <80 MM HG: CPT | Performed by: INTERNAL MEDICINE

## 2025-06-30 RX ORDER — ALBUTEROL SULFATE 0.83 MG/ML
3 SOLUTION RESPIRATORY (INHALATION) AS NEEDED
Status: DISCONTINUED | OUTPATIENT
Start: 2025-06-30 | End: 2025-06-30 | Stop reason: HOSPADM

## 2025-06-30 RX ORDER — DIPHENHYDRAMINE HYDROCHLORIDE 50 MG/ML
50 INJECTION, SOLUTION INTRAMUSCULAR; INTRAVENOUS AS NEEDED
Status: DISCONTINUED | OUTPATIENT
Start: 2025-06-30 | End: 2025-06-30 | Stop reason: HOSPADM

## 2025-06-30 RX ORDER — EPINEPHRINE 0.3 MG/.3ML
0.3 INJECTION SUBCUTANEOUS EVERY 5 MIN PRN
Status: DISCONTINUED | OUTPATIENT
Start: 2025-06-30 | End: 2025-06-30 | Stop reason: HOSPADM

## 2025-06-30 RX ORDER — HYDROCODONE BITARTRATE AND ACETAMINOPHEN 10; 325 MG/1; MG/1
1 TABLET ORAL EVERY 6 HOURS PRN
Qty: 60 TABLET | Refills: 0 | Status: SHIPPED | OUTPATIENT
Start: 2025-06-30

## 2025-06-30 RX ORDER — FAMOTIDINE 10 MG/ML
20 INJECTION, SOLUTION INTRAVENOUS ONCE AS NEEDED
Status: DISCONTINUED | OUTPATIENT
Start: 2025-06-30 | End: 2025-06-30 | Stop reason: HOSPADM

## 2025-06-30 RX ADMIN — SODIUM CHLORIDE 400 MG: 9 INJECTION, SOLUTION INTRAVENOUS at 10:07

## 2025-06-30 ASSESSMENT — PAIN SCALES - GENERAL: PAINLEVEL_OUTOF10: 0-NO PAIN

## 2025-06-30 NOTE — PROGRESS NOTES
Followed up with pt and her . Pt was not feeling well per  a bit ago. She was weak and had no control of her bowels and bladder. Pt felt out of it and was ready to just be done and refused to go to the ER. Per  pt is also having less blood flow and circulation in her L leg leaving her toes purple. They are following up with vascular MD. Per  while she was declining their son came over and talked to pt and per pt she perked up the next day. Pt presents with bruising up and down both arms and has been mostly wc bound at home. Pt explains her back pain has kept her from ambulating and doing more things. Pt has a wc and rollator at home. Pt has walk in shower and has all needed equipment. Pt is frustrated by her limitations and all the work that has been put on her . Talked about pt's mind set when she was ready to be done and inquired how often she feels this way. Pt has an appt with her psych MD this upcoming month. Reviewed how her current meds are holding her. Pt feels aside from that bout she is doing okay. Talked to pt about speaking to psych MD in regards to her break through panic and depression that is occurring. Pt has a goal to be around until Oct when her new grand baby is going to be born. Pt has a PET scan in the next few weeks to see how she is responding to treatment at this time. Provided support and encouragement.

## 2025-06-30 NOTE — PROGRESS NOTES
LOCATION:   [facility], at [facility].     HEMATOLOGY ONCOLOGY PROBLEMS:  1.  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         PD-L1 70%.  No actionable alterations.       a.  Ist -line therapy with Carbo/Taxol/Keytruda x 3 cycles from Sep to Nov 2023.       b.  On maintenance Keytruda beginning Dec 2023.     CHIEF COMPLAINT:    The patient is in the clinic today for followup of poorly differentiated carcinoma and for continuation of treatment and management of therapy related effects.          HISTORY:  Ms. Toussaint is a 70-year-old female with poorly differentiated carcinoma of most probable primary bronchogenic origin.  She has multiple medical problems and was admitted at [facility] in Aug 2023 with complaint worsening shortness of breath, cough and weakness.  There was also history of progressive weight loss and recent falls.  Work-up revealed stable pulmonary nodules but there was concern regarding new bilateral adrenal metastatic lesions along with questionable liver lesions and right hilar lymphadenopathy. She is a lifelong heavy smoker. CT-guided biopsy of the adrenal lesion was suggestive of poorly differentiated carcinoma. 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  She was also evaluated by Dr. Francheska Parada at [facility] and case was reviewed by  pathology and as per tumor board evaluation, overall clinical and radiological findings were considered mainly of probable primary bronchogenic origin.  She was treated with carbo/Taxol/Keytruda combination for 3 cycles.  4th cycle was discontinued due to tolerability issues.  Follow-up CT scan showed interval decrease in bilateral adrenal lesions and she was transitioned to maintenance Keytruda beginning Dec 2023.    INTERVAL HISTORY:  Persistent issues with shortness of breath, weakness and fatigue.  Frequent hospitalizations due to COPD exacerbation / anxiety.  She has also been noted to have left lower extremity vascular blockade and is being closely followed in the vascular surgery clinic and is being maintained on Eliquis.     PAST MEDICAL HISTORY:   1.  COPD on home O2.   2.  Coronary artery disease   3.  Hypertension   4.  Hyperlipidemia   5.  Multiple vascular aneurysms.  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  6.  Peripheral vascular disease.  S/p femoral-femoral bypass   7.  AAA graft repair procedure.  8.  History of tubal ligation, bladder lift, lithotripsy surgeries     SOCIAL HISTORY:    for 46 years and lives in [place].  1 and half to 2 pack a day for 47 years smoking history. Nonalcoholic.  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  Born and raised  in [place].     FAMILY HISTORY:    Father  at age 52 from myocardial infarction.  Mother  at age 75 or from aortic aneurysm related complications.  1 sister  at age 68 from depression and related complications.  3 children and 5 grandkids.  Her son and daughter has history of Crohn's disease.  No other specific history of bleeding, clotting or malignant disorder in the family.     REVIEW OF SYSTEMS:  Pertinent finding as per the history above.  All other systems have been reviewed and generally negative and noncontributory.     PHYSICAL EXAMINATION:    Detailed physical examination not done     ALLERGY & MEDICATIONS:  Allergies and latest outpatient medications list were reviewed in the EMR.    LAB RESULTS:  CBC from today shows a white cell count of 13.6 with hemoglobin of 10.4 and platelets of 281.   Metabolic profile is unremarkable other than glucose of 172.  TSH is pending.  Last 3 sets of blood work were reviewed and the trend was noted.    RADIOLOGY RESULTS:  CT chest 2025  Impression:  1. No evidence of acute pulmonary embolism.  2. Hyperinflation and chronic lung changes with apparently enlarging small nodules suspicious for worsening  neoplasm.  3. Multiple additional nonacute findings as described above.     PATHOLOGY RESULTS:  Surgical Pathology [Aug 23 2023 1:48PM] ([id number])  Specimens: RIGHT ADRENAL LESION CORE   Name AALIYAH SAWANT   Accession #: [id number]   FINAL DIAGNOSIS   A. RIGHT ADRENAL LESION CORE:   METASTATIC POORLY DIFFERENTIATED  CARCINOMA: SOFT TISSUE (IDENTIFIED AS RIGHT ADRENAL) COMMENT : The biopsyconsists entirely of a poorly differentiated malignancy in soft  tissue; no adrenal is seen. The tumor stains for keratins (CAM5.2 and  CK7 but not CK20) and is negative for TTF1 and Napsin as well as p40. Staining for SF1 is completely negative.   Electronically Signed Out By BOLIVAR BARRETO ASA, MD, PhD/AN     ASSESSMENT AND PLAN:   1.  Stage IV metastatic poorly differentiated carcinoma of unknown primary origin.  Please refer the details of initial presentation and management as outlined above.  In summary, patient with history of lifelong heavy smoking.  She was admitted at [facility]  with complaint of worsening shortness of breath in Aug 2023.  Work-up revealed stable pulmonary nodules but there was concern regarding new bilateral adrenal metastatic lesions along with questionable liver lesions and right hilar lymphadenopathy.  CT-guided biopsy of the adrenal lesion was suggestive of poorly differentiated carcinoma.  Cancer type ID molecular test results were suggestive of questionable sarcoma.  She was also evaluated by Dr. Francheska Parada at [facility] and her case was reviewed by  pathology and as per tumor board discussions, overall clinical and radiological finding were considered mainly of probable primary bronchogenic origin.  She was treated with carbo/Taxol/Keytruda combination for 3 cycles.  4th cycle was discontinued due to tolerability issues.  Follow-up CT scan showed interval decrease in bilateral adrenal lesions and she was transitioned to maintenance Keytruda beginning Dec 2023.     For now we will  continue with  Keytruda at the current dose and schedule.  Last follow-up CT scan from 03/28/2025 showed essentially stable findings.  As stated above there are issues with frequent COPD exacerbation and lower extremity vascular issues leading to treatment breaks and hospitalizations.  Probable side effects of Keytruda mainly weakness, fatigue, pneumonitis, colitis, endocrinopathies, pleuritis, skin rash etc. were explained to them in detail.  Her overall long-term prognosis remains guarded.    2.  COPD exacerbation/anxiety.  I strongly advised her to continue to follow-up with pulmonary clinic closely.  There is also a significant component of anxiety and panic attacks and she should continue to follow-up with psychiatry clinic.     3.  Left lower leg wound/vascular issues.  Really appreciate wound and vascular surgery teams from help.  Plavix has been changed to Eliquis. She will continue to follow-up with them.    4.  Follow-up.  She will return to the clinic in 6 weeks. She will be scheduled for follow-up PET scan in the next few weeks.    This note has been transcribed using Dragon voice recognition system and there is a possibility of unintentional typing misprints.

## 2025-06-30 NOTE — SIGNIFICANT EVENT

## 2025-07-12 ENCOUNTER — APPOINTMENT (OUTPATIENT)
Dept: BEHAVIORAL HEALTH | Facility: CLINIC | Age: 70
End: 2025-07-12
Payer: MEDICARE

## 2025-07-12 DIAGNOSIS — F41.1 GAD (GENERALIZED ANXIETY DISORDER): ICD-10-CM

## 2025-07-12 DIAGNOSIS — F33.0 MILD EPISODE OF RECURRENT MAJOR DEPRESSIVE DISORDER: ICD-10-CM

## 2025-07-12 DIAGNOSIS — F41.9 ANXIETY: ICD-10-CM

## 2025-07-12 PROCEDURE — 1160F RVW MEDS BY RX/DR IN RCRD: CPT

## 2025-07-12 PROCEDURE — 1159F MED LIST DOCD IN RCRD: CPT

## 2025-07-12 PROCEDURE — 99214 OFFICE O/P EST MOD 30 MIN: CPT

## 2025-07-12 RX ORDER — GABAPENTIN 300 MG/1
300 CAPSULE ORAL 3 TIMES DAILY
Qty: 270 CAPSULE | Refills: 3 | Status: SHIPPED | OUTPATIENT
Start: 2025-07-12 | End: 2026-07-12

## 2025-07-12 NOTE — PATIENT INSTRUCTIONS
1. Reviewed diagnostic impression including subjective and objective data and provided education about depression and anxiety disorder, treatment recommendations including medication, therapy, course of treatment and prognosis. Patient amendable to treatment plan.     2. Plan - Good job getting off of the Klonopin and I'm glad that you feel better. I will DC today   CONTINUE Bupropion 75 mg every day in the am for depression and fatigue  CONTINUE  Sertraline 100 mg - take 2  table by mouth daily for anxiety and depression   CONTINUE  Gabapentin 300 mg - take 1 tablet, 3 times daily for anxiety   DC  Clonazepam 0.5 mg  - take 1 tablet, twice daily as needed for anxiety          3. Reviewed r/b/a, possible side effects of the medication(s). Client is aware benefit/risks      4. Labs reviewed and - placed order for lab work. Please provide at any  facility Please provide Urine Drug Screen to abide by  Substance Use policy.      5. Plan for supportive psychotherapy     6. Follow up with physical health providers as scheduled     7. Follow up - call in August to schedule in 3 months     May follow up sooner if experiences worsening symptoms by calling  Psychiatry at [phone]      Patient verbalized an understanding to call Mobile Crisis at [phone] ([facility]), 211, or 648/go to the nearest emergency room if experiences thoughts of harm to self or others.

## 2025-07-12 NOTE — PROGRESS NOTES
"  Mrs Toussaint is a 69 year old female with PMH of a stemi, CAD, hypertension, hyperlipidemia, PAD, nicotine abuse, non small lung cancer, referred to  Psychoncology for a follow up evaluation for  treatment of Anxiety.      Patient provided 2 personal identifiers prior to virtual appointment     Chief Complaint - Anxiety     Patient reports that she is doing well from a mental health standpoint  with medications working as expected.     Patient reports that she has occasional anxiety but nothing like she used to. She hasn't experienced panic attacks.     She has to have her fourth toe amputated which causes worry but she knows that it has to be done.     The patient doesn't feel depressed and she describes her mood as  \"good.\"     Patient reports that she is sleeping well and she denies appetite problems.     She will be stopping Ketruda as long as her PET scan is good. She will have the PET scan soon.     Ongoing fatigue with no worsening symptoms.     Breathing OK, using supplemental oxygen via nasal cannula at 2 liters.     The family is in [facility]. Celebrating her  and her granddaughter's birthday      Depression  Visit Type: Follow up   Onset of symptoms: 1 year ago.  Progression since onset: improving   Patient presents with the following symptoms: decreased concentration,occasional worry, fatigue, muscle tension, restlessness and shortness of breath.  Frequency of symptoms: occasional   Severity: moderate   Sleep per night: 7 hours  Sleep quality: good   Nighttime awakenings: occasional  Risk factors: recent illness and major life event  Patient has a history of: anxiety/panic attacks, CAD and chronic lung disease  Treatment tried: non-benzodiazephine anxiolytics and benzodiazepine  Compliance with treatment: good  Improvement on treatment: mild     Anxiety  Presents for follow up visit. Onset was > a year ago.  The problem has improved.  Symptoms include decreased concentration,  dizziness, " "occasional  worry, muscle tension,  and shortness of breath. Symptoms occur intermittently. The severity of symptoms is mild. The symptoms are aggravated by social activities. The patient sleeps 7 hours per night. The quality of sleep is good.  Nighttime awakenings: occasional.      Risk factors include a major life event and recent illness. Her past medical history is significant for anxiety/panic attacks, CAD and chronic lung disease. Past treatments include benzodiazephines and non-benzodiazephine anxiolytics. The treatment has provided marginal relief. Compliance with prior treatments has been good.   Mental Status Examination  General Appearance: Well groomed, appropriate eye contact.  Gait/Station:  gait not assessed, virtual appointment   Speech: Normal rate, volume, prosody  Mood: \" good\"  Affect: full range  Thought Process: Linear, goal directed  Thought Associations: No loosening of associations  Thought Content: normal  Perception: No perceptual abnormalities noted  Level of Consciousness: Alert  Orientation: Alert and oriented to person, place, time and situation  Attention and Concentration: Intact  Recent Memory: Intact as evidenced by ability to recall details from the past 24 hours   Remote Memory: Intact as evidenced by ability to recall previous medical issues   Executive function: Intact  Language: Naming intact  Fund of knowledge: Good  Insight:  Intact  Judgment: Intact, as evidenced by help-seeking behavior, ability to reason through medical decision making, and compliance with treatment recommendations  Review of Systems   Constitutional:  Positive for activity change and fatigue.   Respiratory:  Positive for shortness of breath.    Cardiovascular:  Positive for palpitations.   Neurological:  Positive for dizziness.   Psychiatric/Behavioral:  Positive for decreased concentration, mild depression. The patient is mildly anxious   S  Lab Review:   Lab on 06/30/2025   Component Date Value    WBC " 06/30/2025 13.6 (H)     nRBC 06/30/2025 0.0     RBC 06/30/2025 4.17     Hemoglobin 06/30/2025 10.4 (L)     Hematocrit 06/30/2025 36.7     MCV 06/30/2025 88     MCH 06/30/2025 24.9 (L)     MCHC 06/30/2025 28.3 (L)     RDW 06/30/2025 17.2 (H)     Platelets 06/30/2025 281     Neutrophils % 06/30/2025 78.1     Immature Granulocytes %,* 06/30/2025 1.0 (H)     Lymphocytes % 06/30/2025 12.5     Monocytes % 06/30/2025 6.8     Eosinophils % 06/30/2025 1.2     Basophils % 06/30/2025 0.4     Neutrophils Absolute 06/30/2025 10.60 (H)     Immature Granulocytes Ab* 06/30/2025 0.14     Lymphocytes Absolute 06/30/2025 1.70     Monocytes Absolute 06/30/2025 0.93     Eosinophils Absolute 06/30/2025 0.16     Basophils Absolute 06/30/2025 0.05     Glucose 06/30/2025 84     Sodium 06/30/2025 140     Potassium 06/30/2025 4.2     Chloride 06/30/2025 106     Bicarbonate 06/30/2025 29     Anion Gap 06/30/2025 9 (L)     Urea Nitrogen 06/30/2025 21     Creatinine 06/30/2025 0.78     eGFR 06/30/2025 82     Calcium 06/30/2025 9.0     Albumin 06/30/2025 3.7     Alkaline Phosphatase 06/30/2025 51     Total Protein 06/30/2025 6.9     AST 06/30/2025 19     Bilirubin, Total 06/30/2025 0.3     ALT 06/30/2025 20     Thyroid Stimulating Horm* 06/30/2025 3.67    Infusion on 05/19/2025   Component Date Value    WBC 05/19/2025 12.8 (H)     nRBC 05/19/2025 0.0     RBC 05/19/2025 4.33     Hemoglobin 05/19/2025 11.1 (L)     Hematocrit 05/19/2025 37.9     MCV 05/19/2025 88     MCH 05/19/2025 25.6 (L)     MCHC 05/19/2025 29.3 (L)     RDW 05/19/2025 21.3 (H)     Platelets 05/19/2025 228     Neutrophils % 05/19/2025 75.2     Immature Granulocytes %,* 05/19/2025 0.9     Lymphocytes % 05/19/2025 16.4     Monocytes % 05/19/2025 6.3     Eosinophils % 05/19/2025 0.7     Basophils % 05/19/2025 0.5     Neutrophils Absolute 05/19/2025 9.62 (H)     Immature Granulocytes Ab* 05/19/2025 0.12     Lymphocytes Absolute 05/19/2025 2.10     Monocytes Absolute 05/19/2025 0.81      Eosinophils Absolute 05/19/2025 0.09     Basophils Absolute 05/19/2025 0.06     Glucose 05/19/2025 83     Sodium 05/19/2025 140     Potassium 05/19/2025 4.5     Chloride 05/19/2025 107     Bicarbonate 05/19/2025 25     Anion Gap 05/19/2025 13     Urea Nitrogen 05/19/2025 21     Creatinine 05/19/2025 1.06 (H)     eGFR 05/19/2025 57 (L)     Calcium 05/19/2025 8.8     Albumin 05/19/2025 3.8     Alkaline Phosphatase 05/19/2025 52     Total Protein 05/19/2025 6.7     AST 05/19/2025 14     Bilirubin, Total 05/19/2025 0.4     ALT 05/19/2025 16     Thyroid Stimulating Horm* 05/19/2025 3.86     RBC Morphology 05/19/2025 See Below     Ovalocytes 05/19/2025 Few              Assessment/Plan   Safety Assessment - no remote or recent SI, HI, or SA. Protective Factors - connected family , no trauma history, limited substance use history . Risk factors - cancer diagnosis and treatment superimposed upon chronic illnesses. Relative Risk is low.      The patient has not required Klonopin and we will DC the medication today. She is satisfied with her other medications.  She doesn't take Hydroxyzine often. Diagnoses and all orders for this visit:  OSITO (generalized a Recurrent      CONTINUE  Sertraline 100 mg - take 2 tablets  by mouth daily for anxiety and depression   CONTINUE   Gabapentin 300 mg - take 1 tablet, 3 times daily for anxie  DC  Clonazepam 0.5 mg PO BID as needed for anxiety   CONTINUE Wellbutrin 75 mg PO every day in the am for depression     Follow up - call to schedule      Chart Review - 2 minutes   Assessment with patient contact - 25 minutes   Charting 5 minutes     Total time 32 minutes

## 2025-07-15 DIAGNOSIS — C34.90 MALIGNANT NEOPLASM OF LUNG, UNSPECIFIED LATERALITY, UNSPECIFIED PART OF LUNG (MULTI): Primary | ICD-10-CM

## 2025-07-17 ENCOUNTER — TELEPHONE (OUTPATIENT)
Dept: HEMATOLOGY/ONCOLOGY | Facility: CLINIC | Age: 70
End: 2025-07-17
Payer: MEDICARE

## 2025-07-17 DIAGNOSIS — G89.3 CANCER RELATED PAIN: ICD-10-CM

## 2025-07-17 DIAGNOSIS — C34.90 NON-SMALL CELL LUNG CANCER, UNSPECIFIED LATERALITY (MULTI): ICD-10-CM

## 2025-07-17 RX ORDER — HYDROCODONE BITARTRATE AND ACETAMINOPHEN 10; 325 MG/1; MG/1
1 TABLET ORAL EVERY 6 HOURS PRN
Qty: 60 TABLET | Refills: 0 | Status: SHIPPED | OUTPATIENT
Start: 2025-07-17

## 2025-07-17 NOTE — TELEPHONE ENCOUNTER
Detailed message left for Opal letting her know PET was denied by insurance and cancelled. Gave her the phone number to call and schedule CT scans so we can see if insurance will approve those. Asked her to [employer] message or call with more questions.

## 2025-07-18 DIAGNOSIS — E03.2 HYPOTHYROIDISM DUE TO DRUGS: ICD-10-CM

## 2025-07-18 DIAGNOSIS — C34.90 MALIGNANT NEOPLASM OF LUNG, UNSPECIFIED LATERALITY, UNSPECIFIED PART OF LUNG (MULTI): ICD-10-CM

## 2025-07-22 ENCOUNTER — APPOINTMENT (OUTPATIENT)
Dept: RADIOLOGY | Facility: CLINIC | Age: 70
End: 2025-07-22
Payer: MEDICARE

## 2025-07-24 ENCOUNTER — APPOINTMENT (OUTPATIENT)
Dept: PRIMARY CARE | Facility: CLINIC | Age: 70
End: 2025-07-24
Payer: MEDICARE

## 2025-07-28 ENCOUNTER — APPOINTMENT (OUTPATIENT)
Dept: PRIMARY CARE | Facility: CLINIC | Age: 70
End: 2025-07-28
Payer: MEDICARE

## 2025-07-28 VITALS
SYSTOLIC BLOOD PRESSURE: 98 MMHG | HEART RATE: 77 BPM | BODY MASS INDEX: 23.19 KG/M2 | DIASTOLIC BLOOD PRESSURE: 60 MMHG | OXYGEN SATURATION: 99 % | WEIGHT: 126 LBS | HEIGHT: 62 IN

## 2025-07-28 DIAGNOSIS — C34.90 MALIGNANT NEOPLASM OF LUNG, UNSPECIFIED LATERALITY, UNSPECIFIED PART OF LUNG (MULTI): ICD-10-CM

## 2025-07-28 DIAGNOSIS — Z00.00 MEDICARE ANNUAL WELLNESS VISIT, SUBSEQUENT: Primary | ICD-10-CM

## 2025-07-28 DIAGNOSIS — I25.10 CORONARY ARTERY DISEASE DUE TO LIPID RICH PLAQUE: ICD-10-CM

## 2025-07-28 DIAGNOSIS — I25.83 CORONARY ARTERY DISEASE DUE TO LIPID RICH PLAQUE: ICD-10-CM

## 2025-07-28 DIAGNOSIS — F51.01 PRIMARY INSOMNIA: Chronic | ICD-10-CM

## 2025-07-28 DIAGNOSIS — I73.9 PERIPHERAL VASCULAR DISEASE: ICD-10-CM

## 2025-07-28 DIAGNOSIS — I73.9 INTERMITTENT CLAUDICATION: ICD-10-CM

## 2025-07-28 DIAGNOSIS — J43.8 OTHER EMPHYSEMA (MULTI): ICD-10-CM

## 2025-07-28 DIAGNOSIS — F41.9 ANXIETY: ICD-10-CM

## 2025-07-28 DIAGNOSIS — R11.2 NAUSEA AND VOMITING, UNSPECIFIED VOMITING TYPE: ICD-10-CM

## 2025-07-28 PROCEDURE — G0439 PPPS, SUBSEQ VISIT: HCPCS | Performed by: STUDENT IN AN ORGANIZED HEALTH CARE EDUCATION/TRAINING PROGRAM

## 2025-07-28 PROCEDURE — 3008F BODY MASS INDEX DOCD: CPT | Performed by: STUDENT IN AN ORGANIZED HEALTH CARE EDUCATION/TRAINING PROGRAM

## 2025-07-28 PROCEDURE — 1170F FXNL STATUS ASSESSED: CPT | Performed by: STUDENT IN AN ORGANIZED HEALTH CARE EDUCATION/TRAINING PROGRAM

## 2025-07-28 PROCEDURE — 3078F DIAST BP <80 MM HG: CPT | Performed by: STUDENT IN AN ORGANIZED HEALTH CARE EDUCATION/TRAINING PROGRAM

## 2025-07-28 PROCEDURE — 1124F ACP DISCUSS-NO DSCNMKR DOCD: CPT | Performed by: STUDENT IN AN ORGANIZED HEALTH CARE EDUCATION/TRAINING PROGRAM

## 2025-07-28 PROCEDURE — 3074F SYST BP LT 130 MM HG: CPT | Performed by: STUDENT IN AN ORGANIZED HEALTH CARE EDUCATION/TRAINING PROGRAM

## 2025-07-28 PROCEDURE — 1160F RVW MEDS BY RX/DR IN RCRD: CPT | Performed by: STUDENT IN AN ORGANIZED HEALTH CARE EDUCATION/TRAINING PROGRAM

## 2025-07-28 PROCEDURE — 99214 OFFICE O/P EST MOD 30 MIN: CPT | Performed by: STUDENT IN AN ORGANIZED HEALTH CARE EDUCATION/TRAINING PROGRAM

## 2025-07-28 PROCEDURE — 1159F MED LIST DOCD IN RCRD: CPT | Performed by: STUDENT IN AN ORGANIZED HEALTH CARE EDUCATION/TRAINING PROGRAM

## 2025-07-28 RX ORDER — MONTELUKAST SODIUM 10 MG/1
10 TABLET ORAL NIGHTLY
Qty: 90 TABLET | Refills: 3 | Status: SHIPPED | OUTPATIENT
Start: 2025-07-28 | End: 2026-01-24

## 2025-07-28 RX ORDER — PROCHLORPERAZINE MALEATE 5 MG
5 TABLET ORAL EVERY 6 HOURS PRN
Qty: 120 TABLET | Refills: 0 | Status: SHIPPED | OUTPATIENT
Start: 2025-07-28

## 2025-07-28 RX ORDER — PROCHLORPERAZINE MALEATE 5 MG
5 TABLET ORAL EVERY 6 HOURS PRN
COMMUNITY
End: 2025-07-28 | Stop reason: SDUPTHER

## 2025-07-28 RX ORDER — LIDOCAINE AND PRILOCAINE 25; 25 MG/G; MG/G
CREAM TOPICAL 2 TIMES DAILY PRN
Qty: 30 G | Refills: 1 | Status: SHIPPED | OUTPATIENT
Start: 2025-07-28

## 2025-07-28 RX ORDER — OLANZAPINE 15 MG/1
15 TABLET, FILM COATED ORAL NIGHTLY
Qty: 90 TABLET | Refills: 1 | Status: SHIPPED | OUTPATIENT
Start: 2025-07-28 | End: 2026-01-24

## 2025-07-28 RX ORDER — FLUTICASONE FUROATE, UMECLIDINIUM BROMIDE AND VILANTEROL TRIFENATATE 200; 62.5; 25 UG/1; UG/1; UG/1
1 POWDER RESPIRATORY (INHALATION)
Qty: 60 EACH | Refills: 3 | Status: SHIPPED | OUTPATIENT
Start: 2025-07-28

## 2025-07-28 ASSESSMENT — ACTIVITIES OF DAILY LIVING (ADL)
DOING_HOUSEWORK: INDEPENDENT
BATHING: INDEPENDENT
TAKING_MEDICATION: INDEPENDENT
DRESSING: INDEPENDENT
MANAGING_FINANCES: INDEPENDENT
GROCERY_SHOPPING: INDEPENDENT

## 2025-07-28 ASSESSMENT — PATIENT HEALTH QUESTIONNAIRE - PHQ9
2. FEELING DOWN, DEPRESSED OR HOPELESS: NOT AT ALL
1. LITTLE INTEREST OR PLEASURE IN DOING THINGS: NOT AT ALL
SUM OF ALL RESPONSES TO PHQ9 QUESTIONS 1 AND 2: 0
SUM OF ALL RESPONSES TO PHQ9 QUESTIONS 1 AND 2: 0
1. LITTLE INTEREST OR PLEASURE IN DOING THINGS: NOT AT ALL
2. FEELING DOWN, DEPRESSED OR HOPELESS: NOT AT ALL

## 2025-07-28 NOTE — PROGRESS NOTES
Subjective   Patient ID: Radha Toussaint is a 70 y.o. female who presents for Medicare Annual Wellness Visit Subsequent (Medicare wellness visit /Smoking less- cut down to 3 packs a week).  History of Present Illness  Radha Toussaint is a 70 year old female with peripheral vascular disease who presents with foot pain and pending toe amputation.    She experiences significant pain in her left foot, primarily under the ball of the foot, due to poor circulation. The affected area is turning black again. She is awaiting a toe amputation, expected in six months, although it may occur sooner depending on her condition.    For pain relief, she uses Bactine and lidocaine spray, which provide short-term relief. The pain is described as periodic and affects her sleep. Her caregiver assists with cleaning the area using iodine, and she sometimes uses a wrap, although it can exacerbate the pain.    She has a history of smoking but has reduced her intake, which has improved her breathing. She experiences panic attacks, particularly when transitioning from the car to the house, and these episodes have decreased in frequency but still occur. She attributes some episodes to environmental factors like humidity.    Her medication regimen includes gabapentin, which provides minimal pain relief without causing memory issues. She was recently taken off Plavix due to skin tears and bruising, which has improved her condition. She is also on Trelegy and olanzapine, and she is due for her next oncologist follow-up and infusion on August 12th.    No issues with appetite, bowel movements, abdominal pain, neck pain, swelling, or ear problems. No significant dizziness or lightheadedness. Her ankles are not swollen, although tight socks can cause temporary impressions.    Review of Systems  ROS otherwise negative aside from what was mentioned above in HPI.    Objective     BP 98/60 (BP Location: Left arm, Patient Position: Sitting, BP Cuff Size:  "Adult)   Pulse 77   Ht 1.58 m (5' 2.21\")   Wt 57.2 kg (126 lb)   SpO2 99%   BMI 22.89 kg/m²      Current Outpatient Medications   Medication Instructions    acetaminophen (TYLENOL) 975 mg, oral, Every 6 hours PRN    apixaban (ELIQUIS) 5 mg, oral, 2 times daily    buPROPion XL (WELLBUTRIN XL) 150 mg, oral, Daily, Do not crush, chew, or split.    clopidogrel (PLAVIX) 75 mg, oral, Every morning    dexAMETHasone (DECADRON) 2 mg, oral, Daily    fluticasone-umeclidin-vilanter (Trelegy Ellipta) 200-62.5-25 mcg blister with device 1 puff, inhalation, Daily RT    gabapentin (NEURONTIN) 300 mg, oral, 3 times daily    HYDROcodone-acetaminophen (Norco)  mg tablet 1 tablet, oral, Every 6 hours PRN    lidocaine-prilocaine (Emla) 2.5-2.5 % cream Topical, 2 times daily PRN, Apply topically to left foot    lubiprostone (AMITIZA) 24 mcg, oral, 2 times daily (morning and late afternoon)    montelukast (SINGULAIR) 10 mg, oral, Nightly    OLANZapine (ZYPREXA) 15 mg, oral, Nightly    ondansetron ODT (ZOFRAN-ODT) 8 mg, oral, Every 8 hours PRN    oxygen (O2) gas therapy 1 each, Continuous    pantoprazole (PROTONIX) 40 mg, oral, Daily before breakfast, Do not crush, chew, or split.    prochlorperazine (COMPAZINE) 5 mg, oral, Every 6 hours PRN    rosuvastatin (CRESTOR) 40 mg, oral, Daily    sertraline (ZOLOFT) 100 mg, oral, 2 times daily       Physical Exam  GENERAL: Alert, cooperative, well developed, no acute distress  HEENT: Normocephalic, normal oropharynx, moist mucous membranes.  NECK: Thyroid normal to palpation, neck normal  CHEST: Clear to auscultation bilaterally, no wheezes, rhonchi, or crackles  CV: Normal heart rate and rhythm, S1 and S2 normal without murmurs  ABDOMEN: Soft, non-tender, non-distended, abdomen normal  NEURO: Cranial nerves grossly intact, moves all extremities without gross motor impairment, normal ROM  EXTREMITIES: No ankle swelling    Results      Assessment & Plan  Chronic ulcer of the left " foot  Significant pain in the left foot, particularly under the ball of the foot, associated with poor blood flow leading to blackening of the toe. Awaiting potential surgical intervention to remove the affected toe, depending on blood flow status. Uses Bactine and lidocaine spray for temporary pain relief. Lidocaine cream prescribed for longer-lasting relief.  - Prescribe topical lidocaine cream for pain management.    Peripheral artery disease (PAD)  PAD contributes to poor blood flow in the left foot, leading to ulceration and potential need for toe amputation. Specialists are managing this condition. Efforts to restore blood flow will be made, but dead tissue will not be saved.    Chronic pain management  Chronic pain, particularly in the left foot due to ulceration. Gabapentin provides some relief without significant side effects such as confusion or excessive sleepiness.    Easy bruising and bleeding  Previously on clopidogrel (Plavix) but discontinued due to skin tears and excessive bruising. Cessation has reduced these symptoms.    Chronic obstructive pulmonary disease (COPD)  Reports improved breathing and reduced smoking, positively impacting respiratory status. Uses Trelegy for COPD management.  - Refill Trelegy inhaler.    Anxiety and panic attacks  Experiences panic attacks, often triggered by specific situations such as transitioning from the car to the house. Frequency has decreased but still occur. On sertraline and clonazepam for anxiety management.    Follow-up  Follow-up appointment with the oncologist on August 12th for the next infusion.    Follow up in 6 months    Francheska Rogel DO     This medical note was created with the assistance of artificial intelligence (AI) for documentation purposes. The content has been reviewed and confirmed by the healthcare provider for accuracy and completeness. Patient consented to the use of audio recording and use of AI during their visit.

## 2025-07-29 DIAGNOSIS — G89.3 CANCER RELATED PAIN: ICD-10-CM

## 2025-07-29 DIAGNOSIS — C34.90 NON-SMALL CELL LUNG CANCER, UNSPECIFIED LATERALITY (MULTI): ICD-10-CM

## 2025-07-29 RX ORDER — HYDROCODONE BITARTRATE AND ACETAMINOPHEN 10; 325 MG/1; MG/1
1 TABLET ORAL EVERY 6 HOURS PRN
Qty: 60 TABLET | Refills: 0 | Status: SHIPPED | OUTPATIENT
Start: 2025-07-29

## 2025-07-30 ENCOUNTER — LAB (OUTPATIENT)
Dept: LAB | Facility: HOSPITAL | Age: 70
End: 2025-07-30
Payer: MEDICARE

## 2025-07-30 DIAGNOSIS — E03.2 HYPOTHYROIDISM DUE TO DRUGS: ICD-10-CM

## 2025-07-30 DIAGNOSIS — C34.90 MALIGNANT NEOPLASM OF LUNG, UNSPECIFIED LATERALITY, UNSPECIFIED PART OF LUNG (MULTI): ICD-10-CM

## 2025-07-30 DIAGNOSIS — C34.90 MALIGNANT NEOPLASM OF UNSPECIFIED PART OF UNSPECIFIED BRONCHUS OR LUNG (MULTI): Primary | ICD-10-CM

## 2025-07-30 LAB
ALBUMIN SERPL BCP-MCNC: 3.7 G/DL (ref 3.4–5)
ALP SERPL-CCNC: 56 U/L (ref 33–136)
ALT SERPL W P-5'-P-CCNC: 15 U/L (ref 7–45)
ANION GAP SERPL CALC-SCNC: 11 MMOL/L (ref 10–20)
AST SERPL W P-5'-P-CCNC: 13 U/L (ref 9–39)
BASOPHILS # BLD AUTO: 0.05 X10*3/UL (ref 0–0.1)
BASOPHILS NFR BLD AUTO: 0.3 %
BILIRUB SERPL-MCNC: 0.3 MG/DL (ref 0–1.2)
BUN SERPL-MCNC: 19 MG/DL (ref 6–23)
CALCIUM SERPL-MCNC: 9.1 MG/DL (ref 8.6–10.3)
CHLORIDE SERPL-SCNC: 103 MMOL/L (ref 98–107)
CO2 SERPL-SCNC: 29 MMOL/L (ref 21–32)
CREAT SERPL-MCNC: 0.79 MG/DL (ref 0.5–1.05)
EGFRCR SERPLBLD CKD-EPI 2021: 81 ML/MIN/1.73M*2
EOSINOPHIL # BLD AUTO: 0.13 X10*3/UL (ref 0–0.7)
EOSINOPHIL NFR BLD AUTO: 0.8 %
ERYTHROCYTE [DISTWIDTH] IN BLOOD BY AUTOMATED COUNT: 16.2 % (ref 11.5–14.5)
GLUCOSE SERPL-MCNC: 108 MG/DL (ref 74–99)
HCT VFR BLD AUTO: 38.4 % (ref 36–46)
HGB BLD-MCNC: 10.5 G/DL (ref 12–16)
IMM GRANULOCYTES # BLD AUTO: 0.12 X10*3/UL (ref 0–0.7)
IMM GRANULOCYTES NFR BLD AUTO: 0.8 % (ref 0–0.9)
LYMPHOCYTES # BLD AUTO: 2.15 X10*3/UL (ref 1.2–4.8)
LYMPHOCYTES NFR BLD AUTO: 13.7 %
MCH RBC QN AUTO: 23.6 PG (ref 26–34)
MCHC RBC AUTO-ENTMCNC: 27.3 G/DL (ref 32–36)
MCV RBC AUTO: 87 FL (ref 80–100)
MONOCYTES # BLD AUTO: 0.92 X10*3/UL (ref 0.1–1)
MONOCYTES NFR BLD AUTO: 5.9 %
NEUTROPHILS # BLD AUTO: 12.32 X10*3/UL (ref 1.2–7.7)
NEUTROPHILS NFR BLD AUTO: 78.5 %
NRBC BLD-RTO: 0 /100 WBCS (ref 0–0)
PLATELET # BLD AUTO: 240 X10*3/UL (ref 150–450)
POTASSIUM SERPL-SCNC: 4.1 MMOL/L (ref 3.5–5.3)
PROT SERPL-MCNC: 6.3 G/DL (ref 6.4–8.2)
RBC # BLD AUTO: 4.44 X10*6/UL (ref 4–5.2)
SODIUM SERPL-SCNC: 139 MMOL/L (ref 136–145)
TSH SERPL-ACNC: 3.54 MIU/L (ref 0.44–3.98)
WBC # BLD AUTO: 15.7 X10*3/UL (ref 4.4–11.3)

## 2025-07-30 PROCEDURE — 36415 COLL VENOUS BLD VENIPUNCTURE: CPT

## 2025-07-30 PROCEDURE — 84443 ASSAY THYROID STIM HORMONE: CPT

## 2025-07-30 PROCEDURE — 85025 COMPLETE CBC W/AUTO DIFF WBC: CPT

## 2025-07-30 PROCEDURE — 80053 COMPREHEN METABOLIC PANEL: CPT

## 2025-08-01 ENCOUNTER — PATIENT MESSAGE (OUTPATIENT)
Dept: PRIMARY CARE | Facility: CLINIC | Age: 70
End: 2025-08-01
Payer: MEDICARE

## 2025-08-01 DIAGNOSIS — C34.90 NON-SMALL CELL LUNG CANCER, UNSPECIFIED LATERALITY (MULTI): ICD-10-CM

## 2025-08-01 DIAGNOSIS — R11.2 NAUSEA AND VOMITING, UNSPECIFIED VOMITING TYPE: ICD-10-CM

## 2025-08-01 DIAGNOSIS — G89.3 CANCER RELATED PAIN: ICD-10-CM

## 2025-08-01 PROBLEM — T78.3XXA ANGIOEDEMA: Status: RESOLVED | Noted: 2023-03-24 | Resolved: 2025-08-01

## 2025-08-01 RX ORDER — ONDANSETRON 4 MG/1
4 TABLET, ORALLY DISINTEGRATING ORAL EVERY 8 HOURS PRN
Qty: 30 TABLET | Refills: 2 | Status: SHIPPED | OUTPATIENT
Start: 2025-08-01 | End: 2026-08-01

## 2025-08-04 ENCOUNTER — HOSPITAL ENCOUNTER (OUTPATIENT)
Dept: RADIOLOGY | Facility: HOSPITAL | Age: 70
Discharge: HOME | End: 2025-08-04
Payer: MEDICARE

## 2025-08-04 DIAGNOSIS — C34.90 MALIGNANT NEOPLASM OF LUNG, UNSPECIFIED LATERALITY, UNSPECIFIED PART OF LUNG (MULTI): ICD-10-CM

## 2025-08-04 PROCEDURE — 74177 CT ABD & PELVIS W/CONTRAST: CPT

## 2025-08-04 PROCEDURE — 2550000001 HC RX 255 CONTRASTS: Performed by: INTERNAL MEDICINE

## 2025-08-04 PROCEDURE — 74177 CT ABD & PELVIS W/CONTRAST: CPT | Performed by: RADIOLOGY

## 2025-08-04 PROCEDURE — 71260 CT THORAX DX C+: CPT | Performed by: RADIOLOGY

## 2025-08-04 RX ADMIN — IOHEXOL 75 ML: 350 INJECTION, SOLUTION INTRAVENOUS at 15:19

## 2025-08-11 RX ORDER — PROCHLORPERAZINE EDISYLATE 5 MG/ML
10 INJECTION INTRAMUSCULAR; INTRAVENOUS EVERY 6 HOURS PRN
Status: CANCELLED | OUTPATIENT
Start: 2025-08-12

## 2025-08-11 RX ORDER — DIPHENHYDRAMINE HYDROCHLORIDE 50 MG/ML
50 INJECTION, SOLUTION INTRAMUSCULAR; INTRAVENOUS AS NEEDED
Status: CANCELLED | OUTPATIENT
Start: 2025-08-12

## 2025-08-11 RX ORDER — FAMOTIDINE 10 MG/ML
20 INJECTION, SOLUTION INTRAVENOUS ONCE AS NEEDED
Status: CANCELLED | OUTPATIENT
Start: 2025-08-12

## 2025-08-11 RX ORDER — EPINEPHRINE 0.3 MG/.3ML
0.3 INJECTION SUBCUTANEOUS EVERY 5 MIN PRN
Status: CANCELLED | OUTPATIENT
Start: 2025-08-12

## 2025-08-11 RX ORDER — ALBUTEROL SULFATE 0.83 MG/ML
3 SOLUTION RESPIRATORY (INHALATION) AS NEEDED
Status: CANCELLED | OUTPATIENT
Start: 2025-08-12

## 2025-08-11 RX ORDER — PROCHLORPERAZINE MALEATE 10 MG
10 TABLET ORAL EVERY 6 HOURS PRN
Status: CANCELLED | OUTPATIENT
Start: 2025-08-12

## 2025-08-12 ENCOUNTER — APPOINTMENT (OUTPATIENT)
Dept: PRIMARY CARE | Facility: CLINIC | Age: 70
End: 2025-08-12
Payer: MEDICARE

## 2025-08-12 ENCOUNTER — OFFICE VISIT (OUTPATIENT)
Dept: HEMATOLOGY/ONCOLOGY | Facility: CLINIC | Age: 70
End: 2025-08-12
Payer: MEDICARE

## 2025-08-12 ENCOUNTER — LAB (OUTPATIENT)
Dept: LAB | Facility: CLINIC | Age: 70
End: 2025-08-12
Payer: MEDICARE

## 2025-08-12 ENCOUNTER — INFUSION (OUTPATIENT)
Dept: HEMATOLOGY/ONCOLOGY | Facility: CLINIC | Age: 70
End: 2025-08-12
Payer: MEDICARE

## 2025-08-12 ENCOUNTER — SOCIAL WORK (OUTPATIENT)
Dept: HEMATOLOGY/ONCOLOGY | Facility: CLINIC | Age: 70
End: 2025-08-12

## 2025-08-12 VITALS
OXYGEN SATURATION: 97 % | BODY MASS INDEX: 23.94 KG/M2 | TEMPERATURE: 96.8 F | SYSTOLIC BLOOD PRESSURE: 103 MMHG | WEIGHT: 130.07 LBS | RESPIRATION RATE: 20 BRPM | HEIGHT: 62 IN | DIASTOLIC BLOOD PRESSURE: 59 MMHG

## 2025-08-12 VITALS
HEART RATE: 63 BPM | RESPIRATION RATE: 18 BRPM | SYSTOLIC BLOOD PRESSURE: 111 MMHG | DIASTOLIC BLOOD PRESSURE: 56 MMHG | TEMPERATURE: 97.5 F

## 2025-08-12 DIAGNOSIS — C34.90 MALIGNANT NEOPLASM OF LUNG, UNSPECIFIED LATERALITY, UNSPECIFIED PART OF LUNG (MULTI): ICD-10-CM

## 2025-08-12 DIAGNOSIS — G89.3 CANCER RELATED PAIN: ICD-10-CM

## 2025-08-12 DIAGNOSIS — C34.90 MALIGNANT NEOPLASM OF LUNG, UNSPECIFIED LATERALITY, UNSPECIFIED PART OF LUNG (MULTI): Primary | ICD-10-CM

## 2025-08-12 DIAGNOSIS — C34.90 NON-SMALL CELL LUNG CANCER, UNSPECIFIED LATERALITY (MULTI): ICD-10-CM

## 2025-08-12 DIAGNOSIS — E03.2 HYPOTHYROIDISM DUE TO DRUGS: ICD-10-CM

## 2025-08-12 LAB
ALBUMIN SERPL BCP-MCNC: 3.8 G/DL (ref 3.4–5)
ALP SERPL-CCNC: 50 U/L (ref 33–136)
ALT SERPL W P-5'-P-CCNC: 15 U/L (ref 7–45)
ANION GAP SERPL CALC-SCNC: 10 MMOL/L (ref 10–20)
AST SERPL W P-5'-P-CCNC: 12 U/L (ref 9–39)
BASOPHILS # BLD AUTO: 0.06 X10*3/UL (ref 0–0.1)
BASOPHILS NFR BLD AUTO: 0.4 %
BILIRUB SERPL-MCNC: 0.3 MG/DL (ref 0–1.2)
BUN SERPL-MCNC: 30 MG/DL (ref 6–23)
CALCIUM SERPL-MCNC: 9 MG/DL (ref 8.6–10.3)
CHLORIDE SERPL-SCNC: 105 MMOL/L (ref 98–107)
CO2 SERPL-SCNC: 28 MMOL/L (ref 21–32)
CREAT SERPL-MCNC: 0.79 MG/DL (ref 0.5–1.05)
EGFRCR SERPLBLD CKD-EPI 2021: 81 ML/MIN/1.73M*2
EOSINOPHIL # BLD AUTO: 0.11 X10*3/UL (ref 0–0.7)
EOSINOPHIL NFR BLD AUTO: 0.8 %
ERYTHROCYTE [DISTWIDTH] IN BLOOD BY AUTOMATED COUNT: 16.7 % (ref 11.5–14.5)
GLUCOSE SERPL-MCNC: 111 MG/DL (ref 74–99)
HCT VFR BLD AUTO: 38 % (ref 36–46)
HGB BLD-MCNC: 10.4 G/DL (ref 12–16)
IMM GRANULOCYTES # BLD AUTO: 0.17 X10*3/UL (ref 0–0.7)
IMM GRANULOCYTES NFR BLD AUTO: 1.2 % (ref 0–0.9)
LYMPHOCYTES # BLD AUTO: 2.11 X10*3/UL (ref 1.2–4.8)
LYMPHOCYTES NFR BLD AUTO: 14.8 %
MCH RBC QN AUTO: 22.7 PG (ref 26–34)
MCHC RBC AUTO-ENTMCNC: 27.4 G/DL (ref 32–36)
MCV RBC AUTO: 83 FL (ref 80–100)
MONOCYTES # BLD AUTO: 0.92 X10*3/UL (ref 0.1–1)
MONOCYTES NFR BLD AUTO: 6.5 %
NEUTROPHILS # BLD AUTO: 10.86 X10*3/UL (ref 1.2–7.7)
NEUTROPHILS NFR BLD AUTO: 76.3 %
NRBC BLD-RTO: 0 /100 WBCS (ref 0–0)
PLATELET # BLD AUTO: 229 X10*3/UL (ref 150–450)
POTASSIUM SERPL-SCNC: 4.2 MMOL/L (ref 3.5–5.3)
PROT SERPL-MCNC: 6.9 G/DL (ref 6.4–8.2)
RBC # BLD AUTO: 4.59 X10*6/UL (ref 4–5.2)
SODIUM SERPL-SCNC: 139 MMOL/L (ref 136–145)
T4 FREE SERPL-MCNC: 0.7 NG/DL (ref 0.61–1.12)
TSH SERPL-ACNC: 6.57 MIU/L (ref 0.44–3.98)
WBC # BLD AUTO: 14.2 X10*3/UL (ref 4.4–11.3)

## 2025-08-12 PROCEDURE — 3074F SYST BP LT 130 MM HG: CPT | Performed by: INTERNAL MEDICINE

## 2025-08-12 PROCEDURE — 1126F AMNT PAIN NOTED NONE PRSNT: CPT | Performed by: INTERNAL MEDICINE

## 2025-08-12 PROCEDURE — 84443 ASSAY THYROID STIM HORMONE: CPT

## 2025-08-12 PROCEDURE — 96413 CHEMO IV INFUSION 1 HR: CPT

## 2025-08-12 PROCEDURE — 84075 ASSAY ALKALINE PHOSPHATASE: CPT

## 2025-08-12 PROCEDURE — 1159F MED LIST DOCD IN RCRD: CPT | Performed by: INTERNAL MEDICINE

## 2025-08-12 PROCEDURE — 2500000004 HC RX 250 GENERAL PHARMACY W/ HCPCS (ALT 636 FOR OP/ED): Mod: JZ,TB | Performed by: INTERNAL MEDICINE

## 2025-08-12 PROCEDURE — 36415 COLL VENOUS BLD VENIPUNCTURE: CPT

## 2025-08-12 PROCEDURE — 3078F DIAST BP <80 MM HG: CPT | Performed by: INTERNAL MEDICINE

## 2025-08-12 PROCEDURE — 84439 ASSAY OF FREE THYROXINE: CPT

## 2025-08-12 PROCEDURE — 85025 COMPLETE CBC W/AUTO DIFF WBC: CPT

## 2025-08-12 PROCEDURE — 99215 OFFICE O/P EST HI 40 MIN: CPT | Performed by: INTERNAL MEDICINE

## 2025-08-12 PROCEDURE — 3008F BODY MASS INDEX DOCD: CPT | Performed by: INTERNAL MEDICINE

## 2025-08-12 PROCEDURE — 99215 OFFICE O/P EST HI 40 MIN: CPT | Mod: 25 | Performed by: INTERNAL MEDICINE

## 2025-08-12 RX ORDER — HEPARIN SODIUM,PORCINE/PF 10 UNIT/ML
50 SYRINGE (ML) INTRAVENOUS AS NEEDED
OUTPATIENT
Start: 2025-08-12

## 2025-08-12 RX ORDER — HYDROCODONE BITARTRATE AND ACETAMINOPHEN 10; 325 MG/1; MG/1
1 TABLET ORAL EVERY 6 HOURS PRN
Qty: 60 TABLET | Refills: 0 | Status: SHIPPED | OUTPATIENT
Start: 2025-08-12

## 2025-08-12 RX ORDER — HEPARIN 100 UNIT/ML
500 SYRINGE INTRAVENOUS AS NEEDED
OUTPATIENT
Start: 2025-08-12

## 2025-08-12 RX ADMIN — SODIUM CHLORIDE 400 MG: 9 INJECTION, SOLUTION INTRAVENOUS at 11:23

## 2025-08-12 ASSESSMENT — PAIN SCALES - GENERAL: PAINLEVEL_OUTOF10: 0-NO PAIN

## 2025-08-19 ENCOUNTER — TELEPHONE (OUTPATIENT)
Dept: HEMATOLOGY/ONCOLOGY | Facility: CLINIC | Age: 70
End: 2025-08-19
Payer: MEDICARE

## 2025-08-26 ENCOUNTER — PATIENT OUTREACH (OUTPATIENT)
Dept: PRIMARY CARE | Facility: CLINIC | Age: 70
End: 2025-08-26
Payer: MEDICARE

## 2025-09-02 LAB — NON-UH HIE POC GLUCOSE: 111 MG/DL (ref 72–100)

## 2026-01-28 ENCOUNTER — APPOINTMENT (OUTPATIENT)
Dept: PRIMARY CARE | Facility: CLINIC | Age: 71
End: 2026-01-28
Payer: MEDICARE

## (undated) DEVICE — INTRODUCER SET, MICROPUNCTURE, REG, 4FR 10CM W/SS GUIDEWIRE

## (undated) DEVICE — GUIDEWIRE, GOLD TIP, 45 DEG ANGLE, 300 CM, 3CM TIP

## (undated) DEVICE — STRIP, SKIN CLOSURE, STERI-STRIP, REINFORCED, 0.25 X 3 IN

## (undated) DEVICE — CATHETER, CXI SUPPORT, .018 X 150CM, STR TIP

## (undated) DEVICE — CATHETER, BALLOON, PACIFIC PLUS PTA, 3.0 X 120 X 150

## (undated) DEVICE — CATHETER, BALLOON, EVERCROSS, 6MM X 40MM X 135CM, OTW PTA

## (undated) DEVICE — SHEATH, BRITE TIP 6 X 23

## (undated) DEVICE — INFLATION KIT, ADVANTAGE, ENCORE 26 (1/BOX)

## (undated) DEVICE — CATHETER, BALLOON, INPACT AV, DCB 018 6.0 MM X 150 MM X 200 CM

## (undated) DEVICE — INTRODUCER, MICROPUNCTURE, 2.9/4.0 FR, W/ GUIDEWIRE, ECHO

## (undated) DEVICE — GUIDEWIRE, COMMAND ST, HI-TORQUE, 0.18 X 300CM

## (undated) DEVICE — SHEATH, PINNACLE, 10 CM,  5FR INTRODUCER, 5FR DIA, 2.5 CM DIALATOR

## (undated) DEVICE — ACCESSORY KIT, JETI NON STERILE, DISP

## (undated) DEVICE — SHEATH, 45CM, W/DILATOR, 6 FR DIA, ST CURVE STYLE W/CROSS-CUT VALVE

## (undated) DEVICE — NEEDLE, ENTRY, PERCUTANEOUS, 21 G X 2.5 CM

## (undated) DEVICE — ACCESS KIT, S-MAK MINI, 4FR 10CM 0.018IN 40CM, NT/PT, ECHO ENHANCE NEEDLE

## (undated) DEVICE — Device

## (undated) DEVICE — CATHETER, CXI SUPPORT, .018 2.6F X 65CM, STR TIP

## (undated) DEVICE — GUIDEWIRE, HI-TORQUE, VERSACORE, 260CM, FLOPPY

## (undated) DEVICE — CATHETER, NAVICROSS, 0.035 X 90CM, ANGLED TIP

## (undated) DEVICE — GUIDE WIRE, 260CM, HI-TORQUE, VERSACORE, MODIFIED J

## (undated) DEVICE — NEEDLE, PERCUTANEOUS ENTRY, THINWALL, W/O BASEPLATE, 18 G X 7 CM

## (undated) DEVICE — CATHETER, ARMADA 18PTA, 5.0MM X 150MM X 150CM

## (undated) DEVICE — CATHETER, BALLOON, PACIFIC PLUS PTA, 3.0 X 80 X 150

## (undated) DEVICE — NEEDLE, SAFETY, 22 G X 1.5 IN

## (undated) DEVICE — SURVIVAL KIT, EVEREST 30

## (undated) DEVICE — BANDAGE, GAUZE, CONFORMING, KERLIX, 6 PLY, 4.5 IN X 4.1 YD

## (undated) DEVICE — SHEATH, INTRODUCER, ACT, 6.5FR 11CML, GREEN, 0.038 IN

## (undated) DEVICE — THROMBECTOMY SYSTEM, JETI 6FR

## (undated) DEVICE — GUIDEWIRE, HI-TORQUE, COMMAND ES, 0.014 X 300CM

## (undated) DEVICE — CATHETER, TURBO-POWER, 2.0MM X 150CM, OTW PERIPHERAL LASER ATHERECTOMY, 0.018IN

## (undated) DEVICE — SNARE KIT, AMPLATZ, GOOSE NECK, 10 MM X 120 CM, 4 FR X 102 CM

## (undated) DEVICE — GUIDEWIRE, STEELCORE .018 X 300CM

## (undated) DEVICE — DEVICE, INFLATION, ENCORE 20

## (undated) DEVICE — CATHETER, ARMADA 18PTA, 5.0MM X 200MM X 150CM

## (undated) DEVICE — CATHETER, BALLOON, CHOCOLATE, 3.0MM X 120MM, 150CM, OTW PTA

## (undated) DEVICE — DRESSING, ABDOMINAL, TENDERSORB, 8 X 7-1/2 IN, STERILE